# Patient Record
Sex: MALE | Race: WHITE | NOT HISPANIC OR LATINO | Employment: OTHER | URBAN - METROPOLITAN AREA
[De-identification: names, ages, dates, MRNs, and addresses within clinical notes are randomized per-mention and may not be internally consistent; named-entity substitution may affect disease eponyms.]

---

## 2017-01-02 DIAGNOSIS — I10 ESSENTIAL HYPERTENSION: ICD-10-CM

## 2017-01-04 RX ORDER — HYDROCHLOROTHIAZIDE 12.5 MG/1
CAPSULE, GELATIN COATED ORAL
Qty: 30 CAP | Refills: 11 | Status: SHIPPED | OUTPATIENT
Start: 2017-01-04 | End: 2017-10-02

## 2017-01-13 ENCOUNTER — APPOINTMENT (OUTPATIENT)
Dept: MEDICAL GROUP | Facility: MEDICAL CENTER | Age: 72
End: 2017-01-13
Payer: MEDICARE

## 2017-01-27 ENCOUNTER — HOSPITAL ENCOUNTER (OUTPATIENT)
Dept: LAB | Facility: MEDICAL CENTER | Age: 72
End: 2017-01-27
Attending: FAMILY MEDICINE
Payer: MEDICARE

## 2017-01-27 DIAGNOSIS — E11.42 TYPE 2 DIABETES MELLITUS WITH PERIPHERAL NEUROPATHY (HCC): ICD-10-CM

## 2017-01-27 DIAGNOSIS — E78.5 HYPERLIPIDEMIA: ICD-10-CM

## 2017-01-27 DIAGNOSIS — E55.9 VITAMIN D INSUFFICIENCY: ICD-10-CM

## 2017-01-27 DIAGNOSIS — I10 ESSENTIAL HYPERTENSION: ICD-10-CM

## 2017-01-27 LAB
25(OH)D3 SERPL-MCNC: 19 NG/ML (ref 30–100)
ALBUMIN SERPL BCP-MCNC: 4 G/DL (ref 3.2–4.9)
ALBUMIN/GLOB SERPL: 1.3 G/DL
ALP SERPL-CCNC: 112 U/L (ref 30–99)
ALT SERPL-CCNC: 35 U/L (ref 2–50)
ANION GAP SERPL CALC-SCNC: 6 MMOL/L (ref 0–11.9)
AST SERPL-CCNC: 26 U/L (ref 12–45)
BASOPHILS # BLD AUTO: 0.06 K/UL (ref 0–0.12)
BASOPHILS NFR BLD AUTO: 1.1 % (ref 0–1.8)
BILIRUB SERPL-MCNC: 1.8 MG/DL (ref 0.1–1.5)
BUN SERPL-MCNC: 17 MG/DL (ref 8–22)
CALCIUM SERPL-MCNC: 9.3 MG/DL (ref 8.5–10.5)
CHLORIDE SERPL-SCNC: 105 MMOL/L (ref 96–112)
CHOLEST SERPL-MCNC: 149 MG/DL (ref 100–199)
CO2 SERPL-SCNC: 27 MMOL/L (ref 20–33)
CREAT SERPL-MCNC: 0.97 MG/DL (ref 0.5–1.4)
CREAT UR-MCNC: 95.5 MG/DL
EOSINOPHIL # BLD: 0.2 K/UL (ref 0–0.51)
EOSINOPHIL NFR BLD AUTO: 3.5 % (ref 0–6.9)
ERYTHROCYTE [DISTWIDTH] IN BLOOD BY AUTOMATED COUNT: 46.1 FL (ref 35.9–50)
EST. AVERAGE GLUCOSE BLD GHB EST-MCNC: 192 MG/DL
GLOBULIN SER CALC-MCNC: 3 G/DL (ref 1.9–3.5)
GLUCOSE SERPL-MCNC: 163 MG/DL (ref 65–99)
HBA1C MFR BLD: 8.3 % (ref 0–5.6)
HCT VFR BLD AUTO: 49.2 % (ref 42–52)
HDLC SERPL-MCNC: 56 MG/DL
HGB BLD-MCNC: 15.7 G/DL (ref 14–18)
IMM GRANULOCYTES # BLD AUTO: 0.01 K/UL (ref 0–0.11)
IMM GRANULOCYTES NFR BLD AUTO: 0.2 % (ref 0–0.9)
LDLC SERPL CALC-MCNC: 75 MG/DL
LYMPHOCYTES # BLD: 1.41 K/UL (ref 1–4.8)
LYMPHOCYTES NFR BLD AUTO: 25 % (ref 22–41)
MCH RBC QN AUTO: 30.5 PG (ref 27–33)
MCHC RBC AUTO-ENTMCNC: 31.9 G/DL (ref 33.7–35.3)
MCV RBC AUTO: 95.5 FL (ref 81.4–97.8)
MICROALBUMIN UR-MCNC: 6 MG/DL
MICROALBUMIN/CREAT UR: 63 MG/G (ref 0–30)
MONOCYTES # BLD: 0.51 K/UL (ref 0–0.85)
MONOCYTES NFR BLD AUTO: 9 % (ref 0–13.4)
NEUTROPHILS # BLD: 3.46 K/UL (ref 1.82–7.42)
NEUTROPHILS NFR BLD AUTO: 61.2 % (ref 44–72)
NRBC # BLD AUTO: 0 K/UL
NRBC BLD-RTO: 0 /100 WBC
PLATELET # BLD AUTO: 171 K/UL (ref 164–446)
PMV BLD AUTO: 10.1 FL (ref 9–12.9)
POTASSIUM SERPL-SCNC: 4.2 MMOL/L (ref 3.6–5.5)
PROT SERPL-MCNC: 7 G/DL (ref 6–8.2)
RBC # BLD AUTO: 5.15 M/UL (ref 4.7–6.1)
SODIUM SERPL-SCNC: 138 MMOL/L (ref 135–145)
TRIGL SERPL-MCNC: 89 MG/DL (ref 0–149)
TSH SERPL DL<=0.005 MIU/L-ACNC: 1.24 UIU/ML (ref 0.3–3.7)
WBC # BLD AUTO: 5.7 K/UL (ref 4.8–10.8)

## 2017-01-27 PROCEDURE — 80061 LIPID PANEL: CPT

## 2017-01-27 PROCEDURE — 83036 HEMOGLOBIN GLYCOSYLATED A1C: CPT

## 2017-01-27 PROCEDURE — 82570 ASSAY OF URINE CREATININE: CPT

## 2017-01-27 PROCEDURE — 84443 ASSAY THYROID STIM HORMONE: CPT

## 2017-01-27 PROCEDURE — 36415 COLL VENOUS BLD VENIPUNCTURE: CPT

## 2017-01-27 PROCEDURE — 80053 COMPREHEN METABOLIC PANEL: CPT

## 2017-01-27 PROCEDURE — 82306 VITAMIN D 25 HYDROXY: CPT

## 2017-01-27 PROCEDURE — 85025 COMPLETE CBC W/AUTO DIFF WBC: CPT

## 2017-01-27 PROCEDURE — 82043 UR ALBUMIN QUANTITATIVE: CPT

## 2017-01-29 ENCOUNTER — PATIENT OUTREACH (OUTPATIENT)
Dept: HEALTH INFORMATION MANAGEMENT | Facility: OTHER | Age: 72
End: 2017-01-29

## 2017-01-29 NOTE — PROGRESS NOTES
1ST ATTEMPT AWV- REQUESTED A CALL BACK    DUE: , COLONOSCOPY, RETINAL SCREENING, DIABETES MONOFILAMENT, TDAP, ZOSTER, PNEUMO AND FLU (NO RECORD IN [facility])

## 2017-01-30 ENCOUNTER — OFFICE VISIT (OUTPATIENT)
Dept: MEDICAL GROUP | Facility: MEDICAL CENTER | Age: 72
End: 2017-01-30
Payer: MEDICARE

## 2017-01-30 VITALS
BODY MASS INDEX: 32.85 KG/M2 | RESPIRATION RATE: 16 BRPM | OXYGEN SATURATION: 94 % | HEART RATE: 72 BPM | DIASTOLIC BLOOD PRESSURE: 82 MMHG | HEIGHT: 74 IN | SYSTOLIC BLOOD PRESSURE: 142 MMHG | TEMPERATURE: 97.5 F | WEIGHT: 256 LBS

## 2017-01-30 DIAGNOSIS — I10 ESSENTIAL HYPERTENSION: ICD-10-CM

## 2017-01-30 DIAGNOSIS — N40.1 BENIGN NON-NODULAR PROSTATIC HYPERPLASIA WITH LOWER URINARY TRACT SYMPTOMS: ICD-10-CM

## 2017-01-30 DIAGNOSIS — E55.9 VITAMIN D DEFICIENCY: ICD-10-CM

## 2017-01-30 DIAGNOSIS — E11.42 TYPE 2 DIABETES MELLITUS WITH PERIPHERAL NEUROPATHY (HCC): ICD-10-CM

## 2017-01-30 DIAGNOSIS — E78.00 PURE HYPERCHOLESTEROLEMIA: ICD-10-CM

## 2017-01-30 PROCEDURE — 1036F TOBACCO NON-USER: CPT | Performed by: FAMILY MEDICINE

## 2017-01-30 PROCEDURE — 1101F PT FALLS ASSESS-DOCD LE1/YR: CPT | Performed by: FAMILY MEDICINE

## 2017-01-30 PROCEDURE — 3045F PR MOST RECENT HEMOGLOBIN A1C LEVEL 7.0-9.0%: CPT | Performed by: FAMILY MEDICINE

## 2017-01-30 PROCEDURE — 99214 OFFICE O/P EST MOD 30 MIN: CPT | Performed by: FAMILY MEDICINE

## 2017-01-30 PROCEDURE — G8598 ASA/ANTIPLAT THER USED: HCPCS | Performed by: FAMILY MEDICINE

## 2017-01-30 PROCEDURE — G8419 CALC BMI OUT NRM PARAM NOF/U: HCPCS | Performed by: FAMILY MEDICINE

## 2017-01-30 PROCEDURE — G8484 FLU IMMUNIZE NO ADMIN: HCPCS | Performed by: FAMILY MEDICINE

## 2017-01-30 PROCEDURE — 4040F PNEUMOC VAC/ADMIN/RCVD: CPT | Mod: 8P | Performed by: FAMILY MEDICINE

## 2017-01-30 PROCEDURE — G8432 DEP SCR NOT DOC, RNG: HCPCS | Performed by: FAMILY MEDICINE

## 2017-01-30 PROCEDURE — 3017F COLORECTAL CA SCREEN DOC REV: CPT | Mod: 8P | Performed by: FAMILY MEDICINE

## 2017-01-30 ASSESSMENT — PATIENT HEALTH QUESTIONNAIRE - PHQ9: CLINICAL INTERPRETATION OF PHQ2 SCORE: 0

## 2017-01-30 NOTE — ASSESSMENT & PLAN NOTE
Patient has been forgetting to take vitamin D supplementation. He has vitamin D 5000 units at home. Vitamin D level was 19.

## 2017-01-30 NOTE — ASSESSMENT & PLAN NOTE
Patient is tolerating current medications without any noticeable side effects. His A1c did go up from 7.7 up to 8.3. He is still taking Actos 45 mg daily and metformin 500 mg 3 times a day. He states he has not been compliant with healthy diet recently.

## 2017-01-30 NOTE — ASSESSMENT & PLAN NOTE
"Patient is waking up one time at night and notices that his stream is decreased in the middle night. During the day his stream is \"pretty good\". He is not on any medication for prostate.  "

## 2017-01-30 NOTE — MR AVS SNAPSHOT
"        Agapito Stone   2017 1:40 PM   Office Visit   MRN: [id number]    Department:  [facility]   Dept Phone:  [phone]    Description:  Male : 1945   Provider:  Brody Zamorano M.D.           Reason for Visit     Follow-Up     Results           Allergies as of 2017     Allergen Noted Reactions    Fish 2010   Hives    shellfish    Pcn [Penicillins] 2010   Hives    Amoxicillin 2013   Hives    Food 03/10/2014   Swelling     Eggs,Melons, avacados-puffy mouth    Horse Allergy 2015       Influenza Vaccines 2016         You were diagnosed with     Type 2 diabetes mellitus with peripheral neuropathy (CMS-HCC)   [3330923]       Essential hypertension   [4634141]       Pure hypercholesterolemia   [272.0.ICD-9-CM]       Vitamin D deficiency   [6633685]       Benign non-nodular prostatic hyperplasia with lower urinary tract symptoms   [7264105]         Vital Signs     Blood Pressure Pulse Temperature Respirations Height Weight    142/82 mmHg 72 36.4 °C (97.5 °F) 16 1.88 m (6' 2.02\") 116.121 kg (256 lb)    Body Mass Index Oxygen Saturation Smoking Status             32.85 kg/m2 94% Never Smoker          Basic Information     Date Of Birth Sex Race Ethnicity Preferred Language    1945 Male White Non- English      Your appointments     Mar 08, 2017 11:00 AM   FOLLOW UP with Kade Ovalle M.D.   [facility] (--)    [address] [place] [address] [address]  [facility] [address]   [phone]              Problem List              ICD-10-CM Priority Class Noted - Resolved    History of stroke Z86.73 Medium  2012 - Present    HTN (hypertension) I10 High  2012 - Present    Carotid atherosclerosis I65.29 High  2012 - Present    CAD (coronary artery disease) I25.10 High  10/30/2013 - Present    Hyperlipidemia E78.5 High  10/30/2013 - Present    Stented coronary artery Z95.5 High  2013 - Present    Dysphagia R13.10 "   9/4/2014 - Present    Type 2 diabetes mellitus with peripheral neuropathy (CMS-HCC) E11.42   9/24/2014 - Present    Hereditary and idiopathic peripheral neuropathy G60.9   9/24/2014 - Present    Vitamin D deficiency E55.9   11/9/2015 - Present    Seasonal allergies J30.2   11/9/2015 - Present    Obesity (BMI 30.0-34.9) E66.9   11/9/2015 - Present    Agent orange exposure Z77.098   1/14/2016 - Present    Right ankle pain M25.571   9/22/2016 - Present    Benign non-nodular prostatic hyperplasia with lower urinary tract symptoms N40.1   1/30/2017 - Present      Health Maintenance        Date Due Completion Dates    IMM DTaP/Tdap/Td Vaccine (1 - Tdap) 2/25/1966 ---    COLONOSCOPY 2/25/1997 ---    IMM ZOSTER VACCINE 2/25/2007 ---    IMM PNEUMOCOCCAL 65+ (ADULT) LOW/MEDIUM RISK SERIES (1 of 2 - PCV13) 2/25/2012 ---    DIABETES MONOFILAMENT / LE EXAM 3/18/2016 3/18/2015 (N/S), 3/10/2014    Override on 3/18/2015: (N/S)    IMM INFLUENZA (1) 9/1/2016 ---    RETINAL SCREENING 10/12/2016 10/12/2015, 10/6/2014, 8/16/2013, 6/29/2012    A1C SCREENING 7/27/2017 1/27/2017, 9/22/2016, 6/20/2016, 3/19/2016, 3/19/2016, 9/23/2015, 3/18/2015, 9/24/2014, 9/22/2014, 11/15/2013, 1/29/2012, 6/14/2010    FASTING LIPID PROFILE 1/27/2018 1/27/2017, 6/20/2016, 3/19/2016, 10/8/2015, 9/22/2014, 11/15/2013, 1/29/2012, 6/14/2010    URINE ACR / MICROALBUMIN 1/27/2018 1/27/2017, 6/20/2016, 3/19/2016, 9/21/2015, 10/22/2012    SERUM CREATININE 1/27/2018 1/27/2017, 9/5/2016, 6/20/2016, 3/19/2016, 10/8/2015, 3/30/2015, 3/16/2015, 9/22/2014, 11/15/2013, 2/20/2013, 1/31/2013, 10/22/2012, 1/29/2012, 1/28/2012, 7/14/2010, 7/6/2010, 6/14/2010, 6/13/2010, 4/9/2007, 1/23/2007            Current Immunizations     No immunizations on file.      Below and/or attached are the medications your provider expects you to take. Review all of your home medications and newly ordered medications with your provider and/or pharmacist. Follow medication instructions as  directed by your provider and/or pharmacist. Please keep your medication list with you and share with your provider. Update the information when medications are discontinued, doses are changed, or new medications (including over-the-counter products) are added; and carry medication information at all times in the event of emergency situations     Allergies:  FISH - Hives     PCN - Hives     AMOXICILLIN - Hives     FOOD - Swelling     HORSE ALLERGY - (reactions not documented)     INFLUENZA VACCINES - (reactions not documented)               Medications  Valid as of: January 30, 2017 -  1:52 PM    Generic Name Brand Name Tablet Size Instructions for use    Aspirin (Tab) aspirin 81 MG Take 81 mg by mouth every day.        Atorvastatin Calcium (Tab) LIPITOR 80 MG Take 1 Tab by mouth every day.        Blood Glucose Monitoring Suppl (Misc) Blood Glucose Monitoring Suppl SUPPLIES Accu Check Yoanna blood glucose test strips.  Checking blood sugars 2 times per day E11.40        Carvedilol (Tab) COREG 12.5 MG Take 1 Tab by mouth 2 times a day, with meals.        Cholecalciferol (Tab) Vitamin D-3 5000 UNITS Take 1 Tab by mouth every day.          HydroCHLOROthiazide (Cap) MICROZIDE 12.5 MG TAKE ONE CAPSULE BY MOUTH ONE TIME DAILY        Lisinopril (Tab) PRINIVIL 20 MG Take 1 Tab by mouth every day.        MetFORMIN HCl (Tab) GLUCOPHAGE 500 MG Take 1 Tab by mouth 3 times a day, with meals.        Multiple Vitamin   Take 1 Tab by mouth every day.        Nitroglycerin (SL Tab) NITROSTAT 0.4 MG Place 1 Tab under tongue as needed for Chest Pain.        Pioglitazone HCl (Tab) ACTOS 45 MG Take 1 Tab by mouth every day.        .                 Medicines prescribed today were sent to:     [id number] - [place] - [address] [place] [address]    Phone: [phone] Fax: [phone]    Open 24 Hours?: No      Medication refill instructions:       If your prescription bottle indicates you have medication refills  left, it is not necessary to call your provider’s office. Please contact your pharmacy and they will refill your medication.    If your prescription bottle indicates you do not have any refills left, you may request refills at any time through one of the following ways: The online Jampp system (except Urgent Care), by calling your provider’s office, or by asking your pharmacy to contact your provider’s office with a refill request. Medication refills are processed only during regular business hours and may not be available until the next business day. Your provider may request additional information or to have a follow-up visit with you prior to refilling your medication.   *Please Note: Medication refills are assigned a new Rx number when refilled electronically. Your pharmacy may indicate that no refills were authorized even though a new prescription for the same medication is available at the pharmacy. Please request the medicine by name with the pharmacy before contacting your provider for a refill.        Your To Do List     Future Labs/Procedures Complete By Expires    COMP METABOLIC PANEL  As directed 1/31/2018    HEMOGLOBIN A1C  As directed 1/31/2018    LIPID PROFILE  As directed 1/31/2018    MICROALBUMIN CREAT RATIO URINE  As directed 1/31/2018    VITAMIN D,25 HYDROXY  As directed 1/31/2018         Jampp Access Code: Activation code not generated  Current Jampp Status: Active

## 2017-01-30 NOTE — Clinical Note
[facility]  Brody Zamorano M.D.  [address]  [place] [address]  Fax: [phone] Authorization for Release/Disclosure of Protected Health Information   Name: NADINE CABRERA : 1945 SSN: [id number]   Address: [address] [place] [address]  [place] [address] Phone:    [phone] (home)    I authorize the entity listed below to release/disclose the PHI below to [facility]/Brody Zamorano M.D.   Provider or Entity Name:  Dr Jorden Samaniego      Address   City, [facility]   Phone:      Fax:     Reason for request: continuity of care   Information to be released:    [  ] LAST COLONOSCOPY, including any PATH REPORT [  ] LAST DEXA  [  ] LAST MAMMOGRAM  [  ] LAST PAP [  ] RETINA EXAM REPORT  [  ] IMMUNIZATION RECORDS  [  ] Release all info      [  ] Check here and initial the line next to each item to release ALL health information INCLUDING  _____ Care and treatment for drug and / or alcohol abuse  _____ HIV testing, infection status, or AIDS  _____ Genetic Testing    DATES OF SERVICE OR TIME PERIOD TO BE DISCLOSED: _____________  I understand and acknowledge that:  * This Authorization may be revoked at any time by you in writing, except if your health information has already been used or disclosed.  * Your health information that will be used or disclosed as a result of you signing this authorization could be re-disclosed by the recipient. If this occurs, your re-disclosed health information may no longer be protected by State or Federal laws.  * You may refuse to sign this Authorization. Your refusal will not affect your ability to obtain treatment.  * This Authorization becomes effective upon signing and will  on (date) __________. If no date is indicated, this Authorization will  one (1) year from the signature date.    Name: Nadine Cabrera    Signature:     Date: 2017

## 2017-01-30 NOTE — ASSESSMENT & PLAN NOTE
Patient is tolerating atorvastatin 80 mg daily. He has history of stroke. His history of coronary artery disease, requiring stent.

## 2017-01-30 NOTE — PROGRESS NOTES
"Subjective:   Agapito Stone is a 71 y.o. male here today for diabetes, BPH, every lipidemia    Benign non-nodular prostatic hyperplasia with lower urinary tract symptoms  Patient is waking up one time at night and notices that his stream is decreased in the middle night. During the day his stream is \"pretty good\". He is not on any medication for prostate.    HTN (hypertension)  Patient is tolerating current medications without any noticeable side effects.    Type 2 diabetes mellitus with peripheral neuropathy  Patient is tolerating current medications without any noticeable side effects. His A1c did go up from 7.7 up to 8.3. He is still taking Actos 45 mg daily and metformin 500 mg 3 times a day. He states he has not been compliant with healthy diet recently.    Hyperlipidemia  Patient is tolerating atorvastatin 80 mg daily. He has history of stroke. His history of coronary artery disease, requiring stent.    Vitamin D deficiency  Patient has been forgetting to take vitamin D supplementation. He has vitamin D 5000 units at home. Vitamin D level was 19.           Current medicines (including changes today)  Current Outpatient Prescriptions   Medication Sig Dispense Refill   • hydrochlorothiazide (MICROZIDE) 12.5 MG capsule TAKE ONE CAPSULE BY MOUTH ONE TIME DAILY 30 Cap 11   • carvedilol (COREG) 12.5 MG Tab Take 1 Tab by mouth 2 times a day, with meals. 180 Tab 3   • atorvastatin (LIPITOR) 80 MG tablet Take 1 Tab by mouth every day. 90 Tab 3   • metformin (GLUCOPHAGE) 500 MG Tab Take 1 Tab by mouth 3 times a day, with meals. 90 Tab 5   • Blood Glucose Monitoring Suppl SUPPLIES Misc Accu Check Yoanna blood glucose test strips.  Checking blood sugars 2 times per day E11.40 150 Strip 5   • pioglitazone (ACTOS) 45 MG Tab Take 1 Tab by mouth every day. 90 Tab 1   • lisinopril (PRINIVIL) 20 MG Tab Take 1 Tab by mouth every day. 90 Tab 2   • aspirin 81 MG tablet Take 81 mg by mouth every day.     • nitroglycerin " "(NITROSTAT) 0.4 MG SUBL Place 1 Tab under tongue as needed for Chest Pain. 25 Tab 1   • Cholecalciferol (VITAMIN D-3) 5000 UNITS TABS Take 1 Tab by mouth every day.       • Multiple Vitamin (HM STRESS FORMULA VITAMIN PO) Take 1 Tab by mouth every day.       No current facility-administered medications for this visit.     He  has a past medical history of Diabetes; Stroke (CMS-[facility]) (2010); Hypertension; CAD (coronary artery disease) (October 2013); Hyperlipidemia; and Syncope.    ROS   No chest pain, no shortness of breath, no abdominal pain       Objective:     Blood pressure 142/82, pulse 72, temperature 36.4 °C (97.5 °F), resp. rate 16, height 1.88 m (6' 2.02\"), weight 116.121 kg (256 lb), SpO2 94 %. Body mass index is 32.85 kg/(m^2).   Physical Exam:  Constitutional: Alert, no distress.  Skin: Warm, dry, good turgor, no rashes in visible areas.  Eye: Equal, round and reactive, conjunctiva clear, lids normal.  Psych: Alert and oriented x3, normal affect and mood.        Assessment and Plan:   The following treatment plan was discussed    1. Type 2 diabetes mellitus with peripheral neuropathy (CMS-HCC)  Advised patient on diet and exercise. Continue current medication. Follow-up in 3 months. If no improvement in blood sugar in 3 months consider adding Januvia.  - COMP METABOLIC PANEL; Future  - HEMOGLOBIN A1C; Future  - MICROALBUMIN CREAT RATIO URINE; Future    2. Essential hypertension  Controlled. Continue current medication.    3. Pure hypercholesterolemia  Controlled. Continue current medication.  - LIPID PROFILE; Future    4. Vitamin D deficiency  Advised patient to start taking vitamin D 5000 units daily.  - VITAMIN D,25 HYDROXY; Future    5. Benign non-nodular prostatic hyperplasia with lower urinary tract symptoms  Advised patient to monitor symptoms and if symptoms get worse then consider Flomax.      Followup: Return in about 3 months (around 4/30/2017) for Diabetes, Short.           "

## 2017-01-30 NOTE — Clinical Note
[facility]  Brody Zamorano M.D.  [address]  [place] [address]  Fax: [phone] Authorization for Release/Disclosure of Protected Health Information   Name: AGAPITO CABRERA : 1945 SSN: [id number]   Address: [address] [place] [address]  [place] [address] Phone:    [phone] (home)    I authorize the entity listed below to release/disclose the PHI below to [facility]/Brody Zamorano M.D.   [facility]      Address   City, State, Zip   Phone:      Fax:     Reason for request: continuity of care   Information to be released:    [X] LAST COLONOSCOPY, including any PATH REPORT [  ] LAST DEXA  [  ] LAST MAMMOGRAM  [  ] LAST PAP [  ] RETINA EXAM REPORT  [  ] IMMUNIZATION RECORDS  [  ] Release all info      [  ] Check here and initial the line next to each item to release ALL health information INCLUDING  _____ Care and treatment for drug and / or alcohol abuse  _____ HIV testing, infection status, or AIDS  _____ Genetic Testing    DATES OF SERVICE OR TIME PERIOD TO BE DISCLOSED: _____________  I understand and acknowledge that:  * This Authorization may be revoked at any time by you in writing, except if your health information has already been used or disclosed.  * Your health information that will be used or disclosed as a result of you signing this authorization could be re-disclosed by the recipient. If this occurs, your re-disclosed health information may no longer be protected by State or Federal laws.  * You may refuse to sign this Authorization. Your refusal will not affect your ability to obtain treatment.  * This Authorization becomes effective upon signing and will  on (date) __________. If no date is indicated, this Authorization will  one (1) year from the signature date.    Name: Agapito Cabrera    Signature:     Date: 2017

## 2017-02-03 DIAGNOSIS — E11.42 TYPE 2 DIABETES MELLITUS WITH PERIPHERAL NEUROPATHY (HCC): ICD-10-CM

## 2017-02-03 NOTE — TELEPHONE ENCOUNTER
Please notify patient received a message from his insurance company saying that he has not filled a prescription for metformin or Actos in the last one year. Please ask why he is not filling his diabetic medications.  Brody Zamorano M.D.

## 2017-02-06 RX ORDER — PIOGLITAZONEHYDROCHLORIDE 45 MG/1
45 TABLET ORAL DAILY
Qty: 90 TAB | Refills: 3 | Status: SHIPPED | OUTPATIENT
Start: 2017-02-06 | End: 2018-01-27 | Stop reason: SDUPTHER

## 2017-02-06 NOTE — TELEPHONE ENCOUNTER
Pt's wife states [facility]. Pt's wife had also received an over supply of Metformin from [facility] two years ago and shared it with him. They would like to get back on track to receiving rx's from [facility] [place].     He needs a refill for Actos and Metformin.     Call back number: [phone] (home)

## 2017-02-11 NOTE — PROGRESS NOTES
2/11/17   -     Annual Wellness Visit Scheduling  1. Scheduling Status:Not Scheduled. Patient states they are not interested   PT SAID THAT HE SAW DR. JONES RECENTLY AND HE ALREADY HAS ENOUGH DR'S.     MyChart Activation: already active

## 2017-02-16 ENCOUNTER — APPOINTMENT (OUTPATIENT)
Dept: ADMISSIONS | Facility: MEDICAL CENTER | Age: 72
End: 2017-02-16
Attending: OPHTHALMOLOGY
Payer: MEDICARE

## 2017-02-17 DIAGNOSIS — E66.9 DIABETES MELLITUS TYPE 2 IN OBESE (HCC): ICD-10-CM

## 2017-02-17 DIAGNOSIS — E11.69 DIABETES MELLITUS TYPE 2 IN OBESE (HCC): ICD-10-CM

## 2017-02-24 DIAGNOSIS — E11.69 DIABETES MELLITUS TYPE 2 IN OBESE (HCC): ICD-10-CM

## 2017-02-24 DIAGNOSIS — E66.9 DIABETES MELLITUS TYPE 2 IN OBESE (HCC): ICD-10-CM

## 2017-02-24 NOTE — TELEPHONE ENCOUNTER
Pt needs 3 mo RX HARD COPT for  on Monday at .    This is due to pharmacy change.     Was the patient seen in the last year in this department? Yes     Does patient have an active prescription for medications requested? No     Received Request Via: Patient

## 2017-03-02 ENCOUNTER — TELEPHONE (OUTPATIENT)
Dept: MEDICAL GROUP | Facility: MEDICAL CENTER | Age: 72
End: 2017-03-02

## 2017-03-02 DIAGNOSIS — E11.42 TYPE 2 DIABETES MELLITUS WITH PERIPHERAL NEUROPATHY (HCC): ICD-10-CM

## 2017-03-02 RX ORDER — BLOOD-GLUCOSE METER
1 KIT MISCELLANEOUS DAILY
Qty: 1 KIT | Refills: 0 | Status: ON HOLD | OUTPATIENT
Start: 2017-03-02 | End: 2020-01-01

## 2017-03-02 RX ORDER — LANCETS 30 GAUGE
EACH MISCELLANEOUS
Qty: 100 EACH | Refills: 3 | Status: SHIPPED | OUTPATIENT
Start: 2017-03-02 | End: 2020-01-01

## 2017-03-02 NOTE — TELEPHONE ENCOUNTER
There is no machine called freestyle acensia.  There is a machine called freestyle light and there is a different machine called ascensia.  I have sent in a prescription for freestyle machine and supplies.  Brody Zamorano M.D.

## 2017-03-02 NOTE — TELEPHONE ENCOUNTER
1. Caller Name: Simran Pt's wife                      Call Back Number: [phone]    2. Message: Pt's wife states their insurance will only cover a Freestyle Acensia. She would like rx for device, strips, and lancets.     3. Patient approves office to leave a detailed voicemail/MyChart message: N\A

## 2017-03-08 ENCOUNTER — OFFICE VISIT (OUTPATIENT)
Dept: CARDIOLOGY | Facility: MEDICAL CENTER | Age: 72
End: 2017-03-08
Payer: MEDICARE

## 2017-03-08 VITALS
BODY MASS INDEX: 32.85 KG/M2 | DIASTOLIC BLOOD PRESSURE: 80 MMHG | HEART RATE: 70 BPM | SYSTOLIC BLOOD PRESSURE: 150 MMHG | HEIGHT: 74 IN | WEIGHT: 256 LBS

## 2017-03-08 DIAGNOSIS — I10 ESSENTIAL HYPERTENSION: ICD-10-CM

## 2017-03-08 DIAGNOSIS — Z95.5 STENTED CORONARY ARTERY: ICD-10-CM

## 2017-03-08 DIAGNOSIS — E78.00 PURE HYPERCHOLESTEROLEMIA: ICD-10-CM

## 2017-03-08 DIAGNOSIS — I25.10 CORONARY ARTERY DISEASE INVOLVING NATIVE CORONARY ARTERY OF NATIVE HEART WITHOUT ANGINA PECTORIS: ICD-10-CM

## 2017-03-08 PROCEDURE — G8598 ASA/ANTIPLAT THER USED: HCPCS | Performed by: INTERNAL MEDICINE

## 2017-03-08 PROCEDURE — G8419 CALC BMI OUT NRM PARAM NOF/U: HCPCS | Performed by: INTERNAL MEDICINE

## 2017-03-08 PROCEDURE — G8432 DEP SCR NOT DOC, RNG: HCPCS | Performed by: INTERNAL MEDICINE

## 2017-03-08 PROCEDURE — 1101F PT FALLS ASSESS-DOCD LE1/YR: CPT | Performed by: INTERNAL MEDICINE

## 2017-03-08 PROCEDURE — 99214 OFFICE O/P EST MOD 30 MIN: CPT | Performed by: INTERNAL MEDICINE

## 2017-03-08 PROCEDURE — 3017F COLORECTAL CA SCREEN DOC REV: CPT | Mod: 8P | Performed by: INTERNAL MEDICINE

## 2017-03-08 PROCEDURE — 4040F PNEUMOC VAC/ADMIN/RCVD: CPT | Mod: 8P | Performed by: INTERNAL MEDICINE

## 2017-03-08 PROCEDURE — 1036F TOBACCO NON-USER: CPT | Performed by: INTERNAL MEDICINE

## 2017-03-08 PROCEDURE — G8484 FLU IMMUNIZE NO ADMIN: HCPCS | Performed by: INTERNAL MEDICINE

## 2017-03-08 ASSESSMENT — ENCOUNTER SYMPTOMS
VOMITING: 0
FOCAL WEAKNESS: 0
MYALGIAS: 0
CONSTITUTIONAL NEGATIVE: 1
NAUSEA: 0
BLURRED VISION: 0
WEAKNESS: 0
SHORTNESS OF BREATH: 0
PALPITATIONS: 0
DEPRESSION: 0
LOSS OF CONSCIOUSNESS: 0

## 2017-03-08 NOTE — Clinical Note
[facility]   [address]  [place] [address]  Phone: [phone]  Fax: [phone]              Agapito Stone  1945    Encounter Date: 3/8/2017    Kade Ovalle M.D.          PROGRESS NOTE:  Subjective:   Agapito Stone is a 72 y.o. male who presents today for follow-up of coronary disease with stenting of the circumflex coronary artery performed in [place] in October 2013. At that time he had a 3.5×12 mm Xience stent placed in the proximal circumflex and a 3.25×23 mm Xience stent placed in the mid to distal circumflex. He's had no angina at all. The patient has diabetes mellitus, hypertension, and carotid artery disease. He had a left endarterectomy and his carotid artery status is followed closely by Dr. Darden.  Recent lab was reviewed with him. LDL is not quite at target on maximum dose atorvastatin. Weight loss was again recommended.  Last visit, HCTZ was added to his blood pressure regimen.    Past Medical History   Diagnosis Date   • Diabetes    • Hypertension    • CAD (coronary artery disease) October 2013     Dr. Ovalle; ACS. PCI/LETA x 2 of the mid circumflex (Xience 3.25 x 23mm) and proximal circumflex (Xience 3.6x 12mm)   • Hyperlipidemia    • Syncope    • Arthritis      hands, wrists, ankles   • Cataract    • Pain      ankles   • Stroke (CMS-HCC) 2010     no residual effects     Past Surgical History   Procedure Laterality Date   • Carotid endarterectomy  7/13/2010     left; Performed by CHIQUITA DARDEN at SURGERY [facility] ORS   • Stent placement  2013     cardiac   • Tonsillectomy  as a child     Family History   Problem Relation Age of Onset   • Other Mother      Rheumatic fever     History   Smoking status   • Never Smoker    Smokeless tobacco   • Never Used     Allergies   Allergen Reactions   • Fish Hives     shellfish   • Pcn [Penicillins] Hives   • Amoxicillin Hives   • Food Swelling      Eggs,Melons,  avocados-puffy mouth   • Horse Allergy    • Influenza Vaccines      Outpatient Encounter Prescriptions as of 3/8/2017   Medication Sig Dispense Refill   • Blood Glucose Monitoring Suppl (FREESTYLE FREEDOM LITE) W/DEVICE Kit 1 Application by Does not apply route every day. Dx: E11.42 1 Kit 0   • Blood Glucose Monitoring Suppl SUPPLIES Misc Test strips order: Test strips for Abbott Freestyle Lite meter. Sig: use once daily. Dx: E11.42 100 Each 3   • Lancets Misc Lancets order: Lancets for Abbott Freestyle Lite meter. Sig: use once daily. Dx: E11.42 100 Each 3   • Blood Glucose Monitoring Suppl SUPPLIES Misc Accu Check Yoanna blood glucose test strips.  Checking blood sugars 2 times per day E11.40 200 Strip 3   • ibuprofen (MOTRIN) 200 MG Tab Take 200 mg by mouth every 6 hours as needed.     • pioglitazone (ACTOS) 45 MG Tab Take 1 Tab by mouth every day. 90 Tab 3   • metformin (GLUCOPHAGE) 500 MG Tab Take 1 Tab by mouth 3 times a day, with meals. 270 Tab 3   • hydrochlorothiazide (MICROZIDE) 12.5 MG capsule TAKE ONE CAPSULE BY MOUTH ONE TIME DAILY 30 Cap 11   • carvedilol (COREG) 12.5 MG Tab Take 1 Tab by mouth 2 times a day, with meals. 180 Tab 3   • atorvastatin (LIPITOR) 80 MG tablet Take 1 Tab by mouth every day. (Patient taking differently: Take 80 mg by mouth every evening.) 90 Tab 3   • lisinopril (PRINIVIL) 20 MG Tab Take 1 Tab by mouth every day. 90 Tab 2   • aspirin 81 MG tablet Take 81 mg by mouth every day.     • nitroglycerin (NITROSTAT) 0.4 MG SUBL Place 1 Tab under tongue as needed for Chest Pain. 25 Tab 1   • Cholecalciferol (VITAMIN D-3) 5000 UNITS TABS Take 1 Tab by mouth every day.         No facility-administered encounter medications on file as of 3/8/2017.     Review of Systems   Constitutional: Negative.  Negative for malaise/fatigue.   HENT: Negative for hearing loss.         Difficulty swallowing, intermittent   Eyes: Negative for blurred vision.   Respiratory: Negative for shortness of breath.   "  Cardiovascular: Negative for chest pain and palpitations.   Gastrointestinal: Negative for nausea and vomiting.   Genitourinary: Negative for hematuria.   Musculoskeletal: Positive for joint pain. Negative for myalgias.        Has bilateral ankle pain   Neurological: Negative for focal weakness, loss of consciousness and weakness.        Wife notes some speech difficulty after his stroke, this is chronic   Psychiatric/Behavioral: Negative for depression.        Objective:   /80 mmHg  Pulse 70  Ht 1.88 m (6' 2\")  Wt 116.121 kg (256 lb)  BMI 32.85 kg/m2    Physical Exam   Constitutional: He is oriented to person, place, and time. He appears well-developed and well-nourished. No distress.   obese   HENT:   Head: Atraumatic.   Eyes: Conjunctivae and EOM are normal. Pupils are equal, round, and reactive to light.   Neck: Neck supple. No JVD present.   Cardiovascular: Normal rate and regular rhythm.    No murmur heard.  Pulses:       Carotid pulses are on the right side with bruit, and on the left side with bruit.  Pulmonary/Chest: Effort normal and breath sounds normal. No respiratory distress. He has no wheezes. He has no rales.   Abdominal: Soft. There is no tenderness.   obese   Musculoskeletal: He exhibits no edema.   Neurological: He is alert and oriented to person, place, and time.   Skin: Skin is warm and dry. He is not diaphoretic.       Assessment:     1. Coronary artery disease involving native coronary artery of native heart without angina pectoris     2. Essential hypertension     3. Pure hypercholesterolemia     4. Stented coronary artery         Medical Decision Making:  Today's Assessment / Status / Plan:   His blood pressure is under good control on his current medical regimen. By my reading is 132/80 bilaterally using a large cuff.   He's had no angina at all. LDL is not quite at target on maximum dose atorvastatin. Weight loss again encouraged.   He is admitted for towards ankle surgery " which was postponed previously. According to ACC/AHA guidelines is a risk for perioperative event.      Brody Zamorano M.D.  [address]  [address]  [facility] [address]  VIA In Basket

## 2017-03-08 NOTE — PROGRESS NOTES
Subjective:   Agapito Stone is a 72 y.o. male who presents today for follow-up of coronary disease with stenting of the circumflex coronary artery performed in [place] in October 2013. At that time he had a 3.5×12 mm Xience stent placed in the proximal circumflex and a 3.25×23 mm Xience stent placed in the mid to distal circumflex. He's had no angina at all. The patient has diabetes mellitus, hypertension, and carotid artery disease. He had a left endarterectomy and his carotid artery status is followed closely by Dr. Corrigan.  Recent lab was reviewed with him. LDL is not quite at target on maximum dose atorvastatin. Weight loss was again recommended.  Last visit, HCTZ was added to his blood pressure regimen.    Past Medical History   Diagnosis Date   • Diabetes    • Hypertension    • CAD (coronary artery disease) October 2013     Dr. Ovalle; ACS. PCI/LETA x 2 of the mid circumflex (Xience 3.25 x 23mm) and proximal circumflex (Xience 3.6x 12mm)   • Hyperlipidemia    • Syncope    • Arthritis      hands, wrists, ankles   • Cataract    • Pain      ankles   • Stroke (CMS-HCC) 2010     no residual effects     Past Surgical History   Procedure Laterality Date   • Carotid endarterectomy  7/13/2010     left; Performed by CHIQUITA CORRIGAN at [facility]ERY [facility]   • Stent placement  2013     cardiac   • Tonsillectomy  as a child     Family History   Problem Relation Age of Onset   • Other Mother      Rheumatic fever     History   Smoking status   • Never Smoker    Smokeless tobacco   • Never Used     Allergies   Allergen Reactions   • Fish Hives     shellfish   • Pcn [Penicillins] Hives   • Amoxicillin Hives   • Food Swelling      Eggs,Melons, avocados-puffy mouth   • Horse Allergy    • Influenza Vaccines      Outpatient Encounter Prescriptions as of 3/8/2017   Medication Sig Dispense Refill   • Blood Glucose Monitoring Suppl (FREESTYLE FREEDOM LITE) W/DEVICE Kit 1 Application by Does not apply route every day. Dx:  E11.42 1 Kit 0   • Blood Glucose Monitoring Suppl SUPPLIES Misc Test strips order: Test strips for Abbott Freestyle Lite meter. Sig: use once daily. Dx: E11.42 100 Each 3   • Lancets Misc Lancets order: Lancets for Abbott Freestyle Lite meter. Sig: use once daily. Dx: E11.42 100 Each 3   • Blood Glucose Monitoring Suppl SUPPLIES Misc Accu Check Yoanna blood glucose test strips.  Checking blood sugars 2 times per day E11.40 200 Strip 3   • ibuprofen (MOTRIN) 200 MG Tab Take 200 mg by mouth every 6 hours as needed.     • pioglitazone (ACTOS) 45 MG Tab Take 1 Tab by mouth every day. 90 Tab 3   • metformin (GLUCOPHAGE) 500 MG Tab Take 1 Tab by mouth 3 times a day, with meals. 270 Tab 3   • hydrochlorothiazide (MICROZIDE) 12.5 MG capsule TAKE ONE CAPSULE BY MOUTH ONE TIME DAILY 30 Cap 11   • carvedilol (COREG) 12.5 MG Tab Take 1 Tab by mouth 2 times a day, with meals. 180 Tab 3   • atorvastatin (LIPITOR) 80 MG tablet Take 1 Tab by mouth every day. (Patient taking differently: Take 80 mg by mouth every evening.) 90 Tab 3   • lisinopril (PRINIVIL) 20 MG Tab Take 1 Tab by mouth every day. 90 Tab 2   • aspirin 81 MG tablet Take 81 mg by mouth every day.     • nitroglycerin (NITROSTAT) 0.4 MG SUBL Place 1 Tab under tongue as needed for Chest Pain. 25 Tab 1   • Cholecalciferol (VITAMIN D-3) 5000 UNITS TABS Take 1 Tab by mouth every day.         No facility-administered encounter medications on file as of 3/8/2017.     Review of Systems   Constitutional: Negative.  Negative for malaise/fatigue.   HENT: Negative for hearing loss.         Difficulty swallowing, intermittent   Eyes: Negative for blurred vision.   Respiratory: Negative for shortness of breath.    Cardiovascular: Negative for chest pain and palpitations.   Gastrointestinal: Negative for nausea and vomiting.   Genitourinary: Negative for hematuria.   Musculoskeletal: Positive for joint pain. Negative for myalgias.        Has bilateral ankle pain   Neurological:  "Negative for focal weakness, loss of consciousness and weakness.        Wife notes some speech difficulty after his stroke, this is chronic   Psychiatric/Behavioral: Negative for depression.        Objective:   /80 mmHg  Pulse 70  Ht 1.88 m (6' 2\")  Wt 116.121 kg (256 lb)  BMI 32.85 kg/m2    Physical Exam   Constitutional: He is oriented to person, place, and time. He appears well-developed and well-nourished. No distress.   obese   HENT:   Head: Atraumatic.   Eyes: Conjunctivae and EOM are normal. Pupils are equal, round, and reactive to light.   Neck: Neck supple. No JVD present.   Cardiovascular: Normal rate and regular rhythm.    No murmur heard.  Pulses:       Carotid pulses are on the right side with bruit, and on the left side with bruit.  Pulmonary/Chest: Effort normal and breath sounds normal. No respiratory distress. He has no wheezes. He has no rales.   Abdominal: Soft. There is no tenderness.   obese   Musculoskeletal: He exhibits no edema.   Neurological: He is alert and oriented to person, place, and time.   Skin: Skin is warm and dry. He is not diaphoretic.       Assessment:     1. Coronary artery disease involving native coronary artery of native heart without angina pectoris     2. Essential hypertension     3. Pure hypercholesterolemia     4. Stented coronary artery         Medical Decision Making:  Today's Assessment / Status / Plan:   His blood pressure is under good control on his current medical regimen. By my reading is 132/80 bilaterally using a large cuff.   He's had no angina at all. LDL is not quite at target on maximum dose atorvastatin. Weight loss again encouraged.   He is admitted for towards ankle surgery which was postponed previously. According to ACC/AHA guidelines is a risk for perioperative event.  "

## 2017-03-08 NOTE — MR AVS SNAPSHOT
"        Agapito Stone   3/8/2017 11:00 AM   Office Visit   MRN: [id number]    Department:  [facility]   Dept Phone:  [phone]    Description:  Male : 1945   Provider:  Kade Ovalle M.D.           Reason for Visit     Follow-Up labs in epic 2017      Allergies as of 3/8/2017     Allergen Noted Reactions    Fish 2010   Hives    shellfish    Pcn [Penicillins] 2010   Hives    Amoxicillin 2013   Hives    Food 03/10/2014   Swelling     Eggs,Melons, avocados-puffy mouth    Horse Allergy 2015       Influenza Vaccines 2016         You were diagnosed with     Coronary artery disease involving native coronary artery of native heart without angina pectoris   [0676409]       Essential hypertension   [2910068]       Pure hypercholesterolemia   [272.0.ICD-9-CM]       Stented coronary artery   [473985]         Vital Signs     Blood Pressure Pulse Height Weight Body Mass Index Smoking Status    150/80 mmHg 70 1.88 m (6' 2\") 116.121 kg (256 lb) 32.85 kg/m2 Never Smoker       Basic Information     Date Of Birth Sex Race Ethnicity Preferred Language    1945 Male White Non- English      Your appointments     May 01, 2017  1:40 PM   Established Patient with Brody Zamorano M.D.   [facility]    [address]  [place] [id number] [phone]           You will be receiving a confirmation call a few days before your appointment from our automated call confirmation system.            Sep 11, 2017 10:20 AM   FOLLOW UP with Kade Ovalle M.D.   [facility] (--)    [address], [address]  [place] [address] [phone]              Problem List              ICD-10-CM Priority Class Noted - Resolved    History of stroke Z86.73 Medium  2012 - Present    HTN (hypertension) I10 High  2012 - Present    Carotid atherosclerosis I65.29 High  2012 - Present    CAD " (coronary artery disease) I25.10 High  10/30/2013 - Present    Hyperlipidemia E78.5 High  10/30/2013 - Present    Stented coronary artery Z95.5 High  11/20/2013 - Present    Dysphagia R13.10   9/4/2014 - Present    Type 2 diabetes mellitus with peripheral neuropathy (CMS-[facility]) E11.42   9/24/2014 - Present    Hereditary and idiopathic peripheral neuropathy G60.9   9/24/2014 - Present    Vitamin D deficiency E55.9   11/9/2015 - Present    Seasonal allergies J30.2   11/9/2015 - Present    Obesity (BMI 30.0-34.9) E66.9   11/9/2015 - Present    Agent orange exposure Z77.098   1/14/2016 - Present    Right ankle pain M25.571   9/22/2016 - Present    Benign non-nodular prostatic hyperplasia with lower urinary tract symptoms N40.1   1/30/2017 - Present      Health Maintenance        Date Due Completion Dates    IMM DTaP/Tdap/Td Vaccine (1 - Tdap) 2/25/1966 ---    COLONOSCOPY 2/25/1997 ---    IMM ZOSTER VACCINE 2/25/2007 ---    IMM PNEUMOCOCCAL 65+ (ADULT) LOW/MEDIUM RISK SERIES (1 of 2 - PCV13) 2/25/2012 ---    DIABETES MONOFILAMENT / LE EXAM 3/18/2016 3/18/2015 (N/S), 3/10/2014    Override on 3/18/2015: (N/S)    IMM INFLUENZA (1) 9/1/2016 ---    A1C SCREENING 7/27/2017 1/27/2017, 9/22/2016, 6/20/2016, 3/19/2016, 3/19/2016, 9/23/2015, 3/18/2015, 9/24/2014, 9/22/2014, 11/15/2013, 1/29/2012, 6/14/2010    RETINAL SCREENING 1/25/2018 1/25/2017, 10/12/2015, 10/6/2014, 8/16/2013, 6/29/2012    FASTING LIPID PROFILE 1/27/2018 1/27/2017, 6/20/2016, 3/19/2016, 10/8/2015, 9/22/2014, 11/15/2013, 1/29/2012, 6/14/2010    URINE ACR / MICROALBUMIN 1/27/2018 1/27/2017, 6/20/2016, 3/19/2016, 9/21/2015, 10/22/2012    SERUM CREATININE 1/27/2018 1/27/2017, 9/5/2016, 6/20/2016, 3/19/2016, 10/8/2015, 3/30/2015, 3/16/2015, 9/22/2014, 11/15/2013, 2/20/2013, 1/31/2013, 10/22/2012, 1/29/2012, 1/28/2012, 7/14/2010, 7/6/2010, 6/14/2010, 6/13/2010, 4/9/2007, 1/23/2007            Current Immunizations     No immunizations on file.      Below and/or  attached are the medications your provider expects you to take. Review all of your home medications and newly ordered medications with your provider and/or pharmacist. Follow medication instructions as directed by your provider and/or pharmacist. Please keep your medication list with you and share with your provider. Update the information when medications are discontinued, doses are changed, or new medications (including over-the-counter products) are added; and carry medication information at all times in the event of emergency situations     Allergies:  FISH - Hives     PCN - Hives     AMOXICILLIN - Hives     FOOD - Swelling     HORSE ALLERGY - (reactions not documented)     INFLUENZA VACCINES - (reactions not documented)               Medications  Valid as of: March 08, 2017 - 11:33 AM    Generic Name Brand Name Tablet Size Instructions for use    Aspirin (Tab) aspirin 81 MG Take 81 mg by mouth every day.        Atorvastatin Calcium (Tab) LIPITOR 80 MG Take 1 Tab by mouth every day.        Blood Glucose Monitoring Suppl (Misc) Blood Glucose Monitoring Suppl SUPPLIES Accu Check Yoanna blood glucose test strips.  Checking blood sugars 2 times per day E11.40        Blood Glucose Monitoring Suppl (Kit) FREESTYLE FREEDOM LITE W/DEVICE 1 Application by Does not apply route every day. Dx: E11.42        Blood Glucose Monitoring Suppl (Misc) Blood Glucose Monitoring Suppl SUPPLIES Test strips order: Test strips for Abbott Freestyle Lite meter. Sig: use once daily. Dx: E11.42        Carvedilol (Tab) COREG 12.5 MG Take 1 Tab by mouth 2 times a day, with meals.        Cholecalciferol (Tab) Vitamin D-3 5000 UNITS Take 1 Tab by mouth every day.          HydroCHLOROthiazide (Cap) MICROZIDE 12.5 MG TAKE ONE CAPSULE BY MOUTH ONE TIME DAILY        Ibuprofen (Tab) MOTRIN 200 MG Take 200 mg by mouth every 6 hours as needed.        Lancets (Misc) Lancets  Lancets order: Lancets for Abbott Freestyle Lite meter. Sig: use once daily.  Dx: E11.42        Lisinopril (Tab) PRINIVIL 20 MG Take 1 Tab by mouth every day.        MetFORMIN HCl (Tab) GLUCOPHAGE 500 MG Take 1 Tab by mouth 3 times a day, with meals.        Nitroglycerin (SL Tab) NITROSTAT 0.4 MG Place 1 Tab under tongue as needed for Chest Pain.        Pioglitazone HCl (Tab) ACTOS 45 MG Take 1 Tab by mouth every day.        .                 Medicines prescribed today were sent to:     [id number] - [place] - [address] [place] [address]    Phone: [phone] Fax: [phone]    Open 24 Hours?: No      Medication refill instructions:       If your prescription bottle indicates you have medication refills left, it is not necessary to call your provider’s office. Please contact your pharmacy and they will refill your medication.    If your prescription bottle indicates you do not have any refills left, you may request refills at any time through one of the following ways: The online Deem system (except Urgent Care), by calling your provider’s office, or by asking your pharmacy to contact your provider’s office with a refill request. Medication refills are processed only during regular business hours and may not be available until the next business day. Your provider may request additional information or to have a follow-up visit with you prior to refilling your medication.   *Please Note: Medication refills are assigned a new Rx number when refilled electronically. Your pharmacy may indicate that no refills were authorized even though a new prescription for the same medication is available at the pharmacy. Please request the medicine by name with the pharmacy before contacting your provider for a refill.           Deem Access Code: Activation code not generated  Current Deem Status: Active

## 2017-03-17 ENCOUNTER — OFFICE VISIT (OUTPATIENT)
Dept: MEDICAL GROUP | Facility: MEDICAL CENTER | Age: 72
End: 2017-03-17
Payer: MEDICARE

## 2017-03-17 VITALS
HEIGHT: 74 IN | RESPIRATION RATE: 16 BRPM | TEMPERATURE: 98 F | SYSTOLIC BLOOD PRESSURE: 124 MMHG | OXYGEN SATURATION: 94 % | HEART RATE: 68 BPM | DIASTOLIC BLOOD PRESSURE: 74 MMHG | BODY MASS INDEX: 32.47 KG/M2 | WEIGHT: 253 LBS

## 2017-03-17 DIAGNOSIS — J06.9 VIRAL URI WITH COUGH: ICD-10-CM

## 2017-03-17 DIAGNOSIS — E11.42 TYPE 2 DIABETES MELLITUS WITH PERIPHERAL NEUROPATHY (HCC): ICD-10-CM

## 2017-03-17 PROCEDURE — 99214 OFFICE O/P EST MOD 30 MIN: CPT | Performed by: NURSE PRACTITIONER

## 2017-03-17 PROCEDURE — G8432 DEP SCR NOT DOC, RNG: HCPCS | Performed by: NURSE PRACTITIONER

## 2017-03-17 PROCEDURE — G8598 ASA/ANTIPLAT THER USED: HCPCS | Performed by: NURSE PRACTITIONER

## 2017-03-17 PROCEDURE — 3017F COLORECTAL CA SCREEN DOC REV: CPT | Mod: 8P | Performed by: NURSE PRACTITIONER

## 2017-03-17 PROCEDURE — G8484 FLU IMMUNIZE NO ADMIN: HCPCS | Performed by: NURSE PRACTITIONER

## 2017-03-17 PROCEDURE — 3045F PR MOST RECENT HEMOGLOBIN A1C LEVEL 7.0-9.0%: CPT | Performed by: NURSE PRACTITIONER

## 2017-03-17 PROCEDURE — 4040F PNEUMOC VAC/ADMIN/RCVD: CPT | Mod: 8P | Performed by: NURSE PRACTITIONER

## 2017-03-17 PROCEDURE — G8419 CALC BMI OUT NRM PARAM NOF/U: HCPCS | Performed by: NURSE PRACTITIONER

## 2017-03-17 PROCEDURE — 1036F TOBACCO NON-USER: CPT | Performed by: NURSE PRACTITIONER

## 2017-03-17 PROCEDURE — 1101F PT FALLS ASSESS-DOCD LE1/YR: CPT | Performed by: NURSE PRACTITIONER

## 2017-03-17 NOTE — MR AVS SNAPSHOT
"        Agapito Stone   3/17/2017 10:40 AM   Office Visit   MRN: [id number]    Department:  [facility]   Dept Phone:  [phone]    Description:  Male : 1945   Provider:  LION Julien           Reason for Visit     Cough chest congestion       Allergies as of 3/17/2017     Allergen Noted Reactions    Fish 2010   Hives    shellfish    Pcn [Penicillins] 2010   Hives    Amoxicillin 2013   Hives    Food 03/10/2014   Swelling     Eggs,Melons, avocados-puffy mouth    Horse Allergy 2015       Influenza Vaccines 2016         Vital Signs     Blood Pressure Pulse Temperature Respirations Height Weight    124/74 mmHg 68 36.7 °C (98 °F) 16 1.88 m (6' 2\") 114.76 kg (253 lb)    Body Mass Index Oxygen Saturation Smoking Status             32.47 kg/m2 94% Never Smoker          Basic Information     Date Of Birth Sex Race Ethnicity Preferred Language    1945 Male White Non- English      Your appointments     May 01, 2017  1:40 PM   Established Patient with Brody Zamorano M.D.   [facility] ([facility])    [address]  [place] [address] [phone]           You will be receiving a confirmation call a few days before your appointment from our automated call confirmation system.            Sep 11, 2017 10:20 AM   FOLLOW UP with Kade Ovalle M.D.   [facility] (--)    [address]  [place] [address] [phone]              Problem List              ICD-10-CM Priority Class Noted - Resolved    History of stroke Z86.73 Medium  2012 - Present    HTN (hypertension) I10 High  2012 - Present    Carotid atherosclerosis I65.29 High  2012 - Present    CAD (coronary artery disease) I25.10 High  10/30/2013 - Present    Hyperlipidemia E78.5 High  10/30/2013 - Present    Stented coronary artery Z95.5 High  2013 - Present    Dysphagia R13.10   " 9/4/2014 - Present    Type 2 diabetes mellitus with peripheral neuropathy (CMS-HCC) E11.42   9/24/2014 - Present    Hereditary and idiopathic peripheral neuropathy G60.9   9/24/2014 - Present    Vitamin D deficiency E55.9   11/9/2015 - Present    Seasonal allergies J30.2   11/9/2015 - Present    Obesity (BMI 30.0-34.9) E66.9   11/9/2015 - Present    Agent orange exposure Z77.098   1/14/2016 - Present    Right ankle pain M25.571   9/22/2016 - Present    Benign non-nodular prostatic hyperplasia with lower urinary tract symptoms N40.1   1/30/2017 - Present      Health Maintenance        Date Due Completion Dates    IMM DTaP/Tdap/Td Vaccine (1 - Tdap) 2/25/1966 ---    COLONOSCOPY 2/25/1997 ---    IMM ZOSTER VACCINE 2/25/2007 ---    IMM PNEUMOCOCCAL 65+ (ADULT) LOW/MEDIUM RISK SERIES (1 of 2 - PCV13) 2/25/2012 ---    DIABETES MONOFILAMENT / LE EXAM 3/18/2016 3/18/2015 (N/S), 3/10/2014    Override on 3/18/2015: (N/S)    IMM INFLUENZA (1) 9/1/2016 ---    A1C SCREENING 7/27/2017 1/27/2017, 9/22/2016, 6/20/2016, 3/19/2016, 3/19/2016, 9/23/2015, 3/18/2015, 9/24/2014, 9/22/2014, 11/15/2013, 1/29/2012, 6/14/2010    RETINAL SCREENING 1/25/2018 1/25/2017, 10/12/2015, 10/6/2014, 8/16/2013, 6/29/2012    FASTING LIPID PROFILE 1/27/2018 1/27/2017, 6/20/2016, 3/19/2016, 10/8/2015, 9/22/2014, 11/15/2013, 1/29/2012, 6/14/2010    URINE ACR / MICROALBUMIN 1/27/2018 1/27/2017, 6/20/2016, 3/19/2016, 9/21/2015, 10/22/2012    SERUM CREATININE 1/27/2018 1/27/2017, 9/5/2016, 6/20/2016, 3/19/2016, 10/8/2015, 3/30/2015, 3/16/2015, 9/22/2014, 11/15/2013, 2/20/2013, 1/31/2013, 10/22/2012, 1/29/2012, 1/28/2012, 7/14/2010, 7/6/2010, 6/14/2010, 6/13/2010, 4/9/2007, 1/23/2007            Current Immunizations     No immunizations on file.      Below and/or attached are the medications your provider expects you to take. Review all of your home medications and newly ordered medications with your provider and/or pharmacist. Follow medication instructions  as directed by your provider and/or pharmacist. Please keep your medication list with you and share with your provider. Update the information when medications are discontinued, doses are changed, or new medications (including over-the-counter products) are added; and carry medication information at all times in the event of emergency situations     Allergies:  FISH - Hives     PCN - Hives     AMOXICILLIN - Hives     FOOD - Swelling     HORSE ALLERGY - (reactions not documented)     INFLUENZA VACCINES - (reactions not documented)               Medications  Valid as of: March 17, 2017 - 11:26 AM    Generic Name Brand Name Tablet Size Instructions for use    Aspirin (Tab) aspirin 81 MG Take 81 mg by mouth every day.        Atorvastatin Calcium (Tab) LIPITOR 80 MG Take 1 Tab by mouth every day.        Blood Glucose Monitoring Suppl (Misc) Blood Glucose Monitoring Suppl SUPPLIES Accu Check Yoanna blood glucose test strips.  Checking blood sugars 2 times per day E11.40        Blood Glucose Monitoring Suppl (Kit) FREESTYLE FREEDOM LITE W/DEVICE 1 Application by Does not apply route every day. Dx: E11.42        Blood Glucose Monitoring Suppl (Misc) Blood Glucose Monitoring Suppl SUPPLIES Test strips order: Test strips for Abbott Freestyle Lite meter. Sig: use once daily. Dx: E11.42        Carvedilol (Tab) COREG 12.5 MG Take 1 Tab by mouth 2 times a day, with meals.        Cholecalciferol (Tab) Vitamin D-3 5000 UNITS Take 1 Tab by mouth every day.          HydroCHLOROthiazide (Cap) MICROZIDE 12.5 MG TAKE ONE CAPSULE BY MOUTH ONE TIME DAILY        Ibuprofen (Tab) MOTRIN 200 MG Take 200 mg by mouth every 6 hours as needed.        Lancets (Misc) Lancets  Lancets order: Lancets for Abbott Freestyle Lite meter. Sig: use once daily. Dx: E11.42        Lisinopril (Tab) PRINIVIL 20 MG Take 1 Tab by mouth every day.        MetFORMIN HCl (Tab) GLUCOPHAGE 500 MG Take 1 Tab by mouth 3 times a day, with meals.        Nitroglycerin (SL  Tab) NITROSTAT 0.4 MG Place 1 Tab under tongue as needed for Chest Pain.        Pioglitazone HCl (Tab) ACTOS 45 MG Take 1 Tab by mouth every day.        .                 Medicines prescribed today were sent to:     [facility]/PHARMACY #[id number] - [place] - [address] [address]    Phone: [phone] Fax: [phone]    Open 24 Hours?: No      Medication refill instructions:       If your prescription bottle indicates you have medication refills left, it is not necessary to call your provider’s office. Please contact your pharmacy and they will refill your medication.    If your prescription bottle indicates you do not have any refills left, you may request refills at any time through one of the following ways: The online [employer] system (except Urgent Care), by calling your provider’s office, or by asking your pharmacy to contact your provider’s office with a refill request. Medication refills are processed only during regular business hours and may not be available until the next business day. Your provider may request additional information or to have a follow-up visit with you prior to refilling your medication.   *Please Note: Medication refills are assigned a new Rx number when refilled electronically. Your pharmacy may indicate that no refills were authorized even though a new prescription for the same medication is available at the pharmacy. Please request the medicine by name with the pharmacy before contacting your provider for a refill.           [employer] Access Code: Activation code not generated  Current [employer] Status: Active

## 2017-03-17 NOTE — PROGRESS NOTES
Subjective:     Chief Complaint   Patient presents with   • Cough     chest congestion      Agapito Stone is a 72 y.o. male established patient of Dr. padron here for evaluation of cough and congestion. Reports initially starting to feel poorly Sunday evening-5 days ago. He had sudden onset of nasal congestion, sore throat, cough. This was particularly troublesome for about 2 days and then slightly improved. He does continue to have cough, productive of clear sputum. Cough is worse at night and first thing in the morning. He is using an over-the-counter cough syrup which is helping. He denies shortness of breath, fever, chest pain, malaise, appetite change, history of asthma, COPD, or smoking.  He is a type II diabetic, last A1c 8.3    Current medicines (including changes today)  Current Outpatient Prescriptions   Medication Sig Dispense Refill   • Blood Glucose Monitoring Suppl (FREESTYLE FREEDOM LITE) W/DEVICE Kit 1 Application by Does not apply route every day. Dx: E11.42 1 Kit 0   • Blood Glucose Monitoring Suppl SUPPLIES Misc Test strips order: Test strips for Abbott Freestyle Lite meter. Sig: use once daily. Dx: E11.42 100 Each 3   • Lancets Misc Lancets order: Lancets for Abbott Freestyle Lite meter. Sig: use once daily. Dx: E11.42 100 Each 3   • Blood Glucose Monitoring Suppl SUPPLIES Misc Accu Check Yoanna blood glucose test strips.  Checking blood sugars 2 times per day E11.40 200 Strip 3   • ibuprofen (MOTRIN) 200 MG Tab Take 200 mg by mouth every 6 hours as needed.     • pioglitazone (ACTOS) 45 MG Tab Take 1 Tab by mouth every day. 90 Tab 3   • metformin (GLUCOPHAGE) 500 MG Tab Take 1 Tab by mouth 3 times a day, with meals. 270 Tab 3   • hydrochlorothiazide (MICROZIDE) 12.5 MG capsule TAKE ONE CAPSULE BY MOUTH ONE TIME DAILY 30 Cap 11   • carvedilol (COREG) 12.5 MG Tab Take 1 Tab by mouth 2 times a day, with meals. 180 Tab 3   • atorvastatin (LIPITOR) 80 MG tablet Take 1 Tab by mouth every day.  "(Patient taking differently: Take 80 mg by mouth every evening.) 90 Tab 3   • lisinopril (PRINIVIL) 20 MG Tab Take 1 Tab by mouth every day. 90 Tab 2   • aspirin 81 MG tablet Take 81 mg by mouth every day.     • nitroglycerin (NITROSTAT) 0.4 MG SUBL Place 1 Tab under tongue as needed for Chest Pain. 25 Tab 1   • Cholecalciferol (VITAMIN D-3) 5000 UNITS TABS Take 1 Tab by mouth every day.         No current facility-administered medications for this visit.     He  has a past medical history of Diabetes; Hypertension; CAD (coronary artery disease) (October 2013); Hyperlipidemia; Syncope; Arthritis; Cataract; Pain; and Stroke (CMS-[facility], located within [facility]) (2010).    ROS included above     Objective:     Blood pressure 124/74, pulse 68, temperature 36.7 °C (98 °F), resp. rate 16, height 1.88 m (6' 2\"), weight 114.76 kg (253 lb), SpO2 94 %. Body mass index is 32.47 kg/(m^2).     Physical Exam:  General: Alert, oriented in no acute distress.  Eye contact is good, speech is normal, affect calm  HEENT: Oral mucosa pink moist, no lesions. TMs gray with good landmarks bilaterally. No cervical or supraclavicular lymphadenopathy.  Lungs: Coarse throughout, good aeration, normal effort, no wheeze/ rhonchi/ rales.  CV: regular rate and rhythm, S1, S2, no murmur  Abdomen: soft, nontender  Ext: no edema, color normal, vascularity normal, temperature normal    Assessment and Plan:   The following treatment plan was discussed   1. Viral URI with cough   5 days of illness with the initial 2 days being more severe than current symptoms. While oxygenated on room air, no indication of consolidation on exam, no acute distress. Course of viral illness reviewed, symptomatic treatment discussed including increasing fluids, rest, continue over-the-counter cough syrup as needed. Given his diabetes I would have a lower threshold for antibiotic use, but at this time he seems to be doing well. He may contact me if symptoms worsen    2. Type 2 diabetes mellitus with " peripheral neuropathy (CMS-HCC)   last A1c 8.3.        Followup: As needed         Please note that this dictation was created using voice recognition software. I have worked with consultants from the vendor as well as technical experts from [facility] to optimize the interface. I have made every reasonable attempt to correct obvious errors, but I expect that there are errors of grammar and possibly content that I did not discover before finalizing the note.

## 2017-03-30 ENCOUNTER — OFFICE VISIT (OUTPATIENT)
Dept: MEDICAL GROUP | Facility: MEDICAL CENTER | Age: 72
End: 2017-03-30
Payer: MEDICARE

## 2017-03-30 VITALS
HEART RATE: 83 BPM | DIASTOLIC BLOOD PRESSURE: 82 MMHG | OXYGEN SATURATION: 93 % | WEIGHT: 251 LBS | HEIGHT: 74 IN | TEMPERATURE: 97.2 F | SYSTOLIC BLOOD PRESSURE: 128 MMHG | RESPIRATION RATE: 16 BRPM | BODY MASS INDEX: 32.21 KG/M2

## 2017-03-30 DIAGNOSIS — J18.9 PNEUMONIA OF RIGHT LOWER LOBE DUE TO INFECTIOUS ORGANISM: ICD-10-CM

## 2017-03-30 PROCEDURE — 99214 OFFICE O/P EST MOD 30 MIN: CPT | Performed by: FAMILY MEDICINE

## 2017-03-30 PROCEDURE — 1101F PT FALLS ASSESS-DOCD LE1/YR: CPT | Performed by: FAMILY MEDICINE

## 2017-03-30 PROCEDURE — 3017F COLORECTAL CA SCREEN DOC REV: CPT | Mod: 8P | Performed by: FAMILY MEDICINE

## 2017-03-30 PROCEDURE — 4040F PNEUMOC VAC/ADMIN/RCVD: CPT | Mod: 8P | Performed by: FAMILY MEDICINE

## 2017-03-30 PROCEDURE — G8417 CALC BMI ABV UP PARAM F/U: HCPCS | Performed by: FAMILY MEDICINE

## 2017-03-30 PROCEDURE — G8432 DEP SCR NOT DOC, RNG: HCPCS | Performed by: FAMILY MEDICINE

## 2017-03-30 PROCEDURE — G8484 FLU IMMUNIZE NO ADMIN: HCPCS | Performed by: FAMILY MEDICINE

## 2017-03-30 PROCEDURE — G8598 ASA/ANTIPLAT THER USED: HCPCS | Performed by: FAMILY MEDICINE

## 2017-03-30 PROCEDURE — 1036F TOBACCO NON-USER: CPT | Performed by: FAMILY MEDICINE

## 2017-03-30 NOTE — PROGRESS NOTES
Subjective:   Agapito Stone is a 72 y.o. male here today for pneumonia    Pneumonia of right lower lobe due to infectious organism  Patient was seen about one week ago and had a chest x-ray done which he said showed a right lower lobe pneumonia. He was treated with a Z-Apolinar. He is now feeling significantly improved. Patient is here because he was advised to follow-up with PCP in one week. Patient states that his cough has improved. He does not have a fever. No chest pain or shortness of breath.         Current medicines (including changes today)  Current Outpatient Prescriptions   Medication Sig Dispense Refill   • Blood Glucose Monitoring Suppl (FREESTYLE FREEDOM LITE) W/DEVICE Kit 1 Application by Does not apply route every day. Dx: E11.42 1 Kit 0   • Blood Glucose Monitoring Suppl SUPPLIES Misc Test strips order: Test strips for Abbott Freestyle Lite meter. Sig: use once daily. Dx: E11.42 100 Each 3   • Lancets Misc Lancets order: Lancets for Abbott Freestyle Lite meter. Sig: use once daily. Dx: E11.42 100 Each 3   • Blood Glucose Monitoring Suppl SUPPLIES Misc Accu Check Yoanna blood glucose test strips.  Checking blood sugars 2 times per day E11.40 200 Strip 3   • ibuprofen (MOTRIN) 200 MG Tab Take 200 mg by mouth every 6 hours as needed.     • pioglitazone (ACTOS) 45 MG Tab Take 1 Tab by mouth every day. 90 Tab 3   • metformin (GLUCOPHAGE) 500 MG Tab Take 1 Tab by mouth 3 times a day, with meals. 270 Tab 3   • hydrochlorothiazide (MICROZIDE) 12.5 MG capsule TAKE ONE CAPSULE BY MOUTH ONE TIME DAILY 30 Cap 11   • carvedilol (COREG) 12.5 MG Tab Take 1 Tab by mouth 2 times a day, with meals. 180 Tab 3   • atorvastatin (LIPITOR) 80 MG tablet Take 1 Tab by mouth every day. (Patient taking differently: Take 80 mg by mouth every evening.) 90 Tab 3   • lisinopril (PRINIVIL) 20 MG Tab Take 1 Tab by mouth every day. 90 Tab 2   • aspirin 81 MG tablet Take 81 mg by mouth every day.     • nitroglycerin (NITROSTAT) 0.4  "MG SUBL Place 1 Tab under tongue as needed for Chest Pain. 25 Tab 1   • Cholecalciferol (VITAMIN D-3) 5000 UNITS TABS Take 1 Tab by mouth every day.         No current facility-administered medications for this visit.     He  has a past medical history of Diabetes; Hypertension; CAD (coronary artery disease) (October 2013); Hyperlipidemia; Syncope; Arthritis; Cataract; Pain; and Stroke (CMS-HCC) (2010).    ROS   No chest pain, no shortness of breath       Objective:     Blood pressure 128/82, pulse 83, temperature 36.2 °C (97.2 °F), resp. rate 16, height 1.88 m (6' 2.02\"), weight 113.853 kg (251 lb), SpO2 93 %. Body mass index is 32.21 kg/(m^2).   Physical Exam:  Constitutional: Alert, no distress.  Skin: Warm, dry, good turgor, no rashes in visible areas.  Eye: Equal, round and reactive, conjunctiva clear, lids normal.  Respiratory: Unlabored respiratory effort, lungs clear to auscultation, no wheezes, no ronchi.  Psych: Alert and oriented x3, normal affect and mood.        Assessment and Plan:   The following treatment plan was discussed    1. Pneumonia of right lower lobe due to infectious organism  Patient is clinically improved. Pneumonia is most likely resolving.  Continue off Z-Apolinar. No need for further antibiotics.  X-ray to be done and approximately 4-5 weeks to ensure resolution of pneumonia. X-ray ordered today.  - DX-CHEST-2 VIEWS; Future      Followup: Return if symptoms worsen or fail to improve.           "

## 2017-03-30 NOTE — MR AVS SNAPSHOT
"        Agapito Stone   3/30/2017 1:40 PM   Office Visit   MRN: [id number]    Department:  [facility]   Dept Phone:  [phone]    Description:  Male : 1945   Provider:  Brody Zamorano M.D.           Allergies as of 3/30/2017     Allergen Noted Reactions    Fish 2010   Hives    shellfish    Pcn [Penicillins] 2010   Hives    Amoxicillin 2013   Hives    Food 03/10/2014   Swelling     Eggs,Melons, avocados-puffy mouth    Horse Allergy 2015       Influenza Vaccines 2016         You were diagnosed with     Pneumonia of right lower lobe due to infectious organism   [[id number]]         Vital Signs     Blood Pressure Pulse Temperature Respirations Height Weight    128/82 mmHg 83 36.2 °C (97.2 °F) 16 1.88 m (6' 2.02\") 113.853 kg (251 lb)    Body Mass Index Oxygen Saturation Smoking Status             32.21 kg/m2 93% Never Smoker          Basic Information     Date Of Birth Sex Race Ethnicity Preferred Language    1945 Male White Non- English      Your appointments     2017 12:15 PM   CAROTID DUPLEX with VASCULAR LAB [facility] EXAM 6   NON-INVASIVE LAB [facility] ([facility])    [address] [facility] [address] [phone]           No prep            May 01, 2017  1:40 PM   Established Patient with Brody Zamorano M.D.   [facility])    [address]  [facility] [address] [phone]           You will be receiving a confirmation call a few days before your appointment from our automated call confirmation system.            Sep 11, 2017 10:20 AM   FOLLOW UP with Kade Ovalle M.D.   [facility] (--)    [address], [address]  [place] [address] [phone]              Problem List              ICD-10-CM Priority Class Noted - Resolved    History of stroke Z86.73 Medium  2012 - Present    HTN (hypertension) I10 High  2012 - Present    Carotid " atherosclerosis I65.29 High  4/17/2012 - Present    CAD (coronary artery disease) I25.10 High  10/30/2013 - Present    Hyperlipidemia E78.5 High  10/30/2013 - Present    Stented coronary artery Z95.5 High  11/20/2013 - Present    Dysphagia R13.10   9/4/2014 - Present    Type 2 diabetes mellitus with peripheral neuropathy (CMS-HCC) E11.42   9/24/2014 - Present    Hereditary and idiopathic peripheral neuropathy G60.9   9/24/2014 - Present    Vitamin D deficiency E55.9   11/9/2015 - Present    Seasonal allergies J30.2   11/9/2015 - Present    Obesity (BMI 30.0-34.9) E66.9   11/9/2015 - Present    Agent orange exposure Z77.098   1/14/2016 - Present    Right ankle pain M25.571   9/22/2016 - Present    Benign non-nodular prostatic hyperplasia with lower urinary tract symptoms N40.1   1/30/2017 - Present    Pneumonia of right lower lobe due to infectious organism J18.9   3/30/2017 - Present      Health Maintenance        Date Due Completion Dates    IMM DTaP/Tdap/Td Vaccine (1 - Tdap) 2/25/1966 ---    COLONOSCOPY 2/25/1997 ---    IMM ZOSTER VACCINE 2/25/2007 ---    IMM PNEUMOCOCCAL 65+ (ADULT) LOW/MEDIUM RISK SERIES (1 of 2 - PCV13) 2/25/2012 ---    DIABETES MONOFILAMENT / LE EXAM 3/18/2016 3/18/2015 (N/S), 3/10/2014    Override on 3/18/2015: (N/S)    IMM INFLUENZA (1) 9/1/2016 ---    A1C SCREENING 7/27/2017 1/27/2017, 9/22/2016, 6/20/2016, 3/19/2016, 3/19/2016, 9/23/2015, 3/18/2015, 9/24/2014, 9/22/2014, 11/15/2013, 1/29/2012, 6/14/2010    RETINAL SCREENING 1/25/2018 1/25/2017, 10/12/2015, 10/6/2014, 8/16/2013, 6/29/2012    FASTING LIPID PROFILE 1/27/2018 1/27/2017, 6/20/2016, 3/19/2016, 10/8/2015, 9/22/2014, 11/15/2013, 1/29/2012, 6/14/2010    URINE ACR / MICROALBUMIN 1/27/2018 1/27/2017, 6/20/2016, 3/19/2016, 9/21/2015, 10/22/2012    SERUM CREATININE 1/27/2018 1/27/2017, 9/5/2016, 6/20/2016, 3/19/2016, 10/8/2015, 3/30/2015, 3/16/2015, 9/22/2014, 11/15/2013, 2/20/2013, 1/31/2013, 10/22/2012, 1/29/2012, 1/28/2012,  7/14/2010, 7/6/2010, 6/14/2010, 6/13/2010, 4/9/2007, 1/23/2007            Current Immunizations     No immunizations on file.      Below and/or attached are the medications your provider expects you to take. Review all of your home medications and newly ordered medications with your provider and/or pharmacist. Follow medication instructions as directed by your provider and/or pharmacist. Please keep your medication list with you and share with your provider. Update the information when medications are discontinued, doses are changed, or new medications (including over-the-counter products) are added; and carry medication information at all times in the event of emergency situations     Allergies:  FISH - Hives     PCN - Hives     AMOXICILLIN - Hives     FOOD - Swelling     HORSE ALLERGY - (reactions not documented)     INFLUENZA VACCINES - (reactions not documented)               Medications  Valid as of: March 30, 2017 -  3:21 PM    Generic Name Brand Name Tablet Size Instructions for use    Aspirin (Tab) aspirin 81 MG Take 81 mg by mouth every day.        Atorvastatin Calcium (Tab) LIPITOR 80 MG Take 1 Tab by mouth every day.        Blood Glucose Monitoring Suppl (Misc) Blood Glucose Monitoring Suppl SUPPLIES Accu Check Yoanna blood glucose test strips.  Checking blood sugars 2 times per day E11.40        Blood Glucose Monitoring Suppl (Kit) FREESTYLE FREEDOM LITE W/DEVICE 1 Application by Does not apply route every day. Dx: E11.42        Blood Glucose Monitoring Suppl (Misc) Blood Glucose Monitoring Suppl SUPPLIES Test strips order: Test strips for Abbott Freestyle Lite meter. Sig: use once daily. Dx: E11.42        Carvedilol (Tab) COREG 12.5 MG Take 1 Tab by mouth 2 times a day, with meals.        Cholecalciferol (Tab) Vitamin D-3 5000 UNITS Take 1 Tab by mouth every day.          HydroCHLOROthiazide (Cap) MICROZIDE 12.5 MG TAKE ONE CAPSULE BY MOUTH ONE TIME DAILY        Ibuprofen (Tab) MOTRIN 200 MG Take 200 mg  by mouth every 6 hours as needed.        Lancets (Misc) Lancets  Lancets order: Lancets for Abbott Freestyle Lite meter. Sig: use once daily. Dx: E11.42        Lisinopril (Tab) PRINIVIL 20 MG Take 1 Tab by mouth every day.        MetFORMIN HCl (Tab) GLUCOPHAGE 500 MG Take 1 Tab by mouth 3 times a day, with meals.        Nitroglycerin (SL Tab) NITROSTAT 0.4 MG Place 1 Tab under tongue as needed for Chest Pain.        Pioglitazone HCl (Tab) ACTOS 45 MG Take 1 Tab by mouth every day.        .                 Medicines prescribed today were sent to:     [facility]/PHARMACY #[id number] - [place] - [address]    [address] [place] [address]    Phone: [phone] Fax: [phone]    Open 24 Hours?: No      Medication refill instructions:       If your prescription bottle indicates you have medication refills left, it is not necessary to call your provider’s office. Please contact your pharmacy and they will refill your medication.    If your prescription bottle indicates you do not have any refills left, you may request refills at any time through one of the following ways: The online [employer] system (except Urgent Care), by calling your provider’s office, or by asking your pharmacy to contact your provider’s office with a refill request. Medication refills are processed only during regular business hours and may not be available until the next business day. Your provider may request additional information or to have a follow-up visit with you prior to refilling your medication.   *Please Note: Medication refills are assigned a new Rx number when refilled electronically. Your pharmacy may indicate that no refills were authorized even though a new prescription for the same medication is available at the pharmacy. Please request the medicine by name with the pharmacy before contacting your provider for a refill.        Your To Do List     Future Labs/Procedures Complete By Expires    DX-CHEST-2 VIEWS  As directed 9/30/2017          [employer] Access Code: Activation code not generated  Current [employer] Status: Active

## 2017-03-30 NOTE — ASSESSMENT & PLAN NOTE
Patient was seen about one week ago and had a chest x-ray done which he said showed a right lower lobe pneumonia. He was treated with a Z-Apolinar. He is now feeling significantly improved. Patient is here because he was advised to follow-up with PCP in one week. Patient states that his cough has improved. He does not have a fever. No chest pain or shortness of breath.

## 2017-04-06 ENCOUNTER — HOSPITAL ENCOUNTER (OUTPATIENT)
Dept: RADIOLOGY | Facility: MEDICAL CENTER | Age: 72
End: 2017-04-06
Attending: SURGERY
Payer: MEDICARE

## 2017-04-06 DIAGNOSIS — Z86.79 PERSONAL HISTORY OF UNSPECIFIED CIRCULATORY DISEASE: ICD-10-CM

## 2017-04-06 PROCEDURE — 93880 EXTRACRANIAL BILAT STUDY: CPT

## 2017-04-06 PROCEDURE — 93880 EXTRACRANIAL BILAT STUDY: CPT | Mod: 26 | Performed by: SURGERY

## 2017-04-26 ENCOUNTER — HOSPITAL ENCOUNTER (OUTPATIENT)
Facility: MEDICAL CENTER | Age: 72
End: 2017-04-26
Attending: ORTHOPAEDIC SURGERY | Admitting: ORTHOPAEDIC SURGERY
Payer: MEDICARE

## 2017-05-08 DIAGNOSIS — I10 ESSENTIAL HYPERTENSION: ICD-10-CM

## 2017-05-08 RX ORDER — LISINOPRIL 20 MG/1
20 TABLET ORAL DAILY
Qty: 90 TAB | Refills: 3 | OUTPATIENT
Start: 2017-05-08 | End: 2017-09-11

## 2017-05-11 ENCOUNTER — HOSPITAL ENCOUNTER (OUTPATIENT)
Dept: RADIOLOGY | Facility: MEDICAL CENTER | Age: 72
End: 2017-05-11
Attending: FAMILY MEDICINE
Payer: MEDICARE

## 2017-05-11 DIAGNOSIS — J18.9 PNEUMONIA OF RIGHT LOWER LOBE DUE TO INFECTIOUS ORGANISM: ICD-10-CM

## 2017-05-11 PROCEDURE — 71020 DX-CHEST-2 VIEWS: CPT

## 2017-05-15 ENCOUNTER — HOSPITAL ENCOUNTER (OUTPATIENT)
Dept: LAB | Facility: MEDICAL CENTER | Age: 72
End: 2017-05-15
Attending: FAMILY MEDICINE
Payer: MEDICARE

## 2017-05-15 DIAGNOSIS — E11.42 TYPE 2 DIABETES MELLITUS WITH PERIPHERAL NEUROPATHY (HCC): ICD-10-CM

## 2017-05-15 DIAGNOSIS — E78.00 PURE HYPERCHOLESTEROLEMIA: ICD-10-CM

## 2017-05-15 DIAGNOSIS — E55.9 VITAMIN D DEFICIENCY: ICD-10-CM

## 2017-05-15 LAB
25(OH)D3 SERPL-MCNC: 24 NG/ML (ref 30–100)
ALBUMIN SERPL BCP-MCNC: 3.9 G/DL (ref 3.2–4.9)
ALBUMIN/GLOB SERPL: 1.3 G/DL
ALP SERPL-CCNC: 114 U/L (ref 30–99)
ALT SERPL-CCNC: 31 U/L (ref 2–50)
ANION GAP SERPL CALC-SCNC: 5 MMOL/L (ref 0–11.9)
AST SERPL-CCNC: 20 U/L (ref 12–45)
BILIRUB SERPL-MCNC: 1.5 MG/DL (ref 0.1–1.5)
BUN SERPL-MCNC: 27 MG/DL (ref 8–22)
CALCIUM SERPL-MCNC: 9.7 MG/DL (ref 8.5–10.5)
CHLORIDE SERPL-SCNC: 106 MMOL/L (ref 96–112)
CHOLEST SERPL-MCNC: 135 MG/DL (ref 100–199)
CO2 SERPL-SCNC: 28 MMOL/L (ref 20–33)
CREAT SERPL-MCNC: 1.22 MG/DL (ref 0.5–1.4)
CREAT UR-MCNC: 151.2 MG/DL
EST. AVERAGE GLUCOSE BLD GHB EST-MCNC: 171 MG/DL
GFR SERPL CREATININE-BSD FRML MDRD: 59 ML/MIN/1.73 M 2
GLOBULIN SER CALC-MCNC: 3 G/DL (ref 1.9–3.5)
GLUCOSE SERPL-MCNC: 173 MG/DL (ref 65–99)
HBA1C MFR BLD: 7.6 % (ref 0–5.6)
HDLC SERPL-MCNC: 50 MG/DL
LDLC SERPL CALC-MCNC: 67 MG/DL
MICROALBUMIN UR-MCNC: 4.9 MG/DL
MICROALBUMIN/CREAT UR: 32 MG/G (ref 0–30)
POTASSIUM SERPL-SCNC: 4.8 MMOL/L (ref 3.6–5.5)
PROT SERPL-MCNC: 6.9 G/DL (ref 6–8.2)
SODIUM SERPL-SCNC: 139 MMOL/L (ref 135–145)
TRIGL SERPL-MCNC: 89 MG/DL (ref 0–149)

## 2017-05-15 PROCEDURE — 83036 HEMOGLOBIN GLYCOSYLATED A1C: CPT

## 2017-05-15 PROCEDURE — 36415 COLL VENOUS BLD VENIPUNCTURE: CPT

## 2017-05-15 PROCEDURE — 80053 COMPREHEN METABOLIC PANEL: CPT

## 2017-05-15 PROCEDURE — 80061 LIPID PANEL: CPT

## 2017-05-15 PROCEDURE — 82570 ASSAY OF URINE CREATININE: CPT

## 2017-05-15 PROCEDURE — 82043 UR ALBUMIN QUANTITATIVE: CPT

## 2017-05-15 PROCEDURE — 82306 VITAMIN D 25 HYDROXY: CPT

## 2017-05-16 ENCOUNTER — OFFICE VISIT (OUTPATIENT)
Dept: MEDICAL GROUP | Facility: MEDICAL CENTER | Age: 72
End: 2017-05-16
Payer: MEDICARE

## 2017-05-16 VITALS
TEMPERATURE: 98.4 F | RESPIRATION RATE: 16 BRPM | DIASTOLIC BLOOD PRESSURE: 80 MMHG | HEART RATE: 65 BPM | SYSTOLIC BLOOD PRESSURE: 134 MMHG | OXYGEN SATURATION: 95 % | HEIGHT: 74 IN

## 2017-05-16 DIAGNOSIS — Z12.5 PROSTATE CANCER SCREENING: ICD-10-CM

## 2017-05-16 DIAGNOSIS — H61.21 IMPACTED CERUMEN OF RIGHT EAR: ICD-10-CM

## 2017-05-16 DIAGNOSIS — E55.9 VITAMIN D DEFICIENCY: ICD-10-CM

## 2017-05-16 DIAGNOSIS — E11.42 TYPE 2 DIABETES MELLITUS WITH PERIPHERAL NEUROPATHY (HCC): ICD-10-CM

## 2017-05-16 DIAGNOSIS — E78.00 PURE HYPERCHOLESTEROLEMIA: ICD-10-CM

## 2017-05-16 PROCEDURE — 3045F PR MOST RECENT HEMOGLOBIN A1C LEVEL 7.0-9.0%: CPT | Performed by: FAMILY MEDICINE

## 2017-05-16 PROCEDURE — 1036F TOBACCO NON-USER: CPT | Performed by: FAMILY MEDICINE

## 2017-05-16 PROCEDURE — G8417 CALC BMI ABV UP PARAM F/U: HCPCS | Performed by: FAMILY MEDICINE

## 2017-05-16 PROCEDURE — 3017F COLORECTAL CA SCREEN DOC REV: CPT | Mod: 8P | Performed by: FAMILY MEDICINE

## 2017-05-16 PROCEDURE — 99214 OFFICE O/P EST MOD 30 MIN: CPT | Performed by: FAMILY MEDICINE

## 2017-05-16 PROCEDURE — 1101F PT FALLS ASSESS-DOCD LE1/YR: CPT | Performed by: FAMILY MEDICINE

## 2017-05-16 PROCEDURE — 4040F PNEUMOC VAC/ADMIN/RCVD: CPT | Mod: 8P | Performed by: FAMILY MEDICINE

## 2017-05-16 PROCEDURE — G8432 DEP SCR NOT DOC, RNG: HCPCS | Performed by: FAMILY MEDICINE

## 2017-05-16 PROCEDURE — G8598 ASA/ANTIPLAT THER USED: HCPCS | Performed by: FAMILY MEDICINE

## 2017-05-16 NOTE — ASSESSMENT & PLAN NOTE
Patient is tolerating Actos 45 mg daily and metformin 500 mg 3 times a day. He is also trying to eat a healthier diet. Patient has lost approximately 6 pounds, per his scale, since our last visit.    Results for RICK NADINE SERA (MRN [id number]) as of 5/16/2017 14:00   Ref. Range 1/27/2017 11:39 5/15/2017 09:13   Glycohemoglobin Latest Ref Range: 0.0-5.6 % 8.3 (H) 7.6 (H)

## 2017-05-16 NOTE — PROGRESS NOTES
Subjective:   Agapito Cabrera is a 72 y.o. male here today for diabetes    Type 2 diabetes mellitus with peripheral neuropathy  Patient is tolerating Actos 45 mg daily and metformin 500 mg 3 times a day. He is also trying to eat a healthier diet. Patient has lost approximately 6 pounds, per his scale, since our last visit.    Results for AGAPITO CABRERA (MRN [id number]) as of 5/16/2017 14:00   Ref. Range 1/27/2017 11:39 5/15/2017 09:13   Glycohemoglobin Latest Ref Range: 0.0-5.6 % 8.3 (H) 7.6 (H)       Vitamin D deficiency  Patient is tolerating vitamin D supplementation, although he is forgetting to take it some days. Vitamin D level was slightly low at 24    Impacted cerumen of right ear  Patient was advised to have right cerumen removed when he saw his homeopathic doctor recently.         Current medicines (including changes today)  Current Outpatient Prescriptions   Medication Sig Dispense Refill   • lisinopril (PRINIVIL) 20 MG Tab Take 1 Tab by mouth every day. 90 Tab 3   • Blood Glucose Monitoring Suppl (FREESTYLE FREEDOM LITE) W/DEVICE Kit 1 Application by Does not apply route every day. Dx: E11.42 1 Kit 0   • Blood Glucose Monitoring Suppl SUPPLIES Misc Test strips order: Test strips for Abbott Freestyle Lite meter. Sig: use once daily. Dx: E11.42 100 Each 3   • Lancets Misc Lancets order: Lancets for Abbott Freestyle Lite meter. Sig: use once daily. Dx: E11.42 100 Each 3   • Blood Glucose Monitoring Suppl SUPPLIES Misc Accu Check Yoanna blood glucose test strips.  Checking blood sugars 2 times per day E11.40 200 Strip 3   • ibuprofen (MOTRIN) 200 MG Tab Take 200 mg by mouth every 6 hours as needed.     • pioglitazone (ACTOS) 45 MG Tab Take 1 Tab by mouth every day. 90 Tab 3   • metformin (GLUCOPHAGE) 500 MG Tab Take 1 Tab by mouth 3 times a day, with meals. 270 Tab 3   • hydrochlorothiazide (MICROZIDE) 12.5 MG capsule TAKE ONE CAPSULE BY MOUTH ONE TIME DAILY 30 Cap 11   • carvedilol (COREG) 12.5 MG  "Tab Take 1 Tab by mouth 2 times a day, with meals. 180 Tab 3   • atorvastatin (LIPITOR) 80 MG tablet Take 1 Tab by mouth every day. (Patient taking differently: Take 80 mg by mouth every evening.) 90 Tab 3   • aspirin 81 MG tablet Take 81 mg by mouth every day.     • nitroglycerin (NITROSTAT) 0.4 MG SUBL Place 1 Tab under tongue as needed for Chest Pain. 25 Tab 1   • Cholecalciferol (VITAMIN D-3) 5000 UNITS TABS Take 1 Tab by mouth every day.         No current facility-administered medications for this visit.     He  has a past medical history of Diabetes; Hypertension; CAD (coronary artery disease) (October 2013); Hyperlipidemia; Syncope; Arthritis; Cataract; Pain; and Stroke (CMS-[facility]) (2010).    ROS   No chest pain, no shortness of breath       Objective:     Blood pressure 134/80, pulse 65, temperature 36.9 °C (98.4 °F), resp. rate 16, height 1.88 m (6' 2.02\"), SpO2 95 %. There is no weight on file to calculate BMI.   Physical Exam:  Constitutional: Alert, no distress.  Skin: Warm, dry, good turgor, no rashes in visible areas.  Eye: Equal, round and reactive, conjunctiva clear, lids normal.  ENMT: Right cerumen impaction  Psych: Alert and oriented x3, normal affect and mood.        Assessment and Plan:   The following treatment plan was discussed    1. Type 2 diabetes mellitus with peripheral neuropathy (CMS-HCC)  Controlled for age. Continue current medication. Advised patient continue healthy diet. Follow up with labs in 3 months.  - COMP METABOLIC PANEL; Future  - HEMOGLOBIN A1C; Future  - MICROALBUMIN CREAT RATIO URINE; Future  - LIPID PROFILE; Future    2. Vitamin D deficiency  Uncontrolled. Advised patient to take vitamin D daily with food. Follow-up with vitamin D in 3 months.  - VITAMIN D,25 HYDROXY; Future    3. Pure hypercholesterolemia  Controlled. Continue current medication.    4. Impacted cerumen of right ear  Lavage in clinic today.    5. Prostate cancer screening  - PROSTATE SPECIFIC AG " SCREENING; Future      Followup: Return in about 3 months (around 8/16/2017) for Diabetes, Short.         [facility]

## 2017-05-30 ENCOUNTER — OFFICE VISIT (OUTPATIENT)
Dept: MEDICAL GROUP | Facility: MEDICAL CENTER | Age: 72
End: 2017-05-30
Payer: MEDICARE

## 2017-05-30 VITALS
TEMPERATURE: 98.5 F | DIASTOLIC BLOOD PRESSURE: 78 MMHG | OXYGEN SATURATION: 93 % | HEART RATE: 67 BPM | HEIGHT: 74 IN | WEIGHT: 250 LBS | SYSTOLIC BLOOD PRESSURE: 130 MMHG | BODY MASS INDEX: 32.08 KG/M2

## 2017-05-30 DIAGNOSIS — R53.83 FATIGUE, UNSPECIFIED TYPE: ICD-10-CM

## 2017-05-30 DIAGNOSIS — T63.301A SPIDER BITE, ACCIDENTAL OR UNINTENTIONAL, INITIAL ENCOUNTER: ICD-10-CM

## 2017-05-30 PROBLEM — H61.21 IMPACTED CERUMEN OF RIGHT EAR: Status: RESOLVED | Noted: 2017-05-16 | Resolved: 2017-05-30

## 2017-05-30 PROCEDURE — G8417 CALC BMI ABV UP PARAM F/U: HCPCS | Performed by: FAMILY MEDICINE

## 2017-05-30 PROCEDURE — 3017F COLORECTAL CA SCREEN DOC REV: CPT | Mod: 8P | Performed by: FAMILY MEDICINE

## 2017-05-30 PROCEDURE — 99214 OFFICE O/P EST MOD 30 MIN: CPT | Performed by: FAMILY MEDICINE

## 2017-05-30 PROCEDURE — 4040F PNEUMOC VAC/ADMIN/RCVD: CPT | Mod: 8P | Performed by: FAMILY MEDICINE

## 2017-05-30 PROCEDURE — G8598 ASA/ANTIPLAT THER USED: HCPCS | Performed by: FAMILY MEDICINE

## 2017-05-30 PROCEDURE — 1036F TOBACCO NON-USER: CPT | Performed by: FAMILY MEDICINE

## 2017-05-30 PROCEDURE — G8432 DEP SCR NOT DOC, RNG: HCPCS | Performed by: FAMILY MEDICINE

## 2017-05-30 PROCEDURE — 1101F PT FALLS ASSESS-DOCD LE1/YR: CPT | Performed by: FAMILY MEDICINE

## 2017-05-30 NOTE — ASSESSMENT & PLAN NOTE
3 weeks ago patient noticed a spot on the dorsal aspect of left hand. He states that it has had now formed an eschar over the top with no drainage or warmth. He is complaining of associated pain now.

## 2017-05-30 NOTE — MR AVS SNAPSHOT
"        Agapito Stone   2017 11:20 AM   Office Visit   MRN: [id number]    Department:  [facility]   Dept Phone:  [phone]    Description:  Male : 1945   Provider:  Brody Zamorano M.D.           Reason for Visit     Insect Bite poss spider bite back of  L hand x3wks      Allergies as of 2017     Allergen Noted Reactions    Fish 2010   Hives    shellfish    Pcn [Penicillins] 2010   Hives    Amoxicillin 2013   Hives    Food 03/10/2014   Swelling     Eggs,Melons, avocados-puffy mouth    Horse Allergy 2015       Influenza Vaccines 2016         You were diagnosed with     Spider bite, accidental or unintentional, initial encounter   [[id number]]       Fatigue, unspecified type   [4196635]         Vital Signs     Blood Pressure Pulse Temperature Height Weight Body Mass Index    130/78 mmHg 67 36.9 °C (98.5 °F) 1.88 m (6' 2\") 113.399 kg (250 lb) 32.08 kg/m2    Oxygen Saturation Smoking Status                93% Never Smoker           Basic Information     Date Of Birth Sex Race Ethnicity Preferred Language    1945 Male White Non- English      Your appointments     Aug 17, 2017  1:00 PM   Established Patient with Brody Zamorano M.D.   [facility])    [address]  [place] [id number] [phone]           You will be receiving a confirmation call a few days before your appointment from our automated call confirmation system.            Sep 11, 2017 10:20 AM   FOLLOW UP with Kade Ovalle M.D.   [facility] (--)    [address]  [place] [address] [phone]              Problem List              ICD-10-CM Priority Class Noted - Resolved    History of stroke Z86.73 Medium  2012 - Present    HTN (hypertension) I10 High  2012 - Present    Carotid atherosclerosis I65.29 High  2012 - Present    CAD (coronary artery disease) I25.10 " High  10/30/2013 - Present    Hyperlipidemia E78.5 High  10/30/2013 - Present    Stented coronary artery Z95.5 High  11/20/2013 - Present    Dysphagia R13.10   9/4/2014 - Present    Type 2 diabetes mellitus with peripheral neuropathy (CMS-HCC) E11.42   9/24/2014 - Present    Hereditary and idiopathic peripheral neuropathy G60.9   9/24/2014 - Present    Vitamin D deficiency E55.9   11/9/2015 - Present    Seasonal allergies J30.2   11/9/2015 - Present    Obesity (BMI 30.0-34.9) E66.9   11/9/2015 - Present    Agent orange exposure Z77.098   1/14/2016 - Present    Right ankle pain M25.571   9/22/2016 - Present    Benign non-nodular prostatic hyperplasia with lower urinary tract symptoms N40.1   1/30/2017 - Present    Pneumonia of right lower lobe due to infectious organism J18.1   3/30/2017 - Present    Spider bite T63.301A   5/30/2017 - Present      Health Maintenance        Date Due Completion Dates    IMM DTaP/Tdap/Td Vaccine (1 - Tdap) 2/25/1966 ---    COLONOSCOPY 2/25/1997 ---    IMM ZOSTER VACCINE 2/25/2007 ---    IMM PNEUMOCOCCAL 65+ (ADULT) LOW/MEDIUM RISK SERIES (1 of 2 - PCV13) 2/25/2012 ---    DIABETES MONOFILAMENT / LE EXAM 3/18/2016 3/18/2015 (N/S), 3/10/2014    Override on 3/18/2015: (N/S)    A1C SCREENING 11/15/2017 5/15/2017, 1/27/2017, 9/22/2016, 6/20/2016, 3/19/2016, 3/19/2016, 9/23/2015, 3/18/2015, 9/24/2014, 9/22/2014, 11/15/2013, 1/29/2012, 6/14/2010    RETINAL SCREENING 1/25/2018 1/25/2017, 10/12/2015, 10/6/2014, 8/16/2013, 6/29/2012    FASTING LIPID PROFILE 5/15/2018 5/15/2017, 1/27/2017, 6/20/2016, 3/19/2016, 10/8/2015, 9/22/2014, 11/15/2013, 1/29/2012, 6/14/2010    URINE ACR / MICROALBUMIN 5/15/2018 5/15/2017, 1/27/2017, 6/20/2016, 3/19/2016, 9/21/2015, 10/22/2012    SERUM CREATININE 5/15/2018 5/15/2017, 1/27/2017, 9/5/2016, 6/20/2016, 3/19/2016, 10/8/2015, 3/30/2015, 3/16/2015, 9/22/2014, 11/15/2013, 2/20/2013, 1/31/2013, 10/22/2012, 1/29/2012, 1/28/2012, 7/14/2010, 7/6/2010, 6/14/2010,  6/13/2010, 4/9/2007, 1/23/2007            Current Immunizations     No immunizations on file.      Below and/or attached are the medications your provider expects you to take. Review all of your home medications and newly ordered medications with your provider and/or pharmacist. Follow medication instructions as directed by your provider and/or pharmacist. Please keep your medication list with you and share with your provider. Update the information when medications are discontinued, doses are changed, or new medications (including over-the-counter products) are added; and carry medication information at all times in the event of emergency situations     Allergies:  FISH - Hives     PCN - Hives     AMOXICILLIN - Hives     FOOD - Swelling     HORSE ALLERGY - (reactions not documented)     INFLUENZA VACCINES - (reactions not documented)               Medications  Valid as of: May 30, 2017 -  2:38 PM    Generic Name Brand Name Tablet Size Instructions for use    Aspirin (Tab) aspirin 81 MG Take 81 mg by mouth every day.        Atorvastatin Calcium (Tab) LIPITOR 80 MG Take 1 Tab by mouth every day.        Blood Glucose Monitoring Suppl (Misc) Blood Glucose Monitoring Suppl SUPPLIES Accu Check Yoanna blood glucose test strips.  Checking blood sugars 2 times per day E11.40        Blood Glucose Monitoring Suppl (Kit) FREESTYLE FREEDOM LITE W/DEVICE 1 Application by Does not apply route every day. Dx: E11.42        Blood Glucose Monitoring Suppl (Misc) Blood Glucose Monitoring Suppl SUPPLIES Test strips order: Test strips for Abbott Freestyle Lite meter. Sig: use once daily. Dx: E11.42        Carvedilol (Tab) COREG 12.5 MG Take 1 Tab by mouth 2 times a day, with meals.        Cholecalciferol (Tab) Vitamin D-3 5000 UNITS Take 1 Tab by mouth every day.          HydroCHLOROthiazide (Cap) MICROZIDE 12.5 MG TAKE ONE CAPSULE BY MOUTH ONE TIME DAILY        Ibuprofen (Tab) MOTRIN 200 MG Take 200 mg by mouth every 6 hours as needed.          Lancets (Misc) Lancets  Lancets order: Lancets for Abbott Freestyle Lite meter. Sig: use once daily. Dx: E11.42        Lisinopril (Tab) PRINIVIL 20 MG Take 1 Tab by mouth every day.        MetFORMIN HCl (Tab) GLUCOPHAGE 500 MG Take 1 Tab by mouth 3 times a day, with meals.        Mupirocin (Ointment) BACTROBAN 2 % Apply 1 Application to affected area(s) 2 times a day.        Nitroglycerin (SL Tab) NITROSTAT 0.4 MG Place 1 Tab under tongue as needed for Chest Pain.        Pioglitazone HCl (Tab) ACTOS 45 MG Take 1 Tab by mouth every day.        .                 Medicines prescribed today were sent to:     [id number] - [place] - [address] [place] [address]    Phone: [phone] Fax: [phone]    Open 24 Hours?: No      Medication refill instructions:       If your prescription bottle indicates you have medication refills left, it is not necessary to call your provider’s office. Please contact your pharmacy and they will refill your medication.    If your prescription bottle indicates you do not have any refills left, you may request refills at any time through one of the following ways: The online [facility] system (except Urgent Care), by calling your provider’s office, or by asking your pharmacy to contact your provider’s office with a refill request. Medication refills are processed only during regular business hours and may not be available until the next business day. Your provider may request additional information or to have a follow-up visit with you prior to refilling your medication.   *Please Note: Medication refills are assigned a new Rx number when refilled electronically. Your pharmacy may indicate that no refills were authorized even though a new prescription for the same medication is available at the pharmacy. Please request the medicine by name with the pharmacy before contacting your provider for a refill.        Your To Do List     Future Labs/Procedures Complete By  Expires    TESTOSTERONE SERUM  As directed 5/30/2018         ViewReplet Access Code: Activation code not generated  Current [place] [facility] Status: Active

## 2017-05-30 NOTE — PROGRESS NOTES
Subjective:   Agapito Stone is a 72 y.o. male here today for spider bite and fatigue    Spider bite  3 weeks ago patient noticed a spot on the dorsal aspect of left hand. He states that it has had now formed an eschar over the top with no drainage or warmth. He is complaining of associated pain now.         Patient is complaining of fatigue and leg weakness. He is planning to start exercising again. Patient is concerned of having low testosterone level. He would like his testosterone checked on next labs.     Current medicines (including changes today)  Current Outpatient Prescriptions   Medication Sig Dispense Refill   • mupirocin (BACTROBAN) 2 % Ointment Apply 1 Application to affected area(s) 2 times a day. 1 Tube 1   • lisinopril (PRINIVIL) 20 MG Tab Take 1 Tab by mouth every day. 90 Tab 3   • Blood Glucose Monitoring Suppl (FREESTYLE FREEDOM LITE) W/DEVICE Kit 1 Application by Does not apply route every day. Dx: E11.42 1 Kit 0   • Blood Glucose Monitoring Suppl SUPPLIES Misc Test strips order: Test strips for Abbott Freestyle Lite meter. Sig: use once daily. Dx: E11.42 100 Each 3   • Lancets Misc Lancets order: Lancets for Abbott Freestyle Lite meter. Sig: use once daily. Dx: E11.42 100 Each 3   • Blood Glucose Monitoring Suppl SUPPLIES Misc Accu Check Yoanna blood glucose test strips.  Checking blood sugars 2 times per day E11.40 200 Strip 3   • ibuprofen (MOTRIN) 200 MG Tab Take 200 mg by mouth every 6 hours as needed.     • pioglitazone (ACTOS) 45 MG Tab Take 1 Tab by mouth every day. 90 Tab 3   • metformin (GLUCOPHAGE) 500 MG Tab Take 1 Tab by mouth 3 times a day, with meals. 270 Tab 3   • hydrochlorothiazide (MICROZIDE) 12.5 MG capsule TAKE ONE CAPSULE BY MOUTH ONE TIME DAILY 30 Cap 11   • carvedilol (COREG) 12.5 MG Tab Take 1 Tab by mouth 2 times a day, with meals. 180 Tab 3   • atorvastatin (LIPITOR) 80 MG tablet Take 1 Tab by mouth every day. (Patient taking differently: Take 80 mg by mouth every  "evening.) 90 Tab 3   • aspirin 81 MG tablet Take 81 mg by mouth every day.     • nitroglycerin (NITROSTAT) 0.4 MG SUBL Place 1 Tab under tongue as needed for Chest Pain. 25 Tab 1   • Cholecalciferol (VITAMIN D-3) 5000 UNITS TABS Take 1 Tab by mouth every day.         No current facility-administered medications for this visit.     He  has a past medical history of Diabetes; Hypertension; CAD (coronary artery disease) (October 2013); Hyperlipidemia; Syncope; Arthritis; Cataract; Pain; and Stroke (CMS-HCC) (2010).    ROS   No fever, positive lower leg weakness       Objective:     Blood pressure 130/78, pulse 67, temperature 36.9 °C (98.5 °F), height 1.88 m (6' 2\"), weight 113.399 kg (250 lb), SpO2 93 %. Body mass index is 32.08 kg/(m^2).   Physical Exam:  Constitutional: Alert, no distress.  Skin: Warm, dry, good turgor, left dorsal hand with central eschar and circular mild erythema, no tenderness, no warmth, no drainage  Eye: Equal, round and reactive, conjunctiva clear, lids normal.  Psych: Alert and oriented x3, normal affect and mood.        Assessment and Plan:   The following treatment plan was discussed    1. Spider bite, accidental or unintentional, initial encounter  Healing with no sign of current infection. Prescription for Bactroban given because patient is diabetic, to prevent infection.     2. Fatigue, unspecified type  Check testosterone with next labs. Encouraged patient to exercise regularly.  - TESTOSTERONE SERUM; Future      Followup: Return if symptoms worsen or fail to improve.           "

## 2017-08-16 ENCOUNTER — HOSPITAL ENCOUNTER (OUTPATIENT)
Dept: LAB | Facility: MEDICAL CENTER | Age: 72
End: 2017-08-16
Attending: FAMILY MEDICINE
Payer: MEDICARE

## 2017-08-16 DIAGNOSIS — E55.9 VITAMIN D DEFICIENCY: ICD-10-CM

## 2017-08-16 DIAGNOSIS — Z12.5 PROSTATE CANCER SCREENING: ICD-10-CM

## 2017-08-16 DIAGNOSIS — E11.42 TYPE 2 DIABETES MELLITUS WITH PERIPHERAL NEUROPATHY (HCC): ICD-10-CM

## 2017-08-16 DIAGNOSIS — R53.83 FATIGUE, UNSPECIFIED TYPE: ICD-10-CM

## 2017-08-16 DIAGNOSIS — E78.00 PURE HYPERCHOLESTEROLEMIA: ICD-10-CM

## 2017-08-16 LAB
25(OH)D3 SERPL-MCNC: 22 NG/ML (ref 30–100)
ALBUMIN SERPL BCP-MCNC: 3.8 G/DL (ref 3.2–4.9)
ALBUMIN/GLOB SERPL: 1.3 G/DL
ALP SERPL-CCNC: 128 U/L (ref 30–99)
ALT SERPL-CCNC: 33 U/L (ref 2–50)
ANION GAP SERPL CALC-SCNC: 8 MMOL/L (ref 0–11.9)
AST SERPL-CCNC: 25 U/L (ref 12–45)
BILIRUB SERPL-MCNC: 1.6 MG/DL (ref 0.1–1.5)
BUN SERPL-MCNC: 16 MG/DL (ref 8–22)
CALCIUM SERPL-MCNC: 9.2 MG/DL (ref 8.5–10.5)
CHLORIDE SERPL-SCNC: 105 MMOL/L (ref 96–112)
CHOLEST SERPL-MCNC: 143 MG/DL (ref 100–199)
CO2 SERPL-SCNC: 27 MMOL/L (ref 20–33)
CREAT SERPL-MCNC: 0.87 MG/DL (ref 0.5–1.4)
CREAT UR-MCNC: 134.7 MG/DL
EST. AVERAGE GLUCOSE BLD GHB EST-MCNC: 189 MG/DL
GFR SERPL CREATININE-BSD FRML MDRD: >60 ML/MIN/1.73 M 2
GLOBULIN SER CALC-MCNC: 2.9 G/DL (ref 1.9–3.5)
GLUCOSE SERPL-MCNC: 202 MG/DL (ref 65–99)
HBA1C MFR BLD: 8.2 % (ref 0–5.6)
HDLC SERPL-MCNC: 50 MG/DL
LDLC SERPL CALC-MCNC: 75 MG/DL
MICROALBUMIN UR-MCNC: 7.3 MG/DL
MICROALBUMIN/CREAT UR: 54 MG/G (ref 0–30)
POTASSIUM SERPL-SCNC: 4.1 MMOL/L (ref 3.6–5.5)
PROT SERPL-MCNC: 6.7 G/DL (ref 6–8.2)
PSA SERPL-MCNC: 0.71 NG/ML (ref 0–4)
SODIUM SERPL-SCNC: 140 MMOL/L (ref 135–145)
TESTOST SERPL-MCNC: 396 NG/DL (ref 175–781)
TRIGL SERPL-MCNC: 92 MG/DL (ref 0–149)

## 2017-08-16 PROCEDURE — 84403 ASSAY OF TOTAL TESTOSTERONE: CPT

## 2017-08-16 PROCEDURE — 82306 VITAMIN D 25 HYDROXY: CPT

## 2017-08-16 PROCEDURE — 83036 HEMOGLOBIN GLYCOSYLATED A1C: CPT

## 2017-08-16 PROCEDURE — 84153 ASSAY OF PSA TOTAL: CPT

## 2017-08-16 PROCEDURE — 82570 ASSAY OF URINE CREATININE: CPT

## 2017-08-16 PROCEDURE — 82043 UR ALBUMIN QUANTITATIVE: CPT

## 2017-08-16 PROCEDURE — 36415 COLL VENOUS BLD VENIPUNCTURE: CPT

## 2017-08-16 PROCEDURE — 80061 LIPID PANEL: CPT

## 2017-08-16 PROCEDURE — 80053 COMPREHEN METABOLIC PANEL: CPT

## 2017-08-17 ENCOUNTER — OFFICE VISIT (OUTPATIENT)
Dept: MEDICAL GROUP | Facility: MEDICAL CENTER | Age: 72
End: 2017-08-17
Payer: MEDICARE

## 2017-08-17 VITALS
HEIGHT: 74 IN | TEMPERATURE: 98.1 F | HEART RATE: 74 BPM | OXYGEN SATURATION: 92 % | RESPIRATION RATE: 16 BRPM | SYSTOLIC BLOOD PRESSURE: 128 MMHG | DIASTOLIC BLOOD PRESSURE: 76 MMHG | BODY MASS INDEX: 32.21 KG/M2 | WEIGHT: 251 LBS

## 2017-08-17 DIAGNOSIS — E11.42 TYPE 2 DIABETES MELLITUS WITH PERIPHERAL NEUROPATHY (HCC): ICD-10-CM

## 2017-08-17 DIAGNOSIS — Z12.11 COLON CANCER SCREENING: ICD-10-CM

## 2017-08-17 DIAGNOSIS — E78.00 PURE HYPERCHOLESTEROLEMIA: ICD-10-CM

## 2017-08-17 DIAGNOSIS — I10 ESSENTIAL HYPERTENSION: ICD-10-CM

## 2017-08-17 DIAGNOSIS — E55.9 VITAMIN D DEFICIENCY: ICD-10-CM

## 2017-08-17 PROBLEM — T63.301A SPIDER BITE: Status: RESOLVED | Noted: 2017-05-30 | Resolved: 2017-08-17

## 2017-08-17 PROBLEM — J18.9 PNEUMONIA OF RIGHT LOWER LOBE DUE TO INFECTIOUS ORGANISM: Status: RESOLVED | Noted: 2017-03-30 | Resolved: 2017-08-17

## 2017-08-17 PROCEDURE — 99214 OFFICE O/P EST MOD 30 MIN: CPT | Performed by: FAMILY MEDICINE

## 2017-08-17 NOTE — PROGRESS NOTES
Subjective:   Agapito Stone is a 72 y.o. male here today for diabetes  He is planning to have ankle surgery in October    Type 2 diabetes mellitus with peripheral neuropathy  Has been eating out more because his wife is having back problems and cannot cook.  A1c went up from 7.6 to 8.2.  Still taking metformin 500 mg three times daily, actos 45 mg daily.  + increased numbness in both feet.    Hyperlipidemia  Patient is tolerating atorvastatin 80 mg daily. He denies any side effects with medication.    HTN (hypertension)  Patient is tolerating lisinopril 20 mg daily and carvedilol 12.5 mg twice daily.    Vitamin D deficiency  Patient has been irregular with taking vitamin D 5000 units daily.         Current medicines (including changes today)  Current Outpatient Prescriptions   Medication Sig Dispense Refill   • mupirocin (BACTROBAN) 2 % Ointment Apply 1 Application to affected area(s) 2 times a day. 1 Tube 1   • lisinopril (PRINIVIL) 20 MG Tab Take 1 Tab by mouth every day. 90 Tab 3   • Blood Glucose Monitoring Suppl (FREESTYLE FREEDOM LITE) W/DEVICE Kit 1 Application by Does not apply route every day. Dx: E11.42 1 Kit 0   • Blood Glucose Monitoring Suppl SUPPLIES Misc Test strips order: Test strips for Abbott Freestyle Lite meter. Sig: use once daily. Dx: E11.42 100 Each 3   • Lancets Misc Lancets order: Lancets for Abbott Freestyle Lite meter. Sig: use once daily. Dx: E11.42 100 Each 3   • Blood Glucose Monitoring Suppl SUPPLIES Misc Accu Check Yoanna blood glucose test strips.  Checking blood sugars 2 times per day E11.40 200 Strip 3   • ibuprofen (MOTRIN) 200 MG Tab Take 200 mg by mouth every 6 hours as needed.     • pioglitazone (ACTOS) 45 MG Tab Take 1 Tab by mouth every day. 90 Tab 3   • metformin (GLUCOPHAGE) 500 MG Tab Take 1 Tab by mouth 3 times a day, with meals. 270 Tab 3   • hydrochlorothiazide (MICROZIDE) 12.5 MG capsule TAKE ONE CAPSULE BY MOUTH ONE TIME DAILY 30 Cap 11   • carvedilol (COREG)  "12.5 MG Tab Take 1 Tab by mouth 2 times a day, with meals. 180 Tab 3   • atorvastatin (LIPITOR) 80 MG tablet Take 1 Tab by mouth every day. (Patient taking differently: Take 80 mg by mouth every evening.) 90 Tab 3   • aspirin 81 MG tablet Take 81 mg by mouth every day.     • nitroglycerin (NITROSTAT) 0.4 MG SUBL Place 1 Tab under tongue as needed for Chest Pain. 25 Tab 1   • Cholecalciferol (VITAMIN D-3) 5000 UNITS TABS Take 1 Tab by mouth every day.         No current facility-administered medications for this visit.     He  has a past medical history of Diabetes; Hypertension; CAD (coronary artery disease) (October 2013); Hyperlipidemia; Syncope; Arthritis; Cataract; Pain; and Stroke (CMS-HCC) (2010).    ROS   No chest pain, no shortness of breath, no abdominal pain       Objective:     Blood pressure 128/76, pulse 74, temperature 36.7 °C (98.1 °F), resp. rate 16, height 1.88 m (6' 2.02\"), weight 113.853 kg (251 lb), SpO2 92 %. Body mass index is 32.21 kg/(m^2).   Physical Exam:  Constitutional: Alert, no distress.  Skin: Warm, dry, good turgor, no rashes in visible areas.  Eye: Equal, round and reactive, conjunctiva clear, lids normal.  Respiratory: Unlabored respiratory effort, lungs clear to auscultation, no wheezes, no ronchi.  Cardiovascular: Normal S1, S2, no murmur, no edema.  Psych: Alert and oriented x3, normal affect and mood.        Assessment and Plan:   The following treatment plan was discussed    1. Type 2 diabetes mellitus with peripheral neuropathy (CMS-HCC)  Controlled for age. Continue current medication. Follow up with labs in 3 months. Recommended diet and exercise.  - COMP METABOLIC PANEL; Future  - HEMOGLOBIN A1C; Future  - MICROALBUMIN CREAT RATIO URINE; Future  - LIPID PROFILE; Future    2. Vitamin D deficiency  Uncontrolled. Advised patient take vitamin D daily.    3. Essential hypertension  Controlled. Continue current medication. Follow up with labs in 3 months.      4. Pure " hypercholesterolemia  Controlled. Continue current medication. Follow up with labs in 3 months.      5. Colon cancer screening  - OCCULT BLOOD FECES IMMUNOASSAY; Future      Followup: Return in about 3 months (around 11/17/2017) for Diabetes, Short.

## 2017-08-17 NOTE — ASSESSMENT & PLAN NOTE
Has been eating out more because his wife is having back problems and cannot cook.  A1c went up from 7.6 to 8.2.  Still taking metformin 500 mg three times daily, actos 45 mg daily.  + increased numbness in both feet.

## 2017-08-17 NOTE — MR AVS SNAPSHOT
"        Agapito Stone   2017 1:00 PM   Office Visit   MRN: [id number]    Department:  [facility]   Dept Phone:  [phone]    Description:  Male : 1945   Provider:  Brody Zamorano M.D.           Reason for Visit     Follow-Up           Allergies as of 2017     Allergen Noted Reactions    Fish 2010   Hives    shellfish    Pcn [Penicillins] 2010   Hives    Amoxicillin 2013   Hives    Food 03/10/2014   Swelling     Eggs,Melons, avocados-puffy mouth    Horse Allergy 2015       Influenza Vaccines 2016         You were diagnosed with     Type 2 diabetes mellitus with peripheral neuropathy (CMS-HCC)   [2156097]       Vitamin D deficiency   [2973340]       Essential hypertension   [6338757]       Pure hypercholesterolemia   [272.0.ICD-9-CM]       Colon cancer screening   [185176]         Vital Signs     Blood Pressure Pulse Temperature Respirations Height Weight    128/76 mmHg 74 36.7 °C (98.1 °F) 16 1.88 m (6' 2.02\") 113.853 kg (251 lb)    Body Mass Index Oxygen Saturation Smoking Status             32.21 kg/m2 92% Never Smoker          Basic Information     Date Of Birth Sex Race Ethnicity Preferred Language    1945 Male White Non- English      Your appointments     Sep 11, 2017 10:20 AM   FOLLOW UP with Kade Ovalle M.D.   [facility] (--)    [address] , [facility] [address]  [facility] [address] [phone]              Problem List              ICD-10-CM Priority Class Noted - Resolved    History of stroke Z86.73 Medium  2012 - Present    HTN (hypertension) I10 High  2012 - Present    Carotid atherosclerosis I65.29 High  2012 - Present    CAD (coronary artery disease) I25.10 High  10/30/2013 - Present    Hyperlipidemia E78.5 High  10/30/2013 - Present    Stented coronary artery Z95.5 High  2013 - Present    Dysphagia R13.10   2014 - Present    Type 2 diabetes mellitus with peripheral " neuropathy (CMS-[facility]) E11.42   9/24/2014 - Present    Hereditary and idiopathic peripheral neuropathy G60.9   9/24/2014 - Present    Vitamin D deficiency E55.9   11/9/2015 - Present    Seasonal allergies J30.2   11/9/2015 - Present    Obesity (BMI 30.0-34.9) E66.9   11/9/2015 - Present    Agent orange exposure Z77.098   1/14/2016 - Present    Right ankle pain M25.571   9/22/2016 - Present    Benign non-nodular prostatic hyperplasia with lower urinary tract symptoms N40.1   1/30/2017 - Present      Health Maintenance        Date Due Completion Dates    IMM DTaP/Tdap/Td Vaccine (1 - Tdap) 2/25/1966 ---    COLONOSCOPY 2/25/1997 ---    IMM ZOSTER VACCINE 2/25/2007 ---    IMM PNEUMOCOCCAL 65+ (ADULT) LOW/MEDIUM RISK SERIES (1 of 2 - PCV13) 2/25/2012 ---    DIABETES MONOFILAMENT / LE EXAM 3/18/2016 3/18/2015 (N/S), 3/10/2014    Override on 3/18/2015: (N/S)    IMM INFLUENZA (1) 9/1/2017 ---    RETINAL SCREENING 1/25/2018 1/25/2017, 10/12/2015, 10/6/2014, 8/16/2013, 6/29/2012    A1C SCREENING 2/16/2018 8/16/2017, 5/15/2017, 1/27/2017, 9/22/2016, 6/20/2016, 3/19/2016, 3/19/2016, 9/23/2015, 3/18/2015, 9/24/2014, 9/22/2014, 11/15/2013, 1/29/2012, 6/14/2010    FASTING LIPID PROFILE 8/16/2018 8/16/2017, 5/15/2017, 1/27/2017, 6/20/2016, 3/19/2016, 10/8/2015, 9/22/2014, 11/15/2013, 1/29/2012, 6/14/2010    URINE ACR / MICROALBUMIN 8/16/2018 8/16/2017, 5/15/2017, 1/27/2017, 6/20/2016, 3/19/2016, 9/21/2015, 10/22/2012    SERUM CREATININE 8/16/2018 8/16/2017, 5/15/2017, 1/27/2017, 9/5/2016, 6/20/2016, 3/19/2016, 10/8/2015, 3/30/2015, 3/16/2015, 9/22/2014, 11/15/2013, 2/20/2013, 1/31/2013, 10/22/2012, 1/29/2012, 1/28/2012, 7/14/2010, 7/6/2010, 6/14/2010, 6/13/2010, 4/9/2007, 1/23/2007            Current Immunizations     No immunizations on file.      Below and/or attached are the medications your provider expects you to take. Review all of your home medications and newly ordered medications with your provider and/or pharmacist. Follow  medication instructions as directed by your provider and/or pharmacist. Please keep your medication list with you and share with your provider. Update the information when medications are discontinued, doses are changed, or new medications (including over-the-counter products) are added; and carry medication information at all times in the event of emergency situations     Allergies:  FISH - Hives     PCN - Hives     AMOXICILLIN - Hives     FOOD - Swelling     HORSE ALLERGY - (reactions not documented)     INFLUENZA VACCINES - (reactions not documented)               Medications  Valid as of: August 17, 2017 -  1:40 PM    Generic Name Brand Name Tablet Size Instructions for use    Aspirin (Tab) aspirin 81 MG Take 81 mg by mouth every day.        Atorvastatin Calcium (Tab) LIPITOR 80 MG Take 1 Tab by mouth every day.        Blood Glucose Monitoring Suppl (Misc) Blood Glucose Monitoring Suppl Supplies Accu Check Yoanna blood glucose test strips.  Checking blood sugars 2 times per day E11.40        Blood Glucose Monitoring Suppl (Kit) FREESTYLE FREEDOM LITE w/Device 1 Application by Does not apply route every day. Dx: E11.42        Blood Glucose Monitoring Suppl (Misc) Blood Glucose Monitoring Suppl Supplies Test strips order: Test strips for Abbott Freestyle Lite meter. Sig: use once daily. Dx: E11.42        Carvedilol (Tab) COREG 12.5 MG Take 1 Tab by mouth 2 times a day, with meals.        Cholecalciferol (Tab) Vitamin D-3 5000 units Take 1 Tab by mouth every day.          HydroCHLOROthiazide (Cap) MICROZIDE 12.5 MG TAKE ONE CAPSULE BY MOUTH ONE TIME DAILY        Ibuprofen (Tab) MOTRIN 200 MG Take 200 mg by mouth every 6 hours as needed.        Lancets (Misc) Lancets  Lancets order: Lancets for Abbott Freestyle Lite meter. Sig: use once daily. Dx: E11.42        Lisinopril (Tab) PRINIVIL 20 MG Take 1 Tab by mouth every day.        MetFORMIN HCl (Tab) GLUCOPHAGE 500 MG Take 1 Tab by mouth 3 times a day, with meals.         Mupirocin (Ointment) BACTROBAN 2 % Apply 1 Application to affected area(s) 2 times a day.        Nitroglycerin (SL Tab) NITROSTAT 0.4 MG Place 1 Tab under tongue as needed for Chest Pain.        Pioglitazone HCl (Tab) ACTOS 45 MG Take 1 Tab by mouth every day.        .                 Medicines prescribed today were sent to:     [id number] - [place] - [address]    [address] [place] [address]    Phone: [phone] Fax: [phone]    Open 24 Hours?: No      Medication refill instructions:       If your prescription bottle indicates you have medication refills left, it is not necessary to call your provider’s office. Please contact your pharmacy and they will refill your medication.    If your prescription bottle indicates you do not have any refills left, you may request refills at any time through one of the following ways: The online [employer] system (except Urgent Care), by calling your provider’s office, or by asking your pharmacy to contact your provider’s office with a refill request. Medication refills are processed only during regular business hours and may not be available until the next business day. Your provider may request additional information or to have a follow-up visit with you prior to refilling your medication.   *Please Note: Medication refills are assigned a new Rx number when refilled electronically. Your pharmacy may indicate that no refills were authorized even though a new prescription for the same medication is available at the pharmacy. Please request the medicine by name with the pharmacy before contacting your provider for a refill.        Your To Do List     Future Labs/Procedures Complete By Expires    COMP METABOLIC PANEL  As directed 8/18/2018    HEMOGLOBIN A1C  As directed 8/18/2018    LIPID PROFILE  As directed 8/18/2018    MICROALBUMIN CREAT RATIO URINE  As directed 8/18/2018    OCCULT BLOOD FECES IMMUNOASSAY  As directed 8/18/2018         [employer] Access Code:  Activation code not generated  Current MyChart Status: Active

## 2017-08-31 ENCOUNTER — HOSPITAL ENCOUNTER (OUTPATIENT)
Facility: MEDICAL CENTER | Age: 72
End: 2017-08-31
Attending: ORTHOPAEDIC SURGERY | Admitting: ORTHOPAEDIC SURGERY
Payer: MEDICARE

## 2017-09-08 ENCOUNTER — APPOINTMENT (OUTPATIENT)
Dept: RADIOLOGY | Facility: MEDICAL CENTER | Age: 72
End: 2017-09-08
Attending: EMERGENCY MEDICINE
Payer: MEDICARE

## 2017-09-08 ENCOUNTER — HOSPITAL ENCOUNTER (EMERGENCY)
Facility: MEDICAL CENTER | Age: 72
End: 2017-09-08
Attending: EMERGENCY MEDICINE
Payer: MEDICARE

## 2017-09-08 VITALS
OXYGEN SATURATION: 96 % | TEMPERATURE: 97.9 F | WEIGHT: 250 LBS | HEART RATE: 96 BPM | SYSTOLIC BLOOD PRESSURE: 164 MMHG | HEIGHT: 74 IN | DIASTOLIC BLOOD PRESSURE: 88 MMHG | BODY MASS INDEX: 32.08 KG/M2 | RESPIRATION RATE: 20 BRPM

## 2017-09-08 DIAGNOSIS — R00.2 PALPITATIONS: ICD-10-CM

## 2017-09-08 LAB
ALBUMIN SERPL BCP-MCNC: 3.8 G/DL (ref 3.2–4.9)
ALBUMIN/GLOB SERPL: 1.3 G/DL
ALP SERPL-CCNC: 153 U/L (ref 30–99)
ALT SERPL-CCNC: 30 U/L (ref 2–50)
ANION GAP SERPL CALC-SCNC: 11 MMOL/L (ref 0–11.9)
APTT PPP: 25.5 SEC (ref 24.7–36)
AST SERPL-CCNC: 25 U/L (ref 12–45)
BASOPHILS # BLD AUTO: 1.3 % (ref 0–1.8)
BASOPHILS # BLD: 0.1 K/UL (ref 0–0.12)
BILIRUB SERPL-MCNC: 1.7 MG/DL (ref 0.1–1.5)
BNP SERPL-MCNC: 33 PG/ML (ref 0–100)
BUN SERPL-MCNC: 16 MG/DL (ref 8–22)
CALCIUM SERPL-MCNC: 9.3 MG/DL (ref 8.5–10.5)
CHLORIDE SERPL-SCNC: 106 MMOL/L (ref 96–112)
CO2 SERPL-SCNC: 21 MMOL/L (ref 20–33)
CREAT SERPL-MCNC: 0.87 MG/DL (ref 0.5–1.4)
EKG IMPRESSION: NORMAL
EOSINOPHIL # BLD AUTO: 0.29 K/UL (ref 0–0.51)
EOSINOPHIL NFR BLD: 3.8 % (ref 0–6.9)
ERYTHROCYTE [DISTWIDTH] IN BLOOD BY AUTOMATED COUNT: 44 FL (ref 35.9–50)
GFR SERPL CREATININE-BSD FRML MDRD: >60 ML/MIN/1.73 M 2
GLOBULIN SER CALC-MCNC: 3 G/DL (ref 1.9–3.5)
GLUCOSE SERPL-MCNC: 185 MG/DL (ref 65–99)
HCT VFR BLD AUTO: 46.8 % (ref 42–52)
HGB BLD-MCNC: 15.5 G/DL (ref 14–18)
IMM GRANULOCYTES # BLD AUTO: 0.02 K/UL (ref 0–0.11)
IMM GRANULOCYTES NFR BLD AUTO: 0.3 % (ref 0–0.9)
INR PPP: 0.96 (ref 0.87–1.13)
LIPASE SERPL-CCNC: 27 U/L (ref 11–82)
LYMPHOCYTES # BLD AUTO: 1.44 K/UL (ref 1–4.8)
LYMPHOCYTES NFR BLD: 18.9 % (ref 22–41)
MCH RBC QN AUTO: 30.3 PG (ref 27–33)
MCHC RBC AUTO-ENTMCNC: 33.1 G/DL (ref 33.7–35.3)
MCV RBC AUTO: 91.4 FL (ref 81.4–97.8)
MONOCYTES # BLD AUTO: 0.63 K/UL (ref 0–0.85)
MONOCYTES NFR BLD AUTO: 8.3 % (ref 0–13.4)
NEUTROPHILS # BLD AUTO: 5.14 K/UL (ref 1.82–7.42)
NEUTROPHILS NFR BLD: 67.4 % (ref 44–72)
NRBC # BLD AUTO: 0 K/UL
NRBC BLD AUTO-RTO: 0 /100 WBC
PLATELET # BLD AUTO: 159 K/UL (ref 164–446)
PMV BLD AUTO: 9.2 FL (ref 9–12.9)
POTASSIUM SERPL-SCNC: 4.2 MMOL/L (ref 3.6–5.5)
PROT SERPL-MCNC: 6.8 G/DL (ref 6–8.2)
PROTHROMBIN TIME: 13.1 SEC (ref 12–14.6)
RBC # BLD AUTO: 5.12 M/UL (ref 4.7–6.1)
SODIUM SERPL-SCNC: 138 MMOL/L (ref 135–145)
TROPONIN I SERPL-MCNC: 0.01 NG/ML (ref 0–0.04)
WBC # BLD AUTO: 7.6 K/UL (ref 4.8–10.8)

## 2017-09-08 PROCEDURE — 99284 EMERGENCY DEPT VISIT MOD MDM: CPT

## 2017-09-08 PROCEDURE — 85025 COMPLETE CBC W/AUTO DIFF WBC: CPT

## 2017-09-08 PROCEDURE — 80053 COMPREHEN METABOLIC PANEL: CPT

## 2017-09-08 PROCEDURE — 83880 ASSAY OF NATRIURETIC PEPTIDE: CPT

## 2017-09-08 PROCEDURE — 71020 DX-CHEST-2 VIEWS: CPT

## 2017-09-08 PROCEDURE — 85730 THROMBOPLASTIN TIME PARTIAL: CPT

## 2017-09-08 PROCEDURE — 93005 ELECTROCARDIOGRAM TRACING: CPT | Performed by: EMERGENCY MEDICINE

## 2017-09-08 PROCEDURE — 84484 ASSAY OF TROPONIN QUANT: CPT

## 2017-09-08 PROCEDURE — 85610 PROTHROMBIN TIME: CPT

## 2017-09-08 PROCEDURE — 76705 ECHO EXAM OF ABDOMEN: CPT

## 2017-09-08 PROCEDURE — 83690 ASSAY OF LIPASE: CPT

## 2017-09-08 PROCEDURE — 93005 ELECTROCARDIOGRAM TRACING: CPT

## 2017-09-08 ASSESSMENT — PAIN SCALES - GENERAL: PAINLEVEL_OUTOF10: 0

## 2017-09-08 NOTE — DISCHARGE INSTRUCTIONS
Palpitations  A palpitation is the feeling that your heartbeat is irregular or is faster than normal. It may feel like your heart is fluttering or skipping a beat. Palpitations are usually not a serious problem. However, in some cases, you may need further medical evaluation.  CAUSES   Palpitations can be caused by:  · Smoking.  · Caffeine or other stimulants, such as diet pills or energy drinks.  · Alcohol.  · Stress and anxiety.  · Strenuous physical activity.  · Fatigue.  · Certain medicines.  · Heart disease, especially if you have a history of irregular heart rhythms (arrhythmias), such as atrial fibrillation, atrial flutter, or supraventricular tachycardia.  · An improperly working pacemaker or defibrillator.  DIAGNOSIS   To find the cause of your palpitations, your health care provider will take your medical history and perform a physical exam. Your health care provider may also have you take a test called an ambulatory electrocardiogram (ECG). An ECG records your heartbeat patterns over a 24-hour period. You may also have other tests, such as:  · Transthoracic echocardiogram (TTE). During echocardiography, sound waves are used to evaluate how blood flows through your heart.  · Transesophageal echocardiogram (DOM).  · Cardiac monitoring. This allows your health care provider to monitor your heart rate and rhythm in real time.  · Holter monitor. This is a portable device that records your heartbeat and can help diagnose heart arrhythmias. It allows your health care provider to track your heart activity for several days, if needed.  · Stress tests by exercise or by giving medicine that makes the heart beat faster.  TREATMENT   Treatment of palpitations depends on the cause of your symptoms and can vary greatly. Most cases of palpitations do not require any treatment other than time, relaxation, and monitoring your symptoms. Other causes, such as atrial fibrillation, atrial flutter, or supraventricular  tachycardia, usually require further treatment.  HOME CARE INSTRUCTIONS   · Avoid:  ¨ Caffeinated coffee, tea, soft drinks, diet pills, and energy drinks.  ¨ Chocolate.  ¨ Alcohol.  · Stop smoking if you smoke.  · Reduce your stress and anxiety. Things that can help you relax include:  ¨ A method of controlling things in your body, such as your heartbeats, with your mind (biofeedback).  ¨ Yoga.  ¨ Meditation.  ¨ Physical activity such as swimming, jogging, or walking.  · Get plenty of rest and sleep.  SEEK MEDICAL CARE IF:   · You continue to have a fast or irregular heartbeat beyond 24 hours.  · Your palpitations occur more often.  SEEK IMMEDIATE MEDICAL CARE IF:  · You have chest pain or shortness of breath.  · You have a severe headache.  · You feel dizzy or you faint.  MAKE SURE YOU:  · Understand these instructions.  · Will watch your condition.  · Will get help right away if you are not doing well or get worse.     This information is not intended to replace advice given to you by your health care provider. Make sure you discuss any questions you have with your health care provider.     Document Released: 12/15/2001 Document Revised: 12/23/2014 Document Reviewed: 02/15/2013  Elsevier Interactive Patient Education ©2016 [employer] Inc[employer]

## 2017-09-08 NOTE — ED PROVIDER NOTES
ED Provider Note    ED Provider Note      Primary care provider: Brody Zamorano M.D.    CHIEF COMPLAINT  Chief Complaint   Patient presents with   • Palpitations     Pt BIB EMS for palpitations that started appx 0000, awakened from sleep appx 0130.  Pt denies n/v/cp       HPI  Agapito Stone is a 72 y.o. male who [facility] with chief complaint of palpitations. Patient stated palpitations began about midnight he went to sleep was awoken with more palpitations approximately 1:30. He reported minimal shortness of breath and an uncomfortable feeling with the palpitations but had no pain. He reported that the palpitations seem to be gradually decreasing on the phone. He also reports some minimal abdominal discomfort over the last couple days and sense of bloating. He states that this occasionally happens to him when he eats abnormal foods. He's had no headache, altered mental status he denies pain in his chest  minimal nausea no emesis no Constipation diarrhea dysuria hematuria melena hematochezia skin changes no other acute concerns.    REVIEW OF SYSTEMS  10 systems reviewed and otherwise negative, pertinent positives and negatives listed in the history of present illness.      PAST MEDICAL HISTORY   has a past medical history of Arthritis; CAD (coronary artery disease) (October 2013); Cataract; Diabetes; Hyperlipidemia; Hypertension; Pain; Stroke (CMS-HCC) (2010); and Syncope.    SURGICAL HISTORY   has a past surgical history that includes carotid endarterectomy (7/13/2010); stent placement (2013); and tonsillectomy (as a child).    SOCIAL HISTORY  Social History   Substance Use Topics   • Smoking status: Never Smoker   • Smokeless tobacco: Never Used   • Alcohol use 0.0 oz/week      Comment: once a year      History   Drug Use No       FAMILY HISTORY  Non-Contributory    CURRENT MEDICATIONS  Home Medications     Reviewed by Kat Mosquera R.N. (Registered Nurse) on 09/08/17 at 5634  " Med List Status: Not Addressed   Medication Last Dose Status   aspirin 81 MG tablet 3/8/2017 Active   atorvastatin (LIPITOR) 80 MG tablet 3/8/2017 Active   Blood Glucose Monitoring Suppl (FREESTYLE FREEDOM LITE) W/DEVICE Kit 3/8/2017 Active   Blood Glucose Monitoring Suppl SUPPLIES Misc 3/8/2017 Active   Blood Glucose Monitoring Suppl SUPPLIES Misc 3/8/2017 Active   carvedilol (COREG) 12.5 MG Tab 3/8/2017 Active   Cholecalciferol (VITAMIN D-3) 5000 UNITS TABS 3/8/2017 Active   hydrochlorothiazide (MICROZIDE) 12.5 MG capsule 3/8/2017 Active   ibuprofen (MOTRIN) 200 MG Tab 3/8/2017 Active   Lancets Misc 3/8/2017 Active   lisinopril (PRINIVIL) 20 MG Tab  Active   metformin (GLUCOPHAGE) 500 MG Tab 3/8/2017 Active   mupirocin (BACTROBAN) 2 % Ointment  Active   nitroglycerin (NITROSTAT) 0.4 MG SUBL PRN Active   pioglitazone (ACTOS) 45 MG Tab 3/8/2017 Active                ALLERGIES  Allergies   Allergen Reactions   • Fish Hives     shellfish   • Pcn [Penicillins] Hives   • Amoxicillin Hives   • Food Swelling      Eggs,Melons, avocados-puffy mouth   • Horse Allergy    • Influenza Vaccines        PHYSICAL EXAM  VITAL SIGNS: BP (!) 164/88   Pulse 96   Temp 36.6 °C (97.9 °F)   Resp 20   Ht 1.88 m (6' 2\")   Wt 113.4 kg (250 lb)   SpO2 96%   BMI 32.10 kg/m²   Pulse ox interpretation: I interpret this pulse ox as normal.  Constitutional: Alert and oriented x 3, minimal Distress  HEENT: Atraumatic normocephalic, pupils are equal round reactive to light extraocular movements are intact. The nares is clear, external ears are normal, mouth shows moist mucous membranes  Neck: Supple, no JVD no tracheal deviation  Cardiovascular:Borderline tachycardic no murmur rub or gallop 2+ pulses peripherally x4  Thorax & Lungs: No respiratory distress, no wheezes rales or rhonchi, No chest tenderness.   GI: Soft nontender nondistended positive bowel sounds, no peritoneal signs  Skin: Warm dry no acute rash or lesion  Musculoskeletal: " Moving all extremities with full range and 5 of 5 strength, no acute  deformity  Neurologic: Cranial nerves III through XII are grossly intact, no sensory deficit, no cerebellar dysfunction   Psychiatric: Appropriate affect for situation at this time      DIAGNOSTIC STUDIES / PROCEDURES  LABS  Results for orders placed or performed during the hospital encounter of 09/08/17   Troponin   Result Value Ref Range    Troponin I 0.01 0.00 - 0.04 ng/mL   Btype Natriuretic Peptide   Result Value Ref Range    B Natriuretic Peptide 33 0 - 100 pg/mL   CBC with Differential   Result Value Ref Range    WBC 7.6 4.8 - 10.8 K/uL    RBC 5.12 4.70 - 6.10 M/uL    Hemoglobin 15.5 14.0 - 18.0 g/dL    Hematocrit 46.8 42.0 - 52.0 %    MCV 91.4 81.4 - 97.8 fL    MCH 30.3 27.0 - 33.0 pg    MCHC 33.1 (L) 33.7 - 35.3 g/dL    RDW 44.0 35.9 - 50.0 fL    Platelet Count 159 (L) 164 - 446 K/uL    MPV 9.2 9.0 - 12.9 fL    Neutrophils-Polys 67.40 44.00 - 72.00 %    Lymphocytes 18.90 (L) 22.00 - 41.00 %    Monocytes 8.30 0.00 - 13.40 %    Eosinophils 3.80 0.00 - 6.90 %    Basophils 1.30 0.00 - 1.80 %    Immature Granulocytes 0.30 0.00 - 0.90 %    Nucleated RBC 0.00 /100 WBC    Neutrophils (Absolute) 5.14 1.82 - 7.42 K/uL    Lymphs (Absolute) 1.44 1.00 - 4.80 K/uL    Monos (Absolute) 0.63 0.00 - 0.85 K/uL    Eos (Absolute) 0.29 0.00 - 0.51 K/uL    Baso (Absolute) 0.10 0.00 - 0.12 K/uL    Immature Granulocytes (abs) 0.02 0.00 - 0.11 K/uL    NRBC (Absolute) 0.00 K/uL   Complete Metabolic Panel (CMP)   Result Value Ref Range    Sodium 138 135 - 145 mmol/L    Potassium 4.2 3.6 - 5.5 mmol/L    Chloride 106 96 - 112 mmol/L    Co2 21 20 - 33 mmol/L    Anion Gap 11.0 0.0 - 11.9    Glucose 185 (H) 65 - 99 mg/dL    Bun 16 8 - 22 mg/dL    Creatinine 0.87 0.50 - 1.40 mg/dL    Calcium 9.3 8.5 - 10.5 mg/dL    AST(SGOT) 25 12 - 45 U/L    ALT(SGPT) 30 2 - 50 U/L    Alkaline Phosphatase 153 (H) 30 - 99 U/L    Total Bilirubin 1.7 (H) 0.1 - 1.5 mg/dL    Albumin 3.8 3.2 -  4.9 g/dL    Total Protein 6.8 6.0 - 8.2 g/dL    Globulin 3.0 1.9 - 3.5 g/dL    A-G Ratio 1.3 g/dL   Prothrombin Time   Result Value Ref Range    PT 13.1 12.0 - 14.6 sec    INR 0.96 0.87 - 1.13   APTT   Result Value Ref Range    APTT 25.5 24.7 - 36.0 sec   Lipase   Result Value Ref Range    Lipase 27 11 - 82 U/L   ESTIMATED GFR   Result Value Ref Range    GFR If African American >60 >60 mL/min/1.73 m 2    GFR If Non African American >60 >60 mL/min/1.73 m 2   EKG (ER)   Result Value Ref Range    Report       [facility] Emergency Dept.    Test Date:  2017  Pt Name:    NADINE CABRERA                Department: ER  MRN:        [id number]                      Room:       [facility]  Gender:     M                            Technician: [id number]  :        1945                   Requested By:ER TRIAGE PROTOCOL  Order #:    [id number]                    Reading MD:    Measurements  Intervals                                Axis  Rate:       99                           P:          35  CT:         196                          QRS:        -94  QRSD:       144                          T:          61  QT:         372  QTc:        478    Interpretive Statements  SINUS RHYTHM  RBBB AND LAFB  Compared to ECG 2016 03:54:01  No significant changes         All labs reviewed by me.    EKG Interpretation  Interpreted by me  Sinus rhythm at a rate of 99 with right bundle branch block left anterior fascicular block no ST elevation or ST depression and T-wave inversion no changes from previous EKG from 2016.    RADIOLOGY  DX-CHEST-2 VIEWS   Final Result      No evidence of acute cardiopulmonary disease      US-GALLBLADDER   Final Result      Unremarkable gallbladder ultrasound.           The radiologist's interpretation of all radiological studies have been reviewed by me.    COURSE & MEDICAL DECISION MAKING  Pertinent Labs & Imaging studies reviewed. (See chart for details)    4:25 AM - Patient seen and  "examined at bedside.       Patient noted to have slightly elevated blood pressure likely circumstantial secondary to presenting complaint. Referred to primary care physician for further evaluation.     Patient was given IV fluids based on Slight tachycardia     Medical Decision Making: Patient feeling much better at arrival. Rhythm strip from EMS was reviewed that did show multiple premature ventricular contractions his EKG here has no PVCs sees Telemetry with Occasional PVC No Other Acute Arrhythmic Features EKG Shows No Ischemia He's Having No Chest Pain at This Time His Symptoms Are Gradually Improving without Intervention. Patient Did Report Some Abdominal Bloating As Well He Did Have Some Slight Elevation of Alkaline Phosphatase and Total Bilirubin Which Has Been Seen Prior. He Had an Ultrasound of the Right Upper Quadrant Which Showed Normal Diameter Common Bile Duct No Other Intra-or Extrahepatic Biliary Dilatation No Other Acute Changes. Patient Doing Much Better at This Time. HEENT Does Have Well Established Primary Care. He Reports That He Will Follow-Up with His Primary Care Physician He Will Return Here Immediately for Any Further Palpitations Chest Pain Shortness Breath Any Other Acute Concerns and Was Discharged Home Stable Condition.  BP (!) 164/88   Pulse 96   Temp 36.6 °C (97.9 °F)   Resp 20   Ht 1.88 m (6' 2\")   Wt 113.4 kg (250 lb)   SpO2 96%   BMI 32.10 kg/m²     Brody Zamorano M.D.  [address]  [address]  [facility] [address] [phone]    In 2 days      [facility], Emergency Dept  [address] [facility] [address] [phone]    immediately if symptoms worsen            FINAL IMPRESSION  1. Palpitations     2.Slight elevation of alkaline phosphatase  3. Slight elevation of total bilirubin  4. Premature ventricular contraction      This dictation has been created using voice recognition software and/or scribes. The accuracy of the dictation is limited " by the abilities of the software and the expertise of the scribes. I expect there may be some errors of grammar and possibly content. I made every attempt to manually correct the errors within my dictation. However, errors related to voice recognition software and/or scribes may still exist and should be interpreted within the appropriate context.

## 2017-09-08 NOTE — ED NOTES
Pt provided discharge instructions.  In such instructions, the patient made aware of medical diagnosis, treatment team, follow up recommendations for continuity of care, how to access [employer] health information online, information on medical prescriptions (how to take, when to take/not to take, side effects and adverse effects), and other health information pertinent to patient's diagnosis.  Patient verbalized understanding of discharge information.

## 2017-09-08 NOTE — ED NOTES
Chief Complaint   Patient presents with   • Palpitations     Pt BIB EMS for palpitations that started appx 0000, awakened from sleep appx 0130.  Pt denies n/v/cp       Pt states abdominal distention that started today.  Pt denies d/fevers/pain

## 2017-09-10 ENCOUNTER — OFFICE VISIT (OUTPATIENT)
Dept: URGENT CARE | Facility: CLINIC | Age: 72
End: 2017-09-10
Payer: MEDICARE

## 2017-09-10 VITALS
SYSTOLIC BLOOD PRESSURE: 136 MMHG | HEIGHT: 74 IN | HEART RATE: 72 BPM | RESPIRATION RATE: 18 BRPM | TEMPERATURE: 98.2 F | DIASTOLIC BLOOD PRESSURE: 80 MMHG | OXYGEN SATURATION: 91 % | BODY MASS INDEX: 30.8 KG/M2 | WEIGHT: 240 LBS

## 2017-09-10 DIAGNOSIS — R05.9 COUGH: ICD-10-CM

## 2017-09-10 DIAGNOSIS — J45.20 RAD (REACTIVE AIRWAY DISEASE), MILD INTERMITTENT, UNCOMPLICATED: ICD-10-CM

## 2017-09-10 DIAGNOSIS — H57.89 DISCHARGE OF EYE, LEFT: ICD-10-CM

## 2017-09-10 PROCEDURE — 99203 OFFICE O/P NEW LOW 30 MIN: CPT | Performed by: PHYSICIAN ASSISTANT

## 2017-09-10 RX ORDER — AZITHROMYCIN 250 MG/1
TABLET, FILM COATED ORAL
Qty: 6 TAB | Refills: 1 | Status: SHIPPED | OUTPATIENT
Start: 2017-09-10 | End: 2017-10-02

## 2017-09-10 RX ORDER — ALBUTEROL SULFATE 90 UG/1
2 AEROSOL, METERED RESPIRATORY (INHALATION) EVERY 4 HOURS PRN
Qty: 1 INHALER | Refills: 0 | Status: SHIPPED | OUTPATIENT
Start: 2017-09-10 | End: 2017-10-02

## 2017-09-10 RX ORDER — KETOROLAC TROMETHAMINE 5 MG/ML
1 SOLUTION OPHTHALMIC 4 TIMES DAILY
Qty: 5 ML | Refills: 0 | Status: SHIPPED | OUTPATIENT
Start: 2017-09-10 | End: 2017-10-02

## 2017-09-10 RX ORDER — METHYLPREDNISOLONE 4 MG/1
TABLET ORAL
Qty: 1 TAB | Refills: 0 | Status: SHIPPED | OUTPATIENT
Start: 2017-09-10 | End: 2017-10-02

## 2017-09-10 RX ORDER — CIPROFLOXACIN HYDROCHLORIDE 3.5 MG/ML
SOLUTION/ DROPS TOPICAL
Qty: 5 ML | Refills: 0 | Status: SHIPPED | OUTPATIENT
Start: 2017-09-10 | End: 2017-10-02

## 2017-09-10 ASSESSMENT — ENCOUNTER SYMPTOMS
EYES NEGATIVE: 1
CARDIOVASCULAR NEGATIVE: 1
SORE THROAT: 0
WHEEZING: 1
SPUTUM PRODUCTION: 1
CONSTITUTIONAL NEGATIVE: 1
COUGH: 1
FEVER: 0
SHORTNESS OF BREATH: 1

## 2017-09-10 ASSESSMENT — COPD QUESTIONNAIRES: COPD: 0

## 2017-09-10 NOTE — PROGRESS NOTES
Subjective:      Agaipto Stone is a 72 y.o. male who presents with No chief complaint on file.            Cough   This is a new (cough, whz, sob; ) problem. The current episode started in the past 7 days. The problem has been unchanged. The problem occurs every few minutes. The cough is productive of sputum. Associated symptoms include shortness of breath and wheezing. Pertinent negatives include no fever or sore throat. Nothing aggravates the symptoms. He has tried nothing for the symptoms. The treatment provided no relief. There is no history of asthma, COPD or environmental allergies.       Review of Systems   Constitutional: Negative.  Negative for fever.   HENT: Negative.  Negative for sore throat.    Eyes: Negative.    Respiratory: Positive for cough, sputum production, shortness of breath and wheezing.    Cardiovascular: Negative.    Skin: Negative.    Endo/Heme/Allergies: Negative for environmental allergies.          Objective:     There were no vitals taken for this visit.     Physical Exam   Constitutional: He is oriented to person, place, and time. He appears well-developed and well-nourished. No distress.   HENT:   Head: Normocephalic and atraumatic.   Eyes: EOM are normal. Pupils are equal, round, and reactive to light.   Neck: Normal range of motion. Neck supple.   Cardiovascular: Normal rate, regular rhythm and normal heart sounds.    Pulmonary/Chest: Effort normal and breath sounds normal. No respiratory distress. He has no wheezes. He has no rales.   Neurological: He is alert and oriented to person, place, and time.   Skin: Skin is warm and dry.   Psychiatric: He has a normal mood and affect. His behavior is normal. Thought content normal.   Nursing note and vitals reviewed.  There were no vitals filed for this visit.  Active Ambulatory Problems     Diagnosis Date Noted   • History of stroke 04/17/2012   • HTN (hypertension) 04/17/2012   • Carotid atherosclerosis 04/17/2012   • CAD (coronary  artery disease) 10/30/2013   • Hyperlipidemia 10/30/2013   • Stented coronary artery 11/20/2013   • Dysphagia 09/04/2014   • Type 2 diabetes mellitus with peripheral neuropathy (CMS-HCC) 09/24/2014   • Hereditary and idiopathic peripheral neuropathy 09/24/2014   • Vitamin D deficiency 11/09/2015   • Seasonal allergies 11/09/2015   • Obesity (BMI 30.0-34.9) 11/09/2015   • Agent orange exposure 01/14/2016   • Right ankle pain 09/22/2016   • Benign non-nodular prostatic hyperplasia with lower urinary tract symptoms 01/30/2017     Resolved Ambulatory Problems     Diagnosis Date Noted   • Syncopal episodes 01/29/2012   • Urinary frequency 03/09/2015   • Pneumonia of right lower lobe due to infectious organism 03/30/2017   • Impacted cerumen of right ear 05/16/2017   • Spider bite 05/30/2017     Past Medical History:   Diagnosis Date   • Arthritis    • CAD (coronary artery disease) October 2013   • Cataract    • Diabetes    • Hyperlipidemia    • Hypertension    • Pain    • Stroke (CMS-HCC) 2010   • Syncope      Current Outpatient Prescriptions on File Prior to Visit   Medication Sig Dispense Refill   • Blood Glucose Monitoring Suppl (FREESTYLE FREEDOM LITE) W/DEVICE Kit 1 Application by Does not apply route every day. Dx: E11.42 1 Kit 0   • Blood Glucose Monitoring Suppl SUPPLIES Misc Test strips order: Test strips for Abbott Freestyle Lite meter. Sig: use once daily. Dx: E11.42 100 Each 3   • Lancets Misc Lancets order: Lancets for Abbott Freestyle Lite meter. Sig: use once daily. Dx: E11.42 100 Each 3   • Blood Glucose Monitoring Suppl SUPPLIES Misc Accu Check Yoanna blood glucose test strips.  Checking blood sugars 2 times per day E11.40 200 Strip 3   • ibuprofen (MOTRIN) 200 MG Tab Take 200 mg by mouth every 6 hours as needed.     • pioglitazone (ACTOS) 45 MG Tab Take 1 Tab by mouth every day. 90 Tab 3   • metformin (GLUCOPHAGE) 500 MG Tab Take 1 Tab by mouth 3 times a day, with meals. 270 Tab 3   • hydrochlorothiazide  (MICROZIDE) 12.5 MG capsule TAKE ONE CAPSULE BY MOUTH ONE TIME DAILY 30 Cap 11   • carvedilol (COREG) 12.5 MG Tab Take 1 Tab by mouth 2 times a day, with meals. 180 Tab 3   • atorvastatin (LIPITOR) 80 MG tablet Take 1 Tab by mouth every day. (Patient taking differently: Take 80 mg by mouth every evening.) 90 Tab 3   • aspirin 81 MG tablet Take 81 mg by mouth every day.     • Cholecalciferol (VITAMIN D-3) 5000 UNITS TABS Take 1 Tab by mouth every day.       • mupirocin (BACTROBAN) 2 % Ointment Apply 1 Application to affected area(s) 2 times a day. 1 Tube 1   • lisinopril (PRINIVIL) 20 MG Tab Take 1 Tab by mouth every day. 90 Tab 3   • nitroglycerin (NITROSTAT) 0.4 MG SUBL Place 1 Tab under tongue as needed for Chest Pain. 25 Tab 1     No current facility-administered medications on file prior to visit.      Gargles, Cepacol lozenges, Aleve/Advil as needed for throat pain  Family History   Problem Relation Age of Onset   • Other Mother      Rheumatic fever     Fish; Pcn [penicillins]; Amoxicillin; Food; Horse allergy; and Influenza vaccines              Assessment/Plan:     ·  bronchitis  · RAD  · OS dc      ·  meds as rx; [hold inhaler; ask Cardiol.tomorrow if can use [because of recent palpit.eval at ER]

## 2017-09-11 ENCOUNTER — OFFICE VISIT (OUTPATIENT)
Dept: CARDIOLOGY | Facility: MEDICAL CENTER | Age: 72
End: 2017-09-11
Payer: MEDICARE

## 2017-09-11 VITALS
WEIGHT: 245 LBS | BODY MASS INDEX: 31.44 KG/M2 | OXYGEN SATURATION: 93 % | HEIGHT: 74 IN | DIASTOLIC BLOOD PRESSURE: 80 MMHG | HEART RATE: 80 BPM | SYSTOLIC BLOOD PRESSURE: 140 MMHG

## 2017-09-11 DIAGNOSIS — I65.22 CAROTID ATHEROSCLEROSIS, LEFT: ICD-10-CM

## 2017-09-11 DIAGNOSIS — I25.10 CORONARY ARTERY DISEASE INVOLVING NATIVE CORONARY ARTERY OF NATIVE HEART WITHOUT ANGINA PECTORIS: ICD-10-CM

## 2017-09-11 DIAGNOSIS — I10 ESSENTIAL HYPERTENSION: ICD-10-CM

## 2017-09-11 DIAGNOSIS — E78.00 PURE HYPERCHOLESTEROLEMIA: ICD-10-CM

## 2017-09-11 DIAGNOSIS — Z95.5 STENTED CORONARY ARTERY: ICD-10-CM

## 2017-09-11 PROCEDURE — 99214 OFFICE O/P EST MOD 30 MIN: CPT | Performed by: INTERNAL MEDICINE

## 2017-09-11 RX ORDER — IRBESARTAN 300 MG/1
300 TABLET ORAL DAILY
Qty: 30 TAB | Refills: 3 | Status: SHIPPED | OUTPATIENT
Start: 2017-09-11 | End: 2018-01-09 | Stop reason: SDUPTHER

## 2017-09-11 ASSESSMENT — ENCOUNTER SYMPTOMS
VOMITING: 0
DEPRESSION: 0
CONSTITUTIONAL NEGATIVE: 1
WEAKNESS: 0
MYALGIAS: 0
FOCAL WEAKNESS: 0
NAUSEA: 0
COUGH: 1
PALPITATIONS: 0
LOSS OF CONSCIOUSNESS: 0
BLURRED VISION: 0
SHORTNESS OF BREATH: 0

## 2017-09-11 NOTE — PROGRESS NOTES
Subjective:   Agapito Stone is a 72 y.o. male who presents today For follow-up of coronary artery disease with stenting of the circumflex artery in October, 2013. Myocardial perfusion scanning in 2015 revealed a small fixed apical defect which was probably artifactual. No ischemia was noted. He's had no angina.  He was recently seen in an urgent care because of cough. He also has chronic clearing of his throat. The patient is on an ACE inhibitor. He states his blood sugars have been under good control.    Past Medical History:   Diagnosis Date   • CAD (coronary artery disease) October 2013    Dr. Ovalle; ACS. PCI/LETA x 2 of the mid circumflex (Xience 3.25 x 23mm) and proximal circumflex (Xience 3.6x 12mm)   • Stroke (CMS-[facility]) 2010    no residual effects   • Arthritis     hands, wrists, ankles   • Cataract    • Diabetes    • Hyperlipidemia    • Hypertension    • Pain     ankles   • Syncope      Past Surgical History:   Procedure Laterality Date   • STENT PLACEMENT  2013    cardiac   • CAROTID ENDARTERECTOMY  7/13/2010    left; Performed by CHIQUITA CORRIGAN at SURGERY [facility]   • TONSILLECTOMY  as a child     Family History   Problem Relation Age of Onset   • Other Mother      Rheumatic fever     History   Smoking Status   • Never Smoker   Smokeless Tobacco   • Never Used     Allergies   Allergen Reactions   • Fish Hives     shellfish   • Pcn [Penicillins] Hives   • Amoxicillin Hives   • Food Swelling      Eggs,Melons, avocados-puffy mouth   • Horse Allergy    • Influenza Vaccines      Outpatient Encounter Prescriptions as of 9/11/2017   Medication Sig Dispense Refill   • irbesartan (AVAPRO) 300 MG Tab Take 1 Tab by mouth every day. 30 Tab 3   • ciprofloxacin (CILOXIN) 0.3 % Solution Place 1 gtt into affected eye[s] q3hrs 5 mL 0   • ketorolac (ACULAR) 0.5 % Solution 1 Drop by Ophthalmic route 4 times a day. to affected eye(s) 5 mL 0   • Blood Glucose Monitoring Suppl (FREESTYLE FREEDOM LITE) W/DEVICE Kit  1 Application by Does not apply route every day. Dx: E11.42 1 Kit 0   • Blood Glucose Monitoring Suppl SUPPLIES Misc Test strips order: Test strips for Abbott Freestyle Lite meter. Sig: use once daily. Dx: E11.42 100 Each 3   • Lancets Misc Lancets order: Lancets for Abbott Freestyle Lite meter. Sig: use once daily. Dx: E11.42 100 Each 3   • Blood Glucose Monitoring Suppl SUPPLIES Misc Accu Check Yoanna blood glucose test strips.  Checking blood sugars 2 times per day E11.40 200 Strip 3   • ibuprofen (MOTRIN) 200 MG Tab Take 200 mg by mouth every 6 hours as needed.     • pioglitazone (ACTOS) 45 MG Tab Take 1 Tab by mouth every day. 90 Tab 3   • metformin (GLUCOPHAGE) 500 MG Tab Take 1 Tab by mouth 3 times a day, with meals. 270 Tab 3   • carvedilol (COREG) 12.5 MG Tab Take 1 Tab by mouth 2 times a day, with meals. 180 Tab 3   • atorvastatin (LIPITOR) 80 MG tablet Take 1 Tab by mouth every day. (Patient taking differently: Take 80 mg by mouth every evening.) 90 Tab 3   • aspirin 81 MG tablet Take 81 mg by mouth every day.     • Cholecalciferol (VITAMIN D-3) 5000 UNITS TABS Take 1 Tab by mouth every day.       • albuterol 108 (90 Base) MCG/ACT Aero Soln inhalation aerosol Inhale 2 Puffs by mouth every four hours as needed for Shortness of Breath. (Patient not taking: Reported on 9/11/2017) 1 Inhaler 0   • azithromycin (ZITHROMAX) 250 MG Tab z-lamont; U.D. 6 Tab 1   • MethylPREDNISolone (MEDROL DOSEPAK) 4 MG Tablet Therapy Pack U.D. 1 Tab 0   • mupirocin (BACTROBAN) 2 % Ointment Apply 1 Application to affected area(s) 2 times a day. (Patient not taking: Reported on 9/11/2017) 1 Tube 1   • [DISCONTINUED] lisinopril (PRINIVIL) 20 MG Tab Take 1 Tab by mouth every day. 90 Tab 3   • hydrochlorothiazide (MICROZIDE) 12.5 MG capsule TAKE ONE CAPSULE BY MOUTH ONE TIME DAILY (Patient not taking: Reported on 9/11/2017) 30 Cap 11   • nitroglycerin (NITROSTAT) 0.4 MG SUBL Place 1 Tab under tongue as needed for Chest Pain. 25 Tab 1  "    No facility-administered encounter medications on file as of 9/11/2017.      Review of Systems   Constitutional: Negative.  Negative for malaise/fatigue.   HENT: Negative for hearing loss.         Difficulty swallowing, intermittent   Eyes: Negative for blurred vision.   Respiratory: Positive for cough. Negative for shortness of breath.    Cardiovascular: Negative for chest pain and palpitations.   Gastrointestinal: Negative for nausea and vomiting.   Genitourinary: Negative for hematuria.   Musculoskeletal: Positive for joint pain. Negative for myalgias.        Has bilateral ankle pain   Neurological: Negative for focal weakness, loss of consciousness and weakness.        Wife notes some speech difficulty after his stroke, this is chronic   Psychiatric/Behavioral: Negative for depression.        Objective:   /80   Pulse 80   Ht 1.88 m (6' 2.02\")   Wt 111.1 kg (245 lb)   SpO2 93%   BMI 31.44 kg/m²     Physical Exam   Constitutional: He is oriented to person, place, and time. He appears well-developed and well-nourished. No distress.   obese   HENT:   Head: Atraumatic.   Eyes: Conjunctivae and EOM are normal. Pupils are equal, round, and reactive to light.   Neck: Neck supple. No JVD present.   Cardiovascular: Normal rate and regular rhythm.    No murmur heard.  Pulses:       Carotid pulses are on the left side with bruit.  Pulmonary/Chest: Effort normal and breath sounds normal. No respiratory distress. He has no wheezes. He has no rales.   Abdominal: Soft. There is no tenderness.   obese   Musculoskeletal: He exhibits no edema.   Neurological: He is alert and oriented to person, place, and time.   Skin: Skin is warm and dry. He is not diaphoretic.       Assessment:     1. Essential hypertension     2. Coronary artery disease involving native coronary artery of native heart without angina pectoris     3. Carotid atherosclerosis, left     4. Pure hypercholesterolemia     5. Stented coronary artery   "       Medical Decision Making:  Today's Assessment / Status / Plan:   The patient is having no angina. He is up-to-date on scanning. LDL is not at target and the last value was 75. He has lost some weight and I have encouraged him to  lose another 10-15 pounds for cholesterol management.  His blood pressure by my reading is 146 systolic left arm sitting. We'll change him from lisinopril 20 mg a day to irbesartan 300 mg a day and return for blood pressure check in about 2 months. Hopefully his cough will resolve off ACE inhibitor.

## 2017-09-12 DIAGNOSIS — E78.5 HYPERLIPIDEMIA: ICD-10-CM

## 2017-09-12 RX ORDER — ATORVASTATIN CALCIUM 80 MG/1
TABLET, FILM COATED ORAL
Qty: 90 TAB | Refills: 3 | Status: SHIPPED | OUTPATIENT
Start: 2017-09-12 | End: 2018-09-15 | Stop reason: SDUPTHER

## 2017-09-12 NOTE — TELEPHONE ENCOUNTER
Was the patient seen in the last year in this department? Yes     Does patient have an active prescription for medications requested? No     Received Request Via: Pharmacy     Last seen: 08/17/2017

## 2017-09-27 ENCOUNTER — OFFICE VISIT (OUTPATIENT)
Dept: MEDICAL GROUP | Facility: MEDICAL CENTER | Age: 72
End: 2017-09-27
Payer: MEDICARE

## 2017-09-27 VITALS
RESPIRATION RATE: 16 BRPM | BODY MASS INDEX: 31.32 KG/M2 | HEIGHT: 74 IN | SYSTOLIC BLOOD PRESSURE: 140 MMHG | DIASTOLIC BLOOD PRESSURE: 82 MMHG | HEART RATE: 72 BPM | WEIGHT: 244 LBS | OXYGEN SATURATION: 94 % | TEMPERATURE: 98.4 F

## 2017-09-27 DIAGNOSIS — J30.1 CHRONIC SEASONAL ALLERGIC RHINITIS DUE TO POLLEN: ICD-10-CM

## 2017-09-27 DIAGNOSIS — J30.1 SEASONAL ALLERGIC RHINITIS DUE TO POLLEN: ICD-10-CM

## 2017-09-27 PROBLEM — J06.9 VIRAL UPPER RESPIRATORY TRACT INFECTION: Status: ACTIVE | Noted: 2017-09-27

## 2017-09-27 PROCEDURE — 99214 OFFICE O/P EST MOD 30 MIN: CPT | Performed by: FAMILY MEDICINE

## 2017-09-27 NOTE — ASSESSMENT & PLAN NOTE
Illness: Patient complains of 3 weeks of illness including: nasal congestion, clear/whitish rhinorrhea, cough ,  Sinus pain and pressure: bilateral maxillary  Went to the ER on 9/8/17 for palpitations.  On 9/10/17 he went to the  and was treated with 2 Zpacks.  Symptoms negative for fever, chills,   Since onset, symptoms are waxing and waning  Similarly ill exposures: no  Medical history negative for asthma but is severely allergic to rabbit brush  He  reports that he has never smoked. He has never used smokeless tobacco.

## 2017-10-02 ENCOUNTER — OFFICE VISIT (OUTPATIENT)
Dept: MEDICAL GROUP | Facility: MEDICAL CENTER | Age: 72
End: 2017-10-02
Payer: MEDICARE

## 2017-10-02 VITALS
OXYGEN SATURATION: 90 % | SYSTOLIC BLOOD PRESSURE: 132 MMHG | HEIGHT: 74 IN | BODY MASS INDEX: 30.8 KG/M2 | WEIGHT: 240 LBS | TEMPERATURE: 97.6 F | HEART RATE: 73 BPM | RESPIRATION RATE: 16 BRPM | DIASTOLIC BLOOD PRESSURE: 80 MMHG

## 2017-10-02 DIAGNOSIS — E66.9 OBESITY (BMI 30.0-34.9): ICD-10-CM

## 2017-10-02 DIAGNOSIS — N40.1 BENIGN NON-NODULAR PROSTATIC HYPERPLASIA WITH LOWER URINARY TRACT SYMPTOMS: ICD-10-CM

## 2017-10-02 DIAGNOSIS — J30.1 CHRONIC SEASONAL ALLERGIC RHINITIS DUE TO POLLEN: ICD-10-CM

## 2017-10-02 PROCEDURE — 99214 OFFICE O/P EST MOD 30 MIN: CPT | Performed by: FAMILY MEDICINE

## 2017-10-02 RX ORDER — TAMSULOSIN HYDROCHLORIDE 0.4 MG/1
0.4 CAPSULE ORAL
Qty: 90 CAP | Refills: 3 | Status: SHIPPED | OUTPATIENT
Start: 2017-10-02 | End: 2018-09-26 | Stop reason: SDUPTHER

## 2017-10-02 RX ORDER — MONTELUKAST SODIUM 10 MG/1
10 TABLET ORAL DAILY
Qty: 90 TAB | Refills: 3 | Status: SHIPPED | OUTPATIENT
Start: 2017-10-02 | End: 2018-09-26 | Stop reason: SDUPTHER

## 2017-10-02 NOTE — ASSESSMENT & PLAN NOTE
Has been having cough, sneezing, post nasal drainage recently. Taken off lisinopril but still having cough.  Was treated recently with medrol and z-lamont, which helped about 3 weeks ago.  He is not taking anything on a regular basis.  Had allergy shots in [place] as a child.

## 2017-10-02 NOTE — ASSESSMENT & PLAN NOTE
Patient's weight has steadily decreased over the last month. His wife is now seeing a dietitian and they're both trying to work on their weight.

## 2017-10-02 NOTE — ASSESSMENT & PLAN NOTE
Patient is complained of decreased urinary flow with lots of starting and stopping. He is not being treated for BPH with any medication. He is interested in receiving treatment for BPH.  Symptoms have been slowly progressing.

## 2017-10-02 NOTE — PROGRESS NOTES
Subjective:   Agapito Stone is a 72 y.o. male here today for Seasonal allergies, BPH    Seasonal allergies  Has been having cough, sneezing, post nasal drainage recently. Taken off lisinopril but still having cough.  Was treated recently with medrol and z-lamont, which helped about 3 weeks ago.  He is not taking anything on a regular basis.  Had allergy shots in [place] as a child.    Benign non-nodular prostatic hyperplasia with lower urinary tract symptoms  Patient is complained of decreased urinary flow with lots of starting and stopping. He is not being treated for BPH with any medication. He is interested in receiving treatment for BPH.  Symptoms have been slowly progressing.    Obesity (BMI 30.0-34.9)  Patient's weight has steadily decreased over the last month. His wife is now seeing a dietitian and they're both trying to work on their weight.         Current medicines (including changes today)  Current Outpatient Prescriptions   Medication Sig Dispense Refill   • montelukast (SINGULAIR) 10 MG Tab Take 1 Tab by mouth every day. 90 Tab 3   • tamsulosin (FLOMAX) 0.4 MG capsule Take 1 Cap by mouth ONE-HALF HOUR AFTER BREAKFAST. 90 Cap 3   • atorvastatin (LIPITOR) 80 MG tablet TAKE ONE TABLET BY MOUTH DAILY IN THE EVENING 90 Tab 3   • irbesartan (AVAPRO) 300 MG Tab Take 1 Tab by mouth every day. 30 Tab 3   • Blood Glucose Monitoring Suppl (FREESTYLE FREEDOM LITE) W/DEVICE Kit 1 Application by Does not apply route every day. Dx: E11.42 1 Kit 0   • Blood Glucose Monitoring Suppl SUPPLIES Misc Test strips order: Test strips for Abbott Freestyle Lite meter. Sig: use once daily. Dx: E11.42 100 Each 3   • Lancets Misc Lancets order: Lancets for Abbott Freestyle Lite meter. Sig: use once daily. Dx: E11.42 100 Each 3   • Blood Glucose Monitoring Suppl SUPPLIES Misc Accu Check Yoanna blood glucose test strips.  Checking blood sugars 2 times per day E11.40 200 Strip 3   • ibuprofen (MOTRIN) 200 MG Tab Take 200 mg by  "mouth every 6 hours as needed.     • pioglitazone (ACTOS) 45 MG Tab Take 1 Tab by mouth every day. 90 Tab 3   • metformin (GLUCOPHAGE) 500 MG Tab Take 1 Tab by mouth 3 times a day, with meals. 270 Tab 3   • carvedilol (COREG) 12.5 MG Tab Take 1 Tab by mouth 2 times a day, with meals. 180 Tab 3   • aspirin 81 MG tablet Take 81 mg by mouth every day.     • nitroglycerin (NITROSTAT) 0.4 MG SUBL Place 1 Tab under tongue as needed for Chest Pain. 25 Tab 1   • Cholecalciferol (VITAMIN D-3) 5000 UNITS TABS Take 1 Tab by mouth every day.         No current facility-administered medications for this visit.      He  has a past medical history of Arthritis; CAD (coronary artery disease) (October 2013); Cataract; Diabetes; Hyperlipidemia; Hypertension; Pain; Stroke (CMS-[facility]) (2010); and Syncope.    ROS   No chest pain, no shortness of breath       Objective:     Blood pressure 132/80, pulse 73, temperature 36.4 °C (97.6 °F), resp. rate 16, height 1.88 m (6' 2.02\"), weight 108.9 kg (240 lb), SpO2 90 %. Body mass index is 30.8 kg/m².   Physical Exam:  Constitutional: Alert, no distress.  Skin: Warm, dry, good turgor, no rashes in visible areas.  Eye: Equal, round and reactive, conjunctiva clear, lids normal.  ENMT: Positive nasal drainage bilaterally  Respiratory: Unlabored respiratory effort, lungs clear to auscultation, no wheezes, no ronchi.  Psych: Alert and oriented x3, normal affect and mood.        Assessment and Plan:   The following treatment plan was discussed    1. Chronic seasonal allergic rhinitis due to pollen  Trial of Singulair to see if this helps with postnasal drainage which is causing his cough.  - montelukast (SINGULAIR) 10 MG Tab; Take 1 Tab by mouth every day.  Dispense: 90 Tab; Refill: 3    2. Obesity (BMI 30.0-34.9)  - Patient identified as having weight management issue.  Appropriate orders and counseling given.    3. Benign non-nodular prostatic hyperplasia with lower urinary tract symptoms  Trial of " Flomax. Follow-up in one month with labs for routine diabetic visit and we will review Flomax effectiveness.  - tamsulosin (FLOMAX) 0.4 MG capsule; Take 1 Cap by mouth ONE-HALF HOUR AFTER BREAKFAST.  Dispense: 90 Cap; Refill: 3      Followup: Return in about 4 weeks (around 10/30/2017) for Diabetes, Short.

## 2017-10-04 NOTE — PROGRESS NOTES
Subjective:     Chief Complaint   Patient presents with   • Cough   • URI   • Diarrhea       Agapito Stone is a 72 y.o. male here today for evaluation and management of:    Seasonal allergic rhinitis due to pollen  Illness: Patient complains of 3 weeks of illness including: nasal congestion, clear/whitish rhinorrhea, cough ,  Sinus pain and pressure: bilateral maxillary  Went to the ER on 9/8/17 for palpitations.  On 9/10/17 he went to the  and was treated with 2 Zpacks.  Symptoms negative for fever, chills,   Since onset, symptoms are waxing and waning  Similarly ill exposures: no  Medical history negative for asthma but is severely allergic to rabbit brush  He  reports that he has never smoked. He has never used smokeless tobacco.         Allergies   Allergen Reactions   • Fish Hives     shellfish   • Pcn [Penicillins] Hives   • Amoxicillin Hives   • Food Swelling      Eggs,Melons, avocados-puffy mouth   • Horse Allergy    • Influenza Vaccines        Current medicines (including changes today)  Current Outpatient Prescriptions   Medication Sig Dispense Refill   • atorvastatin (LIPITOR) 80 MG tablet TAKE ONE TABLET BY MOUTH DAILY IN THE EVENING 90 Tab 3   • irbesartan (AVAPRO) 300 MG Tab Take 1 Tab by mouth every day. 30 Tab 3   • Blood Glucose Monitoring Suppl (FREESTYLE FREEDOM LITE) W/DEVICE Kit 1 Application by Does not apply route every day. Dx: E11.42 1 Kit 0   • Blood Glucose Monitoring Suppl SUPPLIES Misc Test strips order: Test strips for Abbott Freestyle Lite meter. Sig: use once daily. Dx: E11.42 100 Each 3   • Lancets Misc Lancets order: Lancets for Abbott Freestyle Lite meter. Sig: use once daily. Dx: E11.42 100 Each 3   • Blood Glucose Monitoring Suppl SUPPLIES Misc Accu Check Yoanna blood glucose test strips.  Checking blood sugars 2 times per day E11.40 200 Strip 3   • ibuprofen (MOTRIN) 200 MG Tab Take 200 mg by mouth every 6 hours as needed.     • pioglitazone (ACTOS) 45 MG Tab Take 1 Tab  by mouth every day. 90 Tab 3   • metformin (GLUCOPHAGE) 500 MG Tab Take 1 Tab by mouth 3 times a day, with meals. 270 Tab 3   • carvedilol (COREG) 12.5 MG Tab Take 1 Tab by mouth 2 times a day, with meals. 180 Tab 3   • aspirin 81 MG tablet Take 81 mg by mouth every day.     • nitroglycerin (NITROSTAT) 0.4 MG SUBL Place 1 Tab under tongue as needed for Chest Pain. 25 Tab 1   • Cholecalciferol (VITAMIN D-3) 5000 UNITS TABS Take 1 Tab by mouth every day.       • montelukast (SINGULAIR) 10 MG Tab Take 1 Tab by mouth every day. 90 Tab 3   • tamsulosin (FLOMAX) 0.4 MG capsule Take 1 Cap by mouth ONE-HALF HOUR AFTER BREAKFAST. 90 Cap 3     No current facility-administered medications for this visit.        He  has a past medical history of Arthritis; CAD (coronary artery disease) (October 2013); Cataract; Diabetes; Hyperlipidemia; Hypertension; Pain; Stroke (CMS-HCC) (2010); and Syncope.    Patient Active Problem List    Diagnosis Date Noted   • Stented coronary artery 11/20/2013     Priority: High   • CAD (coronary artery disease) 10/30/2013     Priority: High   • Hyperlipidemia 10/30/2013     Priority: High   • HTN (hypertension) 04/17/2012     Priority: High   • Carotid atherosclerosis 04/17/2012     Priority: High   • History of stroke 04/17/2012     Priority: Medium   • Benign non-nodular prostatic hyperplasia with lower urinary tract symptoms 01/30/2017   • Right ankle pain 09/22/2016   • Agent orange exposure 01/14/2016   • Vitamin D deficiency 11/09/2015   • Seasonal allergies 11/09/2015   • Obesity (BMI 30.0-34.9) 11/09/2015   • Type 2 diabetes mellitus with peripheral neuropathy (CMS-HCC) 09/24/2014   • Hereditary and idiopathic peripheral neuropathy 09/24/2014   • Dysphagia 09/04/2014       ROS   No fever or chills.  No nausea or vomiting.  No chest pain or palpitations.   No pain with urination or hematuria.  No black or bloody stools.       Objective:     Blood pressure 140/82, pulse 72, temperature 36.9 °C  "(98.4 °F), resp. rate 16, height 1.88 m (6' 2\"), weight 110.7 kg (244 lb), SpO2 94 %. Body mass index is 31.33 kg/m².   Physical Exam:  Well developed, well nourished.  Alert, oriented in no acute distress.  Eye contact is good, speech goal directed, affect calm  Eyes: conjunctiva non-injected, sclera non-icteric.  Ears: Pinna normal. TM pearly gray.   Nose: Nares are patent.  Pale, boggy mucosa with clear discharge  Mouth: Oral mucous membranes pink and moist with no lesions.  Neck Supple.  No adenopathy or masses in the neck or supraclavicular regions. No thyromegaly  Lungs: clear to auscultation bilaterally with good excursion. No wheezes or rhonchi  CV: regular rate and rhythm. No murmur        Assessment and Plan:   The following treatment plan was discussed    1. Seasonal allergic rhinitis due to pollen  Recommend Allegra or Claritin. We did discuss steroids but I am concerned with his recent history of palpitations. Discussed avoidance measures. He is to follow-up with Dr. padron if not improving.    Any change or worsening of signs or symptoms, patient encouraged to follow-up or report to the emergency room for further evaluation. Patient understands and agrees.    Followup: Return if symptoms worsen or fail to improve.           "

## 2017-10-04 NOTE — ASSESSMENT & PLAN NOTE
Illness: One month of illness including: nasal congestion, clear/whitish rhinorrhea, ear pain/congestion, cough ,  Sinus pain and pressure: bilateral frontal  Symptoms negative for fever, chills, night sweats,   Treatments tried: none   Since onset, symptoms are same   Similarly ill exposures: no  Medical history negative for asthma  He  reports that he has never smoked. He has never used smokeless tobacco.  Patient has had allergies in the past.

## 2017-10-09 DIAGNOSIS — I10 ESSENTIAL HYPERTENSION: ICD-10-CM

## 2017-10-09 RX ORDER — CARVEDILOL 12.5 MG/1
12.5 TABLET ORAL 2 TIMES DAILY WITH MEALS
Qty: 180 TAB | Refills: 3 | Status: CANCELLED | OUTPATIENT
Start: 2017-10-09

## 2017-10-09 RX ORDER — CARVEDILOL 12.5 MG/1
12.5 TABLET ORAL 2 TIMES DAILY WITH MEALS
Qty: 180 TAB | Refills: 3 | OUTPATIENT
Start: 2017-10-09 | End: 2018-10-14 | Stop reason: SDUPTHER

## 2017-11-11 ENCOUNTER — HOSPITAL ENCOUNTER (OUTPATIENT)
Dept: LAB | Facility: MEDICAL CENTER | Age: 72
End: 2017-11-11
Attending: FAMILY MEDICINE
Payer: MEDICARE

## 2017-11-11 DIAGNOSIS — E11.42 TYPE 2 DIABETES MELLITUS WITH PERIPHERAL NEUROPATHY (HCC): ICD-10-CM

## 2017-11-11 LAB
ALBUMIN SERPL BCP-MCNC: 3.8 G/DL (ref 3.2–4.9)
ALBUMIN/GLOB SERPL: 1.2 G/DL
ALP SERPL-CCNC: 118 U/L (ref 30–99)
ALT SERPL-CCNC: 30 U/L (ref 2–50)
ANION GAP SERPL CALC-SCNC: 6 MMOL/L (ref 0–11.9)
AST SERPL-CCNC: 20 U/L (ref 12–45)
BILIRUB SERPL-MCNC: 1.6 MG/DL (ref 0.1–1.5)
BUN SERPL-MCNC: 17 MG/DL (ref 8–22)
CALCIUM SERPL-MCNC: 9 MG/DL (ref 8.5–10.5)
CHLORIDE SERPL-SCNC: 104 MMOL/L (ref 96–112)
CHOLEST SERPL-MCNC: 141 MG/DL (ref 100–199)
CO2 SERPL-SCNC: 28 MMOL/L (ref 20–33)
CREAT SERPL-MCNC: 0.89 MG/DL (ref 0.5–1.4)
CREAT UR-MCNC: 104.7 MG/DL
EST. AVERAGE GLUCOSE BLD GHB EST-MCNC: 212 MG/DL
GFR SERPL CREATININE-BSD FRML MDRD: >60 ML/MIN/1.73 M 2
GLOBULIN SER CALC-MCNC: 3.1 G/DL (ref 1.9–3.5)
GLUCOSE SERPL-MCNC: 246 MG/DL (ref 65–99)
HBA1C MFR BLD: 9 % (ref 0–5.6)
HDLC SERPL-MCNC: 52 MG/DL
LDLC SERPL CALC-MCNC: 71 MG/DL
MICROALBUMIN UR-MCNC: 9.3 MG/DL
MICROALBUMIN/CREAT UR: 89 MG/G (ref 0–30)
POTASSIUM SERPL-SCNC: 4.8 MMOL/L (ref 3.6–5.5)
PROT SERPL-MCNC: 6.9 G/DL (ref 6–8.2)
SODIUM SERPL-SCNC: 138 MMOL/L (ref 135–145)
TRIGL SERPL-MCNC: 88 MG/DL (ref 0–149)

## 2017-11-11 PROCEDURE — 36415 COLL VENOUS BLD VENIPUNCTURE: CPT

## 2017-11-11 PROCEDURE — 80061 LIPID PANEL: CPT

## 2017-11-11 PROCEDURE — 83036 HEMOGLOBIN GLYCOSYLATED A1C: CPT

## 2017-11-11 PROCEDURE — 82570 ASSAY OF URINE CREATININE: CPT

## 2017-11-11 PROCEDURE — 82043 UR ALBUMIN QUANTITATIVE: CPT

## 2017-11-11 PROCEDURE — 80053 COMPREHEN METABOLIC PANEL: CPT

## 2017-11-13 ENCOUNTER — OFFICE VISIT (OUTPATIENT)
Dept: MEDICAL GROUP | Facility: MEDICAL CENTER | Age: 72
End: 2017-11-13
Payer: MEDICARE

## 2017-11-13 VITALS
HEIGHT: 74 IN | SYSTOLIC BLOOD PRESSURE: 124 MMHG | OXYGEN SATURATION: 91 % | DIASTOLIC BLOOD PRESSURE: 84 MMHG | HEART RATE: 62 BPM | BODY MASS INDEX: 31.47 KG/M2 | TEMPERATURE: 98.4 F | WEIGHT: 245.2 LBS

## 2017-11-13 DIAGNOSIS — E11.42 TYPE 2 DIABETES MELLITUS WITH PERIPHERAL NEUROPATHY (HCC): ICD-10-CM

## 2017-11-13 DIAGNOSIS — N40.1 BENIGN NON-NODULAR PROSTATIC HYPERPLASIA WITH LOWER URINARY TRACT SYMPTOMS: ICD-10-CM

## 2017-11-13 DIAGNOSIS — E78.00 PURE HYPERCHOLESTEROLEMIA: ICD-10-CM

## 2017-11-13 DIAGNOSIS — I10 ESSENTIAL HYPERTENSION: ICD-10-CM

## 2017-11-13 PROCEDURE — 99214 OFFICE O/P EST MOD 30 MIN: CPT | Performed by: FAMILY MEDICINE

## 2017-11-13 RX ORDER — GLIMEPIRIDE 4 MG/1
4 TABLET ORAL EVERY MORNING
Qty: 90 TAB | Refills: 3 | Status: SHIPPED | OUTPATIENT
Start: 2017-11-13 | End: 2018-11-04 | Stop reason: SDUPTHER

## 2017-11-13 NOTE — PROGRESS NOTES
Subjective:   Agapito Cabrera is a 72 y.o. male here today for diabetes    Type 2 diabetes mellitus with peripheral neuropathy  Patient is taking Actos 45 mg daily and metformin 500 mg three times daily.  Not exercising, but is motivated to go back to [facility].  A1c has progressively been increasing this year.  Results for AGAPITO CABRERA (MRN [id number]) as of 11/13/2017 14:33   Ref. Range 1/27/2017 11:39 5/15/2017 09:13 8/16/2017 10:50 11/11/2017 09:23   Glycohemoglobin Latest Ref Range: 0.0 - 5.6 % 8.3 (H) 7.6 (H) 8.2 (H) 9.0 (H)       Hyperlipidemia  Patient is tolerating atorvastatin 80 mg daily.    HTN (hypertension)  Patient is tolerating avapro 300 mg daily, coreg 12.5 mg twice daily.    Benign non-nodular prostatic hyperplasia with lower urinary tract symptoms  Having hesitancy and incomplete emptying of bladder.  Has not started flomax because he was unsure of what it was.         Current medicines (including changes today)  Current Outpatient Prescriptions   Medication Sig Dispense Refill   • glimepiride (AMARYL) 4 MG Tab Take 1 Tab by mouth every morning. 90 Tab 3   • carvedilol (COREG) 12.5 MG Tab Take 1 Tab by mouth 2 times a day, with meals. 180 Tab 3   • montelukast (SINGULAIR) 10 MG Tab Take 1 Tab by mouth every day. 90 Tab 3   • tamsulosin (FLOMAX) 0.4 MG capsule Take 1 Cap by mouth ONE-HALF HOUR AFTER BREAKFAST. 90 Cap 3   • atorvastatin (LIPITOR) 80 MG tablet TAKE ONE TABLET BY MOUTH DAILY IN THE EVENING 90 Tab 3   • irbesartan (AVAPRO) 300 MG Tab Take 1 Tab by mouth every day. 30 Tab 3   • Blood Glucose Monitoring Suppl (FREESTYLE FREEDOM LITE) W/DEVICE Kit 1 Application by Does not apply route every day. Dx: E11.42 1 Kit 0   • Blood Glucose Monitoring Suppl SUPPLIES Misc Test strips order: Test strips for Abbott Freestyle Lite meter. Sig: use once daily. Dx: E11.42 100 Each 3   • Lancets Misc Lancets order: Lancets for Abbott Freestyle Lite meter. Sig: use once daily.  "Dx: E11.42 100 Each 3   • Blood Glucose Monitoring Suppl SUPPLIES Misc Accu Check Yoanna blood glucose test strips.  Checking blood sugars 2 times per day E11.40 200 Strip 3   • ibuprofen (MOTRIN) 200 MG Tab Take 200 mg by mouth every 6 hours as needed.     • pioglitazone (ACTOS) 45 MG Tab Take 1 Tab by mouth every day. 90 Tab 3   • metformin (GLUCOPHAGE) 500 MG Tab Take 1 Tab by mouth 3 times a day, with meals. 270 Tab 3   • aspirin 81 MG tablet Take 81 mg by mouth every day.     • nitroglycerin (NITROSTAT) 0.4 MG SUBL Place 1 Tab under tongue as needed for Chest Pain. 25 Tab 1   • Cholecalciferol (VITAMIN D-3) 5000 UNITS TABS Take 1 Tab by mouth every day.         No current facility-administered medications for this visit.      He  has a past medical history of Arthritis; CAD (coronary artery disease) (October 2013); Cataract; Diabetes; Hyperlipidemia; Hypertension; Pain; Stroke (CMS-[facility]) (2010); and Syncope.    ROS   No chest pain, no shortness of breath       Objective:     Blood pressure 124/84, pulse 62, temperature 36.9 °C (98.4 °F), height 1.88 m (6' 2.02\"), weight 111.2 kg (245 lb 3.2 oz), SpO2 91 %. Body mass index is 31.46 kg/m².   Physical Exam:  Constitutional: Alert, no distress.  Skin: Warm, dry, good turgor, no rashes in visible areas.  Eye: Equal, round and reactive, conjunctiva clear, lids normal.  Psych: Alert and oriented x3, normal affect and mood.        Assessment and Plan:   The following treatment plan was discussed    1. Type 2 diabetes mellitus with peripheral neuropathy (CMS-[facility])  Uncontrolled. Encouraged exercise. Start on glimepiride 4 mg daily. Follow up in 3 months with labs.  - glimepiride (AMARYL) 4 MG Tab; Take 1 Tab by mouth every morning.  Dispense: 90 Tab; Refill: 3  - HEMOGLOBIN A1C; Future  - COMP METABOLIC PANEL; Future  - MICROALBUMIN CREAT RATIO URINE; Future  - LIPID PROFILE; Future    2. Pure hypercholesterolemia  Controlled. Continue Atorvastatin 80 mg daily.  - COMP " METABOLIC PANEL; Future  - LIPID PROFILE; Future    3. Essential hypertension  Controlled. Continue current medication.    4. Benign non-nodular prostatic hyperplasia with lower urinary tract symptoms  Uncontrolled. Trial of Flomax.      Followup: Return in about 3 months (around 2/13/2018) for Diabetes.

## 2017-11-20 ENCOUNTER — OFFICE VISIT (OUTPATIENT)
Dept: CARDIOLOGY | Facility: MEDICAL CENTER | Age: 72
End: 2017-11-20
Payer: MEDICARE

## 2017-11-20 VITALS
HEIGHT: 74 IN | SYSTOLIC BLOOD PRESSURE: 134 MMHG | HEART RATE: 70 BPM | WEIGHT: 246 LBS | DIASTOLIC BLOOD PRESSURE: 80 MMHG | BODY MASS INDEX: 31.57 KG/M2

## 2017-11-20 DIAGNOSIS — I10 ESSENTIAL HYPERTENSION: ICD-10-CM

## 2017-11-20 DIAGNOSIS — Z95.5 STENTED CORONARY ARTERY: ICD-10-CM

## 2017-11-20 DIAGNOSIS — E78.00 PURE HYPERCHOLESTEROLEMIA: ICD-10-CM

## 2017-11-20 PROCEDURE — 99214 OFFICE O/P EST MOD 30 MIN: CPT | Performed by: INTERNAL MEDICINE

## 2017-11-20 ASSESSMENT — ENCOUNTER SYMPTOMS
SHORTNESS OF BREATH: 0
CONSTITUTIONAL NEGATIVE: 1
PALPITATIONS: 0
DEPRESSION: 0
NAUSEA: 0
BLURRED VISION: 0
LOSS OF CONSCIOUSNESS: 0
WEAKNESS: 0
FOCAL WEAKNESS: 0
MYALGIAS: 0
VOMITING: 0

## 2017-11-20 NOTE — PROGRESS NOTES
Subjective:   Agapito Stone is a 72 y.o. male who presents today for follow-up of coronary disease with stenting of the circumflex coronary artery performed in [place] in October 2013. At that time he had a 3.5×12 mm Xience stent placed in the proximal circumflex and a 3.25×23 mm Xience stent placed in the mid to distal circumflex.  The patient has not been too active since his last visit due to an ankle problem. He's had no angina. LDL from November 11, is 71. Glycohemoglobin is 9. Weight is unchanged. His most recent myocardial perfusion scan was October, 2015.    Past Medical History:   Diagnosis Date   • Arthritis     hands, wrists, ankles   • CAD (coronary artery disease) October 2013    Dr. Ovalle; ACS. PCI/LETA x 2 of the mid circumflex (Xience 3.25 x 23mm) and proximal circumflex (Xience 3.6x 12mm)   • Cataract    • Diabetes    • Hyperlipidemia    • Hypertension    • Pain     ankles   • Stroke (CMS-HCC) 2010    no residual effects   • Syncope      Past Surgical History:   Procedure Laterality Date   • STENT PLACEMENT  2013    cardiac   • CAROTID ENDARTERECTOMY  7/13/2010    left; Performed by CHIQUITA CORRIGAN at SURGERY [facility]S   • TONSILLECTOMY  as a child     Family History   Problem Relation Age of Onset   • Other Mother      Rheumatic fever     History   Smoking Status   • Never Smoker   Smokeless Tobacco   • Never Used     Allergies   Allergen Reactions   • Fish Hives     shellfish   • Pcn [Penicillins] Hives   • Amoxicillin Hives   • Food Swelling      Eggs,Melons, avocados-puffy mouth   • Horse Allergy    • Influenza Vaccines      Outpatient Encounter Prescriptions as of 11/20/2017   Medication Sig Dispense Refill   • glimepiride (AMARYL) 4 MG Tab Take 1 Tab by mouth every morning. 90 Tab 3   • carvedilol (COREG) 12.5 MG Tab Take 1 Tab by mouth 2 times a day, with meals. 180 Tab 3   • tamsulosin (FLOMAX) 0.4 MG capsule Take 1 Cap by mouth ONE-HALF HOUR AFTER BREAKFAST. 90 Cap 3   •  atorvastatin (LIPITOR) 80 MG tablet TAKE ONE TABLET BY MOUTH DAILY IN THE EVENING 90 Tab 3   • irbesartan (AVAPRO) 300 MG Tab Take 1 Tab by mouth every day. 30 Tab 3   • Blood Glucose Monitoring Suppl (FREESTYLE FREEDOM LITE) W/DEVICE Kit 1 Application by Does not apply route every day. Dx: E11.42 1 Kit 0   • Blood Glucose Monitoring Suppl SUPPLIES Misc Test strips order: Test strips for Abbott Freestyle Lite meter. Sig: use once daily. Dx: E11.42 100 Each 3   • Lancets Misc Lancets order: Lancets for Abbott Freestyle Lite meter. Sig: use once daily. Dx: E11.42 100 Each 3   • Blood Glucose Monitoring Suppl SUPPLIES Misc Accu Check Yoanna blood glucose test strips.  Checking blood sugars 2 times per day E11.40 200 Strip 3   • ibuprofen (MOTRIN) 200 MG Tab Take 200 mg by mouth every 6 hours as needed.     • pioglitazone (ACTOS) 45 MG Tab Take 1 Tab by mouth every day. 90 Tab 3   • metformin (GLUCOPHAGE) 500 MG Tab Take 1 Tab by mouth 3 times a day, with meals. 270 Tab 3   • aspirin 81 MG tablet Take 81 mg by mouth every day.     • nitroglycerin (NITROSTAT) 0.4 MG SUBL Place 1 Tab under tongue as needed for Chest Pain. 25 Tab 1   • Cholecalciferol (VITAMIN D-3) 5000 UNITS TABS Take 1 Tab by mouth every day.       • montelukast (SINGULAIR) 10 MG Tab Take 1 Tab by mouth every day. 90 Tab 3     No facility-administered encounter medications on file as of 11/20/2017.      Review of Systems   Constitutional: Negative.  Negative for malaise/fatigue.   HENT: Negative for hearing loss.         Difficulty swallowing, intermittent   Eyes: Negative for blurred vision.   Respiratory: Negative for shortness of breath.    Cardiovascular: Negative for chest pain and palpitations.   Gastrointestinal: Negative for nausea and vomiting.   Genitourinary: Negative for hematuria.   Musculoskeletal: Positive for joint pain. Negative for myalgias.        Has bilateral ankle pain   Neurological: Negative for focal weakness, loss of  "consciousness and weakness.        Wife notes some speech difficulty after his stroke, this is chronic   Psychiatric/Behavioral: Negative for depression.        Objective:   /80   Pulse 70   Ht 1.88 m (6' 2\")   Wt 111.6 kg (246 lb)   BMI 31.58 kg/m²     Physical Exam   Constitutional: He is oriented to person, place, and time. He appears well-developed and well-nourished. No distress.   obese   HENT:   Head: Atraumatic.   Eyes: Conjunctivae and EOM are normal. Pupils are equal, round, and reactive to light.   Neck: Neck supple. No JVD present.   Cardiovascular: Normal rate and regular rhythm.    No murmur heard.  Pulses:       Carotid pulses are on the right side with bruit, and on the left side with bruit.  Pulmonary/Chest: Effort normal and breath sounds normal. No respiratory distress. He has no wheezes. He has no rales.   Abdominal: Soft. There is no tenderness.   obese   Musculoskeletal: He exhibits no edema.   Neurological: He is alert and oriented to person, place, and time.   Skin: Skin is warm and dry. He is not diaphoretic.       Assessment:     1. Essential hypertension     2. Pure hypercholesterolemia     3. Stented coronary artery         Medical Decision Making:  Today's Assessment / Status / Plan:   He is doing well from a cardiac point of view. No angina. He is up-to-date on stress testing. LDL is at target. Recommend weight loss and better control of his diabetes. Return 6 months. Medications reviewed.  "

## 2018-01-09 ENCOUNTER — OFFICE VISIT (OUTPATIENT)
Dept: MEDICAL GROUP | Facility: MEDICAL CENTER | Age: 73
End: 2018-01-09
Payer: MEDICARE

## 2018-01-09 VITALS
TEMPERATURE: 98.9 F | DIASTOLIC BLOOD PRESSURE: 74 MMHG | OXYGEN SATURATION: 97 % | SYSTOLIC BLOOD PRESSURE: 116 MMHG | BODY MASS INDEX: 32.14 KG/M2 | WEIGHT: 250.4 LBS | HEIGHT: 74 IN | HEART RATE: 114 BPM

## 2018-01-09 DIAGNOSIS — J00 ACUTE NASOPHARYNGITIS: ICD-10-CM

## 2018-01-09 DIAGNOSIS — J10.1 INFLUENZA B: ICD-10-CM

## 2018-01-09 LAB
FLUAV+FLUBV AG SPEC QL IA: POSITIVE
INT CON NEG: NEGATIVE
INT CON POS: POSITIVE

## 2018-01-09 PROCEDURE — 99214 OFFICE O/P EST MOD 30 MIN: CPT | Performed by: FAMILY MEDICINE

## 2018-01-09 PROCEDURE — 87804 INFLUENZA ASSAY W/OPTIC: CPT | Performed by: FAMILY MEDICINE

## 2018-01-09 RX ORDER — OSELTAMIVIR PHOSPHATE 75 MG/1
75 CAPSULE ORAL 2 TIMES DAILY
Qty: 10 CAP | Refills: 0 | Status: SHIPPED | OUTPATIENT
Start: 2018-01-09 | End: 2018-01-14

## 2018-01-09 RX ORDER — IRBESARTAN 300 MG/1
300 TABLET ORAL DAILY
Qty: 30 TAB | Refills: 11 | OUTPATIENT
Start: 2018-01-09 | End: 2019-01-29 | Stop reason: SDUPTHER

## 2018-01-09 ASSESSMENT — PATIENT HEALTH QUESTIONNAIRE - PHQ9: CLINICAL INTERPRETATION OF PHQ2 SCORE: 0

## 2018-01-09 NOTE — PROGRESS NOTES
Subjective:   Agapito Stone is a 72 y.o. male here today for flu like symptoms/cold    Acute nasopharyngitis  2 days of cough, headache, runny nose, body aches. He was able to sleep last night.  Has not had a flu vaccine yet this year.  Has not been around sick people.  1 month ago he took a z-lamont by doctor with [facility].         Current medicines (including changes today)  Current Outpatient Prescriptions   Medication Sig Dispense Refill   • oseltamivir (TAMIFLU) 75 MG Cap Take 1 Cap by mouth 2 times a day for 5 days. 10 Cap 0   • glimepiride (AMARYL) 4 MG Tab Take 1 Tab by mouth every morning. 90 Tab 3   • carvedilol (COREG) 12.5 MG Tab Take 1 Tab by mouth 2 times a day, with meals. 180 Tab 3   • montelukast (SINGULAIR) 10 MG Tab Take 1 Tab by mouth every day. 90 Tab 3   • tamsulosin (FLOMAX) 0.4 MG capsule Take 1 Cap by mouth ONE-HALF HOUR AFTER BREAKFAST. 90 Cap 3   • atorvastatin (LIPITOR) 80 MG tablet TAKE ONE TABLET BY MOUTH DAILY IN THE EVENING 90 Tab 3   • irbesartan (AVAPRO) 300 MG Tab Take 1 Tab by mouth every day. 30 Tab 3   • Blood Glucose Monitoring Suppl (FREESTYLE FREEDOM LITE) W/DEVICE Kit 1 Application by Does not apply route every day. Dx: E11.42 1 Kit 0   • Blood Glucose Monitoring Suppl SUPPLIES Misc Test strips order: Test strips for Abbott Freestyle Lite meter. Sig: use once daily. Dx: E11.42 100 Each 3   • Lancets Misc Lancets order: Lancets for Abbott Freestyle Lite meter. Sig: use once daily. Dx: E11.42 100 Each 3   • Blood Glucose Monitoring Suppl SUPPLIES Misc Accu Check Yoanna blood glucose test strips.  Checking blood sugars 2 times per day E11.40 200 Strip 3   • ibuprofen (MOTRIN) 200 MG Tab Take 200 mg by mouth every 6 hours as needed.     • pioglitazone (ACTOS) 45 MG Tab Take 1 Tab by mouth every day. 90 Tab 3   • metformin (GLUCOPHAGE) 500 MG Tab Take 1 Tab by mouth 3 times a day, with meals. 270 Tab 3   • aspirin 81 MG tablet Take 81 mg by mouth every day.     •  "nitroglycerin (NITROSTAT) 0.4 MG SUBL Place 1 Tab under tongue as needed for Chest Pain. 25 Tab 1   • Cholecalciferol (VITAMIN D-3) 5000 UNITS TABS Take 1 Tab by mouth every day.         No current facility-administered medications for this visit.      He  has a past medical history of Arthritis; CAD (coronary artery disease) (October 2013); Cataract; Diabetes; Hyperlipidemia; Hypertension; Pain; Stroke (CMS-HCC) (2010); and Syncope.    ROS   N shortness of breath, no wheeze, no abdominal pain       Objective:     Blood pressure 116/74, pulse (!) 114, temperature 37.2 °C (98.9 °F), height 1.88 m (6' 2\"), weight 113.6 kg (250 lb 6.4 oz), SpO2 97 %. Body mass index is 32.15 kg/m².   Physical Exam:  Constitutional: Alert, no distress.  Skin: Warm, dry, good turgor, no rashes in visible areas.  Eye: Equal, round and reactive, conjunctiva clear, lids normal.  Respiratory: Unlabored respiratory effort, lungs clear to auscultation, no wheezes, no ronchi.  Cardiovascular: Normal S1, S2, no murmur, no edema.  Psych: Alert and oriented x3, normal affect and mood.    + Influenza B on in clinic test    Assessment and Plan:   The following treatment plan was discussed    1. Influenza B  Prescription for Tamiflu and Tamiflu for his wife.  - oseltamivir (TAMIFLU) 75 MG Cap; Take 1 Cap by mouth 2 times a day for 5 days.  Dispense: 10 Cap; Refill: 0    2. Acute nasopharyngitis  - POCT Influenza A/B      Followup: Return in about 4 weeks (around 2/6/2018), or if symptoms worsen or fail to improve, for Diabetes.         "

## 2018-01-09 NOTE — ASSESSMENT & PLAN NOTE
2 days of cough, headache, runny nose, body aches. He was able to sleep last night.  Has not had a flu vaccine yet this year.  Has not been around sick people.  1 month ago he took a z-lamont by doctor with [facility].

## 2018-01-19 ENCOUNTER — PATIENT OUTREACH (OUTPATIENT)
Dept: HEALTH INFORMATION MANAGEMENT | Facility: OTHER | Age: 73
End: 2018-01-19

## 2018-01-19 NOTE — PROGRESS NOTES
Attempt #:1    Verify PCP: yes    Communication Preference Obtained: no     Review Care Team: no    Annual Wellness Visit Scheduling  1. Scheduling Status:Not Scheduled. Patient states they are not interested       Patient declined awv       MyChart Activation: declined  [email or web address] Ram[facility]: no  Virtual Visits: no  Opt In to Text Messages: no

## 2018-01-25 ENCOUNTER — HOSPITAL ENCOUNTER (OUTPATIENT)
Facility: MEDICAL CENTER | Age: 73
End: 2018-01-25
Attending: FAMILY MEDICINE
Payer: MEDICARE

## 2018-01-25 PROCEDURE — 82274 ASSAY TEST FOR BLOOD FECAL: CPT

## 2018-01-26 ENCOUNTER — NON-PROVIDER VISIT (OUTPATIENT)
Dept: URGENT CARE | Facility: CLINIC | Age: 73
End: 2018-01-26
Payer: MEDICARE

## 2018-01-26 ENCOUNTER — OFFICE VISIT (OUTPATIENT)
Dept: MEDICAL GROUP | Facility: MEDICAL CENTER | Age: 73
End: 2018-01-26
Payer: MEDICARE

## 2018-01-26 VITALS
TEMPERATURE: 97.7 F | OXYGEN SATURATION: 95 % | HEIGHT: 74 IN | SYSTOLIC BLOOD PRESSURE: 130 MMHG | WEIGHT: 246.6 LBS | BODY MASS INDEX: 31.65 KG/M2 | HEART RATE: 61 BPM | DIASTOLIC BLOOD PRESSURE: 74 MMHG

## 2018-01-26 DIAGNOSIS — J00 ACUTE NASOPHARYNGITIS: ICD-10-CM

## 2018-01-26 DIAGNOSIS — Z23 NEED FOR INFLUENZA VACCINATION: ICD-10-CM

## 2018-01-26 DIAGNOSIS — R10.30 LOWER ABDOMINAL PAIN: ICD-10-CM

## 2018-01-26 PROCEDURE — 99214 OFFICE O/P EST MOD 30 MIN: CPT | Performed by: FAMILY MEDICINE

## 2018-01-26 PROCEDURE — G0008 ADMIN INFLUENZA VIRUS VAC: HCPCS | Performed by: NURSE PRACTITIONER

## 2018-01-26 PROCEDURE — 90662 IIV NO PRSV INCREASED AG IM: CPT | Performed by: NURSE PRACTITIONER

## 2018-01-26 NOTE — ASSESSMENT & PLAN NOTE
Has had lower abdominal pain that comes and goes for long a time.  He is taking metformin 500 mg three times a day.  Improved with kailash.  No constipation issues.  Never had a colonoscopy.

## 2018-01-26 NOTE — ASSESSMENT & PLAN NOTE
Patient was diagnosed with Flu B and treated with tamiflu. He is not having significant symptoms anymore. Hearing about young people dying this year of the flu this year, so he is concerned.

## 2018-01-26 NOTE — PROGRESS NOTES
Subjective:   Agapito Stone is a 72 y.o. male here today for nasopharyngitis    Acute nasopharyngitis  Patient was diagnosed with Flu B and treated with tamiflu. He is not having significant symptoms anymore. Hearing about young people dying this year of the flu this year, so he is concerned.    Lower abdominal pain  Has had lower abdominal pain that comes and goes for long a time.  He is taking metformin 500 mg three times a day.  Improved with ginger.  No constipation issues.  Never had a colonoscopy.         Current medicines (including changes today)  Current Outpatient Prescriptions   Medication Sig Dispense Refill   • irbesartan (AVAPRO) 300 MG Tab Take 1 Tab by mouth every day. 30 Tab 11   • glimepiride (AMARYL) 4 MG Tab Take 1 Tab by mouth every morning. 90 Tab 3   • carvedilol (COREG) 12.5 MG Tab Take 1 Tab by mouth 2 times a day, with meals. 180 Tab 3   • montelukast (SINGULAIR) 10 MG Tab Take 1 Tab by mouth every day. 90 Tab 3   • tamsulosin (FLOMAX) 0.4 MG capsule Take 1 Cap by mouth ONE-HALF HOUR AFTER BREAKFAST. 90 Cap 3   • atorvastatin (LIPITOR) 80 MG tablet TAKE ONE TABLET BY MOUTH DAILY IN THE EVENING 90 Tab 3   • Blood Glucose Monitoring Suppl (FREESTYLE FREEDOM LITE) W/DEVICE Kit 1 Application by Does not apply route every day. Dx: E11.42 1 Kit 0   • Blood Glucose Monitoring Suppl SUPPLIES Misc Test strips order: Test strips for Abbott Freestyle Lite meter. Sig: use once daily. Dx: E11.42 100 Each 3   • Lancets Misc Lancets order: Lancets for Abbott Freestyle Lite meter. Sig: use once daily. Dx: E11.42 100 Each 3   • Blood Glucose Monitoring Suppl SUPPLIES Misc Accu Check Yoanna blood glucose test strips.  Checking blood sugars 2 times per day E11.40 200 Strip 3   • ibuprofen (MOTRIN) 200 MG Tab Take 200 mg by mouth every 6 hours as needed.     • pioglitazone (ACTOS) 45 MG Tab Take 1 Tab by mouth every day. 90 Tab 3   • metformin (GLUCOPHAGE) 500 MG Tab Take 1 Tab by mouth 3 times a day,  "with meals. 270 Tab 3   • aspirin 81 MG tablet Take 81 mg by mouth every day.     • nitroglycerin (NITROSTAT) 0.4 MG SUBL Place 1 Tab under tongue as needed for Chest Pain. 25 Tab 1   • Cholecalciferol (VITAMIN D-3) 5000 UNITS TABS Take 1 Tab by mouth every day.         No current facility-administered medications for this visit.      He  has a past medical history of Arthritis; CAD (coronary artery disease) (October 2013); Cataract; Diabetes; Hyperlipidemia; Hypertension; Pain; Stroke (CMS-HCC) (2010); and Syncope.    ROS   No fever, no shortness of breath       Objective:     Blood pressure 130/74, pulse 61, temperature 36.5 °C (97.7 °F), height 1.88 m (6' 2\"), weight 111.9 kg (246 lb 9.6 oz), SpO2 95 %. Body mass index is 31.66 kg/m².   Physical Exam:  Constitutional: Alert, no distress.  Skin: Warm, dry, good turgor, no rashes in visible areas.  Eye: Equal, round and reactive, conjunctiva clear, lids normal.  Respiratory: Unlabored respiratory effort, lungs clear to auscultation, no wheezes, no ronchi.  Cardiovascular: Normal S1, S2, no murmur, no edema.  Psych: Alert and oriented x3, normal affect and mood.        Assessment and Plan:   The following treatment plan was discussed    1. Acute nasopharyngitis  Reassurance given. No concerns.  Encouraged influenza vaccine to protect against influenza A. We do not have high dose influenza vaccine in stock, so encouraged to get vaccine at the pharmacy.    2. Lower abdominal pain  Possibly related to metformin, not severe enough to switch metformin, continue metformin. Encouraged colonoscopy.      Followup: Return if symptoms worsen or fail to improve.         "

## 2018-01-27 DIAGNOSIS — E11.42 TYPE 2 DIABETES MELLITUS WITH PERIPHERAL NEUROPATHY (HCC): ICD-10-CM

## 2018-01-29 RX ORDER — PIOGLITAZONEHYDROCHLORIDE 45 MG/1
45 TABLET ORAL DAILY
Qty: 90 TAB | Refills: 3 | Status: SHIPPED | OUTPATIENT
Start: 2018-01-29 | End: 2019-01-23 | Stop reason: SDUPTHER

## 2018-01-30 DIAGNOSIS — Z12.11 COLON CANCER SCREENING: ICD-10-CM

## 2018-01-31 LAB — HEMOCCULT STL QL IA: NEGATIVE

## 2018-02-01 ENCOUNTER — TELEPHONE (OUTPATIENT)
Dept: MEDICAL GROUP | Facility: MEDICAL CENTER | Age: 73
End: 2018-02-01

## 2018-02-01 ENCOUNTER — NON-PROVIDER VISIT (OUTPATIENT)
Dept: MEDICAL GROUP | Facility: MEDICAL CENTER | Age: 73
End: 2018-02-01
Payer: MEDICARE

## 2018-02-01 VITALS — DIASTOLIC BLOOD PRESSURE: 74 MMHG | SYSTOLIC BLOOD PRESSURE: 140 MMHG

## 2018-02-01 NOTE — TELEPHONE ENCOUNTER
----- Message from Brody Zamorano M.D. sent at 2/1/2018  7:18 AM PST -----  Please notify patient that stool test is negative for blood. This test should be repeated annually for colon cancer screening.  Brody Zamorano MD

## 2018-02-02 NOTE — PROGRESS NOTES
Agapito Stone is a 72 y.o. male here for a non-provider visit for Blood pressure check with AM    Vitals:    02/01/18 1501   BP: 140/74     If abnormal, was an in office provider notified today? Yes  Routed to PCP? No

## 2018-02-16 ENCOUNTER — OFFICE VISIT (OUTPATIENT)
Dept: MEDICAL GROUP | Facility: MEDICAL CENTER | Age: 73
End: 2018-02-16
Payer: MEDICARE

## 2018-02-16 VITALS
HEART RATE: 65 BPM | SYSTOLIC BLOOD PRESSURE: 138 MMHG | RESPIRATION RATE: 12 BRPM | BODY MASS INDEX: 31.03 KG/M2 | DIASTOLIC BLOOD PRESSURE: 80 MMHG | HEIGHT: 74 IN | WEIGHT: 241.8 LBS | OXYGEN SATURATION: 97 % | TEMPERATURE: 97.9 F

## 2018-02-16 DIAGNOSIS — E11.42 TYPE 2 DIABETES MELLITUS WITH PERIPHERAL NEUROPATHY (HCC): ICD-10-CM

## 2018-02-16 DIAGNOSIS — Z12.11 COLON CANCER SCREENING: ICD-10-CM

## 2018-02-16 DIAGNOSIS — R14.0 ABDOMINAL BLOATING: ICD-10-CM

## 2018-02-16 DIAGNOSIS — I10 ESSENTIAL HYPERTENSION: ICD-10-CM

## 2018-02-16 PROBLEM — J00 ACUTE NASOPHARYNGITIS: Status: RESOLVED | Noted: 2018-01-09 | Resolved: 2018-02-16

## 2018-02-16 LAB
HBA1C MFR BLD: 7.7 % (ref ?–5.8)
INT CON NEG: NORMAL
INT CON POS: NORMAL

## 2018-02-16 PROCEDURE — 83036 HEMOGLOBIN GLYCOSYLATED A1C: CPT | Performed by: FAMILY MEDICINE

## 2018-02-16 PROCEDURE — 99214 OFFICE O/P EST MOD 30 MIN: CPT | Performed by: FAMILY MEDICINE

## 2018-02-16 RX ORDER — OMEPRAZOLE 20 MG/1
20 CAPSULE, DELAYED RELEASE ORAL DAILY
Qty: 30 CAP | Refills: 2 | Status: SHIPPED | OUTPATIENT
Start: 2018-02-16 | End: 2018-06-06 | Stop reason: SDUPTHER

## 2018-02-16 NOTE — ASSESSMENT & PLAN NOTE
Tolerating metformin 500 mg three times daily, Actos 45 mg daily, glimepiride 4 mg daily. Today A1c is 7.7, down from 9.0.  Planning to see Dr. Samaniego, ophthalmologist, soon.

## 2018-02-16 NOTE — ASSESSMENT & PLAN NOTE
Patient is complaining of abdominal bloating, which is new and progressive. He occasionally has indigestion. Patient has not taken anything regular for indigestion. He has never had a colonoscopy, negative FIT.

## 2018-02-16 NOTE — ASSESSMENT & PLAN NOTE
After being worked on by the chiropractor they got him off the table and he got dizzy and elevated blood pressure of 160s/80s.

## 2018-02-16 NOTE — PROGRESS NOTES
Subjective:   Agapito Stone is a 72 y.o. male here today for diabetes, bloating, HTN    HTN (hypertension)  After being worked on by the chiropractor they got him off the table and he got dizzy and elevated blood pressure of 160s/80s.    Type 2 diabetes mellitus with peripheral neuropathy  Tolerating metformin 500 mg three times daily, Actos 45 mg daily, glimepiride 4 mg daily. Today A1c is 7.7, down from 9.0.  Planning to see Dr. Samaniego, ophthalmologist, soon.    Abdominal bloating  Patient is complaining of abdominal bloating, which is new and progressive. He occasionally has indigestion. Patient has not taken anything regular for indigestion. He has never had a colonoscopy, negative FIT.         Current medicines (including changes today)  Current Outpatient Prescriptions   Medication Sig Dispense Refill   • omeprazole (PRILOSEC) 20 MG delayed-release capsule Take 1 Cap by mouth every day. 30 Cap 2   • pioglitazone (ACTOS) 45 MG Tab TAKE 1 TAB BY MOUTH EVERY DAY. 90 Tab 3   • irbesartan (AVAPRO) 300 MG Tab Take 1 Tab by mouth every day. 30 Tab 11   • glimepiride (AMARYL) 4 MG Tab Take 1 Tab by mouth every morning. 90 Tab 3   • carvedilol (COREG) 12.5 MG Tab Take 1 Tab by mouth 2 times a day, with meals. 180 Tab 3   • montelukast (SINGULAIR) 10 MG Tab Take 1 Tab by mouth every day. 90 Tab 3   • tamsulosin (FLOMAX) 0.4 MG capsule Take 1 Cap by mouth ONE-HALF HOUR AFTER BREAKFAST. 90 Cap 3   • atorvastatin (LIPITOR) 80 MG tablet TAKE ONE TABLET BY MOUTH DAILY IN THE EVENING 90 Tab 3   • Blood Glucose Monitoring Suppl (FREESTYLE FREEDOM LITE) W/DEVICE Kit 1 Application by Does not apply route every day. Dx: E11.42 1 Kit 0   • Blood Glucose Monitoring Suppl SUPPLIES Misc Test strips order: Test strips for Abbott Freestyle Lite meter. Sig: use once daily. Dx: E11.42 100 Each 3   • Lancets Misc Lancets order: Lancets for Abbott Freestyle Lite meter. Sig: use once daily. Dx: E11.42 100 Each 3   • Blood Glucose  "Monitoring Suppl SUPPLIES Misc Accu Check Yoanna blood glucose test strips.  Checking blood sugars 2 times per day E11.40 200 Strip 3   • ibuprofen (MOTRIN) 200 MG Tab Take 200 mg by mouth every 6 hours as needed.     • metformin (GLUCOPHAGE) 500 MG Tab Take 1 Tab by mouth 3 times a day, with meals. 270 Tab 3   • aspirin 81 MG tablet Take 81 mg by mouth every day.     • nitroglycerin (NITROSTAT) 0.4 MG SUBL Place 1 Tab under tongue as needed for Chest Pain. 25 Tab 1   • Cholecalciferol (VITAMIN D-3) 5000 UNITS TABS Take 1 Tab by mouth every day.         No current facility-administered medications for this visit.      He  has a past medical history of Arthritis; CAD (coronary artery disease) (October 2013); Cataract; Diabetes; Hyperlipidemia; Hypertension; Pain; Stroke (CMS-HCC) (2010); and Syncope.    ROS   No dark stools, no bloody stools, no fever       Objective:     Blood pressure 138/80, pulse 65, temperature 36.6 °C (97.9 °F), resp. rate 12, height 1.88 m (6' 2\"), weight 109.7 kg (241 lb 12.8 oz), SpO2 97 %. Body mass index is 31.05 kg/m².   Physical Exam:  Constitutional: Alert, no distress.  Skin: Warm, dry, good turgor, no rashes in visible areas.  Eye: Equal, round and reactive, conjunctiva clear, lids normal.  Psych: Alert and oriented x3, normal affect and mood.        Assessment and Plan:   The following treatment plan was discussed    1. Type 2 diabetes mellitus with peripheral neuropathy (CMS-[facility])  Controlled for age. Continue current medication.  - POCT Hemoglobin A1C    2. Essential hypertension  Controlled. Continue current medication.    3. Abdominal bloating  Trial of omeprazole. Referral to GI for colonoscopy.  - REFERRAL TO GASTROENTEROLOGY    4. Colon cancer screening  Referral to GI for colonoscopy.  - REFERRAL TO GASTROENTEROLOGY      Followup: Return in about 3 months (around 5/16/2018) for Diabetes.         "

## 2018-02-19 DIAGNOSIS — E11.42 TYPE 2 DIABETES MELLITUS WITH PERIPHERAL NEUROPATHY (HCC): ICD-10-CM

## 2018-02-25 DIAGNOSIS — E11.42 TYPE 2 DIABETES MELLITUS WITH PERIPHERAL NEUROPATHY (HCC): ICD-10-CM

## 2018-05-14 ENCOUNTER — HOSPITAL ENCOUNTER (OUTPATIENT)
Dept: LAB | Facility: MEDICAL CENTER | Age: 73
End: 2018-05-14
Attending: FAMILY MEDICINE
Payer: MEDICARE

## 2018-05-14 DIAGNOSIS — E11.42 TYPE 2 DIABETES MELLITUS WITH PERIPHERAL NEUROPATHY (HCC): ICD-10-CM

## 2018-05-14 DIAGNOSIS — E78.00 PURE HYPERCHOLESTEROLEMIA: ICD-10-CM

## 2018-05-14 LAB
ALBUMIN SERPL BCP-MCNC: 3.8 G/DL (ref 3.2–4.9)
ALBUMIN/GLOB SERPL: 1.4 G/DL
ALP SERPL-CCNC: 124 U/L (ref 30–99)
ALT SERPL-CCNC: 31 U/L (ref 2–50)
ANION GAP SERPL CALC-SCNC: 6 MMOL/L (ref 0–11.9)
AST SERPL-CCNC: 22 U/L (ref 12–45)
BILIRUB SERPL-MCNC: 1.7 MG/DL (ref 0.1–1.5)
BUN SERPL-MCNC: 20 MG/DL (ref 8–22)
CALCIUM SERPL-MCNC: 9.4 MG/DL (ref 8.5–10.5)
CHLORIDE SERPL-SCNC: 106 MMOL/L (ref 96–112)
CHOLEST SERPL-MCNC: 131 MG/DL (ref 100–199)
CO2 SERPL-SCNC: 29 MMOL/L (ref 20–33)
CREAT SERPL-MCNC: 0.98 MG/DL (ref 0.5–1.4)
CREAT UR-MCNC: 144.2 MG/DL
EST. AVERAGE GLUCOSE BLD GHB EST-MCNC: 217 MG/DL
GLOBULIN SER CALC-MCNC: 2.7 G/DL (ref 1.9–3.5)
GLUCOSE SERPL-MCNC: 197 MG/DL (ref 65–99)
HBA1C MFR BLD: 9.2 % (ref 0–5.6)
HDLC SERPL-MCNC: 47 MG/DL
LDLC SERPL CALC-MCNC: 63 MG/DL
MICROALBUMIN UR-MCNC: 8.2 MG/DL
MICROALBUMIN/CREAT UR: 57 MG/G (ref 0–30)
POTASSIUM SERPL-SCNC: 4.6 MMOL/L (ref 3.6–5.5)
PROT SERPL-MCNC: 6.5 G/DL (ref 6–8.2)
SODIUM SERPL-SCNC: 141 MMOL/L (ref 135–145)
TRIGL SERPL-MCNC: 106 MG/DL (ref 0–149)

## 2018-05-14 PROCEDURE — 82043 UR ALBUMIN QUANTITATIVE: CPT

## 2018-05-14 PROCEDURE — 82570 ASSAY OF URINE CREATININE: CPT

## 2018-05-14 PROCEDURE — 83036 HEMOGLOBIN GLYCOSYLATED A1C: CPT

## 2018-05-14 PROCEDURE — 80053 COMPREHEN METABOLIC PANEL: CPT

## 2018-05-14 PROCEDURE — 80061 LIPID PANEL: CPT

## 2018-05-14 PROCEDURE — 36415 COLL VENOUS BLD VENIPUNCTURE: CPT

## 2018-05-16 ENCOUNTER — OFFICE VISIT (OUTPATIENT)
Dept: MEDICAL GROUP | Facility: MEDICAL CENTER | Age: 73
End: 2018-05-16
Payer: MEDICARE

## 2018-05-16 VITALS
SYSTOLIC BLOOD PRESSURE: 140 MMHG | DIASTOLIC BLOOD PRESSURE: 88 MMHG | TEMPERATURE: 97.9 F | OXYGEN SATURATION: 92 % | BODY MASS INDEX: 32.08 KG/M2 | HEART RATE: 65 BPM | HEIGHT: 74 IN | WEIGHT: 250 LBS | RESPIRATION RATE: 16 BRPM

## 2018-05-16 DIAGNOSIS — E66.9 OBESITY (BMI 30-39.9): ICD-10-CM

## 2018-05-16 DIAGNOSIS — E55.9 VITAMIN D DEFICIENCY: ICD-10-CM

## 2018-05-16 DIAGNOSIS — I10 ESSENTIAL HYPERTENSION: ICD-10-CM

## 2018-05-16 DIAGNOSIS — E11.42 TYPE 2 DIABETES MELLITUS WITH PERIPHERAL NEUROPATHY (HCC): ICD-10-CM

## 2018-05-16 DIAGNOSIS — E78.00 PURE HYPERCHOLESTEROLEMIA: ICD-10-CM

## 2018-05-16 PROCEDURE — 99214 OFFICE O/P EST MOD 30 MIN: CPT | Performed by: FAMILY MEDICINE

## 2018-05-16 NOTE — ASSESSMENT & PLAN NOTE
Has an eye appointment 6th of next month.  On metformin 500 mg TID, but forgets to take it 3x/day. He is also on Actos 45 mg daily and glimepiride 4 mg daily.  A1c went from 7.7 to 9.2.

## 2018-05-16 NOTE — PROGRESS NOTES
Subjective:   Agapito Stone is a 73 y.o. male here today for diabetes    Type 2 diabetes mellitus with peripheral neuropathy  Has an eye appointment 6th of next month.  On metformin 500 mg TID, but forgets to take it 3x/day. He is also on Actos 45 mg daily and glimepiride 4 mg daily.  A1c went from 7.7 to 9.2.    Hyperlipidemia  Tolerating atorvastatin 80 mg daily.    HTN (hypertension)  Patient is tolerating irbesartan 300 mg daily.    Vitamin D deficiency  Taking Vitamin D 5000 units daily.         Current medicines (including changes today)  Current Outpatient Prescriptions   Medication Sig Dispense Refill   • metFORMIN (GLUCOPHAGE) 1000 MG tablet Take 1 Tab by mouth 2 times a day, with meals. 180 Tab 3   • omeprazole (PRILOSEC) 20 MG delayed-release capsule Take 1 Cap by mouth every day. 30 Cap 2   • pioglitazone (ACTOS) 45 MG Tab TAKE 1 TAB BY MOUTH EVERY DAY. 90 Tab 3   • irbesartan (AVAPRO) 300 MG Tab Take 1 Tab by mouth every day. 30 Tab 11   • glimepiride (AMARYL) 4 MG Tab Take 1 Tab by mouth every morning. 90 Tab 3   • carvedilol (COREG) 12.5 MG Tab Take 1 Tab by mouth 2 times a day, with meals. 180 Tab 3   • montelukast (SINGULAIR) 10 MG Tab Take 1 Tab by mouth every day. 90 Tab 3   • tamsulosin (FLOMAX) 0.4 MG capsule Take 1 Cap by mouth ONE-HALF HOUR AFTER BREAKFAST. 90 Cap 3   • atorvastatin (LIPITOR) 80 MG tablet TAKE ONE TABLET BY MOUTH DAILY IN THE EVENING 90 Tab 3   • Blood Glucose Monitoring Suppl (FREESTYLE FREEDOM LITE) W/DEVICE Kit 1 Application by Does not apply route every day. Dx: E11.42 1 Kit 0   • Blood Glucose Monitoring Suppl SUPPLIES Misc Test strips order: Test strips for Abbott Freestyle Lite meter. Sig: use once daily. Dx: E11.42 100 Each 3   • Lancets Misc Lancets order: Lancets for Abbott Freestyle Lite meter. Sig: use once daily. Dx: E11.42 100 Each 3   • Blood Glucose Monitoring Suppl SUPPLIES Misc Accu Check Yoanna blood glucose test strips.  Checking blood sugars 2 times  "per day E11.40 200 Strip 3   • ibuprofen (MOTRIN) 200 MG Tab Take 200 mg by mouth every 6 hours as needed.     • aspirin 81 MG tablet Take 81 mg by mouth every day.     • nitroglycerin (NITROSTAT) 0.4 MG SUBL Place 1 Tab under tongue as needed for Chest Pain. 25 Tab 1   • Cholecalciferol (VITAMIN D-3) 5000 UNITS TABS Take 1 Tab by mouth every day.         No current facility-administered medications for this visit.      He  has a past medical history of Arthritis; CAD (coronary artery disease) (October 2013); Cataract; Diabetes; Hyperlipidemia; Hypertension; Pain; Stroke (HCC) (2010); and Syncope.    ROS   + cough, + post nasal drainage, no fever       Objective:     Blood pressure 140/88, pulse 65, temperature 36.6 °C (97.9 °F), resp. rate 16, height 1.88 m (6' 2\"), weight 113.4 kg (250 lb), SpO2 92 %. Body mass index is 32.1 kg/m².   Physical Exam:  Constitutional: Alert, no distress.  Skin: Warm, dry, good turgor, no rashes in visible areas.  Eye: Equal, round and reactive, conjunctiva clear, lids normal.  Respiratory: Unlabored respiratory effort, lungs clear to auscultation, no wheezes, no ronchi.  Cardiovascular: Normal S1, S2, no murmur, no edema.  Psych: Alert and oriented x3, normal affect and mood.        Assessment and Plan:   The following treatment plan was discussed    1. Type 2 diabetes mellitus with peripheral neuropathy (HCC)  Uncontrolled.  Increase metformin from 500 mg twice daily to 1000 mg twice daily.  Advised diet and exercise.  Follow-up with labs in 3 months.  - metFORMIN (GLUCOPHAGE) 1000 MG tablet; Take 1 Tab by mouth 2 times a day, with meals.  Dispense: 180 Tab; Refill: 3  - COMP METABOLIC PANEL; Future  - HEMOGLOBIN A1C; Future  - MICROALBUMIN CREAT RATIO URINE; Future  - LIPID PROFILE; Future    2. Essential hypertension  Controlled.  Continue current medication.    3. Pure hypercholesterolemia  Controlled.  Continue current medication.    4. Vitamin D deficiency  Continue vitamin D " supplementation.  Check labs in 3 months and follow-up.  - VITAMIN D,25 HYDROXY; Future    5. Obesity (BMI 30-39.9)  - Patient identified as having weight management issue.  Appropriate orders and counseling given.      Followup: Return in about 3 months (around 8/16/2018) for Diabetes.

## 2018-05-21 RX ORDER — BLOOD-GLUCOSE METER
KIT MISCELLANEOUS
Qty: 150 STRIP | Refills: 0 | OUTPATIENT
Start: 2018-05-21

## 2018-05-22 DIAGNOSIS — E11.42 TYPE 2 DIABETES MELLITUS WITH PERIPHERAL NEUROPATHY (HCC): ICD-10-CM

## 2018-05-22 NOTE — TELEPHONE ENCOUNTER
Was the patient seen in the last year in this department? Yes     Does patient have an active prescription for medications requested? Yes     Received Request Via: Patient     I checked the pt chart with previous refill and refusal. Refill on 5/18/18 states Transmission to pharmacy failed and refill request from yesterday was denied due to previous approved refill.   Pt stated this morning that they have not received their Rx yet

## 2018-06-11 ENCOUNTER — APPOINTMENT (OUTPATIENT)
Dept: RADIOLOGY | Facility: MEDICAL CENTER | Age: 73
End: 2018-06-11
Attending: SURGERY
Payer: MEDICARE

## 2018-06-15 ENCOUNTER — HOSPITAL ENCOUNTER (OUTPATIENT)
Dept: RADIOLOGY | Facility: MEDICAL CENTER | Age: 73
End: 2018-06-15
Attending: SURGERY
Payer: MEDICARE

## 2018-06-15 DIAGNOSIS — I65.23 BILATERAL CAROTID ARTERY STENOSIS: ICD-10-CM

## 2018-06-15 PROCEDURE — 93880 EXTRACRANIAL BILAT STUDY: CPT | Mod: 26 | Performed by: SURGERY

## 2018-06-15 PROCEDURE — 93880 EXTRACRANIAL BILAT STUDY: CPT

## 2018-06-20 NOTE — ASSESSMENT & PLAN NOTE
Patient is tolerating vitamin D supplementation, although he is forgetting to take it some days. Vitamin D level was slightly low at 24   My signature below certifies that the above stated patient is homebound and upon completion of the Face-To-Face encounter, has the need for intermittent skilled nursing, physical therapy and/or speech or occupational therapy services in their home for their current diagnosis as outlined in their initial plan of care. These services will continue to be monitored by myself or another physician.

## 2018-07-11 ENCOUNTER — OFFICE VISIT (OUTPATIENT)
Dept: CARDIOLOGY | Facility: MEDICAL CENTER | Age: 73
End: 2018-07-11
Payer: MEDICARE

## 2018-07-11 VITALS
BODY MASS INDEX: 32.34 KG/M2 | HEIGHT: 74 IN | WEIGHT: 252 LBS | DIASTOLIC BLOOD PRESSURE: 80 MMHG | SYSTOLIC BLOOD PRESSURE: 140 MMHG | OXYGEN SATURATION: 94 % | HEART RATE: 64 BPM

## 2018-07-11 DIAGNOSIS — I25.10 CORONARY ARTERY DISEASE INVOLVING NATIVE CORONARY ARTERY OF NATIVE HEART WITHOUT ANGINA PECTORIS: ICD-10-CM

## 2018-07-11 DIAGNOSIS — I10 ESSENTIAL HYPERTENSION: ICD-10-CM

## 2018-07-11 DIAGNOSIS — Z95.5 STENTED CORONARY ARTERY: ICD-10-CM

## 2018-07-11 PROCEDURE — 99214 OFFICE O/P EST MOD 30 MIN: CPT | Performed by: INTERNAL MEDICINE

## 2018-07-11 RX ORDER — ACETAMINOPHEN 325 MG/1
650 TABLET ORAL EVERY 4 HOURS PRN
Status: ON HOLD | COMMUNITY
End: 2020-01-01

## 2018-07-11 ASSESSMENT — ENCOUNTER SYMPTOMS
FOCAL WEAKNESS: 0
CONSTITUTIONAL NEGATIVE: 1
PALPITATIONS: 0
SHORTNESS OF BREATH: 0
NAUSEA: 0
BLURRED VISION: 0
WEAKNESS: 0
MYALGIAS: 0
DEPRESSION: 0
VOMITING: 0
LOSS OF CONSCIOUSNESS: 0

## 2018-07-11 NOTE — LETTER
[facility]   [address], [address]  [place] [address]  Phone: [phone]  Fax: [phone]              Agapito Stone  1945    Encounter Date: 7/11/2018    Kade Ovalle M.D.          PROGRESS NOTE:  Chief Complaint   Patient presents with   • Coronary Artery Disease     Follow up       Subjective:   Agapito Stone is a 73 y.o. male who presents today for follow-up of coronary disease with stenting of his circumflex in October 2013, hypertension and hyperlipidemia.  He has had no angina at all.  The patient has been physically inactive.  Recent lab reveals that blood sugars are not under good control glycol hemoglobin has grown to 9.2.  LDL however is at target at 63.    Past Medical History:   Diagnosis Date   • Arthritis     hands, wrists, ankles   • CAD (coronary artery disease) October 2013    Dr. Ovalle; ACS. PCI/LETA x 2 of the mid circumflex (Xience 3.25 x 23mm) and proximal circumflex (Xience 3.6x 12mm)   • Cataract    • Diabetes    • Hyperlipidemia    • Hypertension    • Pain     ankles   • Stroke (HCC) 2010    no residual effects   • Syncope      Past Surgical History:   Procedure Laterality Date   • STENT PLACEMENT  2013    cardiac   • CAROTID ENDARTERECTOMY  7/13/2010    left; Performed by CHIQUITA CORRIGAN at SURGERY [facility]   • TONSILLECTOMY  as a child     Family History   Problem Relation Age of Onset   • Other Mother      Rheumatic fever     Social History     Social History   • Marital status:      Spouse name: N/A   • Number of children: N/A   • Years of education: N/A     Occupational History   • Not on file.     Social History Main Topics   • Smoking status: Never Smoker   • Smokeless tobacco: Never Used   • Alcohol use 0.0 oz/week      Comment: once a year   • Drug use: No   • Sexual activity: Not on file     Other Topics Concern   • Not on file     Social History Narrative   • No narrative on file          Allergies   Allergen Reactions   • Fish Hives     shellfish   • Pcn [Penicillins] Hives   • Amoxicillin Hives   • Food Swelling      Eggs,Melons, avocados-puffy mouth   • Horse Allergy      Outpatient Encounter Prescriptions as of 7/11/2018   Medication Sig Dispense Refill   • acetaminophen (TYLENOL) 325 MG Tab Take 650 mg by mouth every four hours as needed.     • Alum Hydroxide-Mag Carbonate (GAVISCON PO) Take  by mouth.     • metFORMIN (GLUCOPHAGE) 1000 MG tablet Take 1 Tab by mouth 2 times a day, with meals. (Patient taking differently: Take 1,000 mg by mouth every day.) 180 Tab 3   • pioglitazone (ACTOS) 45 MG Tab TAKE 1 TAB BY MOUTH EVERY DAY. 90 Tab 3   • irbesartan (AVAPRO) 300 MG Tab Take 1 Tab by mouth every day. 30 Tab 11   • glimepiride (AMARYL) 4 MG Tab Take 1 Tab by mouth every morning. 90 Tab 3   • carvedilol (COREG) 12.5 MG Tab Take 1 Tab by mouth 2 times a day, with meals. 180 Tab 3   • atorvastatin (LIPITOR) 80 MG tablet TAKE ONE TABLET BY MOUTH DAILY IN THE EVENING 90 Tab 3   • aspirin 81 MG tablet Take 81 mg by mouth every day.     • Cholecalciferol (VITAMIN D-3) 5000 UNITS TABS Take 1 Tab by mouth every day.       • omeprazole (PRILOSEC) 20 MG delayed-release capsule TAKE 1 CAP BY MOUTH EVERY DAY. 90 Cap 3   • glucose blood (FREESTYLE LITE) strip CHECK BLOOD SUGAR TWICE A DAY - DX E11.42 150 Strip 5   • montelukast (SINGULAIR) 10 MG Tab Take 1 Tab by mouth every day. 90 Tab 3   • tamsulosin (FLOMAX) 0.4 MG capsule Take 1 Cap by mouth ONE-HALF HOUR AFTER BREAKFAST. 90 Cap 3   • Blood Glucose Monitoring Suppl (FREESTYLE FREEDOM LITE) W/DEVICE Kit 1 Application by Does not apply route every day. Dx: E11.42 1 Kit 0   • Blood Glucose Monitoring Suppl SUPPLIES Misc Test strips order: Test strips for Abbott Freestyle Lite meter. Sig: use once daily. Dx: E11.42 100 Each 3   • Lancets Misc Lancets order: Lancets for Abbott Freestyle Lite meter. Sig: use once daily. Dx: E11.42 100 Each 3   • Blood  "Glucose Monitoring Suppl SUPPLIES Misc Accu Check Yoanna blood glucose test strips.  Checking blood sugars 2 times per day E11.40 200 Strip 3   • ibuprofen (MOTRIN) 200 MG Tab Take 200 mg by mouth every 6 hours as needed.     • nitroglycerin (NITROSTAT) 0.4 MG SUBL Place 1 Tab under tongue as needed for Chest Pain. 25 Tab 1     No facility-administered encounter medications on file as of 7/11/2018.      Review of Systems   Constitutional: Negative.  Negative for malaise/fatigue.   HENT: Negative for hearing loss.         Difficulty swallowing, intermittent   Eyes: Negative for blurred vision.   Respiratory: Negative for shortness of breath.    Cardiovascular: Negative for chest pain and palpitations.   Gastrointestinal: Negative for nausea and vomiting.   Genitourinary: Negative for hematuria.   Musculoskeletal: Positive for joint pain. Negative for myalgias.        Has bilateral ankle pain   Neurological: Negative for focal weakness, loss of consciousness and weakness.        History of CVA  Known right carotid occlusion   Psychiatric/Behavioral: Negative for depression.        Objective:   /80   Pulse 64   Ht 1.88 m (6' 2\")   Wt 114.3 kg (252 lb)   SpO2 94%   BMI 32.35 kg/m²      Physical Exam   Constitutional: He is oriented to person, place, and time. No distress.   HENT:   Head: Normocephalic and atraumatic.   Cardiovascular: Normal rate and regular rhythm.    No murmur heard.  Pulses:       Carotid pulses are on the left side with bruit.  Pulmonary/Chest: Breath sounds normal. No respiratory distress. He has no wheezes.   Abdominal: Soft. Bowel sounds are normal. He exhibits no distension.   Musculoskeletal: He exhibits no edema.   Neurological: He is alert and oriented to person, place, and time.   Skin: Skin is warm and dry.   Psychiatric: He has a normal mood and affect.       Assessment:     1. Coronary artery disease involving native coronary artery of native heart without angina pectoris     2. " Stented coronary artery     3. Essential hypertension         Medical Decision Making:  Today's Assessment / Status / Plan:   Coronary artery disease: Status post stenting of circumflex.  He is asymptomatic.  Most recent stress test was in October 2015 that showed a small apical scar no ischemia.  Will plan repeat stress testing in 6 months.    Hypertension: Under good control on current medical regimen.  Hyperlipidemia: LDL is at target.  Diabetes mellitus: Glycohemoglobin needs some work this was discussed with patient.  Unfortunately he is not very physically active due to his ankle injury.  Cerebrovascular disease: Patient is a bruit on the left.  Very recent ultrasound reveals a right carotid is occluded.  He has had previous endarterectomy of the left and there is no evidence of significant stenosis.  Return in 6 months.      Brody Zamorano M.D.  [id number]  [id number]  [place] [address]  VIA In Basket

## 2018-07-12 NOTE — PROGRESS NOTES
Chief Complaint   Patient presents with   • Coronary Artery Disease     Follow up       Subjective:   Agapito Stone is a 73 y.o. male who presents today for follow-up of coronary disease with stenting of his circumflex in October 2013, hypertension and hyperlipidemia.  He has had no angina at all.  The patient has been physically inactive.  Recent lab reveals that blood sugars are not under good control glycol hemoglobin has grown to 9.2.  LDL however is at target at 63.    Past Medical History:   Diagnosis Date   • Arthritis     hands, wrists, ankles   • CAD (coronary artery disease) October 2013    Dr. Ovalle; ACS. PCI/LETA x 2 of the mid circumflex (Xience 3.25 x 23mm) and proximal circumflex (Xience 3.6x 12mm)   • Cataract    • Diabetes    • Hyperlipidemia    • Hypertension    • Pain     ankles   • Stroke (HCC) 2010    no residual effects   • Syncope      Past Surgical History:   Procedure Laterality Date   • STENT PLACEMENT  2013    cardiac   • CAROTID ENDARTERECTOMY  7/13/2010    left; Performed by CHIQUITA CORRIGAN at SURGERY [facility]   • TONSILLECTOMY  as a child     Family History   Problem Relation Age of Onset   • Other Mother      Rheumatic fever     Social History     Social History   • Marital status:      Spouse name: N/A   • Number of children: N/A   • Years of education: N/A     Occupational History   • Not on file.     Social History Main Topics   • Smoking status: Never Smoker   • Smokeless tobacco: Never Used   • Alcohol use 0.0 oz/week      Comment: once a year   • Drug use: No   • Sexual activity: Not on file     Other Topics Concern   • Not on file     Social History Narrative   • No narrative on file     Allergies   Allergen Reactions   • Fish Hives     shellfish   • Pcn [Penicillins] Hives   • Amoxicillin Hives   • Food Swelling      Eggs,Melons, avocados-puffy mouth   • Horse Allergy      Outpatient Encounter Prescriptions as of 7/11/2018   Medication Sig Dispense Refill    • acetaminophen (TYLENOL) 325 MG Tab Take 650 mg by mouth every four hours as needed.     • Alum Hydroxide-Mag Carbonate (GAVISCON PO) Take  by mouth.     • metFORMIN (GLUCOPHAGE) 1000 MG tablet Take 1 Tab by mouth 2 times a day, with meals. (Patient taking differently: Take 1,000 mg by mouth every day.) 180 Tab 3   • pioglitazone (ACTOS) 45 MG Tab TAKE 1 TAB BY MOUTH EVERY DAY. 90 Tab 3   • irbesartan (AVAPRO) 300 MG Tab Take 1 Tab by mouth every day. 30 Tab 11   • glimepiride (AMARYL) 4 MG Tab Take 1 Tab by mouth every morning. 90 Tab 3   • carvedilol (COREG) 12.5 MG Tab Take 1 Tab by mouth 2 times a day, with meals. 180 Tab 3   • atorvastatin (LIPITOR) 80 MG tablet TAKE ONE TABLET BY MOUTH DAILY IN THE EVENING 90 Tab 3   • aspirin 81 MG tablet Take 81 mg by mouth every day.     • Cholecalciferol (VITAMIN D-3) 5000 UNITS TABS Take 1 Tab by mouth every day.       • omeprazole (PRILOSEC) 20 MG delayed-release capsule TAKE 1 CAP BY MOUTH EVERY DAY. 90 Cap 3   • glucose blood (FREESTYLE LITE) strip CHECK BLOOD SUGAR TWICE A DAY - DX E11.42 150 Strip 5   • montelukast (SINGULAIR) 10 MG Tab Take 1 Tab by mouth every day. 90 Tab 3   • tamsulosin (FLOMAX) 0.4 MG capsule Take 1 Cap by mouth ONE-HALF HOUR AFTER BREAKFAST. 90 Cap 3   • Blood Glucose Monitoring Suppl (FREESTYLE FREEDOM LITE) W/DEVICE Kit 1 Application by Does not apply route every day. Dx: E11.42 1 Kit 0   • Blood Glucose Monitoring Suppl SUPPLIES Misc Test strips order: Test strips for Abbott Freestyle Lite meter. Sig: use once daily. Dx: E11.42 100 Each 3   • Lancets Misc Lancets order: Lancets for Abbott Freestyle Lite meter. Sig: use once daily. Dx: E11.42 100 Each 3   • Blood Glucose Monitoring Suppl SUPPLIES Misc Accu Check Yoanna blood glucose test strips.  Checking blood sugars 2 times per day E11.40 200 Strip 3   • ibuprofen (MOTRIN) 200 MG Tab Take 200 mg by mouth every 6 hours as needed.     • nitroglycerin (NITROSTAT) 0.4 MG SUBL Place 1 Tab  "under tongue as needed for Chest Pain. 25 Tab 1     No facility-administered encounter medications on file as of 7/11/2018.      Review of Systems   Constitutional: Negative.  Negative for malaise/fatigue.   HENT: Negative for hearing loss.         Difficulty swallowing, intermittent   Eyes: Negative for blurred vision.   Respiratory: Negative for shortness of breath.    Cardiovascular: Negative for chest pain and palpitations.   Gastrointestinal: Negative for nausea and vomiting.   Genitourinary: Negative for hematuria.   Musculoskeletal: Positive for joint pain. Negative for myalgias.        Has bilateral ankle pain   Neurological: Negative for focal weakness, loss of consciousness and weakness.        History of CVA  Known right carotid occlusion   Psychiatric/Behavioral: Negative for depression.        Objective:   /80   Pulse 64   Ht 1.88 m (6' 2\")   Wt 114.3 kg (252 lb)   SpO2 94%   BMI 32.35 kg/m²     Physical Exam   Constitutional: He is oriented to person, place, and time. No distress.   HENT:   Head: Normocephalic and atraumatic.   Cardiovascular: Normal rate and regular rhythm.    No murmur heard.  Pulses:       Carotid pulses are on the left side with bruit.  Pulmonary/Chest: Breath sounds normal. No respiratory distress. He has no wheezes.   Abdominal: Soft. Bowel sounds are normal. He exhibits no distension.   Musculoskeletal: He exhibits no edema.   Neurological: He is alert and oriented to person, place, and time.   Skin: Skin is warm and dry.   Psychiatric: He has a normal mood and affect.       Assessment:     1. Coronary artery disease involving native coronary artery of native heart without angina pectoris     2. Stented coronary artery     3. Essential hypertension         Medical Decision Making:  Today's Assessment / Status / Plan:   Coronary artery disease: Status post stenting of circumflex.  He is asymptomatic.  Most recent stress test was in October 2015 that showed a small " apical scar no ischemia.  Will plan repeat stress testing in 6 months.    Hypertension: Under good control on current medical regimen.  Hyperlipidemia: LDL is at target.  Diabetes mellitus: Glycohemoglobin needs some work this was discussed with patient.  Unfortunately he is not very physically active due to his ankle injury.  Cerebrovascular disease: Patient is a bruit on the left.  Very recent ultrasound reveals a right carotid is occluded.  He has had previous endarterectomy of the left and there is no evidence of significant stenosis.  Return in 6 months.

## 2018-08-15 ENCOUNTER — PATIENT OUTREACH (OUTPATIENT)
Dept: HEALTH INFORMATION MANAGEMENT | Facility: OTHER | Age: 73
End: 2018-08-15

## 2018-08-15 NOTE — PROGRESS NOTES
Outcome: Left Message    Please transfer to Patient Outreach Team at [phone] when patient returns call.    WebIZ Checked & Epic Updated:  yes    [facility] Verified: yes    Attempt # 1

## 2018-08-18 ENCOUNTER — HOSPITAL ENCOUNTER (OUTPATIENT)
Dept: LAB | Facility: MEDICAL CENTER | Age: 73
End: 2018-08-18
Attending: FAMILY MEDICINE
Payer: MEDICARE

## 2018-08-18 DIAGNOSIS — E11.42 TYPE 2 DIABETES MELLITUS WITH PERIPHERAL NEUROPATHY (HCC): ICD-10-CM

## 2018-08-18 DIAGNOSIS — E55.9 VITAMIN D DEFICIENCY: ICD-10-CM

## 2018-08-18 LAB
25(OH)D3 SERPL-MCNC: 27 NG/ML (ref 30–100)
ALBUMIN SERPL BCP-MCNC: 3.8 G/DL (ref 3.2–4.9)
ALBUMIN/GLOB SERPL: 1.4 G/DL
ALP SERPL-CCNC: 121 U/L (ref 30–99)
ALT SERPL-CCNC: 35 U/L (ref 2–50)
ANION GAP SERPL CALC-SCNC: 7 MMOL/L (ref 0–11.9)
AST SERPL-CCNC: 32 U/L (ref 12–45)
BILIRUB SERPL-MCNC: 2 MG/DL (ref 0.1–1.5)
BUN SERPL-MCNC: 19 MG/DL (ref 8–22)
CALCIUM SERPL-MCNC: 8.9 MG/DL (ref 8.5–10.5)
CHLORIDE SERPL-SCNC: 107 MMOL/L (ref 96–112)
CHOLEST SERPL-MCNC: 139 MG/DL (ref 100–199)
CO2 SERPL-SCNC: 25 MMOL/L (ref 20–33)
CREAT SERPL-MCNC: 0.95 MG/DL (ref 0.5–1.4)
CREAT UR-MCNC: 96.4 MG/DL
EST. AVERAGE GLUCOSE BLD GHB EST-MCNC: 183 MG/DL
GLOBULIN SER CALC-MCNC: 2.8 G/DL (ref 1.9–3.5)
GLUCOSE SERPL-MCNC: 154 MG/DL (ref 65–99)
HBA1C MFR BLD: 8 % (ref 0–5.6)
HDLC SERPL-MCNC: 48 MG/DL
LDLC SERPL CALC-MCNC: 74 MG/DL
MICROALBUMIN UR-MCNC: 7 MG/DL
MICROALBUMIN/CREAT UR: 73 MG/G (ref 0–30)
POTASSIUM SERPL-SCNC: 4.6 MMOL/L (ref 3.6–5.5)
PROT SERPL-MCNC: 6.6 G/DL (ref 6–8.2)
SODIUM SERPL-SCNC: 139 MMOL/L (ref 135–145)
TRIGL SERPL-MCNC: 84 MG/DL (ref 0–149)

## 2018-08-18 PROCEDURE — 36415 COLL VENOUS BLD VENIPUNCTURE: CPT

## 2018-08-18 PROCEDURE — 82043 UR ALBUMIN QUANTITATIVE: CPT

## 2018-08-18 PROCEDURE — 83036 HEMOGLOBIN GLYCOSYLATED A1C: CPT

## 2018-08-18 PROCEDURE — 82306 VITAMIN D 25 HYDROXY: CPT

## 2018-08-18 PROCEDURE — 82570 ASSAY OF URINE CREATININE: CPT

## 2018-08-18 PROCEDURE — 80053 COMPREHEN METABOLIC PANEL: CPT

## 2018-08-18 PROCEDURE — 80061 LIPID PANEL: CPT

## 2018-08-21 ENCOUNTER — OFFICE VISIT (OUTPATIENT)
Dept: MEDICAL GROUP | Facility: MEDICAL CENTER | Age: 73
End: 2018-08-21
Payer: MEDICARE

## 2018-08-21 VITALS
OXYGEN SATURATION: 96 % | WEIGHT: 250.8 LBS | DIASTOLIC BLOOD PRESSURE: 76 MMHG | BODY MASS INDEX: 32.19 KG/M2 | HEART RATE: 65 BPM | SYSTOLIC BLOOD PRESSURE: 146 MMHG | HEIGHT: 74 IN | TEMPERATURE: 97.8 F

## 2018-08-21 DIAGNOSIS — I10 ESSENTIAL HYPERTENSION: ICD-10-CM

## 2018-08-21 DIAGNOSIS — E78.00 PURE HYPERCHOLESTEROLEMIA: ICD-10-CM

## 2018-08-21 DIAGNOSIS — E55.9 VITAMIN D DEFICIENCY: ICD-10-CM

## 2018-08-21 DIAGNOSIS — E11.42 TYPE 2 DIABETES MELLITUS WITH PERIPHERAL NEUROPATHY (HCC): ICD-10-CM

## 2018-08-21 PROCEDURE — 99214 OFFICE O/P EST MOD 30 MIN: CPT | Performed by: FAMILY MEDICINE

## 2018-08-21 NOTE — PROGRESS NOTES
Subjective:   Agapito Cabrera is a 73 y.o. male here today for Diabetes    HTN (hypertension)  Patient is tolerating Avapro 300 mg daily and carvedilol 12.5 mg twice daily.    Hyperlipidemia  Patient is tolerating atorvastatin 80 mg daily.    Type 2 diabetes mellitus with peripheral neuropathy  Patient is currently metformin 1000 mg daily, glimepiride 4 mg daily, Actos 45 mg daily.  Results for AGAPITO CABRERA (MRN [id number]) as of 8/21/2018 13:58   Ref. Range 5/14/2018 11:33 8/18/2018 09:54   Glycohemoglobin Latest Ref Range: 0.0 - 5.6 % 9.2 (H) 8.0 (H)       Vitamin D deficiency  Patient is currently taking vitamin D 5000 units daily.         Current medicines (including changes today)  Current Outpatient Prescriptions   Medication Sig Dispense Refill   • acetaminophen (TYLENOL) 325 MG Tab Take 650 mg by mouth every four hours as needed.     • Alum Hydroxide-Mag Carbonate (GAVISCON PO) Take  by mouth.     • omeprazole (PRILOSEC) 20 MG delayed-release capsule TAKE 1 CAP BY MOUTH EVERY DAY. 90 Cap 3   • glucose blood (FREESTYLE LITE) strip CHECK BLOOD SUGAR TWICE A DAY - DX E11.42 150 Strip 5   • metFORMIN (GLUCOPHAGE) 1000 MG tablet Take 1 Tab by mouth 2 times a day, with meals. (Patient taking differently: Take 1,000 mg by mouth every day.) 180 Tab 3   • pioglitazone (ACTOS) 45 MG Tab TAKE 1 TAB BY MOUTH EVERY DAY. 90 Tab 3   • irbesartan (AVAPRO) 300 MG Tab Take 1 Tab by mouth every day. 30 Tab 11   • glimepiride (AMARYL) 4 MG Tab Take 1 Tab by mouth every morning. 90 Tab 3   • carvedilol (COREG) 12.5 MG Tab Take 1 Tab by mouth 2 times a day, with meals. 180 Tab 3   • montelukast (SINGULAIR) 10 MG Tab Take 1 Tab by mouth every day. 90 Tab 3   • tamsulosin (FLOMAX) 0.4 MG capsule Take 1 Cap by mouth ONE-HALF HOUR AFTER BREAKFAST. 90 Cap 3   • atorvastatin (LIPITOR) 80 MG tablet TAKE ONE TABLET BY MOUTH DAILY IN THE EVENING 90 Tab 3   • Blood Glucose Monitoring Suppl (FREESTYLE FREEDOM LITE) W/DEVICE Kit 1  "Application by Does not apply route every day. Dx: E11.42 1 Kit 0   • Blood Glucose Monitoring Suppl SUPPLIES Misc Test strips order: Test strips for Abbott Freestyle Lite meter. Sig: use once daily. Dx: E11.42 100 Each 3   • Lancets Misc Lancets order: Lancets for Abbott Freestyle Lite meter. Sig: use once daily. Dx: E11.42 100 Each 3   • Blood Glucose Monitoring Suppl SUPPLIES Misc Accu Check Yoanna blood glucose test strips.  Checking blood sugars 2 times per day E11.40 200 Strip 3   • ibuprofen (MOTRIN) 200 MG Tab Take 200 mg by mouth every 6 hours as needed.     • aspirin 81 MG tablet Take 81 mg by mouth every day.     • nitroglycerin (NITROSTAT) 0.4 MG SUBL Place 1 Tab under tongue as needed for Chest Pain. 25 Tab 1   • Cholecalciferol (VITAMIN D-3) 5000 UNITS TABS Take 1 Tab by mouth every day.         No current facility-administered medications for this visit.      He  has a past medical history of Arthritis; CAD (coronary artery disease) (October 2013); Cataract; Diabetes; Hyperlipidemia; Hypertension; Pain; Stroke (HCC) (2010); and Syncope.    ROS   No chest pain, no shortness of breath       Objective:     Blood pressure 146/76, pulse 65, temperature 36.6 °C (97.8 °F), height 1.88 m (6' 2\"), weight 113.8 kg (250 lb 12.8 oz), SpO2 96 %. Body mass index is 32.2 kg/m².   Physical Exam:  Constitutional: Alert, no distress.  Skin: Warm, dry, good turgor, no rashes in visible areas.  Eye: Equal, round and reactive, conjunctiva clear, lids normal.  Psych: Alert and oriented x3, normal affect and mood.    Monofilament testing with a 10 gram force: sensation intact: decreased bilaterally  Visual Inspection: Feet without maceration, ulcers, fissures.  Pedal pulses: intact bilaterally        Assessment and Plan:   The following treatment plan was discussed    1. Type 2 diabetes mellitus with peripheral neuropathy (HCC)  Patient is doing better.  Continue current medications.  Follow-up with labs in 3 months.  - " Diabetic Monofilament Lower Extremity Exam  - COMP METABOLIC PANEL; Future  - HEMOGLOBIN A1C; Future  - MICROALBUMIN CREAT RATIO URINE; Future  - LIPID PROFILE; Future    2. Pure hypercholesterolemia  Controlled.  Continue atorvastatin.  Follow-up with labs in 3 months.    3. Essential hypertension  Controlled.  Continue current medications.    4. Vitamin D deficiency  Advised vitamin D 5000 units daily.  - VITAMIN D,25 HYDROXY; Future      Followup: Return in about 3 months (around 11/21/2018) for Diabetes.

## 2018-08-21 NOTE — ASSESSMENT & PLAN NOTE
Patient is currently metformin 1000 mg daily, glimepiride 4 mg daily, Actos 45 mg daily.  Results for NJLAKSHMI NADINE SERA (MRN [id number]) as of 8/21/2018 13:58   Ref. Range 5/14/2018 11:33 8/18/2018 09:54   Glycohemoglobin Latest Ref Range: 0.0 - 5.6 % 9.2 (H) 8.0 (H)

## 2018-08-31 NOTE — PROGRESS NOTES
1. Attempt #:final  2. WebIZ Checked & Epic Updated: Yes  3. HealthConnect Verified: yes  4. Verify PCP: yes    5. Communication Preference Obtained: yes    6. Diabetes Visit Scheduling  Scheduling Status:Scheduled/already scheduled       7. Care Gap Scheduling (Attempt to Schedule EACH Overdue Care Gap!)    Health Maintenance Due   Topic Date Due   • Annual Wellness Visit  1945   • IMM HEP B VACCINE (1 of 3 - Risk 3-dose series) 02/25/1966   • IMM DTaP/Tdap/Td Vaccine (1 - Tdap) 02/25/1966   • IMM ZOSTER VACCINES (1 of 2) 02/25/1997   • IMM PNEUMOCOCCAL 65+ (ADULT) LOW/MEDIUM RISK SERIES (1 of 2 - PCV13) 02/25/2012   • RETINAL SCREENING  01/25/2018   • IMM INFLUENZA (1) 09/01/2018        8. Patient was directed to Health and Wellness Website: no     - Patient has completed Retinal Eye Exam and HMR Letter(s) faxed to: Dr. Samaniego    9. Screened for Food [employer] Prescription? yes  10. [employer] Activation: already active  11. [employer] Ramón: no  12. Virtual Visits: no  13. Opt In to Text Messages: no

## 2018-09-15 DIAGNOSIS — E78.5 HYPERLIPIDEMIA: ICD-10-CM

## 2018-09-16 RX ORDER — ATORVASTATIN CALCIUM 80 MG/1
TABLET, FILM COATED ORAL
Qty: 90 TAB | Refills: 3 | Status: SHIPPED | OUTPATIENT
Start: 2018-09-16 | End: 2019-03-15 | Stop reason: SDUPTHER

## 2018-09-26 ENCOUNTER — OFFICE VISIT (OUTPATIENT)
Dept: MEDICAL GROUP | Facility: MEDICAL CENTER | Age: 73
End: 2018-09-26
Payer: MEDICARE

## 2018-09-26 VITALS
DIASTOLIC BLOOD PRESSURE: 70 MMHG | OXYGEN SATURATION: 91 % | HEIGHT: 74 IN | TEMPERATURE: 97.8 F | WEIGHT: 247 LBS | BODY MASS INDEX: 31.7 KG/M2 | HEART RATE: 67 BPM | SYSTOLIC BLOOD PRESSURE: 144 MMHG | RESPIRATION RATE: 16 BRPM

## 2018-09-26 DIAGNOSIS — N40.1 BENIGN NON-NODULAR PROSTATIC HYPERPLASIA WITH LOWER URINARY TRACT SYMPTOMS: ICD-10-CM

## 2018-09-26 DIAGNOSIS — J30.1 CHRONIC SEASONAL ALLERGIC RHINITIS DUE TO POLLEN: ICD-10-CM

## 2018-09-26 DIAGNOSIS — J30.1 SEASONAL ALLERGIC RHINITIS DUE TO POLLEN: ICD-10-CM

## 2018-09-26 DIAGNOSIS — Z23 NEED FOR VACCINATION: ICD-10-CM

## 2018-09-26 PROCEDURE — G0009 ADMIN PNEUMOCOCCAL VACCINE: HCPCS | Performed by: NURSE PRACTITIONER

## 2018-09-26 PROCEDURE — 90670 PCV13 VACCINE IM: CPT | Performed by: NURSE PRACTITIONER

## 2018-09-26 PROCEDURE — 90662 IIV NO PRSV INCREASED AG IM: CPT | Performed by: NURSE PRACTITIONER

## 2018-09-26 PROCEDURE — G0008 ADMIN INFLUENZA VIRUS VAC: HCPCS | Performed by: NURSE PRACTITIONER

## 2018-09-26 PROCEDURE — 99213 OFFICE O/P EST LOW 20 MIN: CPT | Mod: 25 | Performed by: NURSE PRACTITIONER

## 2018-09-26 RX ORDER — MONTELUKAST SODIUM 10 MG/1
10 TABLET ORAL DAILY
Qty: 90 TAB | Refills: 3 | Status: SHIPPED | OUTPATIENT
Start: 2018-09-26 | End: 2020-04-14

## 2018-09-26 RX ORDER — TAMSULOSIN HYDROCHLORIDE 0.4 MG/1
0.4 CAPSULE ORAL
Qty: 90 CAP | Refills: 3 | Status: SHIPPED | OUTPATIENT
Start: 2018-09-26 | End: 2019-09-30 | Stop reason: SDUPTHER

## 2018-09-26 NOTE — ASSESSMENT & PLAN NOTE
Patient developed nasal congestion, rhinitis, postnasal drainage about 4 days ago.  He has a slight cough and throat irritation.  He has had seasonal allergies in the past with fall typically being a problem for him.  He has not been taking an allergy medication although he does have Allegra at home  Denies pain in the face or teeth, headache, fatigue, shortness of breath, fevers

## 2018-09-26 NOTE — PROGRESS NOTES
Subjective:     Chief Complaint   Patient presents with   • Other     ALLERGIES     Agapito Stone is a 73 y.o. male here today to follow up on:    Seasonal allergies  Patient developed nasal congestion, rhinitis, postnasal drainage about 4 days ago.  He has a slight cough and throat irritation.  He has had seasonal allergies in the past with fall typically being a problem for him.  He has not been taking an allergy medication although he does have Allegra at home  Denies pain in the face or teeth, headache, fatigue, shortness of breath, fevers     He would like influenza vaccine today  He would also like pneumococcal vaccination    Current medicines (including changes today)  Current Outpatient Prescriptions   Medication Sig Dispense Refill   • montelukast (SINGULAIR) 10 MG Tab TAKE 1 TAB BY MOUTH EVERY DAY. 90 Tab 3   • tamsulosin (FLOMAX) 0.4 MG capsule TAKE 1 CAP BY MOUTH ONE-HALF HOUR AFTER BREAKFAST. 90 Cap 3   • atorvastatin (LIPITOR) 80 MG tablet TAKE ONE TABLET BY MOUTH DAILY IN THE EVENING 90 Tab 3   • Alum Hydroxide-Mag Carbonate (GAVISCON PO) Take  by mouth.     • glucose blood (FREESTYLE LITE) strip CHECK BLOOD SUGAR TWICE A DAY - DX E11.42 150 Strip 5   • metFORMIN (GLUCOPHAGE) 1000 MG tablet Take 1 Tab by mouth 2 times a day, with meals. (Patient taking differently: Take 1,000 mg by mouth every day.) 180 Tab 3   • pioglitazone (ACTOS) 45 MG Tab TAKE 1 TAB BY MOUTH EVERY DAY. 90 Tab 3   • irbesartan (AVAPRO) 300 MG Tab Take 1 Tab by mouth every day. 30 Tab 11   • glimepiride (AMARYL) 4 MG Tab Take 1 Tab by mouth every morning. 90 Tab 3   • carvedilol (COREG) 12.5 MG Tab Take 1 Tab by mouth 2 times a day, with meals. 180 Tab 3   • Blood Glucose Monitoring Suppl (FREESTYLE FREEDOM LITE) W/DEVICE Kit 1 Application by Does not apply route every day. Dx: E11.42 1 Kit 0   • Blood Glucose Monitoring Suppl SUPPLIES Misc Test strips order: Test strips for Abbott Freestyle Lite meter. Sig: use once daily.  "Dx: E11.42 100 Each 3   • Lancets Misc Lancets order: Lancets for Abbott Freestyle Lite meter. Sig: use once daily. Dx: E11.42 100 Each 3   • Blood Glucose Monitoring Suppl SUPPLIES Misc Accu Check Yoanna blood glucose test strips.  Checking blood sugars 2 times per day E11.40 200 Strip 3   • aspirin 81 MG tablet Take 81 mg by mouth every day.     • Cholecalciferol (VITAMIN D-3) 5000 UNITS TABS Take 1 Tab by mouth every day.       • acetaminophen (TYLENOL) 325 MG Tab Take 650 mg by mouth every four hours as needed.     • omeprazole (PRILOSEC) 20 MG delayed-release capsule TAKE 1 CAP BY MOUTH EVERY DAY. 90 Cap 3   • ibuprofen (MOTRIN) 200 MG Tab Take 200 mg by mouth every 6 hours as needed.     • nitroglycerin (NITROSTAT) 0.4 MG SUBL Place 1 Tab under tongue as needed for Chest Pain. 25 Tab 1     No current facility-administered medications for this visit.      He  has a past medical history of Arthritis; CAD (coronary artery disease) (October 2013); Cataract; Diabetes; Hyperlipidemia; Hypertension; Pain; Stroke (HCC) (2010); and Syncope.    ROS included above     Objective:     Blood pressure 144/70, pulse 67, temperature 36.6 °C (97.8 °F), temperature source Temporal, resp. rate 16, height 1.88 m (6' 2\"), weight 112 kg (247 lb), SpO2 91 %. Body mass index is 31.71 kg/m².     Physical Exam:  General: Alert, oriented in no acute distress.  Eye contact is good, speech is normal, affect calm  HEENT: Oral mucosa pink moist, no lesions.  Nares with clear mucus, boggy turbinates.  No pain over maxillary or frontal sinuses.  TMs gray with good landmarks bilaterally. No lymphadenopathy.  Lungs: clear to auscultation bilaterally, normal effort, no wheeze/ rhonchi/ rales.  CV: regular rate and rhythm, S1, S2  Ext: no edema, color normal, vascularity normal, temperature normal    Assessment and Plan:   The following treatment plan was discussed   1. Seasonal allergic rhinitis due to pollen   patient will start Allegra which he " has at home.  Discussed that he may add Flonase if symptoms remain uncontrolled.  Follow-up for further concerns   2. Need for vaccination  I have placed the below orders and discussed them with an approved delegating provider. The MA is performing the below orders under the direction of Dr. Zamorano  Influenza Vaccine, High Dose (65+ Only)    Prevnar 13 PCV-13       Followup: As needed         Please note that this dictation was created using voice recognition software. I have worked with consultants from the vendor as well as technical experts from [facility] to optimize the interface. I have made every reasonable attempt to correct obvious errors, but I expect that there are errors of grammar and possibly content that I did not discover before finalizing the note.

## 2018-10-14 DIAGNOSIS — I10 ESSENTIAL HYPERTENSION: ICD-10-CM

## 2018-10-15 RX ORDER — CARVEDILOL 12.5 MG/1
TABLET ORAL
Qty: 180 TAB | Refills: 3 | Status: SHIPPED | OUTPATIENT
Start: 2018-10-15 | End: 2019-10-21 | Stop reason: SDUPTHER

## 2018-10-30 ENCOUNTER — TELEPHONE (OUTPATIENT)
Dept: MEDICAL GROUP | Facility: MEDICAL CENTER | Age: 73
End: 2018-10-30

## 2018-10-30 DIAGNOSIS — R06.2 WHEEZING: ICD-10-CM

## 2018-10-30 NOTE — TELEPHONE ENCOUNTER
Patient saw [facility]. He was diagnosed with bronchitis and was prescribed a Z-lamont. Patient is requesting an order for an x-ray. He will schedule an appointment to follow up with you.

## 2018-11-02 ENCOUNTER — HOSPITAL ENCOUNTER (OUTPATIENT)
Dept: RADIOLOGY | Facility: MEDICAL CENTER | Age: 73
End: 2018-11-02
Attending: FAMILY MEDICINE
Payer: MEDICARE

## 2018-11-02 DIAGNOSIS — R06.2 WHEEZING: ICD-10-CM

## 2018-11-02 PROCEDURE — 71046 X-RAY EXAM CHEST 2 VIEWS: CPT

## 2018-11-04 DIAGNOSIS — E11.42 TYPE 2 DIABETES MELLITUS WITH PERIPHERAL NEUROPATHY (HCC): ICD-10-CM

## 2018-11-05 ENCOUNTER — TELEPHONE (OUTPATIENT)
Dept: MEDICAL GROUP | Facility: MEDICAL CENTER | Age: 73
End: 2018-11-05

## 2018-11-05 RX ORDER — GLIMEPIRIDE 4 MG/1
4 TABLET ORAL EVERY MORNING
Qty: 90 TAB | Refills: 3 | Status: SHIPPED | OUTPATIENT
Start: 2018-11-05 | End: 2019-09-30

## 2018-11-05 NOTE — TELEPHONE ENCOUNTER
----- Message from Brody Zamorano M.D. sent at 11/5/2018  6:33 AM PST -----  Please notify patient that his chest x-ray shows no concerns for pneumonia or cancer.  Brody Zamorano M.D.

## 2018-11-13 ENCOUNTER — TELEPHONE (OUTPATIENT)
Dept: MEDICAL GROUP | Facility: MEDICAL CENTER | Age: 73
End: 2018-11-13

## 2018-11-13 NOTE — TELEPHONE ENCOUNTER
ESTABLISHED PATIENT PRE-VISIT PLANNING     Patient was contacted to complete PVP.     Note: Patient will not be contacted if there is no indication to call.     1.  Reviewed notes from the last few office visits within the medical group: Yes    2.  If any orders were placed at last visit or intended to be done for this visit (i.e. 6 mos follow-up), do we have Results/Consult Notes?        •  Labs - Labs ordered, NOT completed. Patient advised to complete prior to next appointment.   Note: If patient appointment is for lab review and patient did not complete labs, check with provider if OK to reschedule patient until labs completed.       •  Imaging - Imaging was not ordered at last office visit.       •  Referrals - No referrals were ordered at last office visit.    3. Is this appointment scheduled as a Hospital Follow-Up? No    4.  Immunizations were updated in [facility] using WebIZ?: Yes       •  Web Iz Recommendations: HEPATITIS B, TDAP and ZOSTAVAX (Shingles)    5.  Patient is due for the following Health Maintenance Topics:   Health Maintenance Due   Topic Date Due   • Annual Wellness Visit  1945   • IMM HEP B VACCINE (1 of 3 - Risk 3-dose series) 02/25/1966   • IMM DTaP/Tdap/Td Vaccine (1 - Tdap) 02/25/1966   • IMM ZOSTER VACCINES (1 of 2) 02/25/1997   • RETINAL SCREENING  01/25/2018       6.  MDX printed for Provider? NO    7.  Patient was informed to arrive 15 min prior to their scheduled appointment and bring in their medication bottles.

## 2018-11-14 ENCOUNTER — OFFICE VISIT (OUTPATIENT)
Dept: MEDICAL GROUP | Facility: MEDICAL CENTER | Age: 73
End: 2018-11-14
Payer: MEDICARE

## 2018-11-14 VITALS
DIASTOLIC BLOOD PRESSURE: 78 MMHG | BODY MASS INDEX: 31.7 KG/M2 | HEART RATE: 66 BPM | OXYGEN SATURATION: 93 % | HEIGHT: 74 IN | WEIGHT: 247 LBS | SYSTOLIC BLOOD PRESSURE: 132 MMHG | TEMPERATURE: 98.6 F

## 2018-11-14 DIAGNOSIS — I10 ESSENTIAL HYPERTENSION: ICD-10-CM

## 2018-11-14 DIAGNOSIS — N40.1 BENIGN NON-NODULAR PROSTATIC HYPERPLASIA WITH LOWER URINARY TRACT SYMPTOMS: ICD-10-CM

## 2018-11-14 DIAGNOSIS — J30.2 SEASONAL ALLERGIES: ICD-10-CM

## 2018-11-14 PROCEDURE — 99214 OFFICE O/P EST MOD 30 MIN: CPT | Performed by: FAMILY MEDICINE

## 2018-11-14 NOTE — ASSESSMENT & PLAN NOTE
Patient has not been taking tamsulosin 0.4 mg daily.  He states that his urinary stream is weak and he has a lot of post void dribbling.

## 2018-11-14 NOTE — ASSESSMENT & PLAN NOTE
Patient has had a long history of allergies particularly in the fall.  When he was a kid he was receiving allergy shots 3 times a week.  Every fall he has significant nasal drainage and postnasal drip.  He is having recurrent nasal drainage and cough.  Patient used to take Allegra daily which helped, but he states that if he takes every day he gets too dried out so he is taking it every other day.

## 2018-11-14 NOTE — ASSESSMENT & PLAN NOTE
Patient is taking irbesartan 300 mg daily and carvedilol 12.5 mg only at night instead of twice daily.  Wife complains that his blood pressure sometimes 160s first thing in the morning.

## 2018-11-14 NOTE — PROGRESS NOTES
Subjective:   Agapito Stone is a 73 y.o. male here today for seasonal allergies    HTN (hypertension)  Patient is taking irbesartan 300 mg daily and carvedilol 12.5 mg only at night instead of twice daily.  Wife complains that his blood pressure sometimes 160s first thing in the morning.     Seasonal allergies  Patient has had a long history of allergies particularly in the fall.  When he was a kid he was receiving allergy shots 3 times a week.  Every fall he has significant nasal drainage and postnasal drip.  He is having recurrent nasal drainage and cough.  Patient used to take Allegra daily which helped, but he states that if he takes every day he gets too dried out so he is taking it every other day.    Benign non-nodular prostatic hyperplasia with lower urinary tract symptoms  Patient has not been taking tamsulosin 0.4 mg daily.  He states that his urinary stream is weak and he has a lot of post void dribbling.         Current medicines (including changes today)  Current Outpatient Prescriptions   Medication Sig Dispense Refill   • glimepiride (AMARYL) 4 MG Tab TAKE 1 TAB BY MOUTH EVERY MORNING. 90 Tab 3   • carvedilol (COREG) 12.5 MG Tab TAKE 1 TABLET BY MOUTH TWICE A DAY WITH MEALS 180 Tab 3   • montelukast (SINGULAIR) 10 MG Tab TAKE 1 TAB BY MOUTH EVERY DAY. 90 Tab 3   • tamsulosin (FLOMAX) 0.4 MG capsule TAKE 1 CAP BY MOUTH ONE-HALF HOUR AFTER BREAKFAST. 90 Cap 3   • atorvastatin (LIPITOR) 80 MG tablet TAKE ONE TABLET BY MOUTH DAILY IN THE EVENING 90 Tab 3   • acetaminophen (TYLENOL) 325 MG Tab Take 650 mg by mouth every four hours as needed.     • Alum Hydroxide-Mag Carbonate (GAVISCON PO) Take  by mouth.     • glucose blood (FREESTYLE LITE) strip CHECK BLOOD SUGAR TWICE A DAY - DX E11.42 150 Strip 5   • metFORMIN (GLUCOPHAGE) 1000 MG tablet Take 1 Tab by mouth 2 times a day, with meals. (Patient taking differently: Take 1,000 mg by mouth every day.) 180 Tab 3   • pioglitazone (ACTOS) 45 MG Tab TAKE  "1 TAB BY MOUTH EVERY DAY. 90 Tab 3   • irbesartan (AVAPRO) 300 MG Tab Take 1 Tab by mouth every day. 30 Tab 11   • Blood Glucose Monitoring Suppl (FREESTYLE FREEDOM LITE) W/DEVICE Kit 1 Application by Does not apply route every day. Dx: E11.42 1 Kit 0   • Blood Glucose Monitoring Suppl SUPPLIES Misc Test strips order: Test strips for Abbott Freestyle Lite meter. Sig: use once daily. Dx: E11.42 100 Each 3   • Lancets Misc Lancets order: Lancets for Abbott Freestyle Lite meter. Sig: use once daily. Dx: E11.42 100 Each 3   • Blood Glucose Monitoring Suppl SUPPLIES Misc Accu Check Yoanna blood glucose test strips.  Checking blood sugars 2 times per day E11.40 200 Strip 3   • ibuprofen (MOTRIN) 200 MG Tab Take 200 mg by mouth every 6 hours as needed.     • aspirin 81 MG tablet Take 81 mg by mouth every day.     • nitroglycerin (NITROSTAT) 0.4 MG SUBL Place 1 Tab under tongue as needed for Chest Pain. 25 Tab 1   • Cholecalciferol (VITAMIN D-3) 5000 UNITS TABS Take 1 Tab by mouth every day.         No current facility-administered medications for this visit.      He  has a past medical history of Arthritis; CAD (coronary artery disease) (October 2013); Cataract; Diabetes; Hyperlipidemia; Hypertension; Pain; Stroke (HCC) (2010); and Syncope.    ROS   No chest pain, no shortness of breath       Objective:     Blood pressure 132/78, pulse 66, temperature 37 °C (98.6 °F), height 1.88 m (6' 2\"), weight 112 kg (247 lb), SpO2 93 %. Body mass index is 31.71 kg/m².   Physical Exam:  Constitutional: Alert, no distress.  Skin: Warm, dry, good turgor, no rashes in visible areas.  Eye: Equal, round and reactive, conjunctiva clear, lids normal.  ENMT: Lips without lesions, good dentition, oropharynx clear.  Positive thin nasal drainage bilaterally, deviated septum to the left.  Neck: Trachea midline, no masses, no thyromegaly. No cervical or supraclavicular lymphadenopathy  Respiratory: Unlabored respiratory effort, lungs clear to " auscultation, no wheezes, no ronchi.  Psych: Alert and oriented x3, normal affect and mood.        Assessment and Plan:   The following treatment plan was discussed    1. Seasonal allergies  Advised patient to use montelukast 10 mg daily for the next 2 weeks.  Follow-up in 2 weeks as scheduled and we will consider Flonase if needed.    2. Essential hypertension  Uncontrolled.  Patient will do a trial of carvedilol 12.5 mg twice daily.  If no improvement we will consider sleep apnea testing.    3. Benign non-nodular prostatic hyperplasia with lower urinary tract symptoms  Uncontrolled.  Advised patient do a trial of tamsulosin.      Followup: Return in about 2 weeks (around 11/28/2018), or if symptoms worsen or fail to improve, for Diabetes.

## 2018-12-03 ENCOUNTER — HOSPITAL ENCOUNTER (OUTPATIENT)
Dept: LAB | Facility: MEDICAL CENTER | Age: 73
End: 2018-12-03
Attending: FAMILY MEDICINE
Payer: MEDICARE

## 2018-12-03 DIAGNOSIS — E55.9 VITAMIN D DEFICIENCY: ICD-10-CM

## 2018-12-03 DIAGNOSIS — E11.42 TYPE 2 DIABETES MELLITUS WITH PERIPHERAL NEUROPATHY (HCC): ICD-10-CM

## 2018-12-03 LAB
25(OH)D3 SERPL-MCNC: 24 NG/ML (ref 30–100)
CREAT UR-MCNC: 139.9 MG/DL
MICROALBUMIN UR-MCNC: 13 MG/DL
MICROALBUMIN/CREAT UR: 93 MG/G (ref 0–30)

## 2018-12-03 PROCEDURE — 82043 UR ALBUMIN QUANTITATIVE: CPT

## 2018-12-03 PROCEDURE — 82306 VITAMIN D 25 HYDROXY: CPT

## 2018-12-03 PROCEDURE — 83036 HEMOGLOBIN GLYCOSYLATED A1C: CPT

## 2018-12-03 PROCEDURE — 36415 COLL VENOUS BLD VENIPUNCTURE: CPT

## 2018-12-03 PROCEDURE — 80053 COMPREHEN METABOLIC PANEL: CPT

## 2018-12-03 PROCEDURE — 80061 LIPID PANEL: CPT

## 2018-12-03 PROCEDURE — 82570 ASSAY OF URINE CREATININE: CPT

## 2018-12-04 LAB
ALBUMIN SERPL BCP-MCNC: 3.8 G/DL (ref 3.2–4.9)
ALBUMIN/GLOB SERPL: 1.4 G/DL
ALP SERPL-CCNC: 135 U/L (ref 30–99)
ALT SERPL-CCNC: 33 U/L (ref 2–50)
ANION GAP SERPL CALC-SCNC: 5 MMOL/L (ref 0–11.9)
AST SERPL-CCNC: 23 U/L (ref 12–45)
BILIRUB SERPL-MCNC: 1.6 MG/DL (ref 0.1–1.5)
BUN SERPL-MCNC: 15 MG/DL (ref 8–22)
CALCIUM SERPL-MCNC: 8.8 MG/DL (ref 8.5–10.5)
CHLORIDE SERPL-SCNC: 106 MMOL/L (ref 96–112)
CHOLEST SERPL-MCNC: 142 MG/DL (ref 100–199)
CO2 SERPL-SCNC: 29 MMOL/L (ref 20–33)
CREAT SERPL-MCNC: 0.92 MG/DL (ref 0.5–1.4)
EST. AVERAGE GLUCOSE BLD GHB EST-MCNC: 212 MG/DL
FASTING STATUS PATIENT QL REPORTED: NORMAL
GLOBULIN SER CALC-MCNC: 2.8 G/DL (ref 1.9–3.5)
GLUCOSE SERPL-MCNC: 231 MG/DL (ref 65–99)
HBA1C MFR BLD: 9 % (ref 0–5.6)
HDLC SERPL-MCNC: 47 MG/DL
LDLC SERPL CALC-MCNC: 73 MG/DL
POTASSIUM SERPL-SCNC: 4.3 MMOL/L (ref 3.6–5.5)
PROT SERPL-MCNC: 6.6 G/DL (ref 6–8.2)
SODIUM SERPL-SCNC: 140 MMOL/L (ref 135–145)
TRIGL SERPL-MCNC: 110 MG/DL (ref 0–149)

## 2018-12-18 ENCOUNTER — TELEPHONE (OUTPATIENT)
Dept: MEDICAL GROUP | Facility: MEDICAL CENTER | Age: 73
End: 2018-12-18

## 2018-12-18 NOTE — TELEPHONE ENCOUNTER
Left message for patient to call back regarding pre-visit planning. Please transfer call to [phone].

## 2018-12-18 NOTE — TELEPHONE ENCOUNTER
ESTABLISHED PATIENT PRE-VISIT PLANNING     Patient was NOT contacted to complete PVP.     Note: Patient will not be contacted if there is no indication to call.     1.  Reviewed notes from the last few office visits within the medical group: Yes    2.  If any orders were placed at last visit or intended to be done for this visit (i.e. 6 mos follow-up), do we have Results/Consult Notes?        •  Labs - Labs were not ordered at last office visit.   Note: If patient appointment is for lab review and patient did not complete labs, check with provider if OK to reschedule patient until labs completed.       •  Imaging - Imaging was not ordered at last office visit.       •  Referrals - No referrals were ordered at last office visit.    3. Is this appointment scheduled as a Hospital Follow-Up? No    4.  Immunizations were updated in Epic using WebIZ?: Epic matches WebIZ       •  Web Iz Recommendations: TDAP and SHINGRIX (Shingles)    5.  Patient is due for the following Health Maintenance Topics:   Health Maintenance Due   Topic Date Due   • Annual Wellness Visit  1945   • IMM ZOSTER VACCINES (1 of 2) 02/25/1997   • RETINAL SCREENING  01/25/2018     6.  MDX printed for Provider? NO    7.  Patient was NOT informed to arrive 15 min prior to their scheduled appointment and bring in their medication bottles.

## 2018-12-19 ENCOUNTER — OFFICE VISIT (OUTPATIENT)
Dept: MEDICAL GROUP | Facility: MEDICAL CENTER | Age: 73
End: 2018-12-19
Payer: MEDICARE

## 2018-12-19 VITALS
WEIGHT: 248 LBS | OXYGEN SATURATION: 95 % | HEART RATE: 60 BPM | BODY MASS INDEX: 31.83 KG/M2 | HEIGHT: 74 IN | DIASTOLIC BLOOD PRESSURE: 82 MMHG | TEMPERATURE: 98.4 F | SYSTOLIC BLOOD PRESSURE: 142 MMHG

## 2018-12-19 DIAGNOSIS — E78.00 PURE HYPERCHOLESTEROLEMIA: ICD-10-CM

## 2018-12-19 DIAGNOSIS — E55.9 VITAMIN D DEFICIENCY: ICD-10-CM

## 2018-12-19 DIAGNOSIS — E11.42 TYPE 2 DIABETES MELLITUS WITH PERIPHERAL NEUROPATHY (HCC): ICD-10-CM

## 2018-12-19 PROCEDURE — 99214 OFFICE O/P EST MOD 30 MIN: CPT | Performed by: FAMILY MEDICINE

## 2018-12-19 NOTE — ASSESSMENT & PLAN NOTE
Taking metformin 1000 mg twice daily, glimepiride 4 mg daily, Actos 45 mg daily. A1c went from 8.0 to 9.0.  Not always following a diabetic diet. Not exercising right now.

## 2018-12-19 NOTE — PROGRESS NOTES
Subjective:   Agapito Stone is a 73 y.o. male here today for diabetes    Type 2 diabetes mellitus with peripheral neuropathy  Taking metformin 1000 mg twice daily, glimepiride 4 mg daily, Actos 45 mg daily. A1c went from 8.0 to 9.0.  Not always following a diabetic diet. Not exercising right now.    Hyperlipidemia  Tolerating atorvastatin 80 mg daily.    Vitamin D deficiency  Takes vitamin D 5000 units but does not take with food.         Current medicines (including changes today)  Current Outpatient Prescriptions   Medication Sig Dispense Refill   • glimepiride (AMARYL) 4 MG Tab TAKE 1 TAB BY MOUTH EVERY MORNING. 90 Tab 3   • carvedilol (COREG) 12.5 MG Tab TAKE 1 TABLET BY MOUTH TWICE A DAY WITH MEALS 180 Tab 3   • montelukast (SINGULAIR) 10 MG Tab TAKE 1 TAB BY MOUTH EVERY DAY. 90 Tab 3   • tamsulosin (FLOMAX) 0.4 MG capsule TAKE 1 CAP BY MOUTH ONE-HALF HOUR AFTER BREAKFAST. 90 Cap 3   • atorvastatin (LIPITOR) 80 MG tablet TAKE ONE TABLET BY MOUTH DAILY IN THE EVENING 90 Tab 3   • acetaminophen (TYLENOL) 325 MG Tab Take 650 mg by mouth every four hours as needed.     • Alum Hydroxide-Mag Carbonate (GAVISCON PO) Take  by mouth.     • glucose blood (FREESTYLE LITE) strip CHECK BLOOD SUGAR TWICE A DAY - DX E11.42 150 Strip 5   • metFORMIN (GLUCOPHAGE) 1000 MG tablet Take 1 Tab by mouth 2 times a day, with meals. (Patient taking differently: Take 1,000 mg by mouth every day.) 180 Tab 3   • pioglitazone (ACTOS) 45 MG Tab TAKE 1 TAB BY MOUTH EVERY DAY. 90 Tab 3   • irbesartan (AVAPRO) 300 MG Tab Take 1 Tab by mouth every day. 30 Tab 11   • Blood Glucose Monitoring Suppl (FREESTYLE FREEDOM LITE) W/DEVICE Kit 1 Application by Does not apply route every day. Dx: E11.42 1 Kit 0   • Blood Glucose Monitoring Suppl SUPPLIES Misc Test strips order: Test strips for Abbott Freestyle Lite meter. Sig: use once daily. Dx: E11.42 100 Each 3   • Lancets Misc Lancets order: Lancets for Abbott Freestyle Lite meter. Sig: use once  "daily. Dx: E11.42 100 Each 3   • Blood Glucose Monitoring Suppl SUPPLIES Misc Accu Check Yoanna blood glucose test strips.  Checking blood sugars 2 times per day E11.40 200 Strip 3   • ibuprofen (MOTRIN) 200 MG Tab Take 200 mg by mouth every 6 hours as needed.     • aspirin 81 MG tablet Take 81 mg by mouth every day.     • nitroglycerin (NITROSTAT) 0.4 MG SUBL Place 1 Tab under tongue as needed for Chest Pain. 25 Tab 1   • Cholecalciferol (VITAMIN D-3) 5000 UNITS TABS Take 1 Tab by mouth every day.         No current facility-administered medications for this visit.      He  has a past medical history of Arthritis; CAD (coronary artery disease) (October 2013); Cataract; Diabetes; Hyperlipidemia; Hypertension; Pain; Stroke (HCC) (2010); and Syncope.    ROS   No chest pain, no shortness of breath       Objective:     Blood pressure 142/82, pulse 60, temperature 36.9 °C (98.4 °F), height 1.88 m (6' 2\"), weight 112.5 kg (248 lb), SpO2 95 %. Body mass index is 31.84 kg/m².   Physical Exam:  Constitutional: Alert, no distress.  Skin: Warm, dry, good turgor, no rashes in visible areas.  Eye: Equal, round and reactive, conjunctiva clear, lids normal.  Psych: Alert and oriented x3, normal affect and mood.        Assessment and Plan:   The following treatment plan was discussed    1. Type 2 diabetes mellitus with peripheral neuropathy (HCC)  Elevation and uncontrolled.  Continue current medication.  Advised dietary adjustment and regular exercise.  Follow-up in 3 months with labs.  - CBC WITH DIFFERENTIAL; Future  - COMP METABOLIC PANEL; Future  - HEMOGLOBIN A1C; Future  - MICROALBUMIN CREAT RATIO URINE; Future  - Lipid Profile; Future  - TSH WITH REFLEX TO FT4; Future  - VITAMIN B12; Future    2. Pure hypercholesterolemia  Controlled.  Continue atorvastatin.  - COMP METABOLIC PANEL; Future  - Lipid Profile; Future    3. Vitamin D deficiency  Uncontrolled.  Advised patient to take vitamin D 5000 units with food.  Follow-up " with labs in 3 months.  - VITAMIN D,25 HYDROXY; Future      Followup: Return in about 3 months (around 3/19/2019) for Diabetes.

## 2019-01-16 ENCOUNTER — PATIENT OUTREACH (OUTPATIENT)
Dept: HEALTH INFORMATION MANAGEMENT | Facility: OTHER | Age: 74
End: 2019-01-16

## 2019-01-16 NOTE — PROGRESS NOTES
Spoke with wife and scheduled appointment for her . She stated to add the zoster vaccine to the appointment.     Web Iz  Tdap   Zoster Rolan (Shingrix)

## 2019-01-17 ENCOUNTER — OFFICE VISIT (OUTPATIENT)
Dept: CARDIOLOGY | Facility: MEDICAL CENTER | Age: 74
End: 2019-01-17
Payer: MEDICARE

## 2019-01-17 VITALS
HEART RATE: 76 BPM | HEIGHT: 74 IN | WEIGHT: 248 LBS | OXYGEN SATURATION: 93 % | BODY MASS INDEX: 31.83 KG/M2 | DIASTOLIC BLOOD PRESSURE: 74 MMHG | SYSTOLIC BLOOD PRESSURE: 130 MMHG

## 2019-01-17 DIAGNOSIS — I10 ESSENTIAL HYPERTENSION: ICD-10-CM

## 2019-01-17 DIAGNOSIS — I25.10 CORONARY ARTERY DISEASE INVOLVING NATIVE CORONARY ARTERY OF NATIVE HEART WITHOUT ANGINA PECTORIS: ICD-10-CM

## 2019-01-17 DIAGNOSIS — Z95.5 STENTED CORONARY ARTERY: ICD-10-CM

## 2019-01-17 PROCEDURE — 99214 OFFICE O/P EST MOD 30 MIN: CPT | Performed by: INTERNAL MEDICINE

## 2019-01-17 RX ORDER — NITROGLYCERIN 0.4 MG/1
0.4 TABLET SUBLINGUAL PRN
Qty: 25 TAB | Refills: 1 | Status: ON HOLD | OUTPATIENT
Start: 2019-01-17 | End: 2021-01-01

## 2019-01-17 ASSESSMENT — ENCOUNTER SYMPTOMS
CONSTITUTIONAL NEGATIVE: 1
NAUSEA: 0
BLURRED VISION: 0
DEPRESSION: 0
VOMITING: 0
MYALGIAS: 0
WEAKNESS: 0
LOSS OF CONSCIOUSNESS: 0
FOCAL WEAKNESS: 0
SHORTNESS OF BREATH: 0
PALPITATIONS: 0

## 2019-01-17 NOTE — PROGRESS NOTES
Chief Complaint   Patient presents with   • Coronary Artery Disease       Subjective:   Agapito Stone is a 73 y.o. male who presents today for follow-up of prior CT of his circumflex in October, 2013, hypertension, hyperlipidemia.  He did very physically inactive due to right ankle pain.  No angina.  Lab from early December reviewed with him.  LDL is at target.  Glucose is high.    Past Medical History:   Diagnosis Date   • Arthritis     hands, wrists, ankles   • CAD (coronary artery disease) October 2013    Dr. Ovalle; ACS. PCI/LETA x 2 of the mid circumflex (Xience 3.25 x 23mm) and proximal circumflex (Xience 3.6x 12mm)   • Cataract    • Diabetes    • Hyperlipidemia    • Hypertension    • Pain     ankles   • Stroke (HCC) 2010    no residual effects   • Syncope      Past Surgical History:   Procedure Laterality Date   • STENT PLACEMENT  2013    cardiac   • CAROTID ENDARTERECTOMY  7/13/2010    left; Performed by CHIQUITA CORRIGAN at SURGERY [facility] ORS   • TONSILLECTOMY  as a child     Family History   Problem Relation Age of Onset   • Other Mother         Rheumatic fever     Social History     Social History   • Marital status:      Spouse name: N/A   • Number of children: N/A   • Years of education: N/A     Occupational History   • Not on file.     Social History Main Topics   • Smoking status: Never Smoker   • Smokeless tobacco: Never Used   • Alcohol use 0.0 oz/week      Comment: once a year   • Drug use: No   • Sexual activity: Not on file     Other Topics Concern   • Not on file     Social History Narrative   • No narrative on file     Allergies   Allergen Reactions   • Fish Hives     shellfish   • Pcn [Penicillins] Hives   • Amoxicillin Hives   • Food Swelling      Eggs,Melons, avocados-puffy mouth   • Horse Allergy      Outpatient Encounter Prescriptions as of 1/17/2019   Medication Sig Dispense Refill   • nitroglycerin (NITROSTAT) 0.4 MG SL Tab Place 1 Tab under tongue as needed for Chest  Pain. 25 Tab 1   • glimepiride (AMARYL) 4 MG Tab TAKE 1 TAB BY MOUTH EVERY MORNING. 90 Tab 3   • carvedilol (COREG) 12.5 MG Tab TAKE 1 TABLET BY MOUTH TWICE A DAY WITH MEALS 180 Tab 3   • montelukast (SINGULAIR) 10 MG Tab TAKE 1 TAB BY MOUTH EVERY DAY. 90 Tab 3   • tamsulosin (FLOMAX) 0.4 MG capsule TAKE 1 CAP BY MOUTH ONE-HALF HOUR AFTER BREAKFAST. 90 Cap 3   • atorvastatin (LIPITOR) 80 MG tablet TAKE ONE TABLET BY MOUTH DAILY IN THE EVENING 90 Tab 3   • Alum Hydroxide-Mag Carbonate (GAVISCON PO) Take  by mouth.     • glucose blood (FREESTYLE LITE) strip CHECK BLOOD SUGAR TWICE A DAY - DX E11.42 150 Strip 5   • metFORMIN (GLUCOPHAGE) 1000 MG tablet Take 1 Tab by mouth 2 times a day, with meals. (Patient taking differently: Take 1,000 mg by mouth every day.) 180 Tab 3   • pioglitazone (ACTOS) 45 MG Tab TAKE 1 TAB BY MOUTH EVERY DAY. 90 Tab 3   • irbesartan (AVAPRO) 300 MG Tab Take 1 Tab by mouth every day. 30 Tab 11   • Blood Glucose Monitoring Suppl (FREESTYLE FREEDOM LITE) W/DEVICE Kit 1 Application by Does not apply route every day. Dx: E11.42 1 Kit 0   • Blood Glucose Monitoring Suppl SUPPLIES Misc Test strips order: Test strips for Abbott Freestyle Lite meter. Sig: use once daily. Dx: E11.42 100 Each 3   • Lancets Misc Lancets order: Lancets for Abbott Freestyle Lite meter. Sig: use once daily. Dx: E11.42 100 Each 3   • Blood Glucose Monitoring Suppl SUPPLIES Misc Accu Check Yoanna blood glucose test strips.  Checking blood sugars 2 times per day E11.40 200 Strip 3   • aspirin 81 MG tablet Take 81 mg by mouth every day.     • Cholecalciferol (VITAMIN D-3) 5000 UNITS TABS Take 1 Tab by mouth every day.       • acetaminophen (TYLENOL) 325 MG Tab Take 650 mg by mouth every four hours as needed.     • ibuprofen (MOTRIN) 200 MG Tab Take 200 mg by mouth every 6 hours as needed.     • [DISCONTINUED] nitroglycerin (NITROSTAT) 0.4 MG SUBL Place 1 Tab under tongue as needed for Chest Pain. 25 Tab 1     No  "facility-administered encounter medications on file as of 1/17/2019.      Review of Systems   Constitutional: Negative.  Negative for malaise/fatigue.   HENT: Positive for hearing loss.    Eyes: Negative for blurred vision.   Respiratory: Negative for shortness of breath.    Cardiovascular: Negative for chest pain and palpitations.   Gastrointestinal: Negative for nausea and vomiting.   Genitourinary: Negative for hematuria.   Musculoskeletal: Positive for joint pain. Negative for myalgias.        Has bilateral ankle pain   Neurological: Negative for focal weakness, loss of consciousness and weakness.        History of CVA  Known right carotid occlusion   Psychiatric/Behavioral: Negative for depression.        Objective:   /74 (BP Location: Left arm, Patient Position: Sitting, BP Cuff Size: Adult)   Pulse 76   Ht 1.88 m (6' 2\")   Wt 112.5 kg (248 lb)   SpO2 93%   BMI 31.84 kg/m²     Physical Exam   Constitutional: He is oriented to person, place, and time. No distress.   Patient is examined today and his physical exam is unchanged from the prior exam of July 11, 2018.  His left carotid bruit is persistent.   HENT:   Head: Normocephalic and atraumatic.   Cardiovascular: Normal rate and regular rhythm.    No murmur heard.  Pulses:       Carotid pulses are on the left side with bruit.  Pulmonary/Chest: Breath sounds normal. No respiratory distress. He has no wheezes.   Abdominal: Soft. Bowel sounds are normal. He exhibits no distension.   Musculoskeletal: He exhibits no edema.   Neurological: He is alert and oriented to person, place, and time.   Skin: Skin is warm and dry.   Psychiatric: He has a normal mood and affect.       Assessment:     1. Stented coronary artery  nitroglycerin (NITROSTAT) 0.4 MG SL Tab    NM-CARDIAC STRESS TEST   2. Coronary artery disease involving native coronary artery of native heart without angina pectoris  NM-CARDIAC STRESS TEST   3. Essential hypertension  NM-CARDIAC STRESS " TEST       Medical Decision Making:  Today's Assessment / Status / Plan:   Stented coronary artery: No angina at all.    Hypertension: Under good control on his current medical regimen.  Medications reviewed.  Weight loss recommended.  Return in 6 months.

## 2019-01-23 DIAGNOSIS — E11.42 TYPE 2 DIABETES MELLITUS WITH PERIPHERAL NEUROPATHY (HCC): ICD-10-CM

## 2019-01-23 RX ORDER — PIOGLITAZONEHYDROCHLORIDE 45 MG/1
45 TABLET ORAL DAILY
Qty: 90 TAB | Refills: 3 | Status: SHIPPED | OUTPATIENT
Start: 2019-01-23 | End: 2019-09-30

## 2019-01-24 ENCOUNTER — APPOINTMENT (OUTPATIENT)
Dept: RADIOLOGY | Facility: MEDICAL CENTER | Age: 74
End: 2019-01-24
Attending: INTERNAL MEDICINE
Payer: MEDICARE

## 2019-01-29 DIAGNOSIS — I10 ESSENTIAL HYPERTENSION: Primary | ICD-10-CM

## 2019-01-30 RX ORDER — IRBESARTAN 300 MG/1
TABLET ORAL
Qty: 90 TAB | Refills: 3 | Status: SHIPPED | OUTPATIENT
Start: 2019-01-30 | End: 2019-12-07 | Stop reason: SDUPTHER

## 2019-03-15 RX ORDER — ATORVASTATIN CALCIUM 80 MG/1
TABLET, FILM COATED ORAL
Qty: 90 TAB | Refills: 3 | Status: SHIPPED | OUTPATIENT
Start: 2019-03-15 | End: 2019-04-09 | Stop reason: SDUPTHER

## 2019-03-18 ENCOUNTER — HOSPITAL ENCOUNTER (OUTPATIENT)
Dept: LAB | Facility: MEDICAL CENTER | Age: 74
End: 2019-03-18
Attending: FAMILY MEDICINE
Payer: MEDICARE

## 2019-03-18 DIAGNOSIS — E11.42 TYPE 2 DIABETES MELLITUS WITH PERIPHERAL NEUROPATHY (HCC): ICD-10-CM

## 2019-03-18 DIAGNOSIS — E55.9 VITAMIN D DEFICIENCY: ICD-10-CM

## 2019-03-18 DIAGNOSIS — E78.00 PURE HYPERCHOLESTEROLEMIA: ICD-10-CM

## 2019-03-18 LAB
25(OH)D3 SERPL-MCNC: 16 NG/ML (ref 30–100)
ALBUMIN SERPL BCP-MCNC: 4 G/DL (ref 3.2–4.9)
ALBUMIN/GLOB SERPL: 1.4 G/DL
ALP SERPL-CCNC: 139 U/L (ref 30–99)
ALT SERPL-CCNC: 31 U/L (ref 2–50)
ANION GAP SERPL CALC-SCNC: 5 MMOL/L (ref 0–11.9)
AST SERPL-CCNC: 23 U/L (ref 12–45)
BASOPHILS # BLD AUTO: 1.2 % (ref 0–1.8)
BASOPHILS # BLD: 0.07 K/UL (ref 0–0.12)
BILIRUB SERPL-MCNC: 1.7 MG/DL (ref 0.1–1.5)
BUN SERPL-MCNC: 12 MG/DL (ref 8–22)
CALCIUM SERPL-MCNC: 9.3 MG/DL (ref 8.5–10.5)
CHLORIDE SERPL-SCNC: 105 MMOL/L (ref 96–112)
CHOLEST SERPL-MCNC: 136 MG/DL (ref 100–199)
CO2 SERPL-SCNC: 28 MMOL/L (ref 20–33)
CREAT SERPL-MCNC: 0.76 MG/DL (ref 0.5–1.4)
CREAT UR-MCNC: 84.5 MG/DL
EOSINOPHIL # BLD AUTO: 0.23 K/UL (ref 0–0.51)
EOSINOPHIL NFR BLD: 4 % (ref 0–6.9)
ERYTHROCYTE [DISTWIDTH] IN BLOOD BY AUTOMATED COUNT: 45.6 FL (ref 35.9–50)
EST. AVERAGE GLUCOSE BLD GHB EST-MCNC: 206 MG/DL
FASTING STATUS PATIENT QL REPORTED: NORMAL
GLOBULIN SER CALC-MCNC: 2.8 G/DL (ref 1.9–3.5)
GLUCOSE SERPL-MCNC: 184 MG/DL (ref 65–99)
HBA1C MFR BLD: 8.8 % (ref 0–5.6)
HCT VFR BLD AUTO: 49.6 % (ref 42–52)
HDLC SERPL-MCNC: 49 MG/DL
HGB BLD-MCNC: 16.1 G/DL (ref 14–18)
IMM GRANULOCYTES # BLD AUTO: 0.01 K/UL (ref 0–0.11)
IMM GRANULOCYTES NFR BLD AUTO: 0.2 % (ref 0–0.9)
LDLC SERPL CALC-MCNC: 66 MG/DL
LYMPHOCYTES # BLD AUTO: 1.17 K/UL (ref 1–4.8)
LYMPHOCYTES NFR BLD: 20.5 % (ref 22–41)
MCH RBC QN AUTO: 30.8 PG (ref 27–33)
MCHC RBC AUTO-ENTMCNC: 32.5 G/DL (ref 33.7–35.3)
MCV RBC AUTO: 94.8 FL (ref 81.4–97.8)
MICROALBUMIN UR-MCNC: 14.4 MG/DL
MICROALBUMIN/CREAT UR: 170 MG/G (ref 0–30)
MONOCYTES # BLD AUTO: 0.57 K/UL (ref 0–0.85)
MONOCYTES NFR BLD AUTO: 10 % (ref 0–13.4)
NEUTROPHILS # BLD AUTO: 3.67 K/UL (ref 1.82–7.42)
NEUTROPHILS NFR BLD: 64.1 % (ref 44–72)
NRBC # BLD AUTO: 0 K/UL
NRBC BLD-RTO: 0 /100 WBC
PLATELET # BLD AUTO: 145 K/UL (ref 164–446)
PMV BLD AUTO: 10 FL (ref 9–12.9)
POTASSIUM SERPL-SCNC: 4.8 MMOL/L (ref 3.6–5.5)
PROT SERPL-MCNC: 6.8 G/DL (ref 6–8.2)
RBC # BLD AUTO: 5.23 M/UL (ref 4.7–6.1)
SODIUM SERPL-SCNC: 138 MMOL/L (ref 135–145)
TRIGL SERPL-MCNC: 104 MG/DL (ref 0–149)
TSH SERPL DL<=0.005 MIU/L-ACNC: 1.12 UIU/ML (ref 0.38–5.33)
VIT B12 SERPL-MCNC: 356 PG/ML (ref 211–911)
WBC # BLD AUTO: 5.7 K/UL (ref 4.8–10.8)

## 2019-03-18 PROCEDURE — 82306 VITAMIN D 25 HYDROXY: CPT

## 2019-03-18 PROCEDURE — 84443 ASSAY THYROID STIM HORMONE: CPT

## 2019-03-18 PROCEDURE — 82607 VITAMIN B-12: CPT

## 2019-03-18 PROCEDURE — 85025 COMPLETE CBC W/AUTO DIFF WBC: CPT

## 2019-03-18 PROCEDURE — 36415 COLL VENOUS BLD VENIPUNCTURE: CPT

## 2019-03-18 PROCEDURE — 80061 LIPID PANEL: CPT

## 2019-03-18 PROCEDURE — 82043 UR ALBUMIN QUANTITATIVE: CPT

## 2019-03-18 PROCEDURE — 82570 ASSAY OF URINE CREATININE: CPT

## 2019-03-18 PROCEDURE — 83036 HEMOGLOBIN GLYCOSYLATED A1C: CPT

## 2019-03-18 PROCEDURE — 80053 COMPREHEN METABOLIC PANEL: CPT

## 2019-03-19 ENCOUNTER — TELEPHONE (OUTPATIENT)
Dept: MEDICAL GROUP | Facility: MEDICAL CENTER | Age: 74
End: 2019-03-19

## 2019-03-19 NOTE — TELEPHONE ENCOUNTER
ESTABLISHED PATIENT PRE-VISIT PLANNING     Patient was NOT contacted to complete PVP.     Note: Patient will not be contacted if there is no indication to call.     1.  Reviewed notes from the last few office visits within the medical group: Yes    2.  If any orders were placed at last visit or intended to be done for this visit (i.e. 6 mos follow-up), do we have Results/Consult Notes?        •  Labs - Labs ordered, completed on 3/18/19 and results are in chart.   Note: If patient appointment is for lab review and patient did not complete labs, check with provider if OK to reschedule patient until labs completed.       •  Imaging - Imaging was not ordered at last office visit.       •  Referrals - No referrals were ordered at last office visit.    3. Is this appointment scheduled as a Hospital Follow-Up? No    4.  Immunizations were updated in Epic using WebIZ?: Epic matches WebIZ       •  Web Iz Recommendations: TDAP, VARICELLA (Chicken Pox)  and SHINGRIX (Shingles)    5.  Patient is due for the following Health Maintenance Topics:   Health Maintenance Due   Topic Date Due   • Annual Wellness Visit  1945   • IMM ZOSTER VACCINES (1 of 2) 02/25/1997   • RETINAL SCREENING  01/25/2018   • COLON CANCER SCREENING ANNUAL FIT  01/25/2019     6. Orders for overdue Health Maintenance topics pended in Pre-Charting? NO    7.  AHA (MDX) form printed for Provider? YES    8.  Patient was NOT informed to arrive 15 min prior to their scheduled appointment and bring in their medication bottles.

## 2019-03-19 NOTE — TELEPHONE ENCOUNTER
Left message for patient to call back regarding pre-visit planning. Please transfer call to [phone].

## 2019-03-20 ENCOUNTER — OFFICE VISIT (OUTPATIENT)
Dept: MEDICAL GROUP | Facility: MEDICAL CENTER | Age: 74
End: 2019-03-20
Payer: MEDICARE

## 2019-03-20 VITALS
HEIGHT: 74 IN | SYSTOLIC BLOOD PRESSURE: 130 MMHG | BODY MASS INDEX: 31.57 KG/M2 | WEIGHT: 246 LBS | TEMPERATURE: 99.3 F | OXYGEN SATURATION: 91 % | DIASTOLIC BLOOD PRESSURE: 72 MMHG | HEART RATE: 65 BPM

## 2019-03-20 DIAGNOSIS — E11.42 TYPE 2 DIABETES MELLITUS WITH PERIPHERAL NEUROPATHY (HCC): ICD-10-CM

## 2019-03-20 DIAGNOSIS — I10 ESSENTIAL HYPERTENSION: ICD-10-CM

## 2019-03-20 DIAGNOSIS — E53.8 VITAMIN B12 DEFICIENCY: ICD-10-CM

## 2019-03-20 DIAGNOSIS — E55.9 VITAMIN D DEFICIENCY: ICD-10-CM

## 2019-03-20 DIAGNOSIS — E78.00 PURE HYPERCHOLESTEROLEMIA: ICD-10-CM

## 2019-03-20 PROCEDURE — 8041 PR SCP AHA: Performed by: FAMILY MEDICINE

## 2019-03-20 PROCEDURE — 99214 OFFICE O/P EST MOD 30 MIN: CPT | Performed by: FAMILY MEDICINE

## 2019-03-20 ASSESSMENT — PATIENT HEALTH QUESTIONNAIRE - PHQ9: CLINICAL INTERPRETATION OF PHQ2 SCORE: 0

## 2019-03-20 NOTE — PROGRESS NOTES

## 2019-03-20 NOTE — PROGRESS NOTES
Subjective:   Agapito Cabrera is a 74 y.o. male here today for diabetes    Type 2 diabetes mellitus with peripheral neuropathy  Metformin 500 mg twice daily, glimepiride 4 mg daily, actos 45 mg daily. A1c went down from 9.0 to 8.8.    Hyperlipidemia  Patient is tolerating atorvastatin 80 mg daily.  Results for AGAPITO CABRERA (MRN [id number]) as of 3/20/2019 13:32   Ref. Range 3/18/2019 11:17   Cholesterol,Tot Latest Ref Range: 100 - 199 mg/dL 136   Triglycerides Latest Ref Range: 0 - 149 mg/dL 104   HDL Latest Ref Range: >=40 mg/dL 49   LDL Latest Ref Range: <100 mg/dL 66       HTN (hypertension)  Patient is tolerating irbesartan 300 mg daily and Coreg 12.5 mg twice daily.    Vitamin D deficiency  Forgetting to take vitamin D supplementation.    Vitamin B12 deficiency  Not taking vitamin B12.         Current medicines (including changes today)  Current Outpatient Prescriptions   Medication Sig Dispense Refill   • Empagliflozin-Metformin HCl (SYNJARDY) 5-1000 MG Tab Take 1 Tab by mouth 2 Times a Day. 60 Tab 11   • atorvastatin (LIPITOR) 80 MG tablet TAKE ONE TABLET BY MOUTH DAILY IN THE EVENING 90 Tab 3   • irbesartan (AVAPRO) 300 MG Tab TAKE 1 TAB BY MOUTH EVERYDAY 90 Tab 3   • pioglitazone (ACTOS) 45 MG Tab TAKE 1 TAB BY MOUTH EVERY DAY. 90 Tab 3   • nitroglycerin (NITROSTAT) 0.4 MG SL Tab Place 1 Tab under tongue as needed for Chest Pain. 25 Tab 1   • glimepiride (AMARYL) 4 MG Tab TAKE 1 TAB BY MOUTH EVERY MORNING. 90 Tab 3   • carvedilol (COREG) 12.5 MG Tab TAKE 1 TABLET BY MOUTH TWICE A DAY WITH MEALS 180 Tab 3   • montelukast (SINGULAIR) 10 MG Tab TAKE 1 TAB BY MOUTH EVERY DAY. 90 Tab 3   • tamsulosin (FLOMAX) 0.4 MG capsule TAKE 1 CAP BY MOUTH ONE-HALF HOUR AFTER BREAKFAST. 90 Cap 3   • acetaminophen (TYLENOL) 325 MG Tab Take 650 mg by mouth every four hours as needed.     • Alum Hydroxide-Mag Carbonate (GAVISCON PO) Take  by mouth.     • glucose blood (FREESTYLE LITE) strip CHECK BLOOD SUGAR TWICE A  "DAY - DX E11.42 150 Strip 5   • Blood Glucose Monitoring Suppl (FREESTYLE FREEDOM LITE) W/DEVICE Kit 1 Application by Does not apply route every day. Dx: E11.42 1 Kit 0   • Blood Glucose Monitoring Suppl SUPPLIES Misc Test strips order: Test strips for Abbott Freestyle Lite meter. Sig: use once daily. Dx: E11.42 100 Each 3   • Lancets Misc Lancets order: Lancets for Abbott Freestyle Lite meter. Sig: use once daily. Dx: E11.42 100 Each 3   • Blood Glucose Monitoring Suppl SUPPLIES Misc Accu Check Yoanna blood glucose test strips.  Checking blood sugars 2 times per day E11.40 200 Strip 3   • ibuprofen (MOTRIN) 200 MG Tab Take 200 mg by mouth every 6 hours as needed.     • aspirin 81 MG tablet Take 81 mg by mouth every day.     • Cholecalciferol (VITAMIN D-3) 5000 UNITS TABS Take 1 Tab by mouth every day.         No current facility-administered medications for this visit.      He  has a past medical history of Arthritis; CAD (coronary artery disease) (October 2013); Cataract; Diabetes; Hyperlipidemia; Hypertension; Pain; Stroke (HCC) (2010); and Syncope.    ROS   No chest pain, no shortness of breath       Objective:     Blood pressure 130/72, pulse 65, temperature 37.4 °C (99.3 °F), height 1.88 m (6' 2\"), weight 111.6 kg (246 lb), SpO2 91 %. Body mass index is 31.58 kg/m².   Physical Exam:  Constitutional: Alert, no distress.  Skin: Warm, dry, good turgor, no rashes in visible areas.  Eye: Equal, round and reactive, conjunctiva clear, lids normal.  Psych: Alert and oriented x3, normal affect and mood.        Assessment and Plan:   The following treatment plan was discussed    1. Type 2 diabetes mellitus with peripheral neuropathy (HCC)  Slightly better controlled.  We will start patient on Synjardy 5-1000 mg twice daily.  Follow-up with labs in 3 months.  Continue glimepiride and Actos.  - Comp Metabolic Panel; Future  - HEMOGLOBIN A1C; Future  - MICROALBUMIN CREAT RATIO URINE; Future  - Empagliflozin-Metformin HCl " (SYNJARDY) 5-1000 MG Tab; Take 1 Tab by mouth 2 Times a Day.  Dispense: 60 Tab; Refill: 11    2. Pure hypercholesterolemia  Controlled.  Continue atorvastatin.  - Lipid Profile; Future    3. Essential hypertension  Controlled.  Continue current blood pressure medications.    4. Vitamin D deficiency  Advised vitamin D supplementation daily.  Follow-up labs in 3 months.  - VITAMIN D,25 HYDROXY; Future    5. Vitamin B12 deficiency  Advised B12 supplementation daily.  Follow-up with labs in 3 months.  - VITAMIN B12; Future      Followup: Return in about 3 months (around 6/20/2019) for Diabetes.

## 2019-03-20 NOTE — ASSESSMENT & PLAN NOTE
Patient is tolerating atorvastatin 80 mg daily.  Results for NJLAKSHMI NADINE SERA (MRN [id number]) as of 3/20/2019 13:32   Ref. Range 3/18/2019 11:17   Cholesterol,Tot Latest Ref Range: 100 - 199 mg/dL 136   Triglycerides Latest Ref Range: 0 - 149 mg/dL 104   HDL Latest Ref Range: >=40 mg/dL 49   LDL Latest Ref Range: <100 mg/dL 66

## 2019-03-20 NOTE — ASSESSMENT & PLAN NOTE
Metformin 500 mg twice daily, glimepiride 4 mg daily, actos 45 mg daily. A1c went down from 9.0 to 8.8.

## 2019-04-04 ENCOUNTER — APPOINTMENT (OUTPATIENT)
Dept: RADIOLOGY | Facility: MEDICAL CENTER | Age: 74
End: 2019-04-04
Attending: INTERNAL MEDICINE
Payer: MEDICARE

## 2019-04-04 DIAGNOSIS — E11.42 TYPE 2 DIABETES MELLITUS WITH PERIPHERAL NEUROPATHY (HCC): ICD-10-CM

## 2019-04-10 RX ORDER — ATORVASTATIN CALCIUM 80 MG/1
TABLET, FILM COATED ORAL
Qty: 100 TAB | Refills: 3 | Status: SHIPPED | OUTPATIENT
Start: 2019-04-10 | End: 2020-06-18

## 2019-04-18 ENCOUNTER — HOSPITAL ENCOUNTER (OUTPATIENT)
Dept: RADIOLOGY | Facility: MEDICAL CENTER | Age: 74
End: 2019-04-18
Attending: INTERNAL MEDICINE
Payer: MEDICARE

## 2019-04-18 DIAGNOSIS — I10 ESSENTIAL HYPERTENSION: ICD-10-CM

## 2019-04-18 DIAGNOSIS — Z95.5 STENTED CORONARY ARTERY: ICD-10-CM

## 2019-04-18 DIAGNOSIS — I25.10 CORONARY ARTERY DISEASE INVOLVING NATIVE CORONARY ARTERY OF NATIVE HEART WITHOUT ANGINA PECTORIS: ICD-10-CM

## 2019-04-18 PROCEDURE — 78452 HT MUSCLE IMAGE SPECT MULT: CPT

## 2019-04-18 PROCEDURE — 78452 HT MUSCLE IMAGE SPECT MULT: CPT | Mod: 26 | Performed by: INTERNAL MEDICINE

## 2019-04-18 PROCEDURE — 93018 CV STRESS TEST I&R ONLY: CPT | Performed by: INTERNAL MEDICINE

## 2019-04-18 PROCEDURE — 700111 HCHG RX REV CODE 636 W/ 250 OVERRIDE (IP)

## 2019-04-18 RX ORDER — REGADENOSON 0.08 MG/ML
INJECTION, SOLUTION INTRAVENOUS
Status: COMPLETED
Start: 2019-04-18 | End: 2019-04-18

## 2019-04-18 RX ADMIN — REGADENOSON 0.4 MG: 0.08 INJECTION, SOLUTION INTRAVENOUS at 11:59

## 2019-05-07 DIAGNOSIS — E11.42 TYPE 2 DIABETES MELLITUS WITH PERIPHERAL NEUROPATHY (HCC): ICD-10-CM

## 2019-05-07 NOTE — TELEPHONE ENCOUNTER
Patient hasn't started Synjardy yet, he believes his levels are stable right now on Metformin. Please refill. Patient wants to discuss Synjardy at next appt.

## 2019-05-07 NOTE — TELEPHONE ENCOUNTER
Please ask if patient is taking Synjardy twice a day. If he is then he does not need extra metformin.    Dr. Zamorano

## 2019-05-23 ENCOUNTER — OFFICE VISIT (OUTPATIENT)
Dept: MEDICAL GROUP | Facility: MEDICAL CENTER | Age: 74
End: 2019-05-23
Payer: MEDICARE

## 2019-05-23 VITALS
TEMPERATURE: 99.5 F | HEART RATE: 85 BPM | SYSTOLIC BLOOD PRESSURE: 142 MMHG | OXYGEN SATURATION: 88 % | WEIGHT: 246 LBS | DIASTOLIC BLOOD PRESSURE: 78 MMHG | HEIGHT: 74 IN | BODY MASS INDEX: 31.57 KG/M2

## 2019-05-23 DIAGNOSIS — R05.9 COUGH: ICD-10-CM

## 2019-05-23 DIAGNOSIS — H61.21 IMPACTED CERUMEN OF RIGHT EAR: ICD-10-CM

## 2019-05-23 PROCEDURE — 99214 OFFICE O/P EST MOD 30 MIN: CPT | Performed by: FAMILY MEDICINE

## 2019-05-23 RX ORDER — AZITHROMYCIN 250 MG/1
TABLET, FILM COATED ORAL
Qty: 6 TAB | Refills: 0 | Status: SHIPPED | OUTPATIENT
Start: 2019-05-23 | End: 2019-05-28 | Stop reason: SDUPTHER

## 2019-05-23 RX ORDER — CODEINE PHOSPHATE AND GUAIFENESIN 10; 100 MG/5ML; MG/5ML
5 SOLUTION ORAL EVERY 4 HOURS PRN
Qty: 200 ML | Refills: 0 | Status: SHIPPED
Start: 2019-05-23 | End: 2019-06-02

## 2019-05-23 RX ORDER — FLUTICASONE PROPIONATE 50 MCG
2 SPRAY, SUSPENSION (ML) NASAL DAILY
Qty: 16 G | Refills: 3 | Status: SHIPPED | OUTPATIENT
Start: 2019-05-23 | End: 2020-03-24

## 2019-05-23 NOTE — PROGRESS NOTES
Subjective:   Agapito Stone is a 74 y.o. male here today for cough    Cough  Caught a cold 5 days ago and has progressively got worse with cough and chest congestion. Today the mucus in the chest has broken up. Cough is on and off.    + nasal drainage, no GERD, + wheezing.  + history of significant spring allergies, pollen levels have been very high this year.    Taking robitussin, which does help. Taking vitamin C 1000 mg daily. Tylenol and herbal drops to help fight colds.         Current medicines (including changes today)  Current Outpatient Prescriptions   Medication Sig Dispense Refill   • azithromycin (ZITHROMAX) 250 MG Tab 2 tabs by mouth day 1, 1 tab by mouth days 2-5 6 Tab 0   • fluticasone (FLONASE) 50 MCG/ACT nasal spray Spray 2 Sprays in nose every day. 16 g 3   • guaifenesin-codeine (CHERATUSSIN AC) Solution oral solution Take 5 mL by mouth every four hours as needed for Cough for up to 10 days. 200 mL 0   • metformin (GLUCOPHAGE) 1000 MG tablet TAKE 1 TAB BY MOUTH 2 TIMES A DAY, WITH MEALS. 180 Tab 3   • atorvastatin (LIPITOR) 80 MG tablet TAKE ONE TABLET BY MOUTH DAILY IN THE EVENING 100 Tab 3   • irbesartan (AVAPRO) 300 MG Tab TAKE 1 TAB BY MOUTH EVERYDAY 90 Tab 3   • pioglitazone (ACTOS) 45 MG Tab TAKE 1 TAB BY MOUTH EVERY DAY. 90 Tab 3   • nitroglycerin (NITROSTAT) 0.4 MG SL Tab Place 1 Tab under tongue as needed for Chest Pain. 25 Tab 1   • glimepiride (AMARYL) 4 MG Tab TAKE 1 TAB BY MOUTH EVERY MORNING. 90 Tab 3   • carvedilol (COREG) 12.5 MG Tab TAKE 1 TABLET BY MOUTH TWICE A DAY WITH MEALS 180 Tab 3   • montelukast (SINGULAIR) 10 MG Tab TAKE 1 TAB BY MOUTH EVERY DAY. 90 Tab 3   • tamsulosin (FLOMAX) 0.4 MG capsule TAKE 1 CAP BY MOUTH ONE-HALF HOUR AFTER BREAKFAST. 90 Cap 3   • acetaminophen (TYLENOL) 325 MG Tab Take 650 mg by mouth every four hours as needed.     • Alum Hydroxide-Mag Carbonate (GAVISCON PO) Take  by mouth.     • glucose blood (FREESTYLE LITE) strip CHECK BLOOD SUGAR  "TWICE A DAY - DX E11.42 150 Strip 5   • Blood Glucose Monitoring Suppl (FREESTYLE FREEDOM LITE) W/DEVICE Kit 1 Application by Does not apply route every day. Dx: E11.42 1 Kit 0   • Blood Glucose Monitoring Suppl SUPPLIES Misc Test strips order: Test strips for Abbott Freestyle Lite meter. Sig: use once daily. Dx: E11.42 100 Each 3   • Lancets Misc Lancets order: Lancets for Abbott Freestyle Lite meter. Sig: use once daily. Dx: E11.42 100 Each 3   • Blood Glucose Monitoring Suppl SUPPLIES Misc Accu Check Yoanna blood glucose test strips.  Checking blood sugars 2 times per day E11.40 200 Strip 3   • ibuprofen (MOTRIN) 200 MG Tab Take 200 mg by mouth every 6 hours as needed.     • aspirin 81 MG tablet Take 81 mg by mouth every day.     • Cholecalciferol (VITAMIN D-3) 5000 UNITS TABS Take 1 Tab by mouth every day.         No current facility-administered medications for this visit.      He  has a past medical history of Arthritis; CAD (coronary artery disease) (October 2013); Cataract; Diabetes; Hyperlipidemia; Hypertension; Pain; Stroke (HCC) (2010); and Syncope.    ROS   Left ear congestion and decreased hearing, no GERD       Objective:     /78 (BP Location: Right arm, Patient Position: Sitting)   Pulse 85   Temp 37.5 °C (99.5 °F)   Ht 1.88 m (6' 2\")   Wt 111.6 kg (246 lb)   SpO2 88%  Body mass index is 31.58 kg/m².   Physical Exam:  Constitutional: Alert, no distress.  Skin: Warm, dry, good turgor, no rashes in visible areas.  Eye: Equal, round and reactive, conjunctiva clear, lids normal.  ENMT: Lips without lesions, good dentition, oropharynx clear.  Significant thin clear drainage bilateral nares.  Right cerumen impaction, left TM with mild serous bulging.  Neck: Trachea midline, no masses, no thyromegaly. No cervical or supraclavicular lymphadenopathy  Respiratory: Unlabored respiratory effort, positive wheezing in bilateral lungs.  Psych: Alert and oriented x3, normal affect and " mood.        Assessment and Plan:   The following treatment plan was discussed    1. Cough  New problem.  Possible postnasal drainage versus bronchitis.  Will treat with Z-Apolinar, Flonase and codeine cough syrup.  He declines albuterol inhaler and prednisone.  - azithromycin (ZITHROMAX) 250 MG Tab; 2 tabs by mouth day 1, 1 tab by mouth days 2-5  Dispense: 6 Tab; Refill: 0  - fluticasone (FLONASE) 50 MCG/ACT nasal spray; Spray 2 Sprays in nose every day.  Dispense: 16 g; Refill: 3  - guaifenesin-codeine (CHERATUSSIN AC) Solution oral solution; Take 5 mL by mouth every four hours as needed for Cough for up to 10 days.  Dispense: 200 mL; Refill: 0    2. Impacted cerumen of right ear  Lavaged in clinic today.      Followup: Return if symptoms worsen or fail to improve.

## 2019-05-23 NOTE — ASSESSMENT & PLAN NOTE
Caught a cold 5 days ago and has progressively got worse with cough and chest congestion. Today the mucus in the chest has broken up. Cough is on and off.    + nasal drainage, no GERD, + wheezing.  + history of significant spring allergies, pollen levels have been very high this year.    Taking robitussin, which does help. Taking vitamin C 1000 mg daily. Tylenol and herbal drops to help fight colds.

## 2019-05-28 ENCOUNTER — TELEPHONE (OUTPATIENT)
Dept: MEDICAL GROUP | Facility: MEDICAL CENTER | Age: 74
End: 2019-05-28

## 2019-05-28 DIAGNOSIS — R05.9 COUGH: ICD-10-CM

## 2019-05-28 RX ORDER — AZITHROMYCIN 250 MG/1
TABLET, FILM COATED ORAL
Qty: 6 TAB | Refills: 0 | Status: SHIPPED | OUTPATIENT
Start: 2019-05-28 | End: 2019-06-05

## 2019-05-28 NOTE — TELEPHONE ENCOUNTER
I have sent in another round of Z-Apolinar.  If he does not improve after this round, please have him schedule a follow-up appointment so we can listen to his lungs.  Brody Zamorano M.D.

## 2019-05-28 NOTE — TELEPHONE ENCOUNTER
Patient has reported that cold has improved with Z-Apolinar. He is requesting another round of antibiotics.

## 2019-06-04 ENCOUNTER — TELEPHONE (OUTPATIENT)
Dept: MEDICAL GROUP | Facility: MEDICAL CENTER | Age: 74
End: 2019-06-04

## 2019-06-04 NOTE — TELEPHONE ENCOUNTER
Left message for patient to call back regarding pre-visit planning. Please transfer call to [phone].

## 2019-06-04 NOTE — TELEPHONE ENCOUNTER
ESTABLISHED PATIENT PRE-VISIT PLANNING     Patient was NOT contacted to complete PVP.     Note: Patient will not be contacted if there is no indication to call.     1.  Reviewed notes from the last few office visits within the medical group: Yes    2.  If any orders were placed at last visit or intended to be done for this visit (i.e. 6 mos follow-up), do we have Results/Consult Notes?        •  Labs - Labs ordered, but not to be completed until 6/20/19.   Note: If patient appointment is for lab review and patient did not complete labs, check with provider if OK to reschedule patient until labs completed.       •  Imaging - Imaging was not ordered at last office visit.       •  Referrals - No referrals were ordered at last office visit.    3. Is this appointment scheduled as a Hospital Follow-Up? No    4.  Immunizations were updated in Epic using WebIZ?: Epic matches WebIZ       •  Web Iz Recommendations: TDAP, VARICELLA (Chicken Pox)  and SHINGRIX (Shingles)    5.  Patient is due for the following Health Maintenance Topics:   Health Maintenance Due   Topic Date Due   • Annual Wellness Visit  1945   • IMM ZOSTER VACCINES (1 of 2) 02/25/1997   • RETINAL SCREENING  01/25/2018   • COLON CANCER SCREENING ANNUAL FIT  01/25/2019     6. Orders for overdue Health Maintenance topics pended in Pre-Charting? NO    7.  AHA (MDX) form printed for Provider? No, already completed    8.  Patient was informed to arrive 15 min prior to their scheduled appointment and bring in their medication bottles.

## 2019-06-05 ENCOUNTER — OFFICE VISIT (OUTPATIENT)
Dept: MEDICAL GROUP | Facility: MEDICAL CENTER | Age: 74
End: 2019-06-05
Payer: MEDICARE

## 2019-06-05 VITALS
OXYGEN SATURATION: 90 % | HEIGHT: 74 IN | SYSTOLIC BLOOD PRESSURE: 132 MMHG | HEART RATE: 68 BPM | WEIGHT: 246 LBS | BODY MASS INDEX: 31.57 KG/M2 | DIASTOLIC BLOOD PRESSURE: 76 MMHG | TEMPERATURE: 99.9 F

## 2019-06-05 DIAGNOSIS — J30.2 SEASONAL ALLERGIES: ICD-10-CM

## 2019-06-05 DIAGNOSIS — E11.42 TYPE 2 DIABETES MELLITUS WITH PERIPHERAL NEUROPATHY (HCC): ICD-10-CM

## 2019-06-05 DIAGNOSIS — R05.9 COUGH: ICD-10-CM

## 2019-06-05 PROCEDURE — 99214 OFFICE O/P EST MOD 30 MIN: CPT | Performed by: FAMILY MEDICINE

## 2019-06-05 NOTE — ASSESSMENT & PLAN NOTE
Has nasal congestion first thing in the morning. He has a history of severe allergies that required allergy shots earlier in life. He is using Singulair, which has helped.

## 2019-06-05 NOTE — PROGRESS NOTES
Subjective:   Agapito Stone is a 74 y.o. male here today for cough, seasonal allergies, diabetes    Cough  He had a 2 round of Z-lamont recently which has helped. He started to have a productive cough last week and now he feels almost clear. Not using albuterol and not feeling short of breath. No history of asthma but did have severe allergies and required allergy shots earlier in life. He never smoked in his life.  Singulair has been helpful.    Seasonal allergies  Has nasal congestion first thing in the morning. He has a history of severe allergies that required allergy shots earlier in life. He is using Singulair, which has helped.    Type 2 diabetes mellitus with peripheral neuropathy  A1c 8.8 in March. He is interested switching from metformin to Synjardy.         Current medicines (including changes today)  Current Outpatient Prescriptions   Medication Sig Dispense Refill   • Empagliflozin-Metformin HCl (SYNJARDY) 5-1000 MG Tab Take 1 Tab by mouth 2 Times a Day. 180 Tab 3   • fluticasone (FLONASE) 50 MCG/ACT nasal spray Spray 2 Sprays in nose every day. 16 g 3   • atorvastatin (LIPITOR) 80 MG tablet TAKE ONE TABLET BY MOUTH DAILY IN THE EVENING 100 Tab 3   • irbesartan (AVAPRO) 300 MG Tab TAKE 1 TAB BY MOUTH EVERYDAY 90 Tab 3   • pioglitazone (ACTOS) 45 MG Tab TAKE 1 TAB BY MOUTH EVERY DAY. 90 Tab 3   • nitroglycerin (NITROSTAT) 0.4 MG SL Tab Place 1 Tab under tongue as needed for Chest Pain. 25 Tab 1   • glimepiride (AMARYL) 4 MG Tab TAKE 1 TAB BY MOUTH EVERY MORNING. 90 Tab 3   • carvedilol (COREG) 12.5 MG Tab TAKE 1 TABLET BY MOUTH TWICE A DAY WITH MEALS 180 Tab 3   • montelukast (SINGULAIR) 10 MG Tab TAKE 1 TAB BY MOUTH EVERY DAY. 90 Tab 3   • tamsulosin (FLOMAX) 0.4 MG capsule TAKE 1 CAP BY MOUTH ONE-HALF HOUR AFTER BREAKFAST. 90 Cap 3   • acetaminophen (TYLENOL) 325 MG Tab Take 650 mg by mouth every four hours as needed.     • Alum Hydroxide-Mag Carbonate (GAVISCON PO) Take  by mouth.     • glucose  "blood (FREESTYLE LITE) strip CHECK BLOOD SUGAR TWICE A DAY - DX E11.42 150 Strip 5   • Blood Glucose Monitoring Suppl (FREESTYLE FREEDOM LITE) W/DEVICE Kit 1 Application by Does not apply route every day. Dx: E11.42 1 Kit 0   • Blood Glucose Monitoring Suppl SUPPLIES Misc Test strips order: Test strips for Abbott Freestyle Lite meter. Sig: use once daily. Dx: E11.42 100 Each 3   • Lancets Misc Lancets order: Lancets for Abbott Freestyle Lite meter. Sig: use once daily. Dx: E11.42 100 Each 3   • Blood Glucose Monitoring Suppl SUPPLIES Misc Accu Check Yoanna blood glucose test strips.  Checking blood sugars 2 times per day E11.40 200 Strip 3   • ibuprofen (MOTRIN) 200 MG Tab Take 200 mg by mouth every 6 hours as needed.     • aspirin 81 MG tablet Take 81 mg by mouth every day.     • Cholecalciferol (VITAMIN D-3) 5000 UNITS TABS Take 1 Tab by mouth every day.         No current facility-administered medications for this visit.      He  has a past medical history of Arthritis; CAD (coronary artery disease) (October 2013); Cataract; Diabetes; Hyperlipidemia; Hypertension; Pain; Stroke (HCC) (2010); and Syncope.    ROS   No chest pain, no shortness of breath       Objective:     /76 (BP Location: Right arm, Patient Position: Sitting)   Pulse 68   Temp 37.7 °C (99.9 °F)   Ht 1.88 m (6' 2\")   Wt 111.6 kg (246 lb)   SpO2 90%  Body mass index is 31.58 kg/m².   Physical Exam:  Constitutional: Alert, no distress.  Skin: Warm, dry, good turgor, no rashes in visible areas.  Eye: Equal, round and reactive, conjunctiva clear, lids normal.  Respiratory: Unlabored respiratory effort, + mild diffuse wheezing  Psych: Alert and oriented x3, normal affect and mood.        Assessment and Plan:   The following treatment plan was discussed    1. Cough  Possibly seasonal intermittent asthma.  Referral to allergist.  Continue Singulair.  Patient declines albuterol or prednisone.  - REFERRAL TO ALLERGY    2. Seasonal " allergies  Continue Singulair.  Referral to allergist for evaluation.  - REFERRAL TO ALLERGY    3. Type 2 diabetes mellitus with peripheral neuropathy (HCC)  Uncontrolled.  We will switch patient from metformin to Synjardy.  Follow-up for in clinic A1c in 3 months.  - Empagliflozin-Metformin HCl (SYNJARDY) 5-1000 MG Tab; Take 1 Tab by mouth 2 Times a Day.  Dispense: 180 Tab; Refill: 3      Followup: Return in about 3 months (around 9/5/2019) for Diabetes.

## 2019-06-05 NOTE — ASSESSMENT & PLAN NOTE
He had a 2 round of Z-lamont recently which has helped. He started to have a productive cough last week and now he feels almost clear. Not using albuterol and not feeling short of breath. No history of asthma but did have severe allergies and required allergy shots earlier in life. He never smoked in his life.  Singulair has been helpful.

## 2019-06-10 RX ORDER — BLOOD-GLUCOSE METER
KIT MISCELLANEOUS
Qty: 150 STRIP | Refills: 3 | Status: SHIPPED | OUTPATIENT
Start: 2019-06-10 | End: 2020-04-08 | Stop reason: SDUPTHER

## 2019-06-17 RX ORDER — OMEPRAZOLE 20 MG/1
CAPSULE, DELAYED RELEASE ORAL
Qty: 90 CAP | Refills: 3 | Status: SHIPPED | OUTPATIENT
Start: 2019-06-17 | End: 2020-07-24 | Stop reason: SDUPTHER

## 2019-07-10 ENCOUNTER — OFFICE VISIT (OUTPATIENT)
Dept: CARDIOLOGY | Facility: MEDICAL CENTER | Age: 74
End: 2019-07-10
Payer: MEDICARE

## 2019-07-10 VITALS
WEIGHT: 240 LBS | SYSTOLIC BLOOD PRESSURE: 130 MMHG | DIASTOLIC BLOOD PRESSURE: 84 MMHG | HEIGHT: 74 IN | OXYGEN SATURATION: 91 % | BODY MASS INDEX: 30.8 KG/M2 | HEART RATE: 66 BPM

## 2019-07-10 DIAGNOSIS — I10 ESSENTIAL HYPERTENSION: ICD-10-CM

## 2019-07-10 DIAGNOSIS — Z95.5 STENTED CORONARY ARTERY: ICD-10-CM

## 2019-07-10 DIAGNOSIS — E78.2 MIXED HYPERLIPIDEMIA: ICD-10-CM

## 2019-07-10 DIAGNOSIS — I25.10 CORONARY ARTERY DISEASE INVOLVING NATIVE CORONARY ARTERY OF NATIVE HEART WITHOUT ANGINA PECTORIS: ICD-10-CM

## 2019-07-10 PROCEDURE — 99214 OFFICE O/P EST MOD 30 MIN: CPT | Performed by: INTERNAL MEDICINE

## 2019-07-10 ASSESSMENT — ENCOUNTER SYMPTOMS
FOCAL WEAKNESS: 0
MYALGIAS: 0
VOMITING: 0
BLURRED VISION: 0
DEPRESSION: 0
WEAKNESS: 0
WEIGHT LOSS: 1
LOSS OF CONSCIOUSNESS: 0
NAUSEA: 0
SHORTNESS OF BREATH: 0
PALPITATIONS: 0

## 2019-07-10 NOTE — PROGRESS NOTES
Chief Complaint   Patient presents with   • Coronary Artery Disease     Follow up       Subjective:   Agapito Stone is a 74 y.o. male who presents today for follow-up of coronary disease with prior stenting of the circumflex coronary artery in October 2013 at [facility].  At that time he had a 3.5 x 12 mm Xience stent placed in the proximal circumflex and a 3.25 x 23 mm Xience stent also placed in the circumflex.  He has had no angina.  Recent myocardial perfusion scan was very reassuring with no ischemia.  The patient is now on Synjardy for his diabetes and is very happy with his medication except for the cost.  He is lost about 6 pounds.  Blood sugars under better control.  He is having a difficult time with his right ankle and is scheduled for ankle surgery in the near future.      Past Medical History:   Diagnosis Date   • Arthritis     hands, wrists, ankles   • CAD (coronary artery disease) October 2013    Dr. Ovalle; ACS. PCI/LETA x 2 of the mid circumflex (Xience 3.25 x 23mm) and proximal circumflex (Xience 3.6x 12mm)   • Cataract    • Diabetes    • Hyperlipidemia    • Hypertension    • Pain     ankles   • Stroke (HCC) 2010    no residual effects   • Syncope      Past Surgical History:   Procedure Laterality Date   • STENT PLACEMENT  2013    cardiac   • CAROTID ENDARTERECTOMY  7/13/2010    left; Performed by CHIQUITA CORRIGAN at SURGERY [facility] ORS   • TONSILLECTOMY  as a child     Family History   Problem Relation Age of Onset   • Other Mother         Rheumatic fever     Social History     Social History   • Marital status:      Spouse name: N/A   • Number of children: N/A   • Years of education: N/A     Occupational History   • Not on file.     Social History Main Topics   • Smoking status: Never Smoker   • Smokeless tobacco: Never Used   • Alcohol use 0.0 oz/week      Comment: once a year   • Drug use: No   • Sexual activity: Not on file     Other Topics Concern   • Not on file      Social History Narrative   • No narrative on file     Allergies   Allergen Reactions   • Fish Hives     shellfish   • Pcn [Penicillins] Hives   • Amoxicillin Hives   • Food Swelling      Eggs,Melons, avocados-puffy mouth   • Horse Allergy      Outpatient Encounter Prescriptions as of 7/10/2019   Medication Sig Dispense Refill   • Empagliflozin-Metformin HCl (SYNJARDY) 5-1000 MG Tab Take 1 Tab by mouth 2 Times a Day. (Patient taking differently: Take 1 Tab by mouth every day.) 180 Tab 3   • atorvastatin (LIPITOR) 80 MG tablet TAKE ONE TABLET BY MOUTH DAILY IN THE EVENING 100 Tab 3   • irbesartan (AVAPRO) 300 MG Tab TAKE 1 TAB BY MOUTH EVERYDAY 90 Tab 3   • carvedilol (COREG) 12.5 MG Tab TAKE 1 TABLET BY MOUTH TWICE A DAY WITH MEALS 180 Tab 3   • montelukast (SINGULAIR) 10 MG Tab TAKE 1 TAB BY MOUTH EVERY DAY. 90 Tab 3   • aspirin 81 MG tablet Take 81 mg by mouth every day.     • Cholecalciferol (VITAMIN D-3) 5000 UNITS TABS Take 1 Tab by mouth every day.       • omeprazole (PRILOSEC) 20 MG delayed-release capsule TAKE ONE CAP BY MOUTH DAILY 90 Cap 3   • FREESTYLE LITE strip CHECK BLOOD SUGAR TWICE A DAY - DX E11.42 150 Strip 3   • fluticasone (FLONASE) 50 MCG/ACT nasal spray Spray 2 Sprays in nose every day. (Patient not taking: Reported on 7/10/2019) 16 g 3   • pioglitazone (ACTOS) 45 MG Tab TAKE 1 TAB BY MOUTH EVERY DAY. (Patient not taking: Reported on 7/10/2019) 90 Tab 3   • nitroglycerin (NITROSTAT) 0.4 MG SL Tab Place 1 Tab under tongue as needed for Chest Pain. 25 Tab 1   • glimepiride (AMARYL) 4 MG Tab TAKE 1 TAB BY MOUTH EVERY MORNING. (Patient not taking: Reported on 7/10/2019) 90 Tab 3   • tamsulosin (FLOMAX) 0.4 MG capsule TAKE 1 CAP BY MOUTH ONE-HALF HOUR AFTER BREAKFAST. 90 Cap 3   • acetaminophen (TYLENOL) 325 MG Tab Take 650 mg by mouth every four hours as needed.     • Alum Hydroxide-Mag Carbonate (GAVISCON PO) Take  by mouth.     • Blood Glucose Monitoring Suppl (FREESTYLE FREEDOM LITE) W/DEVICE  "Kit 1 Application by Does not apply route every day. Dx: E11.42 1 Kit 0   • Blood Glucose Monitoring Suppl SUPPLIES Misc Test strips order: Test strips for Abbott Freestyle Lite meter. Sig: use once daily. Dx: E11.42 100 Each 3   • Lancets Misc Lancets order: Lancets for Abbott Freestyle Lite meter. Sig: use once daily. Dx: E11.42 100 Each 3   • Blood Glucose Monitoring Suppl SUPPLIES Misc Accu Check Yoanna blood glucose test strips.  Checking blood sugars 2 times per day E11.40 200 Strip 3   • ibuprofen (MOTRIN) 200 MG Tab Take 200 mg by mouth every 6 hours as needed.       No facility-administered encounter medications on file as of 7/10/2019.      Review of Systems   Constitutional: Positive for weight loss. Negative for malaise/fatigue.   HENT: Positive for hearing loss.    Eyes: Negative for blurred vision.   Respiratory: Negative for shortness of breath.    Cardiovascular: Negative for chest pain and palpitations.   Gastrointestinal: Negative for nausea and vomiting.   Genitourinary: Negative for hematuria.   Musculoskeletal: Positive for joint pain. Negative for myalgias.        Ankle pain   Neurological: Negative for focal weakness, loss of consciousness and weakness.        History of CVA  Known right carotid occlusion   Psychiatric/Behavioral: Negative for depression.        Objective:   /84 (BP Location: Left arm, Patient Position: Sitting, BP Cuff Size: Adult)   Pulse 66   Ht 1.88 m (6' 2\")   Wt 108.9 kg (240 lb)   SpO2 91%   BMI 30.81 kg/m²     Physical Exam   Constitutional: He is oriented to person, place, and time. He appears well-nourished. No distress.   HENT:   Head: Normocephalic and atraumatic.   Eyes: Pupils are equal, round, and reactive to light. Conjunctivae are normal. No scleral icterus.   Cardiovascular: Normal rate and regular rhythm.    No murmur heard.  Pulses:       Carotid pulses are on the left side with bruit.  Bilateral carotid bruits   Pulmonary/Chest: Breath sounds " normal. No respiratory distress. He has no wheezes.   Abdominal: Soft. Bowel sounds are normal. He exhibits no distension and no mass.   Musculoskeletal: He exhibits no edema.   Neurological: He is alert and oriented to person, place, and time.   Skin: Skin is warm and dry.   Psychiatric: He has a normal mood and affect.       Assessment:     1. Essential hypertension     2. Coronary artery disease involving native coronary artery of native heart without angina pectoris     3. Stented coronary artery     4. Mixed hyperlipidemia         Medical Decision Making:  Today's Assessment / Status / Plan:   Coronary disease: Status post stenting of circumflex 6 years ago.  Recent myocardial perfusion scan shows no ischemia.  Patient has no angina.  If he needs ankle surgery.  According to guidelines he is actually low risk for surgery as the scan is normal.    Hypertension: Under good control.    Hyperlipidemia: LDL is at target on his current medications.    No contraindications to proposed surgery or anesthesia.  Return in 6 months

## 2019-09-28 ENCOUNTER — HOSPITAL ENCOUNTER (OUTPATIENT)
Dept: LAB | Facility: MEDICAL CENTER | Age: 74
End: 2019-09-28
Attending: FAMILY MEDICINE
Payer: MEDICARE

## 2019-09-28 DIAGNOSIS — E11.42 TYPE 2 DIABETES MELLITUS WITH PERIPHERAL NEUROPATHY (HCC): ICD-10-CM

## 2019-09-28 DIAGNOSIS — E78.00 PURE HYPERCHOLESTEROLEMIA: ICD-10-CM

## 2019-09-28 DIAGNOSIS — E55.9 VITAMIN D DEFICIENCY: ICD-10-CM

## 2019-09-28 LAB
25(OH)D3 SERPL-MCNC: 14 NG/ML (ref 30–100)
ALBUMIN SERPL BCP-MCNC: 4 G/DL (ref 3.2–4.9)
ALBUMIN/GLOB SERPL: 1.4 G/DL
ALP SERPL-CCNC: 157 U/L (ref 30–99)
ALT SERPL-CCNC: 42 U/L (ref 2–50)
ANION GAP SERPL CALC-SCNC: 7 MMOL/L (ref 0–11.9)
AST SERPL-CCNC: 30 U/L (ref 12–45)
BILIRUB SERPL-MCNC: 1.7 MG/DL (ref 0.1–1.5)
BUN SERPL-MCNC: 16 MG/DL (ref 8–22)
CALCIUM SERPL-MCNC: 9 MG/DL (ref 8.5–10.5)
CHLORIDE SERPL-SCNC: 107 MMOL/L (ref 96–112)
CHOLEST SERPL-MCNC: 133 MG/DL (ref 100–199)
CO2 SERPL-SCNC: 27 MMOL/L (ref 20–33)
CREAT SERPL-MCNC: 0.88 MG/DL (ref 0.5–1.4)
CREAT UR-MCNC: 88.1 MG/DL
GLOBULIN SER CALC-MCNC: 2.8 G/DL (ref 1.9–3.5)
GLUCOSE SERPL-MCNC: 158 MG/DL (ref 65–99)
HDLC SERPL-MCNC: 47 MG/DL
LDLC SERPL CALC-MCNC: 68 MG/DL
MICROALBUMIN UR-MCNC: 8.5 MG/DL
MICROALBUMIN/CREAT UR: 96 MG/G (ref 0–30)
POTASSIUM SERPL-SCNC: 4.3 MMOL/L (ref 3.6–5.5)
PROT SERPL-MCNC: 6.8 G/DL (ref 6–8.2)
SODIUM SERPL-SCNC: 141 MMOL/L (ref 135–145)
TRIGL SERPL-MCNC: 88 MG/DL (ref 0–149)

## 2019-09-28 PROCEDURE — 80061 LIPID PANEL: CPT

## 2019-09-28 PROCEDURE — 82306 VITAMIN D 25 HYDROXY: CPT

## 2019-09-28 PROCEDURE — 82570 ASSAY OF URINE CREATININE: CPT

## 2019-09-28 PROCEDURE — 82043 UR ALBUMIN QUANTITATIVE: CPT

## 2019-09-28 PROCEDURE — 36415 COLL VENOUS BLD VENIPUNCTURE: CPT

## 2019-09-28 PROCEDURE — 80053 COMPREHEN METABOLIC PANEL: CPT

## 2019-09-28 PROCEDURE — 83036 HEMOGLOBIN GLYCOSYLATED A1C: CPT

## 2019-09-29 LAB
EST. AVERAGE GLUCOSE BLD GHB EST-MCNC: 180 MG/DL
HBA1C MFR BLD: 7.9 % (ref 0–5.6)

## 2019-09-30 ENCOUNTER — OFFICE VISIT (OUTPATIENT)
Dept: MEDICAL GROUP | Facility: MEDICAL CENTER | Age: 74
End: 2019-09-30
Payer: MEDICARE

## 2019-09-30 VITALS
WEIGHT: 239 LBS | SYSTOLIC BLOOD PRESSURE: 128 MMHG | TEMPERATURE: 98.6 F | OXYGEN SATURATION: 94 % | BODY MASS INDEX: 30.67 KG/M2 | HEIGHT: 74 IN | HEART RATE: 55 BPM | DIASTOLIC BLOOD PRESSURE: 70 MMHG

## 2019-09-30 DIAGNOSIS — E11.69 HYPERLIPIDEMIA ASSOCIATED WITH TYPE 2 DIABETES MELLITUS (HCC): ICD-10-CM

## 2019-09-30 DIAGNOSIS — E55.9 VITAMIN D DEFICIENCY: ICD-10-CM

## 2019-09-30 DIAGNOSIS — N40.1 BENIGN NON-NODULAR PROSTATIC HYPERPLASIA WITH LOWER URINARY TRACT SYMPTOMS: ICD-10-CM

## 2019-09-30 DIAGNOSIS — I10 ESSENTIAL HYPERTENSION: ICD-10-CM

## 2019-09-30 DIAGNOSIS — E53.8 VITAMIN B12 DEFICIENCY: ICD-10-CM

## 2019-09-30 DIAGNOSIS — Z23 NEED FOR VACCINATION: ICD-10-CM

## 2019-09-30 DIAGNOSIS — E11.42 TYPE 2 DIABETES MELLITUS WITH PERIPHERAL NEUROPATHY (HCC): ICD-10-CM

## 2019-09-30 DIAGNOSIS — E78.5 HYPERLIPIDEMIA ASSOCIATED WITH TYPE 2 DIABETES MELLITUS (HCC): ICD-10-CM

## 2019-09-30 PROCEDURE — 99214 OFFICE O/P EST MOD 30 MIN: CPT | Mod: 25 | Performed by: FAMILY MEDICINE

## 2019-09-30 PROCEDURE — 90662 IIV NO PRSV INCREASED AG IM: CPT | Performed by: FAMILY MEDICINE

## 2019-09-30 PROCEDURE — G0008 ADMIN INFLUENZA VIRUS VAC: HCPCS | Performed by: FAMILY MEDICINE

## 2019-09-30 PROCEDURE — 90732 PPSV23 VACC 2 YRS+ SUBQ/IM: CPT | Performed by: FAMILY MEDICINE

## 2019-09-30 PROCEDURE — G0009 ADMIN PNEUMOCOCCAL VACCINE: HCPCS | Performed by: FAMILY MEDICINE

## 2019-09-30 RX ORDER — TAMSULOSIN HYDROCHLORIDE 0.4 MG/1
0.4 CAPSULE ORAL
Qty: 90 CAP | Refills: 3 | Status: ON HOLD | OUTPATIENT
Start: 2019-09-30 | End: 2020-01-01

## 2019-09-30 NOTE — ASSESSMENT & PLAN NOTE
Patient is tolerating atorvastatin 80 mg daily.  Results for NJLAKSHMI NADINE SERA (MRN [id number]) as of 9/30/2019 13:06   Ref. Range 9/28/2019 10:21   Cholesterol,Tot Latest Ref Range: 100 - 199 mg/dL 133   Triglycerides Latest Ref Range: 0 - 149 mg/dL 88   HDL Latest Ref Range: >=40 mg/dL 47   LDL Latest Ref Range: <100 mg/dL 68

## 2019-09-30 NOTE — PROGRESS NOTES
Subjective:   Agapito Cabrera is a 74 y.o. male here today for diabetes    HTN (hypertension)  Patient is tolerating carvedilol 12.5 mg twice daily and irbesartan 300 mg daily.    Hyperlipidemia associated with type 2 diabetes mellitus (HCC)  Patient is tolerating atorvastatin 80 mg daily.  Results for AGAPITO CABRERA (MRN [id number]) as of 9/30/2019 13:06   Ref. Range 9/28/2019 10:21   Cholesterol,Tot Latest Ref Range: 100 - 199 mg/dL 133   Triglycerides Latest Ref Range: 0 - 149 mg/dL 88   HDL Latest Ref Range: >=40 mg/dL 47   LDL Latest Ref Range: <100 mg/dL 68       Type 2 diabetes mellitus with peripheral neuropathy  Patient is tolerating Synjardy 5-1000 mg once daily. No longer on glimepiride and Actos.    Results for AGAPITO CABRERA (MRN [id number]) as of 9/30/2019 13:06   Ref. Range 3/18/2019 11:17 9/28/2019 10:21   Glycohemoglobin Latest Ref Range: 0.0 - 5.6 % 8.8 (H) 7.9 (H)       Benign non-nodular prostatic hyperplasia with lower urinary tract symptoms  Patient has not been taking Flomax and decreased urinary flow.    Vitamin D deficiency  Patient has not been taking vitamin D supplementation.    Vitamin B12 deficiency  Has not been getting his regular vitamin B12.         Current medicines (including changes today)  Current Outpatient Medications   Medication Sig Dispense Refill   • tamsulosin (FLOMAX) 0.4 MG capsule Take 1 Cap by mouth ONE-HALF HOUR AFTER BREAKFAST. 90 Cap 3   • omeprazole (PRILOSEC) 20 MG delayed-release capsule TAKE ONE CAP BY MOUTH DAILY 90 Cap 3   • FREESTYLE LITE strip CHECK BLOOD SUGAR TWICE A DAY - DX E11.42 150 Strip 3   • Empagliflozin-Metformin HCl (SYNJARDY) 5-1000 MG Tab Take 1 Tab by mouth 2 Times a Day. (Patient taking differently: Take 1 Tab by mouth every day.) 180 Tab 3   • fluticasone (FLONASE) 50 MCG/ACT nasal spray Spray 2 Sprays in nose every day. (Patient not taking: Reported on 7/10/2019) 16 g 3   • atorvastatin (LIPITOR) 80 MG tablet TAKE ONE TABLET  "BY MOUTH DAILY IN THE EVENING 100 Tab 3   • irbesartan (AVAPRO) 300 MG Tab TAKE 1 TAB BY MOUTH EVERYDAY 90 Tab 3   • nitroglycerin (NITROSTAT) 0.4 MG SL Tab Place 1 Tab under tongue as needed for Chest Pain. 25 Tab 1   • carvedilol (COREG) 12.5 MG Tab TAKE 1 TABLET BY MOUTH TWICE A DAY WITH MEALS 180 Tab 3   • montelukast (SINGULAIR) 10 MG Tab TAKE 1 TAB BY MOUTH EVERY DAY. 90 Tab 3   • acetaminophen (TYLENOL) 325 MG Tab Take 650 mg by mouth every four hours as needed.     • Alum Hydroxide-Mag Carbonate (GAVISCON PO) Take  by mouth.     • Blood Glucose Monitoring Suppl (FREESTYLE FREEDOM LITE) W/DEVICE Kit 1 Application by Does not apply route every day. Dx: E11.42 1 Kit 0   • Blood Glucose Monitoring Suppl SUPPLIES Misc Test strips order: Test strips for Abbott Freestyle Lite meter. Sig: use once daily. Dx: E11.42 100 Each 3   • Lancets Misc Lancets order: Lancets for Abbott Freestyle Lite meter. Sig: use once daily. Dx: E11.42 100 Each 3   • Blood Glucose Monitoring Suppl SUPPLIES Misc Accu Check Yoanna blood glucose test strips.  Checking blood sugars 2 times per day E11.40 200 Strip 3   • ibuprofen (MOTRIN) 200 MG Tab Take 200 mg by mouth every 6 hours as needed.     • aspirin 81 MG tablet Take 81 mg by mouth every day.     • Cholecalciferol (VITAMIN D-3) 5000 UNITS TABS Take 1 Tab by mouth every day.         No current facility-administered medications for this visit.      He  has a past medical history of Arthritis, CAD (coronary artery disease) (October 2013), Cataract, Diabetes, Hyperlipidemia, Hypertension, Pain, Stroke (HCC) (2010), and Syncope.    ROS   No chest pain, no shortness of breath       Objective:     /70 (BP Location: Right arm, Patient Position: Sitting)   Pulse (!) 55   Temp 37 °C (98.6 °F)   Ht 1.88 m (6' 2\")   Wt 108.4 kg (239 lb)   SpO2 94%  Body mass index is 30.69 kg/m².   Physical Exam:  Constitutional: Alert, no distress.  Skin: Warm, dry, good turgor, no rashes in visible " areas.  Eye: Equal, round and reactive, conjunctiva clear, lids normal.  ENMT: Lips without lesions, good dentition, oropharynx clear.  Psych: Alert and oriented x3, normal affect and mood.        Assessment and Plan:   The following treatment plan was discussed    1. Type 2 diabetes mellitus with peripheral neuropathy (HCC)  Controlled. Continue Synjardy. Follow up with labs in 6 months.  - Comp Metabolic Panel; Future  - Lipid Profile; Future  - HEMOGLOBIN A1C; Future  - MICROALBUMIN CREAT RATIO URINE; Future  - TSH WITH REFLEX TO FT4; Future    2. Hyperlipidemia associated with type 2 diabetes mellitus (HCC)  Controlled. Continue atorvastatin.  - TSH WITH REFLEX TO FT4; Future    3. Essential hypertension  Controlled. Continue current medications.  - CBC WITH DIFFERENTIAL; Future  - TSH WITH REFLEX TO FT4; Future    4. Benign non-nodular prostatic hyperplasia with lower urinary tract symptoms  Uncontrolled. Restart Flomax.  - tamsulosin (FLOMAX) 0.4 MG capsule; Take 1 Cap by mouth ONE-HALF HOUR AFTER BREAKFAST.  Dispense: 90 Cap; Refill: 3    5. Vitamin B12 deficiency  Advised vitamin B12 supplementation.  - VITAMIN B12; Future    6. Vitamin D deficiency  Advised vitamin D supplementation.  - VITAMIN D,25 HYDROXY; Future    7. Need for vaccination  - Influenza Vaccine, High Dose (65+ Only)  - Pneumococal Polysaccharide Vaccine 23-Valent =>1YO SQ/IM      Followup: Return in about 6 months (around 3/30/2020) for Diabetes.

## 2019-09-30 NOTE — ASSESSMENT & PLAN NOTE
Patient is tolerating Synjardy 5-1000 mg once daily. No longer on glimepiride and Actos.    Results for NADINE CABRERA (MRN [id number]) as of 9/30/2019 13:06   Ref. Range 3/18/2019 11:17 9/28/2019 10:21   Glycohemoglobin Latest Ref Range: 0.0 - 5.6 % 8.8 (H) 7.9 (H)

## 2019-10-16 ENCOUNTER — TELEPHONE (OUTPATIENT)
Dept: CARDIOLOGY | Facility: MEDICAL CENTER | Age: 74
End: 2019-10-16

## 2019-10-16 NOTE — TELEPHONE ENCOUNTER
MANNY Aguirre,    [facility] is requesting a Surgical clearance and EKG for Pt. I know Pt was last seen by RS on 07-. Does he need an appt w/RS or APRN in addition to EKG appt? Please let me know.    Thank you so much for checking,    Christina

## 2019-10-21 DIAGNOSIS — I10 ESSENTIAL HYPERTENSION: ICD-10-CM

## 2019-10-22 RX ORDER — CARVEDILOL 12.5 MG/1
TABLET ORAL
Qty: 200 TAB | Refills: 3 | Status: SHIPPED | OUTPATIENT
Start: 2019-10-22 | End: 2020-01-01

## 2019-10-28 DIAGNOSIS — E11.42 TYPE 2 DIABETES MELLITUS WITH PERIPHERAL NEUROPATHY (HCC): ICD-10-CM

## 2019-10-28 RX ORDER — PIOGLITAZONEHYDROCHLORIDE 45 MG/1
TABLET ORAL
Qty: 100 TAB | Refills: 3 | Status: SHIPPED | OUTPATIENT
Start: 2019-10-28 | End: 2020-07-24

## 2019-11-21 ENCOUNTER — NON-PROVIDER VISIT (OUTPATIENT)
Dept: CARDIOLOGY | Facility: MEDICAL CENTER | Age: 74
End: 2019-11-21
Payer: MEDICARE

## 2019-11-21 DIAGNOSIS — Z01.810 PREOP CARDIOVASCULAR EXAM: ICD-10-CM

## 2019-11-21 PROCEDURE — 93000 ELECTROCARDIOGRAM COMPLETE: CPT | Performed by: INTERNAL MEDICINE

## 2019-11-21 NOTE — TELEPHONE ENCOUNTER
To Dr. Ovalle to review pre-op EKG for  Orthopedic surgery at [facility].  Left message for pt. To call with details about surgery.

## 2019-11-22 LAB — EKG IMPRESSION: NORMAL

## 2019-11-25 NOTE — TELEPHONE ENCOUNTER
Called pt. Again to ask for details re: surgery. Spoke with wife. Pt. Is having right ankle surgery with Dr. Bolivar ([phone]).

## 2019-11-26 NOTE — ASSESSMENT & PLAN NOTE
Having hesitancy and incomplete emptying of bladder.  Has not started flomax because he was unsure of what it was.  
Patient is taking Actos 45 mg daily and metformin 500 mg three times daily.  Not exercising, but is motivated to go back to [facility].  A1c has progressively been increasing this year.  Results for NJLAKSHMI NADINE SERA (MRN [id number]) as of 11/13/2017 14:33   Ref. Range 1/27/2017 11:39 5/15/2017 09:13 8/16/2017 10:50 11/11/2017 09:23   Glycohemoglobin Latest Ref Range: 0.0 - 5.6 % 8.3 (H) 7.6 (H) 8.2 (H) 9.0 (H)     
Patient is tolerating atorvastatin 80 mg daily.  
Patient is tolerating avapro 300 mg daily, coreg 12.5 mg twice daily.  
yes...

## 2019-12-07 DIAGNOSIS — I10 ESSENTIAL HYPERTENSION: ICD-10-CM

## 2019-12-10 RX ORDER — IRBESARTAN 300 MG/1
TABLET ORAL
Qty: 100 TAB | Refills: 0 | Status: SHIPPED | OUTPATIENT
Start: 2019-12-10 | End: 2019-12-23 | Stop reason: RX

## 2019-12-17 ENCOUNTER — TELEPHONE (OUTPATIENT)
Dept: CARDIOLOGY | Facility: MEDICAL CENTER | Age: 74
End: 2019-12-17

## 2019-12-17 DIAGNOSIS — I10 ESSENTIAL HYPERTENSION: ICD-10-CM

## 2019-12-18 NOTE — TELEPHONE ENCOUNTER
Received fax from [facility].  Irbesartan 300mg on back order.  Pharmacy requesting alternative.    Routed to Dr. Ovalle.  Dr. Ovalle out of the office until Monday, 12/23.

## 2019-12-23 RX ORDER — VALSARTAN 320 MG/1
320 TABLET ORAL DAILY
Qty: 30 TAB | Refills: 3 | Status: SHIPPED | OUTPATIENT
Start: 2019-12-23 | End: 2019-12-24 | Stop reason: SDUPTHER

## 2019-12-23 NOTE — TELEPHONE ENCOUNTER
Kade Ovalle M.D.  Monae Sharma, R.N. 15 hours ago (4:34 PM)      Valsartan 320 mg a day    Routing comment      Called pt, pt in the shower, s/w wife (Simran), discussed Dr Ovalle recommendations, wife agreed and verbalizes understanding    New Rx for Valsartan 320mg PO daily sent to [facility], 30 day supply per pt's wife request

## 2019-12-24 DIAGNOSIS — I10 ESSENTIAL HYPERTENSION: ICD-10-CM

## 2019-12-26 RX ORDER — VALSARTAN 320 MG/1
320 TABLET ORAL DAILY
Qty: 100 TAB | Refills: 1 | Status: SHIPPED | OUTPATIENT
Start: 2019-12-26 | End: 2020-03-18 | Stop reason: SDUPTHER

## 2020-01-01 ENCOUNTER — APPOINTMENT (OUTPATIENT)
Dept: RADIOLOGY | Facility: MEDICAL CENTER | Age: 75
End: 2020-01-01
Attending: ORTHOPAEDIC SURGERY
Payer: MEDICARE

## 2020-01-01 ENCOUNTER — ANESTHESIA (OUTPATIENT)
Dept: SURGERY | Facility: MEDICAL CENTER | Age: 75
DRG: 981 | End: 2020-01-01
Payer: MEDICARE

## 2020-01-01 ENCOUNTER — HOSPITAL ENCOUNTER (OUTPATIENT)
Facility: MEDICAL CENTER | Age: 75
End: 2020-09-02
Attending: EMERGENCY MEDICINE | Admitting: INTERNAL MEDICINE
Payer: MEDICARE

## 2020-01-01 ENCOUNTER — APPOINTMENT (OUTPATIENT)
Dept: RADIOLOGY | Facility: MEDICAL CENTER | Age: 75
DRG: 981 | End: 2020-01-01
Attending: FAMILY MEDICINE
Payer: MEDICARE

## 2020-01-01 ENCOUNTER — ANESTHESIA (OUTPATIENT)
Dept: SURGERY | Facility: MEDICAL CENTER | Age: 75
End: 2020-01-01
Payer: MEDICARE

## 2020-01-01 ENCOUNTER — TELEPHONE (OUTPATIENT)
Dept: HEALTH INFORMATION MANAGEMENT | Facility: OTHER | Age: 75
End: 2020-01-01

## 2020-01-01 ENCOUNTER — APPOINTMENT (OUTPATIENT)
Dept: RADIOLOGY | Facility: MEDICAL CENTER | Age: 75
DRG: 981 | End: 2020-01-01
Attending: EMERGENCY MEDICINE
Payer: MEDICARE

## 2020-01-01 ENCOUNTER — APPOINTMENT (OUTPATIENT)
Dept: RADIOLOGY | Facility: MEDICAL CENTER | Age: 75
DRG: 981 | End: 2020-01-01
Attending: INTERNAL MEDICINE
Payer: MEDICARE

## 2020-01-01 ENCOUNTER — PRE-ADMISSION TESTING (OUTPATIENT)
Dept: ADMISSIONS | Facility: MEDICAL CENTER | Age: 75
End: 2020-01-01
Attending: ORTHOPAEDIC SURGERY
Payer: MEDICARE

## 2020-01-01 ENCOUNTER — PATIENT OUTREACH (OUTPATIENT)
Dept: HEALTH INFORMATION MANAGEMENT | Facility: OTHER | Age: 75
End: 2020-01-01

## 2020-01-01 ENCOUNTER — HOME HEALTH ADMISSION (OUTPATIENT)
Dept: HOME HEALTH SERVICES | Facility: HOME HEALTHCARE | Age: 75
End: 2020-01-01
Payer: MEDICARE

## 2020-01-01 ENCOUNTER — ANESTHESIA EVENT (OUTPATIENT)
Dept: SURGERY | Facility: MEDICAL CENTER | Age: 75
DRG: 981 | End: 2020-01-01
Payer: MEDICARE

## 2020-01-01 ENCOUNTER — APPOINTMENT (OUTPATIENT)
Dept: RADIOLOGY | Facility: MEDICAL CENTER | Age: 75
End: 2020-01-01
Attending: EMERGENCY MEDICINE
Payer: MEDICARE

## 2020-01-01 ENCOUNTER — APPOINTMENT (OUTPATIENT)
Dept: CARDIOLOGY | Facility: MEDICAL CENTER | Age: 75
End: 2020-01-01
Attending: INTERNAL MEDICINE
Payer: MEDICARE

## 2020-01-01 ENCOUNTER — HOSPITAL ENCOUNTER (OUTPATIENT)
Facility: MEDICAL CENTER | Age: 75
End: 2020-08-24
Attending: ORTHOPAEDIC SURGERY | Admitting: ORTHOPAEDIC SURGERY
Payer: MEDICARE

## 2020-01-01 ENCOUNTER — ANESTHESIA EVENT (OUTPATIENT)
Dept: SURGERY | Facility: MEDICAL CENTER | Age: 75
End: 2020-01-01
Payer: MEDICARE

## 2020-01-01 ENCOUNTER — HOSPITAL ENCOUNTER (INPATIENT)
Facility: MEDICAL CENTER | Age: 75
LOS: 39 days | DRG: 981 | End: 2020-12-28
Attending: EMERGENCY MEDICINE | Admitting: FAMILY MEDICINE
Payer: MEDICARE

## 2020-01-01 VITALS
RESPIRATION RATE: 18 BRPM | DIASTOLIC BLOOD PRESSURE: 66 MMHG | WEIGHT: 195.11 LBS | BODY MASS INDEX: 25.04 KG/M2 | HEIGHT: 74 IN | HEART RATE: 89 BPM | TEMPERATURE: 98.2 F | SYSTOLIC BLOOD PRESSURE: 111 MMHG | OXYGEN SATURATION: 91 %

## 2020-01-01 VITALS
TEMPERATURE: 97.7 F | HEART RATE: 69 BPM | HEIGHT: 74 IN | WEIGHT: 223.99 LBS | RESPIRATION RATE: 18 BRPM | SYSTOLIC BLOOD PRESSURE: 115 MMHG | DIASTOLIC BLOOD PRESSURE: 64 MMHG | BODY MASS INDEX: 28.75 KG/M2 | OXYGEN SATURATION: 95 %

## 2020-01-01 VITALS
RESPIRATION RATE: 16 BRPM | HEIGHT: 74 IN | BODY MASS INDEX: 29.2 KG/M2 | HEART RATE: 84 BPM | TEMPERATURE: 98 F | WEIGHT: 227.51 LBS | DIASTOLIC BLOOD PRESSURE: 79 MMHG | OXYGEN SATURATION: 94 % | SYSTOLIC BLOOD PRESSURE: 144 MMHG

## 2020-01-01 DIAGNOSIS — U07.1 COVID-19 VIRUS INFECTION: ICD-10-CM

## 2020-01-01 DIAGNOSIS — R62.7 ADULT FAILURE TO THRIVE: ICD-10-CM

## 2020-01-01 DIAGNOSIS — S91.301A OPEN WOUND OF HEEL, RIGHT, INITIAL ENCOUNTER: ICD-10-CM

## 2020-01-01 DIAGNOSIS — Z86.73 HISTORY OF STROKE: ICD-10-CM

## 2020-01-01 DIAGNOSIS — Z01.812 PRE-OPERATIVE LABORATORY EXAMINATION: ICD-10-CM

## 2020-01-01 DIAGNOSIS — I25.10 CORONARY ARTERY DISEASE INVOLVING NATIVE CORONARY ARTERY OF NATIVE HEART WITHOUT ANGINA PECTORIS: ICD-10-CM

## 2020-01-01 DIAGNOSIS — E11.42 TYPE 2 DIABETES MELLITUS WITH PERIPHERAL NEUROPATHY (HCC): ICD-10-CM

## 2020-01-01 DIAGNOSIS — R79.89 ELEVATED TROPONIN: ICD-10-CM

## 2020-01-01 DIAGNOSIS — M25.571 ACUTE RIGHT ANKLE PAIN: ICD-10-CM

## 2020-01-01 DIAGNOSIS — Z77.098 AGENT ORANGE EXPOSURE: ICD-10-CM

## 2020-01-01 DIAGNOSIS — Z01.810 PRE-OPERATIVE CARDIOVASCULAR EXAMINATION: ICD-10-CM

## 2020-01-01 DIAGNOSIS — R53.1 WEAKNESS: ICD-10-CM

## 2020-01-01 DIAGNOSIS — N39.0 ACUTE UTI: ICD-10-CM

## 2020-01-01 DIAGNOSIS — E78.5 HYPERLIPIDEMIA ASSOCIATED WITH TYPE 2 DIABETES MELLITUS (HCC): ICD-10-CM

## 2020-01-01 DIAGNOSIS — Z95.5 STENTED CORONARY ARTERY: ICD-10-CM

## 2020-01-01 DIAGNOSIS — M25.571 RIGHT ANKLE PAIN, UNSPECIFIED CHRONICITY: ICD-10-CM

## 2020-01-01 DIAGNOSIS — I82.462 ACUTE DEEP VEIN THROMBOSIS (DVT) OF CALF MUSCLE VEIN OF LEFT LOWER EXTREMITY (HCC): ICD-10-CM

## 2020-01-01 DIAGNOSIS — N40.1 BENIGN NON-NODULAR PROSTATIC HYPERPLASIA WITH LOWER URINARY TRACT SYMPTOMS: ICD-10-CM

## 2020-01-01 DIAGNOSIS — E11.69 HYPERLIPIDEMIA ASSOCIATED WITH TYPE 2 DIABETES MELLITUS (HCC): ICD-10-CM

## 2020-01-01 DIAGNOSIS — E55.9 VITAMIN D DEFICIENCY: ICD-10-CM

## 2020-01-01 DIAGNOSIS — E11.3293 TYPE 2 DIABETES MELLITUS WITH BOTH EYES AFFECTED BY MILD NONPROLIFERATIVE RETINOPATHY WITHOUT MACULAR EDEMA, WITHOUT LONG-TERM CURRENT USE OF INSULIN (HCC): ICD-10-CM

## 2020-01-01 LAB
25(OH)D3 SERPL-MCNC: 36 NG/ML (ref 30–100)
ALBUMIN SERPL BCP-MCNC: 2.2 G/DL (ref 3.2–4.9)
ALBUMIN SERPL BCP-MCNC: 2.4 G/DL (ref 3.2–4.9)
ALBUMIN SERPL BCP-MCNC: 2.5 G/DL (ref 3.2–4.9)
ALBUMIN SERPL BCP-MCNC: 2.6 G/DL (ref 3.2–4.9)
ALBUMIN SERPL BCP-MCNC: 2.7 G/DL (ref 3.2–4.9)
ALBUMIN SERPL BCP-MCNC: 2.8 G/DL (ref 3.2–4.9)
ALBUMIN/GLOB SERPL: 0.8 G/DL
ALBUMIN/GLOB SERPL: 0.9 G/DL
ALP SERPL-CCNC: 190 U/L (ref 30–99)
ALP SERPL-CCNC: 204 U/L (ref 30–99)
ALP SERPL-CCNC: 210 U/L (ref 30–99)
ALP SERPL-CCNC: 211 U/L (ref 30–99)
ALP SERPL-CCNC: 212 U/L (ref 30–99)
ALP SERPL-CCNC: 237 U/L (ref 30–99)
ALP SERPL-CCNC: 248 U/L (ref 30–99)
ALP SERPL-CCNC: 301 U/L (ref 30–99)
ALP SERPL-CCNC: 343 U/L (ref 30–99)
ALT SERPL-CCNC: 18 U/L (ref 2–50)
ALT SERPL-CCNC: 20 U/L (ref 2–50)
ALT SERPL-CCNC: 21 U/L (ref 2–50)
ALT SERPL-CCNC: 23 U/L (ref 2–50)
ALT SERPL-CCNC: 27 U/L (ref 2–50)
ALT SERPL-CCNC: 28 U/L (ref 2–50)
ALT SERPL-CCNC: 29 U/L (ref 2–50)
ALT SERPL-CCNC: 31 U/L (ref 2–50)
ALT SERPL-CCNC: 32 U/L (ref 2–50)
ALT SERPL-CCNC: 36 U/L (ref 2–50)
ALT SERPL-CCNC: 50 U/L (ref 2–50)
ANION GAP SERPL CALC-SCNC: 10 MMOL/L (ref 7–16)
ANION GAP SERPL CALC-SCNC: 11 MMOL/L (ref 7–16)
ANION GAP SERPL CALC-SCNC: 12 MMOL/L (ref 7–16)
ANION GAP SERPL CALC-SCNC: 13 MMOL/L (ref 7–16)
ANION GAP SERPL CALC-SCNC: 7 MMOL/L (ref 7–16)
ANION GAP SERPL CALC-SCNC: 8 MMOL/L (ref 7–16)
ANION GAP SERPL CALC-SCNC: 9 MMOL/L (ref 7–16)
APPEARANCE UR: ABNORMAL
APPEARANCE UR: ABNORMAL
AST SERPL-CCNC: 27 U/L (ref 12–45)
AST SERPL-CCNC: 27 U/L (ref 12–45)
AST SERPL-CCNC: 28 U/L (ref 12–45)
AST SERPL-CCNC: 28 U/L (ref 12–45)
AST SERPL-CCNC: 29 U/L (ref 12–45)
AST SERPL-CCNC: 30 U/L (ref 12–45)
AST SERPL-CCNC: 31 U/L (ref 12–45)
AST SERPL-CCNC: 32 U/L (ref 12–45)
AST SERPL-CCNC: 32 U/L (ref 12–45)
AST SERPL-CCNC: 36 U/L (ref 12–45)
AST SERPL-CCNC: 58 U/L (ref 12–45)
BACTERIA #/AREA URNS HPF: ABNORMAL /HPF
BACTERIA #/AREA URNS HPF: NEGATIVE /HPF
BACTERIA SPEC ANAEROBE CULT: NORMAL
BACTERIA TISS AEROBE CULT: ABNORMAL
BACTERIA UR CULT: NORMAL
BASOPHILS # BLD AUTO: 0 % (ref 0–1.8)
BASOPHILS # BLD AUTO: 0.2 % (ref 0–1.8)
BASOPHILS # BLD AUTO: 0.3 % (ref 0–1.8)
BASOPHILS # BLD AUTO: 0.3 % (ref 0–1.8)
BASOPHILS # BLD AUTO: 0.5 % (ref 0–1.8)
BASOPHILS # BLD AUTO: 0.5 % (ref 0–1.8)
BASOPHILS # BLD AUTO: 0.6 % (ref 0–1.8)
BASOPHILS # BLD AUTO: 0.6 % (ref 0–1.8)
BASOPHILS # BLD AUTO: 0.9 % (ref 0–1.8)
BASOPHILS # BLD AUTO: 0.9 % (ref 0–1.8)
BASOPHILS # BLD AUTO: 1.1 % (ref 0–1.8)
BASOPHILS # BLD: 0 K/UL (ref 0–0.12)
BASOPHILS # BLD: 0.01 K/UL (ref 0–0.12)
BASOPHILS # BLD: 0.02 K/UL (ref 0–0.12)
BASOPHILS # BLD: 0.02 K/UL (ref 0–0.12)
BASOPHILS # BLD: 0.03 K/UL (ref 0–0.12)
BASOPHILS # BLD: 0.04 K/UL (ref 0–0.12)
BASOPHILS # BLD: 0.05 K/UL (ref 0–0.12)
BASOPHILS # BLD: 0.06 K/UL (ref 0–0.12)
BASOPHILS # BLD: 0.06 K/UL (ref 0–0.12)
BILIRUB SERPL-MCNC: 0.6 MG/DL (ref 0.1–1.5)
BILIRUB SERPL-MCNC: 0.6 MG/DL (ref 0.1–1.5)
BILIRUB SERPL-MCNC: 0.7 MG/DL (ref 0.1–1.5)
BILIRUB SERPL-MCNC: 0.7 MG/DL (ref 0.1–1.5)
BILIRUB SERPL-MCNC: 0.8 MG/DL (ref 0.1–1.5)
BILIRUB SERPL-MCNC: 0.8 MG/DL (ref 0.1–1.5)
BILIRUB SERPL-MCNC: 0.9 MG/DL (ref 0.1–1.5)
BILIRUB SERPL-MCNC: 1.1 MG/DL (ref 0.1–1.5)
BILIRUB SERPL-MCNC: 1.3 MG/DL (ref 0.1–1.5)
BILIRUB SERPL-MCNC: 1.5 MG/DL (ref 0.1–1.5)
BILIRUB SERPL-MCNC: 1.6 MG/DL (ref 0.1–1.5)
BILIRUB UR QL STRIP.AUTO: NEGATIVE
BILIRUB UR QL STRIP.AUTO: NEGATIVE
BUN SERPL-MCNC: 13 MG/DL (ref 8–22)
BUN SERPL-MCNC: 15 MG/DL (ref 8–22)
BUN SERPL-MCNC: 16 MG/DL (ref 8–22)
BUN SERPL-MCNC: 16 MG/DL (ref 8–22)
BUN SERPL-MCNC: 17 MG/DL (ref 8–22)
BUN SERPL-MCNC: 17 MG/DL (ref 8–22)
BUN SERPL-MCNC: 18 MG/DL (ref 8–22)
BUN SERPL-MCNC: 18 MG/DL (ref 8–22)
BUN SERPL-MCNC: 19 MG/DL (ref 8–22)
BUN SERPL-MCNC: 20 MG/DL (ref 8–22)
BUN SERPL-MCNC: 20 MG/DL (ref 8–22)
BUN SERPL-MCNC: 21 MG/DL (ref 8–22)
BUN SERPL-MCNC: 22 MG/DL (ref 8–22)
BUN SERPL-MCNC: 24 MG/DL (ref 8–22)
BUN SERPL-MCNC: 24 MG/DL (ref 8–22)
BUN SERPL-MCNC: 26 MG/DL (ref 8–22)
BUN SERPL-MCNC: 28 MG/DL (ref 8–22)
BUN SERPL-MCNC: 30 MG/DL (ref 8–22)
BUN SERPL-MCNC: 35 MG/DL (ref 8–22)
BUN SERPL-MCNC: 35 MG/DL (ref 8–22)
BUN SERPL-MCNC: 36 MG/DL (ref 8–22)
CALCIUM SERPL-MCNC: 7.8 MG/DL (ref 8.4–10.2)
CALCIUM SERPL-MCNC: 7.8 MG/DL (ref 8.4–10.2)
CALCIUM SERPL-MCNC: 8 MG/DL (ref 8.4–10.2)
CALCIUM SERPL-MCNC: 8.1 MG/DL (ref 8.4–10.2)
CALCIUM SERPL-MCNC: 8.2 MG/DL (ref 8.4–10.2)
CALCIUM SERPL-MCNC: 8.2 MG/DL (ref 8.4–10.2)
CALCIUM SERPL-MCNC: 8.3 MG/DL (ref 8.4–10.2)
CALCIUM SERPL-MCNC: 8.4 MG/DL (ref 8.4–10.2)
CALCIUM SERPL-MCNC: 8.5 MG/DL (ref 8.4–10.2)
CALCIUM SERPL-MCNC: 8.5 MG/DL (ref 8.5–10.5)
CALCIUM SERPL-MCNC: 8.6 MG/DL (ref 8.4–10.2)
CALCIUM SERPL-MCNC: 8.6 MG/DL (ref 8.4–10.2)
CALCIUM SERPL-MCNC: 8.6 MG/DL (ref 8.5–10.5)
CALCIUM SERPL-MCNC: 8.7 MG/DL (ref 8.4–10.2)
CALCIUM SERPL-MCNC: 8.7 MG/DL (ref 8.5–10.5)
CALCIUM SERPL-MCNC: 9.6 MG/DL (ref 8.5–10.5)
CHLORIDE SERPL-SCNC: 100 MMOL/L (ref 96–112)
CHLORIDE SERPL-SCNC: 100 MMOL/L (ref 96–112)
CHLORIDE SERPL-SCNC: 101 MMOL/L (ref 96–112)
CHLORIDE SERPL-SCNC: 102 MMOL/L (ref 96–112)
CHLORIDE SERPL-SCNC: 103 MMOL/L (ref 96–112)
CHLORIDE SERPL-SCNC: 104 MMOL/L (ref 96–112)
CHLORIDE SERPL-SCNC: 104 MMOL/L (ref 96–112)
CHLORIDE SERPL-SCNC: 105 MMOL/L (ref 96–112)
CHLORIDE SERPL-SCNC: 106 MMOL/L (ref 96–112)
CHLORIDE SERPL-SCNC: 109 MMOL/L (ref 96–112)
CHLORIDE SERPL-SCNC: 110 MMOL/L (ref 96–112)
CHOLEST SERPL-MCNC: 92 MG/DL (ref 100–199)
CK SERPL-CCNC: 280 U/L (ref 0–154)
CO2 SERPL-SCNC: 22 MMOL/L (ref 20–33)
CO2 SERPL-SCNC: 23 MMOL/L (ref 20–33)
CO2 SERPL-SCNC: 24 MMOL/L (ref 20–33)
CO2 SERPL-SCNC: 25 MMOL/L (ref 20–33)
CO2 SERPL-SCNC: 26 MMOL/L (ref 20–33)
CO2 SERPL-SCNC: 27 MMOL/L (ref 20–33)
CO2 SERPL-SCNC: 27 MMOL/L (ref 20–33)
CO2 SERPL-SCNC: 28 MMOL/L (ref 20–33)
CO2 SERPL-SCNC: 29 MMOL/L (ref 20–33)
CO2 SERPL-SCNC: 29 MMOL/L (ref 20–33)
COLOR UR: YELLOW
COLOR UR: YELLOW
COVID ORDER STATUS COVID19: NORMAL
CREAT SERPL-MCNC: 0.78 MG/DL (ref 0.5–1.4)
CREAT SERPL-MCNC: 0.78 MG/DL (ref 0.5–1.4)
CREAT SERPL-MCNC: 0.81 MG/DL (ref 0.5–1.4)
CREAT SERPL-MCNC: 0.86 MG/DL (ref 0.5–1.4)
CREAT SERPL-MCNC: 0.89 MG/DL (ref 0.5–1.4)
CREAT SERPL-MCNC: 0.9 MG/DL (ref 0.5–1.4)
CREAT SERPL-MCNC: 0.9 MG/DL (ref 0.5–1.4)
CREAT SERPL-MCNC: 0.91 MG/DL (ref 0.5–1.4)
CREAT SERPL-MCNC: 0.94 MG/DL (ref 0.5–1.4)
CREAT SERPL-MCNC: 0.97 MG/DL (ref 0.5–1.4)
CREAT SERPL-MCNC: 1.01 MG/DL (ref 0.5–1.4)
CREAT SERPL-MCNC: 1.02 MG/DL (ref 0.5–1.4)
CREAT SERPL-MCNC: 1.07 MG/DL (ref 0.5–1.4)
CREAT SERPL-MCNC: 1.07 MG/DL (ref 0.5–1.4)
CREAT SERPL-MCNC: 1.09 MG/DL (ref 0.5–1.4)
CREAT SERPL-MCNC: 1.11 MG/DL (ref 0.5–1.4)
CREAT SERPL-MCNC: 1.13 MG/DL (ref 0.5–1.4)
CREAT SERPL-MCNC: 1.14 MG/DL (ref 0.5–1.4)
CREAT SERPL-MCNC: 1.2 MG/DL (ref 0.5–1.4)
CREAT SERPL-MCNC: 1.23 MG/DL (ref 0.5–1.4)
CREAT SERPL-MCNC: 1.27 MG/DL (ref 0.5–1.4)
CREAT SERPL-MCNC: 1.41 MG/DL (ref 0.5–1.4)
CREAT SERPL-MCNC: 1.5 MG/DL (ref 0.5–1.4)
CREAT SERPL-MCNC: 1.51 MG/DL (ref 0.5–1.4)
CREAT SERPL-MCNC: 1.53 MG/DL (ref 0.5–1.4)
CREAT SERPL-MCNC: 1.71 MG/DL (ref 0.5–1.4)
CRP SERPL HS-MCNC: 139.2 MG/L (ref 0–7.5)
D DIMER PPP IA.FEU-MCNC: 3.4 UG/ML (FEU) (ref 0–0.5)
D DIMER PPP IA.FEU-MCNC: 4.25 UG/ML (FEU) (ref 0–0.5)
EKG IMPRESSION: NORMAL
EKG IMPRESSION: NORMAL
EOSINOPHIL # BLD AUTO: 0 K/UL (ref 0–0.51)
EOSINOPHIL # BLD AUTO: 0.02 K/UL (ref 0–0.51)
EOSINOPHIL # BLD AUTO: 0.03 K/UL (ref 0–0.51)
EOSINOPHIL # BLD AUTO: 0.07 K/UL (ref 0–0.51)
EOSINOPHIL # BLD AUTO: 0.1 K/UL (ref 0–0.51)
EOSINOPHIL # BLD AUTO: 0.11 K/UL (ref 0–0.51)
EOSINOPHIL # BLD AUTO: 0.12 K/UL (ref 0–0.51)
EOSINOPHIL # BLD AUTO: 0.15 K/UL (ref 0–0.51)
EOSINOPHIL # BLD AUTO: 0.16 K/UL (ref 0–0.51)
EOSINOPHIL NFR BLD: 0 % (ref 0–6.9)
EOSINOPHIL NFR BLD: 0.2 % (ref 0–6.9)
EOSINOPHIL NFR BLD: 0.3 % (ref 0–6.9)
EOSINOPHIL NFR BLD: 1.1 % (ref 0–6.9)
EOSINOPHIL NFR BLD: 1.6 % (ref 0–6.9)
EOSINOPHIL NFR BLD: 2 % (ref 0–6.9)
EOSINOPHIL NFR BLD: 2.2 % (ref 0–6.9)
EOSINOPHIL NFR BLD: 2.2 % (ref 0–6.9)
EOSINOPHIL NFR BLD: 2.8 % (ref 0–6.9)
EPI CELLS #/AREA URNS HPF: ABNORMAL /HPF
EPI CELLS #/AREA URNS HPF: ABNORMAL /HPF
ERYTHROCYTE [DISTWIDTH] IN BLOOD BY AUTOMATED COUNT: 46.5 FL (ref 35.9–50)
ERYTHROCYTE [DISTWIDTH] IN BLOOD BY AUTOMATED COUNT: 46.5 FL (ref 35.9–50)
ERYTHROCYTE [DISTWIDTH] IN BLOOD BY AUTOMATED COUNT: 47 FL (ref 35.9–50)
ERYTHROCYTE [DISTWIDTH] IN BLOOD BY AUTOMATED COUNT: 51.2 FL (ref 35.9–50)
ERYTHROCYTE [DISTWIDTH] IN BLOOD BY AUTOMATED COUNT: 51.7 FL (ref 35.9–50)
ERYTHROCYTE [DISTWIDTH] IN BLOOD BY AUTOMATED COUNT: 52.1 FL (ref 35.9–50)
ERYTHROCYTE [DISTWIDTH] IN BLOOD BY AUTOMATED COUNT: 52.3 FL (ref 35.9–50)
ERYTHROCYTE [DISTWIDTH] IN BLOOD BY AUTOMATED COUNT: 52.4 FL (ref 35.9–50)
ERYTHROCYTE [DISTWIDTH] IN BLOOD BY AUTOMATED COUNT: 53.2 FL (ref 35.9–50)
ERYTHROCYTE [DISTWIDTH] IN BLOOD BY AUTOMATED COUNT: 53.4 FL (ref 35.9–50)
ERYTHROCYTE [DISTWIDTH] IN BLOOD BY AUTOMATED COUNT: 53.6 FL (ref 35.9–50)
ERYTHROCYTE [DISTWIDTH] IN BLOOD BY AUTOMATED COUNT: 53.8 FL (ref 35.9–50)
ERYTHROCYTE [DISTWIDTH] IN BLOOD BY AUTOMATED COUNT: 54 FL (ref 35.9–50)
ERYTHROCYTE [DISTWIDTH] IN BLOOD BY AUTOMATED COUNT: 54.1 FL (ref 35.9–50)
ERYTHROCYTE [DISTWIDTH] IN BLOOD BY AUTOMATED COUNT: 54.4 FL (ref 35.9–50)
ERYTHROCYTE [DISTWIDTH] IN BLOOD BY AUTOMATED COUNT: 54.4 FL (ref 35.9–50)
ERYTHROCYTE [DISTWIDTH] IN BLOOD BY AUTOMATED COUNT: 55.5 FL (ref 35.9–50)
ERYTHROCYTE [DISTWIDTH] IN BLOOD BY AUTOMATED COUNT: 55.7 FL (ref 35.9–50)
ERYTHROCYTE [DISTWIDTH] IN BLOOD BY AUTOMATED COUNT: 55.8 FL (ref 35.9–50)
ERYTHROCYTE [SEDIMENTATION RATE] IN BLOOD BY WESTERGREN METHOD: 26 MM/HOUR (ref 0–20)
EST. AVERAGE GLUCOSE BLD GHB EST-MCNC: 183 MG/DL
FERRITIN SERPL-MCNC: 1062 NG/ML (ref 22–322)
FLUAV RNA SPEC QL NAA+PROBE: NEGATIVE
FLUAV RNA SPEC QL NAA+PROBE: NEGATIVE
FLUBV RNA SPEC QL NAA+PROBE: NEGATIVE
FLUBV RNA SPEC QL NAA+PROBE: NEGATIVE
GLOBULIN SER CALC-MCNC: 2.6 G/DL (ref 1.9–3.5)
GLOBULIN SER CALC-MCNC: 2.7 G/DL (ref 1.9–3.5)
GLOBULIN SER CALC-MCNC: 2.8 G/DL (ref 1.9–3.5)
GLOBULIN SER CALC-MCNC: 2.9 G/DL (ref 1.9–3.5)
GLOBULIN SER CALC-MCNC: 3.2 G/DL (ref 1.9–3.5)
GLOBULIN SER CALC-MCNC: 3.6 G/DL (ref 1.9–3.5)
GLUCOSE BLD-MCNC: 100 MG/DL (ref 65–99)
GLUCOSE BLD-MCNC: 102 MG/DL (ref 65–99)
GLUCOSE BLD-MCNC: 103 MG/DL (ref 65–99)
GLUCOSE BLD-MCNC: 104 MG/DL (ref 65–99)
GLUCOSE BLD-MCNC: 106 MG/DL (ref 65–99)
GLUCOSE BLD-MCNC: 107 MG/DL (ref 65–99)
GLUCOSE BLD-MCNC: 108 MG/DL (ref 65–99)
GLUCOSE BLD-MCNC: 109 MG/DL (ref 65–99)
GLUCOSE BLD-MCNC: 110 MG/DL (ref 65–99)
GLUCOSE BLD-MCNC: 110 MG/DL (ref 65–99)
GLUCOSE BLD-MCNC: 112 MG/DL (ref 65–99)
GLUCOSE BLD-MCNC: 112 MG/DL (ref 65–99)
GLUCOSE BLD-MCNC: 113 MG/DL (ref 65–99)
GLUCOSE BLD-MCNC: 113 MG/DL (ref 65–99)
GLUCOSE BLD-MCNC: 114 MG/DL (ref 65–99)
GLUCOSE BLD-MCNC: 115 MG/DL (ref 65–99)
GLUCOSE BLD-MCNC: 116 MG/DL (ref 65–99)
GLUCOSE BLD-MCNC: 118 MG/DL (ref 65–99)
GLUCOSE BLD-MCNC: 118 MG/DL (ref 65–99)
GLUCOSE BLD-MCNC: 119 MG/DL (ref 65–99)
GLUCOSE BLD-MCNC: 120 MG/DL (ref 65–99)
GLUCOSE BLD-MCNC: 121 MG/DL (ref 65–99)
GLUCOSE BLD-MCNC: 123 MG/DL (ref 65–99)
GLUCOSE BLD-MCNC: 123 MG/DL (ref 65–99)
GLUCOSE BLD-MCNC: 125 MG/DL (ref 65–99)
GLUCOSE BLD-MCNC: 127 MG/DL (ref 65–99)
GLUCOSE BLD-MCNC: 128 MG/DL (ref 65–99)
GLUCOSE BLD-MCNC: 130 MG/DL (ref 65–99)
GLUCOSE BLD-MCNC: 131 MG/DL (ref 65–99)
GLUCOSE BLD-MCNC: 132 MG/DL (ref 65–99)
GLUCOSE BLD-MCNC: 133 MG/DL (ref 65–99)
GLUCOSE BLD-MCNC: 134 MG/DL (ref 65–99)
GLUCOSE BLD-MCNC: 135 MG/DL (ref 65–99)
GLUCOSE BLD-MCNC: 136 MG/DL (ref 65–99)
GLUCOSE BLD-MCNC: 138 MG/DL (ref 65–99)
GLUCOSE BLD-MCNC: 139 MG/DL (ref 65–99)
GLUCOSE BLD-MCNC: 142 MG/DL (ref 65–99)
GLUCOSE BLD-MCNC: 143 MG/DL (ref 65–99)
GLUCOSE BLD-MCNC: 145 MG/DL (ref 65–99)
GLUCOSE BLD-MCNC: 145 MG/DL (ref 65–99)
GLUCOSE BLD-MCNC: 146 MG/DL (ref 65–99)
GLUCOSE BLD-MCNC: 146 MG/DL (ref 65–99)
GLUCOSE BLD-MCNC: 147 MG/DL (ref 65–99)
GLUCOSE BLD-MCNC: 148 MG/DL (ref 65–99)
GLUCOSE BLD-MCNC: 151 MG/DL (ref 65–99)
GLUCOSE BLD-MCNC: 151 MG/DL (ref 65–99)
GLUCOSE BLD-MCNC: 152 MG/DL (ref 65–99)
GLUCOSE BLD-MCNC: 153 MG/DL (ref 65–99)
GLUCOSE BLD-MCNC: 156 MG/DL (ref 65–99)
GLUCOSE BLD-MCNC: 156 MG/DL (ref 65–99)
GLUCOSE BLD-MCNC: 158 MG/DL (ref 65–99)
GLUCOSE BLD-MCNC: 159 MG/DL (ref 65–99)
GLUCOSE BLD-MCNC: 161 MG/DL (ref 65–99)
GLUCOSE BLD-MCNC: 162 MG/DL (ref 65–99)
GLUCOSE BLD-MCNC: 162 MG/DL (ref 65–99)
GLUCOSE BLD-MCNC: 164 MG/DL (ref 65–99)
GLUCOSE BLD-MCNC: 166 MG/DL (ref 65–99)
GLUCOSE BLD-MCNC: 169 MG/DL (ref 65–99)
GLUCOSE BLD-MCNC: 170 MG/DL (ref 65–99)
GLUCOSE BLD-MCNC: 171 MG/DL (ref 65–99)
GLUCOSE BLD-MCNC: 171 MG/DL (ref 65–99)
GLUCOSE BLD-MCNC: 173 MG/DL (ref 65–99)
GLUCOSE BLD-MCNC: 174 MG/DL (ref 65–99)
GLUCOSE BLD-MCNC: 182 MG/DL (ref 65–99)
GLUCOSE BLD-MCNC: 185 MG/DL (ref 65–99)
GLUCOSE BLD-MCNC: 189 MG/DL (ref 65–99)
GLUCOSE BLD-MCNC: 190 MG/DL (ref 65–99)
GLUCOSE BLD-MCNC: 190 MG/DL (ref 65–99)
GLUCOSE BLD-MCNC: 192 MG/DL (ref 65–99)
GLUCOSE BLD-MCNC: 197 MG/DL (ref 65–99)
GLUCOSE BLD-MCNC: 221 MG/DL (ref 65–99)
GLUCOSE BLD-MCNC: 237 MG/DL (ref 65–99)
GLUCOSE BLD-MCNC: 73 MG/DL (ref 65–99)
GLUCOSE BLD-MCNC: 78 MG/DL (ref 65–99)
GLUCOSE BLD-MCNC: 81 MG/DL (ref 65–99)
GLUCOSE BLD-MCNC: 81 MG/DL (ref 65–99)
GLUCOSE BLD-MCNC: 82 MG/DL (ref 65–99)
GLUCOSE BLD-MCNC: 83 MG/DL (ref 65–99)
GLUCOSE BLD-MCNC: 83 MG/DL (ref 65–99)
GLUCOSE BLD-MCNC: 84 MG/DL (ref 65–99)
GLUCOSE BLD-MCNC: 85 MG/DL (ref 65–99)
GLUCOSE BLD-MCNC: 86 MG/DL (ref 65–99)
GLUCOSE BLD-MCNC: 87 MG/DL (ref 65–99)
GLUCOSE BLD-MCNC: 88 MG/DL (ref 65–99)
GLUCOSE BLD-MCNC: 89 MG/DL (ref 65–99)
GLUCOSE BLD-MCNC: 89 MG/DL (ref 65–99)
GLUCOSE BLD-MCNC: 90 MG/DL (ref 65–99)
GLUCOSE BLD-MCNC: 91 MG/DL (ref 65–99)
GLUCOSE BLD-MCNC: 92 MG/DL (ref 65–99)
GLUCOSE BLD-MCNC: 93 MG/DL (ref 65–99)
GLUCOSE BLD-MCNC: 94 MG/DL (ref 65–99)
GLUCOSE BLD-MCNC: 94 MG/DL (ref 65–99)
GLUCOSE BLD-MCNC: 95 MG/DL (ref 65–99)
GLUCOSE BLD-MCNC: 96 MG/DL (ref 65–99)
GLUCOSE BLD-MCNC: 96 MG/DL (ref 65–99)
GLUCOSE BLD-MCNC: 97 MG/DL (ref 65–99)
GLUCOSE BLD-MCNC: 97 MG/DL (ref 65–99)
GLUCOSE BLD-MCNC: 98 MG/DL (ref 65–99)
GLUCOSE BLD-MCNC: 98 MG/DL (ref 65–99)
GLUCOSE BLD-MCNC: 99 MG/DL (ref 65–99)
GLUCOSE SERPL-MCNC: 107 MG/DL (ref 65–99)
GLUCOSE SERPL-MCNC: 112 MG/DL (ref 65–99)
GLUCOSE SERPL-MCNC: 115 MG/DL (ref 65–99)
GLUCOSE SERPL-MCNC: 120 MG/DL (ref 65–99)
GLUCOSE SERPL-MCNC: 136 MG/DL (ref 65–99)
GLUCOSE SERPL-MCNC: 143 MG/DL (ref 65–99)
GLUCOSE SERPL-MCNC: 157 MG/DL (ref 65–99)
GLUCOSE SERPL-MCNC: 160 MG/DL (ref 65–99)
GLUCOSE SERPL-MCNC: 163 MG/DL (ref 65–99)
GLUCOSE SERPL-MCNC: 174 MG/DL (ref 65–99)
GLUCOSE SERPL-MCNC: 191 MG/DL (ref 65–99)
GLUCOSE SERPL-MCNC: 196 MG/DL (ref 65–99)
GLUCOSE SERPL-MCNC: 198 MG/DL (ref 65–99)
GLUCOSE SERPL-MCNC: 199 MG/DL (ref 65–99)
GLUCOSE SERPL-MCNC: 202 MG/DL (ref 65–99)
GLUCOSE SERPL-MCNC: 275 MG/DL (ref 65–99)
GLUCOSE SERPL-MCNC: 72 MG/DL (ref 65–99)
GLUCOSE SERPL-MCNC: 72 MG/DL (ref 65–99)
GLUCOSE SERPL-MCNC: 79 MG/DL (ref 65–99)
GLUCOSE SERPL-MCNC: 81 MG/DL (ref 65–99)
GLUCOSE SERPL-MCNC: 85 MG/DL (ref 65–99)
GLUCOSE SERPL-MCNC: 89 MG/DL (ref 65–99)
GLUCOSE SERPL-MCNC: 90 MG/DL (ref 65–99)
GLUCOSE SERPL-MCNC: 95 MG/DL (ref 65–99)
GLUCOSE SERPL-MCNC: 97 MG/DL (ref 65–99)
GLUCOSE SERPL-MCNC: 97 MG/DL (ref 65–99)
GLUCOSE UR STRIP.AUTO-MCNC: 100 MG/DL
GLUCOSE UR STRIP.AUTO-MCNC: 500 MG/DL
GRAM STN SPEC: ABNORMAL
GRAM STN SPEC: NORMAL
HBA1C MFR BLD: 8 % (ref 0–5.6)
HCT VFR BLD AUTO: 29.5 % (ref 42–52)
HCT VFR BLD AUTO: 30.4 % (ref 42–52)
HCT VFR BLD AUTO: 31.6 % (ref 42–52)
HCT VFR BLD AUTO: 31.8 % (ref 42–52)
HCT VFR BLD AUTO: 31.9 % (ref 42–52)
HCT VFR BLD AUTO: 32.4 % (ref 42–52)
HCT VFR BLD AUTO: 32.5 % (ref 42–52)
HCT VFR BLD AUTO: 32.7 % (ref 42–52)
HCT VFR BLD AUTO: 32.9 % (ref 42–52)
HCT VFR BLD AUTO: 33.1 % (ref 42–52)
HCT VFR BLD AUTO: 33.5 % (ref 42–52)
HCT VFR BLD AUTO: 33.5 % (ref 42–52)
HCT VFR BLD AUTO: 34 % (ref 42–52)
HCT VFR BLD AUTO: 34.5 % (ref 42–52)
HCT VFR BLD AUTO: 35.5 % (ref 42–52)
HCT VFR BLD AUTO: 36 % (ref 42–52)
HCT VFR BLD AUTO: 39.8 % (ref 42–52)
HDLC SERPL-MCNC: 31 MG/DL
HGB BLD-MCNC: 10 G/DL (ref 14–18)
HGB BLD-MCNC: 10.1 G/DL (ref 14–18)
HGB BLD-MCNC: 10.2 G/DL (ref 14–18)
HGB BLD-MCNC: 10.3 G/DL (ref 14–18)
HGB BLD-MCNC: 10.3 G/DL (ref 14–18)
HGB BLD-MCNC: 10.4 G/DL (ref 14–18)
HGB BLD-MCNC: 10.5 G/DL (ref 14–18)
HGB BLD-MCNC: 10.7 G/DL (ref 14–18)
HGB BLD-MCNC: 10.7 G/DL (ref 14–18)
HGB BLD-MCNC: 10.9 G/DL (ref 14–18)
HGB BLD-MCNC: 11.4 G/DL (ref 14–18)
HGB BLD-MCNC: 11.6 G/DL (ref 14–18)
HGB BLD-MCNC: 13.1 G/DL (ref 14–18)
HGB BLD-MCNC: 9.2 G/DL (ref 14–18)
HGB BLD-MCNC: 9.5 G/DL (ref 14–18)
HGB BLD-MCNC: 9.7 G/DL (ref 14–18)
HYALINE CASTS #/AREA URNS LPF: ABNORMAL /LPF
IMM GRANULOCYTES # BLD AUTO: 0.01 K/UL (ref 0–0.11)
IMM GRANULOCYTES # BLD AUTO: 0.02 K/UL (ref 0–0.11)
IMM GRANULOCYTES # BLD AUTO: 0.03 K/UL (ref 0–0.11)
IMM GRANULOCYTES # BLD AUTO: 0.04 K/UL (ref 0–0.11)
IMM GRANULOCYTES # BLD AUTO: 0.05 K/UL (ref 0–0.11)
IMM GRANULOCYTES # BLD AUTO: 0.06 K/UL (ref 0–0.11)
IMM GRANULOCYTES # BLD AUTO: 0.06 K/UL (ref 0–0.11)
IMM GRANULOCYTES NFR BLD AUTO: 0.2 % (ref 0–0.9)
IMM GRANULOCYTES NFR BLD AUTO: 0.3 % (ref 0–0.9)
IMM GRANULOCYTES NFR BLD AUTO: 0.4 % (ref 0–0.9)
IMM GRANULOCYTES NFR BLD AUTO: 0.4 % (ref 0–0.9)
IMM GRANULOCYTES NFR BLD AUTO: 0.5 % (ref 0–0.9)
IMM GRANULOCYTES NFR BLD AUTO: 0.5 % (ref 0–0.9)
IMM GRANULOCYTES NFR BLD AUTO: 0.6 % (ref 0–0.9)
IMM GRANULOCYTES NFR BLD AUTO: 0.7 % (ref 0–0.9)
IMM GRANULOCYTES NFR BLD AUTO: 0.9 % (ref 0–0.9)
IMM GRANULOCYTES NFR BLD AUTO: 1.1 % (ref 0–0.9)
IMM GRANULOCYTES NFR BLD AUTO: 1.4 % (ref 0–0.9)
IRON SATN MFR SERPL: 23 % (ref 15–55)
IRON SERPL-MCNC: 41 UG/DL (ref 50–180)
KETONES UR STRIP.AUTO-MCNC: NEGATIVE MG/DL
KETONES UR STRIP.AUTO-MCNC: NEGATIVE MG/DL
LDLC SERPL CALC-MCNC: 45 MG/DL
LEUKOCYTE ESTERASE UR QL STRIP.AUTO: ABNORMAL
LEUKOCYTE ESTERASE UR QL STRIP.AUTO: ABNORMAL
LV EJECT FRACT  99904: 55
LV EJECT FRACT MOD 2C 99903: 53.54
LV EJECT FRACT MOD 4C 99902: 69.5
LV EJECT FRACT MOD BP 99901: 62.11
LYMPHOCYTES # BLD AUTO: 0.36 K/UL (ref 1–4.8)
LYMPHOCYTES # BLD AUTO: 0.47 K/UL (ref 1–4.8)
LYMPHOCYTES # BLD AUTO: 0.53 K/UL (ref 1–4.8)
LYMPHOCYTES # BLD AUTO: 0.56 K/UL (ref 1–4.8)
LYMPHOCYTES # BLD AUTO: 0.6 K/UL (ref 1–4.8)
LYMPHOCYTES # BLD AUTO: 0.69 K/UL (ref 1–4.8)
LYMPHOCYTES # BLD AUTO: 0.77 K/UL (ref 1–4.8)
LYMPHOCYTES # BLD AUTO: 0.77 K/UL (ref 1–4.8)
LYMPHOCYTES # BLD AUTO: 0.98 K/UL (ref 1–4.8)
LYMPHOCYTES # BLD AUTO: 1.06 K/UL (ref 1–4.8)
LYMPHOCYTES # BLD AUTO: 1.15 K/UL (ref 1–4.8)
LYMPHOCYTES # BLD AUTO: 1.2 K/UL (ref 1–4.8)
LYMPHOCYTES # BLD AUTO: 1.23 K/UL (ref 1–4.8)
LYMPHOCYTES # BLD AUTO: 1.35 K/UL (ref 1–4.8)
LYMPHOCYTES # BLD AUTO: 1.45 K/UL (ref 1–4.8)
LYMPHOCYTES NFR BLD: 10 % (ref 22–41)
LYMPHOCYTES NFR BLD: 10.2 % (ref 22–41)
LYMPHOCYTES NFR BLD: 10.7 % (ref 22–41)
LYMPHOCYTES NFR BLD: 11.1 % (ref 22–41)
LYMPHOCYTES NFR BLD: 12 % (ref 22–41)
LYMPHOCYTES NFR BLD: 16.4 % (ref 22–41)
LYMPHOCYTES NFR BLD: 16.6 % (ref 22–41)
LYMPHOCYTES NFR BLD: 18.5 % (ref 22–41)
LYMPHOCYTES NFR BLD: 21.4 % (ref 22–41)
LYMPHOCYTES NFR BLD: 21.4 % (ref 22–41)
LYMPHOCYTES NFR BLD: 27 % (ref 22–41)
LYMPHOCYTES NFR BLD: 7.6 % (ref 22–41)
LYMPHOCYTES NFR BLD: 9.1 % (ref 22–41)
LYMPHOCYTES NFR BLD: 9.4 % (ref 22–41)
LYMPHOCYTES NFR BLD: 9.9 % (ref 22–41)
MAGNESIUM SERPL-MCNC: 1.6 MG/DL (ref 1.5–2.5)
MAGNESIUM SERPL-MCNC: 1.7 MG/DL (ref 1.5–2.5)
MAGNESIUM SERPL-MCNC: 1.7 MG/DL (ref 1.5–2.5)
MAGNESIUM SERPL-MCNC: 1.8 MG/DL (ref 1.5–2.5)
MAGNESIUM SERPL-MCNC: 1.8 MG/DL (ref 1.5–2.5)
MAGNESIUM SERPL-MCNC: 1.9 MG/DL (ref 1.5–2.5)
MAGNESIUM SERPL-MCNC: 2 MG/DL (ref 1.5–2.5)
MAGNESIUM SERPL-MCNC: 2.1 MG/DL (ref 1.5–2.5)
MAGNESIUM SERPL-MCNC: 2.2 MG/DL (ref 1.5–2.5)
MCH RBC QN AUTO: 29.2 PG (ref 27–33)
MCH RBC QN AUTO: 29.3 PG (ref 27–33)
MCH RBC QN AUTO: 29.5 PG (ref 27–33)
MCH RBC QN AUTO: 29.5 PG (ref 27–33)
MCH RBC QN AUTO: 29.7 PG (ref 27–33)
MCH RBC QN AUTO: 29.7 PG (ref 27–33)
MCH RBC QN AUTO: 29.8 PG (ref 27–33)
MCH RBC QN AUTO: 29.9 PG (ref 27–33)
MCH RBC QN AUTO: 30 PG (ref 27–33)
MCH RBC QN AUTO: 30.1 PG (ref 27–33)
MCH RBC QN AUTO: 30.1 PG (ref 27–33)
MCH RBC QN AUTO: 30.3 PG (ref 27–33)
MCH RBC QN AUTO: 31 PG (ref 27–33)
MCH RBC QN AUTO: 31.1 PG (ref 27–33)
MCH RBC QN AUTO: 31.6 PG (ref 27–33)
MCHC RBC AUTO-ENTMCNC: 30.5 G/DL (ref 33.7–35.3)
MCHC RBC AUTO-ENTMCNC: 30.9 G/DL (ref 33.7–35.3)
MCHC RBC AUTO-ENTMCNC: 31 G/DL (ref 33.7–35.3)
MCHC RBC AUTO-ENTMCNC: 31.1 G/DL (ref 33.7–35.3)
MCHC RBC AUTO-ENTMCNC: 31.2 G/DL (ref 33.7–35.3)
MCHC RBC AUTO-ENTMCNC: 31.3 G/DL (ref 33.7–35.3)
MCHC RBC AUTO-ENTMCNC: 31.4 G/DL (ref 33.7–35.3)
MCHC RBC AUTO-ENTMCNC: 31.4 G/DL (ref 33.7–35.3)
MCHC RBC AUTO-ENTMCNC: 31.5 G/DL (ref 33.7–35.3)
MCHC RBC AUTO-ENTMCNC: 31.5 G/DL (ref 33.7–35.3)
MCHC RBC AUTO-ENTMCNC: 31.6 G/DL (ref 33.7–35.3)
MCHC RBC AUTO-ENTMCNC: 31.6 G/DL (ref 33.7–35.3)
MCHC RBC AUTO-ENTMCNC: 31.9 G/DL (ref 33.7–35.3)
MCHC RBC AUTO-ENTMCNC: 32.1 G/DL (ref 33.7–35.3)
MCHC RBC AUTO-ENTMCNC: 32.2 G/DL (ref 33.7–35.3)
MCHC RBC AUTO-ENTMCNC: 32.3 G/DL (ref 33.7–35.3)
MCHC RBC AUTO-ENTMCNC: 32.9 G/DL (ref 33.7–35.3)
MCV RBC AUTO: 93.1 FL (ref 81.4–97.8)
MCV RBC AUTO: 93.1 FL (ref 81.4–97.8)
MCV RBC AUTO: 93.2 FL (ref 81.4–97.8)
MCV RBC AUTO: 93.3 FL (ref 81.4–97.8)
MCV RBC AUTO: 93.5 FL (ref 81.4–97.8)
MCV RBC AUTO: 93.7 FL (ref 81.4–97.8)
MCV RBC AUTO: 94 FL (ref 81.4–97.8)
MCV RBC AUTO: 94.4 FL (ref 81.4–97.8)
MCV RBC AUTO: 94.5 FL (ref 81.4–97.8)
MCV RBC AUTO: 94.6 FL (ref 81.4–97.8)
MCV RBC AUTO: 94.7 FL (ref 81.4–97.8)
MCV RBC AUTO: 95.6 FL (ref 81.4–97.8)
MCV RBC AUTO: 95.9 FL (ref 81.4–97.8)
MCV RBC AUTO: 96 FL (ref 81.4–97.8)
MCV RBC AUTO: 96.3 FL (ref 81.4–97.8)
MCV RBC AUTO: 96.4 FL (ref 81.4–97.8)
MCV RBC AUTO: 96.5 FL (ref 81.4–97.8)
MCV RBC AUTO: 97 FL (ref 81.4–97.8)
MCV RBC AUTO: 98.8 FL (ref 81.4–97.8)
MICRO URNS: ABNORMAL
MICRO URNS: ABNORMAL
MONOCYTES # BLD AUTO: 0.13 K/UL (ref 0–0.85)
MONOCYTES # BLD AUTO: 0.2 K/UL (ref 0–0.85)
MONOCYTES # BLD AUTO: 0.23 K/UL (ref 0–0.85)
MONOCYTES # BLD AUTO: 0.24 K/UL (ref 0–0.85)
MONOCYTES # BLD AUTO: 0.35 K/UL (ref 0–0.85)
MONOCYTES # BLD AUTO: 0.35 K/UL (ref 0–0.85)
MONOCYTES # BLD AUTO: 0.41 K/UL (ref 0–0.85)
MONOCYTES # BLD AUTO: 0.5 K/UL (ref 0–0.85)
MONOCYTES # BLD AUTO: 0.59 K/UL (ref 0–0.85)
MONOCYTES # BLD AUTO: 0.62 K/UL (ref 0–0.85)
MONOCYTES # BLD AUTO: 0.63 K/UL (ref 0–0.85)
MONOCYTES # BLD AUTO: 0.64 K/UL (ref 0–0.85)
MONOCYTES # BLD AUTO: 0.7 K/UL (ref 0–0.85)
MONOCYTES # BLD AUTO: 0.81 K/UL (ref 0–0.85)
MONOCYTES # BLD AUTO: 0.87 K/UL (ref 0–0.85)
MONOCYTES NFR BLD AUTO: 10.8 % (ref 0–13.4)
MONOCYTES NFR BLD AUTO: 11 % (ref 0–13.4)
MONOCYTES NFR BLD AUTO: 3.6 % (ref 0–13.4)
MONOCYTES NFR BLD AUTO: 3.6 % (ref 0–13.4)
MONOCYTES NFR BLD AUTO: 4.5 % (ref 0–13.4)
MONOCYTES NFR BLD AUTO: 4.6 % (ref 0–13.4)
MONOCYTES NFR BLD AUTO: 5.4 % (ref 0–13.4)
MONOCYTES NFR BLD AUTO: 6.6 % (ref 0–13.4)
MONOCYTES NFR BLD AUTO: 7.6 % (ref 0–13.4)
MONOCYTES NFR BLD AUTO: 7.7 % (ref 0–13.4)
MONOCYTES NFR BLD AUTO: 8 % (ref 0–13.4)
MONOCYTES NFR BLD AUTO: 8.5 % (ref 0–13.4)
MONOCYTES NFR BLD AUTO: 8.7 % (ref 0–13.4)
MONOCYTES NFR BLD AUTO: 9.3 % (ref 0–13.4)
MONOCYTES NFR BLD AUTO: 9.8 % (ref 0–13.4)
NEUTROPHILS # BLD AUTO: 3.09 K/UL (ref 1.82–7.42)
NEUTROPHILS # BLD AUTO: 3.13 K/UL (ref 1.82–7.42)
NEUTROPHILS # BLD AUTO: 3.56 K/UL (ref 1.82–7.42)
NEUTROPHILS # BLD AUTO: 3.7 K/UL (ref 1.82–7.42)
NEUTROPHILS # BLD AUTO: 4.19 K/UL (ref 1.82–7.42)
NEUTROPHILS # BLD AUTO: 4.25 K/UL (ref 1.82–7.42)
NEUTROPHILS # BLD AUTO: 4.33 K/UL (ref 1.82–7.42)
NEUTROPHILS # BLD AUTO: 4.49 K/UL (ref 1.82–7.42)
NEUTROPHILS # BLD AUTO: 4.6 K/UL (ref 1.82–7.42)
NEUTROPHILS # BLD AUTO: 4.67 K/UL (ref 1.82–7.42)
NEUTROPHILS # BLD AUTO: 5.18 K/UL (ref 1.82–7.42)
NEUTROPHILS # BLD AUTO: 5.38 K/UL (ref 1.82–7.42)
NEUTROPHILS # BLD AUTO: 6.98 K/UL (ref 1.82–7.42)
NEUTROPHILS # BLD AUTO: 7.99 K/UL (ref 1.82–7.42)
NEUTROPHILS # BLD AUTO: 8.39 K/UL (ref 1.82–7.42)
NEUTROPHILS NFR BLD: 58.3 % (ref 44–72)
NEUTROPHILS NFR BLD: 66.2 % (ref 44–72)
NEUTROPHILS NFR BLD: 66.3 % (ref 44–72)
NEUTROPHILS NFR BLD: 69.7 % (ref 44–72)
NEUTROPHILS NFR BLD: 70.9 % (ref 44–72)
NEUTROPHILS NFR BLD: 71.6 % (ref 44–72)
NEUTROPHILS NFR BLD: 78.1 % (ref 44–72)
NEUTROPHILS NFR BLD: 82.1 % (ref 44–72)
NEUTROPHILS NFR BLD: 82.6 % (ref 44–72)
NEUTROPHILS NFR BLD: 83.1 % (ref 44–72)
NEUTROPHILS NFR BLD: 83.3 % (ref 44–72)
NEUTROPHILS NFR BLD: 83.4 % (ref 44–72)
NEUTROPHILS NFR BLD: 84.8 % (ref 44–72)
NEUTROPHILS NFR BLD: 85.3 % (ref 44–72)
NEUTROPHILS NFR BLD: 85.4 % (ref 44–72)
NITRITE UR QL STRIP.AUTO: NEGATIVE
NITRITE UR QL STRIP.AUTO: NEGATIVE
NRBC # BLD AUTO: 0 K/UL
NRBC BLD-RTO: 0 /100 WBC
PATHOLOGY CONSULT NOTE: NORMAL
PH UR STRIP.AUTO: 5 [PH] (ref 5–8)
PH UR STRIP.AUTO: 5 [PH] (ref 5–8)
PHOSPHATE SERPL-MCNC: 2.6 MG/DL (ref 2.5–4.5)
PHOSPHATE SERPL-MCNC: 2.7 MG/DL (ref 2.5–4.5)
PHOSPHATE SERPL-MCNC: 2.7 MG/DL (ref 2.5–4.5)
PHOSPHATE SERPL-MCNC: 3 MG/DL (ref 2.5–4.5)
PHOSPHATE SERPL-MCNC: 3 MG/DL (ref 2.5–4.5)
PHOSPHATE SERPL-MCNC: 3.1 MG/DL (ref 2.5–4.5)
PHOSPHATE SERPL-MCNC: 3.2 MG/DL (ref 2.5–4.5)
PHOSPHATE SERPL-MCNC: 3.4 MG/DL (ref 2.5–4.5)
PHOSPHATE SERPL-MCNC: 3.4 MG/DL (ref 2.5–4.5)
PHOSPHATE SERPL-MCNC: 3.5 MG/DL (ref 2.5–4.5)
PHOSPHATE SERPL-MCNC: 3.6 MG/DL (ref 2.5–4.5)
PHOSPHATE SERPL-MCNC: 3.6 MG/DL (ref 2.5–4.5)
PLATELET # BLD AUTO: 149 K/UL (ref 164–446)
PLATELET # BLD AUTO: 174 K/UL (ref 164–446)
PLATELET # BLD AUTO: 175 K/UL (ref 164–446)
PLATELET # BLD AUTO: 176 K/UL (ref 164–446)
PLATELET # BLD AUTO: 179 K/UL (ref 164–446)
PLATELET # BLD AUTO: 179 K/UL (ref 164–446)
PLATELET # BLD AUTO: 180 K/UL (ref 164–446)
PLATELET # BLD AUTO: 184 K/UL (ref 164–446)
PLATELET # BLD AUTO: 192 K/UL (ref 164–446)
PLATELET # BLD AUTO: 219 K/UL (ref 164–446)
PLATELET # BLD AUTO: 225 K/UL (ref 164–446)
PLATELET # BLD AUTO: 226 K/UL (ref 164–446)
PLATELET # BLD AUTO: 235 K/UL (ref 164–446)
PLATELET # BLD AUTO: 242 K/UL (ref 164–446)
PLATELET # BLD AUTO: 244 K/UL (ref 164–446)
PLATELET # BLD AUTO: 247 K/UL (ref 164–446)
PLATELET # BLD AUTO: 253 K/UL (ref 164–446)
PLATELET # BLD AUTO: 259 K/UL (ref 164–446)
PLATELET # BLD AUTO: 272 K/UL (ref 164–446)
PMV BLD AUTO: 10.2 FL (ref 9–12.9)
PMV BLD AUTO: 10.3 FL (ref 9–12.9)
PMV BLD AUTO: 9.1 FL (ref 9–12.9)
PMV BLD AUTO: 9.2 FL (ref 9–12.9)
PMV BLD AUTO: 9.2 FL (ref 9–12.9)
PMV BLD AUTO: 9.5 FL (ref 9–12.9)
PMV BLD AUTO: 9.5 FL (ref 9–12.9)
PMV BLD AUTO: 9.6 FL (ref 9–12.9)
PMV BLD AUTO: 9.7 FL (ref 9–12.9)
PMV BLD AUTO: 9.7 FL (ref 9–12.9)
PMV BLD AUTO: 9.8 FL (ref 9–12.9)
PMV BLD AUTO: 9.9 FL (ref 9–12.9)
POTASSIUM SERPL-SCNC: 4.2 MMOL/L (ref 3.6–5.5)
POTASSIUM SERPL-SCNC: 4.2 MMOL/L (ref 3.6–5.5)
POTASSIUM SERPL-SCNC: 4.3 MMOL/L (ref 3.6–5.5)
POTASSIUM SERPL-SCNC: 4.5 MMOL/L (ref 3.6–5.5)
POTASSIUM SERPL-SCNC: 4.6 MMOL/L (ref 3.6–5.5)
POTASSIUM SERPL-SCNC: 4.8 MMOL/L (ref 3.6–5.5)
POTASSIUM SERPL-SCNC: 4.9 MMOL/L (ref 3.6–5.5)
POTASSIUM SERPL-SCNC: 5.1 MMOL/L (ref 3.6–5.5)
POTASSIUM SERPL-SCNC: 5.2 MMOL/L (ref 3.6–5.5)
POTASSIUM SERPL-SCNC: 5.3 MMOL/L (ref 3.6–5.5)
POTASSIUM SERPL-SCNC: 5.5 MMOL/L (ref 3.6–5.5)
PROCALCITONIN SERPL-MCNC: 0.13 NG/ML
PROCALCITONIN SERPL-MCNC: 0.13 NG/ML
PROT SERPL-MCNC: 5 G/DL (ref 6–8.2)
PROT SERPL-MCNC: 5.1 G/DL (ref 6–8.2)
PROT SERPL-MCNC: 5.2 G/DL (ref 6–8.2)
PROT SERPL-MCNC: 5.3 G/DL (ref 6–8.2)
PROT SERPL-MCNC: 5.5 G/DL (ref 6–8.2)
PROT SERPL-MCNC: 5.6 G/DL (ref 6–8.2)
PROT SERPL-MCNC: 5.8 G/DL (ref 6–8.2)
PROT SERPL-MCNC: 6.4 G/DL (ref 6–8.2)
PROT UR QL STRIP: NEGATIVE MG/DL
PROT UR QL STRIP: NEGATIVE MG/DL
RBC # BLD AUTO: 3.12 M/UL (ref 4.7–6.1)
RBC # BLD AUTO: 3.18 M/UL (ref 4.7–6.1)
RBC # BLD AUTO: 3.22 M/UL (ref 4.7–6.1)
RBC # BLD AUTO: 3.3 M/UL (ref 4.7–6.1)
RBC # BLD AUTO: 3.36 M/UL (ref 4.7–6.1)
RBC # BLD AUTO: 3.41 M/UL (ref 4.7–6.1)
RBC # BLD AUTO: 3.41 M/UL (ref 4.7–6.1)
RBC # BLD AUTO: 3.42 M/UL (ref 4.7–6.1)
RBC # BLD AUTO: 3.43 M/UL (ref 4.7–6.1)
RBC # BLD AUTO: 3.46 M/UL (ref 4.7–6.1)
RBC # BLD AUTO: 3.49 M/UL (ref 4.7–6.1)
RBC # BLD AUTO: 3.49 M/UL (ref 4.7–6.1)
RBC # BLD AUTO: 3.51 M/UL (ref 4.7–6.1)
RBC # BLD AUTO: 3.6 M/UL (ref 4.7–6.1)
RBC # BLD AUTO: 3.6 M/UL (ref 4.7–6.1)
RBC # BLD AUTO: 3.66 M/UL (ref 4.7–6.1)
RBC # BLD AUTO: 3.7 M/UL (ref 4.7–6.1)
RBC # BLD AUTO: 3.74 M/UL (ref 4.7–6.1)
RBC # BLD AUTO: 4.15 M/UL (ref 4.7–6.1)
RBC # URNS HPF: ABNORMAL /HPF
RBC # URNS HPF: ABNORMAL /HPF
RBC UR QL AUTO: ABNORMAL
RBC UR QL AUTO: ABNORMAL
RSV RNA SPEC QL NAA+PROBE: NEGATIVE
RSV RNA SPEC QL NAA+PROBE: NEGATIVE
SARS-COV+SARS-COV-2 AG RESP QL IA.RAPID: NOTDETECTED
SARS-COV-2 RNA RESP QL NAA+PROBE: DETECTED
SARS-COV-2 RNA RESP QL NAA+PROBE: NOTDETECTED
SIGNIFICANT IND 70042: ABNORMAL
SIGNIFICANT IND 70042: NORMAL
SITE SITE: ABNORMAL
SITE SITE: NORMAL
SODIUM SERPL-SCNC: 135 MMOL/L (ref 135–145)
SODIUM SERPL-SCNC: 136 MMOL/L (ref 135–145)
SODIUM SERPL-SCNC: 136 MMOL/L (ref 135–145)
SODIUM SERPL-SCNC: 137 MMOL/L (ref 135–145)
SODIUM SERPL-SCNC: 138 MMOL/L (ref 135–145)
SODIUM SERPL-SCNC: 139 MMOL/L (ref 135–145)
SODIUM SERPL-SCNC: 141 MMOL/L (ref 135–145)
SODIUM SERPL-SCNC: 141 MMOL/L (ref 135–145)
SODIUM SERPL-SCNC: 142 MMOL/L (ref 135–145)
SODIUM SERPL-SCNC: 143 MMOL/L (ref 135–145)
SODIUM SERPL-SCNC: 143 MMOL/L (ref 135–145)
SODIUM SERPL-SCNC: 144 MMOL/L (ref 135–145)
SODIUM SERPL-SCNC: 144 MMOL/L (ref 135–145)
SOURCE SOURCE: ABNORMAL
SOURCE SOURCE: NORMAL
SP GR UR STRIP.AUTO: 1.02
SP GR UR STRIP.AUTO: 1.02
SPECIMEN SOURCE: ABNORMAL
SPECIMEN SOURCE: NORMAL
T4 FREE SERPL-MCNC: 1.58 NG/DL (ref 0.93–1.7)
TIBC SERPL-MCNC: 175 UG/DL (ref 250–450)
TRANSFERRIN SERPL-MCNC: 118 MG/DL (ref 200–370)
TRIGL SERPL-MCNC: 78 MG/DL (ref 0–149)
TROPONIN T SERPL-MCNC: 119 NG/L (ref 6–19)
TROPONIN T SERPL-MCNC: 284 NG/L (ref 6–19)
TROPONIN T SERPL-MCNC: 33 NG/L (ref 6–19)
TROPONIN T SERPL-MCNC: 33 NG/L (ref 6–19)
TROPONIN T SERPL-MCNC: 34 NG/L (ref 6–19)
TROPONIN T SERPL-MCNC: 52 NG/L (ref 6–19)
TSH SERPL DL<=0.005 MIU/L-ACNC: 0.96 UIU/ML (ref 0.38–5.33)
UIBC SERPL-MCNC: 134 UG/DL (ref 110–370)
UNIDENT CRYS URNS QL MICRO: ABNORMAL /HPF
UROBILINOGEN UR STRIP.AUTO-MCNC: 1 MG/DL
WBC # BLD AUTO: 10.1 K/UL (ref 4.8–10.8)
WBC # BLD AUTO: 10.2 K/UL (ref 4.8–10.8)
WBC # BLD AUTO: 3.6 K/UL (ref 4.8–10.8)
WBC # BLD AUTO: 5 K/UL (ref 4.8–10.8)
WBC # BLD AUTO: 5.3 K/UL (ref 4.8–10.8)
WBC # BLD AUTO: 5.4 K/UL (ref 4.8–10.8)
WBC # BLD AUTO: 5.5 K/UL (ref 4.8–10.8)
WBC # BLD AUTO: 6.3 K/UL (ref 4.8–10.8)
WBC # BLD AUTO: 6.5 K/UL (ref 4.8–10.8)
WBC # BLD AUTO: 7.2 K/UL (ref 4.8–10.8)
WBC # BLD AUTO: 7.8 K/UL (ref 4.8–10.8)
WBC # BLD AUTO: 8.5 K/UL (ref 4.8–10.8)
WBC # BLD AUTO: 9.8 K/UL (ref 4.8–10.8)
WBC #/AREA URNS HPF: ABNORMAL /HPF
WBC #/AREA URNS HPF: ABNORMAL /HPF

## 2020-01-01 PROCEDURE — 700102 HCHG RX REV CODE 250 W/ 637 OVERRIDE(OP): Performed by: INTERNAL MEDICINE

## 2020-01-01 PROCEDURE — 36415 COLL VENOUS BLD VENIPUNCTURE: CPT

## 2020-01-01 PROCEDURE — A9270 NON-COVERED ITEM OR SERVICE: HCPCS | Performed by: INTERNAL MEDICINE

## 2020-01-01 PROCEDURE — 700111 HCHG RX REV CODE 636 W/ 250 OVERRIDE (IP): Performed by: FAMILY MEDICINE

## 2020-01-01 PROCEDURE — 82962 GLUCOSE BLOOD TEST: CPT | Mod: 91

## 2020-01-01 PROCEDURE — 700111 HCHG RX REV CODE 636 W/ 250 OVERRIDE (IP): Performed by: INTERNAL MEDICINE

## 2020-01-01 PROCEDURE — 700111 HCHG RX REV CODE 636 W/ 250 OVERRIDE (IP): Performed by: ORTHOPAEDIC SURGERY

## 2020-01-01 PROCEDURE — 700102 HCHG RX REV CODE 250 W/ 637 OVERRIDE(OP): Performed by: HOSPITALIST

## 2020-01-01 PROCEDURE — 80053 COMPREHEN METABOLIC PANEL: CPT

## 2020-01-01 PROCEDURE — 99232 SBSQ HOSP IP/OBS MODERATE 35: CPT | Performed by: INTERNAL MEDICINE

## 2020-01-01 PROCEDURE — 99239 HOSP IP/OBS DSCHRG MGMT >30: CPT | Performed by: INTERNAL MEDICINE

## 2020-01-01 PROCEDURE — A6223 GAUZE >16<=48 NO W/SAL W/O B: HCPCS | Performed by: ORTHOPAEDIC SURGERY

## 2020-01-01 PROCEDURE — 502000 HCHG MISC OR IMPLANTS RC 0278: Performed by: ORTHOPAEDIC SURGERY

## 2020-01-01 PROCEDURE — 96372 THER/PROPH/DIAG INJ SC/IM: CPT

## 2020-01-01 PROCEDURE — 97535 SELF CARE MNGMENT TRAINING: CPT

## 2020-01-01 PROCEDURE — 97530 THERAPEUTIC ACTIVITIES: CPT

## 2020-01-01 PROCEDURE — 80069 RENAL FUNCTION PANEL: CPT

## 2020-01-01 PROCEDURE — 700101 HCHG RX REV CODE 250: Performed by: INTERNAL MEDICINE

## 2020-01-01 PROCEDURE — 97166 OT EVAL MOD COMPLEX 45 MIN: CPT

## 2020-01-01 PROCEDURE — A9270 NON-COVERED ITEM OR SERVICE: HCPCS | Performed by: HOSPITALIST

## 2020-01-01 PROCEDURE — 84484 ASSAY OF TROPONIN QUANT: CPT

## 2020-01-01 PROCEDURE — 85027 COMPLETE CBC AUTOMATED: CPT

## 2020-01-01 PROCEDURE — 770021 HCHG ROOM/CARE - ISO PRIVATE

## 2020-01-01 PROCEDURE — U0003 INFECTIOUS AGENT DETECTION BY NUCLEIC ACID (DNA OR RNA); SEVERE ACUTE RESPIRATORY SYNDROME CORONAVIRUS 2 (SARS-COV-2) (CORONAVIRUS DISEASE [COVID-19]), AMPLIFIED PROBE TECHNIQUE, MAKING USE OF HIGH THROUGHPUT TECHNOLOGIES AS DESCRIBED BY CMS-2020-01-R: HCPCS

## 2020-01-01 PROCEDURE — 97605 NEG PRS WND THER DME<=50SQCM: CPT

## 2020-01-01 PROCEDURE — 82962 GLUCOSE BLOOD TEST: CPT

## 2020-01-01 PROCEDURE — 700102 HCHG RX REV CODE 250 W/ 637 OVERRIDE(OP): Performed by: FAMILY MEDICINE

## 2020-01-01 PROCEDURE — 85025 COMPLETE CBC W/AUTO DIFF WBC: CPT

## 2020-01-01 PROCEDURE — A4306 DRUG DELIVERY SYSTEM <=50 ML: HCPCS | Performed by: ORTHOPAEDIC SURGERY

## 2020-01-01 PROCEDURE — 99233 SBSQ HOSP IP/OBS HIGH 50: CPT | Performed by: INTERNAL MEDICINE

## 2020-01-01 PROCEDURE — A9270 NON-COVERED ITEM OR SERVICE: HCPCS | Performed by: ORTHOPAEDIC SURGERY

## 2020-01-01 PROCEDURE — 700111 HCHG RX REV CODE 636 W/ 250 OVERRIDE (IP): Performed by: HOSPITALIST

## 2020-01-01 PROCEDURE — A9270 NON-COVERED ITEM OR SERVICE: HCPCS | Performed by: FAMILY MEDICINE

## 2020-01-01 PROCEDURE — 99232 SBSQ HOSP IP/OBS MODERATE 35: CPT | Performed by: FAMILY MEDICINE

## 2020-01-01 PROCEDURE — 84100 ASSAY OF PHOSPHORUS: CPT

## 2020-01-01 PROCEDURE — 501838 HCHG SUTURE GENERAL: Performed by: ORTHOPAEDIC SURGERY

## 2020-01-01 PROCEDURE — 700105 HCHG RX REV CODE 258: Performed by: FAMILY MEDICINE

## 2020-01-01 PROCEDURE — 160027 HCHG SURGERY MINUTES - 1ST 30 MINS LEVEL 2: Performed by: ORTHOPAEDIC SURGERY

## 2020-01-01 PROCEDURE — 0241U HCHG SARS-COV-2 COVID-19 NFCT DS RESP RNA 4 TRGT MIC: CPT

## 2020-01-01 PROCEDURE — 99285 EMERGENCY DEPT VISIT HI MDM: CPT

## 2020-01-01 PROCEDURE — 97165 OT EVAL LOW COMPLEX 30 MIN: CPT

## 2020-01-01 PROCEDURE — 85652 RBC SED RATE AUTOMATED: CPT

## 2020-01-01 PROCEDURE — 700105 HCHG RX REV CODE 258: Performed by: EMERGENCY MEDICINE

## 2020-01-01 PROCEDURE — 85379 FIBRIN DEGRADATION QUANT: CPT

## 2020-01-01 PROCEDURE — 700105 HCHG RX REV CODE 258: Performed by: ORTHOPAEDIC SURGERY

## 2020-01-01 PROCEDURE — C9803 HOPD COVID-19 SPEC COLLECT: HCPCS | Performed by: FAMILY MEDICINE

## 2020-01-01 PROCEDURE — 83550 IRON BINDING TEST: CPT

## 2020-01-01 PROCEDURE — 87015 SPECIMEN INFECT AGNT CONCNTJ: CPT

## 2020-01-01 PROCEDURE — 71045 X-RAY EXAM CHEST 1 VIEW: CPT

## 2020-01-01 PROCEDURE — 93005 ELECTROCARDIOGRAM TRACING: CPT | Performed by: EMERGENCY MEDICINE

## 2020-01-01 PROCEDURE — 96365 THER/PROPH/DIAG IV INF INIT: CPT

## 2020-01-01 PROCEDURE — 99232 SBSQ HOSP IP/OBS MODERATE 35: CPT | Performed by: HOSPITALIST

## 2020-01-01 PROCEDURE — C9803 HOPD COVID-19 SPEC COLLECT: HCPCS | Performed by: INTERNAL MEDICINE

## 2020-01-01 PROCEDURE — 99231 SBSQ HOSP IP/OBS SF/LOW 25: CPT | Performed by: INTERNAL MEDICINE

## 2020-01-01 PROCEDURE — 502240 HCHG MISC OR SUPPLY RC 0272: Performed by: ORTHOPAEDIC SURGERY

## 2020-01-01 PROCEDURE — 87075 CULTR BACTERIA EXCEPT BLOOD: CPT

## 2020-01-01 PROCEDURE — 3E02340 INTRODUCTION OF INFLUENZA VACCINE INTO MUSCLE, PERCUTANEOUS APPROACH: ICD-10-PCS | Performed by: INTERNAL MEDICINE

## 2020-01-01 PROCEDURE — 0HRMXK3 REPLACEMENT OF RIGHT FOOT SKIN WITH NONAUTOLOGOUS TISSUE SUBSTITUTE, FULL THICKNESS, EXTERNAL APPROACH: ICD-10-PCS | Performed by: ORTHOPAEDIC SURGERY

## 2020-01-01 PROCEDURE — 83540 ASSAY OF IRON: CPT

## 2020-01-01 PROCEDURE — 160038 HCHG SURGERY MINUTES - EA ADDL 1 MIN LEVEL 2: Performed by: ORTHOPAEDIC SURGERY

## 2020-01-01 PROCEDURE — 93005 ELECTROCARDIOGRAM TRACING: CPT

## 2020-01-01 PROCEDURE — 64447 NJX AA&/STRD FEMORAL NRV IMG: CPT | Performed by: ORTHOPAEDIC SURGERY

## 2020-01-01 PROCEDURE — 64445 NJX AA&/STRD SCIATIC NRV IMG: CPT | Performed by: ORTHOPAEDIC SURGERY

## 2020-01-01 PROCEDURE — 97162 PT EVAL MOD COMPLEX 30 MIN: CPT

## 2020-01-01 PROCEDURE — 83735 ASSAY OF MAGNESIUM: CPT

## 2020-01-01 PROCEDURE — 700102 HCHG RX REV CODE 250 W/ 637 OVERRIDE(OP)

## 2020-01-01 PROCEDURE — 700101 HCHG RX REV CODE 250: Performed by: ANESTHESIOLOGY

## 2020-01-01 PROCEDURE — 80048 BASIC METABOLIC PNL TOTAL CA: CPT

## 2020-01-01 PROCEDURE — 160002 HCHG RECOVERY MINUTES (STAT): Performed by: ORTHOPAEDIC SURGERY

## 2020-01-01 PROCEDURE — 99223 1ST HOSP IP/OBS HIGH 75: CPT | Mod: AI | Performed by: INTERNAL MEDICINE

## 2020-01-01 PROCEDURE — 87426 SARSCOV CORONAVIRUS AG IA: CPT

## 2020-01-01 PROCEDURE — 84484 ASSAY OF TROPONIN QUANT: CPT | Mod: 91

## 2020-01-01 PROCEDURE — 84145 PROCALCITONIN (PCT): CPT

## 2020-01-01 PROCEDURE — A6454 SELF-ADHER BAND W>=3" <5"/YD: HCPCS | Performed by: ORTHOPAEDIC SURGERY

## 2020-01-01 PROCEDURE — 700102 HCHG RX REV CODE 250 W/ 637 OVERRIDE(OP): Performed by: ANESTHESIOLOGY

## 2020-01-01 PROCEDURE — 160035 HCHG PACU - 1ST 60 MINS PHASE I: Performed by: ORTHOPAEDIC SURGERY

## 2020-01-01 PROCEDURE — 80061 LIPID PANEL: CPT

## 2020-01-01 PROCEDURE — 83036 HEMOGLOBIN GLYCOSYLATED A1C: CPT

## 2020-01-01 PROCEDURE — 700111 HCHG RX REV CODE 636 W/ 250 OVERRIDE (IP): Performed by: ANESTHESIOLOGY

## 2020-01-01 PROCEDURE — 700111 HCHG RX REV CODE 636 W/ 250 OVERRIDE (IP)

## 2020-01-01 PROCEDURE — C1781 MESH (IMPLANTABLE): HCPCS | Performed by: ORTHOPAEDIC SURGERY

## 2020-01-01 PROCEDURE — 93306 TTE W/DOPPLER COMPLETE: CPT

## 2020-01-01 PROCEDURE — 87070 CULTURE OTHR SPECIMN AEROBIC: CPT

## 2020-01-01 PROCEDURE — 160009 HCHG ANES TIME/MIN: Performed by: ORTHOPAEDIC SURGERY

## 2020-01-01 PROCEDURE — 97110 THERAPEUTIC EXERCISES: CPT

## 2020-01-01 PROCEDURE — 99231 SBSQ HOSP IP/OBS SF/LOW 25: CPT | Performed by: HOSPITALIST

## 2020-01-01 PROCEDURE — 500562 HCHG FIBERWIRE: Performed by: ORTHOPAEDIC SURGERY

## 2020-01-01 PROCEDURE — 71275 CT ANGIOGRAPHY CHEST: CPT

## 2020-01-01 PROCEDURE — 160046 HCHG PACU - 1ST 60 MINS PHASE II: Performed by: ORTHOPAEDIC SURGERY

## 2020-01-01 PROCEDURE — 87086 URINE CULTURE/COLONY COUNT: CPT

## 2020-01-01 PROCEDURE — 90471 IMMUNIZATION ADMIN: CPT

## 2020-01-01 PROCEDURE — 81001 URINALYSIS AUTO W/SCOPE: CPT

## 2020-01-01 PROCEDURE — 160048 HCHG OR STATISTICAL LEVEL 1-5: Performed by: ORTHOPAEDIC SURGERY

## 2020-01-01 PROCEDURE — 700102 HCHG RX REV CODE 250 W/ 637 OVERRIDE(OP): Performed by: EMERGENCY MEDICINE

## 2020-01-01 PROCEDURE — 84439 ASSAY OF FREE THYROXINE: CPT

## 2020-01-01 PROCEDURE — 93306 TTE W/DOPPLER COMPLETE: CPT | Mod: 26 | Performed by: INTERNAL MEDICINE

## 2020-01-01 PROCEDURE — 87147 CULTURE TYPE IMMUNOLOGIC: CPT

## 2020-01-01 PROCEDURE — 88307 TISSUE EXAM BY PATHOLOGIST: CPT

## 2020-01-01 PROCEDURE — 87186 SC STD MICRODIL/AGAR DIL: CPT

## 2020-01-01 PROCEDURE — 700105 HCHG RX REV CODE 258: Performed by: HOSPITALIST

## 2020-01-01 PROCEDURE — 73610 X-RAY EXAM OF ANKLE: CPT | Mod: RT

## 2020-01-01 PROCEDURE — 86141 C-REACTIVE PROTEIN HS: CPT

## 2020-01-01 PROCEDURE — 82728 ASSAY OF FERRITIN: CPT

## 2020-01-01 PROCEDURE — 160025 RECOVERY II MINUTES (STATS): Performed by: ORTHOPAEDIC SURGERY

## 2020-01-01 PROCEDURE — 88304 TISSUE EXAM BY PATHOLOGIST: CPT

## 2020-01-01 PROCEDURE — 84466 ASSAY OF TRANSFERRIN: CPT

## 2020-01-01 PROCEDURE — 73600 X-RAY EXAM OF ANKLE: CPT | Mod: RT

## 2020-01-01 PROCEDURE — 99217 PR OBSERVATION CARE DISCHARGE: CPT | Performed by: INTERNAL MEDICINE

## 2020-01-01 PROCEDURE — 88311 DECALCIFY TISSUE: CPT

## 2020-01-01 PROCEDURE — 87205 SMEAR GRAM STAIN: CPT

## 2020-01-01 PROCEDURE — 93970 EXTREMITY STUDY: CPT

## 2020-01-01 PROCEDURE — 160029 HCHG SURGERY MINUTES - 1ST 30 MINS LEVEL 4: Performed by: ORTHOPAEDIC SURGERY

## 2020-01-01 PROCEDURE — G0378 HOSPITAL OBSERVATION PER HR: HCPCS

## 2020-01-01 PROCEDURE — 97112 NEUROMUSCULAR REEDUCATION: CPT

## 2020-01-01 PROCEDURE — 700101 HCHG RX REV CODE 250: Performed by: ORTHOPAEDIC SURGERY

## 2020-01-01 PROCEDURE — 84443 ASSAY THYROID STIM HORMONE: CPT

## 2020-01-01 PROCEDURE — C1713 ANCHOR/SCREW BN/BN,TIS/BN: HCPCS | Performed by: ORTHOPAEDIC SURGERY

## 2020-01-01 PROCEDURE — 87077 CULTURE AEROBIC IDENTIFY: CPT

## 2020-01-01 PROCEDURE — 93010 ELECTROCARDIOGRAM REPORT: CPT | Performed by: INTERNAL MEDICINE

## 2020-01-01 PROCEDURE — 82306 VITAMIN D 25 HYDROXY: CPT

## 2020-01-01 PROCEDURE — 502579 HCHG PACK, TOTAL KNEE: Performed by: ORTHOPAEDIC SURGERY

## 2020-01-01 PROCEDURE — 99220 PR INITIAL OBSERVATION CARE,LEVL III: CPT | Performed by: INTERNAL MEDICINE

## 2020-01-01 PROCEDURE — A9270 NON-COVERED ITEM OR SERVICE: HCPCS | Performed by: ANESTHESIOLOGY

## 2020-01-01 PROCEDURE — 700105 HCHG RX REV CODE 258: Performed by: INTERNAL MEDICINE

## 2020-01-01 PROCEDURE — 160041 HCHG SURGERY MINUTES - EA ADDL 1 MIN LEVEL 4: Performed by: ORTHOPAEDIC SURGERY

## 2020-01-01 PROCEDURE — 99225 PR SUBSEQUENT OBSERVATION CARE,LEVEL II: CPT | Performed by: INTERNAL MEDICINE

## 2020-01-01 PROCEDURE — 700111 HCHG RX REV CODE 636 W/ 250 OVERRIDE (IP): Performed by: EMERGENCY MEDICINE

## 2020-01-01 PROCEDURE — 90662 IIV NO PRSV INCREASED AG IM: CPT | Performed by: INTERNAL MEDICINE

## 2020-01-01 PROCEDURE — 99233 SBSQ HOSP IP/OBS HIGH 50: CPT | Performed by: HOSPITALIST

## 2020-01-01 PROCEDURE — 700117 HCHG RX CONTRAST REV CODE 255: Performed by: EMERGENCY MEDICINE

## 2020-01-01 PROCEDURE — 770001 HCHG ROOM/CARE - MED/SURG/GYN PRIV*

## 2020-01-01 PROCEDURE — 160047 HCHG PACU  - EA ADDL 30 MINS PHASE II: Performed by: ORTHOPAEDIC SURGERY

## 2020-01-01 PROCEDURE — A9270 NON-COVERED ITEM OR SERVICE: HCPCS | Performed by: EMERGENCY MEDICINE

## 2020-01-01 PROCEDURE — 99204 OFFICE O/P NEW MOD 45 MIN: CPT | Performed by: INTERNAL MEDICINE

## 2020-01-01 PROCEDURE — 97597 DBRDMT OPN WND 1ST 20 CM/<: CPT

## 2020-01-01 PROCEDURE — 0QBL0ZX EXCISION OF RIGHT TARSAL, OPEN APPROACH, DIAGNOSTIC: ICD-10-PCS | Performed by: ORTHOPAEDIC SURGERY

## 2020-01-01 PROCEDURE — 500881 HCHG PACK, EXTREMITY: Performed by: ORTHOPAEDIC SURGERY

## 2020-01-01 PROCEDURE — 503339 HCHG DRESSSING PICO: Performed by: ORTHOPAEDIC SURGERY

## 2020-01-01 PROCEDURE — 0QPL04Z REMOVAL OF INTERNAL FIXATION DEVICE FROM RIGHT TARSAL, OPEN APPROACH: ICD-10-PCS | Performed by: ORTHOPAEDIC SURGERY

## 2020-01-01 PROCEDURE — 82550 ASSAY OF CK (CPK): CPT

## 2020-01-01 PROCEDURE — 99231 SBSQ HOSP IP/OBS SF/LOW 25: CPT | Performed by: FAMILY MEDICINE

## 2020-01-01 PROCEDURE — C9803 HOPD COVID-19 SPEC COLLECT: HCPCS | Performed by: HOSPITALIST

## 2020-01-01 DEVICE — GENTRIX SURGICAL MATRIX 5CM X 5CM: Type: IMPLANTABLE DEVICE | Site: HEEL | Status: FUNCTIONAL

## 2020-01-01 DEVICE — MATRISTEM MICROMATRIX 1000MG (1/EA): Type: IMPLANTABLE DEVICE | Site: HEEL | Status: FUNCTIONAL

## 2020-01-01 DEVICE — WIRE K- SMTH .062 4 - (6TX6=36): Type: IMPLANTABLE DEVICE | Site: ANKLE | Status: FUNCTIONAL

## 2020-01-01 DEVICE — IMPLANTABLE DEVICE: Type: IMPLANTABLE DEVICE | Site: ANKLE | Status: FUNCTIONAL

## 2020-01-01 DEVICE — GRAFT BONE VIABLE BONE MATRIX BIO4 5 CC: Type: IMPLANTABLE DEVICE | Site: ANKLE | Status: FUNCTIONAL

## 2020-01-01 RX ORDER — HYDROMORPHONE HYDROCHLORIDE 1 MG/ML
0.25 INJECTION, SOLUTION INTRAMUSCULAR; INTRAVENOUS; SUBCUTANEOUS
Status: DISCONTINUED | OUTPATIENT
Start: 2020-01-01 | End: 2020-01-01 | Stop reason: HOSPADM

## 2020-01-01 RX ORDER — DEXAMETHASONE 4 MG/1
6 TABLET ORAL DAILY
Status: COMPLETED | OUTPATIENT
Start: 2020-01-01 | End: 2020-01-01

## 2020-01-01 RX ORDER — CARVEDILOL 3.12 MG/1
3.12 TABLET ORAL 2 TIMES DAILY WITH MEALS
Status: DISCONTINUED | OUTPATIENT
Start: 2020-01-01 | End: 2020-01-01 | Stop reason: HOSPADM

## 2020-01-01 RX ORDER — HYDRALAZINE HYDROCHLORIDE 20 MG/ML
5 INJECTION INTRAMUSCULAR; INTRAVENOUS
Status: DISCONTINUED | OUTPATIENT
Start: 2020-01-01 | End: 2020-01-01 | Stop reason: HOSPADM

## 2020-01-01 RX ORDER — HALOPERIDOL 5 MG/ML
1 INJECTION INTRAMUSCULAR
Status: DISCONTINUED | OUTPATIENT
Start: 2020-01-01 | End: 2020-01-01 | Stop reason: HOSPADM

## 2020-01-01 RX ORDER — PHENYLEPHRINE HYDROCHLORIDE 10 MG/ML
INJECTION, SOLUTION INTRAMUSCULAR; INTRAVENOUS; SUBCUTANEOUS PRN
Status: DISCONTINUED | OUTPATIENT
Start: 2020-01-01 | End: 2020-01-01 | Stop reason: SURG

## 2020-01-01 RX ORDER — DEXTROSE MONOHYDRATE 25 G/50ML
50 INJECTION, SOLUTION INTRAVENOUS
Status: CANCELLED | OUTPATIENT
Start: 2020-01-01 | End: 2020-01-01

## 2020-01-01 RX ORDER — BUPIVACAINE HYDROCHLORIDE 2.5 MG/ML
INJECTION, SOLUTION EPIDURAL; INFILTRATION; INTRACAUDAL PRN
Status: DISCONTINUED | OUTPATIENT
Start: 2020-01-01 | End: 2020-01-01 | Stop reason: SURG

## 2020-01-01 RX ORDER — MAGNESIUM HYDROXIDE 1200 MG/15ML
LIQUID ORAL
Status: COMPLETED | OUTPATIENT
Start: 2020-01-01 | End: 2020-01-01

## 2020-01-01 RX ORDER — BUPIVACAINE HYDROCHLORIDE 5 MG/ML
INJECTION, SOLUTION EPIDURAL; INTRACAUDAL
Status: DISCONTINUED | OUTPATIENT
Start: 2020-01-01 | End: 2020-01-01 | Stop reason: HOSPADM

## 2020-01-01 RX ORDER — ACETAMINOPHEN 325 MG/1
650 TABLET ORAL EVERY 6 HOURS PRN
Status: CANCELLED | OUTPATIENT
Start: 2020-01-01

## 2020-01-01 RX ORDER — HYDROMORPHONE HYDROCHLORIDE 1 MG/ML
0.4 INJECTION, SOLUTION INTRAMUSCULAR; INTRAVENOUS; SUBCUTANEOUS
Status: DISCONTINUED | OUTPATIENT
Start: 2020-01-01 | End: 2020-01-01 | Stop reason: HOSPADM

## 2020-01-01 RX ORDER — ONDANSETRON 2 MG/ML
4 INJECTION INTRAMUSCULAR; INTRAVENOUS
Status: DISCONTINUED | OUTPATIENT
Start: 2020-01-01 | End: 2020-01-01 | Stop reason: HOSPADM

## 2020-01-01 RX ORDER — ONDANSETRON 2 MG/ML
4 INJECTION INTRAMUSCULAR; INTRAVENOUS EVERY 4 HOURS PRN
Status: CANCELLED | OUTPATIENT
Start: 2020-01-01

## 2020-01-01 RX ORDER — CEPHALEXIN 500 MG/1
500 CAPSULE ORAL 4 TIMES DAILY
Status: ON HOLD | COMMUNITY
Start: 2020-01-01 | End: 2020-01-01

## 2020-01-01 RX ORDER — ONDANSETRON 4 MG/1
4 TABLET, ORALLY DISINTEGRATING ORAL EVERY 4 HOURS PRN
Status: DISCONTINUED | OUTPATIENT
Start: 2020-01-01 | End: 2020-01-01 | Stop reason: HOSPADM

## 2020-01-01 RX ORDER — LIDOCAINE HYDROCHLORIDE 10 MG/ML
INJECTION, SOLUTION EPIDURAL; INFILTRATION; INTRACAUDAL; PERINEURAL
Status: COMPLETED
Start: 2020-01-01 | End: 2020-01-01

## 2020-01-01 RX ORDER — BUPIVACAINE HYDROCHLORIDE 5 MG/ML
INJECTION, SOLUTION EPIDURAL; INTRACAUDAL PRN
Status: DISCONTINUED | OUTPATIENT
Start: 2020-01-01 | End: 2020-01-01 | Stop reason: SURG

## 2020-01-01 RX ORDER — DEXTROSE MONOHYDRATE 25 G/50ML
50 INJECTION, SOLUTION INTRAVENOUS
Status: CANCELLED | OUTPATIENT
Start: 2020-01-01

## 2020-01-01 RX ORDER — ATORVASTATIN CALCIUM 40 MG/1
80 TABLET, FILM COATED ORAL EVERY EVENING
Status: CANCELLED | OUTPATIENT
Start: 2020-01-01

## 2020-01-01 RX ORDER — HEPARIN SODIUM 5000 [USP'U]/ML
5000 INJECTION, SOLUTION INTRAVENOUS; SUBCUTANEOUS EVERY 8 HOURS
Status: DISCONTINUED | OUTPATIENT
Start: 2020-01-01 | End: 2020-01-01

## 2020-01-01 RX ORDER — TRANEXAMIC ACID 100 MG/ML
INJECTION, SOLUTION INTRAVENOUS PRN
Status: DISCONTINUED | OUTPATIENT
Start: 2020-01-01 | End: 2020-01-01 | Stop reason: SURG

## 2020-01-01 RX ORDER — NITROGLYCERIN 0.4 MG/1
0.4 TABLET SUBLINGUAL PRN
Status: DISCONTINUED | OUTPATIENT
Start: 2020-01-01 | End: 2020-01-01 | Stop reason: HOSPADM

## 2020-01-01 RX ORDER — ASPIRIN 300 MG/1
300 SUPPOSITORY RECTAL DAILY
Status: DISCONTINUED | OUTPATIENT
Start: 2020-01-01 | End: 2020-01-01

## 2020-01-01 RX ORDER — POLYETHYLENE GLYCOL 3350 17 G/17G
1 POWDER, FOR SOLUTION ORAL
Status: CANCELLED | OUTPATIENT
Start: 2020-01-01

## 2020-01-01 RX ORDER — POLYETHYLENE GLYCOL 3350 17 G/17G
1 POWDER, FOR SOLUTION ORAL
Status: DISCONTINUED | OUTPATIENT
Start: 2020-01-01 | End: 2020-01-01 | Stop reason: HOSPADM

## 2020-01-01 RX ORDER — METOPROLOL TARTRATE 1 MG/ML
1 INJECTION, SOLUTION INTRAVENOUS
Status: DISCONTINUED | OUTPATIENT
Start: 2020-01-01 | End: 2020-01-01 | Stop reason: HOSPADM

## 2020-01-01 RX ORDER — DIPHENHYDRAMINE HYDROCHLORIDE 50 MG/ML
12.5 INJECTION INTRAMUSCULAR; INTRAVENOUS
Status: DISCONTINUED | OUTPATIENT
Start: 2020-01-01 | End: 2020-01-01 | Stop reason: HOSPADM

## 2020-01-01 RX ORDER — CARVEDILOL 6.25 MG/1
6.25 TABLET ORAL 2 TIMES DAILY WITH MEALS
Status: DISCONTINUED | OUTPATIENT
Start: 2020-01-01 | End: 2020-01-01

## 2020-01-01 RX ORDER — ASCORBIC ACID 500 MG
500 TABLET ORAL DAILY
Status: CANCELLED | OUTPATIENT
Start: 2020-01-01

## 2020-01-01 RX ORDER — ONDANSETRON 2 MG/ML
4 INJECTION INTRAMUSCULAR; INTRAVENOUS EVERY 4 HOURS PRN
Status: DISCONTINUED | OUTPATIENT
Start: 2020-01-01 | End: 2020-01-01 | Stop reason: HOSPADM

## 2020-01-01 RX ORDER — MONTELUKAST SODIUM 10 MG/1
10 TABLET ORAL DAILY
Status: DISCONTINUED | OUTPATIENT
Start: 2020-01-01 | End: 2020-01-01 | Stop reason: HOSPADM

## 2020-01-01 RX ORDER — SODIUM CHLORIDE 9 MG/ML
INJECTION, SOLUTION INTRAVENOUS ONCE
Status: COMPLETED | OUTPATIENT
Start: 2020-01-01 | End: 2020-01-01

## 2020-01-01 RX ORDER — DEXTROSE MONOHYDRATE 25 G/50ML
50 INJECTION, SOLUTION INTRAVENOUS
Status: DISCONTINUED | OUTPATIENT
Start: 2020-01-01 | End: 2020-01-01

## 2020-01-01 RX ORDER — ACETAMINOPHEN 500 MG
1000 TABLET ORAL EVERY 6 HOURS PRN
COMMUNITY
End: 2020-01-01

## 2020-01-01 RX ORDER — ONDANSETRON 4 MG/1
4 TABLET, ORALLY DISINTEGRATING ORAL EVERY 4 HOURS PRN
Status: CANCELLED | OUTPATIENT
Start: 2020-01-01

## 2020-01-01 RX ORDER — ASPIRIN 81 MG/1
81 TABLET, CHEWABLE ORAL DAILY
Status: DISCONTINUED | OUTPATIENT
Start: 2020-01-01 | End: 2020-01-01

## 2020-01-01 RX ORDER — OXYCODONE HYDROCHLORIDE 5 MG/1
2.5 TABLET ORAL
Status: DISCONTINUED | OUTPATIENT
Start: 2020-01-01 | End: 2020-01-01 | Stop reason: HOSPADM

## 2020-01-01 RX ORDER — LABETALOL HYDROCHLORIDE 5 MG/ML
5 INJECTION, SOLUTION INTRAVENOUS
Status: DISCONTINUED | OUTPATIENT
Start: 2020-01-01 | End: 2020-01-01 | Stop reason: HOSPADM

## 2020-01-01 RX ORDER — MEPERIDINE HYDROCHLORIDE 25 MG/ML
6.25 INJECTION INTRAMUSCULAR; INTRAVENOUS; SUBCUTANEOUS
Status: DISCONTINUED | OUTPATIENT
Start: 2020-01-01 | End: 2020-01-01 | Stop reason: HOSPADM

## 2020-01-01 RX ORDER — ASPIRIN 81 MG/1
81 TABLET, CHEWABLE ORAL DAILY
Status: CANCELLED | OUTPATIENT
Start: 2020-01-01

## 2020-01-01 RX ORDER — ASPIRIN 81 MG/1
324 TABLET, CHEWABLE ORAL DAILY
Status: DISCONTINUED | OUTPATIENT
Start: 2020-01-01 | End: 2020-01-01

## 2020-01-01 RX ORDER — LIDOCAINE HYDROCHLORIDE 20 MG/ML
INJECTION, SOLUTION EPIDURAL; INFILTRATION; INTRACAUDAL; PERINEURAL PRN
Status: DISCONTINUED | OUTPATIENT
Start: 2020-01-01 | End: 2020-01-01 | Stop reason: SURG

## 2020-01-01 RX ORDER — AMOXICILLIN 250 MG
2 CAPSULE ORAL 2 TIMES DAILY
Status: DISCONTINUED | OUTPATIENT
Start: 2020-01-01 | End: 2020-01-01 | Stop reason: HOSPADM

## 2020-01-01 RX ORDER — SODIUM CHLORIDE 9 MG/ML
500 INJECTION, SOLUTION INTRAVENOUS ONCE
Status: DISCONTINUED | OUTPATIENT
Start: 2020-01-01 | End: 2020-01-01

## 2020-01-01 RX ORDER — HYDROMORPHONE HYDROCHLORIDE 1 MG/ML
0.1 INJECTION, SOLUTION INTRAMUSCULAR; INTRAVENOUS; SUBCUTANEOUS
Status: DISCONTINUED | OUTPATIENT
Start: 2020-01-01 | End: 2020-01-01 | Stop reason: HOSPADM

## 2020-01-01 RX ORDER — AMOXICILLIN 250 MG
2 CAPSULE ORAL 2 TIMES DAILY
Status: CANCELLED | OUTPATIENT
Start: 2020-01-01

## 2020-01-01 RX ORDER — DEXTROSE MONOHYDRATE 25 G/50ML
50 INJECTION, SOLUTION INTRAVENOUS
Status: DISCONTINUED | OUTPATIENT
Start: 2020-01-01 | End: 2020-01-01 | Stop reason: HOSPADM

## 2020-01-01 RX ORDER — NITROGLYCERIN 0.4 MG/1
0.4 TABLET SUBLINGUAL PRN
Status: CANCELLED | OUTPATIENT
Start: 2020-01-01

## 2020-01-01 RX ORDER — BISACODYL 10 MG
10 SUPPOSITORY, RECTAL RECTAL
Status: DISCONTINUED | OUTPATIENT
Start: 2020-01-01 | End: 2020-01-01 | Stop reason: HOSPADM

## 2020-01-01 RX ORDER — SODIUM CHLORIDE, SODIUM LACTATE, POTASSIUM CHLORIDE, CALCIUM CHLORIDE 600; 310; 30; 20 MG/100ML; MG/100ML; MG/100ML; MG/100ML
INJECTION, SOLUTION INTRAVENOUS CONTINUOUS
Status: DISCONTINUED | OUTPATIENT
Start: 2020-01-01 | End: 2020-01-01 | Stop reason: HOSPADM

## 2020-01-01 RX ORDER — VITAMIN B COMPLEX
1000 TABLET ORAL DAILY
Status: DISCONTINUED | OUTPATIENT
Start: 2020-01-01 | End: 2020-01-01 | Stop reason: HOSPADM

## 2020-01-01 RX ORDER — SULFAMETHOXAZOLE AND TRIMETHOPRIM 800; 160 MG/1; MG/1
1 TABLET ORAL EVERY 12 HOURS
Status: DISCONTINUED | OUTPATIENT
Start: 2020-01-01 | End: 2020-01-01 | Stop reason: HOSPADM

## 2020-01-01 RX ORDER — BISACODYL 10 MG
10 SUPPOSITORY, RECTAL RECTAL
Status: CANCELLED | OUTPATIENT
Start: 2020-01-01

## 2020-01-01 RX ORDER — OXYCODONE HYDROCHLORIDE 5 MG/1
5 TABLET ORAL
Status: DISCONTINUED | OUTPATIENT
Start: 2020-01-01 | End: 2020-01-01 | Stop reason: HOSPADM

## 2020-01-01 RX ORDER — OXYCODONE HYDROCHLORIDE 5 MG/1
5 TABLET ORAL EVERY 6 HOURS PRN
COMMUNITY
End: 2021-01-01

## 2020-01-01 RX ORDER — ASPIRIN 81 MG/1
162 TABLET, CHEWABLE ORAL ONCE
Status: COMPLETED | OUTPATIENT
Start: 2020-01-01 | End: 2020-01-01

## 2020-01-01 RX ORDER — KETOROLAC TROMETHAMINE 30 MG/ML
INJECTION, SOLUTION INTRAMUSCULAR; INTRAVENOUS PRN
Status: DISCONTINUED | OUTPATIENT
Start: 2020-01-01 | End: 2020-01-01 | Stop reason: SURG

## 2020-01-01 RX ORDER — ASPIRIN 325 MG
325 TABLET ORAL DAILY
Status: DISCONTINUED | OUTPATIENT
Start: 2020-01-01 | End: 2020-01-01

## 2020-01-01 RX ORDER — ALUMINA, MAGNESIA, AND SIMETHICONE 2400; 2400; 240 MG/30ML; MG/30ML; MG/30ML
10 SUSPENSION ORAL 3 TIMES DAILY PRN
Status: DISCONTINUED | OUTPATIENT
Start: 2020-01-01 | End: 2020-01-01 | Stop reason: HOSPADM

## 2020-01-01 RX ORDER — OXYCODONE HCL 5 MG/5 ML
10 SOLUTION, ORAL ORAL
Status: DISCONTINUED | OUTPATIENT
Start: 2020-01-01 | End: 2020-01-01 | Stop reason: HOSPADM

## 2020-01-01 RX ORDER — CARVEDILOL 3.12 MG/1
3.12 TABLET ORAL 2 TIMES DAILY WITH MEALS
Status: CANCELLED | OUTPATIENT
Start: 2020-01-01

## 2020-01-01 RX ORDER — ATORVASTATIN CALCIUM 40 MG/1
80 TABLET, FILM COATED ORAL EVERY EVENING
Status: DISCONTINUED | OUTPATIENT
Start: 2020-01-01 | End: 2020-01-01 | Stop reason: HOSPADM

## 2020-01-01 RX ORDER — VALSARTAN 80 MG/1
160 TABLET ORAL 2 TIMES DAILY
Status: DISCONTINUED | OUTPATIENT
Start: 2020-01-01 | End: 2020-01-01 | Stop reason: HOSPADM

## 2020-01-01 RX ORDER — SODIUM CHLORIDE 9 MG/ML
1000 INJECTION, SOLUTION INTRAVENOUS ONCE
Status: COMPLETED | OUTPATIENT
Start: 2020-01-01 | End: 2020-01-01

## 2020-01-01 RX ORDER — CARVEDILOL 6.25 MG/1
6.25 TABLET ORAL 2 TIMES DAILY WITH MEALS
Status: DISCONTINUED | OUTPATIENT
Start: 2020-01-01 | End: 2020-01-01 | Stop reason: HOSPADM

## 2020-01-01 RX ORDER — ASCORBIC ACID 500 MG
500 TABLET ORAL DAILY
Status: DISCONTINUED | OUTPATIENT
Start: 2020-01-01 | End: 2020-01-01 | Stop reason: HOSPADM

## 2020-01-01 RX ORDER — LIDOCAINE HYDROCHLORIDE 40 MG/ML
SOLUTION TOPICAL PRN
Status: DISCONTINUED | OUTPATIENT
Start: 2020-01-01 | End: 2020-01-01 | Stop reason: SURG

## 2020-01-01 RX ORDER — OXYCODONE HYDROCHLORIDE 5 MG/1
5 TABLET ORAL EVERY 6 HOURS PRN
Qty: 20 TAB | Refills: 0 | Status: SHIPPED | OUTPATIENT
Start: 2020-01-01 | End: 2020-01-01

## 2020-01-01 RX ORDER — PHENYLEPHRINE HCL IN 0.9% NACL 0.5 MG/5ML
SYRINGE (ML) INTRAVENOUS PRN
Status: DISCONTINUED | OUTPATIENT
Start: 2020-01-01 | End: 2020-01-01 | Stop reason: SURG

## 2020-01-01 RX ORDER — HYDROMORPHONE HYDROCHLORIDE 1 MG/ML
0.2 INJECTION, SOLUTION INTRAMUSCULAR; INTRAVENOUS; SUBCUTANEOUS
Status: DISCONTINUED | OUTPATIENT
Start: 2020-01-01 | End: 2020-01-01 | Stop reason: HOSPADM

## 2020-01-01 RX ORDER — NEOSTIGMINE METHYLSULFATE 1 MG/ML
INJECTION, SOLUTION INTRAVENOUS PRN
Status: DISCONTINUED | OUTPATIENT
Start: 2020-01-01 | End: 2020-01-01 | Stop reason: SURG

## 2020-01-01 RX ORDER — GLYCOPYRROLATE 0.2 MG/ML
INJECTION INTRAMUSCULAR; INTRAVENOUS PRN
Status: DISCONTINUED | OUTPATIENT
Start: 2020-01-01 | End: 2020-01-01 | Stop reason: SURG

## 2020-01-01 RX ORDER — DOXYCYCLINE 100 MG/1
100 TABLET ORAL EVERY 12 HOURS
Status: DISCONTINUED | OUTPATIENT
Start: 2020-01-01 | End: 2020-01-01

## 2020-01-01 RX ORDER — ATORVASTATIN CALCIUM 80 MG/1
80 TABLET, FILM COATED ORAL EVERY EVENING
Status: DISCONTINUED | OUTPATIENT
Start: 2020-01-01 | End: 2020-01-01 | Stop reason: HOSPADM

## 2020-01-01 RX ORDER — PIOGLITAZONEHYDROCHLORIDE 15 MG/1
15 TABLET ORAL EVERY MORNING
Status: ON HOLD | COMMUNITY
End: 2021-01-01

## 2020-01-01 RX ORDER — OXYCODONE HCL 5 MG/5 ML
5 SOLUTION, ORAL ORAL
Status: DISCONTINUED | OUTPATIENT
Start: 2020-01-01 | End: 2020-01-01 | Stop reason: HOSPADM

## 2020-01-01 RX ORDER — ACETAMINOPHEN 500 MG
1000 TABLET ORAL ONCE
Status: COMPLETED | OUTPATIENT
Start: 2020-01-01 | End: 2020-01-01

## 2020-01-01 RX ORDER — SULFAMETHOXAZOLE AND TRIMETHOPRIM 800; 160 MG/1; MG/1
1 TABLET ORAL EVERY 12 HOURS
Refills: 0 | Status: SHIPPED
Start: 2020-01-01 | End: 2021-01-01

## 2020-01-01 RX ORDER — INSULIN GLARGINE 100 [IU]/ML
10 INJECTION, SOLUTION SUBCUTANEOUS EVERY EVENING
Status: DISCONTINUED | OUTPATIENT
Start: 2020-01-01 | End: 2020-01-01 | Stop reason: HOSPADM

## 2020-01-01 RX ORDER — OXYCODONE HYDROCHLORIDE 5 MG/1
5 TABLET ORAL EVERY 8 HOURS
Status: DISCONTINUED | OUTPATIENT
Start: 2020-01-01 | End: 2020-01-01

## 2020-01-01 RX ORDER — CEFAZOLIN SODIUM 1 G/3ML
INJECTION, POWDER, FOR SOLUTION INTRAMUSCULAR; INTRAVENOUS PRN
Status: DISCONTINUED | OUTPATIENT
Start: 2020-01-01 | End: 2020-01-01 | Stop reason: SURG

## 2020-01-01 RX ORDER — CARVEDILOL 12.5 MG/1
12.5 TABLET ORAL EVERY EVENING
Status: ON HOLD | COMMUNITY
End: 2020-01-01

## 2020-01-01 RX ORDER — ONDANSETRON 2 MG/ML
INJECTION INTRAMUSCULAR; INTRAVENOUS PRN
Status: DISCONTINUED | OUTPATIENT
Start: 2020-01-01 | End: 2020-01-01 | Stop reason: SURG

## 2020-01-01 RX ORDER — CARVEDILOL 6.25 MG/1
6.25 TABLET ORAL 2 TIMES DAILY WITH MEALS
Qty: 120 TAB | Refills: 0 | Status: ON HOLD | OUTPATIENT
Start: 2020-01-01 | End: 2021-01-01

## 2020-01-01 RX ORDER — ROCURONIUM BROMIDE 10 MG/ML
INJECTION, SOLUTION INTRAVENOUS PRN
Status: DISCONTINUED | OUTPATIENT
Start: 2020-01-01 | End: 2020-01-01 | Stop reason: SURG

## 2020-01-01 RX ORDER — CARVEDILOL 12.5 MG/1
12.5 TABLET ORAL EVERY EVENING
Status: DISCONTINUED | OUTPATIENT
Start: 2020-01-01 | End: 2020-01-01

## 2020-01-01 RX ORDER — ACETAMINOPHEN 325 MG/1
650 TABLET ORAL EVERY 6 HOURS PRN
Status: DISCONTINUED | OUTPATIENT
Start: 2020-01-01 | End: 2020-01-01 | Stop reason: HOSPADM

## 2020-01-01 RX ORDER — DEXAMETHASONE 4 MG/1
6 TABLET ORAL DAILY
Status: CANCELLED | OUTPATIENT
Start: 2020-01-01 | End: 2020-01-01

## 2020-01-01 RX ORDER — ONDANSETRON 4 MG/1
4 TABLET, ORALLY DISINTEGRATING ORAL EVERY 6 HOURS PRN
COMMUNITY
End: 2021-01-01

## 2020-01-01 RX ORDER — MIDAZOLAM HYDROCHLORIDE 1 MG/ML
INJECTION INTRAMUSCULAR; INTRAVENOUS PRN
Status: DISCONTINUED | OUTPATIENT
Start: 2020-01-01 | End: 2020-01-01 | Stop reason: SURG

## 2020-01-01 RX ORDER — CALCIUM CARBONATE 500 MG/1
500 TABLET, CHEWABLE ORAL PRN
COMMUNITY
End: 2021-01-01

## 2020-01-01 RX ORDER — VALSARTAN 80 MG/1
160 TABLET ORAL 2 TIMES DAILY
Status: DISCONTINUED | OUTPATIENT
Start: 2020-01-01 | End: 2020-01-01

## 2020-01-01 RX ORDER — DEXAMETHASONE SODIUM PHOSPHATE 4 MG/ML
INJECTION, SOLUTION INTRA-ARTICULAR; INTRALESIONAL; INTRAMUSCULAR; INTRAVENOUS; SOFT TISSUE PRN
Status: DISCONTINUED | OUTPATIENT
Start: 2020-01-01 | End: 2020-01-01 | Stop reason: SURG

## 2020-01-01 RX ORDER — NITROGLYCERIN 0.4 MG/1
0.4 TABLET SUBLINGUAL
Status: DISCONTINUED | OUTPATIENT
Start: 2020-01-01 | End: 2020-01-01 | Stop reason: HOSPADM

## 2020-01-01 RX ORDER — VALSARTAN 160 MG/1
160 TABLET ORAL 2 TIMES DAILY
COMMUNITY
End: 2020-01-01

## 2020-01-01 RX ORDER — SODIUM CHLORIDE, SODIUM LACTATE, POTASSIUM CHLORIDE, CALCIUM CHLORIDE 600; 310; 30; 20 MG/100ML; MG/100ML; MG/100ML; MG/100ML
INJECTION, SOLUTION INTRAVENOUS CONTINUOUS
Status: ACTIVE | OUTPATIENT
Start: 2020-01-01 | End: 2020-01-01

## 2020-01-01 RX ADMIN — STANDARDIZED SENNA CONCENTRATE AND DOCUSATE SODIUM 2 TABLET: 8.6; 5 TABLET, FILM COATED ORAL at 20:14

## 2020-01-01 RX ADMIN — STANDARDIZED SENNA CONCENTRATE AND DOCUSATE SODIUM 2 TABLET: 8.6; 5 TABLET, FILM COATED ORAL at 05:49

## 2020-01-01 RX ADMIN — STANDARDIZED SENNA CONCENTRATE AND DOCUSATE SODIUM 2 TABLET: 8.6; 5 TABLET, FILM COATED ORAL at 05:27

## 2020-01-01 RX ADMIN — APIXABAN 5 MG: 5 TABLET, FILM COATED ORAL at 05:30

## 2020-01-01 RX ADMIN — APIXABAN 10 MG: 5 TABLET, FILM COATED ORAL at 17:00

## 2020-01-01 RX ADMIN — ASPIRIN 81 MG CHEWABLE TABLET 81 MG: 81 TABLET CHEWABLE at 05:26

## 2020-01-01 RX ADMIN — SODIUM CHLORIDE, POTASSIUM CHLORIDE, SODIUM LACTATE AND CALCIUM CHLORIDE: 600; 310; 30; 20 INJECTION, SOLUTION INTRAVENOUS at 13:38

## 2020-01-01 RX ADMIN — CARVEDILOL 3.12 MG: 6.25 TABLET, FILM COATED ORAL at 05:41

## 2020-01-01 RX ADMIN — Medication 400 MG: at 06:44

## 2020-01-01 RX ADMIN — STANDARDIZED SENNA CONCENTRATE AND DOCUSATE SODIUM 2 TABLET: 8.6; 5 TABLET, FILM COATED ORAL at 17:46

## 2020-01-01 RX ADMIN — ROCURONIUM BROMIDE 30 MG: 10 INJECTION INTRAVENOUS at 17:27

## 2020-01-01 RX ADMIN — DEXAMETHASONE 6 MG: 4 TABLET ORAL at 17:55

## 2020-01-01 RX ADMIN — Medication 400 MG: at 10:30

## 2020-01-01 RX ADMIN — APIXABAN 10 MG: 5 TABLET, FILM COATED ORAL at 05:07

## 2020-01-01 RX ADMIN — PROPOFOL 150 MG: 10 INJECTION, EMULSION INTRAVENOUS at 09:44

## 2020-01-01 RX ADMIN — APIXABAN 10 MG: 5 TABLET, FILM COATED ORAL at 17:18

## 2020-01-01 RX ADMIN — ATORVASTATIN CALCIUM 80 MG: 40 TABLET, FILM COATED ORAL at 18:38

## 2020-01-01 RX ADMIN — APIXABAN 5 MG: 5 TABLET, FILM COATED ORAL at 16:51

## 2020-01-01 RX ADMIN — OXYCODONE HYDROCHLORIDE AND ACETAMINOPHEN 500 MG: 500 TABLET ORAL at 04:56

## 2020-01-01 RX ADMIN — MONTELUKAST 10 MG: 10 TABLET, FILM COATED ORAL at 04:53

## 2020-01-01 RX ADMIN — STANDARDIZED SENNA CONCENTRATE AND DOCUSATE SODIUM 2 TABLET: 8.6; 5 TABLET, FILM COATED ORAL at 05:45

## 2020-01-01 RX ADMIN — APIXABAN 5 MG: 5 TABLET, FILM COATED ORAL at 06:44

## 2020-01-01 RX ADMIN — Medication 1 TABLET: at 16:33

## 2020-01-01 RX ADMIN — STANDARDIZED SENNA CONCENTRATE AND DOCUSATE SODIUM 2 TABLET: 8.6; 5 TABLET, FILM COATED ORAL at 18:12

## 2020-01-01 RX ADMIN — CARVEDILOL 3.12 MG: 6.25 TABLET, FILM COATED ORAL at 10:42

## 2020-01-01 RX ADMIN — CARVEDILOL 6.25 MG: 6.25 TABLET, FILM COATED ORAL at 04:51

## 2020-01-01 RX ADMIN — FENTANYL CITRATE 25 MCG: 50 INJECTION, SOLUTION INTRAMUSCULAR; INTRAVENOUS at 17:08

## 2020-01-01 RX ADMIN — CARVEDILOL 6.25 MG: 6.25 TABLET, FILM COATED ORAL at 05:49

## 2020-01-01 RX ADMIN — APIXABAN 10 MG: 5 TABLET, FILM COATED ORAL at 16:59

## 2020-01-01 RX ADMIN — APIXABAN 5 MG: 5 TABLET, FILM COATED ORAL at 17:52

## 2020-01-01 RX ADMIN — SULFAMETHOXAZOLE AND TRIMETHOPRIM 1 TABLET: 800; 160 TABLET ORAL at 16:34

## 2020-01-01 RX ADMIN — SULFAMETHOXAZOLE AND TRIMETHOPRIM 1 TABLET: 800; 160 TABLET ORAL at 16:51

## 2020-01-01 RX ADMIN — STANDARDIZED SENNA CONCENTRATE AND DOCUSATE SODIUM 2 TABLET: 8.6; 5 TABLET, FILM COATED ORAL at 04:09

## 2020-01-01 RX ADMIN — APIXABAN 5 MG: 5 TABLET, FILM COATED ORAL at 17:14

## 2020-01-01 RX ADMIN — ASPIRIN 81 MG CHEWABLE TABLET 81 MG: 81 TABLET CHEWABLE at 05:49

## 2020-01-01 RX ADMIN — STANDARDIZED SENNA CONCENTRATE AND DOCUSATE SODIUM 2 TABLET: 8.6; 5 TABLET, FILM COATED ORAL at 05:03

## 2020-01-01 RX ADMIN — INSULIN HUMAN 2 UNITS: 100 INJECTION, SOLUTION PARENTERAL at 13:20

## 2020-01-01 RX ADMIN — MONTELUKAST 10 MG: 10 TABLET, FILM COATED ORAL at 04:51

## 2020-01-01 RX ADMIN — ASPIRIN 81 MG CHEWABLE TABLET 81 MG: 81 TABLET CHEWABLE at 05:54

## 2020-01-01 RX ADMIN — ROCURONIUM BROMIDE 50 MG: 10 INJECTION, SOLUTION INTRAVENOUS at 09:44

## 2020-01-01 RX ADMIN — MIDAZOLAM HYDROCHLORIDE 2 MG: 1 INJECTION, SOLUTION INTRAMUSCULAR; INTRAVENOUS at 09:15

## 2020-01-01 RX ADMIN — APIXABAN 5 MG: 5 TABLET, FILM COATED ORAL at 06:03

## 2020-01-01 RX ADMIN — CARVEDILOL 6.25 MG: 6.25 TABLET, FILM COATED ORAL at 17:23

## 2020-01-01 RX ADMIN — SODIUM CHLORIDE, POTASSIUM CHLORIDE, SODIUM LACTATE AND CALCIUM CHLORIDE: 600; 310; 30; 20 INJECTION, SOLUTION INTRAVENOUS at 12:17

## 2020-01-01 RX ADMIN — ATORVASTATIN CALCIUM 80 MG: 40 TABLET, FILM COATED ORAL at 17:46

## 2020-01-01 RX ADMIN — FENTANYL CITRATE 50 MCG: 50 INJECTION, SOLUTION INTRAMUSCULAR; INTRAVENOUS at 17:53

## 2020-01-01 RX ADMIN — FENTANYL CITRATE 50 MCG: 50 INJECTION INTRAMUSCULAR; INTRAVENOUS at 09:15

## 2020-01-01 RX ADMIN — POLYETHYLENE GLYCOL 3350 1 PACKET: 17 POWDER, FOR SOLUTION ORAL at 17:04

## 2020-01-01 RX ADMIN — LIDOCAINE HYDROCHLORIDE 100 MG: 20 INJECTION, SOLUTION EPIDURAL; INFILTRATION; INTRACAUDAL; PERINEURAL at 17:27

## 2020-01-01 RX ADMIN — ATORVASTATIN CALCIUM 80 MG: 40 TABLET, FILM COATED ORAL at 18:11

## 2020-01-01 RX ADMIN — ATORVASTATIN CALCIUM 80 MG: 40 TABLET, FILM COATED ORAL at 17:50

## 2020-01-01 RX ADMIN — CARVEDILOL 12.5 MG: 12.5 TABLET, FILM COATED ORAL at 17:37

## 2020-01-01 RX ADMIN — MAGNESIUM HYDROXIDE 30 ML: 400 SUSPENSION ORAL at 05:31

## 2020-01-01 RX ADMIN — OXYCODONE HYDROCHLORIDE 5 MG: 5 TABLET ORAL at 15:48

## 2020-01-01 RX ADMIN — VANCOMYCIN HYDROCHLORIDE 750 MG: 500 INJECTION, POWDER, LYOPHILIZED, FOR SOLUTION INTRAVENOUS at 04:58

## 2020-01-01 RX ADMIN — APIXABAN 5 MG: 5 TABLET, FILM COATED ORAL at 05:03

## 2020-01-01 RX ADMIN — APIXABAN 5 MG: 5 TABLET, FILM COATED ORAL at 05:27

## 2020-01-01 RX ADMIN — CARVEDILOL 3.12 MG: 6.25 TABLET, FILM COATED ORAL at 18:12

## 2020-01-01 RX ADMIN — SULFAMETHOXAZOLE AND TRIMETHOPRIM 1 TABLET: 800; 160 TABLET ORAL at 10:33

## 2020-01-01 RX ADMIN — INSULIN HUMAN 1 UNITS: 100 INJECTION, SOLUTION PARENTERAL at 21:41

## 2020-01-01 RX ADMIN — APIXABAN 5 MG: 5 TABLET, FILM COATED ORAL at 16:55

## 2020-01-01 RX ADMIN — INSULIN GLARGINE 10 UNITS: 100 INJECTION, SOLUTION SUBCUTANEOUS at 17:47

## 2020-01-01 RX ADMIN — STANDARDIZED SENNA CONCENTRATE AND DOCUSATE SODIUM 2 TABLET: 8.6; 5 TABLET, FILM COATED ORAL at 17:32

## 2020-01-01 RX ADMIN — ATORVASTATIN CALCIUM 80 MG: 40 TABLET, FILM COATED ORAL at 17:17

## 2020-01-01 RX ADMIN — SULFAMETHOXAZOLE AND TRIMETHOPRIM 1 TABLET: 800; 160 TABLET ORAL at 17:22

## 2020-01-01 RX ADMIN — DEXAMETHASONE 6 MG: 4 TABLET ORAL at 17:21

## 2020-01-01 RX ADMIN — OXYCODONE HYDROCHLORIDE AND ACETAMINOPHEN 500 MG: 500 TABLET ORAL at 05:33

## 2020-01-01 RX ADMIN — PROPOFOL 50 MG: 10 INJECTION, EMULSION INTRAVENOUS at 13:33

## 2020-01-01 RX ADMIN — STANDARDIZED SENNA CONCENTRATE AND DOCUSATE SODIUM 2 TABLET: 8.6; 5 TABLET, FILM COATED ORAL at 05:41

## 2020-01-01 RX ADMIN — OXYCODONE HYDROCHLORIDE AND ACETAMINOPHEN 500 MG: 500 TABLET ORAL at 05:55

## 2020-01-01 RX ADMIN — OXYCODONE HYDROCHLORIDE AND ACETAMINOPHEN 500 MG: 500 TABLET ORAL at 05:27

## 2020-01-01 RX ADMIN — SODIUM CHLORIDE, POTASSIUM CHLORIDE, SODIUM LACTATE AND CALCIUM CHLORIDE: 600; 310; 30; 20 INJECTION, SOLUTION INTRAVENOUS at 08:55

## 2020-01-01 RX ADMIN — STANDARDIZED SENNA CONCENTRATE AND DOCUSATE SODIUM 2 TABLET: 8.6; 5 TABLET, FILM COATED ORAL at 17:21

## 2020-01-01 RX ADMIN — DOXYCYCLINE 100 MG: 100 TABLET, FILM COATED ORAL at 09:32

## 2020-01-01 RX ADMIN — CARVEDILOL 3.12 MG: 6.25 TABLET, FILM COATED ORAL at 17:40

## 2020-01-01 RX ADMIN — PROPOFOL 100 MG: 10 INJECTION, EMULSION INTRAVENOUS at 17:26

## 2020-01-01 RX ADMIN — ASPIRIN 81 MG CHEWABLE TABLET 81 MG: 81 TABLET CHEWABLE at 05:50

## 2020-01-01 RX ADMIN — ATORVASTATIN CALCIUM 80 MG: 40 TABLET, FILM COATED ORAL at 19:20

## 2020-01-01 RX ADMIN — Medication 400 MG: at 17:51

## 2020-01-01 RX ADMIN — CEFTRIAXONE SODIUM 1 G: 1 INJECTION, POWDER, FOR SOLUTION INTRAMUSCULAR; INTRAVENOUS at 13:46

## 2020-01-01 RX ADMIN — ACETAMINOPHEN 650 MG: 325 TABLET, FILM COATED ORAL at 18:07

## 2020-01-01 RX ADMIN — ATORVASTATIN CALCIUM 80 MG: 40 TABLET, FILM COATED ORAL at 17:00

## 2020-01-01 RX ADMIN — ATORVASTATIN CALCIUM 80 MG: 40 TABLET, FILM COATED ORAL at 17:22

## 2020-01-01 RX ADMIN — APIXABAN 10 MG: 5 TABLET, FILM COATED ORAL at 04:40

## 2020-01-01 RX ADMIN — OXYCODONE HYDROCHLORIDE AND ACETAMINOPHEN 500 MG: 500 TABLET ORAL at 05:13

## 2020-01-01 RX ADMIN — SULFAMETHOXAZOLE AND TRIMETHOPRIM 1 TABLET: 800; 160 TABLET ORAL at 17:55

## 2020-01-01 RX ADMIN — Medication 1 TABLET: at 18:01

## 2020-01-01 RX ADMIN — INSULIN HUMAN 1 UNITS: 100 INJECTION, SOLUTION PARENTERAL at 18:41

## 2020-01-01 RX ADMIN — STANDARDIZED SENNA CONCENTRATE AND DOCUSATE SODIUM 2 TABLET: 8.6; 5 TABLET, FILM COATED ORAL at 17:16

## 2020-01-01 RX ADMIN — APIXABAN 5 MG: 5 TABLET, FILM COATED ORAL at 05:33

## 2020-01-01 RX ADMIN — APIXABAN 10 MG: 5 TABLET, FILM COATED ORAL at 17:57

## 2020-01-01 RX ADMIN — OXYCODONE HYDROCHLORIDE AND ACETAMINOPHEN 500 MG: 500 TABLET ORAL at 06:44

## 2020-01-01 RX ADMIN — Medication 1000 UNITS: at 09:20

## 2020-01-01 RX ADMIN — Medication 1 TABLET: at 20:16

## 2020-01-01 RX ADMIN — MAGNESIUM HYDROXIDE 30 ML: 400 SUSPENSION ORAL at 01:20

## 2020-01-01 RX ADMIN — STANDARDIZED SENNA CONCENTRATE AND DOCUSATE SODIUM 2 TABLET: 8.6; 5 TABLET, FILM COATED ORAL at 05:54

## 2020-01-01 RX ADMIN — CARVEDILOL 3.12 MG: 6.25 TABLET, FILM COATED ORAL at 08:51

## 2020-01-01 RX ADMIN — STANDARDIZED SENNA CONCENTRATE AND DOCUSATE SODIUM 2 TABLET: 8.6; 5 TABLET, FILM COATED ORAL at 05:13

## 2020-01-01 RX ADMIN — APIXABAN 5 MG: 5 TABLET, FILM COATED ORAL at 05:31

## 2020-01-01 RX ADMIN — SULFAMETHOXAZOLE AND TRIMETHOPRIM 1 TABLET: 800; 160 TABLET ORAL at 05:13

## 2020-01-01 RX ADMIN — Medication 1 TABLET: at 16:55

## 2020-01-01 RX ADMIN — APIXABAN 5 MG: 5 TABLET, FILM COATED ORAL at 05:50

## 2020-01-01 RX ADMIN — GLYCOPYRROLATE 0.2 MG: 0.2 INJECTION INTRAMUSCULAR; INTRAVENOUS at 13:08

## 2020-01-01 RX ADMIN — CARVEDILOL 3.12 MG: 6.25 TABLET, FILM COATED ORAL at 18:04

## 2020-01-01 RX ADMIN — OXYCODONE HYDROCHLORIDE AND ACETAMINOPHEN 500 MG: 500 TABLET ORAL at 05:54

## 2020-01-01 RX ADMIN — SULFAMETHOXAZOLE AND TRIMETHOPRIM 1 TABLET: 800; 160 TABLET ORAL at 17:47

## 2020-01-01 RX ADMIN — Medication 1 TABLET: at 18:07

## 2020-01-01 RX ADMIN — APIXABAN 5 MG: 5 TABLET, FILM COATED ORAL at 19:24

## 2020-01-01 RX ADMIN — Medication 1000 UNITS: at 04:52

## 2020-01-01 RX ADMIN — CARVEDILOL 3.12 MG: 6.25 TABLET, FILM COATED ORAL at 08:45

## 2020-01-01 RX ADMIN — OXYCODONE HYDROCHLORIDE AND ACETAMINOPHEN 500 MG: 500 TABLET ORAL at 07:06

## 2020-01-01 RX ADMIN — INSULIN GLARGINE 10 UNITS: 100 INJECTION, SOLUTION SUBCUTANEOUS at 17:30

## 2020-01-01 RX ADMIN — ASPIRIN 81 MG CHEWABLE TABLET 81 MG: 81 TABLET CHEWABLE at 05:18

## 2020-01-01 RX ADMIN — OXYCODONE HYDROCHLORIDE 5 MG: 5 TABLET ORAL at 19:56

## 2020-01-01 RX ADMIN — ATORVASTATIN CALCIUM 80 MG: 40 TABLET, FILM COATED ORAL at 16:35

## 2020-01-01 RX ADMIN — INSULIN HUMAN 1 UNITS: 100 INJECTION, SOLUTION PARENTERAL at 21:07

## 2020-01-01 RX ADMIN — Medication 400 MG: at 17:00

## 2020-01-01 RX ADMIN — ATORVASTATIN CALCIUM 80 MG: 40 TABLET, FILM COATED ORAL at 17:45

## 2020-01-01 RX ADMIN — CARVEDILOL 3.12 MG: 6.25 TABLET, FILM COATED ORAL at 09:49

## 2020-01-01 RX ADMIN — Medication 1 TABLET: at 18:23

## 2020-01-01 RX ADMIN — INSULIN HUMAN 1 UNITS: 100 INJECTION, SOLUTION PARENTERAL at 21:01

## 2020-01-01 RX ADMIN — DOXYCYCLINE 100 MG: 100 TABLET, FILM COATED ORAL at 04:56

## 2020-01-01 RX ADMIN — Medication 200 MCG: at 09:58

## 2020-01-01 RX ADMIN — ATORVASTATIN CALCIUM 80 MG: 40 TABLET, FILM COATED ORAL at 16:59

## 2020-01-01 RX ADMIN — TRANEXAMIC ACID 1000 MG: 100 INJECTION, SOLUTION INTRAVENOUS at 12:43

## 2020-01-01 RX ADMIN — APIXABAN 5 MG: 5 TABLET, FILM COATED ORAL at 16:34

## 2020-01-01 RX ADMIN — OXYCODONE HYDROCHLORIDE AND ACETAMINOPHEN 500 MG: 500 TABLET ORAL at 05:41

## 2020-01-01 RX ADMIN — STANDARDIZED SENNA CONCENTRATE AND DOCUSATE SODIUM 2 TABLET: 8.6; 5 TABLET, FILM COATED ORAL at 18:09

## 2020-01-01 RX ADMIN — APIXABAN 5 MG: 5 TABLET, FILM COATED ORAL at 16:53

## 2020-01-01 RX ADMIN — SULFAMETHOXAZOLE AND TRIMETHOPRIM 1 TABLET: 800; 160 TABLET ORAL at 17:35

## 2020-01-01 RX ADMIN — APIXABAN 5 MG: 5 TABLET, FILM COATED ORAL at 05:06

## 2020-01-01 RX ADMIN — SULFAMETHOXAZOLE AND TRIMETHOPRIM 1 TABLET: 800; 160 TABLET ORAL at 16:53

## 2020-01-01 RX ADMIN — APIXABAN 10 MG: 5 TABLET, FILM COATED ORAL at 04:32

## 2020-01-01 RX ADMIN — VALSARTAN 160 MG: 80 TABLET, FILM COATED ORAL at 09:40

## 2020-01-01 RX ADMIN — DEXAMETHASONE 6 MG: 4 TABLET ORAL at 19:01

## 2020-01-01 RX ADMIN — Medication 1 TABLET: at 18:04

## 2020-01-01 RX ADMIN — CARVEDILOL 3.12 MG: 6.25 TABLET, FILM COATED ORAL at 17:03

## 2020-01-01 RX ADMIN — Medication 1 TABLET: at 17:14

## 2020-01-01 RX ADMIN — SODIUM CHLORIDE, POTASSIUM CHLORIDE, SODIUM LACTATE AND CALCIUM CHLORIDE: 600; 310; 30; 20 INJECTION, SOLUTION INTRAVENOUS at 16:49

## 2020-01-01 RX ADMIN — Medication 1 TABLET: at 18:38

## 2020-01-01 RX ADMIN — FENTANYL CITRATE 50 MCG: 50 INJECTION INTRAMUSCULAR; INTRAVENOUS at 11:14

## 2020-01-01 RX ADMIN — Medication 1000 UNITS: at 04:53

## 2020-01-01 RX ADMIN — ACETAMINOPHEN 650 MG: 325 TABLET, FILM COATED ORAL at 04:29

## 2020-01-01 RX ADMIN — APIXABAN 5 MG: 5 TABLET, FILM COATED ORAL at 18:04

## 2020-01-01 RX ADMIN — APIXABAN 5 MG: 5 TABLET, FILM COATED ORAL at 17:22

## 2020-01-01 RX ADMIN — CARVEDILOL 3.12 MG: 6.25 TABLET, FILM COATED ORAL at 05:04

## 2020-01-01 RX ADMIN — Medication 1 TABLET: at 17:23

## 2020-01-01 RX ADMIN — STANDARDIZED SENNA CONCENTRATE AND DOCUSATE SODIUM 2 TABLET: 8.6; 5 TABLET, FILM COATED ORAL at 05:36

## 2020-01-01 RX ADMIN — OXYCODONE HYDROCHLORIDE AND ACETAMINOPHEN 500 MG: 500 TABLET ORAL at 04:42

## 2020-01-01 RX ADMIN — SULFAMETHOXAZOLE AND TRIMETHOPRIM 1 TABLET: 800; 160 TABLET ORAL at 05:45

## 2020-01-01 RX ADMIN — APIXABAN 5 MG: 5 TABLET, FILM COATED ORAL at 17:46

## 2020-01-01 RX ADMIN — ATORVASTATIN CALCIUM 80 MG: 40 TABLET, FILM COATED ORAL at 17:40

## 2020-01-01 RX ADMIN — DOCUSATE SODIUM 50 MG AND SENNOSIDES 8.6 MG 2 TABLET: 8.6; 5 TABLET, FILM COATED ORAL at 04:52

## 2020-01-01 RX ADMIN — ACETAMINOPHEN 650 MG: 325 TABLET, FILM COATED ORAL at 08:44

## 2020-01-01 RX ADMIN — OXYCODONE HYDROCHLORIDE AND ACETAMINOPHEN 500 MG: 500 TABLET ORAL at 04:30

## 2020-01-01 RX ADMIN — METFORMIN HYDROCHLORIDE 1000 MG: 500 TABLET ORAL at 09:19

## 2020-01-01 RX ADMIN — STANDARDIZED SENNA CONCENTRATE AND DOCUSATE SODIUM 2 TABLET: 8.6; 5 TABLET, FILM COATED ORAL at 17:15

## 2020-01-01 RX ADMIN — APIXABAN 5 MG: 5 TABLET, FILM COATED ORAL at 05:54

## 2020-01-01 RX ADMIN — DOXYCYCLINE 100 MG: 100 TABLET, FILM COATED ORAL at 05:36

## 2020-01-01 RX ADMIN — FENTANYL CITRATE 100 MCG: 50 INJECTION INTRAMUSCULAR; INTRAVENOUS at 09:43

## 2020-01-01 RX ADMIN — ASPIRIN 81 MG CHEWABLE TABLET 81 MG: 81 TABLET CHEWABLE at 04:41

## 2020-01-01 RX ADMIN — ASPIRIN 81 MG CHEWABLE TABLET 81 MG: 81 TABLET CHEWABLE at 05:52

## 2020-01-01 RX ADMIN — STANDARDIZED SENNA CONCENTRATE AND DOCUSATE SODIUM 2 TABLET: 8.6; 5 TABLET, FILM COATED ORAL at 05:46

## 2020-01-01 RX ADMIN — OXYCODONE HYDROCHLORIDE 5 MG: 5 TABLET ORAL at 09:21

## 2020-01-01 RX ADMIN — APIXABAN 5 MG: 5 TABLET, FILM COATED ORAL at 18:12

## 2020-01-01 RX ADMIN — CARVEDILOL 6.25 MG: 6.25 TABLET, FILM COATED ORAL at 08:24

## 2020-01-01 RX ADMIN — DIBASIC SODIUM PHOSPHATE, MONOBASIC POTASSIUM PHOSPHATE AND MONOBASIC SODIUM PHOSPHATE 500 MG: 852; 155; 130 TABLET ORAL at 10:30

## 2020-01-01 RX ADMIN — DEXAMETHASONE 6 MG: 4 TABLET ORAL at 16:58

## 2020-01-01 RX ADMIN — OXYCODONE HYDROCHLORIDE AND ACETAMINOPHEN 500 MG: 500 TABLET ORAL at 05:30

## 2020-01-01 RX ADMIN — STANDARDIZED SENNA CONCENTRATE AND DOCUSATE SODIUM 2 TABLET: 8.6; 5 TABLET, FILM COATED ORAL at 04:44

## 2020-01-01 RX ADMIN — STANDARDIZED SENNA CONCENTRATE AND DOCUSATE SODIUM 2 TABLET: 8.6; 5 TABLET, FILM COATED ORAL at 05:31

## 2020-01-01 RX ADMIN — STANDARDIZED SENNA CONCENTRATE AND DOCUSATE SODIUM 2 TABLET: 8.6; 5 TABLET, FILM COATED ORAL at 05:55

## 2020-01-01 RX ADMIN — STANDARDIZED SENNA CONCENTRATE AND DOCUSATE SODIUM 2 TABLET: 8.6; 5 TABLET, FILM COATED ORAL at 17:25

## 2020-01-01 RX ADMIN — ROPIVACAINE HYDROCHLORIDE: 2 INJECTION, SOLUTION EPIDURAL; INFILTRATION at 14:15

## 2020-01-01 RX ADMIN — APIXABAN 5 MG: 5 TABLET, FILM COATED ORAL at 05:52

## 2020-01-01 RX ADMIN — CARVEDILOL 3.12 MG: 6.25 TABLET, FILM COATED ORAL at 18:23

## 2020-01-01 RX ADMIN — LIDOCAINE HYDROCHLORIDE 4 ML: 40 SOLUTION TOPICAL at 09:45

## 2020-01-01 RX ADMIN — STANDARDIZED SENNA CONCENTRATE AND DOCUSATE SODIUM 2 TABLET: 8.6; 5 TABLET, FILM COATED ORAL at 06:03

## 2020-01-01 RX ADMIN — Medication 1 TABLET: at 17:49

## 2020-01-01 RX ADMIN — INSULIN HUMAN 1 UNITS: 100 INJECTION, SOLUTION PARENTERAL at 05:39

## 2020-01-01 RX ADMIN — Medication 400 MG: at 05:27

## 2020-01-01 RX ADMIN — DEXAMETHASONE 6 MG: 4 TABLET ORAL at 17:23

## 2020-01-01 RX ADMIN — CARVEDILOL 3.12 MG: 6.25 TABLET, FILM COATED ORAL at 08:30

## 2020-01-01 RX ADMIN — CARVEDILOL 3.12 MG: 6.25 TABLET, FILM COATED ORAL at 07:56

## 2020-01-01 RX ADMIN — APIXABAN 5 MG: 5 TABLET, FILM COATED ORAL at 04:09

## 2020-01-01 RX ADMIN — APIXABAN 5 MG: 5 TABLET, FILM COATED ORAL at 05:48

## 2020-01-01 RX ADMIN — APIXABAN 5 MG: 5 TABLET, FILM COATED ORAL at 23:46

## 2020-01-01 RX ADMIN — ATORVASTATIN CALCIUM 80 MG: 40 TABLET, FILM COATED ORAL at 16:55

## 2020-01-01 RX ADMIN — DOXYCYCLINE 100 MG: 100 TABLET, FILM COATED ORAL at 17:15

## 2020-01-01 RX ADMIN — INSULIN HUMAN 1 UNITS: 100 INJECTION, SOLUTION PARENTERAL at 05:52

## 2020-01-01 RX ADMIN — APIXABAN 5 MG: 5 TABLET, FILM COATED ORAL at 04:44

## 2020-01-01 RX ADMIN — STANDARDIZED SENNA CONCENTRATE AND DOCUSATE SODIUM 2 TABLET: 8.6; 5 TABLET, FILM COATED ORAL at 04:30

## 2020-01-01 RX ADMIN — INSULIN HUMAN 2 UNITS: 100 INJECTION, SOLUTION PARENTERAL at 13:16

## 2020-01-01 RX ADMIN — CARVEDILOL 3.12 MG: 6.25 TABLET, FILM COATED ORAL at 08:58

## 2020-01-01 RX ADMIN — DEXAMETHASONE 6 MG: 4 TABLET ORAL at 17:27

## 2020-01-01 RX ADMIN — ASPIRIN 81 MG CHEWABLE TABLET 81 MG: 81 TABLET CHEWABLE at 05:47

## 2020-01-01 RX ADMIN — SUGAMMADEX 200 MG: 100 INJECTION, SOLUTION INTRAVENOUS at 18:03

## 2020-01-01 RX ADMIN — BUPIVACAINE HYDROCHLORIDE 20 ML: 2.5 INJECTION, SOLUTION EPIDURAL; INFILTRATION; INTRACAUDAL; PERINEURAL at 09:36

## 2020-01-01 RX ADMIN — DEXAMETHASONE 6 MG: 4 TABLET ORAL at 17:51

## 2020-01-01 RX ADMIN — ASPIRIN 81 MG CHEWABLE TABLET 81 MG: 81 TABLET CHEWABLE at 06:44

## 2020-01-01 RX ADMIN — CARVEDILOL 6.25 MG: 6.25 TABLET, FILM COATED ORAL at 12:08

## 2020-01-01 RX ADMIN — ASPIRIN 81 MG CHEWABLE TABLET 81 MG: 81 TABLET CHEWABLE at 04:42

## 2020-01-01 RX ADMIN — ATORVASTATIN CALCIUM 80 MG: 40 TABLET, FILM COATED ORAL at 18:10

## 2020-01-01 RX ADMIN — ACETAMINOPHEN 650 MG: 325 TABLET, FILM COATED ORAL at 21:19

## 2020-01-01 RX ADMIN — ATORVASTATIN CALCIUM 80 MG: 40 TABLET, FILM COATED ORAL at 17:24

## 2020-01-01 RX ADMIN — OXYCODONE HYDROCHLORIDE AND ACETAMINOPHEN 500 MG: 500 TABLET ORAL at 04:44

## 2020-01-01 RX ADMIN — OXYCODONE HYDROCHLORIDE AND ACETAMINOPHEN 500 MG: 500 TABLET ORAL at 06:03

## 2020-01-01 RX ADMIN — ATORVASTATIN CALCIUM 80 MG: 40 TABLET, FILM COATED ORAL at 17:57

## 2020-01-01 RX ADMIN — OXYCODONE HYDROCHLORIDE 5 MG: 5 TABLET ORAL at 12:04

## 2020-01-01 RX ADMIN — ACETAMINOPHEN 650 MG: 325 TABLET, FILM COATED ORAL at 16:02

## 2020-01-01 RX ADMIN — APIXABAN 10 MG: 5 TABLET, FILM COATED ORAL at 14:45

## 2020-01-01 RX ADMIN — IOHEXOL 75 ML: 350 INJECTION, SOLUTION INTRAVENOUS at 17:10

## 2020-01-01 RX ADMIN — Medication 400 MG: at 05:31

## 2020-01-01 RX ADMIN — VALSARTAN 160 MG: 80 TABLET, FILM COATED ORAL at 17:33

## 2020-01-01 RX ADMIN — CARVEDILOL 6.25 MG: 6.25 TABLET, FILM COATED ORAL at 17:00

## 2020-01-01 RX ADMIN — CARVEDILOL 3.12 MG: 6.25 TABLET, FILM COATED ORAL at 06:44

## 2020-01-01 RX ADMIN — APIXABAN 5 MG: 5 TABLET, FILM COATED ORAL at 05:45

## 2020-01-01 RX ADMIN — APIXABAN 5 MG: 5 TABLET, FILM COATED ORAL at 18:26

## 2020-01-01 RX ADMIN — STANDARDIZED SENNA CONCENTRATE AND DOCUSATE SODIUM 2 TABLET: 8.6; 5 TABLET, FILM COATED ORAL at 06:18

## 2020-01-01 RX ADMIN — OXYCODONE HYDROCHLORIDE AND ACETAMINOPHEN 500 MG: 500 TABLET ORAL at 04:58

## 2020-01-01 RX ADMIN — INFLUENZA A VIRUS A/MICHIGAN/45/2015 X-275 (H1N1) ANTIGEN (FORMALDEHYDE INACTIVATED), INFLUENZA A VIRUS A/SINGAPORE/INFIMH-16-0019/2016 IVR-186 (H3N2) ANTIGEN (FORMALDEHYDE INACTIVATED), INFLUENZA B VIRUS B/PHUKET/3073/2013 ANTIGEN (FORMALDEHYDE INACTIVATED), AND INFLUENZA B VIRUS B/MARYLAND/15/2016 BX-69A ANTIGEN (FORMALDEHYDE INACTIVATED) 0.7 ML: 60; 60; 60; 60 INJECTION, SUSPENSION INTRAMUSCULAR at 17:08

## 2020-01-01 RX ADMIN — MAGNESIUM HYDROXIDE 30 ML: 400 SUSPENSION ORAL at 17:50

## 2020-01-01 RX ADMIN — CARVEDILOL 3.12 MG: 6.25 TABLET, FILM COATED ORAL at 10:29

## 2020-01-01 RX ADMIN — OXYCODONE HYDROCHLORIDE AND ACETAMINOPHEN 500 MG: 500 TABLET ORAL at 05:46

## 2020-01-01 RX ADMIN — OXYCODONE HYDROCHLORIDE AND ACETAMINOPHEN 500 MG: 500 TABLET ORAL at 14:46

## 2020-01-01 RX ADMIN — APIXABAN 5 MG: 5 TABLET, FILM COATED ORAL at 18:50

## 2020-01-01 RX ADMIN — CARVEDILOL 3.12 MG: 6.25 TABLET, FILM COATED ORAL at 16:55

## 2020-01-01 RX ADMIN — ATORVASTATIN CALCIUM 80 MG: 40 TABLET, FILM COATED ORAL at 18:23

## 2020-01-01 RX ADMIN — FENTANYL CITRATE 50 MCG: 50 INJECTION INTRAMUSCULAR; INTRAVENOUS at 13:46

## 2020-01-01 RX ADMIN — SULFAMETHOXAZOLE AND TRIMETHOPRIM 1 TABLET: 800; 160 TABLET ORAL at 16:55

## 2020-01-01 RX ADMIN — STANDARDIZED SENNA CONCENTRATE AND DOCUSATE SODIUM 2 TABLET: 8.6; 5 TABLET, FILM COATED ORAL at 16:51

## 2020-01-01 RX ADMIN — APIXABAN 10 MG: 5 TABLET, FILM COATED ORAL at 05:13

## 2020-01-01 RX ADMIN — Medication 400 MG: at 18:12

## 2020-01-01 RX ADMIN — APIXABAN 5 MG: 5 TABLET, FILM COATED ORAL at 05:36

## 2020-01-01 RX ADMIN — KETOROLAC TROMETHAMINE 15 MG: 30 INJECTION, SOLUTION INTRAMUSCULAR at 17:45

## 2020-01-01 RX ADMIN — ACETAMINOPHEN 650 MG: 325 TABLET, FILM COATED ORAL at 20:07

## 2020-01-01 RX ADMIN — APIXABAN 10 MG: 5 TABLET, FILM COATED ORAL at 05:49

## 2020-01-01 RX ADMIN — SULFAMETHOXAZOLE AND TRIMETHOPRIM 1 TABLET: 800; 160 TABLET ORAL at 05:26

## 2020-01-01 RX ADMIN — OXYCODONE HYDROCHLORIDE AND ACETAMINOPHEN 500 MG: 500 TABLET ORAL at 04:09

## 2020-01-01 RX ADMIN — CARVEDILOL 3.12 MG: 6.25 TABLET, FILM COATED ORAL at 17:13

## 2020-01-01 RX ADMIN — ASPIRIN 81 MG: 81 TABLET, COATED ORAL at 04:51

## 2020-01-01 RX ADMIN — APIXABAN 5 MG: 5 TABLET, FILM COATED ORAL at 19:01

## 2020-01-01 RX ADMIN — DOXYCYCLINE 100 MG: 100 TABLET, FILM COATED ORAL at 18:04

## 2020-01-01 RX ADMIN — LIDOCAINE HYDROCHLORIDE 2 ML: 20 INJECTION, SOLUTION EPIDURAL; INFILTRATION; INTRACAUDAL at 09:36

## 2020-01-01 RX ADMIN — Medication 1 TABLET: at 17:40

## 2020-01-01 RX ADMIN — Medication 1 TABLET: at 19:20

## 2020-01-01 RX ADMIN — Medication 1 TABLET: at 16:53

## 2020-01-01 RX ADMIN — CARVEDILOL 3.12 MG: 6.25 TABLET, FILM COATED ORAL at 05:31

## 2020-01-01 RX ADMIN — APIXABAN 5 MG: 5 TABLET, FILM COATED ORAL at 05:13

## 2020-01-01 RX ADMIN — ACETAMINOPHEN 650 MG: 325 TABLET, FILM COATED ORAL at 11:08

## 2020-01-01 RX ADMIN — ATORVASTATIN CALCIUM 80 MG: 40 TABLET, FILM COATED ORAL at 16:53

## 2020-01-01 RX ADMIN — CARVEDILOL 6.25 MG: 6.25 TABLET, FILM COATED ORAL at 06:36

## 2020-01-01 RX ADMIN — Medication 400 MG: at 10:39

## 2020-01-01 RX ADMIN — ACETAMINOPHEN 650 MG: 325 TABLET, FILM COATED ORAL at 21:05

## 2020-01-01 RX ADMIN — HEPARIN SODIUM 5000 UNITS: 5000 INJECTION, SOLUTION INTRAVENOUS; SUBCUTANEOUS at 13:14

## 2020-01-01 RX ADMIN — CARVEDILOL 3.12 MG: 6.25 TABLET, FILM COATED ORAL at 05:50

## 2020-01-01 RX ADMIN — APIXABAN 5 MG: 5 TABLET, FILM COATED ORAL at 17:32

## 2020-01-01 RX ADMIN — SULFAMETHOXAZOLE AND TRIMETHOPRIM 1 TABLET: 800; 160 TABLET ORAL at 07:06

## 2020-01-01 RX ADMIN — ATORVASTATIN CALCIUM 80 MG: 40 TABLET, FILM COATED ORAL at 17:14

## 2020-01-01 RX ADMIN — OXYCODONE HYDROCHLORIDE 5 MG: 5 TABLET ORAL at 20:27

## 2020-01-01 RX ADMIN — Medication 1 TABLET: at 17:47

## 2020-01-01 RX ADMIN — STANDARDIZED SENNA CONCENTRATE AND DOCUSATE SODIUM 2 TABLET: 8.6; 5 TABLET, FILM COATED ORAL at 18:00

## 2020-01-01 RX ADMIN — INSULIN HUMAN 1 UNITS: 100 INJECTION, SOLUTION PARENTERAL at 12:16

## 2020-01-01 RX ADMIN — ASPIRIN 81 MG CHEWABLE TABLET 81 MG: 81 TABLET CHEWABLE at 05:08

## 2020-01-01 RX ADMIN — ATORVASTATIN CALCIUM 80 MG: 40 TABLET, FILM COATED ORAL at 19:01

## 2020-01-01 RX ADMIN — CEFAZOLIN 2 G: 1 INJECTION, POWDER, FOR SOLUTION INTRAVENOUS at 17:31

## 2020-01-01 RX ADMIN — APIXABAN 10 MG: 5 TABLET, FILM COATED ORAL at 05:46

## 2020-01-01 RX ADMIN — Medication 1 TABLET: at 16:39

## 2020-01-01 RX ADMIN — OXYCODONE HYDROCHLORIDE AND ACETAMINOPHEN 500 MG: 500 TABLET ORAL at 05:26

## 2020-01-01 RX ADMIN — CARVEDILOL 3.12 MG: 6.25 TABLET, FILM COATED ORAL at 07:43

## 2020-01-01 RX ADMIN — VANCOMYCIN HYDROCHLORIDE 2250 MG: 500 INJECTION, POWDER, LYOPHILIZED, FOR SOLUTION INTRAVENOUS at 15:33

## 2020-01-01 RX ADMIN — STANDARDIZED SENNA CONCENTRATE AND DOCUSATE SODIUM 2 TABLET: 8.6; 5 TABLET, FILM COATED ORAL at 17:27

## 2020-01-01 RX ADMIN — DEXAMETHASONE 6 MG: 4 TABLET ORAL at 16:37

## 2020-01-01 RX ADMIN — APIXABAN 5 MG: 5 TABLET, FILM COATED ORAL at 05:26

## 2020-01-01 RX ADMIN — FENTANYL CITRATE 50 MCG: 50 INJECTION INTRAMUSCULAR; INTRAVENOUS at 13:33

## 2020-01-01 RX ADMIN — MONTELUKAST 10 MG: 10 TABLET, FILM COATED ORAL at 09:20

## 2020-01-01 RX ADMIN — CARVEDILOL 3.12 MG: 6.25 TABLET, FILM COATED ORAL at 17:34

## 2020-01-01 RX ADMIN — ACETAMINOPHEN 650 MG: 325 TABLET, FILM COATED ORAL at 12:56

## 2020-01-01 RX ADMIN — BUPIVACAINE HYDROCHLORIDE 20 ML: 5 INJECTION, SOLUTION EPIDURAL; INTRACAUDAL; PERINEURAL at 09:25

## 2020-01-01 RX ADMIN — Medication 1 TABLET: at 17:00

## 2020-01-01 RX ADMIN — SULFAMETHOXAZOLE AND TRIMETHOPRIM 1 TABLET: 800; 160 TABLET ORAL at 05:06

## 2020-01-01 RX ADMIN — DEXAMETHASONE 6 MG: 4 TABLET ORAL at 18:05

## 2020-01-01 RX ADMIN — POLYETHYLENE GLYCOL 3350 1 PACKET: 17 POWDER, FOR SOLUTION ORAL at 09:27

## 2020-01-01 RX ADMIN — ENOXAPARIN SODIUM 40 MG: 40 INJECTION SUBCUTANEOUS at 04:55

## 2020-01-01 RX ADMIN — APIXABAN 5 MG: 5 TABLET, FILM COATED ORAL at 18:23

## 2020-01-01 RX ADMIN — HEPARIN SODIUM 5000 UNITS: 5000 INJECTION, SOLUTION INTRAVENOUS; SUBCUTANEOUS at 04:55

## 2020-01-01 RX ADMIN — Medication 1 TABLET: at 18:11

## 2020-01-01 RX ADMIN — POLYETHYLENE GLYCOL 3350 1 PACKET: 17 POWDER, FOR SOLUTION ORAL at 19:16

## 2020-01-01 RX ADMIN — STANDARDIZED SENNA CONCENTRATE AND DOCUSATE SODIUM 2 TABLET: 8.6; 5 TABLET, FILM COATED ORAL at 16:36

## 2020-01-01 RX ADMIN — CARVEDILOL 3.12 MG: 6.25 TABLET, FILM COATED ORAL at 07:33

## 2020-01-01 RX ADMIN — VALSARTAN 160 MG: 80 TABLET, FILM COATED ORAL at 04:53

## 2020-01-01 RX ADMIN — CARVEDILOL 3.12 MG: 6.25 TABLET, FILM COATED ORAL at 08:23

## 2020-01-01 RX ADMIN — APIXABAN 5 MG: 5 TABLET, FILM COATED ORAL at 17:27

## 2020-01-01 RX ADMIN — DOCUSATE SODIUM 50 MG AND SENNOSIDES 8.6 MG 2 TABLET: 8.6; 5 TABLET, FILM COATED ORAL at 17:33

## 2020-01-01 RX ADMIN — ASPIRIN 81 MG CHEWABLE TABLET 81 MG: 81 TABLET CHEWABLE at 04:32

## 2020-01-01 RX ADMIN — CARVEDILOL 6.25 MG: 6.25 TABLET, FILM COATED ORAL at 08:42

## 2020-01-01 RX ADMIN — STANDARDIZED SENNA CONCENTRATE AND DOCUSATE SODIUM 2 TABLET: 8.6; 5 TABLET, FILM COATED ORAL at 17:00

## 2020-01-01 RX ADMIN — Medication 400 MG: at 05:48

## 2020-01-01 RX ADMIN — Medication 1 TABLET: at 17:21

## 2020-01-01 RX ADMIN — DEXAMETHASONE 6 MG: 4 TABLET ORAL at 17:00

## 2020-01-01 RX ADMIN — ASPIRIN 81 MG CHEWABLE TABLET 81 MG: 81 TABLET CHEWABLE at 05:55

## 2020-01-01 RX ADMIN — OXYCODONE HYDROCHLORIDE AND ACETAMINOPHEN 500 MG: 500 TABLET ORAL at 05:14

## 2020-01-01 RX ADMIN — INSULIN HUMAN 1 UNITS: 100 INJECTION, SOLUTION PARENTERAL at 12:54

## 2020-01-01 RX ADMIN — OXYCODONE HYDROCHLORIDE AND ACETAMINOPHEN 500 MG: 500 TABLET ORAL at 05:52

## 2020-01-01 RX ADMIN — Medication 0.5 ML: at 08:37

## 2020-01-01 RX ADMIN — Medication 400 MG: at 17:32

## 2020-01-01 RX ADMIN — VALSARTAN 160 MG: 80 TABLET, FILM COATED ORAL at 17:37

## 2020-01-01 RX ADMIN — CARVEDILOL 6.25 MG: 6.25 TABLET, FILM COATED ORAL at 17:17

## 2020-01-01 RX ADMIN — FENTANYL CITRATE 25 MCG: 50 INJECTION, SOLUTION INTRAMUSCULAR; INTRAVENOUS at 17:25

## 2020-01-01 RX ADMIN — OXYCODONE HYDROCHLORIDE AND ACETAMINOPHEN 500 MG: 500 TABLET ORAL at 05:04

## 2020-01-01 RX ADMIN — STANDARDIZED SENNA CONCENTRATE AND DOCUSATE SODIUM 2 TABLET: 8.6; 5 TABLET, FILM COATED ORAL at 05:06

## 2020-01-01 RX ADMIN — STANDARDIZED SENNA CONCENTRATE AND DOCUSATE SODIUM 2 TABLET: 8.6; 5 TABLET, FILM COATED ORAL at 18:05

## 2020-01-01 RX ADMIN — APIXABAN 5 MG: 5 TABLET, FILM COATED ORAL at 17:21

## 2020-01-01 RX ADMIN — CARVEDILOL 6.25 MG: 6.25 TABLET, FILM COATED ORAL at 16:58

## 2020-01-01 RX ADMIN — ACETAMINOPHEN 500 MG: 500 TABLET ORAL at 08:36

## 2020-01-01 RX ADMIN — STANDARDIZED SENNA CONCENTRATE AND DOCUSATE SODIUM 2 TABLET: 8.6; 5 TABLET, FILM COATED ORAL at 17:56

## 2020-01-01 RX ADMIN — OXYCODONE HYDROCHLORIDE AND ACETAMINOPHEN 500 MG: 500 TABLET ORAL at 05:50

## 2020-01-01 RX ADMIN — ONDANSETRON 4 MG: 2 INJECTION INTRAMUSCULAR; INTRAVENOUS at 17:33

## 2020-01-01 RX ADMIN — ATORVASTATIN CALCIUM 80 MG: 40 TABLET, FILM COATED ORAL at 17:05

## 2020-01-01 RX ADMIN — SUCCINYLCHOLINE CHLORIDE 100 MG: 20 INJECTION, SOLUTION INTRAMUSCULAR; INTRAVENOUS at 17:26

## 2020-01-01 RX ADMIN — STANDARDIZED SENNA CONCENTRATE AND DOCUSATE SODIUM 2 TABLET: 8.6; 5 TABLET, FILM COATED ORAL at 17:13

## 2020-01-01 RX ADMIN — STANDARDIZED SENNA CONCENTRATE AND DOCUSATE SODIUM 2 TABLET: 8.6; 5 TABLET, FILM COATED ORAL at 19:20

## 2020-01-01 RX ADMIN — DOXYCYCLINE 100 MG: 100 TABLET, FILM COATED ORAL at 17:45

## 2020-01-01 RX ADMIN — Medication 400 MG: at 11:28

## 2020-01-01 RX ADMIN — ATORVASTATIN CALCIUM 80 MG: 40 TABLET, FILM COATED ORAL at 18:04

## 2020-01-01 RX ADMIN — APIXABAN 5 MG: 5 TABLET, FILM COATED ORAL at 18:03

## 2020-01-01 RX ADMIN — OXYCODONE HYDROCHLORIDE AND ACETAMINOPHEN 500 MG: 500 TABLET ORAL at 05:18

## 2020-01-01 RX ADMIN — APIXABAN 5 MG: 5 TABLET, FILM COATED ORAL at 04:42

## 2020-01-01 RX ADMIN — Medication 1 TABLET: at 17:45

## 2020-01-01 RX ADMIN — SULFAMETHOXAZOLE AND TRIMETHOPRIM 1 TABLET: 800; 160 TABLET ORAL at 04:44

## 2020-01-01 RX ADMIN — APIXABAN 5 MG: 5 TABLET, FILM COATED ORAL at 16:56

## 2020-01-01 RX ADMIN — STANDARDIZED SENNA CONCENTRATE AND DOCUSATE SODIUM 2 TABLET: 8.6; 5 TABLET, FILM COATED ORAL at 18:03

## 2020-01-01 RX ADMIN — CARVEDILOL 3.12 MG: 6.25 TABLET, FILM COATED ORAL at 07:34

## 2020-01-01 RX ADMIN — ATORVASTATIN CALCIUM 80 MG: 80 TABLET, FILM COATED ORAL at 17:33

## 2020-01-01 RX ADMIN — STANDARDIZED SENNA CONCENTRATE AND DOCUSATE SODIUM 2 TABLET: 8.6; 5 TABLET, FILM COATED ORAL at 17:40

## 2020-01-01 RX ADMIN — CARVEDILOL 3.12 MG: 6.25 TABLET, FILM COATED ORAL at 09:10

## 2020-01-01 RX ADMIN — APIXABAN 5 MG: 5 TABLET, FILM COATED ORAL at 15:04

## 2020-01-01 RX ADMIN — STANDARDIZED SENNA CONCENTRATE AND DOCUSATE SODIUM 2 TABLET: 8.6; 5 TABLET, FILM COATED ORAL at 16:35

## 2020-01-01 RX ADMIN — STANDARDIZED SENNA CONCENTRATE AND DOCUSATE SODIUM 2 TABLET: 8.6; 5 TABLET, FILM COATED ORAL at 19:01

## 2020-01-01 RX ADMIN — ATORVASTATIN CALCIUM 80 MG: 40 TABLET, FILM COATED ORAL at 17:27

## 2020-01-01 RX ADMIN — CARVEDILOL 3.12 MG: 6.25 TABLET, FILM COATED ORAL at 18:03

## 2020-01-01 RX ADMIN — FENTANYL CITRATE 50 MCG: 50 INJECTION INTRAMUSCULAR; INTRAVENOUS at 12:00

## 2020-01-01 RX ADMIN — MAGNESIUM HYDROXIDE 30 ML: 400 SUSPENSION ORAL at 12:40

## 2020-01-01 RX ADMIN — Medication 400 MG: at 16:38

## 2020-01-01 RX ADMIN — APIXABAN 5 MG: 5 TABLET, FILM COATED ORAL at 17:45

## 2020-01-01 RX ADMIN — Medication 1 TABLET: at 16:02

## 2020-01-01 RX ADMIN — APIXABAN 10 MG: 5 TABLET, FILM COATED ORAL at 17:23

## 2020-01-01 RX ADMIN — ATORVASTATIN CALCIUM 80 MG: 40 TABLET, FILM COATED ORAL at 16:51

## 2020-01-01 RX ADMIN — APIXABAN 10 MG: 5 TABLET, FILM COATED ORAL at 17:05

## 2020-01-01 RX ADMIN — SODIUM CHLORIDE 1000 ML: 9 INJECTION, SOLUTION INTRAVENOUS at 15:58

## 2020-01-01 RX ADMIN — ATORVASTATIN CALCIUM 80 MG: 40 TABLET, FILM COATED ORAL at 17:34

## 2020-01-01 RX ADMIN — Medication 1 TABLET: at 17:32

## 2020-01-01 RX ADMIN — SULFAMETHOXAZOLE AND TRIMETHOPRIM 1 TABLET: 800; 160 TABLET ORAL at 17:46

## 2020-01-01 RX ADMIN — APIXABAN 5 MG: 5 TABLET, FILM COATED ORAL at 17:51

## 2020-01-01 RX ADMIN — APIXABAN 5 MG: 5 TABLET, FILM COATED ORAL at 17:34

## 2020-01-01 RX ADMIN — STANDARDIZED SENNA CONCENTRATE AND DOCUSATE SODIUM 2 TABLET: 8.6; 5 TABLET, FILM COATED ORAL at 18:38

## 2020-01-01 RX ADMIN — OXYCODONE HYDROCHLORIDE AND ACETAMINOPHEN 500 MG: 500 TABLET ORAL at 05:36

## 2020-01-01 RX ADMIN — APIXABAN 5 MG: 5 TABLET, FILM COATED ORAL at 04:30

## 2020-01-01 RX ADMIN — BISACODYL 10 MG: 10 SUPPOSITORY RECTAL at 02:56

## 2020-01-01 RX ADMIN — ATORVASTATIN CALCIUM 80 MG: 40 TABLET, FILM COATED ORAL at 18:02

## 2020-01-01 RX ADMIN — DOXYCYCLINE 100 MG: 100 TABLET, FILM COATED ORAL at 05:45

## 2020-01-01 RX ADMIN — ASPIRIN 81 MG CHEWABLE TABLET 81 MG: 81 TABLET CHEWABLE at 05:13

## 2020-01-01 RX ADMIN — VALSARTAN 160 MG: 80 TABLET, FILM COATED ORAL at 04:51

## 2020-01-01 RX ADMIN — ACETAMINOPHEN 650 MG: 325 TABLET, FILM COATED ORAL at 06:19

## 2020-01-01 RX ADMIN — DOXYCYCLINE 100 MG: 100 TABLET, FILM COATED ORAL at 18:23

## 2020-01-01 RX ADMIN — INSULIN HUMAN 1 UNITS: 100 INJECTION, SOLUTION PARENTERAL at 17:30

## 2020-01-01 RX ADMIN — INSULIN HUMAN 1 UNITS: 100 INJECTION, SOLUTION PARENTERAL at 21:46

## 2020-01-01 RX ADMIN — OXYCODONE HYDROCHLORIDE AND ACETAMINOPHEN 500 MG: 500 TABLET ORAL at 05:07

## 2020-01-01 RX ADMIN — APIXABAN 5 MG: 5 TABLET, FILM COATED ORAL at 05:19

## 2020-01-01 RX ADMIN — DEXAMETHASONE SODIUM PHOSPHATE 8 MG: 4 INJECTION, SOLUTION INTRAMUSCULAR; INTRAVENOUS at 17:33

## 2020-01-01 RX ADMIN — SULFAMETHOXAZOLE AND TRIMETHOPRIM 1 TABLET: 800; 160 TABLET ORAL at 05:33

## 2020-01-01 RX ADMIN — ATORVASTATIN CALCIUM 80 MG: 40 TABLET, FILM COATED ORAL at 20:14

## 2020-01-01 RX ADMIN — SULFAMETHOXAZOLE AND TRIMETHOPRIM 1 TABLET: 800; 160 TABLET ORAL at 05:30

## 2020-01-01 RX ADMIN — APIXABAN 5 MG: 5 TABLET, FILM COATED ORAL at 18:08

## 2020-01-01 RX ADMIN — CARVEDILOL 3.12 MG: 6.25 TABLET, FILM COATED ORAL at 17:45

## 2020-01-01 RX ADMIN — CARVEDILOL 3.12 MG: 6.25 TABLET, FILM COATED ORAL at 08:15

## 2020-01-01 RX ADMIN — APIXABAN 5 MG: 5 TABLET, FILM COATED ORAL at 04:58

## 2020-01-01 RX ADMIN — STANDARDIZED SENNA CONCENTRATE AND DOCUSATE SODIUM 2 TABLET: 8.6; 5 TABLET, FILM COATED ORAL at 05:14

## 2020-01-01 RX ADMIN — OXYCODONE HYDROCHLORIDE AND ACETAMINOPHEN 500 MG: 500 TABLET ORAL at 05:49

## 2020-01-01 RX ADMIN — APIXABAN 5 MG: 5 TABLET, FILM COATED ORAL at 18:00

## 2020-01-01 RX ADMIN — ASPIRIN 81 MG CHEWABLE TABLET 81 MG: 81 TABLET CHEWABLE at 05:46

## 2020-01-01 RX ADMIN — CARVEDILOL 3.12 MG: 6.25 TABLET, FILM COATED ORAL at 06:03

## 2020-01-01 RX ADMIN — ATORVASTATIN CALCIUM 80 MG: 80 TABLET, FILM COATED ORAL at 17:37

## 2020-01-01 RX ADMIN — CARVEDILOL 3.12 MG: 6.25 TABLET, FILM COATED ORAL at 18:07

## 2020-01-01 RX ADMIN — CEFAZOLIN 2 G: 330 INJECTION, POWDER, FOR SOLUTION INTRAMUSCULAR; INTRAVENOUS at 13:25

## 2020-01-01 RX ADMIN — ENOXAPARIN SODIUM 40 MG: 40 INJECTION SUBCUTANEOUS at 05:18

## 2020-01-01 RX ADMIN — STANDARDIZED SENNA CONCENTRATE AND DOCUSATE SODIUM 2 TABLET: 8.6; 5 TABLET, FILM COATED ORAL at 05:33

## 2020-01-01 RX ADMIN — CEFTRIAXONE SODIUM 2 G: 2 INJECTION, POWDER, FOR SOLUTION INTRAMUSCULAR; INTRAVENOUS at 00:19

## 2020-01-01 RX ADMIN — NEOSTIGMINE METHYLSULFATE 2 MG: 1 INJECTION INTRAVENOUS at 13:08

## 2020-01-01 RX ADMIN — CARVEDILOL 3.12 MG: 6.25 TABLET, FILM COATED ORAL at 08:35

## 2020-01-01 RX ADMIN — APIXABAN 5 MG: 5 TABLET, FILM COATED ORAL at 05:46

## 2020-01-01 RX ADMIN — Medication 1 TABLET: at 17:28

## 2020-01-01 RX ADMIN — OXYCODONE HYDROCHLORIDE AND ACETAMINOPHEN 500 MG: 500 TABLET ORAL at 05:06

## 2020-01-01 RX ADMIN — ONDANSETRON 4 MG: 2 INJECTION INTRAMUSCULAR; INTRAVENOUS at 13:08

## 2020-01-01 RX ADMIN — CARVEDILOL 3.12 MG: 6.25 TABLET, FILM COATED ORAL at 18:05

## 2020-01-01 RX ADMIN — DOXYCYCLINE 100 MG: 100 TABLET, FILM COATED ORAL at 11:00

## 2020-01-01 RX ADMIN — CARVEDILOL 6.25 MG: 6.25 TABLET, FILM COATED ORAL at 17:33

## 2020-01-01 RX ADMIN — STANDARDIZED SENNA CONCENTRATE AND DOCUSATE SODIUM 2 TABLET: 8.6; 5 TABLET, FILM COATED ORAL at 05:18

## 2020-01-01 RX ADMIN — ASPIRIN 162 MG: 81 TABLET, CHEWABLE ORAL at 07:51

## 2020-01-01 RX ADMIN — CARVEDILOL 3.12 MG: 6.25 TABLET, FILM COATED ORAL at 19:01

## 2020-01-01 RX ADMIN — INSULIN HUMAN 1 UNITS: 100 INJECTION, SOLUTION PARENTERAL at 20:36

## 2020-01-01 RX ADMIN — CARVEDILOL 3.12 MG: 6.25 TABLET, FILM COATED ORAL at 09:40

## 2020-01-01 RX ADMIN — CARVEDILOL 6.25 MG: 6.25 TABLET, FILM COATED ORAL at 17:57

## 2020-01-01 RX ADMIN — Medication 1 TABLET: at 17:35

## 2020-01-01 RX ADMIN — STANDARDIZED SENNA CONCENTRATE AND DOCUSATE SODIUM 2 TABLET: 8.6; 5 TABLET, FILM COATED ORAL at 06:44

## 2020-01-01 RX ADMIN — INSULIN HUMAN 1 UNITS: 100 INJECTION, SOLUTION PARENTERAL at 22:31

## 2020-01-01 RX ADMIN — Medication 1 TABLET: at 16:51

## 2020-01-01 RX ADMIN — CEFAZOLIN 2 G: 330 INJECTION, POWDER, FOR SOLUTION INTRAMUSCULAR; INTRAVENOUS at 09:30

## 2020-01-01 RX ADMIN — STANDARDIZED SENNA CONCENTRATE AND DOCUSATE SODIUM 2 TABLET: 8.6; 5 TABLET, FILM COATED ORAL at 04:58

## 2020-01-01 RX ADMIN — APIXABAN 5 MG: 5 TABLET, FILM COATED ORAL at 19:54

## 2020-01-01 RX ADMIN — VANCOMYCIN HYDROCHLORIDE 750 MG: 500 INJECTION, POWDER, LYOPHILIZED, FOR SOLUTION INTRAVENOUS at 03:59

## 2020-01-01 RX ADMIN — CARVEDILOL 3.12 MG: 6.25 TABLET, FILM COATED ORAL at 10:16

## 2020-01-01 RX ADMIN — INSULIN HUMAN 2 UNITS: 100 INJECTION, SOLUTION PARENTERAL at 20:58

## 2020-01-01 RX ADMIN — OXYCODONE HYDROCHLORIDE AND ACETAMINOPHEN 500 MG: 500 TABLET ORAL at 04:32

## 2020-01-01 RX ADMIN — CARVEDILOL 3.12 MG: 6.25 TABLET, FILM COATED ORAL at 07:49

## 2020-01-01 RX ADMIN — STANDARDIZED SENNA CONCENTRATE AND DOCUSATE SODIUM 2 TABLET: 8.6; 5 TABLET, FILM COATED ORAL at 07:05

## 2020-01-01 RX ADMIN — INSULIN HUMAN 1 UNITS: 100 INJECTION, SOLUTION PARENTERAL at 05:45

## 2020-01-01 RX ADMIN — APIXABAN 10 MG: 5 TABLET, FILM COATED ORAL at 04:38

## 2020-01-01 RX ADMIN — Medication 400 MG: at 05:41

## 2020-01-01 RX ADMIN — ASPIRIN 81 MG CHEWABLE TABLET 81 MG: 81 TABLET CHEWABLE at 04:59

## 2020-01-01 RX ADMIN — CARVEDILOL 3.12 MG: 6.25 TABLET, FILM COATED ORAL at 05:48

## 2020-01-01 RX ADMIN — APIXABAN 5 MG: 5 TABLET, FILM COATED ORAL at 18:05

## 2020-01-01 RX ADMIN — Medication 100 MCG: at 10:09

## 2020-01-01 RX ADMIN — ASPIRIN 81 MG CHEWABLE TABLET 81 MG: 81 TABLET CHEWABLE at 06:03

## 2020-01-01 RX ADMIN — OXYCODONE HYDROCHLORIDE AND ACETAMINOPHEN 500 MG: 500 TABLET ORAL at 05:45

## 2020-01-01 RX ADMIN — SODIUM CHLORIDE: 9 INJECTION, SOLUTION INTRAVENOUS at 10:18

## 2020-01-01 RX ADMIN — ACETAMINOPHEN 650 MG: 325 TABLET, FILM COATED ORAL at 15:32

## 2020-01-01 RX ADMIN — POLYETHYLENE GLYCOL 3350 1 PACKET: 17 POWDER, FOR SOLUTION ORAL at 10:14

## 2020-01-01 RX ADMIN — Medication 400 MG: at 05:52

## 2020-01-01 RX ADMIN — OXYCODONE HYDROCHLORIDE AND ACETAMINOPHEN 500 MG: 500 TABLET ORAL at 04:40

## 2020-01-01 RX ADMIN — CARVEDILOL 6.25 MG: 6.25 TABLET, FILM COATED ORAL at 17:05

## 2020-01-01 RX ADMIN — Medication 1 TABLET: at 17:50

## 2020-01-01 RX ADMIN — OXYCODONE HYDROCHLORIDE AND ACETAMINOPHEN 500 MG: 500 TABLET ORAL at 04:37

## 2020-01-01 RX ADMIN — Medication 400 MG: at 10:42

## 2020-01-01 RX ADMIN — MAGNESIUM HYDROXIDE 30 ML: 400 SUSPENSION ORAL at 11:11

## 2020-01-01 RX ADMIN — STANDARDIZED SENNA CONCENTRATE AND DOCUSATE SODIUM 2 TABLET: 8.6; 5 TABLET, FILM COATED ORAL at 06:00

## 2020-01-01 RX ADMIN — STANDARDIZED SENNA CONCENTRATE AND DOCUSATE SODIUM 2 TABLET: 8.6; 5 TABLET, FILM COATED ORAL at 17:35

## 2020-01-01 RX ADMIN — OXYCODONE HYDROCHLORIDE AND ACETAMINOPHEN 500 MG: 500 TABLET ORAL at 05:48

## 2020-01-01 RX ADMIN — APIXABAN 5 MG: 5 TABLET, FILM COATED ORAL at 04:56

## 2020-01-01 RX ADMIN — PROPOFOL 20 MG: 10 INJECTION, EMULSION INTRAVENOUS at 18:03

## 2020-01-01 RX ADMIN — ATORVASTATIN CALCIUM 80 MG: 40 TABLET, FILM COATED ORAL at 17:32

## 2020-01-01 RX ADMIN — STANDARDIZED SENNA CONCENTRATE AND DOCUSATE SODIUM 2 TABLET: 8.6; 5 TABLET, FILM COATED ORAL at 05:30

## 2020-01-01 RX ADMIN — OXYCODONE HYDROCHLORIDE AND ACETAMINOPHEN 500 MG: 500 TABLET ORAL at 05:31

## 2020-01-01 RX ADMIN — STANDARDIZED SENNA CONCENTRATE AND DOCUSATE SODIUM 2 TABLET: 8.6; 5 TABLET, FILM COATED ORAL at 05:52

## 2020-01-01 RX ADMIN — PHENYLEPHRINE HYDROCHLORIDE 100 MCG: 10 INJECTION INTRAVENOUS at 17:45

## 2020-01-01 RX ADMIN — ASPIRIN 325 MG: 325 TABLET, FILM COATED ORAL at 04:52

## 2020-01-01 RX ADMIN — APIXABAN 5 MG: 5 TABLET, FILM COATED ORAL at 07:06

## 2020-01-01 RX ADMIN — INSULIN HUMAN 1 UNITS: 100 INJECTION, SOLUTION PARENTERAL at 18:09

## 2020-01-01 RX ADMIN — INSULIN HUMAN 1 UNITS: 100 INJECTION, SOLUTION PARENTERAL at 19:48

## 2020-01-01 RX ADMIN — STANDARDIZED SENNA CONCENTRATE AND DOCUSATE SODIUM 2 TABLET: 8.6; 5 TABLET, FILM COATED ORAL at 17:52

## 2020-01-01 RX ADMIN — CARVEDILOL 3.12 MG: 6.25 TABLET, FILM COATED ORAL at 16:53

## 2020-01-01 RX ADMIN — DOXYCYCLINE 100 MG: 100 TABLET, FILM COATED ORAL at 05:31

## 2020-01-01 RX ADMIN — APIXABAN 5 MG: 5 TABLET, FILM COATED ORAL at 05:55

## 2020-01-01 RX ADMIN — CARVEDILOL 3.12 MG: 6.25 TABLET, FILM COATED ORAL at 19:21

## 2020-01-01 RX ADMIN — ATORVASTATIN CALCIUM 80 MG: 40 TABLET, FILM COATED ORAL at 18:07

## 2020-01-01 RX ADMIN — CARVEDILOL 3.12 MG: 6.25 TABLET, FILM COATED ORAL at 17:32

## 2020-01-01 RX ADMIN — ASPIRIN 81 MG CHEWABLE TABLET 81 MG: 81 TABLET CHEWABLE at 05:31

## 2020-01-01 RX ADMIN — Medication 400 MG: at 19:20

## 2020-01-01 RX ADMIN — CARVEDILOL 3.12 MG: 6.25 TABLET, FILM COATED ORAL at 18:38

## 2020-01-01 RX ADMIN — SULFAMETHOXAZOLE AND TRIMETHOPRIM 1 TABLET: 800; 160 TABLET ORAL at 04:30

## 2020-01-01 RX ADMIN — Medication 1 TABLET: at 19:01

## 2020-01-01 RX ADMIN — Medication 1 TABLET: at 18:09

## 2020-01-01 RX ADMIN — ATORVASTATIN CALCIUM 80 MG: 40 TABLET, FILM COATED ORAL at 16:34

## 2020-01-01 RX ADMIN — LIDOCAINE HYDROCHLORIDE 0.5 ML: 10 INJECTION, SOLUTION EPIDURAL; INFILTRATION; INTRACAUDAL at 08:37

## 2020-01-01 RX ADMIN — ATORVASTATIN CALCIUM 80 MG: 40 TABLET, FILM COATED ORAL at 18:05

## 2020-01-01 RX ADMIN — CEFTRIAXONE SODIUM 2 G: 2 INJECTION, POWDER, FOR SOLUTION INTRAMUSCULAR; INTRAVENOUS at 14:31

## 2020-01-01 RX ADMIN — APIXABAN 5 MG: 5 TABLET, FILM COATED ORAL at 05:41

## 2020-01-01 RX ADMIN — SODIUM CHLORIDE, POTASSIUM CHLORIDE, SODIUM LACTATE AND CALCIUM CHLORIDE: 600; 310; 30; 20 INJECTION, SOLUTION INTRAVENOUS at 19:54

## 2020-01-01 RX ADMIN — ASPIRIN 81 MG CHEWABLE TABLET 81 MG: 81 TABLET CHEWABLE at 05:41

## 2020-01-01 RX ADMIN — STANDARDIZED SENNA CONCENTRATE AND DOCUSATE SODIUM 2 TABLET: 8.6; 5 TABLET, FILM COATED ORAL at 17:50

## 2020-01-01 RX ADMIN — ASPIRIN 81 MG CHEWABLE TABLET 81 MG: 81 TABLET CHEWABLE at 04:38

## 2020-01-01 RX ADMIN — INSULIN HUMAN 1 UNITS: 100 INJECTION, SOLUTION PARENTERAL at 20:42

## 2020-01-01 RX ADMIN — CARVEDILOL 3.12 MG: 6.25 TABLET, FILM COATED ORAL at 05:52

## 2020-01-01 SDOH — ECONOMIC STABILITY: FOOD INSECURITY: WITHIN THE PAST 12 MONTHS, THE FOOD YOU BOUGHT JUST DIDN'T LAST AND YOU DIDN'T HAVE MONEY TO GET MORE.: NEVER TRUE

## 2020-01-01 SDOH — ECONOMIC STABILITY: TRANSPORTATION INSECURITY
IN THE PAST 12 MONTHS, HAS LACK OF TRANSPORTATION KEPT YOU FROM MEETINGS, WORK, OR FROM GETTING THINGS NEEDED FOR DAILY LIVING?: NO

## 2020-01-01 SDOH — ECONOMIC STABILITY: TRANSPORTATION INSECURITY
IN THE PAST 12 MONTHS, HAS THE LACK OF TRANSPORTATION KEPT YOU FROM MEDICAL APPOINTMENTS OR FROM GETTING MEDICATIONS?: NO

## 2020-01-01 SDOH — ECONOMIC STABILITY: FOOD INSECURITY: WITHIN THE PAST 12 MONTHS, YOU WORRIED THAT YOUR FOOD WOULD RUN OUT BEFORE YOU GOT MONEY TO BUY MORE.: NEVER TRUE

## 2020-01-01 SDOH — ECONOMIC STABILITY: INCOME INSECURITY: HOW HARD IS IT FOR YOU TO PAY FOR THE VERY BASICS LIKE FOOD, HOUSING, MEDICAL CARE, AND HEATING?: NOT HARD AT ALL

## 2020-01-01 ASSESSMENT — ENCOUNTER SYMPTOMS
HEADACHES: 0
PALPITATIONS: 0
BLOOD IN STOOL: 0
DIZZINESS: 0
NAUSEA: 0
BRUISES/BLEEDS EASILY: 0
HEADACHES: 0
DEPRESSION: 0
DIAPHORESIS: 0
LOSS OF CONSCIOUSNESS: 0
VOMITING: 0
FEVER: 0
CHILLS: 0
COUGH: 0
DIAPHORESIS: 0
DIZZINESS: 0
CHILLS: 0
PHOTOPHOBIA: 0
CHILLS: 0
VOMITING: 0
ABDOMINAL PAIN: 0
SPUTUM PRODUCTION: 0
BLURRED VISION: 0
VOMITING: 0
PHOTOPHOBIA: 0
SHORTNESS OF BREATH: 0
COUGH: 0
VOMITING: 0
SORE THROAT: 0
SHORTNESS OF BREATH: 0
VOMITING: 0
HEADACHES: 0
DIAPHORESIS: 0
NECK PAIN: 0
PALPITATIONS: 0
HEMOPTYSIS: 0
NAUSEA: 0
NERVOUS/ANXIOUS: 0
CLAUDICATION: 0
ABDOMINAL PAIN: 0
LOSS OF CONSCIOUSNESS: 0
NECK PAIN: 0
CHILLS: 0
DEPRESSION: 0
DIZZINESS: 0
ORTHOPNEA: 0
NECK PAIN: 0
MYALGIAS: 0
HEMOPTYSIS: 0
PALPITATIONS: 0
FEVER: 0
SPEECH CHANGE: 0
NERVOUS/ANXIOUS: 0
NAUSEA: 0
CHILLS: 0
SENSORY CHANGE: 0
DIZZINESS: 0
VOMITING: 0
COUGH: 0
DIAPHORESIS: 0
CLAUDICATION: 0
MYALGIAS: 0
HEMOPTYSIS: 0
VOMITING: 0
ABDOMINAL PAIN: 0
TREMORS: 0
PALPITATIONS: 0
COUGH: 1
DIARRHEA: 0
DIZZINESS: 0
HEADACHES: 0
NAUSEA: 0
NERVOUS/ANXIOUS: 0
PALPITATIONS: 0
ORTHOPNEA: 0
ORTHOPNEA: 0
LOSS OF CONSCIOUSNESS: 0
FEVER: 0
PHOTOPHOBIA: 0
SORE THROAT: 0
VOMITING: 0
SHORTNESS OF BREATH: 0
HEMOPTYSIS: 0
ABDOMINAL PAIN: 0
DEPRESSION: 0
WHEEZING: 0
BRUISES/BLEEDS EASILY: 0
COUGH: 0
BACK PAIN: 0
SORE THROAT: 0
SPUTUM PRODUCTION: 0
WEAKNESS: 0
HEADACHES: 0
FEVER: 0
SORE THROAT: 0
SHORTNESS OF BREATH: 0
ABDOMINAL PAIN: 0
TINGLING: 0
PHOTOPHOBIA: 0
DOUBLE VISION: 0
SPEECH CHANGE: 0
HEADACHES: 0
VOMITING: 0
NAUSEA: 0
BLURRED VISION: 0
VOMITING: 0
FOCAL WEAKNESS: 0
CHILLS: 0
NAUSEA: 0
DIZZINESS: 0
DOUBLE VISION: 0
ABDOMINAL PAIN: 0
PALPITATIONS: 0
FOCAL WEAKNESS: 0
SHORTNESS OF BREATH: 0
PHOTOPHOBIA: 0
ORTHOPNEA: 0
BACK PAIN: 0
NECK PAIN: 0
VOMITING: 0
WEAKNESS: 1
HEADACHES: 0
DIZZINESS: 0
HEMOPTYSIS: 0
HEADACHES: 0
WEAKNESS: 1
VOMITING: 0
BACK PAIN: 0
FEVER: 0
CONSTIPATION: 0
DOUBLE VISION: 0
SHORTNESS OF BREATH: 1
TREMORS: 0
ORTHOPNEA: 0
PALPITATIONS: 0
DIAPHORESIS: 0
BRUISES/BLEEDS EASILY: 0
HEADACHES: 0
BACK PAIN: 0
HEARTBURN: 0
PHOTOPHOBIA: 0
COUGH: 0
TINGLING: 0
DOUBLE VISION: 0
DIZZINESS: 0
MYALGIAS: 0
LOSS OF CONSCIOUSNESS: 0
CLAUDICATION: 0
NAUSEA: 0
PALPITATIONS: 0
DIZZINESS: 0
DIARRHEA: 0
WEAKNESS: 1
CLAUDICATION: 0
VOMITING: 0
FEVER: 0
FEVER: 0
WEAKNESS: 1
SENSORY CHANGE: 0
PALPITATIONS: 0
BACK PAIN: 0
MYALGIAS: 0
EYE DISCHARGE: 0
WEAKNESS: 1
FOCAL WEAKNESS: 0
FEVER: 0
HEADACHES: 0
DEPRESSION: 0
PALPITATIONS: 0
SHORTNESS OF BREATH: 0
SORE THROAT: 0
ABDOMINAL PAIN: 0
SHORTNESS OF BREATH: 0
BACK PAIN: 0
CLAUDICATION: 0
BACK PAIN: 0
VOMITING: 0
NAUSEA: 0
SHORTNESS OF BREATH: 1
TREMORS: 0
SPUTUM PRODUCTION: 0
PALPITATIONS: 0
HEMOPTYSIS: 0
DIARRHEA: 0
ORTHOPNEA: 0
DIARRHEA: 0
PALPITATIONS: 0
FEVER: 0
TINGLING: 0
DIAPHORESIS: 0
HEADACHES: 0
WHEEZING: 0
TINGLING: 0
SPUTUM PRODUCTION: 0
DEPRESSION: 0
EYE PAIN: 0
CLAUDICATION: 0
DOUBLE VISION: 0
PALPITATIONS: 0
BLURRED VISION: 0
FALLS: 0
SPUTUM PRODUCTION: 0
EYE DISCHARGE: 0
CHILLS: 0
MYALGIAS: 0
BACK PAIN: 0
HEADACHES: 0
VOMITING: 0
VOMITING: 0
BLURRED VISION: 0
INSOMNIA: 0
COUGH: 0
EYE REDNESS: 0
CLAUDICATION: 0
BLURRED VISION: 0
ORTHOPNEA: 0
SHORTNESS OF BREATH: 0
LOSS OF CONSCIOUSNESS: 0
NAUSEA: 0
ABDOMINAL PAIN: 0
EYE PAIN: 0
BACK PAIN: 0
CONSTIPATION: 0
NERVOUS/ANXIOUS: 0
SPUTUM PRODUCTION: 0
INSOMNIA: 0
VOMITING: 0
BLURRED VISION: 0
SHORTNESS OF BREATH: 0
NECK PAIN: 0
COUGH: 0
HEMOPTYSIS: 0
COUGH: 0
LOSS OF CONSCIOUSNESS: 0
DEPRESSION: 0
NERVOUS/ANXIOUS: 0
NAUSEA: 0
COUGH: 0
CHILLS: 0
NERVOUS/ANXIOUS: 0
NAUSEA: 0
VOMITING: 0
DIARRHEA: 0
COUGH: 0
NAUSEA: 0
ABDOMINAL PAIN: 0
NERVOUS/ANXIOUS: 0
ORTHOPNEA: 0
CLAUDICATION: 0
BACK PAIN: 0
COUGH: 1
WHEEZING: 0
BACK PAIN: 0
DIARRHEA: 0
EYE PAIN: 0
DIAPHORESIS: 0
SHORTNESS OF BREATH: 0
HEADACHES: 0
DOUBLE VISION: 0
HEARTBURN: 0
NAUSEA: 0
PALPITATIONS: 0
SHORTNESS OF BREATH: 1
LOSS OF CONSCIOUSNESS: 0
HEMOPTYSIS: 0
PALPITATIONS: 0
DIARRHEA: 0
FEVER: 0
PHOTOPHOBIA: 0
BRUISES/BLEEDS EASILY: 0
DOUBLE VISION: 0
CHILLS: 0
NAUSEA: 0
PALPITATIONS: 0
SORE THROAT: 0
NAUSEA: 0
COUGH: 0
CHILLS: 0
VOMITING: 0
SHORTNESS OF BREATH: 0
ABDOMINAL PAIN: 0
FEVER: 0
TREMORS: 0
HEADACHES: 0
DOUBLE VISION: 0
TINGLING: 0
COUGH: 1
BACK PAIN: 0
HEADACHES: 0
TINGLING: 0
LOSS OF CONSCIOUSNESS: 0
CHILLS: 0
HEADACHES: 0
HEMOPTYSIS: 0
PALPITATIONS: 0
DOUBLE VISION: 0
NECK PAIN: 0
PALPITATIONS: 0
NAUSEA: 0
HEADACHES: 0
LOSS OF CONSCIOUSNESS: 0
SHORTNESS OF BREATH: 0
FEVER: 0
SORE THROAT: 0
EYE PAIN: 0
DIAPHORESIS: 0
ORTHOPNEA: 0
FOCAL WEAKNESS: 0
CHILLS: 0
PHOTOPHOBIA: 0
PALPITATIONS: 0
BACK PAIN: 0
COUGH: 1
HEADACHES: 0
HEADACHES: 0
MYALGIAS: 0
DIZZINESS: 0
COUGH: 0
FEVER: 0
COUGH: 0
VOMITING: 0
DIAPHORESIS: 0
FALLS: 0
EYE DISCHARGE: 0
HEADACHES: 0
DEPRESSION: 0
ABDOMINAL PAIN: 0
FALLS: 0
SPUTUM PRODUCTION: 0
EYE PAIN: 0
DIZZINESS: 0
SPUTUM PRODUCTION: 0
COUGH: 0
DIAPHORESIS: 0
SEIZURES: 0
SHORTNESS OF BREATH: 0
SPEECH CHANGE: 0
CONSTIPATION: 0
DIARRHEA: 0
BACK PAIN: 0
NECK PAIN: 0
BRUISES/BLEEDS EASILY: 0
DIAPHORESIS: 0
COUGH: 0
BACK PAIN: 0
FEVER: 0
CHILLS: 0
DIZZINESS: 0
HEADACHES: 0
HEARTBURN: 0
FEVER: 0
CHILLS: 0
DIARRHEA: 0
CONSTIPATION: 0
HEARTBURN: 0
DEPRESSION: 0
HEADACHES: 0
PHOTOPHOBIA: 0
DIZZINESS: 0
PHOTOPHOBIA: 0
ABDOMINAL PAIN: 0
FALLS: 0
SHORTNESS OF BREATH: 0
SPUTUM PRODUCTION: 0
CHILLS: 0
CONSTIPATION: 0
DIAPHORESIS: 0
HEMOPTYSIS: 0
ABDOMINAL PAIN: 0
DIZZINESS: 0
SORE THROAT: 0
COUGH: 0
NECK PAIN: 0
DIAPHORESIS: 0
CLAUDICATION: 0
HEADACHES: 0
PALPITATIONS: 0
FEVER: 0
CHILLS: 0
NAUSEA: 0
DIAPHORESIS: 0
HEARTBURN: 0
SPEECH CHANGE: 0
TREMORS: 0
SORE THROAT: 0
DIARRHEA: 0
NAUSEA: 0
EYE PAIN: 0
NECK PAIN: 0
SHORTNESS OF BREATH: 0
BRUISES/BLEEDS EASILY: 0
FOCAL WEAKNESS: 0
EYE PAIN: 0
TINGLING: 0
SPUTUM PRODUCTION: 0
DIZZINESS: 0
CONSTIPATION: 0
NERVOUS/ANXIOUS: 0
PALPITATIONS: 0
DEPRESSION: 0
BLURRED VISION: 0
BRUISES/BLEEDS EASILY: 0
MYALGIAS: 0
EYE PAIN: 0
CHILLS: 0
PALPITATIONS: 0
PALPITATIONS: 0
CONSTIPATION: 0
DOUBLE VISION: 0
CONSTIPATION: 0
DIZZINESS: 0
HEADACHES: 0
SPUTUM PRODUCTION: 0
NAUSEA: 0
SHORTNESS OF BREATH: 0
FALLS: 0
NAUSEA: 0
NAUSEA: 0
BACK PAIN: 0
BLURRED VISION: 0
CONSTIPATION: 0
SPUTUM PRODUCTION: 0
CONSTIPATION: 0
SORE THROAT: 0
BLURRED VISION: 0
SPUTUM PRODUCTION: 0
SPUTUM PRODUCTION: 0
BACK PAIN: 0
PALPITATIONS: 0
FEVER: 0
ABDOMINAL PAIN: 0
HEARTBURN: 0
NECK PAIN: 0
COUGH: 0
HEADACHES: 0
NECK PAIN: 0
CHILLS: 0
FALLS: 0
DEPRESSION: 0
VOMITING: 0
PHOTOPHOBIA: 0
ORTHOPNEA: 0
CHILLS: 0
DIARRHEA: 0
FEVER: 0
BLURRED VISION: 0
EYE PAIN: 0
BLURRED VISION: 0
NAUSEA: 0
INSOMNIA: 0
WHEEZING: 0
FALLS: 0
CONSTIPATION: 0
DIZZINESS: 0
SHORTNESS OF BREATH: 0
PALPITATIONS: 0
COUGH: 1
FEVER: 0
SORE THROAT: 0
LOSS OF CONSCIOUSNESS: 0
CLAUDICATION: 0
PALPITATIONS: 0
HEMOPTYSIS: 0
SENSORY CHANGE: 0
FALLS: 0
BACK PAIN: 0
NECK PAIN: 0
TINGLING: 0
BACK PAIN: 0
VOMITING: 0
FEVER: 0
FEVER: 0
DEPRESSION: 0
BACK PAIN: 0
SORE THROAT: 0
COUGH: 0
DIZZINESS: 0
TINGLING: 0
DIZZINESS: 0
DIARRHEA: 0
DEPRESSION: 0
SORE THROAT: 0
ABDOMINAL PAIN: 0
VOMITING: 0
NECK PAIN: 0
BLURRED VISION: 0
TINGLING: 0
NAUSEA: 0
EYE DISCHARGE: 0
VOMITING: 0
FALLS: 0
DOUBLE VISION: 0
SENSORY CHANGE: 0
FEVER: 0
VOMITING: 0
CLAUDICATION: 0
SORE THROAT: 0
SPUTUM PRODUCTION: 0
PHOTOPHOBIA: 0
BLURRED VISION: 0
ABDOMINAL PAIN: 0
DIARRHEA: 0
CONSTIPATION: 0
CHILLS: 0
INSOMNIA: 0
ABDOMINAL PAIN: 0
FOCAL WEAKNESS: 0
HEADACHES: 0
PALPITATIONS: 0
DIARRHEA: 0
DIZZINESS: 0
NAUSEA: 0
CONSTIPATION: 0
ORTHOPNEA: 0
PALPITATIONS: 0
FEVER: 0
PALPITATIONS: 0
FALLS: 0
LOSS OF CONSCIOUSNESS: 0
ABDOMINAL PAIN: 0
NAUSEA: 0
BLURRED VISION: 0
FEVER: 0
COUGH: 1
TINGLING: 0
HEADACHES: 0
VOMITING: 0
DIARRHEA: 0
WEAKNESS: 0
NAUSEA: 0
BLURRED VISION: 0
WHEEZING: 0
TREMORS: 0
WHEEZING: 0
INSOMNIA: 0
TINGLING: 0
DIZZINESS: 0
HEADACHES: 0
DOUBLE VISION: 0
INSOMNIA: 0
CHILLS: 0
SPUTUM PRODUCTION: 0
DEPRESSION: 0
SPUTUM PRODUCTION: 0
SHORTNESS OF BREATH: 0
DIARRHEA: 0
TINGLING: 0
HEADACHES: 0
BRUISES/BLEEDS EASILY: 0
PALPITATIONS: 0
ORTHOPNEA: 0
PALPITATIONS: 0
MYALGIAS: 0
VOMITING: 0
INSOMNIA: 0
VOMITING: 0
HEADACHES: 0
CHILLS: 0
BRUISES/BLEEDS EASILY: 0
MYALGIAS: 1
BACK PAIN: 0
ABDOMINAL PAIN: 0
CHILLS: 0
CHILLS: 0
ABDOMINAL PAIN: 0
SORE THROAT: 0
EYE PAIN: 0
WHEEZING: 0
CHILLS: 0
DIARRHEA: 0
SPUTUM PRODUCTION: 0
HEADACHES: 0
FALLS: 0
CONSTIPATION: 0
CHILLS: 0
FOCAL WEAKNESS: 0
NAUSEA: 0
PALPITATIONS: 0
BRUISES/BLEEDS EASILY: 0
DIAPHORESIS: 0
DIZZINESS: 0
FEVER: 0
NECK PAIN: 0
SHORTNESS OF BREATH: 1
SHORTNESS OF BREATH: 1
NECK PAIN: 0
BACK PAIN: 0
ABDOMINAL PAIN: 0
DIZZINESS: 0
EYE PAIN: 0
HEARTBURN: 0
HEMOPTYSIS: 0
HEMOPTYSIS: 0
COUGH: 0
VOMITING: 0
NAUSEA: 0
FEVER: 0
HEMOPTYSIS: 0
DEPRESSION: 0
SHORTNESS OF BREATH: 0
NAUSEA: 0
ABDOMINAL PAIN: 0
COUGH: 0
SORE THROAT: 0
CLAUDICATION: 0
MYALGIAS: 0
NERVOUS/ANXIOUS: 0
SPUTUM PRODUCTION: 0
CHILLS: 0
COUGH: 0
ORTHOPNEA: 0
VOMITING: 0
PHOTOPHOBIA: 0
FEVER: 0
HEADACHES: 0
HEARTBURN: 0
DIAPHORESIS: 0
HEADACHES: 0
PALPITATIONS: 0
BRUISES/BLEEDS EASILY: 0
BRUISES/BLEEDS EASILY: 0
NECK PAIN: 0
DIARRHEA: 0
COUGH: 0
DOUBLE VISION: 0
NAUSEA: 0
NECK PAIN: 0
COUGH: 0
TINGLING: 0
EYE REDNESS: 0
DIZZINESS: 0
CONSTIPATION: 0
CONSTIPATION: 0
CHILLS: 0
EYE DISCHARGE: 0
CONSTIPATION: 0
TREMORS: 0
CHILLS: 0
WEIGHT LOSS: 0
SHORTNESS OF BREATH: 0
NAUSEA: 0
HEARTBURN: 0
DIAPHORESIS: 0
WEAKNESS: 1
PALPITATIONS: 0
CLAUDICATION: 0
BACK PAIN: 0
PHOTOPHOBIA: 0
VOMITING: 0
DIAPHORESIS: 0
ORTHOPNEA: 0
ABDOMINAL PAIN: 0
WHEEZING: 0
DIAPHORESIS: 0
VOMITING: 0
NAUSEA: 0
ABDOMINAL PAIN: 0
NAUSEA: 0
SHORTNESS OF BREATH: 1
SEIZURES: 0
FEVER: 0
PALPITATIONS: 0
LOSS OF CONSCIOUSNESS: 0
HEADACHES: 0
SPUTUM PRODUCTION: 0
VOMITING: 0
CHILLS: 0
STRIDOR: 0
HEMOPTYSIS: 0
SENSORY CHANGE: 0
HEMOPTYSIS: 0
LOSS OF CONSCIOUSNESS: 0
NAUSEA: 0
HEMOPTYSIS: 0
NAUSEA: 0
SHORTNESS OF BREATH: 0
DOUBLE VISION: 0
MYALGIAS: 0
VOMITING: 0
BLURRED VISION: 0
BACK PAIN: 0
FEVER: 0
INSOMNIA: 0
DIZZINESS: 0
NECK PAIN: 0
NERVOUS/ANXIOUS: 0
NECK PAIN: 0
PALPITATIONS: 0
VOMITING: 0
SHORTNESS OF BREATH: 0
VOMITING: 0
TREMORS: 0
DEPRESSION: 0
ABDOMINAL PAIN: 0
MYALGIAS: 0
DIARRHEA: 0
DOUBLE VISION: 0
DIAPHORESIS: 0
NECK PAIN: 0
ABDOMINAL PAIN: 0
HEADACHES: 0
CONSTIPATION: 0
BLURRED VISION: 0
SHORTNESS OF BREATH: 1
DIZZINESS: 0
ABDOMINAL PAIN: 0
CHILLS: 0
CONSTIPATION: 0
COUGH: 0
BRUISES/BLEEDS EASILY: 0
CHILLS: 0
ORTHOPNEA: 0
BACK PAIN: 0
TINGLING: 0
PHOTOPHOBIA: 0
EYE DISCHARGE: 0
SPEECH CHANGE: 0
CHILLS: 0
COUGH: 0
BACK PAIN: 0
DIAPHORESIS: 0
NAUSEA: 0
ORTHOPNEA: 0
COUGH: 0
FEVER: 0
DEPRESSION: 0
WHEEZING: 0
FEVER: 0
EYE PAIN: 0
FEVER: 0
DIZZINESS: 0
ABDOMINAL PAIN: 0
EYE PAIN: 0
ABDOMINAL PAIN: 0
DIZZINESS: 0
PALPITATIONS: 0
DIZZINESS: 0
DIZZINESS: 0
DEPRESSION: 0
FALLS: 0
ABDOMINAL PAIN: 0
SPUTUM PRODUCTION: 0
HEADACHES: 0
PALPITATIONS: 0
DIARRHEA: 0
NAUSEA: 0
VOMITING: 0
BRUISES/BLEEDS EASILY: 0
NECK PAIN: 0
DIZZINESS: 0
LOSS OF CONSCIOUSNESS: 0
HEADACHES: 0
BLURRED VISION: 0
SPUTUM PRODUCTION: 0
HEMOPTYSIS: 0
NAUSEA: 0
EYE PAIN: 0
HEMOPTYSIS: 0
BLURRED VISION: 0
CHILLS: 0
FEVER: 0
SPUTUM PRODUCTION: 0
DIAPHORESIS: 0
SHORTNESS OF BREATH: 0
SPUTUM PRODUCTION: 0
BLURRED VISION: 0
HEADACHES: 0
CONSTIPATION: 0
MYALGIAS: 0
HEMOPTYSIS: 0
DIZZINESS: 0
DIARRHEA: 0
COUGH: 1
HEADACHES: 0
SHORTNESS OF BREATH: 0
LOSS OF CONSCIOUSNESS: 0
FEVER: 0
VOMITING: 0
ABDOMINAL PAIN: 0
VOMITING: 0
CHILLS: 0
PALPITATIONS: 0
SHORTNESS OF BREATH: 0
CLAUDICATION: 0
COUGH: 0
HEMOPTYSIS: 0
SHORTNESS OF BREATH: 0
NAUSEA: 0

## 2020-01-01 ASSESSMENT — COGNITIVE AND FUNCTIONAL STATUS - GENERAL
TOILETING: A LOT
HELP NEEDED FOR BATHING: A LOT
MOVING FROM LYING ON BACK TO SITTING ON SIDE OF FLAT BED: UNABLE
DRESSING REGULAR UPPER BODY CLOTHING: A LITTLE
DRESSING REGULAR UPPER BODY CLOTHING: A LITTLE
DRESSING REGULAR LOWER BODY CLOTHING: A LOT
DRESSING REGULAR LOWER BODY CLOTHING: A LOT
TOILETING: A LOT
WALKING IN HOSPITAL ROOM: A LOT
MOBILITY SCORE: 8
MOBILITY SCORE: 10
DRESSING REGULAR UPPER BODY CLOTHING: A LITTLE
WALKING IN HOSPITAL ROOM: A LOT
MOVING TO AND FROM BED TO CHAIR: A LOT
TURNING FROM BACK TO SIDE WHILE IN FLAT BAD: A LOT
PERSONAL GROOMING: A LITTLE
CLIMB 3 TO 5 STEPS WITH RAILING: TOTAL
DRESSING REGULAR LOWER BODY CLOTHING: A LOT
CLIMB 3 TO 5 STEPS WITH RAILING: TOTAL
SUGGESTED CMS G CODE MODIFIER DAILY ACTIVITY: CK
MOVING FROM LYING ON BACK TO SITTING ON SIDE OF FLAT BED: UNABLE
DAILY ACTIVITIY SCORE: 18
DRESSING REGULAR UPPER BODY CLOTHING: A LITTLE
HELP NEEDED FOR BATHING: A LOT
STANDING UP FROM CHAIR USING ARMS: A LOT
DRESSING REGULAR LOWER BODY CLOTHING: A LOT
DAILY ACTIVITIY SCORE: 16
MOBILITY SCORE: 8
CLIMB 3 TO 5 STEPS WITH RAILING: TOTAL
MOBILITY SCORE: 11
SUGGESTED CMS G CODE MODIFIER DAILY ACTIVITY: CK
CLIMB 3 TO 5 STEPS WITH RAILING: TOTAL
TURNING FROM BACK TO SIDE WHILE IN FLAT BAD: A LOT
MOVING FROM LYING ON BACK TO SITTING ON SIDE OF FLAT BED: A LOT
SUGGESTED CMS G CODE MODIFIER MOBILITY: CM
CLIMB 3 TO 5 STEPS WITH RAILING: TOTAL
STANDING UP FROM CHAIR USING ARMS: A LOT
MOVING TO AND FROM BED TO CHAIR: A LOT
SUGGESTED CMS G CODE MODIFIER MOBILITY: CL
CLIMB 3 TO 5 STEPS WITH RAILING: TOTAL
DRESSING REGULAR UPPER BODY CLOTHING: A LOT
DAILY ACTIVITIY SCORE: 17
WALKING IN HOSPITAL ROOM: TOTAL
PERSONAL GROOMING: A LITTLE
STANDING UP FROM CHAIR USING ARMS: A LOT
MOVING TO AND FROM BED TO CHAIR: A LOT
SUGGESTED CMS G CODE MODIFIER MOBILITY: CM
STANDING UP FROM CHAIR USING ARMS: A LOT
SUGGESTED CMS G CODE MODIFIER MOBILITY: CM
SUGGESTED CMS G CODE MODIFIER MOBILITY: CM
SUGGESTED CMS G CODE MODIFIER DAILY ACTIVITY: CK
WALKING IN HOSPITAL ROOM: A LOT
SUGGESTED CMS G CODE MODIFIER DAILY ACTIVITY: CK
PERSONAL GROOMING: A LITTLE
TOILETING: A LOT
DRESSING REGULAR UPPER BODY CLOTHING: A LITTLE
HELP NEEDED FOR BATHING: A LOT
SUGGESTED CMS G CODE MODIFIER MOBILITY: CL
HELP NEEDED FOR BATHING: A LOT
HELP NEEDED FOR BATHING: A LOT
CLIMB 3 TO 5 STEPS WITH RAILING: TOTAL
DAILY ACTIVITIY SCORE: 17
MOVING FROM LYING ON BACK TO SITTING ON SIDE OF FLAT BED: UNABLE
SUGGESTED CMS G CODE MODIFIER DAILY ACTIVITY: CK
MOVING FROM LYING ON BACK TO SITTING ON SIDE OF FLAT BED: A LOT
DRESSING REGULAR LOWER BODY CLOTHING: A LOT
TURNING FROM BACK TO SIDE WHILE IN FLAT BAD: A LOT
MOVING FROM LYING ON BACK TO SITTING ON SIDE OF FLAT BED: A LOT
DRESSING REGULAR UPPER BODY CLOTHING: A LITTLE
MOVING FROM LYING ON BACK TO SITTING ON SIDE OF FLAT BED: UNABLE
HELP NEEDED FOR BATHING: A LOT
WALKING IN HOSPITAL ROOM: TOTAL
STANDING UP FROM CHAIR USING ARMS: A LOT
SUGGESTED CMS G CODE MODIFIER MOBILITY: CL
TOILETING: A LOT
STANDING UP FROM CHAIR USING ARMS: A LITTLE
DAILY ACTIVITIY SCORE: 17
WALKING IN HOSPITAL ROOM: A LOT
DAILY ACTIVITIY SCORE: 16
MOBILITY SCORE: 12
TOILETING: A LOT
MOVING TO AND FROM BED TO CHAIR: A LOT
STANDING UP FROM CHAIR USING ARMS: A LOT
SUGGESTED CMS G CODE MODIFIER MOBILITY: CL
TURNING FROM BACK TO SIDE WHILE IN FLAT BAD: A LITTLE
MOBILITY SCORE: 10
TURNING FROM BACK TO SIDE WHILE IN FLAT BAD: A LOT
MOBILITY SCORE: 9
TOILETING: A LOT
PERSONAL GROOMING: A LITTLE
DAILY ACTIVITIY SCORE: 16
CLIMB 3 TO 5 STEPS WITH RAILING: TOTAL
MOVING TO AND FROM BED TO CHAIR: UNABLE
TURNING FROM BACK TO SIDE WHILE IN FLAT BAD: A LOT
MOVING TO AND FROM BED TO CHAIR: A LOT
SUGGESTED CMS G CODE MODIFIER DAILY ACTIVITY: CK
SUGGESTED CMS G CODE MODIFIER MOBILITY: CL
MOVING TO AND FROM BED TO CHAIR: A LOT
WALKING IN HOSPITAL ROOM: TOTAL
STANDING UP FROM CHAIR USING ARMS: A LITTLE
SUGGESTED CMS G CODE MODIFIER DAILY ACTIVITY: CK
CLIMB 3 TO 5 STEPS WITH RAILING: TOTAL
HELP NEEDED FOR BATHING: A LOT
TURNING FROM BACK TO SIDE WHILE IN FLAT BAD: A LOT
PERSONAL GROOMING: A LITTLE
MOVING TO AND FROM BED TO CHAIR: UNABLE
DAILY ACTIVITIY SCORE: 15
HELP NEEDED FOR BATHING: A LOT
MOBILITY SCORE: 9
DRESSING REGULAR LOWER BODY CLOTHING: A LOT
WALKING IN HOSPITAL ROOM: TOTAL
STANDING UP FROM CHAIR USING ARMS: A LOT
DRESSING REGULAR LOWER BODY CLOTHING: A LOT
MOVING FROM LYING ON BACK TO SITTING ON SIDE OF FLAT BED: UNABLE
DRESSING REGULAR LOWER BODY CLOTHING: A LOT
TURNING FROM BACK TO SIDE WHILE IN FLAT BAD: A LOT
WALKING IN HOSPITAL ROOM: TOTAL
SUGGESTED CMS G CODE MODIFIER DAILY ACTIVITY: CK
TURNING FROM BACK TO SIDE WHILE IN FLAT BAD: A LOT
MOBILITY SCORE: 12
MOVING TO AND FROM BED TO CHAIR: A LOT
DRESSING REGULAR UPPER BODY CLOTHING: A LITTLE
TOILETING: A LITTLE
MOVING FROM LYING ON BACK TO SITTING ON SIDE OF FLAT BED: UNABLE
TOILETING: A LITTLE

## 2020-01-01 ASSESSMENT — PAIN DESCRIPTION - PAIN TYPE
TYPE: ACUTE PAIN

## 2020-01-01 ASSESSMENT — LIFESTYLE VARIABLES
SUBSTANCE_ABUSE: 0
SUBSTANCE_ABUSE: 0
EVER_SMOKED: NEVER
ALCOHOL_USE: NO
EVER FELT BAD OR GUILTY ABOUT YOUR DRINKING: NO
HAVE PEOPLE ANNOYED YOU BY CRITICIZING YOUR DRINKING: NO
SUBSTANCE_ABUSE: 0
HAVE YOU EVER FELT YOU SHOULD CUT DOWN ON YOUR DRINKING: NO
EVER HAD A DRINK FIRST THING IN THE MORNING TO STEADY YOUR NERVES TO GET RID OF A HANGOVER: NO
SUBSTANCE_ABUSE: 0
TOTAL SCORE: 0
CONSUMPTION TOTAL: NEGATIVE
HOW MANY TIMES IN THE PAST YEAR HAVE YOU HAD 5 OR MORE DRINKS IN A DAY: 0
TOTAL SCORE: 0
DO YOU DRINK ALCOHOL: NO
SUBSTANCE_ABUSE: 0
TOTAL SCORE: 0
AVERAGE NUMBER OF DAYS PER WEEK YOU HAVE A DRINK CONTAINING ALCOHOL: 0
ON A TYPICAL DAY WHEN YOU DRINK ALCOHOL HOW MANY DRINKS DO YOU HAVE: 0

## 2020-01-01 ASSESSMENT — GAIT ASSESSMENTS
GAIT LEVEL OF ASSIST: MINIMAL ASSIST
GAIT LEVEL OF ASSIST: MODERATE ASSIST
GAIT LEVEL OF ASSIST: UNABLE TO PARTICIPATE
GAIT LEVEL OF ASSIST: UNABLE TO PARTICIPATE
DEVIATION: STEP TO;BRADYKINETIC
GAIT LEVEL OF ASSIST: UNABLE TO PARTICIPATE
ASSISTIVE DEVICE: FRONT WHEEL WALKER
ASSISTIVE DEVICE: FRONT WHEEL WALKER
DISTANCE (FEET): 2
GAIT LEVEL OF ASSIST: UNABLE TO PARTICIPATE

## 2020-01-01 ASSESSMENT — FIBROSIS 4 INDEX
FIB4 SCORE: 1.78
FIB4 SCORE: 2.11
FIB4 SCORE: 1.7

## 2020-01-01 ASSESSMENT — PATIENT HEALTH QUESTIONNAIRE - PHQ9
1. LITTLE INTEREST OR PLEASURE IN DOING THINGS: NOT AT ALL
SUM OF ALL RESPONSES TO PHQ9 QUESTIONS 1 AND 2: 0
2. FEELING DOWN, DEPRESSED, IRRITABLE, OR HOPELESS: NOT AT ALL

## 2020-01-01 ASSESSMENT — PAIN SCALES - GENERAL
PAIN_LEVEL: 0
PAIN_LEVEL: 0

## 2020-01-01 ASSESSMENT — ACTIVITIES OF DAILY LIVING (ADL)
TOILETING: REQUIRES ASSIST
TOILETING: REQUIRES ASSIST

## 2020-01-02 ENCOUNTER — OFFICE VISIT (OUTPATIENT)
Dept: MEDICAL GROUP | Facility: MEDICAL CENTER | Age: 75
End: 2020-01-02
Payer: MEDICARE

## 2020-01-02 VITALS
OXYGEN SATURATION: 96 % | HEIGHT: 74 IN | TEMPERATURE: 99 F | WEIGHT: 234 LBS | HEART RATE: 76 BPM | BODY MASS INDEX: 30.03 KG/M2 | SYSTOLIC BLOOD PRESSURE: 128 MMHG | DIASTOLIC BLOOD PRESSURE: 72 MMHG

## 2020-01-02 DIAGNOSIS — E11.42 TYPE 2 DIABETES MELLITUS WITH PERIPHERAL NEUROPATHY (HCC): ICD-10-CM

## 2020-01-02 DIAGNOSIS — L20.9 ATOPIC DERMATITIS, UNSPECIFIED TYPE: ICD-10-CM

## 2020-01-02 DIAGNOSIS — E11.69 HYPERLIPIDEMIA ASSOCIATED WITH TYPE 2 DIABETES MELLITUS (HCC): ICD-10-CM

## 2020-01-02 DIAGNOSIS — E11.3293 TYPE 2 DIABETES MELLITUS WITH BOTH EYES AFFECTED BY MILD NONPROLIFERATIVE RETINOPATHY WITHOUT MACULAR EDEMA, WITHOUT LONG-TERM CURRENT USE OF INSULIN (HCC): ICD-10-CM

## 2020-01-02 DIAGNOSIS — R74.8 ELEVATED LIVER ENZYMES: ICD-10-CM

## 2020-01-02 DIAGNOSIS — E78.5 HYPERLIPIDEMIA ASSOCIATED WITH TYPE 2 DIABETES MELLITUS (HCC): ICD-10-CM

## 2020-01-02 PROCEDURE — 8041 PR SCP AHA: Performed by: FAMILY MEDICINE

## 2020-01-02 PROCEDURE — 99214 OFFICE O/P EST MOD 30 MIN: CPT | Performed by: FAMILY MEDICINE

## 2020-01-02 ASSESSMENT — PATIENT HEALTH QUESTIONNAIRE - PHQ9: CLINICAL INTERPRETATION OF PHQ2 SCORE: 0

## 2020-01-02 NOTE — PROGRESS NOTES
Subjective:   Agapito Stone is a 74 y.o. male here today for skin lesions, pancreatic cancer concerns    Patient is concerned because multiple people he knows that have been to [place] have been diagnosed and passed away with pancreatic cancer.  He was in [place] for 39 months.  He is asking if he can be screened for pancreatic cancer.  Patient has an elevated alkaline phosphatase.  He had a right upper quadrant ultrasound 2017, which was normal.    Patient is having skin lesions on his right upper back.  He states they are mildly itchy but he has been itching them to try to treat them.  No pain associated with skin lesion.  Skin lesions have been present for months.    Patient has not had labs done yet for his diabetes.    Current medicines (including changes today)  Current Outpatient Medications   Medication Sig Dispense Refill   • valsartan (DIOVAN) 320 MG tablet Take 1 Tab by mouth every day. 100 Tab 1   • pioglitazone (ACTOS) 45 MG Tab TAKE 1 TABLET BY MOUTH EVERY  Tab 3   • carvedilol (COREG) 12.5 MG Tab TAKE 1 TABLET BY MOUTH TWICE A DAY WITH MEALS 200 Tab 3   • tamsulosin (FLOMAX) 0.4 MG capsule Take 1 Cap by mouth ONE-HALF HOUR AFTER BREAKFAST. 90 Cap 3   • omeprazole (PRILOSEC) 20 MG delayed-release capsule TAKE ONE CAP BY MOUTH DAILY 90 Cap 3   • FREESTYLE LITE strip CHECK BLOOD SUGAR TWICE A DAY - DX E11.42 150 Strip 3   • Empagliflozin-Metformin HCl (SYNJARDY) 5-1000 MG Tab Take 1 Tab by mouth 2 Times a Day. (Patient taking differently: Take 1 Tab by mouth every day.) 180 Tab 3   • fluticasone (FLONASE) 50 MCG/ACT nasal spray Spray 2 Sprays in nose every day. (Patient not taking: Reported on 7/10/2019) 16 g 3   • atorvastatin (LIPITOR) 80 MG tablet TAKE ONE TABLET BY MOUTH DAILY IN THE EVENING 100 Tab 3   • nitroglycerin (NITROSTAT) 0.4 MG SL Tab Place 1 Tab under tongue as needed for Chest Pain. 25 Tab 1   • montelukast (SINGULAIR) 10 MG Tab TAKE 1 TAB BY MOUTH EVERY DAY. 90 Tab 3   •  "acetaminophen (TYLENOL) 325 MG Tab Take 650 mg by mouth every four hours as needed.     • Alum Hydroxide-Mag Carbonate (GAVISCON PO) Take  by mouth.     • Blood Glucose Monitoring Suppl (FREESTYLE FREEDOM LITE) W/DEVICE Kit 1 Application by Does not apply route every day. Dx: E11.42 1 Kit 0   • Blood Glucose Monitoring Suppl SUPPLIES Misc Test strips order: Test strips for Abbott Freestyle Lite meter. Sig: use once daily. Dx: E11.42 100 Each 3   • Lancets Misc Lancets order: Lancets for Abbott Freestyle Lite meter. Sig: use once daily. Dx: E11.42 100 Each 3   • Blood Glucose Monitoring Suppl SUPPLIES Misc Accu Check Yoanna blood glucose test strips.  Checking blood sugars 2 times per day E11.40 200 Strip 3   • ibuprofen (MOTRIN) 200 MG Tab Take 200 mg by mouth every 6 hours as needed.     • aspirin 81 MG tablet Take 81 mg by mouth every day.     • Cholecalciferol (VITAMIN D-3) 5000 UNITS TABS Take 1 Tab by mouth every day.         No current facility-administered medications for this visit.      He  has a past medical history of Arthritis, CAD (coronary artery disease) (October 2013), Cataract, Diabetes, Hyperlipidemia, Hypertension, Pain, Stroke (HCC) (2010), and Syncope.    ROS   No chest pain, no shortness of breath       Objective:     /72 (BP Location: Right arm, Patient Position: Sitting)   Pulse 76   Temp 37.2 °C (99 °F)   Ht 1.88 m (6' 2\")   Wt 106.1 kg (234 lb)   SpO2 96%  Body mass index is 30.04 kg/m².   Physical Exam:  Constitutional: Alert, no distress.  Skin: Warm, dry, good turgor.  Areas of excoriation of right posterior shoulder without significant erythema and no drainage.  Associated dryness of skin in the area.  Eye: Equal, round and reactive, conjunctiva clear, lids normal.  Psych: Alert and oriented x3, normal affect and mood.        Assessment and Plan:   The following treatment plan was discussed    1. Atopic dermatitis, unspecified type  New problem.  Advised patient to use " over-the-counter lotion and to avoid itching, which he states he can do.    2. Elevated liver enzymes  Repeat ultrasound to evaluate elevated alkaline phosphatase.  He would like to be reassured that he does not have pancreatic cancer.  - US-RUQ; Future    3. Type 2 diabetes mellitus with peripheral neuropathy (HCC)  Controlled.  Continue current medications.  Follow-up in 8 weeks with labs.    4. Type 2 diabetes mellitus with both eyes affected by mild nonproliferative retinopathy without macular edema, without long-term current use of insulin (HCC)  Controlled.  Continue current medications.  Follow-up in 8 weeks with labs.    5. Hyperlipidemia associated with type 2 diabetes mellitus (HCC)  Controlled.  Continue current medications.  Follow-up in 8 weeks with labs.      Followup: Return in about 8 weeks (around 2/27/2020), or if symptoms worsen or fail to improve, for Labs.

## 2020-01-07 ENCOUNTER — PATIENT OUTREACH (OUTPATIENT)
Dept: HEALTH INFORMATION MANAGEMENT | Facility: OTHER | Age: 75
End: 2020-01-07

## 2020-01-07 NOTE — PROGRESS NOTES
Called member to introduce myself as his SCP . He was not available, so his wife took down my information and let me know she would have him call me back. If the member calls back, please transfer to [phone].    Attempt # 1

## 2020-01-20 ENCOUNTER — HOSPITAL ENCOUNTER (OUTPATIENT)
Dept: RADIOLOGY | Facility: MEDICAL CENTER | Age: 75
End: 2020-01-20
Attending: FAMILY MEDICINE
Payer: MEDICARE

## 2020-01-20 DIAGNOSIS — R74.8 ELEVATED LIVER ENZYMES: ICD-10-CM

## 2020-01-20 PROCEDURE — 76705 ECHO EXAM OF ABDOMEN: CPT

## 2020-02-25 NOTE — PROGRESS NOTES
Called member to wish him a happy birthday and introduce myself as the member SCP . His wife answered and advised that he was in the shower and took down my direct line. She said that she would call me back because she did have questions about a previous surgery the member had. I let her know that would be fine and I would go over my introduction with him then. She understood and had no other questions or concerns at this time. If the member calls back, please transfer to [phone]

## 2020-03-18 ENCOUNTER — TELEPHONE (OUTPATIENT)
Dept: CARDIOLOGY | Facility: MEDICAL CENTER | Age: 75
End: 2020-03-18

## 2020-03-18 DIAGNOSIS — I10 ESSENTIAL HYPERTENSION: ICD-10-CM

## 2020-03-18 RX ORDER — VALSARTAN 320 MG/1
320 TABLET ORAL DAILY
Qty: 100 TAB | Refills: 0 | Status: SHIPPED | OUTPATIENT
Start: 2020-03-18 | End: 2020-05-22

## 2020-03-18 NOTE — TELEPHONE ENCOUNTER
RS    Hello, patient wife is calling in regards to patient medication for valsartan (DIOVAN) 320 MG. Patient went to [facility] pharmacy and was told he could gt a refill as early as April patient is concerned as he only has 5 tabs left. Patient would like a call back at [phone]

## 2020-03-30 NOTE — PROGRESS NOTES
Called member to introduce myself as his SCP . He said that he had everything he needed with SCP and was not interested in going over the program at this time. He asked that he be placed on a DNC list. I advised that I would.     Attempt # 3

## 2020-04-08 DIAGNOSIS — E11.42 DM TYPE 2 WITH DIABETIC PERIPHERAL NEUROPATHY (HCC): ICD-10-CM

## 2020-04-09 RX ORDER — BLOOD-GLUCOSE METER
KIT MISCELLANEOUS
Qty: 150 STRIP | Refills: 3 | Status: ON HOLD | OUTPATIENT
Start: 2020-04-09 | End: 2020-01-01

## 2020-04-13 DIAGNOSIS — J30.1 CHRONIC SEASONAL ALLERGIC RHINITIS DUE TO POLLEN: ICD-10-CM

## 2020-04-14 RX ORDER — MONTELUKAST SODIUM 10 MG/1
TABLET ORAL
Qty: 90 TAB | Refills: 3 | Status: SHIPPED | OUTPATIENT
Start: 2020-04-14 | End: 2021-01-01 | Stop reason: SDUPTHER

## 2020-05-09 ENCOUNTER — HOSPITAL ENCOUNTER (OUTPATIENT)
Dept: LAB | Facility: MEDICAL CENTER | Age: 75
End: 2020-05-09
Attending: FAMILY MEDICINE
Payer: MEDICARE

## 2020-05-09 DIAGNOSIS — E55.9 VITAMIN D DEFICIENCY: ICD-10-CM

## 2020-05-09 DIAGNOSIS — E11.69 HYPERLIPIDEMIA ASSOCIATED WITH TYPE 2 DIABETES MELLITUS (HCC): ICD-10-CM

## 2020-05-09 DIAGNOSIS — E11.42 TYPE 2 DIABETES MELLITUS WITH PERIPHERAL NEUROPATHY (HCC): ICD-10-CM

## 2020-05-09 DIAGNOSIS — I10 ESSENTIAL HYPERTENSION: ICD-10-CM

## 2020-05-09 DIAGNOSIS — E53.8 VITAMIN B12 DEFICIENCY: ICD-10-CM

## 2020-05-09 DIAGNOSIS — E78.5 HYPERLIPIDEMIA ASSOCIATED WITH TYPE 2 DIABETES MELLITUS (HCC): ICD-10-CM

## 2020-05-09 LAB
25(OH)D3 SERPL-MCNC: 20 NG/ML (ref 30–100)
ALBUMIN SERPL BCP-MCNC: 3.9 G/DL (ref 3.2–4.9)
ALBUMIN/GLOB SERPL: 1.3 G/DL
ALP SERPL-CCNC: 163 U/L (ref 30–99)
ALT SERPL-CCNC: 37 U/L (ref 2–50)
ANION GAP SERPL CALC-SCNC: 12 MMOL/L (ref 7–16)
AST SERPL-CCNC: 25 U/L (ref 12–45)
BASOPHILS # BLD AUTO: 0.7 % (ref 0–1.8)
BASOPHILS # BLD: 0.05 K/UL (ref 0–0.12)
BILIRUB SERPL-MCNC: 1.7 MG/DL (ref 0.1–1.5)
BUN SERPL-MCNC: 20 MG/DL (ref 8–22)
CALCIUM SERPL-MCNC: 9.1 MG/DL (ref 8.5–10.5)
CHLORIDE SERPL-SCNC: 101 MMOL/L (ref 96–112)
CHOLEST SERPL-MCNC: 147 MG/DL (ref 100–199)
CO2 SERPL-SCNC: 25 MMOL/L (ref 20–33)
CREAT SERPL-MCNC: 0.96 MG/DL (ref 0.5–1.4)
EOSINOPHIL # BLD AUTO: 0.04 K/UL (ref 0–0.51)
EOSINOPHIL NFR BLD: 0.6 % (ref 0–6.9)
ERYTHROCYTE [DISTWIDTH] IN BLOOD BY AUTOMATED COUNT: 46.6 FL (ref 35.9–50)
GLOBULIN SER CALC-MCNC: 2.9 G/DL (ref 1.9–3.5)
GLUCOSE SERPL-MCNC: 196 MG/DL (ref 65–99)
HCT VFR BLD AUTO: 50.6 % (ref 42–52)
HDLC SERPL-MCNC: 51 MG/DL
HGB BLD-MCNC: 16.5 G/DL (ref 14–18)
IMM GRANULOCYTES # BLD AUTO: 0.02 K/UL (ref 0–0.11)
IMM GRANULOCYTES NFR BLD AUTO: 0.3 % (ref 0–0.9)
LDLC SERPL CALC-MCNC: 75 MG/DL
LYMPHOCYTES # BLD AUTO: 1.22 K/UL (ref 1–4.8)
LYMPHOCYTES NFR BLD: 18 % (ref 22–41)
MCH RBC QN AUTO: 30.9 PG (ref 27–33)
MCHC RBC AUTO-ENTMCNC: 32.6 G/DL (ref 33.7–35.3)
MCV RBC AUTO: 94.8 FL (ref 81.4–97.8)
MONOCYTES # BLD AUTO: 0.59 K/UL (ref 0–0.85)
MONOCYTES NFR BLD AUTO: 8.7 % (ref 0–13.4)
NEUTROPHILS # BLD AUTO: 4.85 K/UL (ref 1.82–7.42)
NEUTROPHILS NFR BLD: 71.7 % (ref 44–72)
NRBC # BLD AUTO: 0 K/UL
NRBC BLD-RTO: 0 /100 WBC
PLATELET # BLD AUTO: 169 K/UL (ref 164–446)
PMV BLD AUTO: 9.9 FL (ref 9–12.9)
POTASSIUM SERPL-SCNC: 4.4 MMOL/L (ref 3.6–5.5)
PROT SERPL-MCNC: 6.8 G/DL (ref 6–8.2)
RBC # BLD AUTO: 5.34 M/UL (ref 4.7–6.1)
SODIUM SERPL-SCNC: 138 MMOL/L (ref 135–145)
TRIGL SERPL-MCNC: 105 MG/DL (ref 0–149)
TSH SERPL DL<=0.005 MIU/L-ACNC: 1.59 UIU/ML (ref 0.38–5.33)
VIT B12 SERPL-MCNC: 479 PG/ML (ref 211–911)
WBC # BLD AUTO: 6.8 K/UL (ref 4.8–10.8)

## 2020-05-09 PROCEDURE — 83036 HEMOGLOBIN GLYCOSYLATED A1C: CPT

## 2020-05-09 PROCEDURE — 80061 LIPID PANEL: CPT

## 2020-05-09 PROCEDURE — 84443 ASSAY THYROID STIM HORMONE: CPT

## 2020-05-09 PROCEDURE — 82306 VITAMIN D 25 HYDROXY: CPT

## 2020-05-09 PROCEDURE — 85025 COMPLETE CBC W/AUTO DIFF WBC: CPT

## 2020-05-09 PROCEDURE — 80053 COMPREHEN METABOLIC PANEL: CPT

## 2020-05-09 PROCEDURE — 82607 VITAMIN B-12: CPT

## 2020-05-09 PROCEDURE — 36415 COLL VENOUS BLD VENIPUNCTURE: CPT

## 2020-05-10 LAB
EST. AVERAGE GLUCOSE BLD GHB EST-MCNC: 189 MG/DL
HBA1C MFR BLD: 8.2 % (ref 0–5.6)

## 2020-05-11 ENCOUNTER — HOSPITAL ENCOUNTER (OUTPATIENT)
Facility: MEDICAL CENTER | Age: 75
End: 2020-05-11
Attending: FAMILY MEDICINE
Payer: MEDICARE

## 2020-05-11 LAB
CREAT UR-MCNC: 110.12 MG/DL
MICROALBUMIN UR-MCNC: 7 MG/DL
MICROALBUMIN/CREAT UR: 64 MG/G (ref 0–30)

## 2020-05-11 PROCEDURE — 82570 ASSAY OF URINE CREATININE: CPT

## 2020-05-11 PROCEDURE — 82043 UR ALBUMIN QUANTITATIVE: CPT

## 2020-05-15 ENCOUNTER — OFFICE VISIT (OUTPATIENT)
Dept: MEDICAL GROUP | Facility: MEDICAL CENTER | Age: 75
End: 2020-05-15
Payer: MEDICARE

## 2020-05-15 VITALS
HEIGHT: 74 IN | HEART RATE: 80 BPM | DIASTOLIC BLOOD PRESSURE: 72 MMHG | WEIGHT: 232 LBS | TEMPERATURE: 98.2 F | OXYGEN SATURATION: 90 % | BODY MASS INDEX: 29.77 KG/M2 | SYSTOLIC BLOOD PRESSURE: 128 MMHG

## 2020-05-15 DIAGNOSIS — E78.5 HYPERLIPIDEMIA ASSOCIATED WITH TYPE 2 DIABETES MELLITUS (HCC): ICD-10-CM

## 2020-05-15 DIAGNOSIS — I10 ESSENTIAL HYPERTENSION: ICD-10-CM

## 2020-05-15 DIAGNOSIS — E11.42 TYPE 2 DIABETES MELLITUS WITH PERIPHERAL NEUROPATHY (HCC): ICD-10-CM

## 2020-05-15 DIAGNOSIS — E11.69 HYPERLIPIDEMIA ASSOCIATED WITH TYPE 2 DIABETES MELLITUS (HCC): ICD-10-CM

## 2020-05-15 PROBLEM — E66.9 OBESITY (BMI 30-39.9): Status: RESOLVED | Noted: 2018-05-16 | Resolved: 2020-05-15

## 2020-05-15 PROCEDURE — 99214 OFFICE O/P EST MOD 30 MIN: CPT | Performed by: FAMILY MEDICINE

## 2020-05-15 RX ORDER — EMPAGLIFLOZIN AND METFORMIN HYDROCHLORIDE 5; 1000 MG/1; MG/1
1 TABLET ORAL 2 TIMES DAILY
Qty: 180 TAB | Refills: 3 | Status: ON HOLD | OUTPATIENT
Start: 2020-05-15 | End: 2021-01-01

## 2020-05-15 ASSESSMENT — FIBROSIS 4 INDEX: FIB4 SCORE: 1.78

## 2020-05-15 NOTE — PROGRESS NOTES
Subjective:   Agapito Cabrera is a 75 y.o. male here today for diabetes    HTN (hypertension)  Home blood pressure sometimes reads 150s or 140s on wrist blood pressure cuff in the morning. He is on valsartan 320 mg, which helps his blood pressure quickly.    Type 2 diabetes mellitus with peripheral neuropathy  He is taking Synjardy 5-1000 mg once daily. His weight has gone 2 pounds in the last 4 months. A1c went up slightly from 7.9 to 8.2.    Hyperlipidemia associated with type 2 diabetes mellitus (HCC)  Patient is tolerating atorvastatin 80 mg daily.  Results for AGAPITO CABRERA (MRN [id number]) as of 5/15/2020 13:20   Ref. Range 9/28/2019 10:21 5/9/2020 10:40   Cholesterol,Tot Latest Ref Range: 100 - 199 mg/dL 133 147   Triglycerides Latest Ref Range: 0 - 149 mg/dL 88 105   HDL Latest Ref Range: >=40 mg/dL 47 51   LDL Latest Ref Range: <100 mg/dL 68 75          Current medicines (including changes today)  Current Outpatient Medications   Medication Sig Dispense Refill   • Empagliflozin-metFORMIN HCl (SYNJARDY) 5-1000 MG Tab Take 1 Tab by mouth 2 Times a Day. 180 Tab 3   • montelukast (SINGULAIR) 10 MG Tab TAKE 1 TABLET BY MOUTH EVERY DAY 90 Tab 3   • glucose blood (FREESTYLE LITE) strip CHECK BLOOD SUGAR TWICE A DAY - DX E11.42 150 Strip 3   • fluticasone (FLONASE) 50 MCG/ACT nasal spray SPRAY 2 SPRAYS IN NOSE EVERY DAY. 48 mL 3   • valsartan (DIOVAN) 320 MG tablet Take 1 Tab by mouth every day. 100 Tab 0   • pioglitazone (ACTOS) 45 MG Tab TAKE 1 TABLET BY MOUTH EVERY  Tab 3   • carvedilol (COREG) 12.5 MG Tab TAKE 1 TABLET BY MOUTH TWICE A DAY WITH MEALS 200 Tab 3   • tamsulosin (FLOMAX) 0.4 MG capsule Take 1 Cap by mouth ONE-HALF HOUR AFTER BREAKFAST. 90 Cap 3   • omeprazole (PRILOSEC) 20 MG delayed-release capsule TAKE ONE CAP BY MOUTH DAILY 90 Cap 3   • atorvastatin (LIPITOR) 80 MG tablet TAKE ONE TABLET BY MOUTH DAILY IN THE EVENING 100 Tab 3   • nitroglycerin (NITROSTAT) 0.4 MG SL Tab Place 1  "Tab under tongue as needed for Chest Pain. 25 Tab 1   • acetaminophen (TYLENOL) 325 MG Tab Take 650 mg by mouth every four hours as needed.     • Alum Hydroxide-Mag Carbonate (GAVISCON PO) Take  by mouth.     • Blood Glucose Monitoring Suppl (FREESTYLE FREEDOM LITE) W/DEVICE Kit 1 Application by Does not apply route every day. Dx: E11.42 1 Kit 0   • Blood Glucose Monitoring Suppl SUPPLIES Misc Test strips order: Test strips for Abbott Freestyle Lite meter. Sig: use once daily. Dx: E11.42 100 Each 3   • Lancets Misc Lancets order: Lancets for Abbott Freestyle Lite meter. Sig: use once daily. Dx: E11.42 100 Each 3   • Blood Glucose Monitoring Suppl SUPPLIES Misc Accu Check Yoanna blood glucose test strips.  Checking blood sugars 2 times per day E11.40 200 Strip 3   • ibuprofen (MOTRIN) 200 MG Tab Take 200 mg by mouth every 6 hours as needed.     • aspirin 81 MG tablet Take 81 mg by mouth every day.     • Cholecalciferol (VITAMIN D-3) 5000 UNITS TABS Take 1 Tab by mouth every day.         No current facility-administered medications for this visit.      He  has a past medical history of Arthritis, CAD (coronary artery disease) (October 2013), Cataract, Diabetes, Hyperlipidemia, Hypertension, Pain, Stroke (HCC) (2010), and Syncope.    ROS   No chest pain, no shortness of breath       Objective:     /72 (BP Location: Right arm, Patient Position: Sitting)   Pulse 80   Temp 36.8 °C (98.2 °F)   Ht 1.88 m (6' 2\")   Wt 105.2 kg (232 lb)   SpO2 90%  Body mass index is 29.79 kg/m².   Physical Exam:  Constitutional: Alert, no distress.  Skin: Warm, dry, good turgor, no rashes in visible areas.  Eye: Equal, round and reactive, conjunctiva clear, lids normal.  Psych: Alert and oriented x3, normal affect and mood.        Assessment and Plan:   The following treatment plan was discussed    1. Type 2 diabetes mellitus with peripheral neuropathy (HCC)  Uncontrolled. Increase Synjardy 5-1000 mg from once daily to twice daily. " Follow up in 4 months with labs.  - Empagliflozin-metFORMIN HCl (SYNJARDY) 5-1000 MG Tab; Take 1 Tab by mouth 2 Times a Day.  Dispense: 180 Tab; Refill: 3    2. Hyperlipidemia associated with type 2 diabetes mellitus (HCC)  Stable. Continue atorvastatin 80 mg daily.    3. Essential hypertension  Controlled. Take valsartan 360 mg daily at night instead of morning.      Followup: Return in about 4 months (around 9/15/2020) for Diabetes.

## 2020-05-15 NOTE — ASSESSMENT & PLAN NOTE
He is taking Synjardy 5-1000 mg once daily. His weight has gone 2 pounds in the last 4 months. A1c went up slightly from 7.9 to 8.2.

## 2020-05-15 NOTE — ASSESSMENT & PLAN NOTE
Home blood pressure sometimes reads 150s or 140s on wrist blood pressure cuff in the morning. He is on valsartan 320 mg, which helps his blood pressure quickly.

## 2020-05-15 NOTE — ASSESSMENT & PLAN NOTE
Patient is tolerating atorvastatin 80 mg daily.  Results for TRICIADAJALAKSHMI NADINE SERA (MRN [id number]) as of 5/15/2020 13:20   Ref. Range 9/28/2019 10:21 5/9/2020 10:40   Cholesterol,Tot Latest Ref Range: 100 - 199 mg/dL 133 147   Triglycerides Latest Ref Range: 0 - 149 mg/dL 88 105   HDL Latest Ref Range: >=40 mg/dL 47 51   LDL Latest Ref Range: <100 mg/dL 68 75

## 2020-05-22 ENCOUNTER — TELEPHONE (OUTPATIENT)
Dept: MEDICAL GROUP | Facility: MEDICAL CENTER | Age: 75
End: 2020-05-22

## 2020-05-22 RX ORDER — VALSARTAN 160 MG/1
320 TABLET ORAL DAILY
Qty: 180 TAB | Refills: 3 | Status: SHIPPED | OUTPATIENT
Start: 2020-05-22 | End: 2020-01-01

## 2020-05-22 NOTE — TELEPHONE ENCOUNTER
[facility] pharmacy no longer carries Valsartan 320mg dosage. Pharmacy has requested rx be sent in for 180mg tabs 2x daily as alternative to 320mg. Patient is agreeable. Please advise if this is appropriate.

## 2020-06-18 RX ORDER — ATORVASTATIN CALCIUM 80 MG/1
TABLET, FILM COATED ORAL
Qty: 100 TAB | Refills: 3 | Status: ON HOLD | OUTPATIENT
Start: 2020-06-18 | End: 2021-01-01

## 2020-06-18 NOTE — TELEPHONE ENCOUNTER
Received request via: Pharmacy    Was the patient seen in the last year in this department? Yes 5/15/2020    Does the patient have an active prescription (recently filled or refills available) for medication(s) requested? No

## 2020-07-14 ENCOUNTER — APPOINTMENT (OUTPATIENT)
Dept: MEDICAL GROUP | Facility: MEDICAL CENTER | Age: 75
End: 2020-07-14
Payer: MEDICARE

## 2020-07-24 ENCOUNTER — OFFICE VISIT (OUTPATIENT)
Dept: MEDICAL GROUP | Facility: MEDICAL CENTER | Age: 75
End: 2020-07-24
Payer: MEDICARE

## 2020-07-24 VITALS
HEART RATE: 66 BPM | WEIGHT: 232 LBS | SYSTOLIC BLOOD PRESSURE: 130 MMHG | TEMPERATURE: 98.6 F | DIASTOLIC BLOOD PRESSURE: 64 MMHG | BODY MASS INDEX: 29.77 KG/M2 | HEIGHT: 74 IN | OXYGEN SATURATION: 92 %

## 2020-07-24 DIAGNOSIS — R14.0 ABDOMINAL BLOATING: ICD-10-CM

## 2020-07-24 DIAGNOSIS — E11.42 TYPE 2 DIABETES MELLITUS WITH PERIPHERAL NEUROPATHY (HCC): ICD-10-CM

## 2020-07-24 DIAGNOSIS — E53.8 VITAMIN B12 DEFICIENCY: ICD-10-CM

## 2020-07-24 DIAGNOSIS — E55.9 VITAMIN D DEFICIENCY: ICD-10-CM

## 2020-07-24 DIAGNOSIS — J30.2 SEASONAL ALLERGIES: ICD-10-CM

## 2020-07-24 PROCEDURE — 99214 OFFICE O/P EST MOD 30 MIN: CPT | Performed by: FAMILY MEDICINE

## 2020-07-24 RX ORDER — OMEPRAZOLE 20 MG/1
20 CAPSULE, DELAYED RELEASE ORAL DAILY
Qty: 90 CAP | Refills: 3 | Status: SHIPPED | OUTPATIENT
Start: 2020-07-24 | End: 2020-01-01

## 2020-07-24 ASSESSMENT — FIBROSIS 4 INDEX: FIB4 SCORE: 1.78

## 2020-07-24 NOTE — ASSESSMENT & PLAN NOTE
He is having persistent abdominal bloating for about 2 years. Abdominal ultrasound earlier this year showed no significant ascites. He was prescribed omeprazole 20 mg, but never started. Taking gaviscon has helped in the past.  2 weeks ago had 2 episodes of small episodes of blood in stool.  Has on and off constipation and diarrhea. He does take Colace about once per month.  He is on Synjardy (jardiance-metformin).

## 2020-07-24 NOTE — PROGRESS NOTES
Subjective:   Agapito Stone is a 75 y.o. male here today for abdominal bloating, diabetes    Abdominal bloating  He is having persistent abdominal bloating for about 2 years. Abdominal ultrasound earlier this year showed no significant ascites. He was prescribed omeprazole 20 mg, but never started. Taking gaviscon has helped in the past.  2 weeks ago had 2 episodes of small episodes of blood in stool.  Has on and off constipation and diarrhea. He does take Colace about once per month.  He is on Synjardy (jardiance-metformin).    Seasonal allergies  Has been having increased seasonal allergies, which he is controlling with Singulair.    Type 2 diabetes mellitus with peripheral neuropathy  Tolerating Synjardy 5-1000 mg once daily. Last A1c about 2 months was 8.2.         Current medicines (including changes today)  Current Outpatient Medications   Medication Sig Dispense Refill   • omeprazole (PRILOSEC) 20 MG delayed-release capsule Take 1 Cap by mouth every day. 90 Cap 3   • atorvastatin (LIPITOR) 80 MG tablet TAKE ONE TABLET BY MOUTH DAILY IN THE EVENING 100 Tab 3   • valsartan (DIOVAN) 160 MG Tab Take 2 Tabs by mouth every day. 180 Tab 3   • Empagliflozin-metFORMIN HCl (SYNJARDY) 5-1000 MG Tab Take 1 Tab by mouth 2 Times a Day. 180 Tab 3   • montelukast (SINGULAIR) 10 MG Tab TAKE 1 TABLET BY MOUTH EVERY DAY 90 Tab 3   • glucose blood (FREESTYLE LITE) strip CHECK BLOOD SUGAR TWICE A DAY - DX E11.42 150 Strip 3   • fluticasone (FLONASE) 50 MCG/ACT nasal spray SPRAY 2 SPRAYS IN NOSE EVERY DAY. 48 mL 3   • carvedilol (COREG) 12.5 MG Tab TAKE 1 TABLET BY MOUTH TWICE A DAY WITH MEALS 200 Tab 3   • tamsulosin (FLOMAX) 0.4 MG capsule Take 1 Cap by mouth ONE-HALF HOUR AFTER BREAKFAST. 90 Cap 3   • nitroglycerin (NITROSTAT) 0.4 MG SL Tab Place 1 Tab under tongue as needed for Chest Pain. 25 Tab 1   • acetaminophen (TYLENOL) 325 MG Tab Take 650 mg by mouth every four hours as needed.     • Blood Glucose Monitoring  "Suppl (FREESTYLE FREEDOM LITE) W/DEVICE Kit 1 Application by Does not apply route every day. Dx: E11.42 1 Kit 0   • Blood Glucose Monitoring Suppl SUPPLIES Misc Test strips order: Test strips for Abbott Freestyle Lite meter. Sig: use once daily. Dx: E11.42 100 Each 3   • Lancets Misc Lancets order: Lancets for Abbott Freestyle Lite meter. Sig: use once daily. Dx: E11.42 100 Each 3   • Blood Glucose Monitoring Suppl SUPPLIES Misc Accu Check Yoanna blood glucose test strips.  Checking blood sugars 2 times per day E11.40 200 Strip 3   • ibuprofen (MOTRIN) 200 MG Tab Take 200 mg by mouth every 6 hours as needed.     • aspirin 81 MG tablet Take 81 mg by mouth every day.     • Cholecalciferol (VITAMIN D-3) 5000 UNITS TABS Take 1 Tab by mouth every day.         No current facility-administered medications for this visit.      He  has a past medical history of Arthritis, CAD (coronary artery disease) (October 2013), Cataract, Diabetes, Hyperlipidemia, Hypertension, Pain, Stroke (HCC) (2010), and Syncope.    ROS   No chest pain, no shortness of breath       Objective:     /64 (BP Location: Right arm, Patient Position: Sitting)   Pulse 66   Temp 37 °C (98.6 °F) (Temporal)   Ht 1.88 m (6' 2\")   Wt 105.2 kg (232 lb)   SpO2 92%  Body mass index is 29.79 kg/m².   Physical Exam:  Constitutional: Alert, no distress.  Skin: Warm, dry, good turgor, no rashes in visible areas.  Eye: Equal, round and reactive, conjunctiva clear, lids normal.  Psych: Alert and oriented x3, normal affect and mood.        Assessment and Plan:   The following treatment plan was discussed    1. Abdominal bloating  Most likely related to either uncontrolled GERD, constipation and/or metformin.  We will start by having him take omeprazole 20 mg daily and consider Colace more often if needed.  If symptoms not improve then we will need to consider lowering metformin dosing.    2. Seasonal allergies  Controlled. Continue Singulair.    3. Type 2 " diabetes mellitus with peripheral neuropathy (HCC)  Uncontrolled.  Advised patient to take Synjardy 5/1000 mg twice daily instead of once daily.  Follow-up with labs in 3 months.  - CBC WITH DIFFERENTIAL; Future  - Comp Metabolic Panel; Future  - Lipid Profile; Future  - HEMOGLOBIN A1C; Future  - MICROALBUMIN CREAT RATIO URINE; Future  - TSH WITH REFLEX TO FT4; Future    4. Vitamin B12 deficiency  - VITAMIN B12; Future    5. Vitamin D deficiency  - VITAMIN D,25 HYDROXY; Future      Followup: Return in about 3 months (around 10/24/2020) for Diabetes.

## 2020-08-20 NOTE — DISCHARGE PLANNING
"DISCHARGE PLANNING NOTE - TOTAL JOINT    Procedure: Procedure(s):  LENGTHENING, TENDON- ACHILLES, MEDIAL, MEDIAL COLUMN RELEASE  FUSION, JOINT, ANKLE- ANKLE/POSS SUBTALAR  TRANSFER, TENDON- BRIDLE, POSS PLANTAR FASCIA RELEASE  OSTEOTOMY- POSS 1ST METATARSAL, BATES AND POSS CORRECTION AS NEEDED  Procedure Date: 8/24/2020  Insurance: Payor: SENIOR CARE PLUS / Plan: SENIOR CARE PLUS / Product Type: *No Product type* /    Equipment currently available at home?  cane, four-wheel walker, front-wheel walker and knee scooter  Steps into the home? 0  Steps within the home? Multilevel home   Toilet height? Standard 6'2\"  Type of shower? walk-in shower w/bench upstairs  Who will be with you during your recovery? spouse  Is Outpatient Physical Therapy set up after surgery? No   Did you take the Total Joint Class and where? No   Planning same day discharge?Yes     Plan: pt plans on being NWB for 5-6 weeks. He plans on staying in his bedroom downstairs. He has a half bath downstairs and a walk in shower upstairs. His wife Simran stated they have a fitness center membership at [place] and they have a w/c accessible shower with benches if he has a hard time getting upstairs to shower or will simply sponge bathe.   "

## 2020-08-23 NOTE — PROGRESS NOTES
COVID-19 Pre-surgery screenin. Do you have an undiagnosed respiratory illness or symptoms such as coughing or sneezing? (No)  a. Onset of Sx  b. Acute vs. chronic respiratory illness      2. Do you have an unexplained fever greater than 100.4 degrees Fahrenheit or 38 degrees Celsius?                     (No)     3. Have you had direct exposure to a patient who tested positive for Covid-19?                          (No)     4. Have you traveled outside [facility] in the last 14 days? (No)     5. Have you had any loss of your sense of taste or smell? Have you had N/V or sore throat? (No)     Patient has been informed of visitor policy and asked to wear a mask upon entering the hospital   (Yes)

## 2020-08-24 NOTE — ANESTHESIA TIME REPORT
Anesthesia Start and Stop Event Times     Date Time Event    8/24/2020 0853 Ready for Procedure     0910 Anesthesia Start     1402 Anesthesia Stop        Responsible Staff  08/24/20    Name Role Begin End    Angel Davidson M.D. Anesth 0910 1402        Preop Diagnosis (Free Text):  Pre-op Diagnosis     PAIN IN RIGHT ANKLE JOINT, ARTHRITIS RIGHT ANKLE, TAVOVARUS RIGHT FOOT        Preop Diagnosis (Codes):    Post op Diagnosis  Arthritis of right ankle      Premium Reason  Non-Premium    Comments: adductor block and sciatic catheter.

## 2020-08-24 NOTE — OR NURSING
D/c orders received. IV dc'd. Pt changed into clothing with assistance.  Discharge instructions given as well as pain management handout; pt and family verbalized understanding and questions answered.  Crutches provided and pt has scooter at home. Patient states ready to d/c home. No prescriptions given pt has RX at home. Pt dc'd in w/c with CNA in stable condition.

## 2020-08-24 NOTE — ANESTHESIA PROCEDURE NOTES
Airway    Date/Time: 8/24/2020 9:45 AM  Performed by: Angel Daivdson M.D.  Authorized by: Angel Davidson M.D.     Location:  OR  Urgency:  Elective  Indications for Airway Management:  Anesthesia      Spontaneous Ventilation: absent    Sedation Level:  Deep  Preoxygenated: Yes    Patient Position:  Sniffing  Mask Difficulty Assessment:  2 - vent by mask + OA or adjuvant +/- NMBA  Final Airway Type:  Endotracheal airway  Final Endotracheal Airway:  ETT  Cuffed: Yes    Technique Used for Successful ETT Placement:  Direct laryngoscopy    Insertion Site:  Oral  Blade Type:  Ewa  Laryngoscope Blade/Videolaryngoscope Blade Size:  3  ETT Size (mm):  8.0  Measured from:  Teeth  ETT to Teeth (cm):  24  Placement Verified by: auscultation and capnometry    Cormack-Lehane Classification:  Grade IIb - view of arytenoids or posterior of glottis only  Number of Attempts at Approach:  1   Atraumatic DLx2, initially had Grade 3 view and had esophageal intubation.  Removed ETT, mask ventilated and DL again.  With cricoid pressure had grade 2 view, easy intubation.

## 2020-08-24 NOTE — ANESTHESIA QCDR
2019 [facility] Clinical Data Registry (for Quality Improvement)     Postoperative nausea/vomiting risk protocol (Adult = 18 yrs and Pediatric 3-17 yrs)- (430 and 463)  General inhalation anesthetic (NOT TIVA) with PONV risk factors: No  Provision of anti-emetic therapy with at least 2 different classes of agents: N/A  Patient DID NOT receive anti-emetic therapy and reason is documented in Medical Record: N/A    Multimodal Pain Management- (477)  Non-emergent surgery AND patient age >= 18: Yes  Use of Multimodal Pain Management, two or more drugs and/or interventions, NOT including systemic opioids: Yes  Exception: Documented allergy to multiple classes of analgesics: N/A    Smoking Abstinence (404)  Patient is current smoker (cigarette, pipe, e-cig, marijuanna): No  Elective Surgery:   Abstinence instructions provided prior to day of surgery:   Patient abstained from smoking on day of surgery:     Pre-Op Beta-Blocker in Isolated CABG (44)  Isolated CABG AND patient age >= 18: No  Beta-blocker admin within 24 hours of surgical incision:   Exception:of medical reason(s) for not administering beta blocker within 24 hours prior to surgical incision (e.g., not  indicated,other medical reason):     PACU assessment of acute postoperative pain prior to Anesthesia Care End- Applies to Patients Age = 18- (ABG7)  Initial PACU pain score is which of the following: < 7/10  Patient unable to report pain score: N/A    Post-anesthetic transfer of care checklist/protocol to PACU/ICU- (426 and 427)  Upon conclusion of case, patient transferred to which of the following locations: PACU/Non-ICU  Use of transfer checklist/protocol: Yes  Exclusion: Service Performed in Patient Hospital Room (and thus did not require transfer): N/A  Unplanned admission to ICU related to anesthesia service up through end of PACU care- (MD51)  Unplanned admission to ICU (not initially anticipated at anesthesia start time): No

## 2020-08-24 NOTE — OR NURSING
Pt's wife Simran phoned and updated on pt status in Recovery.  Simran will return to [facility] for discharge at 1520.

## 2020-08-24 NOTE — OR NURSING
Pt on room air.  No c/o nausea, tolerating PO fluids.  Only c/o throat discomfort post op.  No difficulty swallowing.  Post-op FSBS 167, insulin administered per MAR.  Right ankle dressing CDI, ketan vac functioing, elevated.   Pt able to lift right leg.  Received sciatic, on-Q nerve block and femoral nerve block.   Pt denies any right ankle surgical pain. Pt c/o right eye dryness.  Flushed right eye with NS and placed damp washcloth on eye.  Discussed with pt to not rub right eye.  VSS, afebrile, GIBBONS, A/O x4.    Glasses and personal mask in place.   No prescriptions.

## 2020-08-24 NOTE — ANESTHESIA PREPROCEDURE EVALUATION
Right ankle arthritis    Relevant Problems   NEURO   (+) History of stroke      CARDIAC   (+) CAD (coronary artery disease)   (+) Carotid atherosclerosis   (+) HTN (hypertension)   (+) Stented coronary artery      ENDO   (+) Type 2 diabetes mellitus with both eyes affected by mild nonproliferative retinopathy without macular edema, without long-term current use of insulin (HCC)   (+) Type 2 diabetes mellitus with peripheral neuropathy (HCC)      Other   (+) Hereditary and idiopathic peripheral neuropathy   GERD  DLD  CAD with two stents in 2013. MPI 4/2019 negative for ischemia  CVA 2013 no residual weakness      Physical Exam    Airway   Mallampati: II  TM distance: >3 FB  Neck ROM: full       Cardiovascular - normal exam  Rhythm: regular  Rate: normal  (-) murmur     Dental - normal exam           Pulmonary - normal exam  Breath sounds clear to auscultation     Abdominal    Neurological - normal exam                 Anesthesia Plan    ASA 3   ASA physical status 3 criteria: CAD/stents (> 3 months)    Plan - general and peripheral nerve block     Peripheral nerve block will be post-op pain control  Airway plan will be LMA  (Sciatic nerve catheter)      Induction: intravenous    Postoperative Plan: Postoperative administration of opioids is intended.    Pertinent diagnostic labs and testing reviewed    Informed Consent:    Anesthetic plan and risks discussed with patient.    Use of blood products discussed with: patient whom consented to blood products.

## 2020-08-24 NOTE — OR NURSING
Pt's VSS; denies N/V; states pain is at tolerable level. Dressing CDI to RLE. Hortencia drain and On Q pump intact.

## 2020-08-24 NOTE — ANESTHESIA POSTPROCEDURE EVALUATION
Patient: Agapito Stone    Procedure Summary     Date: 08/24/20 Room / Location: [place] [address] / SURGERY [facility]    Anesthesia Start: 0910 Anesthesia Stop: 1402    Procedures:       LENGTHENING, TENDON- , GASTROC RECESSION (Right Ankle)      FUSION, JOINT, ANKLE- ANKLE SUBTALAR FUSION , BONE GRAFT, MEDIAL RELEASE INCLUDING TENDONS, PATIAL EXCISION OF FIBULA (Right Ankle)      TRANSFER, TENDON- PLANTAR FASCIA RELEASE (Right Foot)      OSTEOTOMY- 1ST METATARSAL, CROSS PROCEDURE, 2-5 METATARSAL OSTEOTOMY, 2-5 PHALANGEL JOINT RECONSTRUCTION (Right Foot)      CORRECTION, HAMMER TOE 2-5 (Right Foot) Diagnosis: (PAIN IN RIGHT ANKLE JOINT, ARTHRITIS RIGHT ANKLE, TAVOVARUS RIGHT FOOT)    Surgeon: Royal Bolivar M.D. Responsible Provider: Angel Davidson M.D.    Anesthesia Type: general, peripheral nerve block ASA Status: 3          Final Anesthesia Type: general, peripheral nerve block  Last vitals  BP   Blood Pressure : 139/79    Temp   36.7 °C (98 °F)    Pulse   Pulse: 84   Resp   16    SpO2   94 %      Anesthesia Post Evaluation    Patient location during evaluation: PACU  Patient participation: complete - patient participated  Level of consciousness: awake and alert  Pain score: 0    Airway patency: patent  Anesthetic complications: no  Cardiovascular status: hemodynamically stable  Respiratory status: acceptable  Hydration status: euvolemic    PONV: none           Nurse Pain Score: 0 (NPRS)

## 2020-08-24 NOTE — ANESTHESIA PROCEDURE NOTES
Peripheral Block    Date/Time: 8/24/2020 9:35 AM  Performed by: Angel Davidson M.D.  Authorized by: Angel Davidson M.D.     Patient Location:  OR  Start Time:  8/24/2020 9:35 AM  End Time:  8/24/2020 9:39 AM  Reason for Block: at surgeon's request and post-op pain management    patient identified, IV checked, site marked, risks and benefits discussed, surgical consent, monitors and equipment checked, pre-op evaluation and timeout performed    Patient Position:  Supine  Prep: ChloraPrep    Monitoring:  Heart rate, continuous pulse ox and cardiac monitor  Block Region:  Lower Extremity  Lower Extremity - Block Type:  Selective FEMORAL nerve block at the Adductor Canal    Laterality:  Right  Procedures: ultrasound guided  Image captured, interpreted and electronically stored.  Local Infiltration:  Lidocaine  Strength:  1 %  Dose:  3 ml  Block Type:  Single-shot  Needle Length:  100mm  Needle Gauge:  21 G  Needle Localization:  Ultrasound guidance  Injection Assessment:  Negative aspiration for heme, no paresthesia on injection, incremental injection and local visualized surrounding nerve on ultrasound  Evidence of intravascular injection: No     US Guided Selective Femoral Nerve Block at Adductor Canal:   US probe placed at mid-thigh level on externally rotated leg and femur identified.  Probe directed medially until Sartorius Muscle (SM), Femoral Artery (FA) and Saphenous Nerve (SN) identified in Adductor Canal (AC).  Needle inserted anterolateral to probe in an in plane approach into a subsartorial perivascular perineural position.  After negative aspiration LA injected with ease and visualized spreading within the AC.

## 2020-08-24 NOTE — ANESTHESIA PROCEDURE NOTES
Peripheral Block    Date/Time: 8/24/2020 9:20 AM  Performed by: Angel Davidson M.D.  Authorized by: Angel Davidson M.D.     Patient Location:  OR  Start Time:  8/24/2020 9:20 AM  End Time:  8/24/2020 9:32 AM  Reason for Block: at surgeon's request and post-op pain management    patient identified, IV checked, site marked, risks and benefits discussed, surgical consent, monitors and equipment checked, pre-op evaluation and timeout performed    Patient Position:  Supine  Prep: ChloraPrep and patient draped    Monitoring:  Heart rate, continuous pulse ox and cardiac monitor  Block Region:  Lower Extremity  Lower Extremity - Block Type:  SCIATIC nerve block, lateral approach    Laterality:  Right  Procedures: ultrasound guided  Image captured, interpreted and electronically stored.  Local Infiltration:  Lidocaine  Strength:  1 %  Dose:  3 ml  Block Type:  Catheter  Needle Length:  100mm  Needle Gauge:  18 G  Needle Localization:  Ultrasound guidance  Injection Assessment:  Negative aspiration for heme, no paresthesia on injection, incremental injection and local visualized surrounding nerve on ultrasound  Evidence of intravascular injection: No    Catheter Securement:  Tegaderm, benzoin and steristrips   US Guided continuous Sciatic Nerve Block Right     Patient prone. Leg prepped with chlora-prep, sterile drape applied.  US probe in sterile sheath placed several cm proximal to popliteal crease on posterior thigh and scanned caudad and cephalad until Sciatic Nerve (SN) identified superficial/lateral to popliteal artery.  Needle with catheter inserted lateral to probe in an in plane approach under direct visualization to a perineural position.  Pajunk e-catheter used for procedure. After negative aspiration LA injected with ease and visualized surrounding the SN. Injected 15ml of local. Next removed needle and inserted inner catheter and injected an additional 5ml of local. Local visualized surrounding nerve from  the catheter.  Catheter secured with benzoin, steri-strips, tegaderm and tape.  No complications from procedure.

## 2020-08-25 NOTE — DISCHARGE INSTRUCTIONS
ACTIVITY: Rest and take it easy for the first 24 hours.  A responsible adult is recommended to remain with you during that time.  It is normal to feel sleepy.  We encourage you to not do anything that requires balance, judgment or coordination.    MILD FLU-LIKE SYMPTOMS ARE NORMAL. YOU MAY EXPERIENCE GENERALIZED MUSCLE ACHES, THROAT IRRITATION, HEADACHE AND/OR SOME NAUSEA.    FOR 24 HOURS DO NOT:  Drive, operate machinery or run household appliances.  Drink beer or alcoholic beverages.   Make important decisions or sign legal documents.    SPECIAL INSTRUCTIONS:   Non-weight bearing, use crutches.  Ice & strict elevation of right leg    Aspirin 81 mg BID starting POD # 1  08/25    DIET: To avoid nausea, slowly advance diet as tolerated, avoiding spicy or greasy foods for the first day.  Add more substantial food to your diet according to your physician's instructions.    INCREASE FLUIDS AND FIBER TO AVOID CONSTIPATION.    SURGICAL DRESSING/BATHING: Keep splint clean and dry     FOLLOW-UP APPOINTMENT:  F/u in office in 2 weeks at previously scheduled appointment    You should CALL YOUR PHYSICIAN if you develop:  Fever greater than 101 degrees F.  Pain not relieved by medication, or persistent nausea or vomiting.  Excessive bleeding (blood soaking through dressing) or unexpected drainage from the wound.  Extreme redness or swelling around the incision site, drainage of pus or foul smelling drainage.  Inability to urinate or empty your bladder within 8 hours.  Problems with breathing or chest pain.    You should call 911 if you develop problems with breathing or chest pain.  If you are unable to contact your doctor or surgical center, you should go to the nearest emergency room or urgent care center.  Physician's telephone #: [phone]    If any questions arise, call your doctor.  If your doctor is not available, please feel free to call the Surgical Center at [phone].  The Center is open Monday through  Friday from 7AM to 7PM.  You can also call the HEALTH HOTLINE open 24 hours/day, 7 days/week and speak to a nurse at [phone], or toll free at [phone].    A registered nurse may call you a few days after your surgery to see how you are doing after your procedure.    MEDICATIONS: Resume taking daily medication.  Take prescribed pain medication with food.  If no medication is prescribed, you may take non-aspirin pain medication if needed.  PAIN MEDICATION CAN BE VERY CONSTIPATING.  Take a stool softener or laxative such as senokot, pericolace, or milk of magnesia if needed.    Prescription given 2 days ago per family .  No oral  pain medication given in recovery    If your physician has prescribed pain medication that includes Acetaminophen (Tylenol), do not take additional Acetaminophen (Tylenol) while taking the prescribed medication.    Depression / Suicide Risk    As you are discharged from this [facility] facility, it is important to learn how to keep safe from harming yourself.    Recognize the warning signs:  · Abrupt changes in personality, positive or negative- including increase in energy   · Giving away possessions  · Change in eating patterns- significant weight changes-  positive or negative  · Change in sleeping patterns- unable to sleep or sleeping all the time   · Unwillingness or inability to communicate  · Depression  · Unusual sadness, discouragement and loneliness  · Talk of wanting to die  · Neglect of personal appearance   · Rebelliousness- reckless behavior  · Withdrawal from people/activities they love  · Confusion- inability to concentrate     If you or a loved one observes any of these behaviors or has concerns about self-harm, here's what you can do:  · Talk about it- your feelings and reasons for harming yourself  · Remove any means that you might use to hurt yourself (examples: pills, rope, extension cords, firearm)  · Get professional help from the community (Mental  Health, Substance Abuse, psychological counseling)  · Do not be alone:Call your Safe Contact- someone whom you trust who will be there for you.  · Call your local CRISIS HOTLINE 907-6505 or 737-494-5889  · Call your local Children's Mobile Crisis Response Team [place] (514) 720-0250 or www.MONOQI  · Call the toll free National Suicide Prevention Hotlines   · National Suicide Prevention Lifeline 724-628-OCIC (3854)  · [employer] Hope Line Network 800-SUICIDE (028-4056)      Peripheral Nerve Block Discharge Instructions from Same Day Surgery and Inpatient :    What to Expect - Lower Extremity  · The block may cause you to experience numbness and weakness in your {LEG LOCATION [id number]} on the same side as your surgery  · Numbness, tingling and / or weakness are all normal. For some people, this may be an unpleasant sensation  · These issues will be resolved when the local anesthetic wears off   · You may experience numbness and tingling in your thigh on the same side as your surgery if the block medicine was injected at your groin area  · Numbness will make it difficult to walk  · You may have problems with balance and walking so be very careful   · Follow your surgeon's direction regarding weight bearing on your surgical limb  · Be very careful with your numb limb  Precautions  · The numbness may affect your balance  · Be careful when walking or moving around  · Your leg may be weak: be very careful putting weight on it  · If your surgeon did not specify a time, you should not bear weight for 24 hours  · Be sure to ask for help when you need it  · It is better to have help than to fall and hurt yourself  Prevent Injury  · Protect the limb like a baby  · Beware of exposing your limb to extreme heat or cold or trauma  · The limb may be injured without you noticing because it is numb  · Keep the limb elevated whenever possible  · Do not sleep on the limb  · Change the position of the limb  "regularly  · Avoid putting pressure on your surgical limb  Pain Control  · The initial block on the day of surgery will make your extremity feel \"numb\"  · Any consecutive injection including prior to discharge from the hospital will make your extremity feel \"numb\"  · You may feel an aching or burning when the local anesthesia starts to wear off  · Take pain pills as prescribed by your surgeon  · Call your surgeon or anesthesiologist if you do not have adequate pain control    "

## 2020-08-25 NOTE — OP REPORT
DATE OF SERVICE:  08/24/2020    PREOPERATIVE DIAGNOSES:  1. Neuropathy.  2. Right cavovarus foot alignment.  3. Right ankle arthritis.    POSTOPERATIVE DIAGNOSES:  1. Neuropathy.  2. Right cavovarus foot alignment.  3. Right ankle arthritis.    PROCEDURES PERFORMED:  1. Right gastrocsoleus recession.  2. Right ankle fusion with autologous bone grafting.  3. Right subtalar fusion with autologous bone grafting.  4. Right medial release of the foot including talonavicular, subtalar as well   as lengthening of the posterior tibialis tendon, flexor digitorum longus, and   flexor hallucis longus.  5. Right radical plantar fascia release.  6. Right partial excision distal fibula.  7. Right first metatarsal dorsiflexion osteotomy.  8. Right Cornell procedure.  9. Right distal metatarsal osteotomies, 2 through 5.  10. Right metatarsophalangeal joint reconstructions, 2 through 5.  11. Right hammertoe corrections, 2 through 5.    SURGEON:  Royal Bolivar MD    FIRST ASSISTANT:  Erik Vidales MD    SECOND ASSISTANT:  Felicia Martínez.    ANESTHESIA:  General endotracheal with popliteal catheter placed per my   request for postoperative pain management.    ESTIMATED BLOOD LOSS:  None.    COMPLICATIONS:  None.    POSTOPERATIVE PLAN:  1. Nonweightbearing.  2. Perioperative antibiotics.  3. Follow up in 2 weeks.    INDICATIONS:  The patient is a pleasant 75-year-old male who has had problems   with his right foot for some time.  The above diagnoses made and options were   discussed with him including operative and nonoperative.  He elected to   undergo operative intervention.  The above procedure was discussed and all   questions were answered.  Risks of surgery were explained, which include but   not limited to wound problems, infection, nerve injury, vascular injury, need   for further surgery.  He understands he could have persistent risk for pain   and/or deformity, malunion, nonunion, and need for further surgery.  He    understands and accepts these risks and agrees to proceed.  His site was   marked by myself prior to receiving psychotropic medicines.    PROCEDURE IN DETAIL:  The patient had a popliteal catheter placed per my   request for postoperative pain management.  He was brought to the operating   room and underwent general endotracheal anesthesia without complications.    Right lower extremity was prepped and draped in standard fashion in the supine   position with all appropriate padding.  Positive site verification confirmed   his right lower extremity as well as above procedure and confirmation that he   received preoperative antibiotics.  Esmarch was used to exsanguinate his foot   and ankle and leg tourniquet was inflated at 250 mmHg.  Posteromedial incision   was made at the level of the musculotendinous junction of the gastroc.  It   was brought down to the crural fascia exposing the gastroc tendon.  Under   direct visualization from lateral to medial, gastroc tendon was released,   preserving the underlying soleus fascia.  This gave significant improvement in   his overall hindfoot Achilles contracture.  The wound was irrigated with   copious irrigation, was closed in layered fashion with 3-0 Vicryl and 3-0   nylon.    An anterior incision was made over his ankle joint line.  This was carefully   dissected down.  The interval between the extensor hallus longus and the   tibialis anterior was developed retracting the neurovascular structure   laterally.  Arthrotomy was made and the ankle joint was exposed.  The ankle   would not reduce.  A full deltoid release was performed, still unable to   reduce the ankle.    Incision was then made at the tip of the medial malleolus extending distal.    It was brought down to the level of the navicula.  It was brought into the   posterior tibialis tendon sheath.  Posterior tibialis tendon was identified.    It was freed and released off of the navicular ___ adhesions.  There  was no   excursion at all, so the posterior tibialis tendon was then cut.  Using a 15   blade, a full talonavicular capsulotomy was performed as well as subtalar   capsulotomy, releasing the anterior, middle and posterior facets.  The midfoot   and hindfoot were still tight and so dissection was then made down to the   level of the flexor digitorum longus and a Z-lengthening was performed on the   FDL and next the FHL.  There still was some tightness, particularly in the   midfoot.    Incision was then made over the insertion of the plantar fascia.  This was   dissected down and the plantar fascia was identified.  Lira elevator released   soft tissue off of the deep and superficial surfaces where it was then cut   with Lilly scissors.  The abductor fascia both the superficial and the deep   were identified.  These were released with Lilly scissors also.  This did   improve his longitudinal arch contracture; however, still remained tight with   significant forefoot valgus.  Reduction of the ankle still was not possible.    Incision was then made over the distal aspect of the fibula.  It was dissected   distally.  It was dissected down.  At that point, there was a prominent   lateral process of the talus.  This was denuded of all soft tissue.  This was   then excised and harvested for bone graft for future use.  This did improve   the ability to reduce the ankle.    The ankle joint was then redressed.  Because it could be reduced, he did   require some shaving with a saw to get it to fall into place.  The joint   surfaces were then prepped by removal of all cartilage and soft tissue down to   good subchondral bone.  The ankle joint was then reduced and a guidepin for   the 7-0 cannulated screw was placed just above the medial malleolus and the   distal tibia into the talus.    Subtalar joint was then denuded of all cartilage and soft tissue at the   posterior facet and middle facet.  Attempt for reduction of the  subtalar joint   was not possible as calcaneus kept hitting the fibula.  A microsagittal saw   was used to perform an excision of the distal fibula.  This allowed the   subtalar joint to be reduced.  The provisional fixation of the ankle was then   removed.  The joint surfaces were then morselized in the ankle and subtalar   joint with multiple drill holes and an osteotome.  This bone graft that was   harvested was then mixed with Aiden BIO4.  This was placed in the ankle and   subtalar joint.  The ankle was then reduced to neutral varus, valgus, neutral   plantarflexion, dorsiflexion.  The guidepin was then advanced again from the   7-0 cannulated screw into the ankle.  Subtalar joint was then reduced and it   was provisionally held with a guide pin from the 7-0 cannulated screw from the   neck of the talus across the subtalar joint to the calcaneus.  C-arm imaging   demonstrated satisfactory radiographic alignment.  Clinically, the heel was in   neutral to slight lateral position.  Both screws were then filled with 7.0   headless Aiden screws.  The [place] fusion plate was then placed with a   nonlocking followed by 2 locking screws into the neck of the talus followed by   a compression hole into the tibia and 2 locking screws.  Clinically, the   hindfoot had good alignment with slight valgus, neutral, plantarflexion,   dorsiflexion.  Rotation was in the long axis with the second metatarsal.    He still had quite significant contractures of all of his toes with clawing   and also significant amount of forefoot valgus.  An incision was then made   over the first metatarsophalangeal joint, was dissected down.  The EHL was   identified.  It was released as distal as possible.  Full capsulotomy was   performed, releasing collateral ligaments as well as dorsal capsule.  This   allowed the first metatarsophalangeal joint to plantarflex.  He still had a   fixed IP joint at that point.  The EHL was then passed through  a drill hole   through the metatarsal neck from lateral to medial and then was tied back on   itself and a microsagittal saw was used to remove the IP joint of the great   toe and this was transfixed with a Aiden 4.0 headless screw completed the   Cornell procedure.    Dorsal incision was then made over the second and fourth webspace.  It was   dissected down the extensor tendon of the second toe.  Z-lengthening was   performed as well as full capsulotomy releasing all soft tissue.  This did   improve his overall position; however, he still remained somewhat contracted.    The exact procedure was performed on the third, fourth, and fifth toes   without deviations or complications.    Using C-arm imaging, the first TMT joint was identified in the lateral   position.  A dorsal closing wedge osteotomy was performed bringing the first   metatarsal dorsiflexed up to be equivalent to the second metatarsal as   confirmed on lateral C-arm imaging.  Clinically, this alleviated his forefoot   valgus.  This was transfixed with a Aiden nitinol staple.  There still   remained contractures of the metatarsophalangeal joints.  A second metatarsal   was then dorsally subluxated and microsagittal saw was used to make an   osteotomy parallel to plantar aspect of the foot.  Metatarsal head was   transitioned posteriorly approximately 3-4 mm.  It was transfixed with a   [place] snap-off screw.  This was then hand tightened.  The distal aspect was   rongeured.  ___ showed no evidence of instability or impingement.  The exact   procedure was performed on the third, fourth, and fifth toes without   deviations or complications.    To address the fixed hammertoes of the lesser toes, an elliptical incision was   made over the dorsal aspect of the second PIP joint.  It was dissected down.    The collateral ligaments were released.  Microsagittal saw was used to remove   the distal aspect of the proximal phalanx.  A 0.062 K wire was then ran    retrograde out through the tip of the toe and back into the proximal phalanx.    Exact procedure was performed on the third, fourth and fifth without   deviations or complications.  The toes were then clinically straight.    Due to the underlying neuropathy and clawing of his toes, a small stab   incision was then made just proximal to distal flexor crease of the second   through fifth toes, releasing the flexor digitorum longus.  Clinically, the   hindfoot, forefoot had good overall alignment relative to the long axis of the   tibia.  Final C-arm imaging demonstrated satisfactory position for internal   fixation, alignment of the foot.  Wounds were then irrigated with copious   irrigation and closed in layered fashion using 3-0 Vicryl and 3-0 nylon.  The   patient was readministered Ancef, was given a dose of TXA.  He was placed into   a posterior tong splint.  He was transferred to recovery room in good   condition.    Utilization of Dr. Vidales was necessary for patient positioning, retracting,   wound closure, dressing placement and splint placement.  He was present   throughout the entire procedure.             ____________________________________     MD MARVIN OLMSTEAD / NTS    DD:  08/24/2020 14:39:24  DT:  08/24/2020 16:35:09    D#:  [id number]  Job#:  [id number]

## 2020-08-31 PROBLEM — R62.7 ADULT FAILURE TO THRIVE: Status: ACTIVE | Noted: 2020-01-01

## 2020-08-31 PROBLEM — R79.89 ELEVATED TROPONIN: Status: ACTIVE | Noted: 2020-01-01

## 2020-08-31 NOTE — ED NOTES
Medicated for pain in rle per mar orders. Allergies verified.  Meal provided to pt. Right lower extremity elevated.

## 2020-08-31 NOTE — ASSESSMENT & PLAN NOTE
Lab Results   Component Value Date/Time    HBA1C 8.0 (H) 08/31/2020 0555    HBA1C 8.2 (H) 05/09/2020 1040    HBA1C 7.9 (H) 09/28/2019 1021     Results from last 7 days   Lab Units 09/01/20  1724 09/01/20  1117 09/01/20  0753 08/31/20  2035 08/31/20  1741 08/31/20  1133   ACCU CHECK GLUCOSE 788 mg/dL 152* 147* 133* 174* 170* 221*     I have ordered insulin sliding scale with D50 and glucagon for hypoglycemia per protocol.  Diabetic diet  Diabetic education    Continue atorvastatin

## 2020-08-31 NOTE — ASSESSMENT & PLAN NOTE
Trend troponin  Echocardiogram showed ejection fraction 55%, normal diastolic function.  Right ventricular systolic pressure 40 mmHg.    Telemetry  Continue core cardiac measures aspirin, statin, carvedilol, valsartan  Cardiology recommends outpatient follow-up

## 2020-08-31 NOTE — ED NOTES
Admitting MD at bedside. Denies any new complaints. Verbalized relief of pain in right lower extremity, denies any chest pain.

## 2020-08-31 NOTE — H&P
"Hospital Medicine History & Physical Note    Date of Service  8/31/2020    Primary Care Physician  Brody Zamorano M.D.    Consultants  Cardiology    Code Status  Full Code    Chief Complaint  Chief Complaint   Patient presents with   • Failure to Thrive     pt had ankle surgery X 1 week ago and has seen been unable to care for self. Pt was unable to get up from chair due to weakness, pt states he realized he was unable to care for himself and should seek help. per EMS report pt came from a \"hoarder house\" from floor to ceiling throughout all the house. pt lives with his wife   • Weakness       History of Presenting Illness  75 y.o. male who presented 8/31/2020 with Failure to Thrive (pt had ankle surgery X 1 week ago and has seen been unable to care for self. Pt was unable to get up from chair due to weakness, pt states he realized he was unable to care for himself and should seek help. per EMS report pt came from a \"hoarder house\" from floor to ceiling throughout all the house. pt lives with his wife) and Weakness  History of CAD with stents, follow [facility]. Last stress test was in 2019:   NUCLEAR IMAGING INTERPRETATION   * Equivocal small sized mild severity non-reversible defect in the apex    likely artifact. SSS 1.   * Normal left ventricular size, ejection fraction, and wall motion.   ECG INTERPRETATION   Negative stress ECG for ischemia.  Recently underwent  1. Right gastrocsoleus recession.  2. Right ankle fusion with autologous bone grafting.  3. Right subtalar fusion with autologous bone grafting.  4. Right medial release of the foot including talonavicular, subtalar as well   as lengthening of the posterior tibialis tendon, flexor digitorum longus, and   flexor hallucis longus.  5. Right radical plantar fascia release.  6. Right partial excision distal fibula.  7. Right first metatarsal dorsiflexion osteotomy.  8. Right Cornell procedure.  9. Right distal metatarsal osteotomies, 2 through 5.  10. " "Right metatarsophalangeal joint reconstructions, 2 through 5.  11. Right hammertoe corrections, 2 through 5  For:  1. Neuropathy.  2. Right cavovarus foot alignment.  3. Right ankle arthritis.  By: Dr. Bolivar on August 24,2020. CoVID test at that time was NEGATIVE  He was discharged home.  Presents with Failure to Thrive (pt had ankle surgery X 1 week ago and has seen been unable to care for self. Pt was unable to get up from chair due to weakness, pt states he realized he was unable to care for himself and should seek help. per EMS report pt came from a \"hoarder house\" from floor to ceiling throughout all the house. pt lives with his wife) and Weakness  Basically he feels weak, complains of the pain in his foot and feels he couldn't take care of himself at home.  He denied dizziness, headaches, chest pain, shortness of breath, productive coughing, nausea, rash, diarrhea or dysuria.   He denied contact with CoVID positive people or travel to pandemic hotspots. He was however just recently hospitalized.  At the ED, he is afebrile and hemodynamically stable.  CXR:  1.  No acute cardiopulmonary disease.  2.  Atherosclerosis  EKG sinus with RBBB  No leukocytosis. Hyperglycemia.  Elevated troponin  CoVID x 1 PENDING  EDP requested admission for failure to thrive, possible placement.    Review of Systems  Review of Systems   Constitutional: Negative for chills and fever.   HENT: Negative for congestion, hearing loss and nosebleeds.    Eyes: Negative for pain and redness.   Respiratory: Negative for cough, hemoptysis, shortness of breath and wheezing.    Cardiovascular: Negative for chest pain and palpitations.   Gastrointestinal: Negative for abdominal pain, blood in stool, constipation, diarrhea, nausea and vomiting.   Genitourinary: Negative for dysuria, frequency and hematuria.   Musculoskeletal: Positive for joint pain and myalgias. Negative for falls.   Skin: Negative for rash.   Neurological: Positive for weakness. " Negative for dizziness, tremors, focal weakness, seizures, loss of consciousness and headaches.   Psychiatric/Behavioral: The patient is not nervous/anxious and does not have insomnia.    All other systems reviewed and are negative.      Past Medical History   has a past medical history of Arthritis, CAD (coronary artery disease) (October 2013), Cataract, Diabetes, High cholesterol, Hyperlipidemia, Hypertension, Pain, Pneumonia (1968), Stroke (HCC) (2010), and Syncope.    Surgical History   has a past surgical history that includes carotid endarterectomy (7/13/2010); stent placement (2013); tonsillectomy (as a child); cataract extraction with iol (Bilateral); tendon lenghtening (Right, 8/24/2020); ankle fusion (Right, 8/24/2020); tendon transfer (Right, 8/24/2020); orthopedic osteotomy (Right, 8/24/2020); and hammertoe correction (Right, 8/24/2020).     Family History  family history includes Other in his mother.     Social History   reports that he has never smoked. He has never used smokeless tobacco. He reports current alcohol use. He reports that he does not use drugs.    Allergies  Allergies   Allergen Reactions   • Fish Hives     shellfish   • Pcn [Penicillins] Hives   • Amoxicillin Hives   • Food Swelling      Eggs,Melons, avocados-puffy mouth       Medications  Prior to Admission Medications   Prescriptions Last Dose Informant Patient Reported? Taking?   Cholecalciferol (VITAMIN D-3) 5000 UNITS TABS 8/30/2020 at AM Patient Yes No   Sig: Take 1 Tab by mouth every 48 hours.   Empagliflozin-metFORMIN HCl (SYNJARDY) 5-1000 MG Tab 8/30/2020 at 1900 Patient No No   Sig: Take 1 Tab by mouth 2 Times a Day.   acetaminophen (TYLENOL) 500 MG Tab 8/31/2020 at 1900 Patient Yes No   Sig: Take 1,000 mg by mouth every 6 hours as needed for Mild Pain or Moderate Pain. Indications: Pain   aspirin 81 MG tablet 8/30/2020 at 0800 Patient Yes No   Sig: Take 81 mg by mouth every day.   atorvastatin (LIPITOR) 80 MG tablet 8/30/2020  at 1900 Patient No No   Sig: TAKE ONE TABLET BY MOUTH DAILY IN THE EVENING   carvedilol (COREG) 12.5 MG Tab 8/30/2020 at 1900 Patient Yes Yes   Sig: Take 12.5 mg by mouth every evening.   cephALEXin (KEFLEX) 500 MG Cap 8/30/2020 at DONE Patient Yes Yes   Sig: Take 500 mg by mouth 4 times a day. 7 DAYS   mag-trisilicate/al-hydrox (GAVISCON) 80-20 MG Chew Tab 8/30/2020 at 1900 Patient Yes Yes   Sig: Take 1 Tab by mouth 3 times a day as needed.   montelukast (SINGULAIR) 10 MG Tab 8/30/2020 at AM Patient No No   Sig: TAKE 1 TABLET BY MOUTH EVERY DAY   nitroglycerin (NITROSTAT) 0.4 MG SL Tab PRN at PRN Patient No No   Sig: Place 1 Tab under tongue as needed for Chest Pain.   valsartan (DIOVAN) 160 MG Tab 8/30/2020 at 1900 Patient Yes Yes   Sig: Take 160 mg by mouth 2 times a day.      Facility-Administered Medications: None       Physical Exam  Temp:  [37.2 °C (99 °F)] 37.2 °C (99 °F)  Pulse:  [] 87  Resp:  [16-20] 18  BP: (119-167)/() 119/59  SpO2:  [88 %-96 %] 88 %    Physical Exam  Vitals signs and nursing note reviewed.   Constitutional:       Comments: Elderly   HENT:      Head: Normocephalic and atraumatic.      Right Ear: External ear normal.      Left Ear: External ear normal.      Nose: Nose normal.      Mouth/Throat:      Mouth: Mucous membranes are moist.   Eyes:      General: No scleral icterus.     Conjunctiva/sclera: Conjunctivae normal.   Neck:      Musculoskeletal: Normal range of motion and neck supple.   Cardiovascular:      Rate and Rhythm: Normal rate and regular rhythm.      Heart sounds: No murmur. No friction rub. No gallop.    Pulmonary:      Effort: Pulmonary effort is normal.      Breath sounds: Normal breath sounds.   Abdominal:      General: Abdomen is flat. Bowel sounds are normal. There is no distension.      Palpations: Abdomen is soft.      Tenderness: There is no abdominal tenderness. There is no guarding.   Musculoskeletal: Normal range of motion.         General: Swelling (R  foot area), tenderness (R foot) and deformity (R foot cast) present.      Comments: R foot cast   Skin:     General: Skin is warm.   Neurological:      Mental Status: He is alert and oriented to person, place, and time. Mental status is at baseline.   Psychiatric:         Mood and Affect: Mood normal.         Behavior: Behavior normal.         Thought Content: Thought content normal.         Judgment: Judgment normal.         Laboratory:  Recent Labs     08/31/20  0555   WBC 10.1   RBC 4.15*   HEMOGLOBIN 13.1*   HEMATOCRIT 39.8*   MCV 95.9   MCH 31.6   MCHC 32.9*   RDW 46.5   PLATELETCT 242   MPV 9.6     Recent Labs     08/31/20  0555   SODIUM 137   POTASSIUM 5.5   CHLORIDE 101   CO2 23   GLUCOSE 275*   BUN 35*   CREATININE 0.97   CALCIUM 8.7     Recent Labs     08/31/20  0555   ALTSGPT 23   ASTSGOT 27   ALKPHOSPHAT 343*   TBILIRUBIN 1.5   GLUCOSE 275*         No results for input(s): NTPROBNP in the last 72 hours.      Recent Labs     08/31/20  0555 08/31/20  0933   TROPONINT 33* 33*       Imaging:  DX-CHEST-PORTABLE (1 VIEW)   Final Result         1.  No acute cardiopulmonary disease.   2.  Atherosclerosis      EC-ECHOCARDIOGRAM COMPLETE W/O CONT    (Results Pending)         Assessment/Plan:  I anticipate this patient is appropriate for observation status at this time.    * Adult failure to thrive, elevated troponin, recent orthopedic surgery  Assessment & Plan  PT/OT ordered, may need skilled  Dr. Bolivar's team, LPS consulted.  Address other issues below    Hyperlipidemia associated with type 2 diabetes mellitus (HCC)- (present on admission)  Assessment & Plan  Uncontrolled  A1c ordered  Hold metformin, start basal insulin and SS insulin  Await A1c if patient needs insulin going home  Ordered diabetes teaching    CAD (coronary artery disease)- (present on admission)  Assessment & Plan  History of   Followed by [facility]  Work up elevated troponin    HTN (hypertension)- (present on admission)  Assessment  & Plan  Stable  Continue home blood pressure medications    Elevated troponin  Assessment & Plan  Trend troponin  Ordered echo  Telemetry  ASA, statin check lipid panel  Consulted Cardiology    Heprain SQ  Gastrointestinal prophylaxis: None  Antibiotics:   Ordered Anti-infectives (9999h ago, onward)    None        Diet:   Orders Placed This Encounter   Procedures   • Diet Order Cardiac, Diabetic     Standing Status:   Standing     Number of Occurrences:   1     Order Specific Question:   Diet:     Answer:   Cardiac [6]     Order Specific Question:   Diet:     Answer:   Diabetic [3]     Order Specific Question:   Consistency/Fluid modifications:     Answer:   2000 ml Fluid Restriction [11]      Prognosis: Guarded  Risk: The Patient is at HIGH risk for inpatient complications and decompensation secondary to his multiple cormorbidities  listed above, especially   Problem   Adult failure to thrive, elevated troponin, recent orthopedic surgery   Cad (Coronary Artery Disease)    October 2013: ACS. PCI/LETA x 2 in the mid circumflex (Xience 3.25 x 23mm) and proximal circumflex (Xience 3.6 x 12mm).     Hyperlipidemia Associated With Type 2 Diabetes Mellitus (Hcc)   Htn (Hypertension)   Elevated Troponin   Type 2 diabetes mellitus with peripheral neuropathy (HCC)      I have personally reviewed notes, labs, vitals, imaging.  I discussed the plan of care with bedside RN as well as on multidisciplinary rounds  I have performed a physical exam and reviewed and updated ROS and Plan today 8/31/2020. In review of yesterday's note 8/30/2020   there are no changes except as documented above.

## 2020-08-31 NOTE — CONSULTS
"Reason for Consult:  Asked by Dr Gaudencio Philippe to see this patient with troponin elevation  CC:   Chief Complaint   Patient presents with   • Failure to Thrive     pt had ankle surgery X 1 week ago and has seen been unable to care for self. Pt was unable to get up from chair due to weakness, pt states he realized he was unable to care for himself and should seek help. per EMS report pt came from a \"hoarder house\" from floor to ceiling throughout all the house. pt lives with his wife   • Weakness       HPI:      75 year old man PMH HTN, HLD, CAD s/p PCI/LETA x 2 of the circumflex 2013 with RCA  presents after mechanical fall from sitting. Patient describes leg weakness which gave out from under him and collapsed under his weight. He denies cardiac complaints; neither at time of event nor now.    Medications / Drug list prior to admission:  No current facility-administered medications on file prior to encounter.      Current Outpatient Medications on File Prior to Encounter   Medication Sig Dispense Refill   • carvedilol (COREG) 12.5 MG Tab Take 12.5 mg by mouth every evening.     • valsartan (DIOVAN) 160 MG Tab Take 160 mg by mouth 2 times a day.     • cephALEXin (KEFLEX) 500 MG Cap Take 500 mg by mouth 4 times a day. 7 DAYS     • mag-trisilicate/al-hydrox (GAVISCON) 80-20 MG Chew Tab Take 1 Tab by mouth 3 times a day as needed.     • acetaminophen (TYLENOL) 500 MG Tab Take 1,000 mg by mouth every 6 hours as needed for Mild Pain or Moderate Pain. Indications: Pain     • atorvastatin (LIPITOR) 80 MG tablet TAKE ONE TABLET BY MOUTH DAILY IN THE EVENING 100 Tab 3   • Empagliflozin-metFORMIN HCl (SYNJARDY) 5-1000 MG Tab Take 1 Tab by mouth 2 Times a Day. 180 Tab 3   • montelukast (SINGULAIR) 10 MG Tab TAKE 1 TABLET BY MOUTH EVERY DAY 90 Tab 3   • nitroglycerin (NITROSTAT) 0.4 MG SL Tab Place 1 Tab under tongue as needed for Chest Pain. 25 Tab 1   • aspirin 81 MG tablet Take 81 mg by mouth every day.     • " Cholecalciferol (VITAMIN D-3) 5000 UNITS TABS Take 1 Tab by mouth every 48 hours.         Current list of administered Medications:    Current Facility-Administered Medications:   •  atorvastatin (LIPITOR) tablet 80 mg, 80 mg, Oral, Q EVENING, Gaudencio Philippe M.D.  •  carvedilol (COREG) tablet 12.5 mg, 12.5 mg, Oral, Q EVENING, Gaudencio Philippe M.D.  •  vitamin D (cholecalciferol) tablet 1,000 Units, 1,000 Units, Oral, DAILY, Gaudencio Philippe M.D., 1,000 Units at 08/31/20 0920  •  montelukast (SINGULAIR) tablet 10 mg, 10 mg, Oral, DAILY, Gaudencio Philippe M.D., 10 mg at 08/31/20 0920  •  nitroglycerin (NITROSTAT) tablet 0.4 mg, 0.4 mg, Sublingual, Q5 MIN PRN, Gaudencio Philippe M.D.  •  valsartan (DIOVAN) tablet 160 mg, 160 mg, Oral, BID, Gaudencio Philippe M.D., 160 mg at 08/31/20 0940  •  senna-docusate (PERICOLACE or SENOKOT S) 8.6-50 MG per tablet 2 Tab, 2 Tab, Oral, BID **AND** polyethylene glycol/lytes (MIRALAX) PACKET 1 Packet, 1 Packet, Oral, QDAY PRN **AND** magnesium hydroxide (MILK OF MAGNESIA) suspension 30 mL, 30 mL, Oral, QDAY PRN **AND** bisacodyl (DULCOLAX) suppository 10 mg, 10 mg, Rectal, QDAY PRN, Gaudencio Philippe M.D.  •  Respiratory Therapy Consult, , Nebulization, Continuous RT, Gaudencio Philippe M.D.  •  [START ON 9/1/2020] heparin injection 5,000 Units, 5,000 Units, Subcutaneous, Q8HRS, Gaudencio Philippe M.D.  •  acetaminophen (TYLENOL) tablet 650 mg, 650 mg, Oral, Q6HRS PRN, Gaudencio Philippe M.D.  •  insulin regular (HumuLIN R,NovoLIN R) injection, 1-6 Units, Subcutaneous, 4X/DAY ACHS, 2 Units at 08/31/20 1316 **AND** POC Blood Glucose, , , Q AC AND BEDTIME(S) **AND** NOTIFY MD and PharmD, , , Once **AND** glucose 4 g chewable tablet 16 g, 16 g, Oral, Q15 MIN PRN **AND** dextrose 50% (D50W) injection 50 mL, 50 mL, Intravenous, Q15 MIN PRN, Gaudencio Philippe M.D.  •  [START ON 9/1/2020] aspirin (ASA) tablet 325 mg, 325 mg, Oral, DAILY **OR** [START ON 9/1/2020] aspirin (ASA) chewable tab  No 324 mg, 324 mg, Oral, DAILY **OR** [START ON 9/1/2020] aspirin (ASA) suppository 300 mg, 300 mg, Rectal, DAILY, Gaudencio Philippe M.D.  •  mag hydrox-al hydrox-simeth (MAALOX PLUS ES or MYLANTA DS) suspension 10 mL, 10 mL, Oral, TID PRN, Gaudencio Philippe M.D.  •  Notify provider if pain remains uncontrolled, , , CONTINUOUS **AND** Use the numeric rating scale (NRS-11) on regular floors and Critical-Care Pain Observation Tool (CPOT) on ICUs/Trauma to assess pain, , , CONTINUOUS **AND** Pulse Ox (Oximetry), , , CONTINUOUS **AND** Pharmacy Consult Request ...Pain Management Review 1 Each, 1 Each, Other, PHARMACY TO DOSE **AND** If patient difficult to arouse and/or has respiratory depression, stop any opiates that are currently infusing and call a Rapid Response., , , CONTINUOUS **AND** oxyCODONE immediate-release (ROXICODONE) tablet 2.5 mg, 2.5 mg, Oral, Q3HRS PRN **AND** oxyCODONE immediate-release (ROXICODONE) tablet 5 mg, 5 mg, Oral, Q3HRS PRN, 5 mg at 08/31/20 0921 **AND** HYDROmorphone pf (DILAUDID) injection 0.25 mg, 0.25 mg, Intravenous, Q3HRS PRN, Gaudencio Philippe M.D.  •  insulin glargine (Lantus) injection, 10 Units, Subcutaneous, Q EVENING, Gaudencio Philippe M.D.    Current Outpatient Medications:   •  carvedilol (COREG) 12.5 MG Tab, Take 12.5 mg by mouth every evening., Disp: , Rfl:   •  valsartan (DIOVAN) 160 MG Tab, Take 160 mg by mouth 2 times a day., Disp: , Rfl:   •  cephALEXin (KEFLEX) 500 MG Cap, Take 500 mg by mouth 4 times a day. 7 DAYS, Disp: , Rfl:   •  mag-trisilicate/al-hydrox (GAVISCON) 80-20 MG Chew Tab, Take 1 Tab by mouth 3 times a day as needed., Disp: , Rfl:   •  acetaminophen (TYLENOL) 500 MG Tab, Take 1,000 mg by mouth every 6 hours as needed for Mild Pain or Moderate Pain. Indications: Pain, Disp: , Rfl:   •  atorvastatin (LIPITOR) 80 MG tablet, TAKE ONE TABLET BY MOUTH DAILY IN THE EVENING, Disp: 100 Tab, Rfl: 3  •  Empagliflozin-metFORMIN HCl (SYNJARDY) 5-1000 MG Tab, Take 1 Tab  by mouth 2 Times a Day., Disp: 180 Tab, Rfl: 3  •  montelukast (SINGULAIR) 10 MG Tab, TAKE 1 TABLET BY MOUTH EVERY DAY, Disp: 90 Tab, Rfl: 3  •  nitroglycerin (NITROSTAT) 0.4 MG SL Tab, Place 1 Tab under tongue as needed for Chest Pain., Disp: 25 Tab, Rfl: 1  •  aspirin 81 MG tablet, Take 81 mg by mouth every day., Disp: , Rfl:   •  Cholecalciferol (VITAMIN D-3) 5000 UNITS TABS, Take 1 Tab by mouth every 48 hours., Disp: , Rfl:     Past Medical History:   Diagnosis Date   • Arthritis     hands, wrists, ankles   • CAD (coronary artery disease) October 2013    Dr. Ovalle; ACS. PCI/LETA x 2 of the mid circumflex (Xience 3.25 x 23mm) and proximal circumflex (Xience 3.6x 12mm)   • Cataract     sugery   • Diabetes     oral meds   • High cholesterol    • Hyperlipidemia    • Hypertension    • Pain     ankles   • Pneumonia 1968   • Stroke (HCC) 2010    no residual effects   • Syncope        Past Surgical History:   Procedure Laterality Date   • TENDON LENGHTENING Right 8/24/2020    Procedure: LENGTHENING, TENDON- , GASTROC RECESSION;  Surgeon: Royal Bolivar M.D.;  Location: [facility];  Service: Orthopedics   • ANKLE FUSION Right 8/24/2020    Procedure: FUSION, JOINT, ANKLE- ANKLE SUBTALAR FUSION , BONE GRAFT, MEDIAL RELEASE INCLUDING TENDONS, PATIAL EXCISION OF FIBULA;  Surgeon: Royal Bolivar M.D.;  Location: [facility];  Service: Orthopedics   • TENDON TRANSFER Right 8/24/2020    Procedure: TRANSFER, TENDON- PLANTAR FASCIA RELEASE;  Surgeon: Royal Bolivar M.D.;  Location: [facility];  Service: Orthopedics   • ORTHOPEDIC OSTEOTOMY Right 8/24/2020    Procedure: OSTEOTOMY- 1ST METATARSAL, CROSS PROCEDURE, 2-5 METATARSAL OSTEOTOMY, 2-5 PHALANGEL JOINT RECONSTRUCTION;  Surgeon: Royal Bolivar M.D.;  Location: [facility];  Service: Orthopedics   • HAMMERTOE CORRECTION Right 8/24/2020    Procedure: CORRECTION, HAMMER TOE 2-5;  Surgeon: Royal Bolivar M.D.;  Location:  SURGERY [facility];  Service: Orthopedics   • STENT PLACEMENT  2013    cardiac   • CAROTID ENDARTERECTOMY  7/13/2010    left; Performed by CHIQUITA CORRIGAN at SURGERY [facility]   • CATARACT EXTRACTION WITH IOL Bilateral    • TONSILLECTOMY  as a child       Family History   Problem Relation Age of Onset   • Other Mother         Rheumatic fever     Patient family history was personally reviewed, no pertinent family history to current presentation    Social History     Socioeconomic History   • Marital status:      Spouse name: Not on file   • Number of children: Not on file   • Years of education: Not on file   • Highest education level: Not on file   Occupational History   • Not on file   Social Needs   • Financial resource strain: Not on file   • Food insecurity     Worry: Not on file     Inability: Not on file   • Transportation needs     Medical: Not on file     Non-medical: Not on file   Tobacco Use   • Smoking status: Never Smoker   • Smokeless tobacco: Never Used   Substance and Sexual Activity   • Alcohol use: Yes     Alcohol/week: 0.0 oz     Comment: once a year   • Drug use: No   • Sexual activity: Not on file   Lifestyle   • Physical activity     Days per week: Not on file     Minutes per session: Not on file   • Stress: Not on file   Relationships   • Social connections     Talks on phone: Not on file     Gets together: Not on file     Attends Jew service: Not on file     Active member of club or organization: Not on file     Attends meetings of clubs or organizations: Not on file     Relationship status: Not on file   • Intimate partner violence     Fear of current or ex partner: Not on file     Emotionally abused: Not on file     Physically abused: Not on file     Forced sexual activity: Not on file   Other Topics Concern   • Not on file   Social History Narrative   • Not on file       ALLERGIES:  Allergies   Allergen Reactions   • Fish Hives     shellfish   • Pcn [Penicillins]  "Hives   • Amoxicillin Hives   • Food Swelling      Eggs,Melons, avocados-puffy mouth       Review of systems:  A complete review of symptoms was reviewed with patient. This is reviewed in H&P and PMH. ALL OTHERS reviewed and negative    Physical exam:  Patient Vitals for the past 24 hrs:   BP Temp Temp src Pulse Resp SpO2 Height Weight   08/31/20 1331 137/64 -- -- 79 20 94 % -- --   08/31/20 1301 118/58 -- -- 84 (!) 22 94 % -- --   08/31/20 1201 143/63 -- -- 86 20 93 % -- --   08/31/20 1101 119/59 -- -- 87 18 88 % -- --   08/31/20 1001 127/60 -- -- 96 16 95 % -- --   08/31/20 0901 130/65 -- -- 91 18 93 % -- --   08/31/20 0802 147/67 -- -- 95 20 96 % -- --   08/31/20 0701 138/67 -- -- 87 20 95 % -- --   08/31/20 0631 150/71 -- -- 89 16 96 % -- --   08/31/20 0601 (!) 167/104 -- -- (!) 144 17 95 % -- --   08/31/20 0531 125/69 -- -- 91 16 92 % -- --   08/31/20 0514 -- 37.2 °C (99 °F) Temporal -- -- -- -- --   08/31/20 0513 131/64 -- -- 97 16 95 % -- --   08/31/20 0511 -- -- -- -- -- -- 1.88 m (6' 2\") 104.8 kg (231 lb)     General: No acute distress.   EYES: no jaundice  HEENT: OP clear   Neck: No bruits No JVD.   CVS:  RRR. S1 + S2. No M/R/G. No edema.  Resp: CTAB. No wheezing or crackles/rhonchi.  Abdomen: Soft, NT, ND,  Skin: Grossly nothing acute no obvious rashes  Neurological: Alert, Moves all extremities, no cranial nerve defects on limited exam  Extremities: Pulse 2+ in b/l LE. No cyanosis.     Data:  Laboratory studies personally reviewed by me:  Recent Results (from the past 24 hour(s))   CBC WITH DIFFERENTIAL    Collection Time: 08/31/20  5:55 AM   Result Value Ref Range    WBC 10.1 4.8 - 10.8 K/uL    RBC 4.15 (L) 4.70 - 6.10 M/uL    Hemoglobin 13.1 (L) 14.0 - 18.0 g/dL    Hematocrit 39.8 (L) 42.0 - 52.0 %    MCV 95.9 81.4 - 97.8 fL    MCH 31.6 27.0 - 33.0 pg    MCHC 32.9 (L) 33.7 - 35.3 g/dL    RDW 46.5 35.9 - 50.0 fL    Platelet Count 242 164 - 446 K/uL    MPV 9.6 9.0 - 12.9 fL    Neutrophils-Polys 83.10 (H) " 44.00 - 72.00 %    Lymphocytes 7.60 (L) 22.00 - 41.00 %    Monocytes 8.00 0.00 - 13.40 %    Eosinophils 0.20 0.00 - 6.90 %    Basophils 0.60 0.00 - 1.80 %    Immature Granulocytes 0.50 0.00 - 0.90 %    Nucleated RBC 0.00 /100 WBC    Neutrophils (Absolute) 8.39 (H) 1.82 - 7.42 K/uL    Lymphs (Absolute) 0.77 (L) 1.00 - 4.80 K/uL    Monos (Absolute) 0.81 0.00 - 0.85 K/uL    Eos (Absolute) 0.02 0.00 - 0.51 K/uL    Baso (Absolute) 0.06 0.00 - 0.12 K/uL    Immature Granulocytes (abs) 0.05 0.00 - 0.11 K/uL    NRBC (Absolute) 0.00 K/uL   COMP METABOLIC PANEL    Collection Time: 08/31/20  5:55 AM   Result Value Ref Range    Sodium 137 135 - 145 mmol/L    Potassium 5.5 3.6 - 5.5 mmol/L    Chloride 101 96 - 112 mmol/L    Co2 23 20 - 33 mmol/L    Anion Gap 13.0 7.0 - 16.0    Glucose 275 (H) 65 - 99 mg/dL    Bun 35 (H) 8 - 22 mg/dL    Creatinine 0.97 0.50 - 1.40 mg/dL    Calcium 8.7 8.5 - 10.5 mg/dL    AST(SGOT) 27 12 - 45 U/L    ALT(SGPT) 23 2 - 50 U/L    Alkaline Phosphatase 343 (H) 30 - 99 U/L    Total Bilirubin 1.5 0.1 - 1.5 mg/dL    Albumin 2.8 (L) 3.2 - 4.9 g/dL    Total Protein 6.4 6.0 - 8.2 g/dL    Globulin 3.6 (H) 1.9 - 3.5 g/dL    A-G Ratio 0.8 g/dL   URINALYSIS CULTURE, IF INDICATED    Collection Time: 08/31/20  5:55 AM    Specimen: Blood   Result Value Ref Range    Color Yellow     Character Cloudy (A)     Specific Gravity 1.020 <1.035    Ph 5.0 5.0 - 8.0    Glucose 100 (A) Negative mg/dL    Ketones Negative Negative mg/dL    Protein Negative Negative mg/dL    Bilirubin Negative Negative    Urobilinogen, Urine 1.0 Negative    Nitrite Negative Negative    Leukocyte Esterase Trace (A) Negative    Occult Blood Trace (A) Negative    Micro Urine Req Microscopic    TSH    Collection Time: 08/31/20  5:55 AM   Result Value Ref Range    TSH 0.965 0.380 - 5.330 uIU/mL   TROPONIN    Collection Time: 08/31/20  5:55 AM   Result Value Ref Range    Troponin T 33 (H) 6 - 19 ng/L   FREE THYROXINE    Collection Time: 08/31/20  5:55 AM    Result Value Ref Range    Free T-4 1.58 0.93 - 1.70 ng/dL   URINE MICROSCOPIC (W/UA)    Collection Time: 20  5:55 AM   Result Value Ref Range    WBC 5-10 (A) /hpf    RBC 5-10 (A) /hpf    Bacteria Negative None /hpf    Epithelial Cells Few /hpf    Hyaline Cast 3-5 (A) /lpf   ESTIMATED GFR    Collection Time: 20  5:55 AM   Result Value Ref Range    GFR If African American >60 >60 mL/min/1.73 m 2    GFR If Non African American >60 >60 mL/min/1.73 m 2   HEMOGLOBIN A1C    Collection Time: 20  5:55 AM   Result Value Ref Range    Glycohemoglobin 8.0 (H) 0.0 - 5.6 %    Est Avg Glucose 183 mg/dL   D-DIMER    Collection Time: 20  5:55 AM   Result Value Ref Range    D-Dimer Screen 4.25 (H) 0.00 - 0.50 ug/mL (FEU)   EKG    Collection Time: 20  6:16 AM   Result Value Ref Range    Report       [facility] Emergency Dept.    Test Date:  2020  Pt Name:    NADINE CABRERA                Department: ER  MRN:        [id number]                      Room:        22  Gender:     Male                         Technician: [id number]  :        [id number]                   Requested By:WADE RODRIGUEZ  Order #:    [id number]                    Reading MD: WADE RODRIGUEZ MD    Measurements  Intervals                                Axis  Rate:       94                           P:          0  DC:         145                          QRS:        -72  QRSD:       160                          T:          58  QT:         396  QTc:        496    Interpretive Statements  SINUS RHYTHM  RIGHT BUNDLE BRANCH BLOCK  Compared to ECG 2020 15:33:57  No significant changes  Electronically Signed On 2020 6:54:02 PDT by WADE RODRIGUEZ MD     Troponin in four (4) hours    Collection Time: 20  9:33 AM   Result Value Ref Range    Troponin T 33 (H) 6 - 19 ng/L   CREATINE KINASE    Collection Time: 20  9:33 AM   Result Value Ref Range    CPK Total 280 (H) 0 - 154 U/L   Routine (COVID/SARS  COV-2 In-House PCR up to 24 hours)    Collection Time: 08/31/20  9:58 AM    Specimen: Nasopharyngeal; Respirate   Result Value Ref Range    COVID Order Status Received    SARS-CoV-2, PCR (In-House)    Collection Time: 08/31/20  9:58 AM   Result Value Ref Range    SARS-CoV-2 Source NP Swab    ACCU-CHEK GLUCOSE    Collection Time: 08/31/20 11:33 AM   Result Value Ref Range    Glucose - Accu-Ck 221 (H) 65 - 99 mg/dL   TROPONIN    Collection Time: 08/31/20 11:34 AM   Result Value Ref Range    Troponin T 34 (H) 6 - 19 ng/L       EKG : personally reviewed by NOEMY Calderon    All pertinent features of laboratory and imaging reviewed including primary images where applicable    TTE 01/2012  CONCLUSIONS   Normal left ventricular size and function. Grade I diastolic   dysfunction is present. Mild concentric left ventricular hypertrophy.   Left ventricular ejection fraction is 55% to 60%.   Mildly dilated right atrium.  Evidence of right to left shunt possible   PFO.   Moderately dilated left atrium by LAUREL = 39.5 cm³/m².  Mild LAE by 2D.   Structurally normal tricuspid valve without significant stenosis or   regurgitation.       Nuclear stress spect 04/2019   NUCLEAR IMAGING INTERPRETATION   * Equivocal small sized mild severity non-reversible defect in the apex    likely artifact. SSS 1.   * Normal left ventricular size, ejection fraction, and wall motion.   ECG INTERPRETATION   Negative stress ECG for ischemia.    Principal Problem:    Adult failure to thrive, elevated troponin, recent orthopedic surgery POA: Unknown  Active Problems:    HTN (hypertension) POA: Yes    CAD (coronary artery disease) POA: Yes      Overview: October 2013: ACS. PCI/LETA x 2 in the mid circumflex (Xience 3.25 x 23mm)       and proximal circumflex (Xience 3.6 x 12mm).    Hyperlipidemia associated with type 2 diabetes mellitus (HCC) POA: Yes    Type 2 diabetes mellitus with peripheral neuropathy (HCC) POA: Yes    Elevated troponin POA:  Unknown  Resolved Problems:    * No resolved hospital problems. *      Assessment / Plan:  75 year old man PMH HTN, HLD, CAD s/p PCI/LETA x 2 of the circumflex 2013 with RCA  presents after mechanical fall from sitting.    -continue aspirin, statin, BB, ARB  -follow up outpatient cardiology when stable for discharge  -suggest social work follow up.    I personally discussed his case with Dr Philippe    Please call back with any further cardiac questions.    It is my pleasure to participate in the care of Mr. Stone.  Please do not hesitate to contact me with questions or concerns.    Nigel Wahl MD  Cardiologist [facility]

## 2020-08-31 NOTE — ED NOTES
Med Rec completed per patient   Allergies reviewed    Patient completed a 7 day Keflex course 8-    Patient was told to decrease from BID to once daily on Coreg

## 2020-08-31 NOTE — ED PROVIDER NOTES
"ED Provider Note    CHIEF COMPLAINT  Weakness, fall    HPI  Agapito Stone is a 75 y.o. male who presents to the emergency department generalized weakness.  Patient had ankle surgery with Dr. Bolivar on 8/24.  He has been at home and having difficulty getting up even out of a chair.  Does not feel like he can care for himself.  Today he was just trying to stand up from a toilet when he collapsed.  He was too weak to get up.  Weakness is severe and getting worse.  He denies any trauma when he fell to the ground.  Denies hitting his head.  He has not had any chest pain or shortness of breath.  Denies abdominal pain, nausea vomiting.  He has not been eating very much.  He has been drinking water.  Makes normal urine.  No dysuria or hematuria.  Has not had a fever.  No leg swelling or increased pain of the extremity.  Denies drugs or alcohol.    Per paramedic report the patient came from a home that was completely out of order.  It was characterized as \"a hoarder house.\"    REVIEW OF SYSTEMS  As per HPI, otherwise a 10 point review of systems is negative    PAST MEDICAL HISTORY  Past Medical History:   Diagnosis Date   • Arthritis     hands, wrists, ankles   • CAD (coronary artery disease) October 2013    Dr. Ovalle; ACS. PCI/LETA x 2 of the mid circumflex (Xience 3.25 x 23mm) and proximal circumflex (Xience 3.6x 12mm)   • Cataract     sugery   • Diabetes     oral meds   • High cholesterol    • Hyperlipidemia    • Hypertension    • Pain     ankles   • Pneumonia 1968   • Stroke (HCC) 2010    no residual effects   • Syncope        SOCIAL HISTORY  Social History     Tobacco Use   • Smoking status: Never Smoker   • Smokeless tobacco: Never Used   Substance Use Topics   • Alcohol use: Yes     Alcohol/week: 0.0 oz     Comment: once a year   • Drug use: No       SURGICAL HISTORY  Past Surgical History:   Procedure Laterality Date   • TENDON LENGHTENING Right 8/24/2020    Procedure: LENGTHENING, TENDON- , GASTROC RECESSION; " " Surgeon: Royal Bolivar M.D.;  Location: [facility];  Service: Orthopedics   • ANKLE FUSION Right 8/24/2020    Procedure: FUSION, JOINT, ANKLE- ANKLE SUBTALAR FUSION , BONE GRAFT, MEDIAL RELEASE INCLUDING TENDONS, PATIAL EXCISION OF FIBULA;  Surgeon: Royal Bolivar M.D.;  Location: [facility];  Service: Orthopedics   • TENDON TRANSFER Right 8/24/2020    Procedure: TRANSFER, TENDON- PLANTAR FASCIA RELEASE;  Surgeon: Royal Bolivar M.D.;  Location: [facility];  Service: Orthopedics   • ORTHOPEDIC OSTEOTOMY Right 8/24/2020    Procedure: OSTEOTOMY- 1ST METATARSAL, CROSS PROCEDURE, 2-5 METATARSAL OSTEOTOMY, 2-5 PHALANGEL JOINT RECONSTRUCTION;  Surgeon: Royal Bolivar M.D.;  Location: [facility];  Service: Orthopedics   • HAMMERTOE CORRECTION Right 8/24/2020    Procedure: CORRECTION, HAMMER TOE 2-5;  Surgeon: Royal Bolivar M.D.;  Location: [facility];  Service: Orthopedics   • STENT PLACEMENT  2013    cardiac   • CAROTID ENDARTERECTOMY  7/13/2010    left; Performed by CHIQUITA CORRIGAN at [facility]   • CATARACT EXTRACTION WITH IOL Bilateral    • TONSILLECTOMY  as a child       CURRENT MEDICATIONS  Home Medications    **Home medications have not yet been reviewed for this encounter**         ALLERGIES  Allergies   Allergen Reactions   • Fish Hives     shellfish   • Pcn [Penicillins] Hives   • Amoxicillin Hives   • Food Swelling      Eggs,Melons, avocados-puffy mouth       PHYSICAL EXAM  VITAL SIGNS: /64   Pulse 97   Temp 37.2 °C (99 °F) (Temporal)   Resp 16   Ht 1.88 m (6' 2\")   Wt 104.8 kg (231 lb)   SpO2 95%   BMI 29.66 kg/m²    Constitutional: Awake and alert tired appearing elderly male  HENT:  Atraumatic, Normocephalic.Oropharynx dry mucus membranes, Nose normal inspection.   Eyes: Normal inspection  Neck: Supple  Cardiovascular: Normal heart rate, Normal rhythm.  Symmetric peripheral pulses.   Thorax & Lungs: No " respiratory distress, No wheezing, No rales, No rhonchi, No chest tenderness.   Abdomen: Bowel sounds normal, soft, non-distended, nontender, no mass  Skin: Warm, Dry, No rash.   Back: No tenderness, No CVA tenderness.   Extremities: Splint on the right lower extremity  Neurologic: Awake alert oriented.  Cranial nerves are intact.  Symmetric generalized weakness.  Moves all extremities.  Clear speech.      RADIOLOGY/PROCEDURES  DX-CHEST-PORTABLE (1 VIEW)   Final Result         1.  No acute cardiopulmonary disease.   2.  Atherosclerosis           Imaging is interpreted by radiologist    Labs:  Results for orders placed or performed during the hospital encounter of 08/31/20   CBC WITH DIFFERENTIAL   Result Value Ref Range    WBC 10.1 4.8 - 10.8 K/uL    RBC 4.15 (L) 4.70 - 6.10 M/uL    Hemoglobin 13.1 (L) 14.0 - 18.0 g/dL    Hematocrit 39.8 (L) 42.0 - 52.0 %    MCV 95.9 81.4 - 97.8 fL    MCH 31.6 27.0 - 33.0 pg    MCHC 32.9 (L) 33.7 - 35.3 g/dL    RDW 46.5 35.9 - 50.0 fL    Platelet Count 242 164 - 446 K/uL    MPV 9.6 9.0 - 12.9 fL    Neutrophils-Polys 83.10 (H) 44.00 - 72.00 %    Lymphocytes 7.60 (L) 22.00 - 41.00 %    Monocytes 8.00 0.00 - 13.40 %    Eosinophils 0.20 0.00 - 6.90 %    Basophils 0.60 0.00 - 1.80 %    Immature Granulocytes 0.50 0.00 - 0.90 %    Nucleated RBC 0.00 /100 WBC    Neutrophils (Absolute) 8.39 (H) 1.82 - 7.42 K/uL    Lymphs (Absolute) 0.77 (L) 1.00 - 4.80 K/uL    Monos (Absolute) 0.81 0.00 - 0.85 K/uL    Eos (Absolute) 0.02 0.00 - 0.51 K/uL    Baso (Absolute) 0.06 0.00 - 0.12 K/uL    Immature Granulocytes (abs) 0.05 0.00 - 0.11 K/uL    NRBC (Absolute) 0.00 K/uL   COMP METABOLIC PANEL   Result Value Ref Range    Sodium 137 135 - 145 mmol/L    Potassium 5.5 3.6 - 5.5 mmol/L    Chloride 101 96 - 112 mmol/L    Co2 23 20 - 33 mmol/L    Anion Gap 13.0 7.0 - 16.0    Glucose 275 (H) 65 - 99 mg/dL    Bun 35 (H) 8 - 22 mg/dL    Creatinine 0.97 0.50 - 1.40 mg/dL    Calcium 8.7 8.5 - 10.5 mg/dL     AST(SGOT) 27 12 - 45 U/L    ALT(SGPT) 23 2 - 50 U/L    Alkaline Phosphatase 343 (H) 30 - 99 U/L    Total Bilirubin 1.5 0.1 - 1.5 mg/dL    Albumin 2.8 (L) 3.2 - 4.9 g/dL    Total Protein 6.4 6.0 - 8.2 g/dL    Globulin 3.6 (H) 1.9 - 3.5 g/dL    A-G Ratio 0.8 g/dL   URINALYSIS CULTURE, IF INDICATED    Specimen: Blood   Result Value Ref Range    Color Yellow     Character Cloudy (A)     Specific Gravity 1.020 <1.035    Ph 5.0 5.0 - 8.0    Glucose 100 (A) Negative mg/dL    Ketones Negative Negative mg/dL    Protein Negative Negative mg/dL    Bilirubin Negative Negative    Urobilinogen, Urine 1.0 Negative    Nitrite Negative Negative    Leukocyte Esterase Trace (A) Negative    Occult Blood Trace (A) Negative    Micro Urine Req Microscopic    TSH   Result Value Ref Range    TSH 0.965 0.380 - 5.330 uIU/mL   TROPONIN   Result Value Ref Range    Troponin T 33 (H) 6 - 19 ng/L   FREE THYROXINE   Result Value Ref Range    Free T-4 1.58 0.93 - 1.70 ng/dL   URINE MICROSCOPIC (W/UA)   Result Value Ref Range    WBC 5-10 (A) /hpf    RBC 5-10 (A) /hpf    Bacteria Negative None /hpf    Epithelial Cells Few /hpf    Hyaline Cast 3-5 (A) /lpf   ESTIMATED GFR   Result Value Ref Range    GFR If African American >60 >60 mL/min/1.73 m 2    GFR If Non African American >60 >60 mL/min/1.73 m 2   EKG   Result Value Ref Range    Report       [facility] Emergency Dept.    Test Date:  2020  Pt Name:    NADINE CABRERA                Department: ER  MRN:        [id number]                      Room:        22  Gender:     Male                         Technician: [id number]  :        1945                   Requested By:WADE RODRIGUEZ  Order #:    [id number]                    Reading MD: WADE RODRIGUEZ MD    Measurements  Intervals                                Axis  Rate:       94                           P:          0  RI:         145                          QRS:        -72  QRSD:       160                          T:           58  QT:         396  QTc:        496    Interpretive Statements  SINUS RHYTHM  RIGHT BUNDLE BRANCH BLOCK  Compared to ECG 08/20/2020 15:33:57  No significant changes  Electronically Signed On 8- 6:54:02 PDT by WADE RODRIGUEZ MD           COURSE & MEDICAL DECISION MAKING  Patient presents with generalized weakness.  His vital signs were stable.  His exam was unremarkable.  Differential was broad and work-up was undertaken.    Data returned as above.  He has an elevated troponin of unclear significance.  This will need to be trended.  His BUN was mildly elevated suggesting some degree of dehydration and he was given oral fluids.  At this point because of his severe weakness he will be admitted to the hospital for further evaluation.  I consulted Dr. Ibarra.    FINAL IMPRESSION  1.  Generalized weakness  2.  Elevated troponin      This dictation was created using voice recognition software. The accuracy of the dictation is limited to the abilities of the software.  The nursing notes were reviewed and certain aspects of this information were incorporated into this note.      Electronically signed by: Wade Rodriguez M.D., 8/31/2020 5:52 AM

## 2020-08-31 NOTE — ED NOTES
0710  Received report from Mirela HARRIS assumed care done. Pt resting in bed comfortably. Updated with the poc.  Waiting for admitting MD to see pt. Noted cast in rle w/drain connected. Call light within reach. Instructed to call staff for any assistance.

## 2020-08-31 NOTE — ASSESSMENT & PLAN NOTE
History of   Followed by [facility] Cardiology recommended outpatient follow-up  Echocardiogram showed ejection fraction 55%, normal diastolic function.  Right ventricular systolic pressure 40 mmHg.

## 2020-08-31 NOTE — PROGRESS NOTES
Patient with underlying history of diabetes mellitus, dyslipidemia, hypertension underwent ankle fusion with Dr. Bolivar from orthopedics 1 week ago, subsequently now presents to the emergency department with severe debility, weakness and unable to provide care for himself/failure to thrive.  Near syncopal event as trying to get off the toilet today.  Troponin was checked and mildly elevated at 33, patient chest pain-free.    ERP, Dr Srinivasan requesting admission for:  1.  Elevated troponin, recommendations to cycle troponins and further work-up of near syncope.  2.  Weakness, further evaluation of weakness  3.  Debility/failure to thrive, consideration towards postacute placement for rehabilitation    Patient assigned to Dr. Philippe who will evaluate the patient, place appropriate admission orders observation versus inpatient unclear at this time, and complete an H&P    Chelly Ibarra M.D.  08/31/20  7:48 AM

## 2020-08-31 NOTE — ED TRIAGE NOTES
"Chief Complaint   Patient presents with   • Failure to Thrive     pt had ankle surgery X 1 week ago and has seen been unable to care for self. Pt was unable to get up from chair due to weakness, pt states he realized he was unable to care for himself and should seek help. per EMS report pt came from a \"hoarder house\" from floor to ceiling throughout all the house. pt lives with his wife   • Weakness       Pt BIB EMS for above. VSS, pt has right ankle wrapped in dressing, pt states he was suppose to follow up for on 09/03/20. Chart up for ERP  "

## 2020-09-01 NOTE — DISCHARGE PLANNING
[facility] Transitional Care Coordination     Referral from:  Dr. Knight  Facesheet indicates: SCP  Potential Rehab Diagnosis: Ankle fracture failure to thrive at home    Chart review indicates patient has limited on going medical management and therapy needs to possibly meet inpatient rehab facility criteria with the goal of returning to community. May need Social service to support return to community.     D/C support: Spouse     Physiatry consultation denied  per protocol.      Current documentation does not support CMS criteria for IRF level of care. Anticipate skilled nursing when medically cleared. SCP may not require qualifying stay for skilled nursing.       Thank you for the referral.

## 2020-09-01 NOTE — PROGRESS NOTES
Bedside report received from night shift RN. Assumed care. Pt is A&Ox4. Pt is in bed. Pt denies pain at this time. Pt was updated on plan of care. Pt has call light, personal belongings, and bedside table within reach. Bed is in the lowest position and bed alarm is on. Will continue to monitor

## 2020-09-01 NOTE — THERAPY
Physical Therapy   Initial Evaluation     Patient Name: Agapito Stone  Age:  75 y.o., Sex:  male  Medical Record #: [id number]  Today's Date: 9/1/2020     Precautions: Fall Risk, Non Weight Bearing Right Lower Extremity  Comments: POD 7 R cavus foot reconstrucion and ankle fusion    Assessment  Patient is 75 y.o. male admitted s/p GLF and elevated troponins. Pt is POD 7 R cavus foot reconstruction, hammer toe reconstruction, and ankle fusion. Pt was sent home same day post surgery without appropriate AD and without PT follow up. Pt is NWB on R LE and lives in tri-level house. In addition to R LE weight bearing status, pts L LE also has a cavus deformity limiting his single leg balance. Since surgery, pt reported he has been staying on the couch on the first floor and using his 4WW as a WC to get to bathroom. Pt currently c/o new pain after GLF at home around R ankle joint- RN aware. Pt required max cues for maintaining NWB and max assist for STS and squat pivot to bedside chair. Pt is not safe to return home and is limited by weakness, balance deficits, and general fatigue. PT will cont while in house.     Plan    Recommend Physical Therapy 4 times per week until therapy goals are met for the following treatments:  Bed Mobility, Community Re-integration, Gait Training, Neuro Re-Education / Balance, Self Care/Home Evaluation, Therapeutic Activities and Therapeutic Exercises    DC Equipment Recommendations: Unable to determine at this time  Discharge Recommendations: Recommend post-acute placement for additional physical therapy services prior to discharge home **Not safe to dc home**          09/01/20 0854   Prior Living Situation   Prior Services None   Housing / Facility 3 Story House   Steps Into Home 1   Steps In Home   (2 sets of stairs, pt has been staying on 1st floor on couch)   Equipment Owned 4-Wheel Walker   Lives with - Patient's Self Care Capacity Spouse   Comments Pt lives with his wife and has a  neighbor that helped him initially get inot and out of his house   Prior Level of Functional Mobility   Bed Mobility Independent   Transfer Status Independent   Ambulation Independent   Distance Ambulation (Feet)   (community)   Assistive Devices Used None   Comments Pt was independent prior to recent surgery. Since surgery, pt has been using 4WW as a WC but reports other AD cannot fit in house.   History of Falls   History of Falls Yes   Passive ROM Lower Body   Passive ROM Lower Body WDL   Comments with exception of R ankle   Active ROM Lower Body    Active ROM Lower Body  WDL   Comments with exception of R ankle   Strength Lower Body   Lower Body Strength  WDL   Comments generalized weakness, funcitonal for standing   Gait Analysis   Gait Level Of Assist Unable to Participate   Weight Bearing Status NWB R LE   Comments transfers only   Bed Mobility    Supine to Sit Minimal Assist   Sit to Supine Supervised   Scooting Supervised   Functional Mobility   Sit to Stand Moderate Assist   Bed, Chair, Wheelchair Transfer Maximal Assist   Transfer Method Squat Pivot   Mobility EOB, STS, SPT to chair   Short Term Goals    Short Term Goal # 1 Pt will be able to complete bed mobility with SPV in 6tx in order to return to prior level   Short Term Goal # 2 Pt will be able to complete bed<>chair transfers with LRAD and min assist in 6tx in order to progress back to prior level   Short Term Goal # 3 Pt will be able to hop 5ft with FWW and min assist in 6tx in order to intiate ambulating   Short Term Goal # 4 Pt will be able to demonstrate SPV WC mobility in 6tx in order to improve overall independence   Anticipated Discharge Equipment and Recommendations   DC Equipment Recommendations Unable to determine at this time   Discharge Recommendations Recommend post-acute placement for additional physical therapy services prior to discharge home

## 2020-09-01 NOTE — PROGRESS NOTES
Report received from Iglesia Gongora RN. Updated on POC.  Assumed care of patient upon arrival to unit. Patient currently A & O x 4; on RA; up without complaints of acute pain. Pt placed on monitor, monitor room notified. Patient oriented to unit and to call light system. Call light within reach. Pt educated to fall risk. Fall precautions in place. Pt provided with personal grooming items. Bed locked and in lowest position. All questions answered. No other needs indicated at this time.

## 2020-09-01 NOTE — WOUND TEAM
Wound consult placed on 8/31 regarding cast to LE. Chart reviewed. No images in Epic. This RN up to assess patient. Discussed with bedside RN, Malini. Cast in place. Per chart pt was evaluated by Dr. Vidales with ortho yesterday Per note pt NWB. Pt to follow up in 1 week for suture removal and re-casting. No advanced wound care indicated at this time. Wound consult completed. No further follow up unless consulted.

## 2020-09-01 NOTE — PROGRESS NOTES
[facility] Medicine Daily Progress Note    Date of Service  9/1/2020    Chief Complaint  75 y.o. male admitted 8/31/2020 with failure to thrive    Hospital Course    Mr. Agapito Stone is a 75 y.o. male who with history of hypertension, hyperlipidemia, coronary artery disease with 2 stents presented on 8/31/2020 with failure to thrive.  Status post ankle surgery 1 week ago and has been unable to care for himself.  Patient was unable to get up from his chair due to generalized weakness.  EMS was called.  Echocardiogram showed ejection fraction 55%, normal diastolic function.  Right ventricular systolic pressure 40 mmHg.  Patient was found to have an elevated troponin at 34 on presentation but with no cardiac symptoms.  Cardiology recommended outpatient follow-up.        Interval Problem Update  Patient was seen and examined at bedside.  I have personally reviewed vitals, labs, and imaging.    9/1.  Afebrile.  1 episode of hypotension yesterday.  On room air.  Noted hemoglobin drop this morning.  Denies fever, chills, chest pain, shortness of breath.  Reports right ankle pain well controlled 4/10.  Still unable to bear weight.    Consultants/Specialty  Cardiology    Code Status  Full Code    Disposition  Will need rehab.    Review of Systems  Review of Systems   Constitutional: Negative for chills and fever.   HENT: Negative for congestion and sore throat.    Eyes: Negative for blurred vision.   Respiratory: Negative for cough and shortness of breath.    Cardiovascular: Negative for chest pain, palpitations and leg swelling.   Gastrointestinal: Negative for abdominal pain, constipation, diarrhea, nausea and vomiting.   Genitourinary: Negative for dysuria, frequency and urgency.   Musculoskeletal: Positive for joint pain (Right ankle). Negative for falls.   Skin: Negative for rash.   Neurological: Negative for dizziness, weakness and headaches.   Psychiatric/Behavioral: Negative for depression. The patient is not  nervous/anxious.    All other systems reviewed and are negative.        Physical Exam  Temp:  [36.4 °C (97.6 °F)-37.3 °C (99.2 °F)] 36.4 °C (97.6 °F)  Pulse:  [70-87] 87  Resp:  [16-18] 18  BP: ()/(50-70) 114/67  SpO2:  [90 %-97 %] 97 %    Physical Exam  Vitals signs and nursing note reviewed.   Constitutional:       General: He is not in acute distress.     Appearance: Normal appearance.   HENT:      Head: Normocephalic and atraumatic.      Nose: Nose normal.      Mouth/Throat:      Mouth: Mucous membranes are moist.      Pharynx: Oropharynx is clear.   Eyes:      Extraocular Movements: Extraocular movements intact.      Conjunctiva/sclera: Conjunctivae normal.   Neck:      Musculoskeletal: Normal range of motion and neck supple.   Cardiovascular:      Rate and Rhythm: Normal rate and regular rhythm.      Pulses: Normal pulses.      Heart sounds: Normal heart sounds. No murmur. No friction rub. No gallop.    Pulmonary:      Effort: Pulmonary effort is normal. No respiratory distress.      Breath sounds: Normal breath sounds. No wheezing or rales.   Chest:      Chest wall: No tenderness.   Abdominal:      General: Abdomen is flat. Bowel sounds are normal. There is no distension.      Palpations: Abdomen is soft. There is no mass.      Tenderness: There is no abdominal tenderness. There is no guarding.   Musculoskeletal: Normal range of motion.      Comments: Right ankle with splint and dressing.   Skin:     General: Skin is warm.      Capillary Refill: Capillary refill takes less than 2 seconds.   Neurological:      General: No focal deficit present.      Mental Status: He is alert and oriented to person, place, and time. Mental status is at baseline.      Cranial Nerves: No cranial nerve deficit.      Motor: No weakness.   Psychiatric:         Mood and Affect: Mood normal.         Behavior: Behavior normal.          Fluids    Intake/Output Summary (Last 24 hours) at 9/1/2020 1341  Last data filed at 9/1/2020  0946  Gross per 24 hour   Intake 240 ml   Output --   Net 240 ml       Laboratory  Recent Labs     08/31/20  0555 09/01/20  0153   WBC 10.1 9.8   RBC 4.15* 3.74*   HEMOGLOBIN 13.1* 11.6*   HEMATOCRIT 39.8* 36.0*   MCV 95.9 96.3   MCH 31.6 31.0   MCHC 32.9* 32.2*   RDW 46.5 46.5   PLATELETCT 242 247   MPV 9.6 9.2     Recent Labs     08/31/20  0555 09/01/20  0153   SODIUM 137 137   POTASSIUM 5.5 4.5   CHLORIDE 101 102   CO2 23 23   GLUCOSE 275* 163*   BUN 35* 35*   CREATININE 0.97 0.94   CALCIUM 8.7 8.6             Recent Labs     09/01/20  0153   TRIGLYCERIDE 78   HDL 31*   LDL 45       Imaging  EC-ECHOCARDIOGRAM COMPLETE W/O CONT   Final Result      DX-CHEST-PORTABLE (1 VIEW)   Final Result         1.  No acute cardiopulmonary disease.   2.  Atherosclerosis           Assessment/Plan  * Adult failure to thrive, elevated troponin, recent orthopedic surgery  Assessment & Plan  PT/OT ordered, may need skilled  Dr. Bolivar's team, LPS consulted.  Address other issues below    Hyperlipidemia associated with type 2 diabetes mellitus (HCC)- (present on admission)  Assessment & Plan  Lab Results   Component Value Date/Time    HBA1C 8.0 (H) 08/31/2020 0555    HBA1C 8.2 (H) 05/09/2020 1040    HBA1C 7.9 (H) 09/28/2019 1021     Results from last 7 days   Lab Units 09/01/20  1724 09/01/20  1117 09/01/20  0753 08/31/20  2035 08/31/20  1741 08/31/20  1133   ACCU CHECK GLUCOSE 788 mg/dL 152* 147* 133* 174* 170* 221*     I have ordered insulin sliding scale with D50 and glucagon for hypoglycemia per protocol.  Diabetic diet  Diabetic education    Continue atorvastatin      CAD (coronary artery disease)- (present on admission)  Assessment & Plan  History of   Followed by [facility] Cardiology recommended outpatient follow-up  Echocardiogram showed ejection fraction 55%, normal diastolic function.  Right ventricular systolic pressure 40 mmHg.      HTN (hypertension)- (present on admission)  Assessment & Plan  Stable  Continue home blood  pressure medications carvedilol, valsartan    Elevated troponin  Assessment & Plan  Trend troponin  Echocardiogram showed ejection fraction 55%, normal diastolic function.  Right ventricular systolic pressure 40 mmHg.    Telemetry  Continue core cardiac measures aspirin, statin, carvedilol, valsartan  Cardiology recommends outpatient follow-up    Type 2 diabetes mellitus with peripheral neuropathy (HCC)- (present on admission)  Assessment & Plan  Management of diabetes as above.       VTE prophylaxis: Enoxaparin

## 2020-09-01 NOTE — DIETARY
"Nutrition services: Day 0 of admit.  Agapito Stone is a 75 y.o. male with admitting DX of weakness and elevated troponin.    Consult received for failure to thrive admit dx and diabetes education.    Pt seen at bedside. States was eating 3-4 meals/snacks at home daily with good appetite. Reports usual weight of 229 lbs and current weight is 225 lbs, with 4 lb weight loss (1.7%) in 1 week, which is nonsevere. Pt also reports drinking 4-5 Boost glucose shakes daily.    Assessment:  Height: 188 cm (6' 2\")  Weight: 102.1 kg (225 lb 1.4 oz) - bed scale  Body mass index is 28.9 kg/m²., BMI classification: overweight  Diet/Intake: cardiac, diabetic, 2000 ml fluid restriction; % x 1 meal    Evaluation:   1. PMH of arthritis, CAD, diabetes, high cholesterol, HLD, HTN, and stroke.  2. Pt presents with failure to thrive and weakness.  3. Pt reports good PO intake/appetite and has had 1.7% nonsevere weight loss in 1 week.  4. Pt observed with mild muscle and mild fat loss of the temples, clavical, and scapula regions, unable to assess lower body.  5. Noted LPS on consult.  6. Wound notes consult completed for cast to lower extremity.  7. Labs: glucose 163, BUN 35, Alk phos 301, total bili 1.6, A1C 8.0  8. Meds: lipitor, insulin, colace, cholecalciferol, bowel protocol  9. Last BM: PTA    Malnutrition Risk: Non-severe malnutrition in the context of acute illness as related to recent ankle surgery as evidenced by mild muscle and mild fat loss, and 1.7% weight loss in 1 week.    Recommendations/Plan:  1. Agree with cardiac, diabetic diet with 2000 ml fluid restriction per MD.  2. Encourage intake of meals.  3. Provided diabetes education during visit.  4. Document intake of all meals as % taken in ADL's to provide interdisciplinary communication across all shifts.   5. Monitor weight.  6. Nutrition rep will continue to see patient for ongoing meal and snack preferences.     RD to follow.          "

## 2020-09-01 NOTE — PROGRESS NOTES
APRN paged for a hemoglobin drop from 13.1 to 11.6. Patient is not receiving any fluids and doesn't show any signs of bleeding. No new orders at this time, monitoring patient closely.

## 2020-09-01 NOTE — CARE PLAN
Problem: Safety  Goal: Will remain free from falls  Outcome: PROGRESSING AS EXPECTED  Intervention: Implement fall precautions  Flowsheets  Taken 8/31/2020 1831  Bed Alarm: Yes - Alarm On  Bedrails: Bedrails Closest to Bathroom Down  Chair/Bed Strip Alarm: Yes - Alarm On  Taken 8/31/2020 1730  Environmental Precautions:   Treaded Slipper Socks on Patient   Personal Belongings, Wastebasket, Call Bell etc. in Easy Reach   Transferred to Stronger Side   Report Given to Other Health Care Providers Regarding Fall Risk   Bed in Low Position   Communication Sign for Patients & Families   Mobility Assessed & Appropriate Sign Placed     Problem: Skin Integrity  Goal: Risk for impaired skin integrity will decrease  Outcome: PROGRESSING AS EXPECTED  Intervention: Implement precautions to protect skin integrity in collaboration with the interdisciplinary team  Flowsheets  Taken 8/31/2020 1730  Friction Interventions: Draw Sheet / Pad Used for Repositioning  Patient Turns / Repositioning: Supine  Patient is Receiving Nutrition: Oral Intake Adequate  Vitamin Therapy in Use: No  Assistance / Tolerance for Turning/Repositioning: Assistance of One  Taken 8/31/2020 1715  Skin Preventative Measures:   Pillows in Use for Support / Positioning   Pillows in Use to Float Heels  Bed Types: Pressure Redistribution Mattress (Atmosair)  PT / OT Involved in Care: Order has been Placed  Moisturizers: Moisturizer   Activity: Bed

## 2020-09-01 NOTE — PROGRESS NOTES
Bedside report received. POC discussed with pt; all questions answered at this time. Patient is resting in bed and currently denies and further needs.

## 2020-09-01 NOTE — PROGRESS NOTES
S:   POD # 6 s/p R cavus foot recon with ankle fusion  Admitted for weakness    O:   Splint/dressings mild wear at bottom of splint and mild saturation to dressings   PAPO disconnected   Sensation blunted at baseline   Toes up/downgoing weakly   Toes all pink with< 2 sec refill   Brisk refill all toes    A:    POD# 6 s/p R cavus foot recon    P:   NWB RLE   Elevation  PT/OT: talked with patient about RNS but likely too unstable. Likely best with RW   DVT ppx: asa 81 BID if ok with primary   D/c plan: f/u in office 1 week for suture removal and casting. Ok to d/c from ortho      perspective when ok with primary team and other ocnsultants

## 2020-09-02 PROBLEM — R79.89 ELEVATED TROPONIN: Status: RESOLVED | Noted: 2020-01-01 | Resolved: 2020-01-01

## 2020-09-02 NOTE — DISCHARGE PLANNING
Anticipated Discharge Disposition: SNF    Action: RN MATT met with patient at bedside to discuss snf. RN MATT explained what snf's are and why treatment team is recommending that level of care. Patient was agreeable and gave verbal consent to send blanket referral to all [facility] skilled nursing [facility]. Choice faxed to [facility].  Per Dr. Knight, patient is medically cleared.    Barriers to Discharge: accepting facility    Plan: Case coordination to f/u with snf referrals

## 2020-09-02 NOTE — DISCHARGE PLANNING
Received Transport Form @ 9274  Spoke to Erasmo  @ [facility]    Transport is scheduled for Wednesday 9/2/2020 @1630 going to [facility].   Yulisa at [facility] notified.

## 2020-09-02 NOTE — DISCHARGE PLANNING
Received Choice form at 1158  Agency/Facility Name: [facility], [facility], [place], [facility], [facility], [facility], [facility], [facility], [facility]  Referral sent per Choice form @ 1200

## 2020-09-02 NOTE — DISCHARGE INSTRUCTIONS
Discharge Instructions    Discharged to other by medical transportation with escort. Discharged via wheelchair, hospital escort: Yes.  Special equipment needed: Not Applicable    Be sure to schedule a follow-up appointment with your primary care doctor or any specialists as instructed.     Discharge Plan:   Diet Plan: Discussed  Activity Level: Discussed  Confirmed Follow up Appointment: Appointment Scheduled  Confirmed Symptoms Management: Discussed  Medication Reconciliation Updated: Yes    I understand that a diet low in cholesterol, fat, and sodium is recommended for good health. Unless I have been given specific instructions below for another diet, I accept this instruction as my diet prescription.   Other diet: Regular Diet as tolerated    Special Instructions: None    · Is patient discharged on Warfarin / Coumadin?   No     Depression / Suicide Risk    As you are discharged from this [facility] facility, it is important to learn how to keep safe from harming yourself.    Recognize the warning signs:  · Abrupt changes in personality, positive or negative- including increase in energy   · Giving away possessions  · Change in eating patterns- significant weight changes-  positive or negative  · Change in sleeping patterns- unable to sleep or sleeping all the time   · Unwillingness or inability to communicate  · Depression  · Unusual sadness, discouragement and loneliness  · Talk of wanting to die  · Neglect of personal appearance   · Rebelliousness- reckless behavior  · Withdrawal from people/activities they love  · Confusion- inability to concentrate     If you or a loved one observes any of these behaviors or has concerns about self-harm, here's what you can do:  · Talk about it- your feelings and reasons for harming yourself  · Remove any means that you might use to hurt yourself (examples: pills, rope, extension cords, firearm)  · Get professional help from the community (Mental Health, Substance Abuse,  psychological counseling)  · Do not be alone:Call your Safe Contact- someone whom you trust who will be there for you.  · Call your local CRISIS HOTLINE 785-8356 or 111-360-0573  · Call your local Children's Mobile Crisis Response Team [place] (010) 427-8017 or www.[facility]  · Call the toll free National Suicide Prevention Hotlines   · National Suicide Prevention Lifeline 951-915-UKRF (2858)  · National Hope Line Network 800-SUICIDE (696-1028)      Failure to Thrive, Adult  Failure to thrive is a group of symptoms that affect adults, including the elderly. These symptoms include loss of appetite and weight loss. People who have this condition may do fewer and fewer activities over time. They may lose interest in being with friends, or they may not want to eat or drink. This condition is not a normal part of aging.  What are the causes?  This condition may be caused by:  · A disease, such as dementia, diabetes, cancer, or lung disease.  · A health problem, such as a vitamin deficiency or a heart problem.  · A disorder, such as depression.  · A disability.  · Medicines.  · Having tooth or mouth problems.  · Mistreatment or neglect.  In some cases, the cause may not be known.  What are the signs or symptoms?  Symptoms of this condition include:  · Loss of more than 5% of your body weight.  · Being more tired than normal after an activity.  · Having trouble getting up after sitting.  · Loss of appetite.  · Not getting out of bed.  · Not wanting to do usual activities.  · Depression.  · Getting infections often.  · Bedsores.  · Taking a long time to recover after an infection, injury, or a surgery.  · Weakness.  How is this diagnosed?  This condition may be diagnosed with a health history, physical exam, and tests. Your health care provider will ask questions about your health, behavior, and mood, such as:  · Has your activity changed?  · Do you seem sad?  · Are your eating habits different?  Tests may also  be done. They may include:  · Blood tests.  · Urine tests.  · Imaging tests, such as:  ? X-rays.  ? CT scan.  ? MRI.  · Hearing tests.  · Vision tests.  · Tests to check thinking ability (cognitive tests).  · Activity tests. Your health care provider may want to see if you can do tasks such as bathing and dressing and if you can move around safely.  You may be referred to a specialist.  How is this treated?  Treatment for this condition depends on the cause. It may be treated by:  · Treating a disease or disorder that is causing symptoms.  · Having talk therapy or taking medicine to treat depression.  · Improving diet, such as by eating more often or taking nutritional supplements.  · Changing or stopping a medicine.  · Having physical or occupational therapy.  It often takes a team of health care providers to find the right treatment.  Follow these instructions at home:    · Take over-the-counter and prescription medicines only as told by your health care provider.  · Eat a healthy, well-balanced diet. Make sure to get enough calories in each meal. Ask your health care provider how many calories you need.  · Be physically active. Include strength training as part of your exercise routine. A physical therapist can help to set up an exercise program that is right for you.  · Make sure that you are safe at home.  · Have a plan for what to do if you become unable to make decisions for yourself.  Contact a health care provider if you:  · Are not able to eat well.  · Are not able to move around.  · Feel very sad or hopeless.  Get help right away if:  · You have thoughts of ending your life.  · You cannot eat or drink.  · You do not get out of bed.  · Staying at home is no longer safe.  · You have a fever.  Summary  · Failure to thrive is a group of symptoms that affect adults, including the elderly.  · Symptoms include loss of appetite and weight loss.  · Take over-the-counter and prescription medicines only as told by  your health care provider.  · Eat a healthy, well-balanced diet. Make sure to get enough calories in each meal. Ask your health care provider how many calories you need.  · Be physically active. Include strength training as part of your exercise routine. A physical therapist can help to set up an exercise program that is right for you.  This information is not intended to replace advice given to you by your health care provider. Make sure you discuss any questions you have with your health care provider.  Document Released: 03/11/2013 Document Revised: 08/20/2019 Document Reviewed: 08/20/2019  [employer] Patient Education © 2020 [employer]        Angina    Angina is extreme discomfort in the chest, neck, arm, jaw, or back. The discomfort is caused by a lack of blood in the middle layer of the heart wall (myocardium).  There are four types of angina:  · Stable angina. This is triggered by vigorous activity or exercise. It goes away when you rest or take angina medicine.  · Unstable angina. This is a warning sign and can lead to a heart attack (acute coronary syndrome). This is a medical emergency. Symptoms come at rest and last a long time.  · Microvascular angina. This affects the small coronary arteries. Symptoms include feeling tired and being short of breath.  · Prinzmetal or variant angina. This is caused by a tightening (spasm) of the arteries that go to your heart.  What are the causes?  This condition is caused by atherosclerosis. This is the buildup of fat and cholesterol (plaque) in your arteries. The plaque may narrow or block the artery.  Other causes of angina include:  · Sudden tightening of the muscles of the arteries in the heart (coronary spasm).  · Small artery disease (microvascular dysfunction).  · Problems with any of your heart valves (heart valve disease).  · A tear in an artery in your heart (coronary artery dissection).  · Diseases of the heart muscle (cardiomyopathy), or other heart  diseases.  What increases the risk?  You are more likely to develop this condition if you have:  · High cholesterol.  · High blood pressure (hypertension).  · Diabetes.  · A family history of heart disease.  · An inactive (sedentary) lifestyle, or you do not exercise enough.  · Depression.  · Had radiation treatment to the left side of your chest.  Other risk factors include:  · Using tobacco.  · Being obese.  · Eating a diet high in saturated fats.  · Being exposed to high stress or triggers of stress.  · Using drugs, such as cocaine.  Women have a greater risk for angina if:  · They are older than 55.  · They have gone through menopause (are postmenopausal).  What are the signs or symptoms?  Common symptoms of this condition in both men and women may include:  · Chest pain, which may:  ? Feel like a crushing or squeezing in the chest, or like a tightness, pressure, fullness, or heaviness in the chest.  ? Last for more than a few minutes at a time, or it may stop and come back (recur) over the course of a few minutes.  · Pain in the neck, arm, jaw, or back.  · Unexplained heartburn or indigestion.  · Shortness of breath.  · Nausea.  · Sudden cold sweats.  Women and people with diabetes may have unusual (atypical) symptoms, such as:  · Fatigue.  · Unexplained feelings of nervousness or anxiety.  · Unexplained weakness.  · Dizziness or fainting.  How is this diagnosed?  This condition may be diagnosed based on:  · Your symptoms and medical history.  · Electrocardiogram (ECG) to measure the electrical activity in your heart.  · Blood tests.  · Stress test to look for signs of blockage when your heart is stressed.  · CT angiogram to examine your heart and the blood flow to it.  · Coronary angiogram to check your coronary arteries for blockage.  How is this treated?  Angina may be treated with:  · Medicines to:  ? Prevent blood clots and heart attack.  ? Relax blood vessels and improve blood flow to the heart  (nitrates).  ? Reduce blood pressure, improve the pumping action of the heart, and relax blood vessels that are spasming.  ? Reduce cholesterol and help treat atherosclerosis.  · A procedure to widen a narrowed or blocked coronary artery (angioplasty). A mesh tube may be placed in a coronary artery to keep it open (coronary stenting).  · Surgery to allow blood to go around a blocked artery (coronary artery bypass surgery).  Follow these instructions at home:  Medicines  · Take over-the-counter and prescription medicines only as told by your health care provider.  · Do not take the following medicines unless your health care provider approves:  ? NSAIDs, such as ibuprofen or naproxen.  ? Vitamin supplements that contain vitamin A, vitamin E, or both.  ? Hormone replacement therapy that contains estrogen with or without progestin.  Eating and drinking    · Eat a heart-healthy diet. This includes plenty of fresh fruits and vegetables, whole grains, low-fat (lean) protein, and low-fat dairy products.  · Follow instructions from your health care provider about eating or drinking restrictions.  Activity  · Follow an exercise program approved by your health care provider.  · Consider joining a cardiac rehabilitation program.  · Take a break when you feel fatigued. Plan rest periods in your daily activities.  Lifestyle    · Do not use any products that contain nicotine or tobacco, such as cigarettes, e-cigarettes, and chewing tobacco. If you need help quitting, ask your health care provider.  · If your health care provider says you can drink alcohol:  ? Limit how much you use to:  § 0-1 drink a day for nonpregnant women.  § 0-2 drinks a day for men.  ? Be aware of how much alcohol is in your drink. In the [place] one drink equals one 12 oz bottle of beer (355 mL), one 5 oz glass of wine (148 mL), or one 1½ oz glass of hard liquor (44 mL).  General instructions  · Maintain a healthy weight.  · Learn to manage stress.  · Keep  your vaccinations up to date. Get the flu (influenza) vaccine every year.  · Talk to your health care provider if you feel depressed. Take a depression screening test to see if you are at risk for depression.  · Work with your health care provider to manage other health conditions, such as hypertension or diabetes.  · Keep all follow-up visits as told by your health care provider. This is important.  Get help right away if:  · You have pain in your chest, neck, arm, jaw, or back, and the pain:  ? Lasts more than a few minutes.  ? Is recurring.  ? Is not relieved by taking medicines under the tongue (sublingual nitroglycerin).  ? Increases in intensity or frequency.  · You have a lot of sweating without cause.  · You have unexplained:  ? Heartburn or indigestion.  ? Shortness of breath or difficulty breathing.  ? Nausea or vomiting.  ? Fatigue.  ? Feelings of nervousness or anxiety.  ? Weakness.  · You have sudden light-headedness or dizziness.  · You faint.  These symptoms may represent a serious problem that is an emergency. Do not wait to see if the symptoms will go away. Get medical help right away. Call your local emergency services (911 in the [place] Do not drive yourself to the hospital.  Summary  · Angina is extreme discomfort in the chest, neck, arm, jaw, or back that is caused by a lack of blood in the heart wall.  · There are many symptoms of angina. They include chest pain, unexplained heartburn or indigestion, sudden cold sweats, and fatigue.  · Angina may be treated with behavioral changes, medicine, or surgery.  · Symptoms of angina may represent an emergency. Get medical help right away. Call your local emergency services (911 in the [place] Do not drive yourself to the hospital.  This information is not intended to replace advice given to you by your health care provider. Make sure you discuss any questions you have with your health care provider.  Document Released: 12/18/2006 Document Revised:  08/05/2019 Document Reviewed: 08/05/2019  [employer] Patient Education © 2020 [employer]      Discharge Instructions per Dr. Agapito Knight D.O.    DIET: Diet Order Cardiac, Diabetic    ACTIVITY: As tolerated    A proper diet that is low in grease, fat, and salt, along with 30 minutes of exercise per day will lead to weight loss, and better controlled blood sugar and blood pressure.    DIAGNOSIS: Adult failure to thrive    Follow up with your Primary Care Provider Brody Zamorano M.D. as scheduled or sooner if your symptoms persist or worsen.  Return to Emergency Room for sever chest pain, shortness of breath, signs of a stroke, or any other emergencies.

## 2020-09-02 NOTE — PROGRESS NOTES
Diabetes education: Met with pt this afternoon. Please see consult note.  Plan: Reviewed jardiance. CDE to continue to follow.

## 2020-09-02 NOTE — DISCHARGE PLANNING
"Hospital Care Management Discharge Planning       Anticipated Discharge Disposition:   · SNF     Action:   · PASRR completed: [id number]  · This RN CM delivered completed COBRA form and transfer packet to patient's hard chart.   · This RN CM attempted to contact patient's wife without success. BS RN aware.   · Controlled substance prescription placed inside transport envelope.     Barriers to Discharge:   · None.     Plan:   · Patient to discharge to [facility] at 1630 today via wheelchair transportation arranged by OMAIRA Yanez.   · Hospital Care Management Team to continue to provide support services and assistance with discharge planning as needed.       Current Expected Day of Discharge: 9/2/2020      Thank you for allowing me the pleasure of participating in this patient's care coordination and discharge planning.       For further assistance please contact the assigned RN Case Manager or  at the extension listed under \"Treatment Teams\".      "

## 2020-09-02 NOTE — PROGRESS NOTES
Pt is discharged to [facility], being transported by [facility]. Report was called to Luli at [facility]. Pt verbally acknowledges all discharge instructions, medications, and medications regimen. Pt gathered all personal belongings, Tele box and IV have been removed. All questions and needs have been met at this time.

## 2020-09-02 NOTE — CONSULTS
Diabetes education: Met with pt this afternoon. Pt has a hx of diabetes on Synjardy at home. Reviewed Jardiance and need to drink water. Pt was admitted with blood sugar of  275 and Hg a1c of 8.0%. Pt is currently on Lantus 10 units pm with regular insulin sliding scale coverage ac and hs. Current blood sugars are 174 (1 unit) 133, and 147.  Plan: CDE to continue to follow.

## 2020-09-02 NOTE — DISCHARGE SUMMARY
"Discharge Summary    CHIEF COMPLAINT ON ADMISSION  Chief Complaint   Patient presents with   • Failure to Thrive     pt had ankle surgery X 1 week ago and has seen been unable to care for self. Pt was unable to get up from chair due to weakness, pt states he realized he was unable to care for himself and should seek help. per EMS report pt came from a \"hoarder house\" from floor to ceiling throughout all the house. pt lives with his wife   • Weakness       Reason for Admission  EMS     Admission Date  8/31/2020    CODE STATUS  Full Code    HPI & HOSPITAL COURSE  Mr. Agapito Stone is a 75 y.o. male who with history of hypertension, hyperlipidemia, coronary artery disease with 2 stents presented on 8/31/2020 with failure to thrive.  Status post ankle surgery 1 week ago and has been unable to care for himself.  Patient was unable to get up from his chair due to generalized weakness.  EMS was called.  Echocardiogram showed ejection fraction 55%, normal diastolic function.  Right ventricular systolic pressure 40 mmHg.  Patient was found to have an elevated troponin at 34 on presentation but with no cardiac symptoms.  Cardiology recommended outpatient follow-up.  Patient was declined from rehabilitation but accepted to skilled nursing.  Medically cleared for discharge to skilled nursing.  Follow-up with Ortho in 1 week.  Follow-up with cardiology and PCP as outpatient.       Therefore, he is discharged in fair and stable condition to skilled nursing facility.    The patient met 2-midnight criteria for an inpatient stay at the time of discharge.    Discharge Date  9/2/2020    FOLLOW UP ITEMS POST DISCHARGE  None    DISCHARGE DIAGNOSES  Principal Problem:    Adult failure to thrive, elevated troponin, recent orthopedic surgery POA: Yes  Active Problems:    HTN (hypertension) POA: Yes    CAD (coronary artery disease) POA: Yes      Overview: October 2013: ACS. PCI/LETA x 2 in the mid circumflex (Xience 3.25 x 23mm)       " and proximal circumflex (Xience 3.6 x 12mm).    Hyperlipidemia associated with type 2 diabetes mellitus (HCC) POA: Yes    Type 2 diabetes mellitus with peripheral neuropathy (HCC) POA: Yes  Resolved Problems:    Elevated troponin POA: Yes      FOLLOW UP  Future Appointments   Date Time Provider Department Center   10/28/2020  1:40 PM Brody Zamorano M.D. [facility]     Brody Zamorano M.D.  [address]  [place] [id number] [phone]    In 2 weeks  As needed, If symptoms worsen    Royal Bolivar M.D.  [address]  [place] [address]  [phone]    In 1 week  As needed, If symptoms worsen      MEDICATIONS ON DISCHARGE     Medication List      START taking these medications      Instructions   oxyCODONE immediate-release 5 MG Tabs  Commonly known as: ROXICODONE   Take 1 Tab by mouth every 6 hours as needed for Severe Pain for up to 5 days.  Dose: 5 mg        CHANGE how you take these medications      Instructions   carvedilol 6.25 MG Tabs  What changed:   · medication strength  · how much to take  · when to take this  Commonly known as: COREG   Take 1 Tab by mouth 2 times a day, with meals.  Dose: 6.25 mg        CONTINUE taking these medications      Instructions   acetaminophen 500 MG Tabs  Commonly known as: TYLENOL   Take 1,000 mg by mouth every 6 hours as needed for Mild Pain or Moderate Pain. Indications: Pain  Dose: 1,000 mg     aspirin 81 MG tablet   Take 81 mg by mouth every day.  Dose: 81 mg     atorvastatin 80 MG tablet  Commonly known as: LIPITOR   TAKE ONE TABLET BY MOUTH DAILY IN THE EVENING     mag-trisilicate/al-hydrox 80-20 MG Chew  Commonly known as: GAVISCON   Take 1 Tab by mouth 3 times a day as needed.  Dose: 1 Tab     montelukast 10 MG Tabs  Commonly known as: SINGULAIR   TAKE 1 TABLET BY MOUTH EVERY DAY     nitroglycerin 0.4 MG Subl  Commonly known as: NITROSTAT   Doctor's comments: May repeat once in 5 minutes  Place 1 Tab under tongue as needed for Chest  Pain.  Dose: 0.4 mg     Synjardy 5-1000 MG Tabs  Generic drug: Empagliflozin-metFORMIN HCl   Take 1 Tab by mouth 2 Times a Day.  Dose: 1 Tab     valsartan 160 MG Tabs  Commonly known as: DIOVAN   Take 160 mg by mouth 2 times a day.  Dose: 160 mg     Vitamin D-3 125 MCG (5000 UT) Tabs   Take 1 Tab by mouth every 48 hours.  Dose: 1 Tab        STOP taking these medications    cephALEXin 500 MG Caps  Commonly known as: KEFLEX            Allergies  Allergies   Allergen Reactions   • Fish Hives     shellfish   • Pcn [Penicillins] Hives   • Amoxicillin Hives   • Food Swelling      Eggs,Melons, avocados-puffy mouth       DIET  Orders Placed This Encounter   Procedures   • Diet Order Cardiac, Diabetic     Standing Status:   Standing     Number of Occurrences:   1     Order Specific Question:   Diet:     Answer:   Cardiac [6]     Order Specific Question:   Diet:     Answer:   Diabetic [3]     Order Specific Question:   Consistency/Fluid modifications:     Answer:   2000 ml Fluid Restriction [11]       ACTIVITY  As tolerated.  Weight bearing as tolerated    CONSULTATIONS  Ortho surgery  Cardiology    PROCEDURES  None    LABORATORY  Lab Results   Component Value Date    SODIUM 137 09/02/2020    POTASSIUM 4.6 09/02/2020    CHLORIDE 102 09/02/2020    CO2 23 09/02/2020    GLUCOSE 136 (H) 09/02/2020    BUN 36 (H) 09/02/2020    CREATININE 1.01 09/02/2020    CREATININE 1.0 04/09/2007        Lab Results   Component Value Date    WBC 10.2 09/02/2020    HEMOGLOBIN 11.4 (L) 09/02/2020    HEMATOCRIT 35.5 (L) 09/02/2020    PLATELETCT 259 09/02/2020        I discussed medications and side effects with the patient.  I discussed prognosis and importance of medical compliance with the patient.  I counseled the patient about diet, exercise, weight loss, and life style modifications.  All questions and concerns have been addressed.  Total time of the discharge process was 38 minutes.

## 2020-09-02 NOTE — PROGRESS NOTES
Called wound nurse about patient's wound vac saying leakage. Changed tubing and also tried V.A.C tape. Wound vac continued to say leakage. Now going on greater than 2 hours without proper suctioning. Took off the wound vac per protocol and changed the the dressing to a wet to dry. Wound scheduled to change wound vac in the am.

## 2020-09-02 NOTE — CARE PLAN
Problem: Safety  Goal: Will remain free from injury  Outcome: PROGRESSING AS EXPECTED     Problem: Infection  Goal: Will remain free from infection  Outcome: PROGRESSING AS EXPECTED     Problem: Bowel/Gastric:  Goal: Normal bowel function is maintained or improved  Outcome: PROGRESSING AS EXPECTED     Problem: Discharge Barriers/Planning  Goal: Patient's continuum of care needs will be met  Outcome: PROGRESSING AS EXPECTED

## 2020-09-02 NOTE — PROGRESS NOTES
Received bedside report from Letitia HARRIS and Malini HARRIS regarding prior 12 hours. Pt awake sitting up in bed; Pr reports mild pain. POC reviewed with pt. Pt verbalizes understanding; appropriate fall precautions in place. Call light within reach. Will continue to monitor.

## 2020-09-10 NOTE — PROGRESS NOTES
Community Health Worker Intake    • Social determinates of health intake completed.   • Identified barriers to: none.  • Contact information provided to Agapito Stone   • Inpatient assessment completed.    CHW Alec spoke with pt at bedside to introduce CCM services. Pt accepted. He confirmed seeing PCP Brody Zamorano but denied that CHW schedule hospital f/u due to unsure of d/c disposition. For transportation to [facility], pt usually drives or has his wife assist him if he is unable to. He does not have trouble paying for resources such as food, housing or medical care. His support system consists of his wife, [employer]ab[employer]ist and neighbor. Pt lives with his wife in a house and receives social security benefits along with intermediate and  award.     Plan: Pt does not need additional resources or services at this time. CHW will watch chart for d/c disposition.     Update 9/2: Pt d/c to [facility], therefore CCM will no longer follow.

## 2020-11-12 NOTE — TELEPHONE ENCOUNTER
PC to Simran Jones, Mr. Stone's wife to discuss discharge planning needs and referral to [facility].  Mr. Stone is planned for a 11/17 discharge to return home with his wife.    No answer at home number. Messages left re referral to [facility] and [facility] at the time of his upcoming discharge from [facility].

## 2020-11-16 NOTE — TELEPHONE ENCOUNTER
PC from Simran, Agapito Stone's wife, regarding discharge planning needs and referral to [facility].  The current discharge date is 11/18, returning home.  Pt potentially is +Covid, however, is asymptomatic, and will not require continued SNF stay r/t covid.            Simran indicated that Agapito's next planned f/u appt with ortho (Osmel) may need to be rescheduled again, if he is +C-19.   Simran was educated about the referral to [facility] as well as [facility], and the differences in types of support each offers.   Simran is agreeable to both [facility] and [facility] post discharge.  [facility] has been informed of the d/c date of 11/18/2020.

## 2020-11-18 NOTE — ED PROVIDER NOTES
"CHIEF COMPLAINT  Chief Complaint   Patient presents with   • Weakness       HPI  Agapito Stone is a 75 y.o. male with recent ankle surgery by Dr. Bolivar.  [facility] and has been at home and apparently fell down because his \"left leg gave out\".  He denies any weakness at this time.  He apparently has not been able to take care of himself at home-he does state he lives with his wife.  He has a history of dyslipidemia and hypertension as well as prior stroke and coronary artery disease.  He notes no fever.  No vomiting or diarrhea.  No abdominal pain.  No chest pain or shortness of breath.  He is a poor historian.    REVIEW OF SYSTEMS  All other systems are negative.     PAST MEDICAL HISTORY  Past Medical History:   Diagnosis Date   • Arthritis     hands, wrists, ankles   • CAD (coronary artery disease) October 2013    Dr. Ovalle; ACS. PCI/LETA x 2 of the mid circumflex (Xience 3.25 x 23mm) and proximal circumflex (Xience 3.6x 12mm)   • Cataract     sugery   • Diabetes     oral meds   • High cholesterol    • Hyperlipidemia    • Hypertension    • Pain     ankles   • Pneumonia 1968   • Stroke (HCC) 2010    no residual effects   • Syncope        FAMILY HISTORY  Family History   Problem Relation Age of Onset   • Other Mother         Rheumatic fever       SOCIAL HISTORY  Social History     Socioeconomic History   • Marital status:      Spouse name: Not on file   • Number of children: Not on file   • Years of education: Not on file   • Highest education level: Not on file   Occupational History   • Not on file   Social Needs   • Financial resource strain: Not hard at all   • Food insecurity     Worry: Never true     Inability: Never true   • Transportation needs     Medical: No     Non-medical: No   Tobacco Use   • Smoking status: Never Smoker   • Smokeless tobacco: Never Used   Substance and Sexual Activity   • Alcohol use: Not Currently     Alcohol/week: 0.0 oz     Comment: once a year   • " Drug use: No   • Sexual activity: Not on file   Lifestyle   • Physical activity     Days per week: Not on file     Minutes per session: Not on file   • Stress: Not on file   Relationships   • Social connections     Talks on phone: Not on file     Gets together: Not on file     Attends Sikhism service: Not on file     Active member of club or organization: Not on file     Attends meetings of clubs or organizations: Not on file     Relationship status: Not on file   • Intimate partner violence     Fear of current or ex partner: Not on file     Emotionally abused: Not on file     Physically abused: Not on file     Forced sexual activity: Not on file   Other Topics Concern   • Not on file   Social History Narrative   • Not on file       SURGICAL HISTORY  Past Surgical History:   Procedure Laterality Date   • TENDON LENGHTENING Right 8/24/2020    Procedure: LENGTHENING, TENDON- , GASTROC RECESSION;  Surgeon: Royal Bolivar M.D.;  Location: [facility];  Service: Orthopedics   • ANKLE FUSION Right 8/24/2020    Procedure: FUSION, JOINT, ANKLE- ANKLE SUBTALAR FUSION , BONE GRAFT, MEDIAL RELEASE INCLUDING TENDONS, PATIAL EXCISION OF FIBULA;  Surgeon: Royal Bolivar M.D.;  Location: [facility];  Service: Orthopedics   • TENDON TRANSFER Right 8/24/2020    Procedure: TRANSFER, TENDON- PLANTAR FASCIA RELEASE;  Surgeon: Royal Bolivar M.D.;  Location: [facility];  Service: Orthopedics   • ORTHOPEDIC OSTEOTOMY Right 8/24/2020    Procedure: OSTEOTOMY- 1ST METATARSAL, RCOSS PROCEDURE, 2-5 METATARSAL OSTEOTOMY, 2-5 PHALANGEL JOINT RECONSTRUCTION;  Surgeon: Royal Bolivar M.D.;  Location: [facility];  Service: Orthopedics   • HAMMERTOE CORRECTION Right 8/24/2020    Procedure: CORRECTION, HAMMER TOE 2-5;  Surgeon: Royal Bolivar M.D.;  Location: [facility];  Service: Orthopedics   • STENT PLACEMENT  2013    cardiac   • CAROTID ENDARTERECTOMY  7/13/2010    left;  "Performed by CHIQUITA CORRIGAN at SURGERY [facility] ORS   • CATARACT EXTRACTION WITH IOL Bilateral    • TONSILLECTOMY  as a child       CURRENT MEDICATIONS  Home Medications    **Home medications have not yet been reviewed for this encounter**         ALLERGIES  Allergies   Allergen Reactions   • Fish Hives     shellfish   • Pcn [Penicillins] Hives   • Amoxicillin Hives   • Food Swelling      Eggs,Melons, avocados-puffy mouth       PHYSICAL EXAM  VITAL SIGNS: /78   Pulse 74   Temp 36.7 °C (98.1 °F) (Temporal)   Resp 16   Ht 1.88 m (6' 2\")   Wt 89.8 kg (198 lb)   SpO2 95%   BMI 25.42 kg/m²      Constitutional: Well developed, Well nourished, No acute distress, Non-toxic appearance.   HENT: Normocephalic, Atraumatic, TMs normal, mucous membranes moist, no erythema, exudates, swelling, or masses, nares patent  Eyes: nonicteric  Neck: Supple, no meningismus  Lymphatic: No lymphadenopathy noted.   Cardiovascular: Regular rate and rhythm, no gallops rubs or murmurs  Lungs: Clear bilaterally   Abdomen: Bowel sounds normal, Soft, No tenderness, No pulsatile masses.   Skin: Warm, Dry, no rash  Back: No tenderness, No CVA tenderness.   Genitalia: Deferred  Rectal: Deferred  Extremities: Right lower extremity is in a cast  Neurologic: Alert, appropriate, follows commands, moving all extremities, normal speech, no drift, no hemineglect, visual fields grossly intact, finger-to-nose intact  Psychiatric: Affect normal    EKG  Time is 1523, rate 94, sinus rhythm, right bundle branch block, left anterior fascicular block, no ST segment elevation or depression or T wave change    RADIOLOGY/PROCEDURES  CT-CTA CHEST PULMONARY ARTERY W/ RECONS   Final Result      1.  No CT evidence of pulmonary embolism.      2.  Some limited scattered pulmonary opacifications are noted as described above. Findings could be due to pneumonitis inflammation or other infection.      3.  Probable consolidative atelectasis noted in the left lung " base.      4.  Borderline enlarged right-sided mediastinal node is identified. Enlargement could be due to inflammation or infection or less likely neoplasm.      Imaging features can be seen with COVID-19 pneumonia, though are nonspecific and can occur with a variety of infectious and noninfectious processes.            DX-CHEST-PORTABLE (1 VIEW)   Final Result      New mild peripheral opacification could represent mild Covid pneumonia      US-EXTREMITY VENOUS LOWER BILAT    (Results Pending)     Results for orders placed or performed during the hospital encounter of 11/18/20   CBC WITH DIFFERENTIAL   Result Value Ref Range    WBC 7.2 4.8 - 10.8 K/uL    RBC 3.60 (L) 4.70 - 6.10 M/uL    Hemoglobin 10.7 (L) 14.0 - 18.0 g/dL    Hematocrit 33.5 (L) 42.0 - 52.0 %    MCV 93.1 81.4 - 97.8 fL    MCH 29.7 27.0 - 33.0 pg    MCHC 31.9 (L) 33.7 - 35.3 g/dL    RDW 54.1 (H) 35.9 - 50.0 fL    Platelet Count 180 164 - 446 K/uL    MPV 9.7 9.0 - 12.9 fL    Neutrophils-Polys 71.60 44.00 - 72.00 %    Lymphocytes 16.60 (L) 22.00 - 41.00 %    Monocytes 8.70 0.00 - 13.40 %    Eosinophils 2.20 0.00 - 6.90 %    Basophils 0.30 0.00 - 1.80 %    Immature Granulocytes 0.60 0.00 - 0.90 %    Nucleated RBC 0.00 /100 WBC    Neutrophils (Absolute) 5.18 1.82 - 7.42 K/uL    Lymphs (Absolute) 1.20 1.00 - 4.80 K/uL    Monos (Absolute) 0.63 0.00 - 0.85 K/uL    Eos (Absolute) 0.16 0.00 - 0.51 K/uL    Baso (Absolute) 0.02 0.00 - 0.12 K/uL    Immature Granulocytes (abs) 0.04 0.00 - 0.11 K/uL    NRBC (Absolute) 0.00 K/uL   COMP METABOLIC PANEL   Result Value Ref Range    Sodium 143 135 - 145 mmol/L    Potassium 5.1 3.6 - 5.5 mmol/L    Chloride 106 96 - 112 mmol/L    Co2 25 20 - 33 mmol/L    Anion Gap 12.0 7.0 - 16.0    Glucose 90 65 - 99 mg/dL    Bun 28 (H) 8 - 22 mg/dL    Creatinine 1.27 0.50 - 1.40 mg/dL    Calcium 8.4 8.4 - 10.2 mg/dL    AST(SGOT) 29 12 - 45 U/L    ALT(SGPT) 21 2 - 50 U/L    Alkaline Phosphatase 212 (H) 30 - 99 U/L    Total Bilirubin 1.1  0.1 - 1.5 mg/dL    Albumin 2.6 (L) 3.2 - 4.9 g/dL    Total Protein 5.8 (L) 6.0 - 8.2 g/dL    Globulin 3.2 1.9 - 3.5 g/dL    A-G Ratio 0.8 g/dL   TROPONIN   Result Value Ref Range    Troponin T 119 (H) 6 - 19 ng/L   COVID/SARS CoV-2 PCR    Specimen: Nasopharyngeal; Respirate   Result Value Ref Range    COVID Order Status Received    URINALYSIS (UA)    Specimen: Urine   Result Value Ref Range    Color Yellow     Character Cloudy (A)     Specific Gravity 1.025 <1.035    Ph 5.0 5.0 - 8.0    Glucose 500 (A) Negative mg/dL    Ketones Negative Negative mg/dL    Protein Negative Negative mg/dL    Bilirubin Negative Negative    Nitrite Negative Negative    Leukocyte Esterase Moderate (A) Negative    Occult Blood Moderate (A) Negative    Micro Urine Req Microscopic    ESTIMATED GFR   Result Value Ref Range    GFR If African American >60 >60 mL/min/1.73 m 2    GFR If Non African American 55 (A) >60 mL/min/1.73 m 2   URINE MICROSCOPIC (W/UA)   Result Value Ref Range    WBC  (A) /hpf    RBC 5-10 (A) /hpf    Bacteria Many (A) None /hpf    Epithelial Cells Rare Few /hpf    Urine Crystals Rare Amorphous /hpf   EKG (Now)   Result Value Ref Range    Report       [facility] Emergency Dept.    Test Date:  2020  Pt Name:    NADINE CABRERA                Department: [facility]  MRN:        [id number]                      [facility]  Gender:     Male                         Technician: KADEN  :        1945                   Requested By:DONNIE MARK  Order #:    [id number]                    Reading MD:    Measurements  Intervals                                Axis  Rate:       94                           P:          -12  KS:         138                          QRS:        -92  QRSD:       136                          T:          69  QT:         374  QTc:        468    Interpretive Statements  Sinus rhythm  RBBB and LAFB  Compared to ECG 2020 06:16:10  Left anterior fascicular block now  present         COURSE & MEDICAL DECISION MAKING  Pertinent Labs & Imaging studies reviewed. (See chart for details)  This is a 75-year-old male who presents for generalized weakness and inability to function at home.  He was recently discharged from [facility] care.  He does not have any focal neurologic deficits on exam.  The patient does appear to have an elevated troponin at around 118 but no definite EKG findings of STEMI.  He is not hypoxic or complaining of shortness of breath to suggest PE.  Labs demonstrate no leukocytosis or significant electrolyte derangement.  There is no evidence of focal infection on his exam.    CTPA ordered to exclude PE, d/w Dr. Hogan.    CT chest demonstrates no evidence of pulmonary embolism-there are some scattered pulmonary opacifications it could be COVID-19 and/or atypical pneumonia.  Covid testing is currently pending.    6:42p - Urinalysis came back demonstrating evidence of UTI-Rocephin written for.    FINAL IMPRESSION  1.  Generalized weakness  2.  Elevated troponin  3. Acute UTI         Electronically signed by: Luis Fernando Marques M.D., 11/18/2020 3:15 PM

## 2020-11-18 NOTE — ED TRIAGE NOTES
"Chief Complaint   Patient presents with   • Weakness      pt was bib [facility] from home. Pt was recently discharged from [facility] care yesterday, per pt his \" insurance lapsed\". Pt had a right ankle surgery. Pt is non ambulatory and unable to care for him self at home. Pt was told he was positive for covid but pt currently asymptomatic.     "

## 2020-11-19 PROBLEM — Z98.890 HISTORY OF ANKLE SURGERY: Status: ACTIVE | Noted: 2020-01-01

## 2020-11-19 PROBLEM — R82.90 ABNORMAL URINALYSIS: Status: ACTIVE | Noted: 2020-01-01

## 2020-11-19 PROBLEM — U07.1 COVID-19 VIRUS INFECTION: Status: ACTIVE | Noted: 2020-01-01

## 2020-11-19 PROBLEM — I82.462 ACUTE DEEP VEIN THROMBOSIS (DVT) OF CALF MUSCLE VEIN OF LEFT LOWER EXTREMITY (HCC): Status: ACTIVE | Noted: 2020-01-01

## 2020-11-19 NOTE — ASSESSMENT & PLAN NOTE
Patient has full cast of the right ankle status post surgical correction and was done on 8/24/20  Pt has missed 2 appointments with ortho office since his operation on 8/24/20   12/10 - S/p IRRIGATION AND DEBRIDEMENT, WOUND - HEEL by Dr Osmel FOLEY graft placement  Wound culture grew MRSA and appears to be localized, was on vancomycin, doxycycline.  Continue Bactrim DS and needs 14 days total (ends 01/01/2021)  Picture of right heel on 12/24 is reviewed  On wound vac  Medically clear for discharge once there is an accepting facility

## 2020-11-19 NOTE — ASSESSMENT & PLAN NOTE
Droplet precaution  Supportive care  Ordered inflammatory markers  Procalcitonin negative.   Continue dexamethasone  CTA chest consistent with COVID-19 pneumonia  Vitamin C ordered vitamin D level normal  Has no resp symptoms  Repeat covid on 12/3 is negative and on 12/8 is positive  Repeat covid on 12/17 is negative  Repeat covid ordered 12/20

## 2020-11-19 NOTE — ASSESSMENT & PLAN NOTE
Denied chest pain  Trend trop and ekg  CTPE negative for PE  Recent history of ankle surgery  Duplex scan showed L DVT  eliquis

## 2020-11-19 NOTE — THERAPY
"Physical Therapy   Initial Evaluation     Patient Name: Agapito Stone  Age:  75 y.o., Sex:  male  Medical Record #: [id number]  Today's Date: 11/19/2020     Precautions: Fall Risk  * Per ortho note on 8/31 from last admit; RLE NWB after R cavus foot recon and ankle fusion on 8/24. RLE in cast, maintained NWB though no formal orders in chart.    Assessment  Patient is a 75 y.o. male admitted following GLF with COVID-19. Pt reports he had just returned home from [facility] and fell in the driveway. He was not using a WC. Last admit 8/31 after R ankle sx on 8/24 indicated RLE NWB, then pt DC'd to SNF. Current chart states wife may not be able to care for pt anymore. At this time, pt requires assist for bed mobility and significant assist to stand. He was unable to maintain RLE and unable to transfer to chair. Recommend placement to maximize safety and functional independence. Pt likely will need a WC at home. Will follow.     Plan  Recommend Physical Therapy 3 times per week until therapy goals are met for the following treatments:  Bed Mobility, Neuro Re-Education / Balance, Self Care/Home Evaluation, Therapeutic Activities and Therapeutic Exercises  DC Equipment Recommendations: Unable to determine at this time, Wheelchair  Discharge Recommendations: Recommend post-acute placement for additional physical therapy services prior to discharge home        11/19/20 0923   Prior Living Situation   Prior Services Other (Comments)   Housing / Facility 3 Story House   Steps Into Home 0   Steps In Home pt reports ground floor was set up for him, no shower   Equipment Owned Front-Wheel Walker   Lives with - Patient's Self Care Capacity Spouse   Comments pt reports he has \"all the medical equipment I need\" however does not have a WC. pt returned home from [facility] and reports did not even make it up his driveway before falling. was not using a WC. pt reports at [facility] he was working with therapy and using a WC, " reports sometimes would SPT, sometimes would use seated slide board   Prior Level of Functional Mobility   Comments likely independent prior to sx in August, has required assist since SNF admission   Balance Assessment   Sitting Balance (Static) Good   Sitting Balance (Dynamic) Fair +   Standing Balance (Static) Trace   Standing Balance (Dynamic) Trace   Weight Shift Sitting Poor   Weight Shift Standing Absent   Comments no LOB sitting EOB, requires assist to scoot. unable to achieve full upright, unable to maintain RLE NWB   Gait Analysis   Gait Level Of Assist Unable to Participate   Weight Bearing Status RLE NWB (per last admit)   Bed Mobility    Supine to Sit Minimal Assist (HOB elevated)   Scooting Moderate Assist   Comments remained EOB post-tx   Functional Mobility   Sit to Stand Maximal Assist (x2)   Bed, Chair, WC Transfer Unable to Participate   Short Term Goals    Short Term Goal # 1 pt will perform supine <> sit without bed features with SPV in 6 visits to be able to get in/out of bed at home   Short Term Goal # 2 pt will perform STS with mod A in 6 visits to initiate SPT   Short Term Goal # 3 pt will perform SPT or seated slide board xfr with mod A in 6 visits for improved OOB mobility   Short Term Goal # 4 pt will demo WC mobility with SPV in 6 visits for improved activity

## 2020-11-19 NOTE — PROGRESS NOTES
Received patient from the Emergency Department. Patient transferred from the [facility] to the hospital bed. Patient is alert and oriented X 4. Safety precautions in place. Will continue to monitor patient.

## 2020-11-19 NOTE — ASSESSMENT & PLAN NOTE
insulin sliding scale with D50 and glucagon for hypoglycemia per protocol.  Diabetic diet  Diabetic education

## 2020-11-19 NOTE — PROGRESS NOTES
Systems Down : Delayed medications and documentation.  Received patient/report from Alok HARRIS .@ [phone]...patient alseep in bed V/S stable.Safety precautions in place and call light within reach. Rounding patient every hour administer medications per MD orders.  Patient refused medications,  Documented in mar.  Patient has remain in stable during shift will continue to monitor every hours.

## 2020-11-19 NOTE — ED NOTES
Pt reports that he does not have the paper work with him  Called pts wife (Simran) @ [phone], pts wife is not sure where the paper work would be.  Called [facility] @ [phone] to fax over MAR, per [facility] is going to print out a summary and fax it over.

## 2020-11-19 NOTE — ED NOTES
Med rec updated and complete  Allergies reviewed  Per [facility] could not find the MAR, they faxed over list of medications that pt was discharged on.  Put discharged papers in pts chart   The nurse was not able to look to see pt last dose, that medical records were gone for the day

## 2020-11-19 NOTE — H&P
Hospital Medicine History & Physical Note    Date of Service  11/18/2020    Primary Care Physician  Brody Zamorano M.D.    Consultants  none    Code Status  Prior    Chief Complaint  Chief Complaint   Patient presents with   • Weakness       History of Presenting Illness  75 y.o. male past medical history of diabetes mellitus, essential hypertension, ankle surgery history who presented 11/18/2020 with weakness.  Per wife she could not take care of him anymore.  In the ER he was found to have elevated troponin and abnormal chest x-ray.  CT PE study was negative for pulmonary embolism but found to have possible pneumonitis or infection  Covid test was positive  He will be admitted for further management    Review of Systems  Review of Systems   Constitutional: Positive for malaise/fatigue. Negative for chills, fever and weight loss.   HENT: Negative for congestion and nosebleeds.    Eyes: Negative for blurred vision, pain, discharge and redness.   Respiratory: Negative for cough, sputum production, shortness of breath and stridor.    Cardiovascular: Negative for chest pain, palpitations and orthopnea.   Gastrointestinal: Negative for abdominal pain, diarrhea, heartburn, nausea and vomiting.   Genitourinary: Negative for dysuria, frequency and urgency.   Musculoskeletal: Negative for back pain, myalgias and neck pain.   Skin: Negative for itching and rash.   Neurological: Negative for dizziness, focal weakness, seizures and headaches.   Psychiatric/Behavioral: Negative for depression. The patient is not nervous/anxious and does not have insomnia.        Past Medical History   has a past medical history of Arthritis, CAD (coronary artery disease) (October 2013), Cataract, Diabetes, High cholesterol, Hyperlipidemia, Hypertension, Pain, Pneumonia (1968), Stroke (HCC) (2010), and Syncope.    Surgical History   has a past surgical history that includes carotid endarterectomy (7/13/2010); stent placement (2013); tonsillectomy  (as a child); cataract extraction with iol (Bilateral); tendon lenghtening (Right, 8/24/2020); ankle fusion (Right, 8/24/2020); tendon transfer (Right, 8/24/2020); orthopedic osteotomy (Right, 8/24/2020); and hammertoe correction (Right, 8/24/2020).     Family History  family history includes Other in his mother.     Social History   reports that he has never smoked. He has never used smokeless tobacco. He reports previous alcohol use. He reports that he does not use drugs.    Allergies  Allergies   Allergen Reactions   • Fish Hives     shellfish   • Pcn [Penicillins] Hives   • Amoxicillin Hives   • Food Swelling      Eggs,Melons, avocados-puffy mouth       Medications  Prior to Admission Medications   Prescriptions Last Dose Informant Patient Reported? Taking?   Cholecalciferol (VITAMIN D-3) 5000 UNITS TABS  Patient Yes No   Sig: Take 1 Tab by mouth every 48 hours.   Empagliflozin-metFORMIN HCl (SYNJARDY) 5-1000 MG Tab  Patient No No   Sig: Take 1 Tab by mouth 2 Times a Day.   acetaminophen (TYLENOL) 500 MG Tab  Patient Yes No   Sig: Take 1,000 mg by mouth every 6 hours as needed for Mild Pain or Moderate Pain. Indications: Pain   aspirin 81 MG tablet  Patient Yes No   Sig: Take 81 mg by mouth every day.   atorvastatin (LIPITOR) 80 MG tablet  Patient No No   Sig: TAKE ONE TABLET BY MOUTH DAILY IN THE EVENING   carvedilol (COREG) 6.25 MG Tab   No No   Sig: Take 1 Tab by mouth 2 times a day, with meals.   mag-trisilicate/al-hydrox (GAVISCON) 80-20 MG Chew Tab  Patient Yes No   Sig: Take 1 Tab by mouth 3 times a day as needed.   montelukast (SINGULAIR) 10 MG Tab  Patient No No   Sig: TAKE 1 TABLET BY MOUTH EVERY DAY   nitroglycerin (NITROSTAT) 0.4 MG SL Tab  Patient No No   Sig: Place 1 Tab under tongue as needed for Chest Pain.   valsartan (DIOVAN) 160 MG Tab  Patient Yes No   Sig: Take 160 mg by mouth 2 times a day.      Facility-Administered Medications: None       Physical Exam  Temp:  [36.7 °C (98.1 °F)] 36.7 °C  (98.1 °F)  Pulse:  [] 100  Resp:  [16] 16  BP: (124-135)/(70-78) 124/70  SpO2:  [95 %-97 %] 97 %    Physical Exam  Vitals signs reviewed.   Constitutional:       General: He is not in acute distress.     Appearance: Normal appearance.   HENT:      Head: Normocephalic and atraumatic.      Nose: No congestion or rhinorrhea.   Eyes:      Extraocular Movements: Extraocular movements intact.      Pupils: Pupils are equal, round, and reactive to light.   Neck:      Musculoskeletal: Normal range of motion and neck supple.   Cardiovascular:      Rate and Rhythm: Normal rate and regular rhythm.      Pulses: Normal pulses.   Pulmonary:      Effort: Pulmonary effort is normal. No respiratory distress.      Breath sounds: Normal breath sounds.   Abdominal:      General: Bowel sounds are normal. There is no distension.      Palpations: Abdomen is soft.      Tenderness: There is no abdominal tenderness.   Musculoskeletal:         General: No swelling or tenderness.   Skin:     General: Skin is warm.      Findings: No erythema.   Neurological:      General: No focal deficit present.      Mental Status: He is alert and oriented to person, place, and time.         Laboratory:  Recent Labs     11/18/20  1544   WBC 7.2   RBC 3.60*   HEMOGLOBIN 10.7*   HEMATOCRIT 33.5*   MCV 93.1   MCH 29.7   MCHC 31.9*   RDW 54.1*   PLATELETCT 180   MPV 9.7     Recent Labs     11/18/20  1544   SODIUM 143   POTASSIUM 5.1   CHLORIDE 106   CO2 25   GLUCOSE 90   BUN 28*   CREATININE 1.27   CALCIUM 8.4     Recent Labs     11/18/20  1544   ALTSGPT 21   ASTSGOT 29   ALKPHOSPHAT 212*   TBILIRUBIN 1.1   GLUCOSE 90         No results for input(s): NTPROBNP in the last 72 hours.      Recent Labs     11/18/20  1544   TROPONINT 119*       Imaging:  DX-CHEST-PORTABLE (1 VIEW)   Final Result      New mild peripheral opacification could represent mild Covid pneumonia      CT-CTA CHEST PULMONARY ARTERY W/ RECONS    (Results Pending)         Assessment/Plan:  I  anticipate this patient is appropriate for observation status at this time.    HTN (hypertension)- (present on admission)  Assessment & Plan  Continue outpatient meds    COVID-19 virus infection- (present on admission)  Assessment & Plan  Droplet precaution  Supportive care  Ordered inflammatory markers  Empiric antibitics with ceftriaxone and doxy      Elevated troponin- (present on admission)  Assessment & Plan  Denied chest pain  Trend trop and ekg  CTPE to rule out PE  Recent history of ankle surgery  Duplex scan to rule out DVT    Type 2 diabetes mellitus with peripheral neuropathy (HCC)- (present on admission)  Assessment & Plan  On SSI

## 2020-11-19 NOTE — ASSESSMENT & PLAN NOTE
Patient with acute DVT left lower extremity.  Likely hypercoagulable state given COVID-19 infection as well as recent right ankle surgery currently in cast.     will need eliquis for  3 to  6 months

## 2020-11-19 NOTE — PROGRESS NOTES
[facility] Medicine Daily Progress Note    Date of Service  11/19/2020    Chief Complaint  75 y.o. male admitted 11/18/2020 with sob, weakness, covid 19+ and new DVT.    Hospital Course  75 y.o. male past medical history of diabetes mellitus, essential hypertension, recent ankle surgery history who presented 11/18/2020 with weakness.  Per wife she could not take care of him anymore.  In the ER he was found to have elevated troponin and abnormal chest x-ray.  CT PE study was negative for pulmonary embolism but found to have possible pneumonitis or infection  Covid test was positive  U/s LLE + DVT, started on eliquis.    Interval Problem Update  Patient currently on 2 L/min nasal cannula he is able to answer all questions appropriately lungs are clear to auscultation bilaterally.  Urine culture pending vitamin D level ordered procalcitonin pending.  Ultrasound of the left lower extremity found in acute DVT.  I have started patient on Eliquis DVT loading and maintenance dosing.  Patient is aware states his wife also takes Eliquis.  Abnormal urinalysis, continue Rocephin until urine culture results.    Consultants/Specialty  none    Code Status  Full Code    Disposition  Physical therapy recommending postacute placement prior to discharge home.  Skilled nursing facility order placed.  Patient is COVID-19 positive.  When asked patient about going home is that there is no way he can go home.  He lives with his wife who also stated in the ER that she was unable to care for him.    Review of Systems  Review of Systems   Constitutional: Positive for malaise/fatigue. Negative for chills, diaphoresis and fever.   HENT: Negative for congestion and sore throat.    Eyes: Negative for pain and discharge.   Respiratory: Positive for cough and shortness of breath. Negative for hemoptysis, sputum production and wheezing.    Cardiovascular: Negative for chest pain, palpitations, claudication and leg swelling.   Gastrointestinal:  Negative for abdominal pain, constipation, diarrhea, melena, nausea and vomiting.   Genitourinary: Negative for dysuria, frequency and urgency.   Musculoskeletal: Negative for back pain, joint pain, myalgias and neck pain.   Skin: Negative for itching and rash.   Neurological: Positive for weakness. Negative for dizziness, sensory change, speech change, focal weakness, loss of consciousness and headaches.   Endo/Heme/Allergies: Does not bruise/bleed easily.   Psychiatric/Behavioral: Negative for depression, substance abuse and suicidal ideas.        Physical Exam  Temp:  [36.1 °C (97 °F)-37.2 °C (99 °F)] 37.2 °C (99 °F)  Pulse:  [] 87  Resp:  [16-20] 20  BP: (109-135)/(60-91) 117/65  SpO2:  [90 %-100 %] 98 %    Physical Exam  Constitutional:       General: He is not in acute distress.     Appearance: He is not diaphoretic.   HENT:      Head: Normocephalic and atraumatic.      Mouth/Throat:      Pharynx: No oropharyngeal exudate.   Eyes:      General: No scleral icterus.        Right eye: No discharge.         Left eye: No discharge.      Conjunctiva/sclera: Conjunctivae normal.      Pupils: Pupils are equal, round, and reactive to light.   Neck:      Musculoskeletal: Normal range of motion and neck supple.      Thyroid: No thyromegaly.      Vascular: No JVD.      Trachea: No tracheal deviation.   Cardiovascular:      Rate and Rhythm: Normal rate and regular rhythm.      Heart sounds: Normal heart sounds. No murmur. No friction rub. No gallop.    Pulmonary:      Effort: Pulmonary effort is normal. No respiratory distress.      Breath sounds: Normal breath sounds. No wheezing or rales.      Comments: 2 LPM NC  Chest:      Chest wall: No tenderness.   Abdominal:      General: Bowel sounds are normal. There is no distension.      Palpations: Abdomen is soft. There is no mass.      Tenderness: There is no abdominal tenderness. There is no guarding or rebound.   Musculoskeletal: Normal range of motion.          General: Deformity (right ankle in full cast boot.) present. No tenderness.   Lymphadenopathy:      Cervical: No cervical adenopathy.   Skin:     General: Skin is warm and dry.      Findings: No erythema or rash.   Neurological:      Mental Status: He is alert and oriented to person, place, and time.      Cranial Nerves: No cranial nerve deficit.      Motor: No abnormal muscle tone.   Psychiatric:         Behavior: Behavior normal.         Thought Content: Thought content normal.         Judgment: Judgment normal.         Fluids    Intake/Output Summary (Last 24 hours) at 11/19/2020 1335  Last data filed at 11/19/2020 0600  Gross per 24 hour   Intake 1000 ml   Output 100 ml   Net 900 ml       Laboratory  Recent Labs     11/18/20  1544 11/19/20  0653   WBC 7.2 7.8   RBC 3.60* 3.49*   HEMOGLOBIN 10.7* 10.4*   HEMATOCRIT 33.5* 33.5*   MCV 93.1 96.0   MCH 29.7 29.8   MCHC 31.9* 31.0*   RDW 54.1* 55.5*   PLATELETCT 180 149*   MPV 9.7 9.8     Recent Labs     11/18/20  1544 11/19/20  0653   SODIUM 143 144   POTASSIUM 5.1 4.6   CHLORIDE 106 110   CO2 25 22   GLUCOSE 90 72   BUN 28* 26*   CREATININE 1.27 1.14   CALCIUM 8.4 8.0*                   Imaging  US-EXTREMITY VENOUS LOWER BILAT   Final Result      CT-CTA CHEST PULMONARY ARTERY W/ RECONS   Final Result      1.  No CT evidence of pulmonary embolism.      2.  Some limited scattered pulmonary opacifications are noted as described above. Findings could be due to pneumonitis inflammation or other infection.      3.  Probable consolidative atelectasis noted in the left lung base.      4.  Borderline enlarged right-sided mediastinal node is identified. Enlargement could be due to inflammation or infection or less likely neoplasm.      Imaging features can be seen with COVID-19 pneumonia, though are nonspecific and can occur with a variety of infectious and noninfectious processes.            DX-CHEST-PORTABLE (1 VIEW)   Final Result      New mild peripheral opacification  could represent mild Covid pneumonia           Assessment/Plan  HTN (hypertension)- (present on admission)  Assessment & Plan  Continue outpatient meds    Abnormal urinalysis  Assessment & Plan  UA WBC  on Rocephin pending urine culture.    History of ankle surgery  Assessment & Plan  Patient has full cast of the right ankle status post surgical correction,  now with DVT left lower extremity.  Generalized weakness physical therapy recommending post acute placement prior to discharge home.    Acute deep vein thrombosis (DVT) of calf muscle vein of left lower extremity (HCC)  Assessment & Plan  Patient with acute DVT left lower extremity.  Likely hypercoagulable state given COVID-19 infection as well as recent right ankle surgery currently in cast.  Per patient he has been too weak to move at home.  Patient started on Eliquis DVT dosing loading 10 mg p.o. twice daily for 7 days then 5 mg p.o. twice daily.  Will need anticoagulation for 6 months.    COVID-19 virus infection- (present on admission)  Assessment & Plan  Droplet precaution  Supportive care  Ordered inflammatory markers  Procalcitonin pending.  Dc doxycycline  CTA chest consistent with COVID-19 pneumonia  Requiring 2 L/min nasal cannula.  Vitamin C ordered vitamin D level pending.      Elevated troponin- (present on admission)  Assessment & Plan  Denied chest pain  Trend trop and ekg  CTPE to rule out PE  Recent history of ankle surgery  Duplex scan to rule out DVT    Type 2 diabetes mellitus with peripheral neuropathy (HCC)- (present on admission)  Assessment & Plan  On SSI       VTE prophylaxis: eliquis

## 2020-11-19 NOTE — ASSESSMENT & PLAN NOTE
UA WBC  likely contaminant  Urine culture mixed stlela   completed Rocephin X3 days  Procalcitonin negative

## 2020-11-20 NOTE — PROGRESS NOTES
Assumed day shift care at start of shift  Patient a+o x 4, c/o soreness/discomfort to left heel - LLE elevated on pillow and heel floated  Cast to RLE - patient able to move toes  Oxygen saturation 98% on 1.5lpm via nc - oxygen decreased to 1lpm and oxygen saturation = 95%, denies sob, denies cough, ls diminished throughout  No needs at this time, wctm    Fall precautions/hourly rounding maintained, call light within reach and functioning, all items within reach.  Patient encouraged to call for assistance, poc reviewed with patient, ?'s/concerns answered.

## 2020-11-20 NOTE — PROGRESS NOTES
Report received from Malathi. NWARNALDO RLE. Pt awake in bed A&Ox4; no complaints at this time. POC discussed; all questions answered. Bed locked in lowest position; call light in reach; bed alarm in use.

## 2020-11-20 NOTE — PROGRESS NOTES
[facility] Medicine Daily Progress Note    Date of Service  11/20/2020    Chief Complaint  75 y.o. male admitted 11/18/2020 with sob, weakness, covid 19+ and new DVT.    Hospital Course  75 y.o. male past medical history of diabetes mellitus, essential hypertension, recent ankle surgery history who presented 11/18/2020 with weakness.  Per wife she could not take care of him anymore.  In the ER he was found to have elevated troponin and abnormal chest x-ray.  CT PE study was negative for pulmonary embolism but found to have possible pneumonitis or infection  Covid test was positive  U/s LLE + DVT, started on eliquis.    Interval Problem Update  Patient on 2 L/min nasal cannula he is able to answer all questions appropriately lungs are clear to auscultation bilaterally.  Urine culture pending.  Completed 3 days of IV Rocephin.  vitamin D level normal   procalcitonin normal.  Ultrasound of the left lower extremity found in acute DVT on Eliquis DVT loading and maintenance dosing.  Patient is aware states his wife also takes Eliquis.       Consultants/Specialty  none    Code Status  Full Code    Disposition  Physical therapy recommending postacute placement prior to discharge home.  Skilled nursing facility order placed.  Patient is COVID-19 positive.  When asked patient about going home is that there is no way he can go home.  He lives with his wife who also stated in the ER that she was unable to care for him.    Review of Systems  Review of Systems   Constitutional: Positive for malaise/fatigue. Negative for chills, diaphoresis and fever.   HENT: Negative for congestion and sore throat.    Eyes: Negative for pain and discharge.   Respiratory: Positive for cough and shortness of breath. Negative for hemoptysis, sputum production and wheezing.    Cardiovascular: Negative for chest pain, palpitations, claudication and leg swelling.   Gastrointestinal: Negative for abdominal pain, constipation, diarrhea, melena, nausea and  vomiting.   Genitourinary: Negative for dysuria, frequency and urgency.   Musculoskeletal: Negative for back pain, joint pain, myalgias and neck pain.   Skin: Negative for itching and rash.   Neurological: Positive for weakness. Negative for dizziness, sensory change, speech change, focal weakness, loss of consciousness and headaches.   Endo/Heme/Allergies: Does not bruise/bleed easily.   Psychiatric/Behavioral: Negative for depression, substance abuse and suicidal ideas.        Physical Exam  Temp:  [36.2 °C (97.1 °F)-37.6 °C (99.6 °F)] 36.2 °C (97.1 °F)  Pulse:  [79-90] 90  Resp:  [16-20] 18  BP: ()/(61-74) 89/61  SpO2:  [94 %-98 %] 94 %    Physical Exam  Constitutional:       General: He is not in acute distress.     Appearance: He is not diaphoretic.   HENT:      Head: Normocephalic and atraumatic.      Mouth/Throat:      Pharynx: No oropharyngeal exudate.   Eyes:      General: No scleral icterus.        Right eye: No discharge.         Left eye: No discharge.      Conjunctiva/sclera: Conjunctivae normal.      Pupils: Pupils are equal, round, and reactive to light.   Neck:      Musculoskeletal: Normal range of motion and neck supple.      Thyroid: No thyromegaly.      Vascular: No JVD.      Trachea: No tracheal deviation.   Cardiovascular:      Rate and Rhythm: Normal rate and regular rhythm.      Heart sounds: Normal heart sounds. No murmur. No friction rub. No gallop.    Pulmonary:      Effort: Pulmonary effort is normal. No respiratory distress.      Breath sounds: Normal breath sounds. No wheezing or rales.      Comments: 2 LPM NC  Chest:      Chest wall: No tenderness.   Abdominal:      General: Bowel sounds are normal. There is no distension.      Palpations: Abdomen is soft. There is no mass.      Tenderness: There is no abdominal tenderness. There is no guarding or rebound.   Musculoskeletal: Normal range of motion.         General: Deformity (right ankle in full cast boot.) present. No tenderness.    Lymphadenopathy:      Cervical: No cervical adenopathy.   Skin:     General: Skin is warm and dry.      Findings: No erythema or rash.   Neurological:      Mental Status: He is alert and oriented to person, place, and time.      Cranial Nerves: No cranial nerve deficit.      Motor: No abnormal muscle tone.   Psychiatric:         Behavior: Behavior normal.         Thought Content: Thought content normal.         Judgment: Judgment normal.         Fluids    Intake/Output Summary (Last 24 hours) at 11/20/2020 1337  Last data filed at 11/20/2020 1100  Gross per 24 hour   Intake 120 ml   Output 725 ml   Net -605 ml       Laboratory  Recent Labs     11/18/20  1544 11/19/20  0653 11/20/20  0223   WBC 7.2 7.8 8.5   RBC 3.60* 3.49* 3.60*   HEMOGLOBIN 10.7* 10.4* 10.7*   HEMATOCRIT 33.5* 33.5* 34.0*   MCV 93.1 96.0 94.4   MCH 29.7 29.8 29.7   MCHC 31.9* 31.0* 31.5*   RDW 54.1* 55.5* 54.0*   PLATELETCT 180 149* 175   MPV 9.7 9.8 9.9     Recent Labs     11/18/20  1544 11/19/20  0653 11/20/20  0223   SODIUM 143 144 144   POTASSIUM 5.1 4.6 4.3   CHLORIDE 106 110 106   CO2 25 22 27   GLUCOSE 90 72 81   BUN 28* 26* 24*   CREATININE 1.27 1.14 1.13   CALCIUM 8.4 8.0* 7.8*                   Imaging  US-EXTREMITY VENOUS LOWER BILAT   Final Result      CT-CTA CHEST PULMONARY ARTERY W/ RECONS   Final Result      1.  No CT evidence of pulmonary embolism.      2.  Some limited scattered pulmonary opacifications are noted as described above. Findings could be due to pneumonitis inflammation or other infection.      3.  Probable consolidative atelectasis noted in the left lung base.      4.  Borderline enlarged right-sided mediastinal node is identified. Enlargement could be due to inflammation or infection or less likely neoplasm.      Imaging features can be seen with COVID-19 pneumonia, though are nonspecific and can occur with a variety of infectious and noninfectious processes.            DX-CHEST-PORTABLE (1 VIEW)   Final Result       New mild peripheral opacification could represent mild Covid pneumonia           Assessment/Plan  HTN (hypertension)- (present on admission)  Assessment & Plan  Continue outpatient meds    Abnormal urinalysis  Assessment & Plan  UA WBC    Urine culture pending   Rocephin X3 days  Procalcitonin negative    History of ankle surgery  Assessment & Plan  Patient has full cast of the right ankle status post surgical correction,  now with DVT left lower extremity.  Generalized weakness physical therapy recommending post acute placement prior to discharge home.    Acute deep vein thrombosis (DVT) of calf muscle vein of left lower extremity (HCC)- (present on admission)  Assessment & Plan  Patient with acute DVT left lower extremity.  Likely hypercoagulable state given COVID-19 infection as well as recent right ankle surgery currently in cast.  Per patient he has been too weak to move at home.  Patient started on Eliquis DVT dosing loading 10 mg p.o. twice daily for 7 days then 5 mg p.o. twice daily.  Will need anticoagulation for 6 months.    COVID-19 virus infection- (present on admission)  Assessment & Plan  Droplet precaution  Supportive care  Ordered inflammatory markers  Procalcitonin negative.  Dc doxycycline  CTA chest consistent with COVID-19 pneumonia  Requiring 2 L/min nasal cannula.  Vitamin C ordered vitamin D level normal    Elevated troponin- (present on admission)  Assessment & Plan  Denied chest pain  Trend trop and ekg  CTPE to rule out PE  Recent history of ankle surgery  Duplex scan to rule out DVT    Type 2 diabetes mellitus with peripheral neuropathy (HCC)- (present on admission)  Assessment & Plan  On SSI       VTE prophylaxis: eliquis

## 2020-11-21 NOTE — PROGRESS NOTES
Patient got to chair today this am and stayed in chair for around 4-5 hours. Tolerated ambulation to chair well. 1 assist with fww. Steady gait.

## 2020-11-21 NOTE — THERAPY
Occupational Therapy   Initial Evaluation     Patient Name: Agapito Stone  Age:  75 y.o., Sex:  male  Medical Record #: [id number]  Today's Date: 11/20/2020     Precautions  Precautions: Fall Risk  Comments: ? WB status, Pt unable to comply with PWB    Assessment  Patient is 75 y.o. male admit with SOB, weakness, DVT, COVID. Pt presents with confusion- has poor safety and insight, recent surgery - ankle reconstruction, fall history and unaware of WB restriction. Pt is at the Mod to Max A level for functional mobility, and LB dressing, is not at reported baseline and is at risk for falls. Pt will benefit from Acute OT services to increase functional I .    Plan    Recommend Occupational Therapy 3 times per week until therapy goals are met for the following treatments:  Neuro Re-Education / Balance, Self Care/Activities of Daily Living, Therapeutic Activities and Therapeutic Exercises.    DC Equipment Recommendations: Unable to determine at this time  Discharge Recommendations: Recommend post-acute placement for additional occupational therapy services prior to discharge home      11/20/20 0971   Prior Living Situation   Prior Services Intermittent Physical Support for ADL Per Family   Housing / Facility 3 Story House   Steps Into Home 0   Bathroom Set up Walk In Shower   Equipment Owned Front-Wheel Walker   Lives with - Patient's Self Care Capacity Spouse   Comments Wife works- per Pt report   Prior Level of ADL Function   Self Feeding Independent   Grooming / Hygiene Independent   Bathing Requires Assist   Dressing Requires Assist   Toileting Requires Assist   Prior Level of IADL Function   Medication Management Independent   Laundry Requires Assist   Kitchen Mobility Requires Assist   Finances Unable To Determine At This Time   Home Management Requires Assist   Shopping Requires Assist   Prior Level Of Mobility Supervision With Device in Home   Driving / Transportation Relatives / Others Provide Transportation    Occupation (Pre-Hospital Vocational) Retired Due To Age   Leisure Interests Family   History of Falls   History of Falls Yes   Precautions   Precautions Fall Risk   Comments ? WB status, Pt unable to comply with PWB   Cognition    Cognition / Consciousness X   Level of Consciousness Alert   Ability To Follow Commands 2 Step   Safety Awareness Impaired   New Learning Impaired   Attention Impaired   Sequencing Impaired   Initiation Impaired   Comments Poor direction following   Strength Upper Body   Upper Body Strength  X   Gross Strength Generalized Weakness, Equal Bilaterally.    Balance Assessment   Sitting Balance (Static) Good   Sitting Balance (Dynamic) Fair +   Standing Balance (Static) Fair -   Standing Balance (Dynamic) Poor +   Weight Shift Sitting Fair   Weight Shift Standing Poor   ADL Assessment   Eating Modified Independent   Grooming Supervision;Seated   Upper Body Dressing Minimal Assist   Lower Body Dressing Moderate Assist   Functional Mobility   Sit to Stand Maximal Assist   Bed, Chair, Wheelchair Transfer Moderate Assist   Transfer Method Stand Pivot   Mobility FWW   Comments Poor compliance with WB precautions   Patient / Family Goals   Patient / Family Goal #1 Home with family   Short Term Goals   Short Term Goal # 1 Pt will be able to tolerate sitting up in a chair, 3x daily for meals   Short Term Goal # 2 Pt will be able to complete functional transfers with Min A   Short Term Goal # 3 Pt will be able to complete FB dressing with Min A

## 2020-11-21 NOTE — PROGRESS NOTES
[facility] Medicine Daily Progress Note    Date of Service  11/21/2020    Chief Complaint  75 y.o. male admitted 11/18/2020 with sob, weakness, covid 19+ and new DVT.    Hospital Course  75 y.o. male past medical history of diabetes mellitus, essential hypertension, recent ankle surgery history who presented 11/18/2020 with weakness.  Per wife she could not take care of him anymore.  In the ER he was found to have elevated troponin and abnormal chest x-ray.  CT PE study was negative for pulmonary embolism but found to have possible pneumonitis or infection  Covid test was positive  U/s LLE + DVT, started on eliquis.    Interval Problem Update  Patient on 2 L/min nasal cannula he is able to answer all questions appropriately lungs are clear to auscultation bilaterally.  Urine culture pending.  Completed 3 days of IV Rocephin.  vitamin D level normal   procalcitonin normal.  Ultrasound of the left lower extremity found in acute DVT on Eliquis DVT loading and maintenance dosing.  Patient is aware states his wife also takes Eliquis.       Consultants/Specialty  none    Code Status  Full Code    Disposition  Physical therapy recommending postacute placement prior to discharge home.  Skilled nursing facility order placed.  Patient is COVID-19 positive.  When asked patient about going home is that there is no way he can go home.  He lives with his wife who also stated in the ER that she was unable to care for him.  Recently at [facility], but patient states he would never go there again.  He felt he was too weak to be discharged from [facility] last admission.    Review of Systems  Review of Systems   Constitutional: Positive for malaise/fatigue. Negative for chills, diaphoresis and fever.   HENT: Negative for congestion and sore throat.    Eyes: Negative for pain and discharge.   Respiratory: Positive for cough and shortness of breath. Negative for hemoptysis, sputum production and wheezing.    Cardiovascular: Negative for  chest pain, palpitations, claudication and leg swelling.   Gastrointestinal: Negative for abdominal pain, constipation, diarrhea, melena, nausea and vomiting.   Genitourinary: Negative for dysuria, frequency and urgency.   Musculoskeletal: Negative for back pain, joint pain, myalgias and neck pain.   Skin: Negative for itching and rash.   Neurological: Positive for weakness. Negative for dizziness, sensory change, speech change, focal weakness, loss of consciousness and headaches.   Endo/Heme/Allergies: Does not bruise/bleed easily.   Psychiatric/Behavioral: Negative for depression, substance abuse and suicidal ideas.        Physical Exam  Temp:  [35.8 °C (96.5 °F)-36.3 °C (97.4 °F)] 36.1 °C (97 °F)  Pulse:  [61-82] 74  Resp:  [16-20] 20  BP: (101-128)/(60-71) 114/60  SpO2:  [96 %-98 %] 97 %    Physical Exam  Constitutional:       General: He is not in acute distress.     Appearance: He is not diaphoretic.   HENT:      Head: Normocephalic and atraumatic.      Mouth/Throat:      Pharynx: No oropharyngeal exudate.   Eyes:      General: No scleral icterus.        Right eye: No discharge.         Left eye: No discharge.      Conjunctiva/sclera: Conjunctivae normal.      Pupils: Pupils are equal, round, and reactive to light.   Neck:      Musculoskeletal: Normal range of motion and neck supple.      Thyroid: No thyromegaly.      Vascular: No JVD.      Trachea: No tracheal deviation.   Cardiovascular:      Rate and Rhythm: Normal rate and regular rhythm.      Heart sounds: Normal heart sounds. No murmur. No friction rub. No gallop.    Pulmonary:      Effort: Pulmonary effort is normal. No respiratory distress.      Breath sounds: Normal breath sounds. No wheezing or rales.      Comments: 2 LPM NC  Chest:      Chest wall: No tenderness.   Abdominal:      General: Bowel sounds are normal. There is no distension.      Palpations: Abdomen is soft. There is no mass.      Tenderness: There is no abdominal tenderness. There is  no guarding or rebound.   Musculoskeletal: Normal range of motion.         General: Deformity (right ankle in full cast boot.) present. No tenderness.   Lymphadenopathy:      Cervical: No cervical adenopathy.   Skin:     General: Skin is warm and dry.      Findings: No erythema or rash.   Neurological:      Mental Status: He is alert and oriented to person, place, and time.      Cranial Nerves: No cranial nerve deficit.      Motor: No abnormal muscle tone.   Psychiatric:         Behavior: Behavior normal.         Thought Content: Thought content normal.         Judgment: Judgment normal.         Fluids    Intake/Output Summary (Last 24 hours) at 11/21/2020 1410  Last data filed at 11/21/2020 0700  Gross per 24 hour   Intake 540 ml   Output 200 ml   Net 340 ml       Laboratory  Recent Labs     11/18/20  1544 11/19/20  0653 11/20/20  0223   WBC 7.2 7.8 8.5   RBC 3.60* 3.49* 3.60*   HEMOGLOBIN 10.7* 10.4* 10.7*   HEMATOCRIT 33.5* 33.5* 34.0*   MCV 93.1 96.0 94.4   MCH 29.7 29.8 29.7   MCHC 31.9* 31.0* 31.5*   RDW 54.1* 55.5* 54.0*   PLATELETCT 180 149* 175   MPV 9.7 9.8 9.9     Recent Labs     11/18/20  1544 11/19/20  0653 11/20/20 0223   SODIUM 143 144 144   POTASSIUM 5.1 4.6 4.3   CHLORIDE 106 110 106   CO2 25 22 27   GLUCOSE 90 72 81   BUN 28* 26* 24*   CREATININE 1.27 1.14 1.13   CALCIUM 8.4 8.0* 7.8*                   Imaging  US-EXTREMITY VENOUS LOWER BILAT   Final Result      CT-CTA CHEST PULMONARY ARTERY W/ RECONS   Final Result      1.  No CT evidence of pulmonary embolism.      2.  Some limited scattered pulmonary opacifications are noted as described above. Findings could be due to pneumonitis inflammation or other infection.      3.  Probable consolidative atelectasis noted in the left lung base.      4.  Borderline enlarged right-sided mediastinal node is identified. Enlargement could be due to inflammation or infection or less likely neoplasm.      Imaging features can be seen with COVID-19 pneumonia,  though are nonspecific and can occur with a variety of infectious and noninfectious processes.            DX-CHEST-PORTABLE (1 VIEW)   Final Result      New mild peripheral opacification could represent mild Covid pneumonia           Assessment/Plan  HTN (hypertension)- (present on admission)  Assessment & Plan  Continue outpatient meds    Abnormal urinalysis  Assessment & Plan  UA WBC    Urine culture pending   Rocephin X3 days  Procalcitonin negative    History of ankle surgery  Assessment & Plan  Patient has full cast of the right ankle status post surgical correction,  now with DVT left lower extremity.  Generalized weakness physical therapy recommending post acute placement prior to discharge home.    Acute deep vein thrombosis (DVT) of calf muscle vein of left lower extremity (HCC)- (present on admission)  Assessment & Plan  Patient with acute DVT left lower extremity.  Likely hypercoagulable state given COVID-19 infection as well as recent right ankle surgery currently in cast.  Per patient he has been too weak to move at home.  Patient started on Eliquis DVT dosing loading 10 mg p.o. twice daily for 7 days then 5 mg p.o. twice daily.  Will need anticoagulation for 6 months.    COVID-19 virus infection- (present on admission)  Assessment & Plan  Droplet precaution  Supportive care  Ordered inflammatory markers  Procalcitonin negative.  Dc doxycycline  CTA chest consistent with COVID-19 pneumonia  Requiring 2 L/min nasal cannula.  Vitamin C ordered vitamin D level normal    Elevated troponin- (present on admission)  Assessment & Plan  Denied chest pain  Trend trop and ekg  CTPE to rule out PE  Recent history of ankle surgery  Duplex scan to rule out DVT    Type 2 diabetes mellitus with peripheral neuropathy (HCC)- (present on admission)  Assessment & Plan  On SSI       VTE prophylaxis: eliquis

## 2020-11-21 NOTE — PROGRESS NOTES
Assumed care of patient, bedside report received from STEVEN Savage. Updated on POC, call light within reach and fall precautions in place. Bed locked and in lowest position. Patient instructed to call for assistance before getting out of bed. All questions answered, no other needs at this time.

## 2020-11-22 NOTE — PROGRESS NOTES
[facility] Medicine Daily Progress Note    Date of Service  11/22/2020    Chief Complaint  75 y.o. male admitted 11/18/2020 with sob, weakness, covid 19+ and new DVT.    Hospital Course  75 y.o. male past medical history of diabetes mellitus, essential hypertension, recent ankle surgery history who presented 11/18/2020 with weakness.  Per wife she could not take care of him anymore.  In the ER he was found to have elevated troponin and abnormal chest x-ray.  CT PE study was negative for pulmonary embolism but found to have possible pneumonitis or infection  Covid test was positive  U/s LLE + DVT, started on eliquis.    Interval Problem Update  Patient on 2 L/min nasal cannula he is able to answer all questions appropriately lungs are clear to auscultation bilaterally.  Urine culture negative, mixed stella.  Completed 3 days of IV Rocephin.  vitamin D level normal   procalcitonin normal.  Ultrasound of the left lower extremity found in acute DVT on Eliquis DVT loading and maintenance dosing.  Patient is aware states his wife also takes Eliquis.       Consultants/Specialty  none    Code Status  Full Code    Disposition  Physical therapy recommending postacute placement prior to discharge home.  Skilled nursing facility order placed.  Patient is COVID-19 positive.  When asked patient about going home is that there is no way he can go home.  He lives with his wife who also stated in the ER that she was unable to care for him.  Recently at [facility], but patient states he would never go there again.  He felt he was too weak to be discharged from [facility] last admission.    Review of Systems  Review of Systems   Constitutional: Positive for malaise/fatigue. Negative for chills, diaphoresis and fever.   HENT: Negative for congestion and sore throat.    Eyes: Negative for pain and discharge.   Respiratory: Positive for cough and shortness of breath. Negative for hemoptysis, sputum production and wheezing.    Cardiovascular:  Negative for chest pain, palpitations, claudication and leg swelling.   Gastrointestinal: Negative for abdominal pain, constipation, diarrhea, melena, nausea and vomiting.   Genitourinary: Negative for dysuria, frequency and urgency.   Musculoskeletal: Negative for back pain, joint pain, myalgias and neck pain.   Skin: Negative for itching and rash.   Neurological: Positive for weakness. Negative for dizziness, sensory change, speech change, focal weakness, loss of consciousness and headaches.   Endo/Heme/Allergies: Does not bruise/bleed easily.   Psychiatric/Behavioral: Negative for depression, substance abuse and suicidal ideas.        Physical Exam  Temp:  [36.7 °C (98 °F)-36.9 °C (98.5 °F)] 36.8 °C (98.3 °F)  Pulse:  [60-94] 69  Resp:  [16-20] 18  BP: (100-125)/(60-98) 113/70  SpO2:  [89 %-97 %] 96 %    Physical Exam  Constitutional:       General: He is not in acute distress.     Appearance: He is not diaphoretic.   HENT:      Head: Normocephalic and atraumatic.      Mouth/Throat:      Pharynx: No oropharyngeal exudate.   Eyes:      General: No scleral icterus.        Right eye: No discharge.         Left eye: No discharge.      Conjunctiva/sclera: Conjunctivae normal.      Pupils: Pupils are equal, round, and reactive to light.   Neck:      Musculoskeletal: Normal range of motion and neck supple.      Thyroid: No thyromegaly.      Vascular: No JVD.      Trachea: No tracheal deviation.   Cardiovascular:      Rate and Rhythm: Normal rate and regular rhythm.      Heart sounds: Normal heart sounds. No murmur. No friction rub. No gallop.    Pulmonary:      Effort: Pulmonary effort is normal. No respiratory distress.      Breath sounds: Normal breath sounds. No wheezing or rales.      Comments: 2 LPM NC  Chest:      Chest wall: No tenderness.   Abdominal:      General: Bowel sounds are normal. There is no distension.      Palpations: Abdomen is soft. There is no mass.      Tenderness: There is no abdominal  tenderness. There is no guarding or rebound.   Musculoskeletal: Normal range of motion.         General: Deformity (right ankle in full cast boot.) present. No tenderness.   Lymphadenopathy:      Cervical: No cervical adenopathy.   Skin:     General: Skin is warm and dry.      Findings: No erythema or rash.   Neurological:      Mental Status: He is alert and oriented to person, place, and time.      Cranial Nerves: No cranial nerve deficit.      Motor: No abnormal muscle tone.   Psychiatric:         Behavior: Behavior normal.         Thought Content: Thought content normal.         Judgment: Judgment normal.         Fluids    Intake/Output Summary (Last 24 hours) at 11/22/2020 1223  Last data filed at 11/22/2020 0900  Gross per 24 hour   Intake 670 ml   Output 750 ml   Net -80 ml       Laboratory  Recent Labs     11/20/20  0223   WBC 8.5   RBC 3.60*   HEMOGLOBIN 10.7*   HEMATOCRIT 34.0*   MCV 94.4   MCH 29.7   MCHC 31.5*   RDW 54.0*   PLATELETCT 175   MPV 9.9     Recent Labs     11/20/20 0223   SODIUM 144   POTASSIUM 4.3   CHLORIDE 106   CO2 27   GLUCOSE 81   BUN 24*   CREATININE 1.13   CALCIUM 7.8*                   Imaging  US-EXTREMITY VENOUS LOWER BILAT   Final Result      CT-CTA CHEST PULMONARY ARTERY W/ RECONS   Final Result      1.  No CT evidence of pulmonary embolism.      2.  Some limited scattered pulmonary opacifications are noted as described above. Findings could be due to pneumonitis inflammation or other infection.      3.  Probable consolidative atelectasis noted in the left lung base.      4.  Borderline enlarged right-sided mediastinal node is identified. Enlargement could be due to inflammation or infection or less likely neoplasm.      Imaging features can be seen with COVID-19 pneumonia, though are nonspecific and can occur with a variety of infectious and noninfectious processes.            DX-CHEST-PORTABLE (1 VIEW)   Final Result      New mild peripheral opacification could represent mild  Covid pneumonia           Assessment/Plan  HTN (hypertension)- (present on admission)  Assessment & Plan  Continue outpatient meds    Abnormal urinalysis  Assessment & Plan  UA WBC  likely contaminant  Urine culture mixed stella   completed Rocephin X3 days  Procalcitonin negative    History of ankle surgery  Assessment & Plan  Patient has full cast of the right ankle status post surgical correction,  now with DVT left lower extremity.  Generalized weakness physical therapy recommending post acute placement prior to discharge home.  Sees [facility].    Acute deep vein thrombosis (DVT) of calf muscle vein of left lower extremity (HCC)- (present on admission)  Assessment & Plan  Patient with acute DVT left lower extremity.  Likely hypercoagulable state given COVID-19 infection as well as recent right ankle surgery currently in cast.  Per patient he has been too weak to move at home.  Patient started on Eliquis DVT dosing loading 10 mg p.o. twice daily for 7 days then 5 mg p.o. twice daily.  Will need anticoagulation for 6 months.    COVID-19 virus infection- (present on admission)  Assessment & Plan  Droplet precaution  Supportive care  Ordered inflammatory markers  Procalcitonin negative.  Dc doxycycline  CTA chest consistent with COVID-19 pneumonia  Requiring 2 L/min nasal cannula.  Vitamin C ordered vitamin D level normal    Elevated troponin- (present on admission)  Assessment & Plan  Denied chest pain  Trend trop and ekg  CTPE to rule out PE  Recent history of ankle surgery  Duplex scan to rule out DVT    Type 2 diabetes mellitus with peripheral neuropathy (HCC)- (present on admission)  Assessment & Plan  On SSI       VTE prophylaxis: eliquis

## 2020-11-23 NOTE — PROGRESS NOTES
[facility] Medicine Daily Progress Note    Date of Service  11/23/2020    Chief Complaint  75 y.o. male admitted 11/18/2020 with sob, weakness, covid 19+ and new DVT.    Hospital Course  75 y.o. male past medical history of diabetes mellitus, essential hypertension, recent ankle surgery history who presented 11/18/2020 with weakness.  Per wife she could not take care of him anymore.  In the ER he was found to have elevated troponin and abnormal chest x-ray.  CT PE study was negative for pulmonary embolism but found to have possible pneumonitis or infection  Covid test was positive  U/s LLE + DVT, started on eliquis.    Interval Problem Update  Patient on 2 L/min nasal cannula he is able to answer all questions appropriately lungs are clear to auscultation bilaterally.  Urine culture negative, mixed stella.  Completed 3 days of IV Rocephin.  vitamin D level normal   procalcitonin normal.  Ultrasound of the left lower extremity found in acute DVT on Eliquis DVT loading and maintenance dosing.  Patient is aware states his wife also takes Eliquis.       Consultants/Specialty  none    Code Status  Full Code    Disposition  Physical therapy recommending postacute placement prior to discharge home.  Skilled nursing facility order placed.  Patient is COVID-19 positive.  When asked patient about going home is that there is no way he can go home.  He lives with his wife who also stated in the ER that she was unable to care for him.  Recently at [facility], but patient states he would never go there again.  He felt he was too weak to be discharged from [facility] last admission.    Review of Systems  Review of Systems   Constitutional: Positive for malaise/fatigue. Negative for chills, diaphoresis and fever.   HENT: Negative for congestion and sore throat.    Eyes: Negative for pain and discharge.   Respiratory: Positive for cough and shortness of breath. Negative for hemoptysis, sputum production and wheezing.    Cardiovascular:  Negative for chest pain, palpitations, claudication and leg swelling.   Gastrointestinal: Negative for abdominal pain, constipation, diarrhea, melena, nausea and vomiting.   Genitourinary: Negative for dysuria, frequency and urgency.   Musculoskeletal: Negative for back pain, joint pain, myalgias and neck pain.   Skin: Negative for itching and rash.   Neurological: Positive for weakness. Negative for dizziness, sensory change, speech change, focal weakness, loss of consciousness and headaches.   Endo/Heme/Allergies: Does not bruise/bleed easily.   Psychiatric/Behavioral: Negative for depression, substance abuse and suicidal ideas.        Physical Exam  Temp:  [36.6 °C (97.9 °F)-37.2 °C (98.9 °F)] 36.6 °C (97.9 °F)  Pulse:  [62-80] 62  Resp:  [18] 18  BP: (100-131)/(57-69) 120/64  SpO2:  [96 %-97 %] 97 %    Physical Exam  Constitutional:       General: He is not in acute distress.     Appearance: He is not diaphoretic.   HENT:      Head: Normocephalic and atraumatic.      Mouth/Throat:      Pharynx: No oropharyngeal exudate.   Eyes:      General: No scleral icterus.        Right eye: No discharge.         Left eye: No discharge.      Conjunctiva/sclera: Conjunctivae normal.      Pupils: Pupils are equal, round, and reactive to light.   Neck:      Musculoskeletal: Normal range of motion and neck supple.      Thyroid: No thyromegaly.      Vascular: No JVD.      Trachea: No tracheal deviation.   Cardiovascular:      Rate and Rhythm: Normal rate and regular rhythm.      Heart sounds: Normal heart sounds. No murmur. No friction rub. No gallop.    Pulmonary:      Effort: Pulmonary effort is normal. No respiratory distress.      Breath sounds: Normal breath sounds. No wheezing or rales.      Comments: 2 LPM NC  Chest:      Chest wall: No tenderness.   Abdominal:      General: Bowel sounds are normal. There is no distension.      Palpations: Abdomen is soft. There is no mass.      Tenderness: There is no abdominal  tenderness. There is no guarding or rebound.   Musculoskeletal: Normal range of motion.         General: Deformity (right ankle in full cast boot.) present. No tenderness.   Lymphadenopathy:      Cervical: No cervical adenopathy.   Skin:     General: Skin is warm and dry.      Findings: No erythema or rash.   Neurological:      Mental Status: He is alert and oriented to person, place, and time.      Cranial Nerves: No cranial nerve deficit.      Motor: No abnormal muscle tone.   Psychiatric:         Behavior: Behavior normal.         Thought Content: Thought content normal.         Judgment: Judgment normal.         Fluids    Intake/Output Summary (Last 24 hours) at 11/23/2020 1243  Last data filed at 11/23/2020 0400  Gross per 24 hour   Intake --   Output 600 ml   Net -600 ml       Laboratory                        Imaging  US-EXTREMITY VENOUS LOWER BILAT   Final Result      CT-CTA CHEST PULMONARY ARTERY W/ RECONS   Final Result      1.  No CT evidence of pulmonary embolism.      2.  Some limited scattered pulmonary opacifications are noted as described above. Findings could be due to pneumonitis inflammation or other infection.      3.  Probable consolidative atelectasis noted in the left lung base.      4.  Borderline enlarged right-sided mediastinal node is identified. Enlargement could be due to inflammation or infection or less likely neoplasm.      Imaging features can be seen with COVID-19 pneumonia, though are nonspecific and can occur with a variety of infectious and noninfectious processes.            DX-CHEST-PORTABLE (1 VIEW)   Final Result      New mild peripheral opacification could represent mild Covid pneumonia           Assessment/Plan  HTN (hypertension)- (present on admission)  Assessment & Plan  Continue outpatient meds    Abnormal urinalysis  Assessment & Plan  UA WBC  likely contaminant  Urine culture mixed stella   completed Rocephin X3 days  Procalcitonin negative    History of ankle  surgery  Assessment & Plan  Patient has full cast of the right ankle status post surgical correction,  now with DVT left lower extremity.  Generalized weakness physical therapy recommending post acute placement prior to discharge home.  Sees [facility].    Acute deep vein thrombosis (DVT) of calf muscle vein of left lower extremity (HCC)- (present on admission)  Assessment & Plan  Patient with acute DVT left lower extremity.  Likely hypercoagulable state given COVID-19 infection as well as recent right ankle surgery currently in cast.  Per patient he has been too weak to move at home.  Patient started on Eliquis DVT dosing loading 10 mg p.o. twice daily for 7 days then 5 mg p.o. twice daily.  Will need anticoagulation for 6 months.    COVID-19 virus infection- (present on admission)  Assessment & Plan  Droplet precaution  Supportive care  Ordered inflammatory markers  Procalcitonin negative.  Dc doxycycline  CTA chest consistent with COVID-19 pneumonia  Requiring 2 L/min nasal cannula.  Vitamin C ordered vitamin D level normal    Elevated troponin- (present on admission)  Assessment & Plan  Denied chest pain  Trend trop and ekg  CTPE to rule out PE  Recent history of ankle surgery  Duplex scan to rule out DVT    Type 2 diabetes mellitus with peripheral neuropathy (HCC)- (present on admission)  Assessment & Plan  On SSI       VTE prophylaxis: eliquis

## 2020-11-24 NOTE — PROGRESS NOTES
Received pt on RA. Per report, pt desats to 88-89% on RA while asleep. Upon assessment, pt asking to sleep. RN put pt 1L NC with O2 sats at 92% while resting. KEVIN.

## 2020-11-24 NOTE — PROGRESS NOTES
Hospital Medicine Daily Progress Note    Date of Service  11/24/2020    Chief Complaint  75 y.o. male admitted 11/18/2020 with sob, weakness, covid 19+ and new DVT.    Hospital Course  Mr. Agapito Stone is a 75 y.o. male with past medical history of diabetes mellitus, essential hypertension, recent right ankle surgery who presented on 11/18/2020 with weakness.  Per wife she could not take care of him anymore.  In the ER he was found to have elevated troponin and abnormal chest x-ray.  CT PE study was negative for pulmonary embolism but found to have possible pneumonitis or infection  Covid test was positive.  US showed DVT in LLE and he was started on Eliquis.  He did complete a 3 day course of Ceftriaxone for a UTI      Interval Problem Update  Patient was seen and examined at bedside.  I have personally reviewed vitals, labs, and imaging.    11/24.  Afebrile.  Episode of hypotension yesterday.  On 1L NC.  Denies fever, chills, chest pain.  Shortness of breath is improved.  Denies abdominal pain, but did have tenderness on palpation.    Consultants/Specialty  none    Code Status  Full Code    Disposition  Physical therapy recommending postacute placement prior to discharge home.  Skilled nursing facility order placed.  Patient is COVID-19 positive.  When asked patient about going home is that there is no way he can go home.  He lives with his wife who also stated in the ER that she was unable to care for him.  Recently at [facility], but patient states he would never go there again.  He felt he was too weak to be discharged from [facility] last admission.    Review of Systems  Review of Systems   Constitutional: Positive for malaise/fatigue. Negative for chills, diaphoresis and fever.   HENT: Negative for congestion and sore throat.    Eyes: Negative for blurred vision.   Respiratory: Positive for cough and shortness of breath.    Cardiovascular: Negative for chest pain, palpitations and claudication.    Gastrointestinal: Negative for abdominal pain, constipation, diarrhea, nausea and vomiting.   Genitourinary: Negative for dysuria, frequency and urgency.   Musculoskeletal: Negative for falls.   Skin: Negative for rash.   Neurological: Negative for dizziness, loss of consciousness and headaches.   Endo/Heme/Allergies: Does not bruise/bleed easily.   Psychiatric/Behavioral: Negative for depression.        Physical Exam  Temp:  [36.3 °C (97.4 °F)-36.6 °C (97.8 °F)] 36.6 °C (97.8 °F)  Pulse:  [67-75] 67  Resp:  [18-20] 18  BP: ()/(57-67) 123/67  SpO2:  [89 %-98 %] 98 %    Physical Exam  Constitutional:       General: He is not in acute distress.     Appearance: He is not diaphoretic.   HENT:      Head: Normocephalic and atraumatic.      Right Ear: External ear normal.      Left Ear: External ear normal.      Mouth/Throat:      Pharynx: Oropharynx is clear.   Eyes:      Extraocular Movements: Extraocular movements intact.      Conjunctiva/sclera: Conjunctivae normal.      Pupils: Pupils are equal, round, and reactive to light.   Neck:      Musculoskeletal: Normal range of motion and neck supple.      Thyroid: No thyromegaly.      Vascular: No JVD.      Trachea: No tracheal deviation.   Cardiovascular:      Rate and Rhythm: Normal rate and regular rhythm.      Pulses: Normal pulses.      Heart sounds: Normal heart sounds. No murmur. No friction rub. No gallop.    Pulmonary:      Effort: Pulmonary effort is normal. No respiratory distress.      Breath sounds: Normal breath sounds. No wheezing or rales.   Chest:      Chest wall: No tenderness.   Abdominal:      General: Bowel sounds are normal. There is no distension.      Palpations: Abdomen is soft. There is no mass.      Tenderness: There is abdominal tenderness. There is no guarding or rebound.   Musculoskeletal: Normal range of motion.         General: Deformity (right ankle in full cast boot.) present. No tenderness.   Lymphadenopathy:      Cervical: No  cervical adenopathy.   Skin:     General: Skin is warm and dry.      Findings: No erythema or rash.   Neurological:      General: No focal deficit present.      Mental Status: He is alert and oriented to person, place, and time. Mental status is at baseline.      Cranial Nerves: No cranial nerve deficit.      Motor: No abnormal muscle tone.   Psychiatric:         Mood and Affect: Mood normal.         Behavior: Behavior normal.         Fluids    Intake/Output Summary (Last 24 hours) at 11/24/2020 0938  Last data filed at 11/24/2020 0600  Gross per 24 hour   Intake 240 ml   Output 600 ml   Net -360 ml       Laboratory                        Imaging  US-EXTREMITY VENOUS LOWER BILAT   Final Result      CT-CTA CHEST PULMONARY ARTERY W/ RECONS   Final Result      1.  No CT evidence of pulmonary embolism.      2.  Some limited scattered pulmonary opacifications are noted as described above. Findings could be due to pneumonitis inflammation or other infection.      3.  Probable consolidative atelectasis noted in the left lung base.      4.  Borderline enlarged right-sided mediastinal node is identified. Enlargement could be due to inflammation or infection or less likely neoplasm.      Imaging features can be seen with COVID-19 pneumonia, though are nonspecific and can occur with a variety of infectious and noninfectious processes.            DX-CHEST-PORTABLE (1 VIEW)   Final Result      New mild peripheral opacification could represent mild Covid pneumonia           Assessment/Plan  HTN (hypertension)- (present on admission)  Assessment & Plan  Continue outpatient Carvedilol with holding parameters    Abnormal urinalysis  Assessment & Plan  UA WBC  likely contaminant  Urine culture mixed stella   completed Rocephin X3 days  Procalcitonin negative    History of ankle surgery  Assessment & Plan  Patient has full cast of the right ankle status post surgical correction,  now with DVT left lower extremity.  Generalized  weakness physical therapy recommending post acute placement prior to discharge home.  Sees [facility].    Acute deep vein thrombosis (DVT) of calf muscle vein of left lower extremity (HCC)- (present on admission)  Assessment & Plan  Patient with acute DVT left lower extremity.  Likely hypercoagulable state given COVID-19 infection as well as recent right ankle surgery currently in cast.  Per patient he has been too weak to move at home.  Patient started on Eliquis DVT dosing loading 10 mg p.o. twice daily for 7 days then 5 mg p.o. twice daily.  Will need anticoagulation for 6 months.    COVID-19 virus infection- (present on admission)  Assessment & Plan  Droplet precaution  Supportive care  Ordered inflammatory markers  Procalcitonin negative.  Dc doxycycline  CTA chest consistent with COVID-19 pneumonia  Requiring 2 L/min nasal cannula.  Vitamin C ordered vitamin D level normal    Elevated troponin- (present on admission)  Assessment & Plan  Denied chest pain  Trend trop and ekg  CTPE negative for PE  Recent history of ankle surgery  Duplex scan showed L DVT    Type 2 diabetes mellitus with peripheral neuropathy (HCC)- (present on admission)  Assessment & Plan  Lab Results   Component Value Date/Time    HBA1C 8.0 (H) 08/31/2020 0555    HBA1C 8.2 (H) 05/09/2020 1040    HBA1C 7.9 (H) 09/28/2019 1021     Results from last 7 days   Lab Units 11/24/20  1104 11/24/20  0553 11/23/20  2045 11/23/20  1721 11/23/20  1132 11/23/20  0506 11/22/20  2120 11/22/20  1658   ACCU CHECK GLUCOSE 788 mg/dL 114* 83 130* 104* 93 93 106* 113*     I have ordered insulin sliding scale with D50 and glucagon for hypoglycemia per protocol.  Diabetic diet  Diabetic education       VTE prophylaxis: eliquis

## 2020-11-25 NOTE — PROGRESS NOTES
[facility] Medicine Daily Progress Note    Date of Service  11/25/2020    Chief Complaint  75 y.o. male admitted 11/18/2020 with sob, weakness, covid 19+ and new DVT.    Hospital Course  Mr. Agapito Stone is a 75 y.o. male with past medical history of diabetes mellitus, essential hypertension, recent right ankle surgery who presented on 11/18/2020 with weakness.  Per wife she could not take care of him anymore.  In the ER he was found to have elevated troponin and abnormal chest x-ray.  CT PE study was negative for pulmonary embolism but found to have possible pneumonitis or infection  Covid test was positive.  US showed DVT in LLE and he was started on Eliquis.  He did complete a 3 day course of Ceftriaxone for a UTI      Interval Problem Update  Patient was seen and examined at bedside.  I have personally reviewed vitals, labs, and imaging.    11/24.  Afebrile.  Episode of hypotension yesterday.  On 1L NC.  Denies fever, chills, chest pain.  Shortness of breath is improved.  Denies abdominal pain, but did have tenderness on palpation.  11/25.  Afebrile.  Episode of bradycardia.  On 1 L nasal cannula.  I did reach out to orthopedic surgery about ankle surgery 8/24 follow-up, getting cast off, weightbearing recommendations.  Denies fever, chills, chest pains.  Shortness of breath is improving.    Consultants/Specialty.   none    Code Status  Full Code    Disposition  Physical therapy recommending postacute placement prior to discharge home.  Skilled nursing facility order placed.  Patient is COVID-19 positive.  When asked patient about going home is that there is no way he can go home.  He lives with his wife who also stated in the ER that she was unable to care for him.  Recently at [facility], but patient states he would never go there again.  He felt he was too weak to be discharged from [facility] last admission.    Review of Systems  Review of Systems   Constitutional: Positive for malaise/fatigue. Negative  for chills, diaphoresis and fever.   HENT: Negative for congestion and sore throat.    Eyes: Negative for blurred vision.   Respiratory: Positive for cough and shortness of breath.    Cardiovascular: Negative for chest pain, palpitations and claudication.   Gastrointestinal: Negative for abdominal pain, constipation, diarrhea, nausea and vomiting.   Genitourinary: Negative for dysuria, frequency and urgency.   Musculoskeletal: Negative for falls.   Skin: Negative for rash.   Neurological: Negative for dizziness, loss of consciousness and headaches.   Endo/Heme/Allergies: Does not bruise/bleed easily.   Psychiatric/Behavioral: Negative for depression.        Physical Exam  Temp:  [36.2 °C (97.2 °F)-37.1 °C (98.8 °F)] 36.2 °C (97.2 °F)  Pulse:  [51-74] 51  Resp:  [19-20] 20  BP: (105-124)/(59-72) 124/72  SpO2:  [94 %-97 %] 96 %    Physical Exam  Constitutional:       General: He is not in acute distress.     Appearance: He is not diaphoretic.   HENT:      Head: Normocephalic and atraumatic.      Right Ear: External ear normal.      Left Ear: External ear normal.      Mouth/Throat:      Pharynx: Oropharynx is clear.   Eyes:      Extraocular Movements: Extraocular movements intact.      Conjunctiva/sclera: Conjunctivae normal.      Pupils: Pupils are equal, round, and reactive to light.   Neck:      Musculoskeletal: Normal range of motion and neck supple.      Thyroid: No thyromegaly.      Vascular: No JVD.      Trachea: No tracheal deviation.   Cardiovascular:      Rate and Rhythm: Normal rate and regular rhythm.      Pulses: Normal pulses.      Heart sounds: Normal heart sounds. No murmur. No friction rub. No gallop.    Pulmonary:      Effort: Pulmonary effort is normal. No respiratory distress.      Breath sounds: Normal breath sounds. No wheezing or rales.   Chest:      Chest wall: No tenderness.   Abdominal:      General: Bowel sounds are normal. There is no distension.      Palpations: Abdomen is soft. There is no  mass.      Tenderness: There is abdominal tenderness. There is no guarding or rebound.   Musculoskeletal: Normal range of motion.         General: Deformity (right ankle in full cast boot.) present. No tenderness.   Lymphadenopathy:      Cervical: No cervical adenopathy.   Skin:     General: Skin is warm and dry.      Findings: No erythema or rash.   Neurological:      General: No focal deficit present.      Mental Status: He is alert and oriented to person, place, and time. Mental status is at baseline.      Cranial Nerves: No cranial nerve deficit.      Motor: No abnormal muscle tone.   Psychiatric:         Mood and Affect: Mood normal.         Behavior: Behavior normal.         Fluids    Intake/Output Summary (Last 24 hours) at 11/25/2020 0751  Last data filed at 11/25/2020 0553  Gross per 24 hour   Intake --   Output 350 ml   Net -350 ml       Laboratory  Recent Labs     11/25/20  0428   WBC 3.6*   RBC 3.41*   HEMOGLOBIN 10.0*   HEMATOCRIT 31.9*   MCV 93.5   MCH 29.3   MCHC 31.3*   RDW 52.4*   PLATELETCT 192   MPV 10.2     Recent Labs     11/25/20  0428   SODIUM 141   POTASSIUM 4.2   CHLORIDE 106   CO2 25   GLUCOSE 157*   BUN 16   CREATININE 0.78   CALCIUM 7.8*                   Imaging  US-EXTREMITY VENOUS LOWER BILAT   Final Result      CT-CTA CHEST PULMONARY ARTERY W/ RECONS   Final Result      1.  No CT evidence of pulmonary embolism.      2.  Some limited scattered pulmonary opacifications are noted as described above. Findings could be due to pneumonitis inflammation or other infection.      3.  Probable consolidative atelectasis noted in the left lung base.      4.  Borderline enlarged right-sided mediastinal node is identified. Enlargement could be due to inflammation or infection or less likely neoplasm.      Imaging features can be seen with COVID-19 pneumonia, though are nonspecific and can occur with a variety of infectious and noninfectious processes.            DX-CHEST-PORTABLE (1 VIEW)   Final  Result      New mild peripheral opacification could represent mild Covid pneumonia           Assessment/Plan  HTN (hypertension)- (present on admission)  Assessment & Plan  Continue outpatient Carvedilol with holding parameters    Abnormal urinalysis  Assessment & Plan  UA WBC  likely contaminant  Urine culture mixed stella   completed Rocephin X3 days  Procalcitonin negative    History of ankle surgery  Assessment & Plan  Patient has full cast of the right ankle status post surgical correction,  now with DVT left lower extremity.  Generalized weakness physical therapy recommending post acute placement prior to discharge home.  Sees [facility].    Acute deep vein thrombosis (DVT) of calf muscle vein of left lower extremity (HCC)- (present on admission)  Assessment & Plan  Patient with acute DVT left lower extremity.  Likely hypercoagulable state given COVID-19 infection as well as recent right ankle surgery currently in cast.  Per patient he has been too weak to move at home.  Patient started on Eliquis DVT dosing loading 10 mg p.o. twice daily for 7 days then 5 mg p.o. twice daily.  Will need anticoagulation for 6 months.    COVID-19 virus infection- (present on admission)  Assessment & Plan  Droplet precaution  Supportive care  Ordered inflammatory markers  Procalcitonin negative.  Dc doxycycline  CTA chest consistent with COVID-19 pneumonia  Vitamin C ordered vitamin D level normal    Elevated troponin- (present on admission)  Assessment & Plan  Denied chest pain  Trend trop and ekg  CTPE negative for PE  Recent history of ankle surgery  Duplex scan showed L DVT  Started Apixaban    Type 2 diabetes mellitus with peripheral neuropathy (HCC)- (present on admission)  Assessment & Plan  Lab Results   Component Value Date/Time    HBA1C 8.0 (H) 08/31/2020 0555    HBA1C 8.2 (H) 05/09/2020 1040    HBA1C 7.9 (H) 09/28/2019 1021     Results from last 7 days   Lab Units 11/25/20  1154 11/25/20  0552 11/24/20 2032  11/24/20  1750 11/24/20  1104 11/24/20  0553 11/23/20  2045 11/23/20  1721   ACCU CHECK GLUCOSE 788 mg/dL 139* 146* 151* 94 114* 83 130* 104*     I have ordered insulin sliding scale with D50 and glucagon for hypoglycemia per protocol.  Diabetic diet  Diabetic education       VTE prophylaxis: eliquis

## 2020-11-25 NOTE — THERAPY
Physical Therapy   Daily Treatment     Patient Name: Agapito Stone  Age:  75 y.o., Sex:  male  Medical Record #: [id number]  Today's Date: 11/24/2020     Precautions: Fall Risk, need clarification on WB orders R LE, will instruct pt NWB R LE for now    Assessment    Pts mobility significantly limited by R LE cast, spoke with MD who will contact ortho for clarification on WB orders and possibility of getting cast removed/ walking boot. Pt is not safe to DC home, is weak and deconditioned, recommend SNF.     Plan    Continue current treatment plan.    DC Equipment Recommendations: Unable to determine at this time, Wheelchair  Discharge Recommendations: Recommend post-acute placement for additional physical therapy services prior to discharge home       11/24/20 1530   Cognition    Comments Pt is a poor historian, poor safety and insight. Agreeable for PT   Gait Analysis   Gait Level Of Assist Unable to Participate   Bed Mobility    Supine to Sit Minimal Assist   Functional Mobility   Sit to Stand Moderate Assist   Bed, Chair, Wheelchair Transfer Moderate Assist   Transfer Method Stand Pivot  (with FWW)   Comments Pt unable to maintain NWB R LE with sit < >stand and transfer   Activity Tolerance   Standing 90 sec with FWW   Short Term Goals    Short Term Goal # 1 pt will perform supine <> sit without bed features with SPV in 6 visits to be able to get in/out of bed at home   Goal Outcome # 1 Progressing as expected   Short Term Goal # 2 pt will perform STS with mod A in 6 visits to initiate SPT   Goal Outcome # 2 Goal met, new goal added   Short Term Goal # 2 B  Pt will be able to perform sit <> stand and transfer Caron in 6 visits so can transfer to chair safely.   Short Term Goal # 3 pt will perform SPT or seated slide board xfr with mod A in 6 visits for improved OOB mobility   Goal Outcome # 3 Goal met   Short Term Goal # 4 pt will demo WC mobility with SPV in 6 visits for improved activity   Goal Outcome # 4  Goal not met

## 2020-11-25 NOTE — THERAPY
Occupational Therapy  Daily Treatment     Patient Name: Agapito Stone  Age:  75 y.o., Sex:  male  Medical Record #: [id number]  Today's Date: 11/25/2020     Precautions  Precautions: Fall Risk  Comments: Pt reports he is PWB RLE, last note from MD 8/24 pt was NWB    Assessment    Pt seen for OT treatment. Pt limited by inability to keep weight off RLE. Pt reports he has missed some follow-up appointments with his orthopedic surgeon and is still in a RLE cast following surgery 8/24.      Plan    Continue current treatment plan.    DC Equipment Recommendations: Unable to determine at this time  Discharge Recommendations: Recommend post-acute placement for additional occupational therapy services prior to discharge home         Objective       11/25/20 1308   Vitals   O2 (LPM) 1   O2 Delivery Device Nasal Cannula   Pain 0 - 10 Group   Therapist Pain Assessment During Activity;Nurse Notified;0   Cognition    Level of Consciousness Alert   Comments poor historian difficulty following directions to attempt to unweight his RLE using his arms on the FWW.    Active ROM Upper Body   Active ROM Upper Body  X   Dominant Hand Right   Comments limitations to B shoulders. Left is more limited than the right due to a h/o dislocation and repair    Balance   Sitting Balance (Static) Good   Sitting Balance (Dynamic) Fair +   Standing Balance (Static) Poor +   Standing Balance (Dynamic) Poor   Weight Shift Sitting Fair   Weight Shift Standing Poor   Comments stood with FWW,    Bed Mobility    Supine to Sit Supervised  (HOB flat )   Scooting Supervised   Activities of Daily Living   Eating Modified Independent   Grooming Supervision;Seated  (wash face and comb hair, declined oral care)   Lower Body Dressing Minimal Assist  (left sock)   Comments pt needed assist to bring left sock over his heel    Functional Mobility   Sit to Stand Minimal Assist   Bed, Chair, Wheelchair Transfer Moderate Assist   Transfer Method Stand Pivot    Comments Pt unable to maintain NWB RLE and required assist throughout to try and unweight the leg during transfer. Not safe to walk until clarification on weight bearing status received,    Activity Tolerance   Comments no desaturation with activity on 1 L    Patient / Family Goals   Patient / Family Goal #1 Home with family   Short Term Goals   Short Term Goal # 1 Pt will be able to tolerate sitting up in a chair, 3x daily for meals   Short Term Goal # 2 Pt will be able to complete functional transfers with Min A   Goal Outcome # 2 Progressing slower than expected   Short Term Goal # 3 Pt will be able to complete FB dressing with Min A    Goal Outcome # 3 Progressing as expected

## 2020-11-26 NOTE — PROGRESS NOTES
[facility] Medicine Daily Progress Note    Date of Service  11/26/2020    Chief Complaint  75 y.o. male admitted 11/18/2020 with sob, weakness, covid 19+ and new DVT.    Hospital Course  Mr. Agapito Stone is a 75 y.o. male with past medical history of diabetes mellitus, essential hypertension, recent right ankle surgery who presented on 11/18/2020 with weakness.  Per wife she could not take care of him anymore.  In the ER he was found to have elevated troponin and abnormal chest x-ray.  CT PE study was negative for pulmonary embolism but found to have possible pneumonitis or infection  Covid test was positive.  US showed DVT in LLE and he was started on Eliquis.  He did complete a 3 day course of Ceftriaxone for a UTI.      Interval Problem Update  Patient was seen and examined at bedside.  I have personally reviewed vitals, labs, and imaging.    11/24.  Afebrile.  Episode of hypotension yesterday.  On 1L NC.  Denies fever, chills, chest pain.  Shortness of breath is improved.  Denies abdominal pain, but did have tenderness on palpation.  11/25.  Afebrile.  Episode of bradycardia.  On 1 L nasal cannula.  I did reach out to orthopedic surgery about ankle surgery 8/24 follow-up, getting cast off, weightbearing recommendations.  Denies fever, chills, chest pains.  Shortness of breath is improving.  11/26.  Afebrile.  Episode of hypotension last night.  On 1-1.5 L nasal cannula.  Denies fevers, chills, chest pains, shortness of breath.  Counseled about mobilization.  Discussed Ortho recommendations for weightbearing as tolerated and cast removal as outpatient.  Repeat Covid for placement    Consultants/Specialty.   none    Code Status  Full Code    Disposition  Physical therapy recommending postacute placement prior to discharge home.  Skilled nursing facility order placed.  Patient is COVID-19 positive.  When asked patient about going home is that there is no way he can go home.  He lives with his wife who also  stated in the ER that she was unable to care for him.  Recently at [facility], but patient states he would never go there again.  He felt he was too weak to be discharged from [facility] last admission.    Review of Systems  Review of Systems   Constitutional: Positive for malaise/fatigue. Negative for chills and fever.   HENT: Negative for congestion and sore throat.    Eyes: Negative for blurred vision.   Respiratory: Negative for cough and shortness of breath.    Cardiovascular: Negative for chest pain, palpitations and claudication.   Gastrointestinal: Negative for abdominal pain, constipation, diarrhea, nausea and vomiting.   Genitourinary: Negative for dysuria, frequency and urgency.   Musculoskeletal: Negative for falls.   Skin: Negative for rash.   Neurological: Negative for dizziness, loss of consciousness and headaches.   Endo/Heme/Allergies: Does not bruise/bleed easily.   Psychiatric/Behavioral: Negative for depression.        Physical Exam  Temp:  [36.4 °C (97.6 °F)-36.8 °C (98.2 °F)] 36.6 °C (97.8 °F)  Pulse:  [63-70] 63  Resp:  [17-18] 18  BP: ()/(49-75) 122/75  SpO2:  [94 %-99 %] 99 %    Physical Exam  Constitutional:       General: He is not in acute distress.     Appearance: He is not diaphoretic.   HENT:      Head: Normocephalic and atraumatic.      Right Ear: External ear normal.      Left Ear: External ear normal.      Mouth/Throat:      Pharynx: Oropharynx is clear.   Eyes:      Extraocular Movements: Extraocular movements intact.      Conjunctiva/sclera: Conjunctivae normal.      Pupils: Pupils are equal, round, and reactive to light.   Neck:      Musculoskeletal: Normal range of motion and neck supple.      Thyroid: No thyromegaly.      Vascular: No JVD.      Trachea: No tracheal deviation.   Cardiovascular:      Rate and Rhythm: Normal rate and regular rhythm.      Pulses: Normal pulses.      Heart sounds: Normal heart sounds. No murmur. No friction rub. No gallop.    Pulmonary:       Effort: Pulmonary effort is normal. No respiratory distress.      Breath sounds: Normal breath sounds. No wheezing or rales.   Chest:      Chest wall: No tenderness.   Abdominal:      General: Bowel sounds are normal. There is no distension.      Palpations: Abdomen is soft. There is no mass.      Tenderness: There is abdominal tenderness. There is no guarding or rebound.   Musculoskeletal: Normal range of motion.         General: Deformity (right ankle in full cast boot.) present. No tenderness.   Lymphadenopathy:      Cervical: No cervical adenopathy.   Skin:     General: Skin is warm and dry.      Findings: No erythema or rash.   Neurological:      General: No focal deficit present.      Mental Status: He is alert and oriented to person, place, and time. Mental status is at baseline.      Cranial Nerves: No cranial nerve deficit.      Motor: No abnormal muscle tone.   Psychiatric:         Mood and Affect: Mood normal.         Behavior: Behavior normal.         Fluids    Intake/Output Summary (Last 24 hours) at 11/26/2020 0751  Last data filed at 11/26/2020 0445  Gross per 24 hour   Intake 680 ml   Output 1175 ml   Net -495 ml       Laboratory  Recent Labs     11/25/20  0428 11/26/20  0520   WBC 3.6* 5.0   RBC 3.41* 3.41*   HEMOGLOBIN 10.0* 10.0*   HEMATOCRIT 31.9* 31.8*   MCV 93.5 93.3   MCH 29.3 29.3   MCHC 31.3* 31.4*   RDW 52.4* 51.7*   PLATELETCT 192 219   MPV 10.2 10.3     Recent Labs     11/25/20  0428 11/26/20  0520   SODIUM 141 139   POTASSIUM 4.2 4.8   CHLORIDE 106 103   CO2 25 25   GLUCOSE 157* 174*   BUN 16 20   CREATININE 0.78 0.78   CALCIUM 7.8* 8.1*                   Imaging  US-EXTREMITY VENOUS LOWER BILAT   Final Result      CT-CTA CHEST PULMONARY ARTERY W/ RECONS   Final Result      1.  No CT evidence of pulmonary embolism.      2.  Some limited scattered pulmonary opacifications are noted as described above. Findings could be due to pneumonitis inflammation or other infection.      3.  Probable  consolidative atelectasis noted in the left lung base.      4.  Borderline enlarged right-sided mediastinal node is identified. Enlargement could be due to inflammation or infection or less likely neoplasm.      Imaging features can be seen with COVID-19 pneumonia, though are nonspecific and can occur with a variety of infectious and noninfectious processes.            DX-CHEST-PORTABLE (1 VIEW)   Final Result      New mild peripheral opacification could represent mild Covid pneumonia           Assessment/Plan  HTN (hypertension)- (present on admission)  Assessment & Plan  Continue outpatient Carvedilol with holding parameters    Abnormal urinalysis  Assessment & Plan  UA WBC  likely contaminant  Urine culture mixed stella   completed Rocephin X3 days  Procalcitonin negative    History of ankle surgery  Assessment & Plan  Patient has full cast of the right ankle status post surgical correction,  now with DVT left lower extremity.  Generalized weakness physical therapy recommending post acute placement prior to discharge home.  Sees [facility].    Acute deep vein thrombosis (DVT) of calf muscle vein of left lower extremity (HCC)- (present on admission)  Assessment & Plan  Patient with acute DVT left lower extremity.  Likely hypercoagulable state given COVID-19 infection as well as recent right ankle surgery currently in cast.  Per patient he has been too weak to move at home.  Patient started on Eliquis DVT dosing loading 10 mg p.o. twice daily for 7 days then 5 mg p.o. twice daily.  Will need anticoagulation for 6 months.    COVID-19 virus infection- (present on admission)  Assessment & Plan  Droplet precaution  Supportive care  Ordered inflammatory markers  Procalcitonin negative.  Dc doxycycline  CTA chest consistent with COVID-19 pneumonia  Vitamin C ordered vitamin D level normal    Elevated troponin- (present on admission)  Assessment & Plan  Denied chest pain  Trend trop and ekg  CTPE negative for  PE  Recent history of ankle surgery  Duplex scan showed L DVT  Started Apixaban    Type 2 diabetes mellitus with peripheral neuropathy (HCC)- (present on admission)  Assessment & Plan  Lab Results   Component Value Date/Time    HBA1C 8.0 (H) 08/31/2020 0555    HBA1C 8.2 (H) 05/09/2020 1040    HBA1C 7.9 (H) 09/28/2019 1021     Results from last 7 days   Lab Units 11/26/20  1106 11/26/20  0625 11/25/20  2106 11/25/20  1629 11/25/20  1154 11/25/20  0552 11/24/20  2032 11/24/20  1750   ACCU CHECK GLUCOSE 788 mg/dL 120* 136* 192* 102* 139* 146* 151* 94     I have ordered insulin sliding scale with D50 and glucagon for hypoglycemia per protocol.  Diabetic diet  Diabetic education       VTE prophylaxis: eliquis

## 2020-11-27 NOTE — PROGRESS NOTES
[facility] Medicine Daily Progress Note    Date of Service  11/27/2020    Chief Complaint  75 y.o. male admitted 11/18/2020 with sob, weakness, covid 19+ and new DVT.    Hospital Course  Mr. Agapito Stone is a 75 y.o. male with past medical history of diabetes mellitus, essential hypertension, recent right ankle surgery who presented on 11/18/2020 with weakness.  Per wife she could not take care of him anymore.  In the ER he was found to have elevated troponin and abnormal chest x-ray.  CT PE study was negative for pulmonary embolism but found to have possible pneumonitis or infection  Covid test was positive.  US showed DVT in LLE and he was started on Eliquis.  He did complete a 3 day course of Ceftriaxone for a UTI.      Interval Problem Update  Patient was seen and examined at bedside.  I have personally reviewed vitals, labs, and imaging.    11/24.  Afebrile.  Episode of hypotension yesterday.  On 1L NC.  Denies fever, chills, chest pain.  Shortness of breath is improved.  Denies abdominal pain, but did have tenderness on palpation.  11/25.  Afebrile.  Episode of bradycardia.  On 1 L nasal cannula.  I did reach out to orthopedic surgery about ankle surgery 8/24 follow-up, getting cast off, weightbearing recommendations.  Denies fever, chills, chest pains.  Shortness of breath is improving.  11/26.  Afebrile.  Episode of hypotension last night.  On 1-1.5 L nasal cannula.  Denies fevers, chills, chest pains, shortness of breath.  Counseled about mobilization.  Discussed Ortho recommendations for weightbearing as tolerated and cast removal as outpatient.  Repeat Covid for placement  11/27.  Afebrile.  Episodes of hypotension.  On 1-1.5 L nasal cannula.  Replete mag and Phos.  Denies fever, chills, chest pains, shortness of breath.  He is disappointed that continue to this positive for Covid.  Counseled on importance of PT and mobilization    Consultants/Specialty.   none    Code Status  Full  Code    Disposition  Physical therapy recommending postacute placement prior to discharge home.  Skilled nursing facility order placed.  Patient is COVID-19 positive.  When asked patient about going home is that there is no way he can go home.  He lives with his wife who also stated in the ER that she was unable to care for him.  Recently at [facility], but patient states he would never go there again.  He felt he was too weak to be discharged from [facility] last admission.    Review of Systems  Review of Systems   Constitutional: Positive for malaise/fatigue. Negative for chills and fever.   HENT: Negative for congestion and sore throat.    Eyes: Negative for blurred vision.   Respiratory: Negative for cough and shortness of breath.    Cardiovascular: Negative for chest pain, palpitations and claudication.   Gastrointestinal: Negative for abdominal pain, constipation, diarrhea, nausea and vomiting.   Genitourinary: Negative for dysuria, frequency and urgency.   Musculoskeletal: Negative for falls.   Skin: Negative for rash.   Neurological: Negative for dizziness, loss of consciousness and headaches.   Endo/Heme/Allergies: Does not bruise/bleed easily.   Psychiatric/Behavioral: Negative for depression.        Physical Exam  Temp:  [36.1 °C (96.9 °F)-36.4 °C (97.6 °F)] 36.4 °C (97.6 °F)  Pulse:  [63-87] 63  Resp:  [18] 18  BP: ()/(62-79) 141/70  SpO2:  [96 %-98 %] 98 %    Physical Exam  Constitutional:       General: He is not in acute distress.     Appearance: He is not diaphoretic.   HENT:      Head: Normocephalic and atraumatic.      Right Ear: External ear normal.      Left Ear: External ear normal.      Mouth/Throat:      Pharynx: Oropharynx is clear.   Eyes:      Extraocular Movements: Extraocular movements intact.      Conjunctiva/sclera: Conjunctivae normal.      Pupils: Pupils are equal, round, and reactive to light.   Neck:      Musculoskeletal: Normal range of motion and neck supple.      Thyroid: No  thyromegaly.      Vascular: No JVD.      Trachea: No tracheal deviation.   Cardiovascular:      Rate and Rhythm: Normal rate and regular rhythm.      Pulses: Normal pulses.      Heart sounds: Normal heart sounds. No murmur. No friction rub. No gallop.    Pulmonary:      Effort: Pulmonary effort is normal. No respiratory distress.      Breath sounds: Normal breath sounds. No wheezing or rales.   Chest:      Chest wall: No tenderness.   Abdominal:      General: Bowel sounds are normal. There is no distension.      Palpations: Abdomen is soft. There is no mass.      Tenderness: There is abdominal tenderness. There is no guarding or rebound.   Musculoskeletal: Normal range of motion.         General: Deformity (right ankle in full cast boot.) present. No tenderness.   Lymphadenopathy:      Cervical: No cervical adenopathy.   Skin:     General: Skin is warm and dry.      Findings: No erythema or rash.   Neurological:      General: No focal deficit present.      Mental Status: He is alert and oriented to person, place, and time. Mental status is at baseline.      Cranial Nerves: No cranial nerve deficit.      Motor: No abnormal muscle tone.   Psychiatric:         Mood and Affect: Mood normal.         Behavior: Behavior normal.         Fluids    Intake/Output Summary (Last 24 hours) at 11/27/2020 0746  Last data filed at 11/26/2020 1545  Gross per 24 hour   Intake 240 ml   Output 500 ml   Net -260 ml       Laboratory  Recent Labs     11/25/20 0428 11/26/20  0520 11/27/20  0607   WBC 3.6* 5.0 5.3   RBC 3.41* 3.41* 3.70*   HEMOGLOBIN 10.0* 10.0* 10.9*   HEMATOCRIT 31.9* 31.8* 34.5*   MCV 93.5 93.3 93.2   MCH 29.3 29.3 29.5   MCHC 31.3* 31.4* 31.6*   RDW 52.4* 51.7* 51.2*   PLATELETCT 192 219 272   MPV 10.2 10.3 9.8     Recent Labs     11/25/20  0428 11/26/20  0520 11/27/20  0607   SODIUM 141 139 139   POTASSIUM 4.2 4.8 4.6   CHLORIDE 106 103 100   CO2 25 25 28   GLUCOSE 157* 174* 143*   BUN 16 20 17   CREATININE 0.78 0.78  0.81   CALCIUM 7.8* 8.1* 8.4                   Imaging  US-EXTREMITY VENOUS LOWER BILAT   Final Result      CT-CTA CHEST PULMONARY ARTERY W/ RECONS   Final Result      1.  No CT evidence of pulmonary embolism.      2.  Some limited scattered pulmonary opacifications are noted as described above. Findings could be due to pneumonitis inflammation or other infection.      3.  Probable consolidative atelectasis noted in the left lung base.      4.  Borderline enlarged right-sided mediastinal node is identified. Enlargement could be due to inflammation or infection or less likely neoplasm.      Imaging features can be seen with COVID-19 pneumonia, though are nonspecific and can occur with a variety of infectious and noninfectious processes.            DX-CHEST-PORTABLE (1 VIEW)   Final Result      New mild peripheral opacification could represent mild Covid pneumonia           Assessment/Plan  HTN (hypertension)- (present on admission)  Assessment & Plan  Continue outpatient Carvedilol with holding parameters    Abnormal urinalysis  Assessment & Plan  UA WBC  likely contaminant  Urine culture mixed stella   completed Rocephin X3 days  Procalcitonin negative    History of ankle surgery  Assessment & Plan  Patient has full cast of the right ankle status post surgical correction,  now with DVT left lower extremity.  Generalized weakness physical therapy recommending post acute placement prior to discharge home.  Sees [facility].    Acute deep vein thrombosis (DVT) of calf muscle vein of left lower extremity (HCC)- (present on admission)  Assessment & Plan  Patient with acute DVT left lower extremity.  Likely hypercoagulable state given COVID-19 infection as well as recent right ankle surgery currently in cast.  Per patient he has been too weak to move at home.  Patient started on Eliquis DVT dosing loading 10 mg p.o. twice daily for 7 days then 5 mg p.o. twice daily.  Will need anticoagulation for 6 months.    COVID-19  virus infection- (present on admission)  Assessment & Plan  Droplet precaution  Supportive care  Ordered inflammatory markers  Procalcitonin negative.  Dc doxycycline  CTA chest consistent with COVID-19 pneumonia  Vitamin C ordered vitamin D level normal    Elevated troponin- (present on admission)  Assessment & Plan  Denied chest pain  Trend trop and ekg  CTPE negative for PE  Recent history of ankle surgery  Duplex scan showed L DVT  Started Apixaban    Type 2 diabetes mellitus with peripheral neuropathy (HCC)- (present on admission)  Assessment & Plan  Lab Results   Component Value Date/Time    HBA1C 8.0 (H) 08/31/2020 0555    HBA1C 8.2 (H) 05/09/2020 1040    HBA1C 7.9 (H) 09/28/2019 1021     Results from last 7 days   Lab Units 11/27/20  1051 11/27/20  0554 11/26/20  1958 11/26/20  1633 11/26/20  1106 11/26/20  0625 11/25/20  2106 11/25/20  1629   ACCU CHECK GLUCOSE 788 mg/dL 102* 142* 152* 118* 120* 136* 192* 102*     I have ordered insulin sliding scale with D50 and glucagon for hypoglycemia per protocol.  Diabetic diet  Diabetic education       VTE prophylaxis: eliquis

## 2020-11-27 NOTE — DISCHARGE PLANNING
"Hospital Care Management Discharge Planning       Anticipated Discharge Disposition:   · SNF     Action:   · RN CM anticipates pt will need SNF placement. As of now, SNF facilities are full and requiring 2 negative COVID tests. RN MATT will send out SNF referral once pt has a negative COVID test.      Barriers to Discharge:   · SNF placement  · 2 negative COVID tests     Plan:    · Hospital Care Management Team to continue to provide support services and assistance with discharge planning as needed.       Current Expected Day of Discharge: 12/5       For further assistance please contact the assigned RN Case Manager or  at the extension listed under \"Treatment Teams\".  "

## 2020-11-27 NOTE — PROGRESS NOTES
Patient AxO x 4  Respirations normal and unlabored. VSS.  Patient encouraged to turn every 2 hours. Refusing turns by staff at this time. Educated on skin breakdown and pressure injury prevention. Patient states he understands. Small open area on buttocks, mepilex placed.   Patient has had no complaints of pain so far this shift.   Fall risk protocol in place. Bed locked and in lowest position.   Non-skid socks and bed alarm on.  Rounded on hourly. Call light with in reach.

## 2020-11-28 NOTE — PROGRESS NOTES
[facility] Medicine Daily Progress Note    Date of Service  11/28/2020    Chief Complaint  75 y.o. male admitted 11/18/2020 with sob, weakness, covid 19+ and new DVT.    Hospital Course  Mr. Agapito Stone is a 75 y.o. male with past medical history of diabetes mellitus, essential hypertension, recent right ankle surgery who presented on 11/18/2020 with weakness.  Per wife she could not take care of him anymore.  In the ER he was found to have elevated troponin and abnormal chest x-ray.  CT PE study was negative for pulmonary embolism but found to have possible pneumonitis or infection  Covid test was positive.  US showed DVT in LLE and he was started on Eliquis.  He did complete a 3 day course of Ceftriaxone for a UTI.      Interval Problem Update  Patient was seen and examined at bedside.  I have personally reviewed vitals, labs, and imaging.    11/24.  Afebrile.  Episode of hypotension yesterday.  On 1L NC.  Denies fever, chills, chest pain.  Shortness of breath is improved.  Denies abdominal pain, but did have tenderness on palpation.  11/25.  Afebrile.  Episode of bradycardia.  On 1 L nasal cannula.  I did reach out to orthopedic surgery about ankle surgery 8/24 follow-up, getting cast off, weightbearing recommendations.  Denies fever, chills, chest pains.  Shortness of breath is improving.  11/26.  Afebrile.  Episode of hypotension last night.  On 1-1.5 L nasal cannula.  Denies fevers, chills, chest pains, shortness of breath.  Counseled about mobilization.  Discussed Ortho recommendations for weightbearing as tolerated and cast removal as outpatient.  Repeat Covid for placement  11/27.  Afebrile.  Episodes of hypotension.  On 1-1.5 L nasal cannula.  Replete mag and Phos.  Denies fever, chills, chest pains, shortness of breath.  He is disappointed that continue to this positive for Covid.  Counseled on importance of PT and mobilization  11/28.  Afebrile.  Stable vitals.  On 1-1.5 L nasal cannula.  Denies  fevers, chills, chest pains, shortness of breath.  Does report some left ankle pain which has been chronic and stable.  Counseled about importance of physical therapy.    Consultants/Specialty.   none    Code Status  Full Code    Disposition  Physical therapy recommending postacute placement prior to discharge home.  Skilled nursing facility order placed.  Patient is COVID-19 positive.  When asked patient about going home is that there is no way he can go home.  He lives with his wife who also stated in the ER that she was unable to care for him.  Recently at [facility], but patient states he would never go there again.  He felt he was too weak to be discharged from [facility] last admission.    Review of Systems  Review of Systems   Constitutional: Negative for chills and fever.   HENT: Negative for congestion and sore throat.    Eyes: Negative for blurred vision.   Respiratory: Negative for cough and shortness of breath.    Cardiovascular: Negative for chest pain, palpitations and claudication.   Gastrointestinal: Negative for abdominal pain, constipation, diarrhea, nausea and vomiting.   Genitourinary: Negative for dysuria, frequency and urgency.   Musculoskeletal: Negative for falls.   Skin: Negative for rash.   Neurological: Negative for dizziness, loss of consciousness and headaches.   Endo/Heme/Allergies: Does not bruise/bleed easily.   Psychiatric/Behavioral: Negative for depression.        Physical Exam  Temp:  [36.1 °C (97 °F)-36.3 °C (97.4 °F)] 36.1 °C (97 °F)  Pulse:  [56-74] 74  Resp:  [16-20] 16  BP: (102-134)/(63-72) 134/72  SpO2:  [93 %-98 %] 98 %    Physical Exam  Constitutional:       General: He is not in acute distress.     Appearance: He is not diaphoretic.   HENT:      Head: Normocephalic and atraumatic.      Right Ear: External ear normal.      Left Ear: External ear normal.      Mouth/Throat:      Pharynx: Oropharynx is clear.   Eyes:      Extraocular Movements: Extraocular movements intact.       Conjunctiva/sclera: Conjunctivae normal.      Pupils: Pupils are equal, round, and reactive to light.   Neck:      Musculoskeletal: Normal range of motion and neck supple.      Thyroid: No thyromegaly.      Vascular: No JVD.      Trachea: No tracheal deviation.   Cardiovascular:      Rate and Rhythm: Normal rate and regular rhythm.      Pulses: Normal pulses.      Heart sounds: Normal heart sounds. No murmur. No friction rub. No gallop.    Pulmonary:      Effort: Pulmonary effort is normal. No respiratory distress.      Breath sounds: Normal breath sounds. No wheezing or rales.   Chest:      Chest wall: No tenderness.   Abdominal:      General: Bowel sounds are normal. There is no distension.      Palpations: Abdomen is soft. There is no mass.      Tenderness: There is abdominal tenderness. There is no guarding or rebound.   Musculoskeletal: Normal range of motion.         General: Deformity (right ankle in full cast boot.) present. No tenderness.   Lymphadenopathy:      Cervical: No cervical adenopathy.   Skin:     General: Skin is warm and dry.      Findings: No erythema or rash.   Neurological:      General: No focal deficit present.      Mental Status: He is alert and oriented to person, place, and time. Mental status is at baseline.      Cranial Nerves: No cranial nerve deficit.      Motor: No abnormal muscle tone.   Psychiatric:         Mood and Affect: Mood normal.         Behavior: Behavior normal.         Fluids    Intake/Output Summary (Last 24 hours) at 11/28/2020 0750  Last data filed at 11/28/2020 0700  Gross per 24 hour   Intake --   Output 1880 ml   Net -1880 ml       Laboratory  Recent Labs     11/26/20 0520 11/27/20  0607   WBC 5.0 5.3   RBC 3.41* 3.70*   HEMOGLOBIN 10.0* 10.9*   HEMATOCRIT 31.8* 34.5*   MCV 93.3 93.2   MCH 29.3 29.5   MCHC 31.4* 31.6*   RDW 51.7* 51.2*   PLATELETCT 219 272   MPV 10.3 9.8     Recent Labs     11/26/20  0520 11/27/20  0607 11/28/20  0207   SODIUM 139 139 139    POTASSIUM 4.8 4.6 4.6   CHLORIDE 103 100 103   CO2 25 28 29   GLUCOSE 174* 143* 191*   BUN 20 17 18   CREATININE 0.78 0.81 0.90   CALCIUM 8.1* 8.4 8.4                   Imaging  US-EXTREMITY VENOUS LOWER BILAT   Final Result      CT-CTA CHEST PULMONARY ARTERY W/ RECONS   Final Result      1.  No CT evidence of pulmonary embolism.      2.  Some limited scattered pulmonary opacifications are noted as described above. Findings could be due to pneumonitis inflammation or other infection.      3.  Probable consolidative atelectasis noted in the left lung base.      4.  Borderline enlarged right-sided mediastinal node is identified. Enlargement could be due to inflammation or infection or less likely neoplasm.      Imaging features can be seen with COVID-19 pneumonia, though are nonspecific and can occur with a variety of infectious and noninfectious processes.            DX-CHEST-PORTABLE (1 VIEW)   Final Result      New mild peripheral opacification could represent mild Covid pneumonia           Assessment/Plan  HTN (hypertension)- (present on admission)  Assessment & Plan  Continue outpatient Carvedilol with holding parameters    Abnormal urinalysis  Assessment & Plan  UA WBC  likely contaminant  Urine culture mixed stella   completed Rocephin X3 days  Procalcitonin negative    History of ankle surgery  Assessment & Plan  Patient has full cast of the right ankle status post surgical correction,  now with DVT left lower extremity.  Generalized weakness physical therapy recommending post acute placement prior to discharge home.  Sees [facility].  Recommended weightbearing as tolerated.  Follow-up outpatient for removal of cast.    Acute deep vein thrombosis (DVT) of calf muscle vein of left lower extremity (HCC)- (present on admission)  Assessment & Plan  Patient with acute DVT left lower extremity.  Likely hypercoagulable state given COVID-19 infection as well as recent right ankle surgery currently in cast.  Per  patient he has been too weak to move at home.  Patient started on Eliquis DVT dosing loading 10 mg p.o. twice daily for 7 days then 5 mg p.o. twice daily.  Will need anticoagulation for 6 months.    COVID-19 virus infection- (present on admission)  Assessment & Plan  Droplet precaution  Supportive care  Ordered inflammatory markers  Procalcitonin negative.  Dc doxycycline  CTA chest consistent with COVID-19 pneumonia  Vitamin C ordered vitamin D level normal    Elevated troponin- (present on admission)  Assessment & Plan  Denied chest pain  Trend trop and ekg  CTPE negative for PE  Recent history of ankle surgery  Duplex scan showed L DVT  Started Apixaban    Type 2 diabetes mellitus with peripheral neuropathy (HCC)- (present on admission)  Assessment & Plan  Lab Results   Component Value Date/Time    HBA1C 8.0 (H) 08/31/2020 0555    HBA1C 8.2 (H) 05/09/2020 1040    HBA1C 7.9 (H) 09/28/2019 1021     Results from last 7 days   Lab Units 11/28/20  1158 11/28/20  0444 11/27/20  1956 11/27/20  1625 11/27/20  1051 11/27/20  0554 11/26/20 1958 11/26/20  1633   ACCU CHECK GLUCOSE 788 mg/dL 106* 162* 123* 100* 102* 142* 152* 118*     I have ordered insulin sliding scale with D50 and glucagon for hypoglycemia per protocol.  Diabetic diet  Diabetic education       VTE prophylaxis: eliquis

## 2020-11-29 NOTE — PROGRESS NOTES
Received pt A&O x4, on RA with O2 sats noted to be 88-89%, so pt put on 1L NC with O2 sats improving to 93-94%. WCTM.

## 2020-11-29 NOTE — PROGRESS NOTES
[facility] Medicine Daily Progress Note    Date of Service  11/29/2020    Chief Complaint  75 y.o. male admitted 11/18/2020 with sob, weakness, covid 19+ and new DVT.    Hospital Course  Mr. Agapito Stone is a 75 y.o. male with past medical history of diabetes mellitus, essential hypertension, recent right ankle surgery who presented on 11/18/2020 with weakness.  Per wife she could not take care of him anymore.  In the ER he was found to have elevated troponin and abnormal chest x-ray.  CT PE study was negative for pulmonary embolism but found to have possible pneumonitis or infection  Covid test was positive.  US showed DVT in LLE and he was started on Eliquis.  He did complete a 3 day course of Ceftriaxone for a UTI.      Interval Problem Update  Patient was seen and examined at bedside.  I have personally reviewed vitals, labs, and imaging.    11/24.  Afebrile.  Episode of hypotension yesterday.  On 1L NC.  Denies fever, chills, chest pain.  Shortness of breath is improved.  Denies abdominal pain, but did have tenderness on palpation.  11/25.  Afebrile.  Episode of bradycardia.  On 1 L nasal cannula.  I did reach out to orthopedic surgery about ankle surgery 8/24 follow-up, getting cast off, weightbearing recommendations.  Denies fever, chills, chest pains.  Shortness of breath is improving.  11/26.  Afebrile.  Episode of hypotension last night.  On 1-1.5 L nasal cannula.  Denies fevers, chills, chest pains, shortness of breath.  Counseled about mobilization.  Discussed Ortho recommendations for weightbearing as tolerated and cast removal as outpatient.  Repeat Covid for placement  11/27.  Afebrile.  Episodes of hypotension.  On 1-1.5 L nasal cannula.  Replete mag and Phos.  Denies fever, chills, chest pains, shortness of breath.  He is disappointed that continue to this positive for Covid.  Counseled on importance of PT and mobilization  11/28.  Afebrile.  Stable vitals.  On 1-1.5 L nasal cannula.  Denies  fevers, chills, chest pains, shortness of breath.  Does report some left ankle pain which has been chronic and stable.  Counseled about importance of physical therapy.  11/29.  Afebrile.  Episode of bradycardia this morning.  On 1 L nasal cannula.  Replete mag.  Denies fevers, chills, chest pains, shortness of breath.  Reports some left ankle pain.    Consultants/Specialty.   none    Code Status  Full Code    Disposition  Physical therapy recommending postacute placement prior to discharge home.  Skilled nursing facility order placed.  Patient is COVID-19 positive.  When asked patient about going home is that there is no way he can go home.  He lives with his wife who also stated in the ER that she was unable to care for him.  Recently at [facility], but patient states he would never go there again.  He felt he was too weak to be discharged from [facility] last admission.    Review of Systems  Review of Systems   Constitutional: Negative for chills and fever.   HENT: Negative for congestion and sore throat.    Eyes: Negative for blurred vision.   Respiratory: Negative for cough and shortness of breath.    Cardiovascular: Negative for chest pain, palpitations and claudication.   Gastrointestinal: Negative for abdominal pain, constipation, diarrhea, nausea and vomiting.   Genitourinary: Negative for dysuria, frequency and urgency.   Musculoskeletal: Positive for joint pain. Negative for falls.   Skin: Negative for rash.   Neurological: Negative for dizziness, loss of consciousness and headaches.   Endo/Heme/Allergies: Does not bruise/bleed easily.   Psychiatric/Behavioral: Negative for depression.        Physical Exam  Temp:  [36.1 °C (97 °F)-36.5 °C (97.7 °F)] 36.4 °C (97.5 °F)  Pulse:  [55-77] 56  Resp:  [16-24] 16  BP: ()/(49-72) 133/66  SpO2:  [88 %-97 %] 97 %    Physical Exam  Constitutional:       General: He is not in acute distress.     Appearance: He is not diaphoretic.   HENT:      Head: Normocephalic  and atraumatic.      Right Ear: External ear normal.      Left Ear: External ear normal.      Mouth/Throat:      Pharynx: Oropharynx is clear.   Eyes:      Extraocular Movements: Extraocular movements intact.      Conjunctiva/sclera: Conjunctivae normal.      Pupils: Pupils are equal, round, and reactive to light.   Neck:      Musculoskeletal: Normal range of motion and neck supple.      Thyroid: No thyromegaly.      Vascular: No JVD.      Trachea: No tracheal deviation.   Cardiovascular:      Rate and Rhythm: Normal rate and regular rhythm.      Pulses: Normal pulses.      Heart sounds: Normal heart sounds. No murmur. No friction rub. No gallop.    Pulmonary:      Effort: Pulmonary effort is normal. No respiratory distress.      Breath sounds: Normal breath sounds. No wheezing or rales.   Chest:      Chest wall: No tenderness.   Abdominal:      General: Bowel sounds are normal. There is no distension.      Palpations: Abdomen is soft. There is no mass.      Tenderness: There is abdominal tenderness. There is no guarding or rebound.   Musculoskeletal: Normal range of motion.         General: Deformity (right ankle in full cast boot.) present. No tenderness.   Lymphadenopathy:      Cervical: No cervical adenopathy.   Skin:     General: Skin is warm and dry.      Findings: No erythema or rash.   Neurological:      General: No focal deficit present.      Mental Status: He is alert and oriented to person, place, and time. Mental status is at baseline.      Cranial Nerves: No cranial nerve deficit.      Motor: No abnormal muscle tone.   Psychiatric:         Mood and Affect: Mood normal.         Behavior: Behavior normal.         Fluids    Intake/Output Summary (Last 24 hours) at 11/29/2020 0752  Last data filed at 11/28/2020 232  Gross per 24 hour   Intake 440 ml   Output 350 ml   Net 90 ml       Laboratory  Recent Labs     11/27/20  0607   WBC 5.3   RBC 3.70*   HEMOGLOBIN 10.9*   HEMATOCRIT 34.5*   MCV 93.2   MCH 29.5    MCHC 31.6*   RDW 51.2*   PLATELETCT 272   MPV 9.8     Recent Labs     11/27/20  0607 11/28/20  0207 11/29/20  0131   SODIUM 139 139 136   POTASSIUM 4.6 4.6 4.5   CHLORIDE 100 103 100   CO2 28 29 26   GLUCOSE 143* 191* 202*   BUN 17 18 19   CREATININE 0.81 0.90 0.90   CALCIUM 8.4 8.4 8.1*                   Imaging  US-EXTREMITY VENOUS LOWER BILAT   Final Result      CT-CTA CHEST PULMONARY ARTERY W/ RECONS   Final Result      1.  No CT evidence of pulmonary embolism.      2.  Some limited scattered pulmonary opacifications are noted as described above. Findings could be due to pneumonitis inflammation or other infection.      3.  Probable consolidative atelectasis noted in the left lung base.      4.  Borderline enlarged right-sided mediastinal node is identified. Enlargement could be due to inflammation or infection or less likely neoplasm.      Imaging features can be seen with COVID-19 pneumonia, though are nonspecific and can occur with a variety of infectious and noninfectious processes.            DX-CHEST-PORTABLE (1 VIEW)   Final Result      New mild peripheral opacification could represent mild Covid pneumonia           Assessment/Plan  HTN (hypertension)- (present on admission)  Assessment & Plan  Continue outpatient Carvedilol with holding parameters    Abnormal urinalysis- (present on admission)  Assessment & Plan  UA WBC  likely contaminant  Urine culture mixed stella   completed Rocephin X3 days  Procalcitonin negative    History of ankle surgery- (present on admission)  Assessment & Plan  Patient has full cast of the right ankle status post surgical correction,  now with DVT left lower extremity.  Generalized weakness physical therapy recommending post acute placement prior to discharge home.  Sees [facility].  Recommended weightbearing as tolerated.  Follow-up outpatient for removal of cast.    Acute deep vein thrombosis (DVT) of calf muscle vein of left lower extremity (HCC)- (present on  admission)  Assessment & Plan  Patient with acute DVT left lower extremity.  Likely hypercoagulable state given COVID-19 infection as well as recent right ankle surgery currently in cast.  Per patient he has been too weak to move at home.  Patient started on Eliquis DVT dosing loading 10 mg p.o. twice daily for 7 days then 5 mg p.o. twice daily.  Will need anticoagulation for 6 months.    COVID-19 virus infection- (present on admission)  Assessment & Plan  Droplet precaution  Supportive care  Ordered inflammatory markers  Procalcitonin negative.  Dc doxycycline  CTA chest consistent with COVID-19 pneumonia  Vitamin C ordered vitamin D level normal    Elevated troponin- (present on admission)  Assessment & Plan  Denied chest pain  Trend trop and ekg  CTPE negative for PE  Recent history of ankle surgery  Duplex scan showed L DVT  Started Apixaban    Type 2 diabetes mellitus with peripheral neuropathy (HCC)- (present on admission)  Assessment & Plan  Lab Results   Component Value Date/Time    HBA1C 8.0 (H) 08/31/2020 0555    HBA1C 8.2 (H) 05/09/2020 1040    HBA1C 7.9 (H) 09/28/2019 1021     Results from last 7 days   Lab Units 11/29/20  1227 11/29/20  0548 11/28/20  2114 11/28/20  1639 11/28/20  1158 11/28/20  0444 11/27/20  1956 11/27/20  1625   ACCU CHECK GLUCOSE 788 mg/dL 95 159* 190* 108* 106* 162* 123* 100*     I have ordered insulin sliding scale with D50 and glucagon for hypoglycemia per protocol.  Diabetic diet  Diabetic education       VTE prophylaxis: eliquis

## 2020-11-30 PROBLEM — R82.90 ABNORMAL URINALYSIS: Status: RESOLVED | Noted: 2020-01-01 | Resolved: 2020-01-01

## 2020-11-30 PROBLEM — R79.89 ELEVATED TROPONIN: Status: RESOLVED | Noted: 2020-01-01 | Resolved: 2020-01-01

## 2020-11-30 NOTE — PROGRESS NOTES
Received pt on room air. Pt noted to desat to 88-89%, pt put on 1L NC with O2 95-97%. A&O x4. WCTM.

## 2020-11-30 NOTE — H&P
Hospital Medicine History & Physical Note    Date of Service  11/30/2020    Primary Care Physician  Brody Zamorano M.D.    Consultants  None    Code Status  Full Code    Chief Complaint  Chief Complaint   Patient presents with   • Weakness       History of Presenting Illness  Mr. Agapito Stone is a 75 y.o. male with past medical history of diabetes mellitus, essential hypertension, recent right ankle surgery who presented on 11/18/2020 with weakness.  Per wife she could not take care of him anymore.  In the ER he was found to have elevated troponin and abnormal chest x-ray.  CT PE study was negative for pulmonary embolism but found to have possible pneumonitis or infection.  Covid test was positive.  US showed DVT in LLE and he was started on Eliquis.  He did complete a 3 day course of Ceftriaxone for a UTI.  Orthopedic surgery recommended weightbearing as tolerated and keeping the cast clean and dry.  Patient is awaiting placement and refusing his life care because of previous bad experience.  He needs two negative COVIDs before he can be placed and unfortunately persistently tests positive.  He is medically stable for transfer to [facility] alternate care site.    Review of Systems  Review of Systems   Constitutional: Negative for chills and fever.   HENT: Negative for congestion and sore throat.    Eyes: Negative for blurred vision.   Respiratory: Negative for cough and shortness of breath.    Cardiovascular: Negative for chest pain, palpitations and leg swelling.   Gastrointestinal: Negative for abdominal pain, constipation, diarrhea, nausea and vomiting.   Genitourinary: Negative for dysuria, frequency and urgency.   Musculoskeletal: Positive for joint pain. Negative for falls.   Skin: Negative for rash.   Neurological: Negative for dizziness, weakness and headaches.   Psychiatric/Behavioral: Negative for depression. The patient is not nervous/anxious.    All other systems reviewed and  are negative.      Past Medical History   has a past medical history of Arthritis, CAD (coronary artery disease) (October 2013), Cataract, Diabetes, High cholesterol, Hyperlipidemia, Hypertension, Pain, Pneumonia (1968), Stroke (HCC) (2010), and Syncope.    Surgical History   has a past surgical history that includes carotid endarterectomy (7/13/2010); stent placement (2013); tonsillectomy (as a child); cataract extraction with iol (Bilateral); tendon lenghtening (Right, 8/24/2020); ankle fusion (Right, 8/24/2020); tendon transfer (Right, 8/24/2020); orthopedic osteotomy (Right, 8/24/2020); and hammertoe correction (Right, 8/24/2020).     Family History  family history includes Other in his mother.     Social History   reports that he has never smoked. He has never used smokeless tobacco. He reports previous alcohol use. He reports that he does not use drugs.    Allergies  Allergies   Allergen Reactions   • Fish Hives     shellfish   • Pcn [Penicillins] Hives   • Amoxicillin Hives   • Food Swelling      Eggs,Melons, avocados-puffy mouth       Medications  Prior to Admission Medications   Prescriptions Last Dose Informant Patient Reported? Taking?   Cholecalciferol (VITAMIN D-3) 5000 UNITS TABS Unknown at Unknown MAR from Other Facility Yes No   Sig: Take 1 Tab by mouth every 48 hours.   Empagliflozin-metFORMIN HCl (SYNJARDY) 5-1000 MG Tab Unknown at Unknown MAR from Other Facility No No   Sig: Take 1 Tab by mouth 2 Times a Day.   Probiotic Product (CULTURELLE PRO-WELL PO) Unknown at Unknown MAR from Other Facility Yes Yes   Sig: Take 1 Cap by mouth every day.   aspirin EC (ECOTRIN) 81 MG Tablet Delayed Response Unknown at Unknown MAR from Other Facility Yes Yes   Sig: Take 81 mg by mouth every day.   atorvastatin (LIPITOR) 80 MG tablet Unknown at Unknown MAR from Other Facility No No   Sig: TAKE ONE TABLET BY MOUTH DAILY IN THE EVENING   Patient taking differently: Take 80 mg by mouth every day. TAKE ONE TABLET BY  MOUTH DAILY IN THE EVENING   calcium carbonate (TUMS) 500 MG Chew Tab Unknown at Unknown MAR from Other Facility Yes Yes   Sig: Chew 500 mg as needed (For indigestion).   carvedilol (COREG) 6.25 MG Tab Unknown at Unknown MAR from Other Facility No No   Sig: Take 1 Tab by mouth 2 times a day, with meals.   insulin lispro (HUMALOG) 100 UNIT/ML Unknown at Unknown MAR from Other Facility Yes Yes   Sig: Inject 2-10 Units under the skin 3 times a day before meals. Sliding scale  151-200 2 units  201-250 4 units  251-300 6 units  301-350 8 units   351-400 10 units below 60 or above 400 notify MD   montelukast (SINGULAIR) 10 MG Tab Unknown at Unknown MAR from Other Facility No No   Sig: TAKE 1 TABLET BY MOUTH EVERY DAY   Patient taking differently: Take 10 mg by mouth every day.   nitroglycerin (NITROSTAT) 0.4 MG SL Tab Unknown at Unknown MAR from Other Facility No No   Sig: Place 1 Tab under tongue as needed for Chest Pain.   ondansetron (ZOFRAN ODT) 4 MG TABLET DISPERSIBLE Unknown at Unknown MAR from Other Facility Yes Yes   Sig: Take 4 mg by mouth every 6 hours as needed for Nausea.   oxyCODONE immediate-release (ROXICODONE) 5 MG Tab Unknown at Unknown MAR from Other Facility Yes Yes   Sig: Take 5 mg by mouth every 6 hours as needed for Severe Pain.   pioglitazone (ACTOS) 15 MG Tab Unknown at Unknown MAR from Other Facility Yes Yes   Sig: Take 15 mg by mouth every day.      Facility-Administered Medications: None       Physical Exam  Temp:  [36.3 °C (97.3 °F)-36.9 °C (98.4 °F)] 36.6 °C (97.8 °F)  Pulse:  [57-71] 60  Resp:  [18-23] 18  BP: (111-130)/(60-83) 119/67  SpO2:  [90 %-98 %] 96 %    Physical Exam  Vitals signs and nursing note reviewed.   Constitutional:       General: He is not in acute distress.     Appearance: Normal appearance.   HENT:      Head: Normocephalic and atraumatic.      Nose: Nose normal.      Mouth/Throat:      Mouth: Mucous membranes are moist.      Pharynx: Oropharynx is clear.   Eyes:       Extraocular Movements: Extraocular movements intact.      Conjunctiva/sclera: Conjunctivae normal.   Neck:      Musculoskeletal: Normal range of motion and neck supple.   Cardiovascular:      Rate and Rhythm: Normal rate and regular rhythm.      Pulses: Normal pulses.      Heart sounds: Normal heart sounds. No murmur. No friction rub. No gallop.    Pulmonary:      Effort: Pulmonary effort is normal. No respiratory distress.      Breath sounds: Normal breath sounds. No wheezing or rales.   Chest:      Chest wall: No tenderness.   Abdominal:      General: Abdomen is flat. Bowel sounds are normal. There is no distension.      Palpations: Abdomen is soft. There is no mass.      Tenderness: There is no abdominal tenderness. There is no guarding.   Musculoskeletal: Normal range of motion.      Comments: Right lower extremity cast   Skin:     General: Skin is warm.      Capillary Refill: Capillary refill takes less than 2 seconds.   Neurological:      General: No focal deficit present.      Mental Status: He is alert and oriented to person, place, and time. Mental status is at baseline.      Cranial Nerves: No cranial nerve deficit.      Motor: No weakness.   Psychiatric:         Mood and Affect: Mood normal.         Behavior: Behavior normal.         Laboratory:  Recent Labs     11/30/20  0546   WBC 5.4   RBC 3.36*   HEMOGLOBIN 10.0*   HEMATOCRIT 31.6*   MCV 94.0   MCH 29.8   MCHC 31.6*   RDW 52.3*   PLATELETCT 235   MPV 9.8     Recent Labs     11/28/20 0207 11/29/20 0131 11/30/20  0546   SODIUM 139 136 138   POTASSIUM 4.6 4.5 4.6   CHLORIDE 103 100 101   CO2 29 26 28   GLUCOSE 191* 202* 196*   BUN 18 19 21   CREATININE 0.90 0.90 1.07   CALCIUM 8.4 8.1* 8.1*     Recent Labs     11/28/20 0207 11/29/20 0131 11/30/20  0546   ALTSGPT  --   --  50   ASTSGOT  --   --  58*   ALKPHOSPHAT  --   --  248*   TBILIRUBIN  --   --  0.6   GLUCOSE 191* 202* 196*         No results for input(s): NTPROBNP in the last 72 hours.      No  results for input(s): TROPONINT in the last 72 hours.    Imaging:  US-EXTREMITY VENOUS LOWER BILAT   Final Result      CT-CTA CHEST PULMONARY ARTERY W/ RECONS   Final Result      1.  No CT evidence of pulmonary embolism.      2.  Some limited scattered pulmonary opacifications are noted as described above. Findings could be due to pneumonitis inflammation or other infection.      3.  Probable consolidative atelectasis noted in the left lung base.      4.  Borderline enlarged right-sided mediastinal node is identified. Enlargement could be due to inflammation or infection or less likely neoplasm.      Imaging features can be seen with COVID-19 pneumonia, though are nonspecific and can occur with a variety of infectious and noninfectious processes.            DX-CHEST-PORTABLE (1 VIEW)   Final Result      New mild peripheral opacification could represent mild Covid pneumonia            Assessment/Plan:  I anticipate this patient will require at least two midnights for appropriate medical management, necessitating inpatient admission.    HTN (hypertension)- (present on admission)  Assessment & Plan  Continue outpatient Carvedilol with holding parameters    History of ankle surgery- (present on admission)  Assessment & Plan  Patient has full cast of the right ankle status post surgical correction,  now with DVT left lower extremity.  Generalized weakness physical therapy recommending post acute placement prior to discharge home.  Sees [facility].  Recommended weightbearing as tolerated.  Follow-up outpatient for removal of cast.    Acute deep vein thrombosis (DVT) of calf muscle vein of left lower extremity (HCC)- (present on admission)  Assessment & Plan  Patient with acute DVT left lower extremity.  Likely hypercoagulable state given COVID-19 infection as well as recent right ankle surgery currently in cast.  Per patient he has been too weak to move at home.  Patient started on Eliquis DVT dosing loading 10 mg p.o.  twice daily for 7 days then 5 mg p.o. twice daily.  Will need anticoagulation for 6 months.    COVID-19 virus infection- (present on admission)  Assessment & Plan  Droplet precaution  Supportive care  Ordered inflammatory markers  Procalcitonin negative.  Dc doxycycline  CTA chest consistent with COVID-19 pneumonia  Vitamin C ordered vitamin D level normal  Needs two negatives for placement    Type 2 diabetes mellitus with peripheral neuropathy (HCC)- (present on admission)  Assessment & Plan  Lab Results   Component Value Date/Time    HBA1C 8.0 (H) 08/31/2020 0555    HBA1C 8.2 (H) 05/09/2020 1040    HBA1C 7.9 (H) 09/28/2019 1021     Results from last 7 days   Lab Units 11/30/20  0517 11/29/20  2050 11/29/20  1730 11/29/20  1227 11/29/20  0548 11/28/20  2114 11/28/20  1639 11/28/20  1158   ACCU CHECK GLUCOSE 788 mg/dL 190* 146* 112* 95 159* 190* 108* 106*     I have ordered insulin sliding scale with D50 and glucagon for hypoglycemia per protocol.  Diabetic diet  Diabetic education

## 2020-11-30 NOTE — DISCHARGE SUMMARY
Discharge Summary    CHIEF COMPLAINT ON ADMISSION  Chief Complaint   Patient presents with   • Weakness       Reason for Admission  EMS     Admission Date  11/18/2020    CODE STATUS  Full Code    HPI & HOSPITAL COURSE  Mr. Agapito Stone is a 75 y.o. male with past medical history of diabetes mellitus, essential hypertension, recent right ankle surgery who presented on 11/18/2020 with weakness.  Per wife she could not take care of him anymore.  In the ER he was found to have elevated troponin and abnormal chest x-ray.  CT PE study was negative for pulmonary embolism but found to have possible pneumonitis or infection.  Covid test was positive.  US showed DVT in LLE and he was started on Eliquis.  He did complete a 3 day course of Ceftriaxone for a UTI.  Orthopedic surgery recommended weightbearing as tolerated and keeping the cast clean and dry.  Patient is awaiting placement and refusing his life care because of previous bad experience.  He needs two negative COVIDs before he can be placed and unfortunately persistently tests positive.  He is medically stable for transfer to [facility] alternate care site.      Therefore, he is discharged in fair and stable condition to a short-term general hosptial for inpatient care.    The patient met 2-midnight criteria for an inpatient stay at the time of discharge.    Discharge Date  11/30/2020    FOLLOW UP ITEMS POST DISCHARGE  None    DISCHARGE DIAGNOSES  Active Problems:    HTN (hypertension) POA: Yes    Type 2 diabetes mellitus with peripheral neuropathy (HCC) POA: Yes    COVID-19 virus infection POA: Yes    Acute deep vein thrombosis (DVT) of calf muscle vein of left lower extremity (HCC) POA: Yes    History of ankle surgery POA: Yes  Resolved Problems:    Elevated troponin POA: Yes    Abnormal urinalysis POA: Yes      FOLLOW UP  No follow-up provider specified.    MEDICATIONS ON DISCHARGE     Medication List      ASK your doctor about these  medications      Instructions   aspirin EC 81 MG Tbec  Commonly known as: ECOTRIN  Ask about: Which instructions should I use?   Take 81 mg by mouth every day.  Dose: 81 mg     atorvastatin 80 MG tablet  Commonly known as: LIPITOR   TAKE ONE TABLET BY MOUTH DAILY IN THE EVENING     calcium carbonate 500 MG Chew  Commonly known as: TUMS   Chew 500 mg as needed (For indigestion).  Dose: 500 mg     carvedilol 6.25 MG Tabs  Commonly known as: COREG   Take 1 Tab by mouth 2 times a day, with meals.  Dose: 6.25 mg     CULTURELLE PRO-WELL PO   Take 1 Cap by mouth every day.  Dose: 1 Cap     HumaLOG 100 UNIT/ML  Generic drug: insulin lispro   Inject 2-10 Units under the skin 3 times a day before meals. Sliding scale  151-200 2 units  201-250 4 units  251-300 6 units  301-350 8 units   351-400 10 units below 60 or above 400 notify MD  Dose: 2-10 Units     montelukast 10 MG Tabs  Commonly known as: SINGULAIR   TAKE 1 TABLET BY MOUTH EVERY DAY     nitroglycerin 0.4 MG Subl  Commonly known as: NITROSTAT   Doctor's comments: May repeat once in 5 minutes  Place 1 Tab under tongue as needed for Chest Pain.  Dose: 0.4 mg     ondansetron 4 MG Tbdp  Commonly known as: ZOFRAN ODT   Take 4 mg by mouth every 6 hours as needed for Nausea.  Dose: 4 mg     oxyCODONE immediate-release 5 MG Tabs  Commonly known as: ROXICODONE   Take 5 mg by mouth every 6 hours as needed for Severe Pain.  Dose: 5 mg     pioglitazone 15 MG Tabs  Commonly known as: ACTOS   Take 15 mg by mouth every day.  Dose: 15 mg     Synjardy 5-1000 MG Tabs  Generic drug: Empagliflozin-metFORMIN HCl   Take 1 Tab by mouth 2 Times a Day.  Dose: 1 Tab     Vitamin D-3 125 MCG (5000 UT) Tabs   Take 1 Tab by mouth every 48 hours.  Dose: 1 Tab            Allergies  Allergies   Allergen Reactions   • Fish Hives     shellfish   • Pcn [Penicillins] Hives   • Amoxicillin Hives   • Food Swelling      Eggs,Melons, avocados-puffy mouth       DIET  Orders Placed This Encounter   Procedures    • Diet Order Diet: Consistent CHO (Diabetic)     Standing Status:   Standing     Number of Occurrences:   1     Order Specific Question:   Diet:     Answer:   Consistent CHO (Diabetic) [4]       ACTIVITY  As tolerated.  Weight bearing as tolerated    CONSULTATIONS  None    PROCEDURES  None    LABORATORY  Lab Results   Component Value Date    SODIUM 138 11/30/2020    POTASSIUM 4.6 11/30/2020    CHLORIDE 101 11/30/2020    CO2 28 11/30/2020    GLUCOSE 196 (H) 11/30/2020    BUN 21 11/30/2020    CREATININE 1.07 11/30/2020    CREATININE 1.0 04/09/2007        Lab Results   Component Value Date    WBC 5.4 11/30/2020    HEMOGLOBIN 10.0 (L) 11/30/2020    HEMATOCRIT 31.6 (L) 11/30/2020    PLATELETCT 235 11/30/2020        I discussed medications and side effects with the patient.  I discussed prognosis and importance of medical compliance with the patient.  I counseled the patient about diet, exercise, weight loss, and life style modifications.  All questions and concerns have been addressed.  Total time of the discharge process was 36 minutes.

## 2020-12-01 PROBLEM — R54 AGE-RELATED PHYSICAL DEBILITY: Status: ACTIVE | Noted: 2020-01-01

## 2020-12-01 PROBLEM — D64.9 ANEMIA: Status: ACTIVE | Noted: 2020-01-01

## 2020-12-01 NOTE — PROGRESS NOTES
[facility] Medicine Daily Progress Note    Date of Service  12/1/2020    Chief Complaint  75 y.o. male admitted 11/18/2020 with sob, weakness, covid 19+ and new DVT.    Hospital Course  Mr. Agapito Stone is a 75 y.o. male with past medical history of diabetes mellitus, essential hypertension, recent right ankle surgery who presented on 11/18/2020 with weakness.  Per wife she could not take care of him anymore.  In the ER he was found to have elevated troponin and abnormal chest x-ray.  CT PE study was negative for pulmonary embolism but found to have possible pneumonitis or infection  Covid test was positive.  US showed DVT in LLE and he was started on Eliquis.  He did complete a 3 day course of Ceftriaxone for a UTI.      Interval Problem Update  Patient was seen and examined at bedside.  I have personally reviewed vitals, labs, and imaging.    11/24.  Afebrile.  Episode of hypotension yesterday.  On 1L NC.  Denies fever, chills, chest pain.  Shortness of breath is improved.  Denies abdominal pain, but did have tenderness on palpation.  11/25.  Afebrile.  Episode of bradycardia.  On 1 L nasal cannula.  I did reach out to orthopedic surgery about ankle surgery 8/24 follow-up, getting cast off, weightbearing recommendations.  Denies fever, chills, chest pains.  Shortness of breath is improving.  11/26.  Afebrile.  Episode of hypotension last night.  On 1-1.5 L nasal cannula.  Denies fevers, chills, chest pains, shortness of breath.  Counseled about mobilization.  Discussed Ortho recommendations for weightbearing as tolerated and cast removal as outpatient.  Repeat Covid for placement  11/27.  Afebrile.  Episodes of hypotension.  On 1-1.5 L nasal cannula.  Replete mag and Phos.  Denies fever, chills, chest pains, shortness of breath.  He is disappointed that continue to this positive for Covid.  Counseled on importance of PT and mobilization  11/28.  Afebrile.  Stable vitals.  On 1-1.5 L nasal cannula.  Denies  fevers, chills, chest pains, shortness of breath.  Does report some left ankle pain which has been chronic and stable.  Counseled about importance of physical therapy.  11/29.  Afebrile.  Episode of bradycardia this morning.  On 1 L nasal cannula.  Replete mag.  Denies fevers, chills, chest pains, shortness of breath.  Reports some left ankle pain.  11/30: patient initially evaluated for transfer to [facility]. Continue Current management.  12/1: Patient was evaluated for [facility] transfer and due to his significant debility he will remain at our facility. Continue PT/OT and pending SNF placement. He will require two negative covid tests. No chest pain, palpitations, or shortness of breath. No acute overnight events reported by nursing staff.    Consultants/Specialty.   none    Code Status  Full Code    Disposition  Physical therapy recommending postacute placement prior to discharge home.  Skilled nursing facility order placed.  Patient is COVID-19 positive.  When asked patient about going home is that there is no way he can go home.  He lives with his wife who also stated in the ER that she was unable to care for him.  Recently at [facility], but patient states he would never go there again.  He felt he was too weak to be discharged from [facility] last admission.    Review of Systems  Review of Systems   Constitutional: Negative for chills and fever.   HENT: Negative for congestion and sore throat.    Eyes: Negative for blurred vision.   Respiratory: Negative for cough, sputum production, shortness of breath and wheezing.    Cardiovascular: Negative for chest pain and palpitations.   Gastrointestinal: Negative for abdominal pain, nausea and vomiting.   Genitourinary: Negative for dysuria and urgency.   Musculoskeletal: Positive for joint pain. Negative for falls.   Skin: Negative for rash.   Neurological: Negative for dizziness, loss of consciousness and headaches.   Endo/Heme/Allergies: Does not bruise/bleed easily.    Psychiatric/Behavioral: Negative for depression.        Physical Exam  Temp:  [36.2 °C (97.2 °F)-37.6 °C (99.7 °F)] 36.4 °C (97.6 °F)  Pulse:  [55-77] 55  Resp:  [18-20] 20  BP: (102-149)/(56-68) 131/67  SpO2:  [80 %-98 %] 80 %    Physical Exam  Constitutional:       General: He is not in acute distress.     Appearance: He is not diaphoretic.   HENT:      Head: Normocephalic and atraumatic.      Right Ear: External ear normal.      Left Ear: External ear normal.      Mouth/Throat:      Pharynx: Oropharynx is clear.   Eyes:      Extraocular Movements: Extraocular movements intact.      Conjunctiva/sclera: Conjunctivae normal.      Pupils: Pupils are equal, round, and reactive to light.   Neck:      Musculoskeletal: Normal range of motion and neck supple.      Thyroid: No thyromegaly.      Vascular: No JVD.      Trachea: No tracheal deviation.   Cardiovascular:      Rate and Rhythm: Normal rate and regular rhythm.      Pulses: Normal pulses.      Heart sounds: Normal heart sounds.   Pulmonary:      Effort: Pulmonary effort is normal. No respiratory distress.      Breath sounds: Normal breath sounds. No wheezing.   Abdominal:      General: Bowel sounds are normal. There is no distension.      Palpations: Abdomen is soft. There is no mass.   Musculoskeletal: Normal range of motion.         General: Deformity (right ankle in full cast boot.) present. No tenderness.   Lymphadenopathy:      Cervical: No cervical adenopathy.   Skin:     General: Skin is warm and dry.      Findings: No erythema or rash.   Neurological:      General: No focal deficit present.      Mental Status: He is alert and oriented to person, place, and time. Mental status is at baseline.      Cranial Nerves: No cranial nerve deficit.      Motor: Weakness present. No abnormal muscle tone.   Psychiatric:         Mood and Affect: Mood normal.         Behavior: Behavior normal.         Fluids    Intake/Output Summary (Last 24 hours) at 12/1/2020  1357  Last data filed at 12/1/2020 1123  Gross per 24 hour   Intake --   Output 830 ml   Net -830 ml       Laboratory  Recent Labs     11/30/20  0546 12/01/20  0525   WBC 5.4 5.5   RBC 3.36* 3.42*   HEMOGLOBIN 10.0* 10.2*   HEMATOCRIT 31.6* 32.4*   MCV 94.0 94.7   MCH 29.8 29.8   MCHC 31.6* 31.5*   RDW 52.3* 53.4*   PLATELETCT 235 226   MPV 9.8 9.9     Recent Labs     11/29/20  0131 11/30/20  0546 12/01/20  0525   SODIUM 136 138 137   POTASSIUM 4.5 4.6 4.8   CHLORIDE 100 101 102   CO2 26 28 28   GLUCOSE 202* 196* 198*   BUN 19 21 19   CREATININE 0.90 1.07 0.89   CALCIUM 8.1* 8.1* 8.2*                   Imaging  US-EXTREMITY VENOUS LOWER BILAT   Final Result      CT-CTA CHEST PULMONARY ARTERY W/ RECONS   Final Result      1.  No CT evidence of pulmonary embolism.      2.  Some limited scattered pulmonary opacifications are noted as described above. Findings could be due to pneumonitis inflammation or other infection.      3.  Probable consolidative atelectasis noted in the left lung base.      4.  Borderline enlarged right-sided mediastinal node is identified. Enlargement could be due to inflammation or infection or less likely neoplasm.      Imaging features can be seen with COVID-19 pneumonia, though are nonspecific and can occur with a variety of infectious and noninfectious processes.            DX-CHEST-PORTABLE (1 VIEW)   Final Result      New mild peripheral opacification could represent mild Covid pneumonia           Assessment/Plan  HTN (hypertension)- (present on admission)  Assessment & Plan  Continue outpatient Carvedilol with holding parameters    Anemia  Assessment & Plan  Normocytic anemia  Pending iron panel  No acute bleeding noted    Age-related physical debility  Assessment & Plan  Unable to go to [facility] 2/2 to PT needs  Pending placement to SNF    History of ankle surgery- (present on admission)  Assessment & Plan  Patient has full cast of the right ankle status post surgical correction,  now with  DVT left lower extremity.  Generalized weakness physical therapy recommending post acute placement prior to discharge home.  Sees [facility].  Recommended weightbearing as tolerated.  Follow-up outpatient for removal of cast.    Acute deep vein thrombosis (DVT) of calf muscle vein of left lower extremity (HCC)- (present on admission)  Assessment & Plan  Patient with acute DVT left lower extremity.  Likely hypercoagulable state given COVID-19 infection as well as recent right ankle surgery currently in cast.  Per patient he has been too weak to move at home.  Patient started on Eliquis DVT dosing loading 10 mg p.o. twice daily for 7 days then 5 mg p.o. twice daily.  Will need anticoagulation for 6 months.    COVID-19 virus infection- (present on admission)  Assessment & Plan  Droplet precaution  Supportive care  Ordered inflammatory markers  Procalcitonin negative.  Dc doxycycline  Continue dexamethasone  CTA chest consistent with COVID-19 pneumonia  Vitamin C ordered vitamin D level normal  Needs two negatives for placement    Type 2 diabetes mellitus with peripheral neuropathy (HCC)- (present on admission)  Assessment & Plan  Lab Results   Component Value Date/Time    HBA1C 8.0 (H) 08/31/2020 0555    HBA1C 8.2 (H) 05/09/2020 1040    HBA1C 7.9 (H) 09/28/2019 1021     Results from last 7 days   Lab Units 11/30/20  0517 11/29/20  2050 11/29/20  1730 11/29/20  1227 11/29/20  0548 11/28/20  2114 11/28/20  1639 11/28/20  1158   ACCU CHECK GLUCOSE 788 mg/dL 190* 146* 112* 95 159* 190* 108* 106*     I have ordered insulin sliding scale with D50 and glucagon for hypoglycemia per protocol.  Diabetic diet  Diabetic education       VTE prophylaxis: eliquis

## 2020-12-01 NOTE — ASSESSMENT & PLAN NOTE
Normocytic anemia  Iron panel reveals low iron however ferritin is very high, consistent with chronic disease and covid  No acute bleeding noted

## 2020-12-01 NOTE — PROGRESS NOTES
Received pt on room air with O2 sats 88-90% while sleeping. Pt requested to have 1L NC put on. O2 sats 95-97% on 1L NC. A&O x4. WCTM.

## 2020-12-01 NOTE — PROGRESS NOTES
"Contacted primary RN, Cesar Martin.  Per Cesar, pt has \"not been observed getting out of bed today\". Primary RN to assess pt's mobility and contact this RN back at [phone]  "

## 2020-12-01 NOTE — PROGRESS NOTES
Received report from STEVEN Hardwick. DX, medications, pain management, O2 needs, plan of care reviewed. Pt SpO2 91% on lL. No acute needs at this time. Care of pt fully assumed.     1123 Baseline assessment complete- See Epic. Pt tolerated well. Pt needs 1-2 assist at this time with walker;can dangle at bedside with 1 assist, but unsteady d/t previous injury to RLE. Desaturation noted upon ambulation 82-86% 2L NC. Plan of care reviewed and pt has no f/u questions at this time. Safety measures in place, personal belongings within reach and bed at lowest position. Will continue to monitor.     1135 STEVEN Rausch [id number] updated regarding pt's mobility status.

## 2020-12-01 NOTE — PROGRESS NOTES
Discussed with bedside RN Cesar Martin, 1 person assist. Can transfer to [facility].       Chelly Ibarra M.D.  12/01/20  9:49 AM

## 2020-12-02 NOTE — THERAPY
Physical Therapy   Daily Treatment     Patient Name: Agapito Stone  Age:  75 y.o., Sex:  male  Medical Record #: [id number]  Today's Date: 12/1/2020     Precautions: Fall Risk, Weight Bearing As Tolerated Right Lower Extremity    Assessment    Pt remains confused, cooperative with therapy. Pt with difficulty with attempts to ambulate due to cast R LE, required modA and would do better with a cast shoe. Order placed in chart. Pt is approp for SNF.  Plan    Continue current treatment plan.    DC Equipment Recommendations: Unable to determine at this time, Wheelchair  Discharge Recommendations: Recommend post-acute placement for additional physical therapy services prior to discharge home       12/01/20 5467   Cognition    Comments Pt is confused but agreeable for PT   Gait Analysis   Gait Level Of Assist Moderate Assist   Assistive Device Front Wheel Walker   Distance (Feet) 2   # of Times Distance was Traveled 1   Weight Bearing Status WBAT R LE, difficult to ambualte due to cast, wlil request cast shoe for R LE   Bed Mobility    Supine to Sit Minimal Assist   Functional Mobility   Sit to Stand Minimal Assist   Bed, Chair, Wheelchair Transfer Minimal Assist   Transfer Method Stand Step  (with FWW)   Activity Tolerance   Standing 2 min   Comments mild SO Bwith activity   Short Term Goals    Short Term Goal # 1 pt will perform supine <> sit without bed features with SPV in 6 visits to be able to get in/out of bed at home   Goal Outcome # 1 Progressing slower than expected   Short Term Goal # 2 B  Pt will be able to perform sit <> stand and transfer Caron in 6 visits so can transfer to chair safely.   Goal Outcome # 2 B Progressing slower than expected   Short Term Goal # 4 pt will demo WC mobility with SPV in 6 visits for improved activity   Goal Outcome # 4 Goal not met

## 2020-12-02 NOTE — PROGRESS NOTES
[facility] Medicine Daily Progress Note    Date of Service  12/2/2020    Chief Complaint  75 y.o. male admitted 11/18/2020 with sob, weakness, covid 19+ and new DVT.    Hospital Course  Mr. Agapito Stone is a 75 y.o. male with past medical history of diabetes mellitus, essential hypertension, recent right ankle surgery who presented on 11/18/2020 with weakness.  Per wife she could not take care of him anymore.  In the ER he was found to have elevated troponin and abnormal chest x-ray.  CT PE study was negative for pulmonary embolism but found to have possible pneumonitis or infection  Covid test was positive.  US showed DVT in LLE and he was started on Eliquis.  He did complete a 3 day course of Ceftriaxone for a UTI.  PT/OT evaluated the patient and are recommending SNF at discharge.      Interval Problem Update  SNF placement pending 2 negative Covid tests.  Will repeat Covid test tomorrow. patient has no new complaints, reports he is having normal bowel movements.  Patient down to 1 L nasal cannula.      Consultants/Specialty.   none    Code Status  Full Code    Disposition  Physical therapy recommending postacute placement prior to discharge home.  Skilled nursing facility order placed.  Patient is COVID-19 positive.  When asked patient about going home is that there is no way he can go home.  He lives with his wife who also stated in the ER that she was unable to care for him.  Recently at [facility], but patient states he would never go there again.  He felt he was too weak to be discharged from [facility] last admission.    Review of Systems  Review of Systems   Constitutional: Negative for chills and fever.   HENT: Negative for congestion and sore throat.    Eyes: Negative for blurred vision.   Respiratory: Negative for cough, sputum production, shortness of breath and wheezing.    Cardiovascular: Negative for chest pain and palpitations.   Gastrointestinal: Negative for abdominal pain, nausea and  vomiting.   Genitourinary: Negative for dysuria and urgency.   Musculoskeletal: Positive for joint pain. Negative for falls.   Skin: Negative for rash.   Neurological: Negative for dizziness, loss of consciousness and headaches.   Endo/Heme/Allergies: Does not bruise/bleed easily.   Psychiatric/Behavioral: Negative for depression.        Physical Exam  Temp:  [36.1 °C (97 °F)-37.1 °C (98.7 °F)] 36.1 °C (97 °F)  Pulse:  [53-74] 74  Resp:  [16-24] 24  BP: ()/(50-75) 115/62  SpO2:  [92 %-100 %] 97 %    Physical Exam  Constitutional:       General: He is not in acute distress.     Appearance: He is not diaphoretic.   HENT:      Head: Normocephalic and atraumatic.      Right Ear: External ear normal.      Left Ear: External ear normal.      Mouth/Throat:      Pharynx: Oropharynx is clear.   Eyes:      Extraocular Movements: Extraocular movements intact.      Conjunctiva/sclera: Conjunctivae normal.      Pupils: Pupils are equal, round, and reactive to light.   Neck:      Musculoskeletal: Normal range of motion and neck supple.      Thyroid: No thyromegaly.      Vascular: No JVD.      Trachea: No tracheal deviation.   Cardiovascular:      Rate and Rhythm: Normal rate and regular rhythm.      Pulses: Normal pulses.      Heart sounds: Normal heart sounds.   Pulmonary:      Effort: Pulmonary effort is normal. No respiratory distress.      Breath sounds: Normal breath sounds. No wheezing.   Abdominal:      General: Bowel sounds are normal. There is no distension.      Palpations: Abdomen is soft. There is no mass.   Musculoskeletal: Normal range of motion.         General: Deformity (right ankle in full cast boot.) present. No tenderness.   Lymphadenopathy:      Cervical: No cervical adenopathy.   Skin:     General: Skin is warm and dry.      Findings: No erythema or rash.   Neurological:      General: No focal deficit present.      Mental Status: He is alert and oriented to person, place, and time. Mental status is at  baseline.      Cranial Nerves: No cranial nerve deficit.      Motor: Weakness present. No abnormal muscle tone.   Psychiatric:         Mood and Affect: Mood normal.         Behavior: Behavior normal.         Fluids    Intake/Output Summary (Last 24 hours) at 12/2/2020 1502  Last data filed at 12/2/2020 1430  Gross per 24 hour   Intake --   Output 200 ml   Net -200 ml       Laboratory  Recent Labs     11/30/20  0546 12/01/20  0525 12/02/20  0532   WBC 5.4 5.5 6.5   RBC 3.36* 3.42* 3.49*   HEMOGLOBIN 10.0* 10.2* 10.5*   HEMATOCRIT 31.6* 32.4* 32.5*   MCV 94.0 94.7 93.1   MCH 29.8 29.8 30.1   MCHC 31.6* 31.5* 32.3*   RDW 52.3* 53.4* 52.1*   PLATELETCT 235 226 244   MPV 9.8 9.9 9.6     Recent Labs     11/30/20  0546 12/01/20  0525 12/02/20  0532   SODIUM 138 137 137   POTASSIUM 4.6 4.8 5.3   CHLORIDE 101 102 101   CO2 28 28 29   GLUCOSE 196* 198* 160*   BUN 21 19 22   CREATININE 1.07 0.89 1.02   CALCIUM 8.1* 8.2* 8.4                   Imaging  US-EXTREMITY VENOUS LOWER BILAT   Final Result      CT-CTA CHEST PULMONARY ARTERY W/ RECONS   Final Result      1.  No CT evidence of pulmonary embolism.      2.  Some limited scattered pulmonary opacifications are noted as described above. Findings could be due to pneumonitis inflammation or other infection.      3.  Probable consolidative atelectasis noted in the left lung base.      4.  Borderline enlarged right-sided mediastinal node is identified. Enlargement could be due to inflammation or infection or less likely neoplasm.      Imaging features can be seen with COVID-19 pneumonia, though are nonspecific and can occur with a variety of infectious and noninfectious processes.            DX-CHEST-PORTABLE (1 VIEW)   Final Result      New mild peripheral opacification could represent mild Covid pneumonia           Assessment/Plan  HTN (hypertension)- (present on admission)  Assessment & Plan  Continue outpatient Carvedilol with holding parameters    Anemia  Assessment &  Plan  Normocytic anemia  Iron panel reveals low iron however ferritin is very high, consistent with chronic disease and covid  No acute bleeding noted    Age-related physical debility  Assessment & Plan  [facility] to go to [facility] to PT needs  Pending placement to SNF    History of ankle surgery- (present on admission)  Assessment & Plan  Patient has full cast of the right ankle status post surgical correction,  now with DVT left lower extremity.  Generalized weakness physical therapy recommending post acute placement prior to discharge home.  Sees [facility].  Recommended weightbearing as tolerated.  Follow-up outpatient for removal of cast.    Acute deep vein thrombosis (DVT) of calf muscle vein of left lower extremity (HCC)- (present on admission)  Assessment & Plan  Patient with acute DVT left lower extremity.  Likely hypercoagulable state given COVID-19 infection as well as recent right ankle surgery currently in cast.    Continue Eliquis, will need anticoagulation for 6 months    COVID-19 virus infection- (present on admission)  Assessment & Plan  Droplet precaution  Supportive care  Ordered inflammatory markers  Procalcitonin negative.  Dc doxycycline  Continue dexamethasone  CTA chest consistent with COVID-19 pneumonia  Vitamin C ordered vitamin D level normal  Needs two negatives for placement    Type 2 diabetes mellitus with peripheral neuropathy (HCC)- (present on admission)  Assessment & Plan  insulin sliding scale with D50 and glucagon for hypoglycemia per protocol.  Diabetic diet  Diabetic education       VTE prophylaxis: eliquis    I have performed a physical exam and reviewed and updated ROS and Plan today (12/2/2020). In review of yesterday's note (12/1/2020), there are no changes except as documented above.

## 2020-12-02 NOTE — PROGRESS NOTES
Received pt on 1L NC while awake. Continuing 1L NC while asleep with O2 sats 95-97%. A& x4, intermittently forgetful. WCTM.

## 2020-12-02 NOTE — THERAPY
"Occupational Therapy  Daily Treatment     Patient Name: Agapito Stone  Age:  75 y.o., Sex:  male  Medical Record #: [id number]  Today's Date: 12/1/2020       Precautions: Fall Risk, Weight Bearing As Tolerated Right Lower Extremity  Comments: ok to WB but still in cast very unsteady     Assessment    Pt was seen for OT tx, pt is pleasant and motivated although lacks insight into on going O2 issues. Pt is making steady progress however functional mobility is greatly limited by RLE cast, he is WBAT but the uneven surface of the cast is impacting his balance. Notes indicate possible txf to [facility], if this does occur pt would likely benefit from use of a WC to access the bathroom. OT will continue to follow     Plan  Continue current treatment plan.    DC Equipment Recommendations: Unable to determine at this time, Wheelchair  Discharge Recommendations: Recommend post-acute placement for additional occupational therapy services prior to discharge home(TBD need to confirm if pt could go home at a WC level ?)    Subjective  \"Where the heck am I going now, what is this other place\"      Objective   12/01/20 1601   Precautions   Precautions Fall Risk;Weight Bearing As Tolerated Right Lower Extremity   Comments ok to WB but still in cast very unsteady    Pain 0 - 10 Group   Therapist Pain Assessment During Activity;Nurse Notified;4   Cognition    Cognition / Consciousness X   Level of Consciousness Alert   Comments pt was cooperative easily distracted, lacking insight into O2 needs but pleasant and motivated    Passive ROM Upper Body   Passive ROM Upper Body WDL   Active ROM Upper Body   Active ROM Upper Body  X   Dominant Hand Right   Comments hx of shoulder limitations baseline/chronic    Strength Upper Body   Upper Body Strength  X   Gross Strength Generalized Weakness, Equal Bilaterally.    Other Treatments   Other Treatments Provided focused on seated ADL's    Balance   Sitting Balance (Static) Fair +   Sitting Balance " (Dynamic) Fair   Standing Balance (Static) Fair -   Standing Balance (Dynamic) Poor +   Weight Shift Sitting Fair   Weight Shift Standing Poor   Skilled Intervention Verbal Cuing;Tactile Cuing;Facilitation   Comments w/fww    Bed Mobility    Supine to Sit Minimal Assist   Skilled Intervention Verbal Cuing;Tactile Cuing   Comments required EOB up for bed mobility    Activities of Daily Living   Grooming Supervision;Seated   Upper Body Dressing Minimal Assist   Lower Body Dressing Moderate Assist   Toileting   (able to use urinal w/spv )   Skilled Intervention Verbal Cuing;Facilitation   Comments assist for standing ADL's d/t poor balance    Functional Mobility   Sit to Stand Minimal Assist   Bed, Chair, Wheelchair Transfer Minimal Assist   Mobility EOB>chair    Skilled Intervention Verbal Cuing;Compensatory Strategies   Comments w/cast still in place pt would likely be safer using a WC for mobility    Visual Perception   Visual Perception  Not Tested   Activity Tolerance   Comments no c/o fatigue but was desaturating w/activity    Patient / Family Goals   Patient / Family Goal #1 Home with family   Goal #1 Outcome Goal not met   Short Term Goals   Short Term Goal # 1 Pt will be able to tolerate sitting up in a chair, 3x daily for meals   Goal Outcome # 1 Progressing as expected   Short Term Goal # 2 Pt will be able to complete functional transfers with Min A   Goal Outcome # 2 Goal met, new goal added   Short Term Goal # 2 B  pt will complete toilet txf w/spv    Short Term Goal # 3 Pt will be able to complete FB dressing with Min A    Goal Outcome # 3 Progressing as expected   Education Group   Role of Occupational Therapist Patient Response Patient;Acceptance;Explanation;Verbal Demonstration   Transfers Patient Response Patient;Acceptance;Explanation;Demonstration;Verbal Demonstration;Action Demonstration;Reinforcement Needed   ADL Patient Response Patient;Acceptance;Explanation;Demonstration;Verbal  Demonstration;Action Demonstration;Reinforcement Needed   Anticipated Discharge Equipment and Recommendations   DC Equipment Recommendations Unable to determine at this time;Wheelchair   Discharge Recommendations Recommend post-acute placement for additional occupational therapy services prior to discharge home  (TBD need to confirm if pt could go home at a WC level ?)   Interdisciplinary Plan of Care Collaboration   IDT Collaboration with  Nursing   Patient Position at End of Therapy Call Light within Reach;Tray Table within Reach;Seated;Chair Alarm On;Phone within Reach   Collaboration Comments RN aware of session    Session Information   Date / Session Number  12/1 #3 (1/3, 12/4)   Priority 2

## 2020-12-02 NOTE — DISCHARGE PLANNING
RN CM called pt regarding DC plan. Pt adamantly refusing placement at [facility]. RN CM explained the he will need two negatives to go to another SNF. Pt is agreeable to wait to see if he can test negative X2.

## 2020-12-02 NOTE — PROGRESS NOTES
Report received from STEVEN Hardwick. Dx, medications, O2 needs, and plan of care reviewed. Currently 100% on 1L. All safety measures in place and care of pt fully assumed

## 2020-12-03 NOTE — PROGRESS NOTES
[facility] Medicine Daily Progress Note    Date of Service  12/3/2020    Chief Complaint  75 y.o. male admitted 11/18/2020 with sob, weakness, covid 19+ and new DVT.    Hospital Course  Mr. Agapito Stone is a 75 y.o. male with past medical history of diabetes mellitus, essential hypertension, recent right ankle surgery who presented on 11/18/2020 with weakness.  Per wife she could not take care of him anymore.  In the ER he was found to have elevated troponin and abnormal chest x-ray.  CT PE study was negative for pulmonary embolism but found to have possible pneumonitis or infection  Covid test was positive.  US showed DVT in LLE and he was started on Eliquis.  He did complete a 3 day course of Ceftriaxone for a UTI.  PT/OT evaluated the patient and are recommending SNF at discharge. Patient refusing to go to [facility], will need negative coivd test prior to dc to another facility.       Interval Problem Update  Covid test ordered today. No acute events overnight. Vitals stable. Patient has no complaints. Asking when he can get his cast off.     Consultants/Specialty.   none    Code Status  Full Code    Disposition  Pending SNF, needs 2 negative covid tests    Review of Systems  Review of Systems   Constitutional: Negative for chills and fever.   HENT: Negative for congestion and sore throat.    Eyes: Negative for blurred vision.   Respiratory: Negative for cough, sputum production, shortness of breath and wheezing.    Cardiovascular: Negative for chest pain and palpitations.   Gastrointestinal: Negative for abdominal pain, nausea and vomiting.   Genitourinary: Negative for dysuria and urgency.   Musculoskeletal: Negative for falls and joint pain.   Skin: Negative for rash.   Neurological: Negative for dizziness, loss of consciousness and headaches.   Endo/Heme/Allergies: Does not bruise/bleed easily.   Psychiatric/Behavioral: Negative for depression.        Physical Exam  Temp:  [36.1 °C (97 °F)-36.4 °C (97.6  °F)] 36.4 °C (97.5 °F)  Pulse:  [54-79] 55  Resp:  [15-24] 15  BP: ()/(50-78) 123/78  SpO2:  [94 %-98 %] 98 %    Physical Exam  Constitutional:       General: He is not in acute distress.     Appearance: He is not diaphoretic.   HENT:      Head: Normocephalic and atraumatic.      Right Ear: External ear normal.      Left Ear: External ear normal.      Mouth/Throat:      Pharynx: Oropharynx is clear.   Eyes:      Extraocular Movements: Extraocular movements intact.      Conjunctiva/sclera: Conjunctivae normal.      Pupils: Pupils are equal, round, and reactive to light.   Neck:      Musculoskeletal: Normal range of motion and neck supple.      Thyroid: No thyromegaly.      Vascular: No JVD.      Trachea: No tracheal deviation.   Cardiovascular:      Rate and Rhythm: Normal rate and regular rhythm.      Pulses: Normal pulses.      Heart sounds: Normal heart sounds.   Pulmonary:      Effort: Pulmonary effort is normal. No respiratory distress.      Breath sounds: Normal breath sounds. No wheezing.   Abdominal:      General: Bowel sounds are normal. There is no distension.      Palpations: Abdomen is soft. There is no mass.   Musculoskeletal: Normal range of motion.         General: Deformity (right ankle in full cast boot.) present. No tenderness.   Lymphadenopathy:      Cervical: No cervical adenopathy.   Skin:     General: Skin is warm and dry.      Findings: No erythema or rash.   Neurological:      General: No focal deficit present.      Mental Status: He is alert and oriented to person, place, and time. Mental status is at baseline.      Cranial Nerves: No cranial nerve deficit.      Motor: Weakness present. No abnormal muscle tone.   Psychiatric:         Mood and Affect: Mood normal.         Behavior: Behavior normal.         Fluids    Intake/Output Summary (Last 24 hours) at 12/3/2020 0822  Last data filed at 12/3/2020 0621  Gross per 24 hour   Intake --   Output 1560 ml   Net -1560 ml        Laboratory  Recent Labs     12/01/20  0525 12/02/20  0532   WBC 5.5 6.5   RBC 3.42* 3.49*   HEMOGLOBIN 10.2* 10.5*   HEMATOCRIT 32.4* 32.5*   MCV 94.7 93.1   MCH 29.8 30.1   MCHC 31.5* 32.3*   RDW 53.4* 52.1*   PLATELETCT 226 244   MPV 9.9 9.6     Recent Labs     12/01/20  0525 12/02/20  0532   SODIUM 137 137   POTASSIUM 4.8 5.3   CHLORIDE 102 101   CO2 28 29   GLUCOSE 198* 160*   BUN 19 22   CREATININE 0.89 1.02   CALCIUM 8.2* 8.4                   Imaging  US-EXTREMITY VENOUS LOWER BILAT   Final Result      CT-CTA CHEST PULMONARY ARTERY W/ RECONS   Final Result      1.  No CT evidence of pulmonary embolism.      2.  Some limited scattered pulmonary opacifications are noted as described above. Findings could be due to pneumonitis inflammation or other infection.      3.  Probable consolidative atelectasis noted in the left lung base.      4.  Borderline enlarged right-sided mediastinal node is identified. Enlargement could be due to inflammation or infection or less likely neoplasm.      Imaging features can be seen with COVID-19 pneumonia, though are nonspecific and can occur with a variety of infectious and noninfectious processes.            DX-CHEST-PORTABLE (1 VIEW)   Final Result      New mild peripheral opacification could represent mild Covid pneumonia           Assessment/Plan  HTN (hypertension)- (present on admission)  Assessment & Plan  Continue outpatient Carvedilol with holding parameters    Anemia  Assessment & Plan  Normocytic anemia  Iron panel reveals low iron however ferritin is very high, consistent with chronic disease and covid  No acute bleeding noted    Age-related physical debility  Assessment & Plan  [facility] to go to [facility] to PT needs  Pending placement to SNF    History of ankle surgery- (present on admission)  Assessment & Plan  Patient has full cast of the right ankle status post surgical correction,  now with DVT left lower extremity.  Generalized weakness physical therapy  recommending post acute placement prior to discharge home.  Sees [facility].  Recommended weightbearing as tolerated.  Follow-up outpatient for removal of cast.    Acute deep vein thrombosis (DVT) of calf muscle vein of left lower extremity (HCC)- (present on admission)  Assessment & Plan  Patient with acute DVT left lower extremity.  Likely hypercoagulable state given COVID-19 infection as well as recent right ankle surgery currently in cast.    Continue Eliquis, will need anticoagulation for 6 months    COVID-19 virus infection- (present on admission)  Assessment & Plan  Droplet precaution  Supportive care  Ordered inflammatory markers  Procalcitonin negative.  Dc doxycycline  Continue dexamethasone  CTA chest consistent with COVID-19 pneumonia  Vitamin C ordered vitamin D level normal  Needs two negatives for placement    Type 2 diabetes mellitus with peripheral neuropathy (HCC)- (present on admission)  Assessment & Plan  insulin sliding scale with D50 and glucagon for hypoglycemia per protocol.  Diabetic diet  Diabetic education       VTE prophylaxis: eliquis    I have performed a physical exam and reviewed and updated ROS and Plan today (12/3/2020). In review of yesterday's note (12/2/2020), there are no changes except as documented above.

## 2020-12-03 NOTE — CARE PLAN
Problem: Communication  Goal: The ability to communicate needs accurately and effectively will improve  Outcome: PROGRESSING AS EXPECTED     Problem: Safety  Goal: Will remain free from injury  Outcome: PROGRESSING AS EXPECTED  Goal: Will remain free from falls  Outcome: PROGRESSING AS EXPECTED     Problem: Infection  Goal: Will remain free from infection  Outcome: PROGRESSING AS EXPECTED     Problem: Venous Thromboembolism (VTW)/Deep Vein Thrombosis (DVT) Prevention:  Goal: Patient will participate in Venous Thrombosis (VTE)/Deep Vein Thrombosis (DVT)Prevention Measures  Outcome: PROGRESSING AS EXPECTED     Problem: Bowel/Gastric:  Goal: Normal bowel function is maintained or improved  Outcome: PROGRESSING AS EXPECTED  Goal: Will not experience complications related to bowel motility  Outcome: PROGRESSING AS EXPECTED     Problem: Knowledge Deficit  Goal: Knowledge of disease process/condition, treatment plan, diagnostic tests, and medications will improve  Outcome: PROGRESSING AS EXPECTED  Goal: Knowledge of the prescribed therapeutic regimen will improve  Outcome: PROGRESSING AS EXPECTED     Problem: Discharge Barriers/Planning  Goal: Patient's continuum of care needs will be met  Outcome: PROGRESSING AS EXPECTED     Problem: Respiratory:  Goal: Respiratory status will improve  Outcome: PROGRESSING AS EXPECTED     Problem: Skin Integrity  Goal: Risk for impaired skin integrity will decrease  Outcome: PROGRESSING AS EXPECTED     Problem: Pain Management  Goal: Pain level will decrease to patient's comfort goal  Outcome: PROGRESSING AS EXPECTED     Problem: Mobility  Goal: Risk for activity intolerance will decrease  Outcome: PROGRESSING AS EXPECTED

## 2020-12-04 NOTE — PROGRESS NOTES
[facility] contacted for cast shoe for patients right foot. Confirmed [facility] tech would be coming to fit shoe.

## 2020-12-04 NOTE — DISCHARGE SUMMARY
Discharge Summary    CHIEF COMPLAINT ON ADMISSION  Chief Complaint   Patient presents with   • Weakness       Reason for Admission  EMS     Admission Date  11/18/2020    CODE STATUS  Full Code    HPI & HOSPITAL COURSE  This is a 75 y.o. male here with   past medical history of diabetes mellitus, essential hypertension, recent right ankle surgery who presented on 11/18/2020 with weakness.  Per wife she could not take care of him anymore.  In the ER he was found to have elevated troponin and abnormal chest x-ray.  CT PE study was negative for pulmonary embolism but found to have possible pneumonitis or infection.  Covid test was positive.  US showed DVT in LLE and he was started on Eliquis.  He did complete a 3 day course of Ceftriaxone for a UTI.  Orthopedic surgery recommended weightbearing as tolerated and keeping the cast clean and dry.  Patient is awaiting placement and refusing his life care because of previous bad experience.  He needs two negative COVIDs before he can be placed and unfortunately persistently tests positive.  He was admitted and has not needed  Much o2.he is doing very well resp wise and can be discharged when there is an accepting snf.    Patient with acute DVT left lower extremity.  Likely hypercoagulable state given COVID-19 infection as well as recent right ankle surgery currently in cast.    Continue Eliquis, will need anticoagulation for 6 months    Patient has full cast of the right ankle status post surgical correction,  now with DVT left lower extremity.  Generalized weakness physical therapy recommending post acute placement prior to discharge home.  Sees [facility].  Recommended weightbearing as tolerated.  Follow-up outpatient for removal of cast.      Therefore, he is discharged in good and stable condition to skilled nursing facility.    The patient met 2-midnight criteria for an inpatient stay at the time of discharge.    Discharge Date  12/4/20    FOLLOW UP ITEMS POST  DISCHARGE  Ortho in 1 to 2 weeks  pcp next week    DISCHARGE DIAGNOSES  Active Problems:    HTN (hypertension) POA: Yes    Type 2 diabetes mellitus with peripheral neuropathy (HCC) POA: Yes    COVID-19 virus infection POA: Yes    Acute deep vein thrombosis (DVT) of calf muscle vein of left lower extremity (HCC) POA: Yes    History of ankle surgery POA: Yes    Age-related physical debility POA: Unknown    Anemia POA: Unknown  Resolved Problems:    Elevated troponin POA: Yes    Abnormal urinalysis POA: Yes      FOLLOW UP  No future appointments.  No follow-up provider specified.    MEDICATIONS ON DISCHARGE     Medication List      START taking these medications      Instructions   apixaban 5mg Tabs  Commonly known as: ELIQUIS   Take 1 Tab by mouth 2 Times a Day. Indications: DVT/PE  Dose: 5 mg     magnesium oxide 400 MG Tabs tablet  Commonly known as: MAG-OX   Take 1 Tab by mouth 2 Times a Day.  Dose: 400 mg        CHANGE how you take these medications      Instructions   atorvastatin 80 MG tablet  What changed:   · when to take this  · additional instructions  Commonly known as: LIPITOR   TAKE ONE TABLET BY MOUTH DAILY IN THE EVENING     montelukast 10 MG Tabs  What changed: when to take this  Commonly known as: SINGULAIR   TAKE 1 TABLET BY MOUTH EVERY DAY        CONTINUE taking these medications      Instructions   aspirin EC 81 MG Tbec  Commonly known as: ECOTRIN   Take 81 mg by mouth every day.  Dose: 81 mg     calcium carbonate 500 MG Chew  Commonly known as: TUMS   Chew 500 mg as needed (For indigestion).  Dose: 500 mg     carvedilol 6.25 MG Tabs  Commonly known as: COREG   Take 1 Tab by mouth 2 times a day, with meals.  Dose: 6.25 mg     CULTURELLE PRO-WELL PO   Take 1 Cap by mouth every day.  Dose: 1 Cap     HumaLOG 100 UNIT/ML  Generic drug: insulin lispro   Inject 2-10 Units under the skin 3 times a day before meals. Sliding scale  151-200 2 units  201-250 4 units  251-300 6 units  301-350 8 units   351-400  10 units below 60 or above 400 notify MD  Dose: 2-10 Units     nitroglycerin 0.4 MG Subl  Commonly known as: NITROSTAT   Doctor's comments: May repeat once in 5 minutes  Place 1 Tab under tongue as needed for Chest Pain.  Dose: 0.4 mg     ondansetron 4 MG Tbdp  Commonly known as: ZOFRAN ODT   Take 4 mg by mouth every 6 hours as needed for Nausea.  Dose: 4 mg     oxyCODONE immediate-release 5 MG Tabs  Commonly known as: ROXICODONE   Take 5 mg by mouth every 6 hours as needed for Severe Pain.  Dose: 5 mg     pioglitazone 15 MG Tabs  Commonly known as: ACTOS   Take 15 mg by mouth every day.  Dose: 15 mg     Synjardy 5-1000 MG Tabs  Generic drug: Empagliflozin-metFORMIN HCl   Take 1 Tab by mouth 2 Times a Day.  Dose: 1 Tab     Vitamin D-3 125 MCG (5000 UT) Tabs   Take 1 Tab by mouth every 48 hours.  Dose: 1 Tab            Allergies  Allergies   Allergen Reactions   • Fish Hives     shellfish   • Pcn [Penicillins] Hives   • Amoxicillin Hives   • Food Swelling      Eggs,Melons, avocados-puffy mouth       DIET  Orders Placed This Encounter   Procedures   • Diet Order Diet: Consistent CHO (Diabetic)     Standing Status:   Standing     Number of Occurrences:   1     Order Specific Question:   Diet:     Answer:   Consistent CHO (Diabetic) [4]       ACTIVITY  As tolerated.  Weight bearing as tolerated    CONSULTATIONS  none    PROCEDURES  none    LABORATORY  Lab Results   Component Value Date    SODIUM 137 12/02/2020    POTASSIUM 5.3 12/02/2020    CHLORIDE 101 12/02/2020    CO2 29 12/02/2020    GLUCOSE 160 (H) 12/02/2020    BUN 22 12/02/2020    CREATININE 1.02 12/02/2020    CREATININE 1.0 04/09/2007        Lab Results   Component Value Date    WBC 6.5 12/02/2020    HEMOGLOBIN 10.5 (L) 12/02/2020    HEMATOCRIT 32.5 (L) 12/02/2020    PLATELETCT 244 12/02/2020        Total time of the discharge process exceeds 35 minutes.

## 2020-12-05 NOTE — DISCHARGE SUMMARY
Discharge Summary    CHIEF COMPLAINT ON ADMISSION  Chief Complaint   Patient presents with   • Weakness       Reason for Admission  EMS     Admission Date  11/18/2020    CODE STATUS  Full Code    HPI & HOSPITAL COURSE  This is a 75 y.o. male here with   past medical history of diabetes mellitus, essential hypertension, recent right ankle surgery who presented on 11/18/2020 with weakness.  Per wife she could not take care of him anymore.  In the ER he was found to have elevated troponin and abnormal chest x-ray.  CT PE study was negative for pulmonary embolism but found to have possible pneumonitis or infection.  Covid test was positive.  US showed DVT in LLE and he was started on Eliquis.  He did complete a 3 day course of Ceftriaxone for a UTI.  Orthopedic surgery recommended weightbearing as tolerated and keeping the cast clean and dry.  Patient is awaiting placement and refusing his life care because of previous bad experience.  He needs two negative COVIDs before he can be placed and unfortunately persistently tests positive.  He was admitted and has not needed  Much o2.he is doing very well resp wise and can be discharged when there is an accepting snf.    Patient with acute DVT left lower extremity.  Likely hypercoagulable state given COVID-19 infection as well as recent right ankle surgery currently in cast.    Continue Eliquis, will need anticoagulation for 6 months    Patient has full cast of the right ankle status post surgical correction,  now with DVT left lower extremity.  Generalized weakness physical therapy recommending post acute placement prior to discharge home.  Sees [facility].  Recommended weightbearing as tolerated.  Follow-up outpatient for removal of cast.  Pt has not been accepted by any SNF and he will be transferred to [facility].  However pt would like to go home  And will discharge home with home health.      Therefore, he is discharged in good and stable condition to skilled nursing  facility.    The patient met 2-midnight criteria for an inpatient stay at the time of discharge.    Discharge Date  12/5/20    FOLLOW UP ITEMS POST DISCHARGE  Ortho in 1 to 2 weeks  pcp next week    DISCHARGE DIAGNOSES  Active Problems:    HTN (hypertension) POA: Yes    Type 2 diabetes mellitus with peripheral neuropathy (HCC) POA: Yes    COVID-19 virus infection POA: Yes    Acute deep vein thrombosis (DVT) of calf muscle vein of left lower extremity (HCC) POA: Yes    History of ankle surgery POA: Yes    Age-related physical debility POA: Unknown    Anemia POA: Unknown  Resolved Problems:    Elevated troponin POA: Yes    Abnormal urinalysis POA: Yes      FOLLOW UP  No future appointments.  No follow-up provider specified.    MEDICATIONS ON DISCHARGE     Medication List      START taking these medications      Instructions   apixaban 5mg Tabs  Commonly known as: ELIQUIS   Take 1 Tab by mouth 2 Times a Day. Indications: DVT/PE  Dose: 5 mg     magnesium oxide 400 MG Tabs tablet  Commonly known as: MAG-OX   Take 1 Tab by mouth 2 Times a Day.  Dose: 400 mg        CHANGE how you take these medications      Instructions   atorvastatin 80 MG tablet  What changed:   · when to take this  · additional instructions  Commonly known as: LIPITOR   TAKE ONE TABLET BY MOUTH DAILY IN THE EVENING     montelukast 10 MG Tabs  What changed: when to take this  Commonly known as: SINGULAIR   TAKE 1 TABLET BY MOUTH EVERY DAY        CONTINUE taking these medications      Instructions   aspirin EC 81 MG Tbec  Commonly known as: ECOTRIN   Take 81 mg by mouth every day.  Dose: 81 mg     calcium carbonate 500 MG Chew  Commonly known as: TUMS   Chew 500 mg as needed (For indigestion).  Dose: 500 mg     carvedilol 6.25 MG Tabs  Commonly known as: COREG   Take 1 Tab by mouth 2 times a day, with meals.  Dose: 6.25 mg     CULTURELLE PRO-WELL PO   Take 1 Cap by mouth every day.  Dose: 1 Cap     HumaLOG 100 UNIT/ML  Generic drug: insulin lispro    Inject 2-10 Units under the skin 3 times a day before meals. Sliding scale  151-200 2 units  201-250 4 units  251-300 6 units  301-350 8 units   351-400 10 units below 60 or above 400 notify MD  Dose: 2-10 Units     nitroglycerin 0.4 MG Subl  Commonly known as: NITROSTAT   Doctor's comments: May repeat once in 5 minutes  Place 1 Tab under tongue as needed for Chest Pain.  Dose: 0.4 mg     ondansetron 4 MG Tbdp  Commonly known as: ZOFRAN ODT   Take 4 mg by mouth every 6 hours as needed for Nausea.  Dose: 4 mg     oxyCODONE immediate-release 5 MG Tabs  Commonly known as: ROXICODONE   Take 5 mg by mouth every 6 hours as needed for Severe Pain.  Dose: 5 mg     pioglitazone 15 MG Tabs  Commonly known as: ACTOS   Take 15 mg by mouth every day.  Dose: 15 mg     Synjardy 5-1000 MG Tabs  Generic drug: Empagliflozin-metFORMIN HCl   Take 1 Tab by mouth 2 Times a Day.  Dose: 1 Tab     Vitamin D-3 125 MCG (5000 UT) Tabs   Take 1 Tab by mouth every 48 hours.  Dose: 1 Tab            Allergies  Allergies   Allergen Reactions   • Fish Hives     shellfish   • Pcn [Penicillins] Hives   • Amoxicillin Hives   • Food Swelling      Eggs,Melons, avocados-puffy mouth       DIET  Orders Placed This Encounter   Procedures   • Diet Order Diet: Consistent CHO (Diabetic)     Standing Status:   Standing     Number of Occurrences:   1     Order Specific Question:   Diet:     Answer:   Consistent CHO (Diabetic) [4]       ACTIVITY  As tolerated.  Weight bearing as tolerated    CONSULTATIONS  none    PROCEDURES  none    LABORATORY  Lab Results   Component Value Date    SODIUM 137 12/02/2020    POTASSIUM 5.3 12/02/2020    CHLORIDE 101 12/02/2020    CO2 29 12/02/2020    GLUCOSE 160 (H) 12/02/2020    BUN 22 12/02/2020    CREATININE 1.02 12/02/2020    CREATININE 1.0 04/09/2007        Lab Results   Component Value Date    WBC 6.5 12/02/2020    HEMOGLOBIN 10.5 (L) 12/02/2020    HEMATOCRIT 32.5 (L) 12/02/2020    PLATELETCT 244 12/02/2020        Total time  of the discharge process exceeds 35 minutes.

## 2020-12-05 NOTE — FACE TO FACE
Face to Face Supporting Documentation - Home Health    The encounter with this patient was in whole or in part the primary reason for home health admission.    Date of encounter:   Patient:                    MRN:                       YOB: 2018  Agapito Stone  [id number]  1945     Home health to see patient for:  Skilled Nursing care for assessment, interventions & education    Skilled need for:  Surgical Aftercare right ankel surgery    Skilled nursing interventions to include:  Wound Care    Homebound status evidenced by:  Needs the assistance of another person in order to leave the home. Leaving home requires a considerable and taxing effort. There is a normal inability to leave the home.    Community Physician to provide follow up care: Brody Zamorano M.D.     Optional Interventions? No      I certify the face to face encounter for this home health care referral meets the CMS requirements and the encounter/clinical assessment with the patient was, in whole, or in part, for the medical condition(s) listed above, which is the primary reason for home health care. Based on my clinical findings: the service(s) are medically necessary, support the need for home health care, and the homebound criteria are met.  I certify that this patient has had a face to face encounter by myself.  CHONG Webber M.D. - NPI: [id number]

## 2020-12-05 NOTE — H&P
Hospital Medicine History & Physical Note    Date of Service  12/5/2020    Primary Care Physician  Brody Zamorano M.D.    Consultants  NONE    Code Status  Full Code    Chief Complaint  Chief Complaint   Patient presents with   • Weakness       History of Presenting Illness  75 y.o. male who presented 11/18/2020 with This is a 75 y.o. male here with   past medical history of diabetes mellitus, essential hypertension, recent right ankle surgery who presented on 11/18/2020 with weakness.  Per wife she could not take care of him anymore.  In the ER he was found to have elevated troponin and abnormal chest x-ray.  CT PE study was negative for pulmonary embolism but found to have possible pneumonitis or infection.  Covid test was positive.  US showed DVT in LLE and he was started on Eliquis.  He did complete a 3 day course of Ceftriaxone for a UTI.  Orthopedic surgery recommended weightbearing as tolerated and keeping the cast clean and dry.  Patient is awaiting placement and refusing his life care because of previous bad experience.  He needs two negative COVIDs before he can be placed and unfortunately persistently tests positive.  He was admitted and has not needed  Much o2.he is doing very well resp wise and can be discharged when there is an accepting snf.     Patient with acute DVT left lower extremity.  Likely hypercoagulable state given COVID-19 infection as well as recent right ankle surgery currently in cast.    Continue Eliquis, will need anticoagulation for 6 months     Patient has full cast of the right ankle status post surgical correction,  now with DVT left lower extremity.  Generalized weakness physical therapy recommending post acute placement prior to discharge home.  Sees [facility].  Recommended weightbearing as tolerated.  Follow-up outpatient for removal of cast.  Pt has not been accepted by any SNF and he will be transferred to [facility].        Therefore, he is discharged in good and stable  condition to skilled nursing facility.     The patient met 2-midnight criteria for an inpatient stay at the time of discharge.    Review of Systems  Review of Systems   Constitutional: Negative for chills, diaphoresis and fever.   HENT: Negative for ear discharge, ear pain and tinnitus.    Eyes: Negative for blurred vision, double vision and photophobia.   Respiratory: Negative for cough, hemoptysis and sputum production.    Cardiovascular: Negative for chest pain, palpitations and orthopnea.   Gastrointestinal: Negative for heartburn, nausea and vomiting.   Genitourinary: Negative for dysuria, frequency and urgency.   Musculoskeletal: Negative for myalgias and neck pain.   Skin: Negative for itching and rash.   Neurological: Negative for dizziness, tingling and headaches.       Past Medical History   has a past medical history of Arthritis, CAD (coronary artery disease) (October 2013), Cataract, Diabetes, High cholesterol, Hyperlipidemia, Hypertension, Pain, Pneumonia (1968), Stroke ([facility]) (2010), and Syncope.    Surgical History   has a past surgical history that includes carotid endarterectomy (7/13/2010); stent placement (2013); tonsillectomy (as a child); cataract extraction with iol (Bilateral); tendon lenghtening (Right, 8/24/2020); ankle fusion (Right, 8/24/2020); tendon transfer (Right, 8/24/2020); orthopedic osteotomy (Right, 8/24/2020); and hammertoe correction (Right, 8/24/2020).     Family History  family history includes Other in his mother.     Social History   reports that he has never smoked. He has never used smokeless tobacco. He reports previous alcohol use. He reports that he does not use drugs.    Allergies  Allergies   Allergen Reactions   • Fish Hives     shellfish   • Pcn [Penicillins] Hives   • Amoxicillin Hives   • Food Swelling      Eggs,Melons, avocados-puffy mouth       Medications  Prior to Admission Medications   Prescriptions Last Dose Informant Patient Reported? Taking?    Cholecalciferol (VITAMIN D-3) 5000 UNITS TABS Unknown at Unknown MAR from Other Facility Yes No   Sig: Take 1 Tab by mouth every 48 hours.   Empagliflozin-metFORMIN HCl (SYNJARDY) 5-1000 MG Tab Unknown at Unknown MAR from Other Facility No No   Sig: Take 1 Tab by mouth 2 Times a Day.   Probiotic Product (CULTURELLE PRO-WELL PO) Unknown at Unknown MAR from Other Facility Yes Yes   Sig: Take 1 Cap by mouth every day.   aspirin EC (ECOTRIN) 81 MG Tablet Delayed Response Unknown at Unknown MAR from Other Facility Yes Yes   Sig: Take 81 mg by mouth every day.   atorvastatin (LIPITOR) 80 MG tablet Unknown at Unknown MAR from Other Facility No No   Sig: TAKE ONE TABLET BY MOUTH DAILY IN THE EVENING   Patient taking differently: Take 80 mg by mouth every day. TAKE ONE TABLET BY MOUTH DAILY IN THE EVENING   calcium carbonate (TUMS) 500 MG Chew Tab Unknown at Unknown MAR from Other Facility Yes Yes   Sig: Chew 500 mg as needed (For indigestion).   carvedilol (COREG) 6.25 MG Tab Unknown at Unknown MAR from Other Facility No No   Sig: Take 1 Tab by mouth 2 times a day, with meals.   insulin lispro (HUMALOG) 100 UNIT/ML Unknown at Unknown MAR from Other Facility Yes Yes   Sig: Inject 2-10 Units under the skin 3 times a day before meals. Sliding scale  151-200 2 units  201-250 4 units  251-300 6 units  301-350 8 units   351-400 10 units below 60 or above 400 notify MD morrisseyst (SINGULAIR) 10 MG Tab Unknown at Unknown MAR from Other Facility No No   Sig: TAKE 1 TABLET BY MOUTH EVERY DAY   Patient taking differently: Take 10 mg by mouth every day.   nitroglycerin (NITROSTAT) 0.4 MG SL Tab Unknown at Unknown MAR from Other Facility No No   Sig: Place 1 Tab under tongue as needed for Chest Pain.   ondansetron (ZOFRAN ODT) 4 MG TABLET DISPERSIBLE Unknown at Unknown MAR from Other Facility Yes Yes   Sig: Take 4 mg by mouth every 6 hours as needed for Nausea.   oxyCODONE immediate-release (ROXICODONE) 5 MG Tab Unknown at Unknown  MAR from Other Facility Yes Yes   Sig: Take 5 mg by mouth every 6 hours as needed for Severe Pain.   pioglitazone (ACTOS) 15 MG Tab Unknown at Unknown MAR from Other Facility Yes Yes   Sig: Take 15 mg by mouth every day.      Facility-Administered Medications: None       Physical Exam  Temp:  [36.2 °C (97.2 °F)-36.7 °C (98.1 °F)] 36.6 °C (97.9 °F)  Pulse:  [68-78] 71  Resp:  [18-20] 18  BP: ()/(50-72) 139/72  SpO2:  [92 %-96 %] 92 %    Physical Exam  Constitutional:       General: He is not in acute distress.     Appearance: He is not toxic-appearing.   HENT:      Head: Normocephalic and atraumatic.      Nose: Nose normal.      Mouth/Throat:      Mouth: Mucous membranes are dry.   Eyes:      Extraocular Movements: Extraocular movements intact.      Pupils: Pupils are equal, round, and reactive to light.   Neck:      Musculoskeletal: Normal range of motion and neck supple.   Cardiovascular:      Rate and Rhythm: Normal rate and regular rhythm.      Pulses: Normal pulses.      Heart sounds: Normal heart sounds.   Pulmonary:      Effort: Pulmonary effort is normal.      Breath sounds: Normal breath sounds.   Abdominal:      General: Bowel sounds are normal.      Palpations: Abdomen is soft.   Musculoskeletal:      Comments: CAST ON THE RIGHT LEG IS NOTED   Skin:     General: Skin is warm and dry.   Neurological:      General: No focal deficit present.      Mental Status: He is alert. Mental status is at baseline.         Laboratory:          No results for input(s): ALTSGPT, ASTSGOT, ALKPHOSPHAT, TBILIRUBIN, DBILIRUBIN, GAMMAGT, AMYLASE, LIPASE, ALB, PREALBUMIN, GLUCOSE in the last 72 hours.      No results for input(s): NTPROBNP in the last 72 hours.      No results for input(s): TROPONINT in the last 72 hours.    Imaging:  US-EXTREMITY VENOUS LOWER BILAT   Final Result      CT-CTA CHEST PULMONARY ARTERY W/ RECONS   Final Result      1.  No CT evidence of pulmonary embolism.      2.  Some limited scattered  pulmonary opacifications are noted as described above. Findings could be due to pneumonitis inflammation or other infection.      3.  Probable consolidative atelectasis noted in the left lung base.      4.  Borderline enlarged right-sided mediastinal node is identified. Enlargement could be due to inflammation or infection or less likely neoplasm.      Imaging features can be seen with COVID-19 pneumonia, though are nonspecific and can occur with a variety of infectious and noninfectious processes.            DX-CHEST-PORTABLE (1 VIEW)   Final Result      New mild peripheral opacification could represent mild Covid pneumonia            Assessment/Plan:  I anticipate this patient will require at least two midnights for appropriate medical management, necessitating inpatient admission.    HTN (hypertension)- (present on admission)  Assessment & Plan  Continue outpatient Carvedilol with holding parameters    Anemia  Assessment & Plan  Normocytic anemia  Iron panel reveals low iron however ferritin is very high, consistent with chronic disease and covid  No acute bleeding noted    Age-related physical debility  Assessment & Plan  F/u with ortho    History of ankle surgery- (present on admission)  Assessment & Plan  Patient has full cast of the right ankle status post surgical correction,  now with DVT left lower extremity.  Generalized weakness physical therapy recommending post acute placement prior to discharge home.  Sees [facility].  Recommended weightbearing as tolerated.  Follow-up outpatient for removal of cast.    Acute deep vein thrombosis (DVT) of calf muscle vein of left lower extremity (HCC)- (present on admission)  Assessment & Plan  Patient with acute DVT left lower extremity.  Likely hypercoagulable state given COVID-19 infection as well as recent right ankle surgery currently in cast.    Continue Eliquis, will need anticoagulation for 6 months    COVID-19 virus infection- (present on  admission)  Assessment & Plan  Droplet precaution  Supportive care  Ordered inflammatory markers  Procalcitonin negative.  Dc doxycycline  Continue dexamethasone  CTA chest consistent with COVID-19 pneumonia  Vitamin C ordered vitamin D level normal  Needs two negatives for placement  [facility].    Type 2 diabetes mellitus with peripheral neuropathy (HCC)- (present on admission)  Assessment & Plan  insulin sliding scale with D50 and glucagon for hypoglycemia per protocol.  Diabetic diet  Diabetic education      DVT PROPHYLAXIS  ON ELIQUIS

## 2020-12-05 NOTE — DISCHARGE PLANNING
This referral has been escalated to a Clinical Supervisor for review in order to determine Home Health appropriateness.

## 2020-12-05 NOTE — THERAPY
Occupational Therapy  Daily Treatment     Patient Name: Agapito Stone  Age:  75 y.o., Sex:  male  Medical Record #: [id number]  Today's Date: 12/4/2020     Precautions  Precautions: (P) Fall Risk, Weight Bearing As Tolerated Right Lower Extremity  Comments: ok to WB but still in cast very unsteady     Assessment    Pt is making gains in functional mobility and ADLS. Spoke with Pt about getting a WC for home use as he recovers. Pt could return home with home health and the use of a WC for longer distances in the home. He will continue to benefit from Acute OT services to increase independence.    Plan    Continue current treatment plan.    DC Equipment Recommendations: (P) Unable to determine at this time  Discharge Recommendations: (P) Recommend home health for continued occupational therapy services     12/04/20 2069   Precautions   Precautions Fall Risk;Weight Bearing As Tolerated Right Lower Extremity   Cognition    Cognition / Consciousness X   Level of Consciousness Alert   Ability To Follow Commands 2 Step   Safety Awareness Impaired   New Learning Impaired   Attention Impaired   Sequencing Impaired   Initiation Impaired   Active ROM Upper Body   Active ROM Upper Body  X   Dominant Hand Right   Comments limitations   Strength Upper Body   Upper Body Strength  X   Gross Strength Generalized Weakness, Equal Bilaterally.    Balance   Sitting Balance (Static) Fair +   Sitting Balance (Dynamic) Fair   Standing Balance (Static) Fair -   Standing Balance (Dynamic) Poor +   Weight Shift Sitting Fair   Weight Shift Standing Poor   Skilled Intervention Verbal Cuing;Tactile Cuing   Bed Mobility    Supine to Sit Supervised   Scooting Supervised   Skilled Intervention Verbal Cuing;Tactile Cuing   Activities of Daily Living   Eating Modified Independent   Grooming Supervision;Seated   Upper Body Dressing Minimal Assist   Skilled Intervention Verbal Cuing;Tactile Cuing   Functional Mobility   Sit to Stand Minimal Assist    Bed, Chair, Wheelchair Transfer Minimal Assist   Transfer Method Stand Step   Mobility FWW   Skilled Intervention Verbal Cuing   Patient / Family Goals   Patient / Family Goal #1 Home with family   Short Term Goals   Short Term Goal # 1 Pt will be able to tolerate sitting up in a chair, 3x daily for meals   Goal Outcome # 1 Progressing as expected   Short Term Goal # 2 Pt will be able to complete functional transfers with Min A   Goal Outcome # 2 Progressing as expected   Short Term Goal # 2 B  pt will complete toilet txf w/spv    Goal Outcome # 2 B Progressing as expected   Short Term Goal # 3 Pt will be able to complete FB dressing with Min A    Goal Outcome # 3 Progressing as expected

## 2020-12-05 NOTE — DISCHARGE PLANNING
Received Choice form at 1500  Agency/Facility Name: [facility]  Referral sent per Choice form @ 1829

## 2020-12-06 NOTE — DISCHARGE PLANNING
Received second call from: Mariana Bowers RN / Hospitalist -[phone]    Called again to request transfer to [facility].    Asked her specifically:  · Ambulatory/steady gait/no more than assist of 1?  Answer: Yes; uses FWW for ambulation.     · Has PT worked with pt on step/stair training:        Answer: No; pt has full flight of stairs at home;          Bathroom & shower on the 2nd floor of house.     · Is pt awake/oriented/ following commands/ no confusion or AMS/not agitated or combative requiring restraints?        Answer: Yes to all     · Have SNF referrals been made?        Answer: Yes - today (Saturday)     · Have any SNFs accepted pt:                    Answer: NO.     · Is spouse able to care for pt at home with home health assistance/visits?        Answer: NO.  Please see LSW note from this         afternoon at 1608.     · Has PT been working with pt on stairs/steps:  Answer: NO.    · Is patient receiving wound care?               Answer: NO.    Transfer Center advised caller that [facility] charge RN would be asked to review one more time, but second declination may be likely.  Caller is in agreement with plan.  Transfer Center will notify her of outcome.    1724: text from Hospitalist STEVEN Bowers:  I just wanted to update that I talked to bedside rn and he said can’t do step up. Finally was able to get in touch w him. So I guess no on [facility].  I will have the pt work with him tomorrow and get their insight.

## 2020-12-06 NOTE — DISCHARGE PLANNING
ATTN: Case Management  RE: Referral for Home Health    As of 12/6/20, we have accepted the Home Health referral for the patient listed above.    A [facility] clinician will be out to see the patient within 48 hours. If you have any questions or concerns regarding the patient's transition to Home Health, please do not hesitate to contact us at [phone].      We look forward to collaborating with you,  [facility] Team

## 2020-12-06 NOTE — CONSULTS
This is a 75-year-old male who underwent an extensive right foot reconstruction with Dr. Bolivar back in August.  It sounds as though he went to a facility and there have been a lot of issues with him being able to obtain follow-up.  He is now admitted to the Covid unit and is having a hard time mobilizing with his cast in place and so I was consulted to assess for cast removal.  He complains of no pain.  He is not had any issues other than just difficulty getting around in the cast.    Past Medical History:   Diagnosis Date   • Arthritis     hands, wrists, ankles   • CAD (coronary artery disease) October 2013    Dr. Ovalle; [facility]. PCI/LETA x 2 of the mid circumflex (Xience 3.25 x 23mm) and proximal circumflex (Xience 3.6x 12mm)   • Cataract     sugery   • Diabetes     oral meds   • High cholesterol    • Hyperlipidemia    • Hypertension    • Pain     ankles   • Pneumonia 1968   • Stroke (HCC) 2010    no residual effects   • Syncope        Past Surgical History:   Procedure Laterality Date   • TENDON LENGHTENING Right 8/24/2020    Procedure: LENGTHENING, TENDON- , GASTROC RECESSION;  Surgeon: Royal Bolivar M.D.;  Location: [facility];  Service: Orthopedics   • ANKLE FUSION Right 8/24/2020    Procedure: FUSION, JOINT, ANKLE- ANKLE SUBTALAR FUSION , BONE GRAFT, MEDIAL RELEASE INCLUDING TENDONS, PATIAL EXCISION OF FIBULA;  Surgeon: Royal Bolivar M.D.;  Location: [facility];  Service: Orthopedics   • TENDON TRANSFER Right 8/24/2020    Procedure: TRANSFER, TENDON- PLANTAR FASCIA RELEASE;  Surgeon: Royal Bolivar M.D.;  Location: [facility];  Service: Orthopedics   • ORTHOPEDIC OSTEOTOMY Right 8/24/2020    Procedure: OSTEOTOMY- 1ST METATARSAL, CROSS PROCEDURE, 2-5 METATARSAL OSTEOTOMY, 2-5 PHALANGEL JOINT RECONSTRUCTION;  Surgeon: Royal Bolivar M.D.;  Location: [facility];  Service: Orthopedics   • HAMMERTOE CORRECTION Right 8/24/2020    Procedure: CORRECTION,  HAMMER TOE 2-5;  Surgeon: Royal Bolivar M.D.;  Location: SUR[facility]ERY [facility];  Service: Orthopedics   • STENT PLACEMENT  2013    cardiac   • CAROTID ENDARTERECTOMY  7/13/2010    left; Performed by CHIQUITA CORRIGAN at SURGERY [facility]   • CATARACT EXTRACTION WITH IOL Bilateral    • TONSILLECTOMY  as a child       Medications  No current facility-administered medications on file prior to encounter.      Current Outpatient Medications on File Prior to Encounter   Medication Sig Dispense Refill   • pioglitazone (ACTOS) 15 MG Tab Take 15 mg by mouth every day.     • aspirin EC (ECOTRIN) 81 MG Tablet Delayed Response Take 81 mg by mouth every day.     • calcium carbonate (TUMS) 500 MG Chew Tab Chew 500 mg as needed (For indigestion).     • Probiotic Product (CULTURELLE PRO-WELL PO) Take 1 Cap by mouth every day.     • insulin lispro (HUMALOG) 100 UNIT/ML Inject 2-10 Units under the skin 3 times a day before meals. Sliding scale  151-200 2 units  201-250 4 units  251-300 6 units  301-350 8 units   351-400 10 units below 60 or above 400 notify MD     • oxyCODONE immediate-release (ROXICODONE) 5 MG Tab Take 5 mg by mouth every 6 hours as needed for Severe Pain.     • ondansetron (ZOFRAN ODT) 4 MG TABLET DISPERSIBLE Take 4 mg by mouth every 6 hours as needed for Nausea.     • carvedilol (COREG) 6.25 MG Tab Take 1 Tab by mouth 2 times a day, with meals. 120 Tab 0   • atorvastatin (LIPITOR) 80 MG tablet TAKE ONE TABLET BY MOUTH DAILY IN THE EVENING (Patient taking differently: Take 80 mg by mouth every day. TAKE ONE TABLET BY MOUTH DAILY IN THE EVENING) 100 Tab 3   • Empagliflozin-metFORMIN HCl (SYNJARDY) 5-1000 MG Tab Take 1 Tab by mouth 2 Times a Day. 180 Tab 3   • montelukast (SINGULAIR) 10 MG Tab TAKE 1 TABLET BY MOUTH EVERY DAY (Patient taking differently: Take 10 mg by mouth every day.) 90 Tab 3   • nitroglycerin (NITROSTAT) 0.4 MG SL Tab Place 1 Tab under tongue as needed for Chest Pain. 25 Tab 1   •  "Cholecalciferol (VITAMIN D-3) 5000 UNITS TABS Take 1 Tab by mouth every 48 hours.         Allergies  Fish, Pcn [penicillins], Amoxicillin, and Food    ROS  All other systems were reviewed and found to be negative    Family History   Problem Relation Age of Onset   • Other Mother         Rheumatic fever       Social History     Socioeconomic History   • Marital status:      Spouse name: Not on file   • Number of children: Not on file   • Years of education: Not on file   • Highest education level: Not on file   Occupational History   • Not on file   Social Needs   • Financial resource strain: Not hard at all   • Food insecurity     Worry: Never true     Inability: Never true   • Transportation needs     Medical: No     Non-medical: No   Tobacco Use   • Smoking status: Never Smoker   • Smokeless tobacco: Never Used   Substance and Sexual Activity   • Alcohol use: Not Currently     Alcohol/week: 0.0 oz     Comment: once a year   • Drug use: No   • Sexual activity: Not on file   Lifestyle   • Physical activity     Days per week: Not on file     Minutes per session: Not on file   • Stress: Not on file   Relationships   • Social connections     Talks on phone: Not on file     Gets together: Not on file     Attends [employer] service: Not on file     Active member of club or organization: Not on file     Attends meetings of clubs or organizations: Not on file     Relationship status: Not on file   • Intimate partner violence     Fear of current or ex partner: Not on file     Emotionally abused: Not on file     Physically abused: Not on file     Forced sexual activity: Not on file   Other Topics Concern   • Not on file   Social History Narrative   • Not on file       Physical Exam  Vitals  /80   Pulse 76   Temp 36.6 °C (97.8 °F) (Temporal)   Resp (!) 21   Ht 1.88 m (6' 2\")   Wt 89.8 kg (198 lb)   SpO2 97%   General: Well Developed, Well Nourished, in bedside chair  HEENT: Normocephalic, atraumatic  Eyes: " Anicteric   Neck: Supple, nontender, no masses  Lungs: Non labored breathing  Heart: RRR  Abdomen: Soft, NT, ND  Skin: There is a large ulceration with necrotic skin and macerated tissue over the heel of the right   Extremities: Examination of his extremities pretty much is significant for the heel ulceration.  Otherwise no deformity or unexpected findings  Neuro: He has diminished sensation consistent with neuropathy in the foot  Vascular: I could not palpate any pulses in his foot.  He does have cap refill in the toes.    Radiographs:  US-EXTREMITY VENOUS LOWER BILAT   Final Result      CT-CTA CHEST PULMONARY ARTERY W/ RECONS   Final Result      1.  No CT evidence of pulmonary embolism.      2.  Some limited scattered pulmonary opacifications are noted as described above. Findings could be due to pneumonitis inflammation or other infection.      3.  Probable consolidative atelectasis noted in the left lung base.      4.  Borderline enlarged right-sided mediastinal node is identified. Enlargement could be due to inflammation or infection or less likely neoplasm.      Imaging features can be seen with COVID-19 pneumonia, though are nonspecific and can occur with a variety of infectious and noninfectious processes.            DX-CHEST-PORTABLE (1 VIEW)   Final Result      New mild peripheral opacification could represent mild Covid pneumonia      DX-ANKLE 3+ VIEWS RIGHT    (Results Pending)       Laboratory Values      No results for input(s): SODIUM, POTASSIUM, CHLORIDE, CO2, GLUCOSE, BUN, CPKTOTAL in the last 72 hours.          Impression:  Status post right foot and ankle reconstruction 3 months ago, now with large heel pressure ulcer    Plan:  I removed his cast.  He needs to remain nonweightbearing on the right leg and will need to float his heel to prevent any further pressure.  Will consult wound care.  I have informed Dr. Bolivar's team.  They can provide further recommendations, especially whether any  further surgery is necessary.

## 2020-12-06 NOTE — DISCHARGE PLANNING
"Anticipated Discharge Disposition: SNF vs HH    Action: Updated by RN that pt was cleared for d/c home with HH by OT. LSW called pt in his room to discuss discharge. Pt requested LSW call his wife to discuss discharge planning.     LSW called pt's wife, Simran. Spouse voiced concern over pt coming home since she hasn't seen him in two weeks and isn't sure what his mobility is like. Simran states pt has a history of falls and she's concerned since she doesn't have anyone to help her. They also live in a two story home and pt will need to navigate stairs to get to the shower. Simran tearful during conversation stating she can't take him home. Discussed other option of SNF placement. Simran would be in agreement with SNF but doesn't want him to go to [facility]. Simran stated she would call pt and discuss d/c options with him then call SW back.     LSW received call from Simran and her neighbor Sheila. Simran requested Sheila be included in discharge planning conversation. Sheila shared with SW that Simran and pt are \"hoarders\" and that home is currently cluttered and pt would be unable to navigate the home with a FWW or a WC. She is helping Simran hire a professional company to help them clean it out, but for right now they don't feel pt would be safe to return home. Simran also continues to be worried about pt falling. Simran is agreeable to a SNF blanket referral for placement. Also notified her of the possibility of pt being transferred to the [facility].     Choice form completed and faxed    Barriers to Discharge: SNF placement    Plan: F/U with SNF  "

## 2020-12-06 NOTE — THERAPY
Physical Therapy   Daily Treatment     Patient Name: Agapito Stone  Age:  75 y.o., Sex:  male  Medical Record #: [id number]  Today's Date: 12/6/2020     Precautions: Fall Risk, Weight Bearing As Tolerated Right Lower Extremity    Assessment    Pt with no c/o pain R foot with standing with cast and cast shoe applied. Spoke with hospitalist RN about f/u with Dr. Bolivar regarding cast removal. Pt is weak and is unable to tolerate ambulation more than a few steps in his room, would not be able to transfer to [facility] at this time and go up a few steps to access bathroom. Pts home does not sound like a safe DC plan at this time, recommend SNF for further PT. At the end of PT session Dr. Bui entered room to remove cast.     Plan    Continue current treatment plan.    DC Equipment Recommendations: (P) Unable to determine at this time  Discharge Recommendations: (P) Recommend post-acute placement for additional physical therapy services prior to discharge home       12/06/20 0818   Precautions   Precautions Fall Risk   Comments WBAT in cast ok (pt now has a cast shoe R LE)   Cognition    Comments Pt confused, unable to give a tineline how long he has had his cast on, no c/o pain, agreeable for PT   Gait Analysis   Gait Level Of Assist Minimal Assist   Assistive Device Front Wheel Walker   Distance (Feet)   (few steps in room)   # of Times Distance was Traveled 1   Deviation Step To;Bradykinetic   Weight Bearing Status WBAT R LE in cast show, donned custom shoe for L LE   Bed Mobility    Supine to Sit   (NT, pt sitting up in chair)   Functional Mobility   Sit to Stand Minimal Assist   Bed, Chair, Wheelchair Transfer Minimal Assist   Transfer Method Stand Step  (with FWW)   Activity Tolerance   Sitting in Chair as tolerated   Standing 3-4 min   Short Term Goals    Short Term Goal # 1 pt will perform supine <> sit without bed features with SPV in 6 visits to be able to get in/out of bed at home   Goal Outcome # 1 Progressing  as expected   Short Term Goal # 2 B  Pt will be able to perform sit <> stand and transfer Caron in 6 visits so can transfer to chair safely.   Goal Outcome # 2 B Progressing slower than expected   Short Term Goal # 4 pt will demo WC mobility with SPV in 6 visits for improved activity   Goal Outcome # 4 Goal not met

## 2020-12-06 NOTE — PROGRESS NOTES
[facility] Medicine Daily Progress Note    Date of Service  12/6/2020    Chief Complaint  75 y.o. male admitted 11/18/2020 with weakness.    Hospital Course  Mr. Agapito Stone is a 75 y.o. male with past medical history of diabetes mellitus, essential hypertension, recent right ankle surgery who presented on 11/18/2020 with weakness.  Per wife she could not take care of him anymore.  In the ER he was found to have elevated troponin and abnormal chest x-ray.  CT PE study was negative for pulmonary embolism but found to have possible pneumonitis or infection  Covid test was positive.  US showed DVT in LLE and he was started on Eliquis.  He did complete a 3 day course of Ceftriaxone for a UTI.  PT/OT evaluated the patient and are recommending SNF at discharge. Patient refusing to go to [facility] care, will need negative coivd test prior to dc to another facility.     Interval Problem Update  Awaiting for the cast to come off    Consultants/Specialty  ortho    Code Status  Full Code    Disposition  pending    Review of Systems  Review of Systems   Constitutional: Negative for chills, diaphoresis and fever.   HENT: Negative for ear discharge, ear pain and tinnitus.    Eyes: Negative for blurred vision, double vision and photophobia.   Respiratory: Negative for cough, hemoptysis and sputum production.    Cardiovascular: Negative for chest pain, palpitations and orthopnea.   Gastrointestinal: Negative for heartburn, nausea and vomiting.   Genitourinary: Negative for dysuria, frequency and urgency.   Musculoskeletal: Negative for back pain, myalgias and neck pain.   Skin: Negative for itching.   Neurological: Negative for dizziness, tingling and headaches.        Physical Exam  Temp:  [36.5 °C (97.7 °F)-37.3 °C (99.2 °F)] 36.6 °C (97.8 °F)  Pulse:  [65-77] 76  Resp:  [18-21] 21  BP: (108-140)/(56-80) 140/80  SpO2:  [92 %-97 %] 97 %    Physical Exam  Constitutional:       Appearance: Normal appearance.   HENT:      Head:  Normocephalic and atraumatic.      Nose: Nose normal.      Mouth/Throat:      Mouth: Mucous membranes are dry.   Eyes:      Extraocular Movements: Extraocular movements intact.      Pupils: Pupils are equal, round, and reactive to light.   Neck:      Musculoskeletal: Normal range of motion and neck supple.   Cardiovascular:      Rate and Rhythm: Normal rate and regular rhythm.      Pulses: Normal pulses.      Heart sounds: Normal heart sounds.   Pulmonary:      Effort: Pulmonary effort is normal.      Breath sounds: Normal breath sounds.   Abdominal:      General: Abdomen is flat.      Palpations: Abdomen is soft.   Musculoskeletal:      Comments: Cast on the right leg   Skin:     General: Skin is warm and dry.   Neurological:      General: No focal deficit present.      Mental Status: He is alert and oriented to person, place, and time.         Fluids    Intake/Output Summary (Last 24 hours) at 12/6/2020 1011  Last data filed at 12/6/2020 0609  Gross per 24 hour   Intake --   Output 1250 ml   Net -1250 ml       Laboratory                        Imaging       Assessment/Plan  HTN (hypertension)- (present on admission)  Assessment & Plan  Continue outpatient Carvedilol with holding parameters    Anemia  Assessment & Plan  Normocytic anemia  Iron panel reveals low iron however ferritin is very high, consistent with chronic disease and covid  No acute bleeding noted    Age-related physical debility  Assessment & Plan  F/u with ortho    History of ankle surgery- (present on admission)  Assessment & Plan  Patient has full cast of the right ankle status post surgical correction and was done on 8/24/20  Will call dr westfall's office  To see if cast can be taken off.  Pt has missed 2 appointments with ortho office    Acute deep vein thrombosis (DVT) of calf muscle vein of left lower extremity (HCC)- (present on admission)  Assessment & Plan  Patient with acute DVT left lower extremity.  Likely hypercoagulable state given  COVID-19 infection as well as recent right ankle surgery currently in cast.    Continue Eliquis, will need anticoagulation for 6 months    COVID-19 virus infection- (present on admission)  Assessment & Plan  Droplet precaution  Supportive care  Ordered inflammatory markers  Procalcitonin negative.  Dc doxycycline  Continue dexamethasone  CTA chest consistent with COVID-19 pneumonia  Vitamin C ordered vitamin D level normal  Needs two negatives for placement  [facility] be transferred to [facility] when accepted    Type 2 diabetes mellitus with peripheral neuropathy (HCC)- (present on admission)  Assessment & Plan  insulin sliding scale with D50 and glucagon for hypoglycemia per protocol.  Diabetic diet  Diabetic education       VTE prophylaxis: eliquis

## 2020-12-07 NOTE — PROGRESS NOTES
[facility] Medicine Daily Progress Note    Date of Service  12/7/2020    Chief Complaint  75 y.o. male admitted 11/18/2020 with weakness.    Hospital Course  Mr. Agapito Stone is a 75 y.o. male with past medical history of diabetes mellitus, essential hypertension, recent right ankle surgery who presented on 11/18/2020 with weakness.  Per wife she could not take care of him anymore.  In the ER he was found to have elevated troponin and abnormal chest x-ray.  CT PE study was negative for pulmonary embolism but found to have possible pneumonitis or infection  Covid test was positive.  US showed DVT in LLE and he was started on Eliquis.  He did complete a 3 day course of Ceftriaxone for a UTI.  PT/OT evaluated the patient and are recommending SNF at discharge. Patient refusing to go to [facility] care, will need negative coivd test prior to dc to another facility.     Interval Problem Update      Consultants/Specialty  ortho    Code Status  Full Code    Disposition  pending    Review of Systems  Review of Systems   Constitutional: Negative for chills, diaphoresis and malaise/fatigue.   HENT: Negative for ear discharge, ear pain and nosebleeds.    Eyes: Negative for double vision, photophobia and pain.   Respiratory: Negative for hemoptysis, sputum production and shortness of breath.    Cardiovascular: Negative for palpitations, orthopnea and claudication.   Gastrointestinal: Negative for abdominal pain, nausea and vomiting.   Genitourinary: Negative for frequency, hematuria and urgency.   Musculoskeletal: Negative for back pain, myalgias and neck pain.   Skin: Negative for itching.   Neurological: Negative for tingling, tremors and headaches.        Physical Exam  Temp:  [36.2 °C (97.2 °F)-36.8 °C (98.2 °F)] 36.2 °C (97.2 °F)  Pulse:  [72-86] 78  Resp:  [17-18] 18  BP: ()/(55-73) 140/73  SpO2:  [93 %-94 %] 94 %    Physical Exam  Constitutional:       General: He is not in acute distress.     Appearance: He is not  toxic-appearing.   HENT:      Head: Normocephalic and atraumatic.      Nose: No congestion or rhinorrhea.      Mouth/Throat:      Mouth: Mucous membranes are dry.   Eyes:      Extraocular Movements: Extraocular movements intact.      Pupils: Pupils are equal, round, and reactive to light.   Neck:      Musculoskeletal: No neck rigidity or muscular tenderness.   Cardiovascular:      Rate and Rhythm: Normal rate and regular rhythm.      Heart sounds: No murmur. No friction rub.   Pulmonary:      Effort: No respiratory distress.      Breath sounds: No stridor.   Abdominal:      General: Abdomen is flat. Bowel sounds are normal.   Musculoskeletal:      Comments: Boot on the right foot is noted   Skin:     Coloration: Skin is not jaundiced or pale.   Neurological:      General: No focal deficit present.      Mental Status: He is alert. Mental status is at baseline.         Fluids    Intake/Output Summary (Last 24 hours) at 12/7/2020 0920  Last data filed at 12/7/2020 0800  Gross per 24 hour   Intake --   Output 700 ml   Net -700 ml       Laboratory                        Imaging       Assessment/Plan  HTN (hypertension)- (present on admission)  Assessment & Plan  Continue outpatient Carvedilol with holding parameters    Anemia  Assessment & Plan  Normocytic anemia  Iron panel reveals low iron however ferritin is very high, consistent with chronic disease and covid  No acute bleeding noted    Age-related physical debility  Assessment & Plan  F/u with ortho    History of ankle surgery- (present on admission)  Assessment & Plan  Patient has full cast of the right ankle status post surgical correction and was done on 8/24/20  Ortho input is noted  Pt has missed 2 appointments with ortho office since his operation on 8/24/20 As per his wife's report to me on  12/6/20.  Wound care input is noted  Awaiting further recommendations from ortho    Acute deep vein thrombosis (DVT) of calf muscle vein of left lower extremity (HCC)-  (present on admission)  Assessment & Plan  Patient with acute DVT left lower extremity.  Likely hypercoagulable state given COVID-19 infection as well as recent right ankle surgery currently in cast.    Continue Eliquis, will need anticoagulation for 6 months    COVID-19 virus infection- (present on admission)  Assessment & Plan  Droplet precaution  Supportive care  Ordered inflammatory markers  Procalcitonin negative.  Dc doxycycline  Continue dexamethasone  CTA chest consistent with COVID-19 pneumonia  Vitamin C ordered vitamin D level normal  Needs two negatives for placement      Type 2 diabetes mellitus with peripheral neuropathy (HCC)- (present on admission)  Assessment & Plan  insulin sliding scale with D50 and glucagon for hypoglycemia per protocol.  Diabetic diet  Diabetic education       VTE prophylaxis: eliquis

## 2020-12-07 NOTE — WOUND TEAM
[facility] Wound & Ostomy Care  Inpatient Services  Initial Wound and Skin Care Evaluation    Admission Date: 11/18/2020     Last order of IP CONSULT TO WOUND CARE was found on 12/6/2020 from Hospital Encounter on 11/18/2020       HPI, PMH, SH: Reviewed    Unit where seen by Wound Team: 1117/01     WOUND CONSULT/FOLLOW UP RELATED TO:  R heel and L ankke     Self Report / Pain Level: no c/o pain      OBJECTIVE:  R heel floated off bed with pillow, pressure redistribution mattress    WOUND TYPE, LOCATION, CHARACTERISTICS (Pressure Injuries: location, stage, POA or date identified)  Wound 12/06/20 Pressure Injury Heel Posterior Right POA Unstageable under cast with suture in place (Active)      12/06/20 1830   Site Assessment Black;Brown;Yellow    Periwound Assessment Red    Margins Defined edges    Closure Open to air    Drainage Amount None    Treatments Cleansed    Wound Cleansing Normal Saline Irrigation    Dressing Cleansing/Solutions 3% Betadine    Dressing Options Hydrofera Blue Ready;Mepilex    Dressing Changed New    Dressing Status Intact    Dressing Change/Treatment Frequency Daily, and As Needed    NEXT Dressing Change/Treatment Date 12/07/20    NEXT Weekly Photo (Inpatient Only) 12/14/20    Pressure Injury Stage U    Wound Length (cm) 4.5 cm 12/06/20 1830   Wound Width (cm) 5.3 cm 12/06/20 1830   Wound Surface Area (cm^2) 23.85 cm^2 12/06/20 1830   Wound Bed Eschar (%) 98 % 12/06/20 1830   Wound Bed Slough % - (Post-Procedure) 2 % 12/06/20 1830   Shape irregular    Wound Odor None    Pulses 1+;DP    Exposed Structures Sutures    Number of days: 0       Wound 12/06/20 Pressure Injury Ankle Lateral Left DTI (Active)      12/06/20 1830   Site Assessment Red;Light Purple    Periwound Assessment Intact    Margins Defined edges    Closure Open to air    Drainage Amount None    Dressing Options Mepilex    Dressing Changed New    Dressing Status Intact    Dressing Change/Treatment Frequency Every 72 hrs, and As Needed     NEXT Dressing Change/Treatment Date 12/07/20    NEXT Weekly Photo (Inpatient Only) 12/14/20    Pressure Injury Stage DTPI    Wound Length (cm) 2.5 cm 12/06/20 1830   Wound Width (cm) 2.5 cm 12/06/20 1830   Wound Surface Area (cm^2) 6.25 cm^2 12/06/20 1830   Shape circular    Wound Odor None    Pulses 1+;DP    Exposed Structures None    Number of days: 0        Vascular:    HAKEEM:   No results found.      Lab Values:    Lab Results   Component Value Date/Time    WBC 6.5 12/02/2020 05:32 AM    RBC 3.49 (L) 12/02/2020 05:32 AM    HEMOGLOBIN 10.5 (L) 12/02/2020 05:32 AM    HEMATOCRIT 32.5 (L) 12/02/2020 05:32 AM    SEDRATEWES 26 (H) 11/19/2020 11:36 AM    HBA1C 8.0 (H) 08/31/2020 05:55 AM            Culture Results show:  No results found for this or any previous visit (from the past 720 hour(s)).      INTERVENTIONS BY WOUND TEAM: R foot cleaned with warm washcloths and forceps to remove dried blood and scabs <20cm^2  revealing some almost resolved wounds to anterior/dorsal ankle/foot. No c/o pain or bleeding.   Cleaned heel with NS, dried well. Painted with iodine, dried. Then applied piece of hydrofera blue and secured with mepilex cut to fit. Floated heel.     Interdisciplinary consultation: Patient, Bedside RN     EVALUATION: Pt had cast removed today  From Sx 8/24/2020 and there is an unstageable pressure injury. Eschar with sutures in R heel pressure injury. Sutures present in 2nd, 4-5 toes from Sx. More sutures in anterior ankle/-dorsal foot. L lateral ankle with DTI and small amount of dry crust in center. Unsure of stage @ this time waiting for it to reveal. Applied iodine to R heel for antimicrobial and to try to dry /maintain eschar for body to autolytic debride. Hydrofera blue and mepilex over dry iodine for antimicrobial and padding since pt c/o pain when heel touching bed. Mepilex to L lateral ankle for padding and protection waiting for DTI to reveal.   Hydrofera Blue applied for the hydrophilic  "polyurethane foam which contains ethylene oxide used as a bactericidal, fungicidal, and sporicidal disinfectant. Hydrofera Blue also aids in maintaining a moist wound environment. The absorption properties of this dressing are important in collecting exudates and bacteria from the injured area. These harmful fluid secretions bind to the dressing removing it from the wound without the foam sticking to the wound causing more harm.   Pt may need HAKEEM for wound healing potential of R heel.     Goals: Maintain DTI for body to reveal and then slowly decreased wound depth and size.  R heel maintain intact eschar since body has created a \"biological band-aid\" for body to autolytic debride at own pace.     NURSING PLAN OF CARE ORDERS (X):    Dressing changes: See Dressing Care orders: x  Skin care: See Skin Care orders:x  Rectal tube care: See Rectal Tube Care orders:   Other orders:    RSKIN:   CURRENTLY IN PLACE (X), APPLIED THIS VISIT (A), ORDERED (O):   Q shift Bradley: x   Q shift pressure point assessments:    Pressure redistribution mattress     x       Low Airloss          Bariatric ERIKA         Bariatric foam           Heel float boots   o  Heel Silicone dressing        Float Heels off Bed with Pillows  x             Barrier wipes         Barrier Cream         Barrier paste          Sacral silicone dressing  Used on lateral L ankle       Silicone O2 tubing         Anchorfast         Cannula fixation Device (Tender )          Gray Foam Ear protectors           Trach with Optifoam split foam                 Waffle cushion        Waffle Overlay         Rectal tube or BMS    Purwick/Condom Cath          Antifungal tx      Interdry          Reposition q 2 hours  x      Up to chair        Ambulate      PT/OT        Dietician        Diabetes Education      PO  x   TF     TPN     NPO   # days   Other        WOUND TEAM PLAN OF CARE   Dressing changes by wound team:          Follow up 1-2 times weekly:    x           Follow " up 3 times weekly:                NPWT change 3 times weekly:     Follow up as needed:       Other (explain):     Anticipated discharge plans:  LTACH:        SNF/Rehab:                  Home Care:           Outpatient Wound Center:            Self Care:            Other: unsure of needs @ this time, needs follow up with Dr Bolivar

## 2020-12-07 NOTE — PROGRESS NOTES
Around 2300 patient moved rooms in his bed by RN staff.     Patient seen by wound team who provided cares for the day. Patient sating well on 4L O2 overnight. No complaints of pain. Will continue to monitor.

## 2020-12-07 NOTE — DISCHARGE PLANNING
"Hospital Care Management Discharge Planning        Anticipated Discharge Disposition:   · SNF     Action:   · Pending SNF acceptance. [facility] referrals sent out on 12/5.     Barriers to Discharge:   · Medical clearance     Plan:    · Hospital Care Management Team to continue to provide support services and assistance with discharge planning as needed.         Current Expected Day of Discharge: 12/15        For further assistance please contact the assigned RN Case Manager or  at the extension listed under \"Treatment Teams\".  "

## 2020-12-08 NOTE — DISCHARGE PLANNING
Agency/Facility Name: [facility]  Spoke To: Surendra  Outcome:  This facility is currently 11 days from accepting Pts. They test again Tomorrow 12/9 to see if they have to start the 14 days over.  Surendra said he would keep the referral. Pt needs 2 neg. Covid test. First Covid test was on 12/3

## 2020-12-09 NOTE — DISCHARGE PLANNING
"Hospital Care Management Discharge Planning       Anticipated Discharge Disposition:   · SNF     Action:   · RN MATT called pts neighbor, Radha. Radha stated that pt is a hoarder and that it is not safe for him to return home. STEVEN GUTIERREZ informed Radha that pt will not be discharging home and will be going to a SNF. Radha stated that \"eventually he will need to come home and its unsafe.\" Radha requesting home health do a home safety evaluation to determine pts home is safe to live in. Radha requesting home health come to pts house prior to DC to tell them home much stuff needs to be removed from the house and to go through pts refrigerator and remove  products. STEVEN GUTIERREZ informed radha that was not what a home safety eval is intended for and that this is not a medical related issue. Radha because upset and demanded that RN CM help find resources for pt and wife. RN CM stated it sounds like pt and wife need to research companies that specialize in hoarding scenarios. Radha requested STEVEN GUTIERREZ research companies and let her know which one to use. STEVEN GUTIERREZ informed Radha she could look it up on [employer]; STEVEN Gutierrez has no experience with horarding companies and could not recommend a company. Radha stated she would be calling [employer] and requesting someone come to the pts house to do a home O2 eval.       Barriers to Discharge:   · SNF acceptance     Plan:    · Hospital Care Management Team to continue to provide support services and assistance with discharge planning as needed.       Current Expected Day of Discharge: 12/15       For further assistance please contact the assigned RN Case Manager or  at the extension listed under \"Treatment Teams\".    "

## 2020-12-09 NOTE — PROGRESS NOTES
[facility] Medicine Daily Progress Note    Date of Service  12/9/2020    Chief Complaint  75 y.o. male admitted 11/18/2020 with weakness.    Hospital Course  Mr. Agapito Stone is a 75 y.o. male with past medical history of diabetes mellitus, essential hypertension, recent right ankle surgery who presented on 11/18/2020 with weakness.  Per wife she could not take care of him anymore.  In the ER he was found to have elevated troponin and abnormal chest x-ray.  CT PE study was negative for pulmonary embolism but found to have possible pneumonitis or infection  Covid test was positive.  US showed DVT in LLE and he was started on Eliquis.  He did complete a 3 day course of Ceftriaxone for a UTI.  PT/OT evaluated the patient and are recommending SNF at discharge. Patient refusing to go to [facility] care, will need negative coivd test prior to dc to another facility.     Interval Problem Update  OR on 12/10/20    Consultants/Specialty  ortho    Code Status  Full Code    Disposition  pending    Review of Systems  Review of Systems   Constitutional: Negative for chills, diaphoresis and malaise/fatigue.   HENT: Negative for ear discharge, ear pain and nosebleeds.    Eyes: Negative for double vision, photophobia and pain.   Respiratory: Negative for hemoptysis, sputum production and shortness of breath.    Cardiovascular: Negative for chest pain, palpitations and orthopnea.   Gastrointestinal: Negative for abdominal pain, nausea and vomiting.   Genitourinary: Negative for frequency, hematuria and urgency.   Musculoskeletal: Negative for back pain, myalgias and neck pain.   Skin: Negative for itching.   Neurological: Negative for tingling, tremors and headaches.        Physical Exam  Temp:  [36.5 °C (97.7 °F)-36.8 °C (98.2 °F)] 36.5 °C (97.7 °F)  Pulse:  [66-86] 72  Resp:  [14-18] 16  BP: (102-132)/(59-79) 132/72  SpO2:  [94 %-97 %] 97 %    Physical Exam  Constitutional:       General: He is not in acute distress.      Appearance: He is not toxic-appearing.   HENT:      Head: Normocephalic and atraumatic.      Nose: No congestion or rhinorrhea.      Mouth/Throat:      Mouth: Mucous membranes are dry.   Eyes:      Extraocular Movements: Extraocular movements intact.      Pupils: Pupils are equal, round, and reactive to light.   Neck:      Musculoskeletal: No neck rigidity or muscular tenderness.   Cardiovascular:      Rate and Rhythm: Normal rate and regular rhythm.      Heart sounds: No murmur. No friction rub.   Pulmonary:      Effort: No respiratory distress.      Breath sounds: No stridor.   Abdominal:      General: Abdomen is flat. Bowel sounds are normal.   Musculoskeletal:      Comments: Boot on the right foot is noted   Skin:     General: Skin is warm and dry.   Neurological:      General: No focal deficit present.      Mental Status: He is alert. Mental status is at baseline.         Fluids    Intake/Output Summary (Last 24 hours) at 12/9/2020 1133  Last data filed at 12/8/2020 2100  Gross per 24 hour   Intake --   Output 300 ml   Net -300 ml       Laboratory  Recent Labs     12/09/20  0518   WBC 6.5   RBC 3.46*   HEMOGLOBIN 10.1*   HEMATOCRIT 32.7*   MCV 94.5   MCH 29.2   MCHC 30.9*   RDW 53.6*   PLATELETCT 179   MPV 9.7     Recent Labs     12/08/20  0518 12/09/20  0518   SODIUM 139 138   POTASSIUM 4.2 4.5   CHLORIDE 105 105   CO2 25 24   GLUCOSE 97 95   BUN 18 20   CREATININE 1.07 1.23   CALCIUM 8.5 8.5                   Imaging       Assessment/Plan  HTN (hypertension)- (present on admission)  Assessment & Plan  Continue outpatient Carvedilol with holding parameters    Anemia  Assessment & Plan  Normocytic anemia  Iron panel reveals low iron however ferritin is very high, consistent with chronic disease and covid  No acute bleeding noted    Age-related physical debility  Assessment & Plan  F/u with ortho    History of ankle surgery- (present on admission)  Assessment & Plan  Patient has full cast of the right ankle  status post surgical correction and was done on 8/24/20  Ortho input is noted  Pt has missed 2 appointments with ortho office since his operation on 8/24/20 As per his wife's report to me on  12/6/20.  Wound care input is noted  Heel pictures are reviewed  Spoke to  dr pandey this morning  12/9 and he stated that he will take him to the   O.R on 12/10/20    Acute deep vein thrombosis (DVT) of calf muscle vein of left lower extremity (HCC)- (present on admission)  Assessment & Plan  Patient with acute DVT left lower extremity.  Likely hypercoagulable state given COVID-19 infection as well as recent right ankle surgery currently in cast.    Continue Eliquis, will need anticoagulation for 6 months    COVID-19 virus infection- (present on admission)  Assessment & Plan  Droplet precaution  Supportive care  Ordered inflammatory markers  Procalcitonin negative.   Continue dexamethasone  CTA chest consistent with COVID-19 pneumonia  Vitamin C ordered vitamin D level normal  Has no resp symptoms      Type 2 diabetes mellitus with peripheral neuropathy (HCC)- (present on admission)  Assessment & Plan  insulin sliding scale with D50 and glucagon for hypoglycemia per protocol.  Diabetic diet  Diabetic education       VTE prophylaxis: eliquis

## 2020-12-09 NOTE — PROGRESS NOTES
Pt is scheduled for OR with Dr. Bolivar on Thursday 12/10 at 5 pm for R heel I&D. Consent order placed. NPO after 0700 on 12/10.

## 2020-12-09 NOTE — DISCHARGE PLANNING
LSW spoke with spouse Simran who would like pts neighbor to be added as an emergency contact.     Sheila Kent  [phone].    Sheila spoke with LSW and stated that pt and spouse are hoarders and wants a home assessment done. LSW unsure of exactly what kind of home assessment Sheila is requesting. LSW stated that [facility] therapies can help pt navigate their homes safely and stated that she would have pts CM call back with d/c updates.

## 2020-12-09 NOTE — PROGRESS NOTES
Patient sleeping most of evening and night. Sating in the 90's on room air. Patient requesting assistance with urinal but no other issues at this time.

## 2020-12-10 NOTE — THERAPY
"Occupational Therapy  Daily Treatment     Patient Name: Agapito Stone  Age:  75 y.o., Sex:  male  Medical Record #: [id number]  Today's Date: 12/9/2020       Precautions: Fall Risk, Non Weight Bearing Right Lower Extremity  Comments: orders in chart state WBAT RLE, however Dr Bui note from 12/6 states NWB RLE?  No formal orders in chart    Assessment    Pt seen for OT tx.  Nsg present during session & advised to clarify with Ortho MD what pt's WB status should be.  Order in chart is for WBAT RLE however Dr Bui note from 12 6 indicated pt should be NWB RLE?  Pt is to have I&D 12/10.  Please clarify if orders pt's correct WB status as pt currently is not able to safely maintain NWB RLE which is greatly impacting his ability to safely perform ADL's & transfers.  Pt appears to have cognitive deficits that affect his ability for new learning.    Plan    Continue current treatment plan.    DC Equipment Recommendations: Unable to determine at this time  Discharge Recommendations: Recommend post-acute placement for additional occupational therapy services prior to discharge home    Subjective    \"I'm so thirsty\"     Objective       12/09/20 1348   Cognition    Cognition / Consciousness X   Speech/ Communication Delayed Responses   Level of Consciousness Alert   Ability To Follow Commands 1 Step   Safety Awareness Impaired   New Learning Impaired   Attention Impaired   Sequencing Impaired   Initiation Impaired   Comments Pt unable to adhere to NWB RLE unless therapist held it off ground.  Pt tearful looking at his Right foot.  Pt has cognitive impairments impacting his ability to sequence ADL's   Other Treatments   Other Treatments Provided Pt educated MD has indicated NWB RLE.  Pt is to go for I&D right foot 12/10.  Pt had significant difficulty following 1 step commands   Balance   Sitting Balance (Static) Fair +   Sitting Balance (Dynamic) Fair   Standing Balance (Static) Trace +   Standing Balance (Dynamic) Trace "   Weight Shift Sitting Fair   Weight Shift Standing Absent   Comments Pt with impaired balance on LLE with FWW & Max A.   Bed Mobility    Supine to Sit Supervised   Sit to Supine Minimal Assist   Scooting Moderate Assist  (pt had difficulty scooting up into bed)   Rolling Supervised   Activities of Daily Living   Eating Modified Independent   Grooming Minimal Assist;Seated   Upper Body Dressing Minimal Assist   Lower Body Dressing Moderate Assist   Toileting Maximal Assist   Functional Mobility   Sit to Stand Maximal Assist  (Max A with FWW to maintain NWB RLE)   Comments pt unable to maintain NWB RLE during transfers   Patient / Family Goals   Patient / Family Goal #1 Home with family   Goal #1 Outcome Goal not met   Short Term Goals   Short Term Goal # 1 Pt will be able to tolerate sitting up in a chair, 3x daily for meals   Goal Outcome # 1 Goal not met   Short Term Goal # 2 Pt will be able to complete functional transfers with Min A   Goal Outcome # 2 Goal not met   Short Term Goal # 2 B  pt will complete toilet txf w/spv    Goal Outcome # 2 B Goal not met   Short Term Goal # 3 Pt will be able to complete FB dressing with Min A    Goal Outcome # 3 Goal not met

## 2020-12-10 NOTE — DISCHARGE PLANNING
"Hospital Care Management Discharge Planning       Anticipated Discharge Disposition:   · TBD     Action:   · Per multiple SNF, pt is out of SNF days. RN CM to follow up with facilities/insurance about SNF days. If pt is out of SNF days, pt will need to DC home or will need to pay out of pocket for SNF stay.      Barriers to Discharge:   · Insurance/SNF days  · Accepting facility     Plan:    · Hospital Care Management Team to continue to provide support services and assistance with discharge planning as needed.       Current Expected Day of Discharge: 12/20       For further assistance please contact the assigned RN Case Manager or  at the extension listed under \"Treatment Teams\".    "

## 2020-12-10 NOTE — ANESTHESIA PREPROCEDURE EVALUATION
Relevant Problems   NEURO   (+) History of stroke      CARDIAC   (+) Acute deep vein thrombosis (DVT) of calf muscle vein of left lower extremity (HCC)   (+) CAD (coronary artery disease)   (+) Carotid atherosclerosis   (+) HTN (hypertension)   (+) Stented coronary artery      ENDO   (+) Type 2 diabetes mellitus with both eyes affected by mild nonproliferative retinopathy without macular edema, without long-term current use of insulin (HCC)   (+) Type 2 diabetes mellitus with peripheral neuropathy (HCC)       Physical Exam    Airway   Mallampati: II  TM distance: >3 FB  Neck ROM: full       Cardiovascular - normal exam  Rhythm: regular  Rate: normal  (-) murmur     Dental       Very poor dentition   Pulmonary - normal exam  Breath sounds clear to auscultation     Abdominal    Neurological - normal exam                 Anesthesia Plan    ASA 3 (covid positive)- EMERGENT   ASA physical status 3 criteria: diabetes - poorly controlled and CAD/stents (> 3 months)ASA physical status emergent criteria: acutely contaminated wound or identified infection source    Plan - general       Airway plan will be ETT      Plan Factors:   Patient did not smoke on day of procedure.      Induction: rapid sequence and intravenous    Postoperative Plan: Postoperative administration of opioids is intended.    Pertinent diagnostic labs and testing reviewed    Informed Consent:    Anesthetic plan and risks discussed with patient.    Use of blood products discussed with: patient whom consented to blood products.

## 2020-12-10 NOTE — PROGRESS NOTES
[facility] Medicine Daily Progress Note    Date of Service  12/10/2020    Chief Complaint  75 y.o. male admitted 11/18/2020 with weakness.    Hospital Course  Mr. Agapito Stone is a 75 y.o. male with past medical history of diabetes mellitus, essential hypertension, recent right ankle surgery who presented on 11/18/2020 with weakness.  Per wife she could not take care of him anymore.  In the ER he was found to have elevated troponin and abnormal chest x-ray.  CT PE study was negative for pulmonary embolism but found to have possible pneumonitis or infection  Covid test was positive.  US showed DVT in LLE and he was started on Eliquis.  He did complete a 3 day course of Ceftriaxone for a UTI.  PT/OT evaluated the patient and are recommending SNF at discharge. Patient refusing to go to [facility] care, will need negative coivd test prior to dc to another facility.     Interval Problem Update  OR on 12/10/20    Consultants/Specialty  ortho    Code Status  Full Code    Disposition  pending    Review of Systems  Review of Systems   Constitutional: Negative for chills, fever and malaise/fatigue.   HENT: Negative for ear discharge, ear pain and tinnitus.    Eyes: Negative for blurred vision, double vision and photophobia.   Respiratory: Negative for cough, hemoptysis and sputum production.    Cardiovascular: Negative for chest pain, palpitations and orthopnea.   Gastrointestinal: Negative for heartburn, nausea and vomiting.   Genitourinary: Negative for dysuria, frequency and urgency.   Musculoskeletal: Negative for back pain, myalgias and neck pain.   Skin: Negative for itching.   Neurological: Negative for dizziness, tingling and headaches.        Physical Exam  Temp:  [36.4 °C (97.5 °F)-36.8 °C (98.2 °F)] 36.4 °C (97.5 °F)  Pulse:  [80-92] 86  Resp:  [18-21] 21  BP: (106-137)/(68-80) 123/70  SpO2:  [97 %-98 %] 98 %    Physical Exam  Constitutional:       Appearance: He is not toxic-appearing or diaphoretic.   HENT:       Head: Normocephalic and atraumatic.      Nose: Nose normal.      Mouth/Throat:      Mouth: Mucous membranes are dry.   Eyes:      Conjunctiva/sclera: Conjunctivae normal.      Pupils: Pupils are equal, round, and reactive to light.   Neck:      Musculoskeletal: Normal range of motion and neck supple.   Cardiovascular:      Rate and Rhythm: Normal rate and regular rhythm.      Pulses: Normal pulses.      Heart sounds: Normal heart sounds.   Pulmonary:      Effort: Pulmonary effort is normal.      Breath sounds: Normal breath sounds.   Abdominal:      General: Abdomen is flat.      Palpations: Abdomen is soft.   Musculoskeletal:      Comments: Boot on the right foot is noted   Skin:     Coloration: Skin is not jaundiced or pale.   Neurological:      General: No focal deficit present.      Mental Status: He is alert and oriented to person, place, and time.         Fluids    Intake/Output Summary (Last 24 hours) at 12/10/2020 1053  Last data filed at 12/10/2020 0600  Gross per 24 hour   Intake 340 ml   Output 1300 ml   Net -960 ml       Laboratory  Recent Labs     12/09/20  0518   WBC 6.5   RBC 3.46*   HEMOGLOBIN 10.1*   HEMATOCRIT 32.7*   MCV 94.5   MCH 29.2   MCHC 30.9*   RDW 53.6*   PLATELETCT 179   MPV 9.7     Recent Labs     12/08/20  0518 12/09/20  0518   SODIUM 139 138   POTASSIUM 4.2 4.5   CHLORIDE 105 105   CO2 25 24   GLUCOSE 97 95   BUN 18 20   CREATININE 1.07 1.23   CALCIUM 8.5 8.5                   Imaging       Assessment/Plan  HTN (hypertension)- (present on admission)  Assessment & Plan  Continue outpatient Carvedilol with holding parameters    Anemia  Assessment & Plan  Normocytic anemia  Iron panel reveals low iron however ferritin is very high, consistent with chronic disease and covid  No acute bleeding noted    Age-related physical debility  Assessment & Plan  F/u with ortho    History of ankle surgery- (present on admission)  Assessment & Plan  Patient has full cast of the right ankle status post  surgical correction and was done on 8/24/20  Ortho input is noted  Pt has missed 2 appointments with ortho office since his operation on 8/24/20 As per his wife's report to me on  12/6/20.  Wound care input is noted  Heel pictures are reviewed  Spoke to  dr pandey this morning  12/9 and he stated that he will take him to the   O.R on 12/10/20  Will hold eliquis    Acute deep vein thrombosis (DVT) of calf muscle vein of left lower extremity (HCC)- (present on admission)  Assessment & Plan  Patient with acute DVT left lower extremity.  Likely hypercoagulable state given COVID-19 infection as well as recent right ankle surgery currently in cast.     will need anticoagulation for  3 to  6 months  Will hold eliquis today for O.R this afternoon    COVID-19 virus infection- (present on admission)  Assessment & Plan  Droplet precaution  Supportive care  Ordered inflammatory markers  Procalcitonin negative.   Continue dexamethasone  CTA chest consistent with COVID-19 pneumonia  Vitamin C ordered vitamin D level normal  Has no resp symptoms      Type 2 diabetes mellitus with peripheral neuropathy (HCC)- (present on admission)  Assessment & Plan  insulin sliding scale with D50 and glucagon for hypoglycemia per protocol.  Diabetic diet  Diabetic education       VTE prophylaxis: eliquis on hold for today

## 2020-12-11 NOTE — DISCHARGE PLANNING
"Hospital Care Management Discharge Planning       Anticipated Discharge Disposition:   · SNF     Action:   · Pt will need SNF placement. However, pt only has 23 SNF days left. Pt now has wound vac in place which will remain in place for approx. 3 weeks. Wound vac will complicate already complicated DC plan.      Barriers to Discharge:   · Wound vac   · Lack of SNF days   · Lack of accepting SNF     Plan:    · Hospital Care Management Team to continue to provide support services and assistance with discharge planning as needed.       Current Expected Day of Discharge: 1/1/21       For further assistance please contact the assigned RN Case Manager or  at the extension listed under \"Treatment Teams\".    "

## 2020-12-11 NOTE — THERAPY
"Physical Therapy   Daily Treatment     Patient Name: Agapito Stone  Age:  75 y.o., Sex:  male  Medical Record #: [id number]  Today's Date: 12/11/2020     Precautions: Fall Risk, Toe Touch Weight Bearing Right Lower Extremity  New order placed 12/10/20 for RLE TTWB s/p R heel I&D and wound vac. No amb on heel.       Assessment  Pt motivated to participate in therapy. Pt demos understanding of RLE TTWB and is able to maintain RLE in the air when standing with FWW. However, pt with decr standing tolerance and decr time able to maintain RLE TTWB/NWB, and therefore was unable to progress to SPT or short distance ambulation. Pt requires max A for STS to FWW with height of bed elevated. Pt performed series of LE therapeutic exercises at EOB, and remained sitting EOB for lunch post-tx. Recommend sitting EOB with nursing daily, pt encouraged to perform seated LE exercises. Will follow.     Plan  Continue current treatment plan.  DC Equipment Recommendations: Unable to determine at this time  Discharge Recommendations: Recommend post-acute placement for additional physical therapy services prior to discharge home       12/11/20 1239   Precautions   Comments new order 12/10 for RLE TTWB, wound vac to R heel (WBAT on toes)   Sitting Lower Body Exercises   Long Arc Quad 1 set of 10   Marching 1 set of 10   Sit to Stand x 3 reps with height of bed elevated   Other Exercises glute squeeze x10   Neuro-Muscular Treatments   Neuro-Muscular Treatments Anterior weight shift;Verbal Cuing   Comments cues for fwd lean to sequence STS. does well with cues for \"nose over toes\"   Balance   Sitting Balance (Static) Fair +   Sitting Balance (Dynamic) Fair   Standing Balance (Static) Poor -   Standing Balance (Dynamic) Trace +   Weight Shift Sitting Fair   Weight Shift Standing Fair (able to maintain RLE NWB)   Comments standing with FWW   Gait Analysis   Gait Level Of Assist Unable to Participate   Weight Bearing Status TTWB RLE, R wound " vac to be placed to R heel   Bed Mobility    Supine to Sit Supervised (HOB elevated)   Scooting Supervised (seated)   Functional Mobility   Sit to Stand Maximal Assist (height of bed elevated, assist for lift off, VCs technique)   Bed, Chair, WC Transfer Unable to Participate   Comments STS x3. able to demo ability to keep RLE up in air. limited tolerance standing and unsafe to attempt pivot transfer d/t risk for not being able to maintain TTWB with fatigue and with I&D yesterday.   Short Term Goals    Short Term Goal # 1 pt will perform supine <> sit without bed features with SPV in 6 visits to be able to get in/out of bed at home   Goal Outcome # 1 Progressing as expected   Short Term Goal # 2 B  Pt will be able to perform sit <> stand and transfer Caron in 6 visits so can transfer to chair safely.   Goal Outcome # 2 B Progressing slower than expected   Short Term Goal # 4 pt will demo WC mobility with SPV in 6 visits for improved activity   Goal Outcome # 4 Goal not met

## 2020-12-11 NOTE — ANESTHESIA PROCEDURE NOTES
Airway    Date/Time: 12/10/2020 5:26 PM  Performed by: Merlin Downs M.D.  Authorized by: Merlin Downs M.D.     Location:  OR  Urgency:  Elective  Difficult Airway: No    Indications for Airway Management:  Anesthesia  Additional Indication: COVID-19 Active or PUI  COVID-19 Comments: I performed this patient's endotracheal intubation which required substantially increased work beyond that of the typical emergency endotracheal intubation based on the need to use COVID-19 precautions which required increased time and skill employed through use of personal protective equipment in preparing for and during the intubation as well as the additional time spent properly disposing of the equipment upon completion of the procedure.  Spontaneous Ventilation: absent    Sedation Level:  Deep  Preoxygenated: Yes    Patient Position:  Sniffing  Mask Difficulty Assessment:  0 - not attempted  Final Airway Type:  Endotracheal airway  Final Endotracheal Airway:  ETT  Cuffed: Yes    Technique Used for Successful ETT Placement:  Direct laryngoscopy  Devices/Methods Used in Placement:  Cricoid pressure    Insertion Site:  Oral  Blade Type:  Glide  Laryngoscope Blade/Videolaryngoscope Blade Size:  3  ETT Size (mm):  7.5  Measured from:  Teeth  ETT to Teeth (cm):  23  Placement Verified by: auscultation and capnometry    Cormack-Lehane Classification:  Grade I - full view of glottis  Number of Attempts at Approach:  1  Number of Other Approaches Attempted:  0

## 2020-12-11 NOTE — PROGRESS NOTES
[facility] Medicine Daily Progress Note    Date of Service  12/11/2020    Chief Complaint  75 y.o. male admitted 11/18/2020 with weakness.    Hospital Course  Mr. Agapito Stone is a 75 y.o. male with past medical history of diabetes mellitus, essential hypertension, recent right ankle surgery who presented on 11/18/2020 with weakness.  Per wife she could not take care of him anymore.  In the ER he was found to have elevated troponin and abnormal chest x-ray.  CT PE study was negative for pulmonary embolism but found to have possible pneumonitis or infection  Covid test was positive.  US showed DVT in LLE and he was started on Eliquis.  He did complete a 3 day course of Ceftriaxone for a UTI.  PT/OT evaluated the patient and are recommending SNF at discharge. Patient refusing to go to [facility] care, will need negative coivd test prior to dc to another facility.     Interval Problem Update  OR on 12/10/20    Consultants/Specialty  ortho    Code Status  Full Code    Disposition  pending    Review of Systems  Review of Systems   Constitutional: Negative for chills, diaphoresis and malaise/fatigue.   HENT: Negative for ear discharge, ear pain and nosebleeds.    Eyes: Negative for double vision, photophobia and pain.   Respiratory: Negative for hemoptysis, sputum production and shortness of breath.    Cardiovascular: Negative for chest pain, palpitations and orthopnea.   Gastrointestinal: Negative for abdominal pain, nausea and vomiting.   Genitourinary: Negative for frequency, hematuria and urgency.   Musculoskeletal: Positive for joint pain (right ankel). Negative for back pain and neck pain.   Skin: Negative for itching.   Neurological: Negative for tingling, tremors and headaches.        Physical Exam  Temp:  [36.3 °C (97.3 °F)-36.8 °C (98.2 °F)] 36.6 °C (97.8 °F)  Pulse:  [64-93] 80  Resp:  [18-20] 18  BP: ()/(55-93) 115/58  SpO2:  [92 %-100 %] 95 %    Physical Exam  Constitutional:       General: He is not in  acute distress.     Appearance: He is not toxic-appearing.   HENT:      Head: Normocephalic and atraumatic.      Nose: No congestion or rhinorrhea.      Mouth/Throat:      Mouth: Mucous membranes are dry.   Eyes:      Extraocular Movements: Extraocular movements intact.      Pupils: Pupils are equal, round, and reactive to light.   Neck:      Musculoskeletal: No neck rigidity or muscular tenderness.   Cardiovascular:      Rate and Rhythm: Normal rate and regular rhythm.      Heart sounds: No murmur. No friction rub.   Pulmonary:      Effort: No respiratory distress.      Breath sounds: No stridor.   Abdominal:      General: Abdomen is flat. Bowel sounds are normal.   Musculoskeletal:      Comments: Boot on the right foot is noted   Skin:     Coloration: Skin is not jaundiced or pale.   Neurological:      General: No focal deficit present.      Mental Status: He is alert. Mental status is at baseline.         Fluids    Intake/Output Summary (Last 24 hours) at 12/11/2020 0943  Last data filed at 12/11/2020 0224  Gross per 24 hour   Intake 700 ml   Output 350 ml   Net 350 ml       Laboratory  Recent Labs     12/09/20  0518 12/11/20  0300   WBC 6.5 6.5   RBC 3.46* 3.51*   HEMOGLOBIN 10.1* 10.3*   HEMATOCRIT 32.7* 32.9*   MCV 94.5 93.7   MCH 29.2 29.3   MCHC 30.9* 31.3*   RDW 53.6* 53.2*   PLATELETCT 179 184   MPV 9.7 9.9     Recent Labs     12/09/20  0518 12/11/20  0300   SODIUM 138 136   POTASSIUM 4.5 5.1   CHLORIDE 105 103   CO2 24 23   GLUCOSE 95 115*   BUN 20 21   CREATININE 1.23 1.20   CALCIUM 8.5 8.4                   Imaging       Assessment/Plan  HTN (hypertension)- (present on admission)  Assessment & Plan  Continue outpatient Carvedilol with holding parameters    Anemia  Assessment & Plan  Normocytic anemia  Iron panel reveals low iron however ferritin is very high, consistent with chronic disease and covid  No acute bleeding noted    Age-related physical debility  Assessment & Plan  F/u with ortho    History  of ankle surgery- (present on admission)  Assessment & Plan  Patient has full cast of the right ankle status post surgical correction and was done on 8/24/20  Ortho input is noted  Pt has missed 2 appointments with ortho office since his operation on 8/24/20 As per his wife's report to me on  12/6/20.  Wound care input is noted  Heel pictures are reviewed  Spoke to  dr pandey this morning  12/9 and he stated that he will take him to the   S/p IRRIGATION AND DEBRIDEMENT, WOUND - HEEL  ACELL graft placement  Ortho/dr gonzalez initially did not think pt needed to be on antibiotics.however noted from ortho on 12/10 states  To continue with antibiotics.  Will d/w ortho to see if they think pt should be on antibiotics..   input is noted and pt will be difficult discharge    Acute deep vein thrombosis (DVT) of calf muscle vein of left lower extremity (HCC)- (present on admission)  Assessment & Plan  Patient with acute DVT left lower extremity.  Likely hypercoagulable state given COVID-19 infection as well as recent right ankle surgery currently in cast.     will need anticoagulation for  3 to  6 months  Will hold eliquis today for O.R this afternoon    COVID-19 virus infection- (present on admission)  Assessment & Plan  Droplet precaution  Supportive care  Ordered inflammatory markers  Procalcitonin negative.   Continue dexamethasone  CTA chest consistent with COVID-19 pneumonia  Vitamin C ordered vitamin D level normal  Has no resp symptoms  Repeat covid on 12/3 is negative and on 12/8 is positive      Type 2 diabetes mellitus with peripheral neuropathy (HCC)- (present on admission)  Assessment & Plan  insulin sliding scale with D50 and glucagon for hypoglycemia per protocol.  Diabetic diet  Diabetic education       VTE prophylaxis: eliquis

## 2020-12-11 NOTE — PROGRESS NOTES
POST-OP NOTE FOR LIMB PRESERVATION SERVICE    SURGERY DATE: 12/10/2020    PROCEDURE: Procedure(s):  IRRIGATION AND DEBRIDEMENT, WOUND - HEEL  ACELL graft placement    WEIGHT BEARING STATUS: Touch toe weight bearing    PT CONSULT: Yes    ANTIBIOTICS: Continue current ABX    PLAN TO RETURN TO O.R.: No    WOUND CARE PLAN: Wound Vac  3 x week    FOLLOW-UP: OP Wound Clinic and LPS rounds in Wound Clinic    DURABLE MEDICAL EQUIPMENT: Front Wheel Walker    OTHER: VAC to ACELL graft on R heel. Change 3x/week. F/u LPS rounds at [facility] 1-2 weeks after d/c.      Erik Vidales M.D.

## 2020-12-11 NOTE — OR NURSING
1633: Pt was brought to special procedure room for pre-op procedures via gurney  by transport. Pt tolerated the transfer well, no pain or nausea, awake and alert, on room air and N95 mask in place, no coughing or SOB. Report rcvd earlier to pt's arrival from his primary RN.   1714: Patient allergies and NPO status verified, home medication reconciliation completed and belongings secured. Patient verbalizes understanding of pain scale, expected course of stay and plan of care. Surgical site verified with patient. IV access established. Sequentials placed on legs.

## 2020-12-11 NOTE — ANESTHESIA POSTPROCEDURE EVALUATION
Patient: Agapito Stone    Procedure Summary     Date: 12/10/20 Room / Location:  OR  / SURGERY [facility]    Anesthesia Start: 1708 Anesthesia Stop: 1825    Procedure: IRRIGATION AND DEBRIDEMENT, WOUND - HEEL (Right Heel) Diagnosis:     Surgeons: Royal Bolivar M.D. Responsible Provider: Merlin Downs M.D.    Anesthesia Type: general ASA Status: 3 - Emergent          Final Anesthesia Type: general  Last vitals  BP   Blood Pressure : 104/67    Temp   36.8 °C (98.2 °F)    Pulse   Pulse: 90   Resp   18    SpO2   100 %      Anesthesia Post Evaluation    Patient location during evaluation: PACU  Patient participation: complete - patient participated  Level of consciousness: awake and alert  Pain score: 0    Airway patency: patent  Anesthetic complications: no  Cardiovascular status: hemodynamically stable  Respiratory status: acceptable  Hydration status: euvolemic    PONV: none           Nurse Pain Score: 0 (NPRS)

## 2020-12-11 NOTE — ANESTHESIA TIME REPORT
Anesthesia Start and Stop Event Times     Date Time Event    12/10/2020 1649 Ready for Procedure     1708 Anesthesia Start     1825 Anesthesia Stop        Responsible Staff  12/10/20    Name Role Begin End    Merlin Downs M.D. Anesth 1708 1825        Preop Diagnosis (Free Text):  Pre-op Diagnosis             Preop Diagnosis (Codes):    Post op Diagnosis  Non-healing wound of right heel      Premium Reason  A. 3PM - 7AM    Comments: procedure: I&D of right heel wound; ASA 3E (CAD, Covid positive, sepsis)

## 2020-12-11 NOTE — OP REPORT
DATE OF SERVICE:  12/10/2020     PROCEDURE DATE: 12/10/2020     PREOPERATIVE DIAGNOSIS:  Right heel ulcer.     POSTOPERATIVE DIAGNOSIS:  Right heel ulcer.     PROCEDURES PERFORMED:    1.  Right heel irrigation, debridement posterior and plantar compartments.    2.  Right removal of internal fixation.  3.  Right bone biopsy.    4.  Right placement skin graft substitute less than 20 square cm.     SURGEON: Royal Bolivar MD     FIRST ASSISTANT:  Dr. Erik Vidales MD     SECOND ASSISTANT:  None.     ANESTHESIA:  General endotracheal.     ESTIMATED BLOOD LOSS:  None.     COMPLICATIONS:  None.     POSTOPERATIVE PLAN:    1.  Wound VAC x 7 days.  2.  Followup on cultures.  3.  Toe touch weightbearing.      INDICATIONS:  This is a patient of mine.  He was admitted to the hospital with   COVID.  He was left in his cast. Due to his medical condition, he remained in   his cast longer than followup while he was bedridden.  Cast was removed.  He   was identified with a posterior heel ulcer.  The above procedures were   discussed.  All questions were answered.  Risks of surgery were explained   limited to wound problems, infection, nerve injury, vascular injury, and need   for surgery.  He understands he could have persistent risk for ulceration,   infection, need for  further surgery.  He understands and accept these risks   and wishes to proceed.  Site was marked by myself prior to receiving   psychotropic medicines.     PROCEDURE IN DETAIL:  The patient was brought to the operating room. He   underwent general endotracheal anesthetic without complications.  His right   lower extremity was prepped and draped in sterile fashion in the supine   position.  All appropriate padding.  Positive site verification confirmed his   right lower extremity as well as above procedure and confirmation that he had   received preoperative antibiotics.  No Esmarch was used.  No tourniquet.  Leg   was crossed over his other leg with padding  placed.  Using a 15 blade, sharp   excisional debridement was brought down to the level of bone.  The   circumference was approximately 4 cm in diameter.  All necrotic tissue was   debrided back to good quality bleeding tissue.  As we went down to the level   of bone, there was noted at that time, he had a screw from his previous   internal fixation.  Appropriate screwdriver was used for hardware removal.    Thorough irrigation was performed with normal saline.  A rongeur was then used   to take a bone biopsy of the calcaneus.  This was then sent off for culture   and pathology.  Final irrigation was performed.  Vancomycin powder was then   placed into the wound followed by MicroMatrix to fill all defects.  Finally,   an ACell skin graft substitute sheet.  This is sutured in place.  Nonstick   dressing was placed.  He was transferred to recovery room in good condition.     Utilization of Dr. Vidales was necessary for patient positioning, holding,   retracting, wound closure and dressing placement.  He was present for the   entire procedure.        ______________________________  MD MARVIN OLMSTEAD/BRONWYN/JULIET    DD:  12/10/2020 18:12  DT:  12/10/2020 20:03    Job#:  [id number]

## 2020-12-12 NOTE — WOUND TEAM
Spoke with Neva HARRIS CM to update her that pt will need to be seen at LPS rounds on 12/18 if he discharges to SNF however per Neva, pt is a difficult discharge and is currently out of SNF days. Susie Kessler LPS APRN and Dr Vidales updated by Yolanda wound care PT.

## 2020-12-12 NOTE — PROGRESS NOTES
[facility] Medicine Daily Progress Note    Date of Service  12/12/2020    Chief Complaint  75 y.o. male admitted 11/18/2020 with weakness.    Hospital Course  Mr. Agapito Stone is a 75 y.o. male with past medical history of diabetes mellitus, essential hypertension, recent right ankle surgery who presented on 11/18/2020 with weakness.  Per wife she could not take care of him anymore.  In the ER he was found to have elevated troponin and abnormal chest x-ray.  CT PE study was negative for pulmonary embolism but found to have possible pneumonitis or infection  Covid test was positive.  US showed DVT in LLE and he was started on Eliquis.  He did complete a 3 day course of Ceftriaxone for a UTI.  PT/OT evaluated the patient and are recommending SNF at discharge. Patient refusing to go to [facility] care, will need negative coivd test prior to dc to another facility.     Interval Problem Update  OR on 12/10/20    Consultants/Specialty  ortho    Code Status  Full Code    Disposition  pending    Review of Systems  Review of Systems   Constitutional: Negative for chills, fever and malaise/fatigue.   HENT: Negative for ear discharge, ear pain and tinnitus.    Eyes: Negative for blurred vision, double vision and photophobia.   Respiratory: Negative for cough, hemoptysis and sputum production.    Cardiovascular: Negative for chest pain, palpitations and orthopnea.   Gastrointestinal: Negative for heartburn, nausea and vomiting.   Genitourinary: Negative for dysuria, frequency and urgency.   Musculoskeletal: Positive for joint pain (right ankel). Negative for back pain and neck pain.   Skin: Negative for itching.   Neurological: Negative for dizziness, tingling and headaches.        Physical Exam  Temp:  [36.2 °C (97.2 °F)-37.2 °C (98.9 °F)] 36.2 °C (97.2 °F)  Pulse:  [60-88] 83  Resp:  [16-25] 18  BP: ()/(47-57) 114/57  SpO2:  [92 %-97 %] 95 %    Physical Exam  Constitutional:       Appearance: He is not toxic-appearing  or diaphoretic.   HENT:      Head: Normocephalic and atraumatic.      Nose: Nose normal.      Mouth/Throat:      Mouth: Mucous membranes are dry.   Eyes:      Conjunctiva/sclera: Conjunctivae normal.      Pupils: Pupils are equal, round, and reactive to light.   Neck:      Musculoskeletal: Normal range of motion and neck supple.   Cardiovascular:      Rate and Rhythm: Normal rate and regular rhythm.      Pulses: Normal pulses.      Heart sounds: Normal heart sounds.   Pulmonary:      Effort: Pulmonary effort is normal.      Breath sounds: Normal breath sounds.   Abdominal:      General: Abdomen is flat.      Palpations: Abdomen is soft.   Musculoskeletal:      Comments: Boot on the right foot is noted   Skin:     General: Skin is warm and dry.   Neurological:      General: No focal deficit present.      Mental Status: He is alert and oriented to person, place, and time.         Fluids    Intake/Output Summary (Last 24 hours) at 12/12/2020 0903  Last data filed at 12/12/2020 0536  Gross per 24 hour   Intake --   Output 250 ml   Net -250 ml       Laboratory  Recent Labs     12/11/20  0300 12/12/20  0220   WBC 6.5 6.3   RBC 3.51* 3.12*   HEMOGLOBIN 10.3* 9.2*   HEMATOCRIT 32.9* 29.5*   MCV 93.7 94.6   MCH 29.3 29.5   MCHC 31.3* 31.2*   RDW 53.2* 53.8*   PLATELETCT 184 179   MPV 9.9 9.9     Recent Labs     12/11/20  0300 12/12/20  0220   SODIUM 136 135   POTASSIUM 5.1 5.1   CHLORIDE 103 102   CO2 23 23   GLUCOSE 115* 112*   BUN 21 30*   CREATININE 1.20 1.41*   CALCIUM 8.4 8.2*                   Imaging       Assessment/Plan  HTN (hypertension)- (present on admission)  Assessment & Plan  Continue outpatient Carvedilol with holding parameters    Anemia  Assessment & Plan  Normocytic anemia  Iron panel reveals low iron however ferritin is very high, consistent with chronic disease and covid  No acute bleeding noted    Age-related physical debility  Assessment & Plan  F/u with ortho    History of ankle surgery- (present on  admission)  Assessment & Plan  Patient has full cast of the right ankle status post surgical correction and was done on 8/24/20  Ortho input is noted  Pt has missed 2 appointments with ortho office since his operation on 8/24/20 As per his wife's report to me on  12/6/20.  Wound care input is noted  Heel pictures are reviewed  Spoke to  dr pandey this morning  12/9 and he stated that he will take him to the   S/p IRRIGATION AND DEBRIDEMENT, WOUND - HEEL  ACELL graft placement  Ortho/dr gonzalez initially did not think pt needed to be on antibiotics.however noted from ortho on 12/10 states  To continue with antibiotics.  Will d/w ortho on 12/11/20 to see if they think pt should be on antibiotics and ortho stated no need for antibiotics   input is noted and pt will be difficult discharge and awaiting SNF acceptance    Acute deep vein thrombosis (DVT) of calf muscle vein of left lower extremity (HCC)- (present on admission)  Assessment & Plan  Patient with acute DVT left lower extremity.  Likely hypercoagulable state given COVID-19 infection as well as recent right ankle surgery currently in cast.     will need anticoagulation for  3 to  6 months  eliquis    COVID-19 virus infection- (present on admission)  Assessment & Plan  Droplet precaution  Supportive care  Ordered inflammatory markers  Procalcitonin negative.   Continue dexamethasone  CTA chest consistent with COVID-19 pneumonia  Vitamin C ordered vitamin D level normal  Has no resp symptoms  Repeat covid on 12/3 is negative and on 12/8 is positive      Type 2 diabetes mellitus with peripheral neuropathy (HCC)- (present on admission)  Assessment & Plan  insulin sliding scale with D50 and glucagon for hypoglycemia per protocol.  Diabetic diet  Diabetic education       VTE prophylaxis: eliquis

## 2020-12-12 NOTE — DISCHARGE PLANNING
[facility] Transitional Care Coordination     Referral from:   Dr. Webber  Facesheet indicates:  [employer]  Potential Rehab Diagnosis: Other Ortho/Debility    Chart review indicates patient has on going medical management and therapy needs     D/C support: Spouse/Neighbors     Physiatry consultation denied.  Current documentation does not reflect discharge support/disposition to facilitate safe transition back to community.  SW note 12/05 - home is very cluttered, pt would not be able to navigate home with FWW for wheelchair; spouse stating she doesn't have anyone to help, can't take him home.  Lack of dc support is barrier to IRF.   Anticipate skilled nursing for post acute transitional care.      Last Covid test date:  12/08/20 COVID positive    Thank you for the referral.

## 2020-12-12 NOTE — WOUND TEAM
[facility]  Inpatient Services  Initial Wound and Skin Care Evaluation    Admission Date: 11/18/2020     Last order of IP CONSULT TO WOUND CARE was found on 12/10/2020 from Hospital Encounter on 11/18/2020       HPI, PMH, SH: Reviewed    Unit where seen by Wound Team: 1113/01     WOUND CONSULT/FOLLOW UP RELATED TO:  Placement of NPWT dressing after surgery     Self Report / Pain Level:  Pre medicated with PO pain medication       OBJECTIVE:  Pt lying in bed with surgical dressing in place. Right heel float boot in place    WOUND TYPE, LOCATION, CHARACTERISTICS (Pressure Injuries: location, stage, POA or date identified)          Negative Pressure Wound Therapy 12/11/20 (Active)   NPWT Pump Mode / Pressure Setting Continuous;125 mmHg    Dressing Type Small;Black Foam (Regular)    Number of Foam Pieces Used 3    NEXT Dressing Change/Treatment Date 12/14/20             Wound 12/10/20 Full Thickness Wound Heel Right Open surgical Wound. *Acell in place (Active)   Wound Image      Site Assessment Red    Periwound Assessment Pink    Margins Defined edges    Closure Secondary intention    Drainage Amount Scant    Drainage Description Serosanguineous    Treatments Cleansed;Site care;Offloading    Wound Cleansing Normal Saline Irrigation    Periwound Protectant Drape;Skin Protectant Wipes to Periwound    Dressing Cleansing/Solutions Not Applicable    Dressing Options Wound Vac    Dressing Changed New    Dressing Status Clean;Dry;Intact    Dressing Change/Treatment Frequency By Wound Team Only    NEXT Dressing Change/Treatment Date 12/14/20    NEXT Weekly Photo (Inpatient Only) 12/18/20    Non-staged Wound Description Full thickness    Wound Length (cm) 4 cm    Wound Width (cm) 2.5 cm    Wound Surface Area (cm^2) 10 cm^2    Shape circular    Wound Odor None    Exposed Structures Sutures;Other (Comments)                Vascular:    HAKEEM:   No results found.      Lab Values:    Lab Results   Component Value  Date/Time    WBC 6.5 12/11/2020 03:00 AM    RBC 3.51 (L) 12/11/2020 03:00 AM    HEMOGLOBIN 10.3 (L) 12/11/2020 03:00 AM    HEMATOCRIT 32.9 (L) 12/11/2020 03:00 AM    SEDRATEWES 26 (H) 11/19/2020 11:36 AM    HBA1C 8.0 (H) 08/31/2020 05:55 AM          Culture:   n/a   Culture Results show:  No results found for this or any previous visit (from the past 720 hour(s)).      INTERVENTIONS BY WOUND TEAM:  Patient seen with Felecia wound care RN. Removed surgical dressing. Moderate drainage noted on surgical dressing but pt was not actively bleeding when dressing was removed. Cleansed periwound with NS and gauze and only used NS on wound bed due to acell being in place. Measurements and picture done.  Prepped periwound (including around sutures) and up dorsal foot w/ no sting and drape. Adaptic applied on sutures and on acell. 1 piece of foam used over wound bed, 1 piece of foam used over sutures and to bridge up to dorsal foot. Foam secured with drape. Button and trac pad applied. Pt started on 125 mmhg cont suction. Sacral mepilex applied to protect skin from vac tubing. Re applied heel float boot. Updated bedside RN. Also placed referral for Outpatient Wound Care for LPS rounds as discussed with Susie Kessler LPS APRN. Plan for patient to be seen on 12/18.     Interdisciplinary consultation: Patient, Bedside Abdiel RN, Felecia wound Care RN, Susie Kessler APRN     EVALUATION: Patient POD 1 I&D of heel and Acell graft placement. Plan for wound team to do vac changes 2x weekly since acell is in place.     Goals: Steady decrease in wound area and depth weekly.    NURSING PLAN OF CARE ORDERS (X):    Dressing changes: See Dressing Care orders: X  Skin care: See Skin Care orders:   Rectal tube care: See Rectal Tube Care orders:   Other orders:    RSKIN:   CURRENTLY IN PLACE (X), APPLIED THIS VISIT (A), ORDERED (O):   Q shift Bradley:    Q shift pressure point assessments:    Pressure redistribution mattress    X         Low Airloss          Bariatric ERIKA         Bariatric foam           Heel float boots   X  Heel Silicone dressing      X  Float Heels off Bed with Pillows               Barrier wipes         Barrier Cream         Barrier paste          Sacral silicone dressing         Silicone O2 tubing         Anchorfast         Cannula fixation Device (Tender )          Gray Foam Ear protectors           Trach with Optifoam split foam                 Waffle cushion        Waffle Overlay         Rectal tube or BMS    Purwick/Condom Cath          Antifungal tx      Interdry          Reposition q 2 hours      X  Up to chair        Ambulate      PT/OT        Dietician        Diabetes Education      PO     TF     TPN     NPO   # days   Other        WOUND TEAM PLAN OF CARE   Dressing changes by wound team:          Follow up 1-2 times weekly:               Follow up 3 times weekly:                NPWT change 3 times weekly:     Follow up as needed:       Other (explain):  X WT to do dressing changes 2x weekly    Anticipated discharge plans: X anticipate pt will likely need SNF. Pt will also need to follow up at OP wound Care clinic for LPS rounds starting 12/18 if discharged.   LTACH:        SNF/Rehab:                  Home Care:           Outpatient Wound Center:            Self Care:

## 2020-12-12 NOTE — PROGRESS NOTES
Received bedside patient report from STEVEN [facility]ows. Patient resting comfortably in bed, no complaints at this time. Safety precautions in place. Will continue to monitor.

## 2020-12-12 NOTE — DISCHARGE PLANNING
"Hospital Care Management Discharge Planning        Anticipated Discharge Disposition:   · SNF     Action:   · Pt will need SNF placement. However, pt only has 23 SNF days left. Pt has been declined by [facility].     Barriers to Discharge:   · Accepting SNF     Plan:    · Hospital Care Management Team to continue to provide support services and assistance with discharge planning as needed.         Current Expected Day of Discharge: 1/1/21         For further assistance please contact the assigned RN Case Manager or  at the extension listed under \"Treatment Teams\".  "

## 2020-12-13 NOTE — PROGRESS NOTES
[facility] Medicine Daily Progress Note    Date of Service  12/13/2020    Chief Complaint  75 y.o. male admitted 11/18/2020 with weakness.    Hospital Course  Mr. Agapito Stone is a 75 y.o. male with past medical history of diabetes mellitus, essential hypertension, recent right ankle surgery who presented on 11/18/2020 with weakness.  Per wife she could not take care of him anymore.  In the ER he was found to have elevated troponin and abnormal chest x-ray.  CT PE study was negative for pulmonary embolism but found to have possible pneumonitis or infection  Covid test was positive.  US showed DVT in LLE and he was started on Eliquis.  He did complete a 3 day course of Ceftriaxone for a UTI.  PT/OT evaluated the patient and are recommending SNF at discharge. Patient refusing to go to [facility] care, will need negative coivd test prior to dc to another facility.     Interval Problem Update  OR on 12/10/20    Consultants/Specialty  ortho    Code Status  Full Code    Disposition  pending    Review of Systems  Review of Systems   Constitutional: Negative for chills, diaphoresis and malaise/fatigue.   HENT: Negative for ear discharge, ear pain and nosebleeds.    Eyes: Negative for double vision, photophobia and pain.   Respiratory: Negative for hemoptysis, sputum production and shortness of breath.    Cardiovascular: Negative for chest pain, palpitations and orthopnea.   Gastrointestinal: Negative for abdominal pain, nausea and vomiting.   Genitourinary: Negative for frequency, hematuria and urgency.   Musculoskeletal: Positive for joint pain (right ankel). Negative for back pain and neck pain.   Skin: Negative for itching.   Neurological: Negative for tingling, tremors and headaches.        Physical Exam  Temp:  [37 °C (98.6 °F)-37.3 °C (99.2 °F)] 37 °C (98.6 °F)  Pulse:  [71-83] 73  Resp:  [18-20] 20  BP: (104-121)/(58-69) 121/64  SpO2:  [90 %-93 %] 90 %    Physical Exam  Constitutional:       General: He is not in  acute distress.     Appearance: He is not toxic-appearing.   HENT:      Head: Normocephalic and atraumatic.      Nose: No congestion or rhinorrhea.      Mouth/Throat:      Mouth: Mucous membranes are dry.   Eyes:      Extraocular Movements: Extraocular movements intact.      Pupils: Pupils are equal, round, and reactive to light.   Neck:      Musculoskeletal: No neck rigidity or muscular tenderness.   Cardiovascular:      Rate and Rhythm: Normal rate and regular rhythm.      Heart sounds: No murmur. No friction rub.   Pulmonary:      Effort: No respiratory distress.      Breath sounds: No stridor.   Abdominal:      General: Abdomen is flat. Bowel sounds are normal.   Musculoskeletal:      Comments: Boot on the right foot is noted   Skin:     Coloration: Skin is not jaundiced or pale.   Neurological:      General: No focal deficit present.      Mental Status: He is alert. Mental status is at baseline.         Fluids    Intake/Output Summary (Last 24 hours) at 12/13/2020 0939  Last data filed at 12/13/2020 0900  Gross per 24 hour   Intake 380 ml   Output 2050 ml   Net -1670 ml       Laboratory  Recent Labs     12/11/20  0300 12/12/20  0220 12/13/20  0152   WBC 6.5 6.3 5.4   RBC 3.51* 3.12* 3.18*   HEMOGLOBIN 10.3* 9.2* 9.5*   HEMATOCRIT 32.9* 29.5* 30.4*   MCV 93.7 94.6 95.6   MCH 29.3 29.5 29.9   MCHC 31.3* 31.2* 31.3*   RDW 53.2* 53.8* 54.4*   PLATELETCT 184 179 174   MPV 9.9 9.9 9.5     Recent Labs     12/11/20  0300 12/12/20 0220 12/13/20  0152   SODIUM 136 135 138   POTASSIUM 5.1 5.1 4.5   CHLORIDE 103 102 105   CO2 23 23 25   GLUCOSE 115* 112* 107*   BUN 21 30* 21   CREATININE 1.20 1.41* 1.11   CALCIUM 8.4 8.2* 8.4                   Imaging       Assessment/Plan  HTN (hypertension)- (present on admission)  Assessment & Plan  Continue outpatient Carvedilol with holding parameters    Anemia  Assessment & Plan  Normocytic anemia  Iron panel reveals low iron however ferritin is very high, consistent with chronic  disease and covid  No acute bleeding noted    Age-related physical debility  Assessment & Plan  F/u with ortho    History of ankle surgery- (present on admission)  Assessment & Plan  Patient has full cast of the right ankle status post surgical correction and was done on 8/24/20  Ortho input is noted  Pt has missed 2 appointments with ortho office since his operation on 8/24/20 As per his wife's report to me on  12/6/20.  Wound care input is noted  Heel pictures are reviewed  Spoke to  dr pandey this morning  12/9 and he stated that he will take him to the   S/p IRRIGATION AND DEBRIDEMENT, WOUND - HEEL  ACELL graft placement  Ortho/dr gonzalez initially did not think pt needed to be on antibiotics.however noted from ortho on 12/10 states  To continue with antibiotics.  Will d/w ortho on 12/11/20 to see if they think pt should be on antibiotics and ortho stated no need for antibiotics   input is noted and pt will be difficult discharge and awaiting SNF acceptance  Wound care input is noted  Difficult discharge    Acute deep vein thrombosis (DVT) of calf muscle vein of left lower extremity (HCC)- (present on admission)  Assessment & Plan  Patient with acute DVT left lower extremity.  Likely hypercoagulable state given COVID-19 infection as well as recent right ankle surgery currently in cast.     will need anticoagulation for  3 to  6 months  eliquis    COVID-19 virus infection- (present on admission)  Assessment & Plan  Droplet precaution  Supportive care  Ordered inflammatory markers  Procalcitonin negative.   Continue dexamethasone  CTA chest consistent with COVID-19 pneumonia  Vitamin C ordered vitamin D level normal  Has no resp symptoms  Repeat covid on 12/3 is negative and on 12/8 is positive      Type 2 diabetes mellitus with peripheral neuropathy (HCC)- (present on admission)  Assessment & Plan  insulin sliding scale with D50 and glucagon for hypoglycemia per protocol.  Diabetic diet  Diabetic  education       VTE prophylaxis: eliquis

## 2020-12-13 NOTE — PROGRESS NOTES
Anderson from Lab called with critical result of right calcaneous wound culture grew MRSA at 1220. Critical lab result read back to Anderson.   Dr. Webber notified of critical lab result at 1223.  Critical lab result read back by Dr. Webber.    Order received from Dr. Webber via telephone for IV Vancomycin dosing per pharmacy. Will follow out new order and continue to monitor.

## 2020-12-13 NOTE — PROGRESS NOTES
"Pharmacy Kinetics 75 y.o. male on vancomycin day # 0 2020    Currently on Vancomycin 2250 mg iv once loading dose.  Provider specified end date: tbd    Indication for Treatment: MRSA from wound cx    Pertinent history per medical record: Admitted on 2020 for covid/possible pna/uti.    Other antibiotics: none    Allergies: Fish, Pcn [penicillins], Amoxicillin, and Food     List concerns for renal function: age, low albumin.    Pertinent cultures to date:   Left calcaneus wound growing MRSA.      Recent Labs     20  0300 20  0152   WBC 6.5 6.3 5.4   NEUTSPOLYS 83.40* 66.30 66.20     Recent Labs     20  0152   BUN 21 30* 21   CREATININE 1.20 1.41* 1.11   ALBUMIN 2.6* 2.2* 2.5*     No results for input(s): VANCOTROUGH, VANCOPEAK, VANCORANDOM in the last 72 hours.    Intake/Output Summary (Last 24 hours) at 2020 1441  Last data filed at 2020 0950  Gross per 24 hour   Intake --   Output 2060 ml   Net -2060 ml      BP (!) 96/60   Pulse 79   Temp 36.7 °C (98 °F) (Temporal)   Resp 20   Ht 1.88 m (6' 2\")   Wt 88.5 kg (195 lb 1.7 oz)   SpO2 95%  Temp (24hrs), Av °C (98.6 °F), Min:36.7 °C (98 °F), Max:37.3 °C (99.2 °F)      A/P   1. Vancomycin dose 2250mg iv once loading dose, then vanco 750mg iv q12h starting at 3am on .  2. Next vancomycin level: 12/15 @1430  3. Goal trough: 10-15mcg/ml  4. Comments:  Awaiting cx sens results.  Will monitor per protocol, order trough 12/15@1430 and adjust accordingly.      Von Johnson, Pharm.D  "

## 2020-12-14 NOTE — PROGRESS NOTES
"Pharmacy Kinetics 75 y.o. male on vancomycin day # 1 2020    Currently on Vancomycin 750 mg iv q12hr  Provider specified end date: Not yet determined    Indication for Treatment: MRSA wound culture    Pertinent history per medical record: Admitted on 2020 for covid/possible pna/uti.    Other antibiotics: None    Allergies: Fish, Pcn [penicillins], Amoxicillin, and Food     List concerns for renal function:  Age, low albumin    Pertinent cultures to date:   Left calcaneus wound growing MRSA    MRSA nares swab if pneumonia is a concern (ordered/positive/negative/n-a): N/A    Recent Labs     20  0220 20  0152   WBC 6.3 5.4   NEUTSPOLYS 66.30 66.20     Recent Labs     20  0152   BUN 30* 21   CREATININE 1.41* 1.11   ALBUMIN 2.2* 2.5*     No results for input(s): VANCOTROUGH, VANCOPEAK, VANCORANDOM in the last 72 hours.    Intake/Output Summary (Last 24 hours) at 2020 1458  Last data filed at 2020 1400  Gross per 24 hour   Intake --   Output 980 ml   Net -980 ml      /51   Pulse 83   Temp 37.1 °C (98.8 °F) (Temporal)   Resp 16   Ht 1.88 m (6' 2\")   Wt 88.5 kg (195 lb 1.7 oz)   SpO2 92%  Temp (24hrs), Av.9 °C (98.4 °F), Min:36.3 °C (97.4 °F), Max:37.4 °C (99.3 °F)      A/P     1. Next vancomycin level: 1430 hours on 12/15/20  2. Goal trough: 10-15 mcg/ml  3. Comments: Will monitor and adjust regimen per protocol.    Bairon Guerra [facility]    "

## 2020-12-14 NOTE — PROGRESS NOTES
[facility] Medicine Daily Progress Note    Date of Service  12/14/2020    Chief Complaint  75 y.o. male admitted 11/18/2020 with weakness.    Hospital Course  Mr. Agapito Stone is a 75 y.o. male with past medical history of diabetes mellitus, essential hypertension, recent right ankle surgery who presented on 11/18/2020 with weakness.  Per wife she could not take care of him anymore.  In the ER he was found to have elevated troponin and abnormal chest x-ray.  CT PE study was negative for pulmonary embolism but found to have possible pneumonitis or infection  Covid test was positive.  US showed DVT in LLE and he was started on Eliquis.  He did complete a 3 day course of Ceftriaxone for a UTI.  PT/OT evaluated the patient and are recommending SNF at discharge. Patient refusing to go to [facility] care, will need negative coivd test prior to dc to another facility.     Interval Problem Update  OR on 12/10/20    Consultants/Specialty  ortho    Code Status  Full Code    Disposition  pending    Review of Systems  Review of Systems   Constitutional: Negative for chills, fever and malaise/fatigue.   HENT: Negative for ear discharge, ear pain and tinnitus.    Eyes: Negative for blurred vision, double vision and photophobia.   Respiratory: Negative for cough, hemoptysis and sputum production.    Cardiovascular: Negative for chest pain, palpitations and orthopnea.   Gastrointestinal: Negative for heartburn, nausea and vomiting.   Genitourinary: Negative for dysuria, frequency and urgency.   Musculoskeletal: Positive for joint pain (right ankel). Negative for back pain and neck pain.   Skin: Negative for itching.   Neurological: Negative for dizziness, tingling and headaches.        Physical Exam  Temp:  [36.3 °C (97.4 °F)-37.4 °C (99.3 °F)] 36.3 °C (97.4 °F)  Pulse:  [68-89] 68  Resp:  [16-20] 16  BP: ()/(51-79) 101/51  SpO2:  [91 %-95 %] 91 %    Physical Exam  Constitutional:       Appearance: He is not toxic-appearing  or diaphoretic.   HENT:      Head: Normocephalic and atraumatic.      Nose: Nose normal.      Mouth/Throat:      Mouth: Mucous membranes are dry.   Eyes:      Conjunctiva/sclera: Conjunctivae normal.      Pupils: Pupils are equal, round, and reactive to light.   Neck:      Musculoskeletal: Normal range of motion and neck supple.   Cardiovascular:      Rate and Rhythm: Normal rate and regular rhythm.      Pulses: Normal pulses.      Heart sounds: Normal heart sounds.   Pulmonary:      Effort: Pulmonary effort is normal.      Breath sounds: Normal breath sounds.   Abdominal:      General: Abdomen is flat.      Palpations: Abdomen is soft.   Musculoskeletal:      Comments: Boot on the right foot is noted  Wound vac on the right foot   Skin:     Coloration: Skin is not jaundiced or pale.   Neurological:      General: No focal deficit present.      Mental Status: He is alert. Mental status is at baseline.         Fluids    Intake/Output Summary (Last 24 hours) at 12/14/2020 1031  Last data filed at 12/14/2020 1000  Gross per 24 hour   Intake --   Output 830 ml   Net -830 ml       Laboratory  Recent Labs     12/12/20  0220 12/13/20  0152   WBC 6.3 5.4   RBC 3.12* 3.18*   HEMOGLOBIN 9.2* 9.5*   HEMATOCRIT 29.5* 30.4*   MCV 94.6 95.6   MCH 29.5 29.9   MCHC 31.2* 31.3*   RDW 53.8* 54.4*   PLATELETCT 179 174   MPV 9.9 9.5     Recent Labs     12/12/20  0220 12/13/20  0152   SODIUM 135 138   POTASSIUM 5.1 4.5   CHLORIDE 102 105   CO2 23 25   GLUCOSE 112* 107*   BUN 30* 21   CREATININE 1.41* 1.11   CALCIUM 8.2* 8.4                   Imaging       Assessment/Plan  HTN (hypertension)- (present on admission)  Assessment & Plan  Continue outpatient Carvedilol with holding parameters    Anemia  Assessment & Plan  Normocytic anemia  Iron panel reveals low iron however ferritin is very high, consistent with chronic disease and covid  No acute bleeding noted    Age-related physical debility  Assessment & Plan  F/u with ortho    History  of ankle surgery- (present on admission)  Assessment & Plan  Patient has full cast of the right ankle status post surgical correction and was done on 8/24/20  Ortho input is noted  Pt has missed 2 appointments with ortho office since his operation on 8/24/20 As per his wife's report to me on  12/6/20.  Wound care input is noted  Heel pictures are reviewed  Spoke to  dr pandey this morning  12/9 and he stated that he will take him to the   S/p IRRIGATION AND DEBRIDEMENT, WOUND - HEEL  ACELL graft placement  Ortho/dr gonzalez initially did not think pt needed to be on antibiotics.however noted from ortho on 12/10 states  To continue with antibiotics.  Will d/w ortho on 12/11/20 to see if they think pt should be on antibiotics and ortho stated no need for antibiotics   input is noted and pt will be difficult discharge and awaiting SNF acceptance  Wound care input is noted  Difficult discharge  Wound culture grew MRSA and appears to be localized,will continue with vancomycin  And switch to doxycycline in few days and complete 14 days total    Acute deep vein thrombosis (DVT) of calf muscle vein of left lower extremity (HCC)- (present on admission)  Assessment & Plan  Patient with acute DVT left lower extremity.  Likely hypercoagulable state given COVID-19 infection as well as recent right ankle surgery currently in cast.     will need anticoagulation for  3 to  6 months  eliquis    COVID-19 virus infection- (present on admission)  Assessment & Plan  Droplet precaution  Supportive care  Ordered inflammatory markers  Procalcitonin negative.   Continue dexamethasone  CTA chest consistent with COVID-19 pneumonia  Vitamin C ordered vitamin D level normal  Has no resp symptoms  Repeat covid on 12/3 is negative and on 12/8 is positive      Type 2 diabetes mellitus with peripheral neuropathy (HCC)- (present on admission)  Assessment & Plan  insulin sliding scale with D50 and glucagon for hypoglycemia per  protocol.  Diabetic diet  Diabetic education       VTE prophylaxis: eliquis

## 2020-12-15 NOTE — PROGRESS NOTES
Patient oriented and cooperative this shift. Vital signs stable. No c/o this shift. Wound vac in place to RLE. Dressing change done by WOCN. Wound care done to stage 2 pressure injury to coccyx. Q2h turns initiated.

## 2020-12-15 NOTE — PROGRESS NOTES
Patient assisted with frequent weight shifts and ensuring that heels remain elevated off bed. Patient resting most of evening. Safety rounds completed and patient aware to call when needing assistance. Call light and belongings within reach.

## 2020-12-15 NOTE — WOUND TEAM
[facility]  Inpatient Services  Wound and Skin Care Progress note    Admission Date: 11/18/2020     Last order of IP CONSULT TO WOUND CARE was found on 12/10/2020 from Hospital Encounter on 11/18/2020     HPI, PMH, SH: Reviewed    Unit where seen by Wound Team: 1113/01     WOUND CONSULT/FOLLOW-UP RELATED TO:  NPWT Change    Self Report / Pain Level:  C/o pain when L lat ankle palpated and cleaned       OBJECTIVE:  drsg to R heel has dried blood present, pt on pressure redistribution mattress with waffle overlay, heel float boot R, heel mepilex L     WOUND TYPE, LOCATION, CHARACTERISTICS (Pressure Injuries: location, stage, POA or date identified)  Wound 12/06/20 Pressure Injury Heel Posterior Right POA Unstageable under cast with suture in place (Active)      12/14/20 1415   Site Assessment Brown    Periwound Assessment Intact    Margins Defined edges    Closure Secondary intention    Drainage Amount Small    Drainage Description Sanguineous    Treatments Cleansed    Wound Cleansing Normal Saline Irrigation    Periwound Protectant Skin Protectant Wipes to Periwound;Drape    Dressing Cleansing/Solutions Not Applicable    Dressing Options Wound Vac    Dressing Changed Changed    Dressing Status Intact    Dressing Change/Treatment Frequency Monday, Wednesday, Friday, and As Needed    NEXT Dressing Change/Treatment Date 12/16/20    NEXT Weekly Photo (Inpatient Only) 12/14/20    Pressure Injury Stage U    Wound Length (cm) 3.5 cm 12/14/20 1415   Wound Width (cm) 3.5 cm 12/14/20 1415   Wound Surface Area (cm^2) 12.25 cm^2 12/14/20 1415   Wound Bed Eschar (%) 98 % 12/06/20 1830   Wound Bed Slough % - (Post-Procedure) 2 % 12/06/20 1830   Shape irregular    Wound Odor None    Pulses 1+;DP    Exposed Structures Sutures    Number of days: 8       Wound 12/06/20 Partial Thickness Wound Ankle Lateral Left (Active)      12/06/20 1830   Site Assessment Pink;Painful    Periwound Assessment Intact    Margins Defined  edges    Closure Secondary intention    Drainage Amount None    Treatments Cleansed    Wound Cleansing Normal Saline Irrigation    Periwound Protectant Not Applicable    Dressing Cleansing/Solutions Not Applicable    Dressing Options Mepilex Heel    Dressing Changed Observed    Dressing Status Intact    Dressing Change/Treatment Frequency Every 72 hrs, and As Needed    NEXT Dressing Change/Treatment Date 12/16/20    NEXT Weekly Photo (Inpatient Only) 12/16/20    Non-staged Wound Description Partial thickness 12/14/20 1415   Wound Length (cm) 0.3 cm 12/14/20 1415   Wound Width (cm) 0.3 cm 12/14/20 1415   Wound Surface Area (cm^2) 0.09 cm^2 12/14/20 1415   Shape circular    Wound Odor None    Pulses 1+;DP    Exposed Structures None    Number of days: 8       Negative Pressure Wound Therapy 12/11/20 (Active)   NPWT Pump Mode / Pressure Setting 125 mmHg 12/14/20 0829   Dressing Type Black Foam (Regular) 12/12/20 2200   Number of Foam Pieces Used 3 12/11/20 1700   Output (mL) 0 mL 12/12/20 2200   NEXT Dressing Change/Treatment Date 12/14/20 12/12/20 2200         Vascular:    HAKEEM:   No results found.      Lab Values:    Lab Results   Component Value Date/Time    WBC 5.4 12/13/2020 01:52 AM    RBC 3.18 (L) 12/13/2020 01:52 AM    HEMOGLOBIN 9.5 (L) 12/13/2020 01:52 AM    HEMATOCRIT 30.4 (L) 12/13/2020 01:52 AM    SEDRATEWES 26 (H) 11/19/2020 11:36 AM    HBA1C 8.0 (H) 08/31/2020 05:55 AM            Culture Results show:  No results found for this or any previous visit (from the past 720 hour(s)).      INTERVENTIONS BY WOUND TEAM:  Removed drsg, cleaned wound with NS, dried. Applied No Sting and drape to periwound. Adaptic to wound bed, covered with black foam with bridge to lateral-dorsal foot. Sealed and NPWT resumed @ 125 mmHg continuous. Mepilex placed over anterior foot and ankle to protect from VAC tubing.Heel float muñoz replaced. Assessed L lateral ankle, it is pink and blanching, but pt continues to c/o pain.      Interdisciplinary consultation: Patient, Bedside RN      EVALUATION: Pt's R heel with evidence of bleeding, Acell and adaptic dark brown color. Sutures intact.     Goals: Slow steady decrease in wound area and depth weekly.    NURSING PLAN OF CARE ORDERS (X):     Dressing changes: Continue previous Dressing Maintenance orders:  x      See new Dressing Maintenance orders:       Skin care: See Skin Care orders:   x     Rectal tube care: See Rectal Tube Care orders:      Other orders:           WOUND TEAM PLAN OF CARE:   Dressing changes by wound team:                   Follow up 3 times weekly:                NPWT change 3 times weekly: x    Follow up 1-2 times weekly:      Follow up Bi-Monthly:                  Follow up as needed:      Other (explain):     Anticipated discharge plans:  LTACH:        SNF/Rehab:                  Home Care:           Outpatient Wound Center:            Self Care:           Other: unsure of needs @ this time

## 2020-12-15 NOTE — PROGRESS NOTES
Patient more alert this shift and conversing with roommate. Vital signs stable, pt O2 sat >95% on RA. No c/o pain or dyspnea. Q2h turns maintained. Complete bed bath and linen change done. Visited by PT, pt up to standing and small steps. Wound vac in place, maintaining suction. Scant sanguineous drainage in chamber. Will continue q2h turns and monitoring vital signs, and encouraging independence within the limits.

## 2020-12-15 NOTE — PROGRESS NOTES
[facility] Medicine Daily Progress Note    Date of Service  12/15/2020    Chief Complaint  75 y.o. male admitted 11/18/2020 with weakness.    Hospital Course  Mr. Agapito Stone is a 75 y.o. male with past medical history of diabetes mellitus, essential hypertension, recent right ankle surgery who presented on 11/18/2020 with weakness.  Per wife she could not take care of him anymore.  In the ER he was found to have elevated troponin and abnormal chest x-ray.  CT PE study was negative for pulmonary embolism but found to have possible pneumonitis or infection  Covid test was positive.  US showed DVT in LLE and he was started on Eliquis.  He did complete a 3 day course of Ceftriaxone for a UTI.  PT/OT evaluated the patient and are recommending SNF at discharge. Patient refusing to go to [facility] care, will need negative coivd test prior to dc to another facility.     Interval Problem Update  OR on 12/10/20    Consultants/Specialty  ortho    Code Status  Full Code    Disposition  pending    Review of Systems  Review of Systems   Constitutional: Negative for chills, diaphoresis and malaise/fatigue.   HENT: Negative for ear discharge, ear pain and nosebleeds.    Eyes: Negative for double vision, photophobia and pain.   Respiratory: Negative for hemoptysis, sputum production and shortness of breath.    Cardiovascular: Negative for palpitations, orthopnea and claudication.   Gastrointestinal: Negative for abdominal pain, nausea and vomiting.   Genitourinary: Negative for frequency, hematuria and urgency.   Musculoskeletal: Positive for joint pain (right ankel). Negative for back pain and neck pain.   Skin: Negative for itching.   Neurological: Negative for tingling, tremors and headaches.        Physical Exam  Temp:  [36.3 °C (97.3 °F)-37.4 °C (99.4 °F)] 36.8 °C (98.2 °F)  Pulse:  [73-88] 86  Resp:  [14-20] 16  BP: ()/(51-69) 124/69  SpO2:  [92 %-97 %] 94 %    Physical Exam  Constitutional:       General: He is not  in acute distress.     Appearance: He is not toxic-appearing.   HENT:      Head: Normocephalic and atraumatic.      Nose: No congestion or rhinorrhea.      Mouth/Throat:      Mouth: Mucous membranes are dry.   Eyes:      Conjunctiva/sclera: Conjunctivae normal.      Pupils: Pupils are equal, round, and reactive to light.   Neck:      Musculoskeletal: No neck rigidity or muscular tenderness.   Cardiovascular:      Rate and Rhythm: Normal rate and regular rhythm.      Heart sounds: No murmur. No friction rub.   Pulmonary:      Effort: No respiratory distress.      Breath sounds: No stridor.   Abdominal:      General: Abdomen is flat. Bowel sounds are normal.   Musculoskeletal:      Comments: Boot on the right foot is noted  Wound vac on the right foot   Skin:     General: Skin is warm and dry.   Neurological:      General: No focal deficit present.      Mental Status: He is alert. Mental status is at baseline.         Fluids    Intake/Output Summary (Last 24 hours) at 12/15/2020 1034  Last data filed at 12/15/2020 0500  Gross per 24 hour   Intake --   Output 950 ml   Net -950 ml       Laboratory  Recent Labs     12/13/20  0152 12/15/20  0521   WBC 5.4 5.3   RBC 3.18* 3.22*   HEMOGLOBIN 9.5* 9.7*   HEMATOCRIT 30.4* 31.8*   MCV 95.6 98.8*   MCH 29.9 30.1   MCHC 31.3* 30.5*   RDW 54.4* 55.8*   PLATELETCT 174 176   MPV 9.5 9.6     Recent Labs     12/13/20  0152 12/15/20  0521   SODIUM 138 142   POTASSIUM 4.5 4.8   CHLORIDE 105 109   CO2 25 24   GLUCOSE 107* 97   BUN 21 13   CREATININE 1.11 0.91   CALCIUM 8.4 8.6                   Imaging       Assessment/Plan  HTN (hypertension)- (present on admission)  Assessment & Plan  Continue outpatient Carvedilol with holding parameters    Anemia  Assessment & Plan  Normocytic anemia  Iron panel reveals low iron however ferritin is very high, consistent with chronic disease and covid  No acute bleeding noted    Age-related physical debility  Assessment & Plan  F/u with  ortho    History of ankle surgery- (present on admission)  Assessment & Plan  Patient has full cast of the right ankle status post surgical correction and was done on 8/24/20  Ortho input is noted  Pt has missed 2 appointments with ortho office since his operation on 8/24/20 As per his wife's report to me on  12/6/20.  Wound care input is noted  Heel pictures are reviewed  Spoke to  dr pandey this morning  12/9 and he stated that he will take him to the   S/p IRRIGATION AND DEBRIDEMENT, WOUND - HEEL  ACELL graft placement  Ortho/dr gonzalez initially did not think pt needed to be on antibiotics.however noted from ortho on 12/10 states  To continue with antibiotics.  Will d/w ortho on 12/11/20 to see if they think pt should be on antibiotics and ortho stated no need for antibiotics   input is noted and pt will be difficult discharge and awaiting SNF acceptance  Wound care input is noted  Difficult discharge  Wound culture grew MRSA and appears to be localized,dced  vancomycin  And switched to doxycycline for 12 more days    Acute deep vein thrombosis (DVT) of calf muscle vein of left lower extremity (HCC)- (present on admission)  Assessment & Plan  Patient with acute DVT left lower extremity.  Likely hypercoagulable state given COVID-19 infection as well as recent right ankle surgery currently in cast.     will need anticoagulation for  3 to  6 months  eliquis    COVID-19 virus infection- (present on admission)  Assessment & Plan  Droplet precaution  Supportive care  Ordered inflammatory markers  Procalcitonin negative.   Continue dexamethasone  CTA chest consistent with COVID-19 pneumonia  Vitamin C ordered vitamin D level normal  Has no resp symptoms  Repeat covid on 12/3 is negative and on 12/8 is positive      Type 2 diabetes mellitus with peripheral neuropathy (HCC)- (present on admission)  Assessment & Plan  insulin sliding scale with D50 and glucagon for hypoglycemia per protocol.  Diabetic  diet  Diabetic education       VTE prophylaxis: eliquis

## 2020-12-15 NOTE — THERAPY
"Occupational Therapy  Daily Treatment     Patient Name: Agapito Stone  Age:  75 y.o., Sex:  male  Medical Record #: [id number]  Today's Date: 12/15/2020     Precautions  Precautions: (P) Fall Risk, Toe Touch Weight Bearing Right Lower Extremity  Comments: new order 12/10 for RLE TTWB, wound vac to R heel (WBAT on toes)    Assessment    Pt motivated for activity. Improved cognition/attn span. Wound vac in place RLE. Performs sup to sit with Sup. Tolerates EOB >20\" for grooming (shave,teeth,hair,face/hand wash). Feeding with Mod Indep. Good sit balance. STS with ModA,FWW. V/c's for TTWB RLE. BTB with Sup.; assistance with pillow placement.        Plan  Continue current treatment plan.    DC Equipment Recommendations: (P) None  Discharge Recommendations: (P) Recommend post-acute placement for additional occupational therapy services prior to discharge home     12/15/20 1420   Cognition    Cognition / Consciousness   (improved mentation.)   Level of Consciousness Alert   Ability To Follow Commands 1 Step   Comments agreeable, motivated, pleasant and cooperative. improved cognition. Ox2.     Balance   Sitting Balance (Static) Good   Sitting Balance (Dynamic) Fair +   Standing Balance (Static) Poor +   Standing Balance (Dynamic) Poor   Weight Shift Sitting Fair   Weight Shift Standing Fair   Skilled Intervention Verbal Cuing;Sequencing   Comments TTWB RLE, FWW   Bed Mobility    Supine to Sit Supervised   Sit to Supine Supervised   Scooting Supervised   Skilled Intervention Verbal Cuing   Activities of Daily Living   Eating Modified Independent   Grooming Supervision;Seated  (shave,teeth,hair,face/hand wash)   Upper Body Dressing Minimal Assist   Toileting Supervision  (urinal use)   Skilled Intervention Verbal Cuing   Functional Mobility   Sit to Stand Moderate Assist   Mobility STS,3 side-steps with fww     Skilled Intervention Verbal Cuing;Sequencing   Short Term Goals   Goal Outcome # 1 Progressing slower than " expected   Goal Outcome # 2 Progressing as expected   Goal Outcome # 2 B Progressing as expected   Goal Outcome # 3 Progressing as expected

## 2020-12-15 NOTE — DISCHARGE PLANNING
Anticipated Discharge Disposition: SNF    Action: Discussed in IDT rounds; pt pending SNF acceptance & (-) COVID.    Barriers to Discharge: COVID +  SNF acceptance    Plan: Care coordination will continue to follow and assist with transfer to accepting SNF.

## 2020-12-16 NOTE — THERAPY
Physical Therapy   Daily Treatment     Patient Name: Agapito Stone  Age:  75 y.o., Sex:  male  Medical Record #: [id number]  Today's Date: 12/16/2020     Precautions: (P) Fall Risk, Toe Touch Weight Bearing Right Lower Extremity New order placed 12/10/20 for RLE TTWB s/p R heel I&D and wound vac. No amb on heel.     Assessment  Patient making minimal gains since last PT session. Patient req min cueing to verbalize RLE wbing precautions and required reinforcement during mobility to maintain wbing precautions. Patient was able to attempt x3 STS with FWW, however L foot sliding on floor and unable to maintain standing >3 sec each attempt. Stand step transfer not attempted d/t safety and concerns of inability to maintain RLE WBing precautions during transfer. Patient performed sitting there-ex at EOB and remained sitting EOB post session for lunch. Pt is not safe to DC home, is weak and deconditioned, recommend SNF.       Plan    Continue current treatment plan.    DC Equipment Recommendations: (P) Unable to determine at this time  Discharge Recommendations: (P) Recommend post-acute placement for additional physical therapy services prior to discharge home       Objective       12/16/20 1203   Gait Analysis   Gait Level Of Assist Unable to Participate   Bed Mobility    Supine to Sit Supervised   Sit to Supine Supervised   Scooting Supervised   Rolling Supervised   Skilled Intervention Verbal Cuing   Comments use of bed features   Functional Mobility   Sit to Stand Moderate Assist   Bed, Chair, Wheelchair Transfer Unable to Participate   Skilled Intervention Verbal Cuing;Sequencing   Comments STS x3, L foot sliding on ground unable to maintain stand for >3 sec. unable to attempt stand pivot  d/t risk of not maintaining TTWB precautions    Short Term Goals    Short Term Goal # 1 pt will perform supine <> sit without bed features with SPV in 6 visits to be able to get in/out of bed at home   Goal Outcome # 1  Progressing as expected   Short Term Goal # 2 pt will perform STS with mod A in 6 visits to initiate SPT   Goal Outcome # 2 Goal met, new goal added   Short Term Goal # 2 B  Pt will be able to perform sit <> stand and transfer Caron in 6 visits so can transfer to chair safely.   Goal Outcome # 2 B Progressing slower than expected   Short Term Goal # 3 pt will perform SPT or seated slide board xfr with mod A in 6 visits for improved OOB mobility   Short Term Goal # 4 pt will demo WC mobility with SPV in 6 visits for improved activity   Goal Outcome # 4 Goal not met   Anticipated Discharge Equipment and Recommendations   DC Equipment Recommendations Unable to determine at this time

## 2020-12-16 NOTE — PROGRESS NOTES
[facility] Medicine Daily Progress Note    Date of Service  12/16/2020    Chief Complaint  75 y.o. male admitted 11/18/2020 with weakness.    Hospital Course  Mr. Agapito Stone is a 75 y.o. male with past medical history of diabetes mellitus, essential hypertension, recent right ankle surgery who presented on 11/18/2020 with weakness.  Per wife she could not take care of him anymore.  In the ER he was found to have elevated troponin and abnormal chest x-ray.  CT PE study was negative for pulmonary embolism but found to have possible pneumonitis or infection  Covid test was positive.  US showed DVT in LLE and he was started on Eliquis.  He did complete a 3 day course of Ceftriaxone for a UTI.  PT/OT evaluated the patient and are recommending SNF at discharge. Patient refusing to go to [facility] care, will need negative coivd test prior to dc to another facility.     Interval Problem Update  OR on 12/10/20    Consultants/Specialty  ortho    Code Status  Full Code    Disposition  pending    Review of Systems  Review of Systems   Constitutional: Negative for chills, fever and malaise/fatigue.   HENT: Negative for ear discharge, ear pain and tinnitus.    Eyes: Negative for blurred vision, double vision and photophobia.   Respiratory: Negative for cough, hemoptysis and sputum production.    Cardiovascular: Negative for chest pain, palpitations and orthopnea.   Gastrointestinal: Negative for heartburn, nausea and vomiting.   Genitourinary: Negative for dysuria, frequency and urgency.   Musculoskeletal: Positive for joint pain (right ankel). Negative for back pain and neck pain.   Skin: Negative for itching.   Neurological: Negative for dizziness, tingling and headaches.        Physical Exam  Temp:  [36.8 °C (98.3 °F)-37.2 °C (99 °F)] 37.1 °C (98.8 °F)  Pulse:  [79-88] 84  Resp:  [16-18] 18  BP: ()/(47-67) 104/56  SpO2:  [93 %-99 %] 93 %    Physical Exam  Constitutional:       Appearance: He is not toxic-appearing or  diaphoretic.   HENT:      Head: Normocephalic and atraumatic.      Nose: Nose normal.      Mouth/Throat:      Mouth: Mucous membranes are dry.   Eyes:      Extraocular Movements: Extraocular movements intact.      Pupils: Pupils are equal, round, and reactive to light.   Neck:      Musculoskeletal: Normal range of motion and neck supple.   Cardiovascular:      Rate and Rhythm: Normal rate and regular rhythm.      Pulses: Normal pulses.      Heart sounds: Normal heart sounds.   Pulmonary:      Effort: Pulmonary effort is normal.      Breath sounds: Normal breath sounds.   Abdominal:      General: Abdomen is flat.      Palpations: Abdomen is soft.   Musculoskeletal:      Comments: Boot on the right foot is noted  Wound vac on the right foot   Skin:     Coloration: Skin is not jaundiced or pale.   Neurological:      General: No focal deficit present.      Mental Status: He is alert and oriented to person, place, and time.         Fluids    Intake/Output Summary (Last 24 hours) at 12/16/2020 1100  Last data filed at 12/16/2020 0946  Gross per 24 hour   Intake --   Output 920 ml   Net -920 ml       Laboratory  Recent Labs     12/15/20  0521   WBC 5.3   RBC 3.22*   HEMOGLOBIN 9.7*   HEMATOCRIT 31.8*   MCV 98.8*   MCH 30.1   MCHC 30.5*   RDW 55.8*   PLATELETCT 176   MPV 9.6     Recent Labs     12/15/20  0521   SODIUM 142   POTASSIUM 4.8   CHLORIDE 109   CO2 24   GLUCOSE 97   BUN 13   CREATININE 0.91   CALCIUM 8.6                   Imaging       Assessment/Plan  HTN (hypertension)- (present on admission)  Assessment & Plan  Continue outpatient Carvedilol with holding parameters    Anemia  Assessment & Plan  Normocytic anemia  Iron panel reveals low iron however ferritin is very high, consistent with chronic disease and covid  No acute bleeding noted    Age-related physical debility  Assessment & Plan  F/u with ortho    History of ankle surgery- (present on admission)  Assessment & Plan  Patient has full cast of the right  ankle status post surgical correction and was done on 8/24/20  Ortho input is noted  Pt has missed 2 appointments with ortho office since his operation on 8/24/20 As per his wife's report to me on  12/6/20.  Wound care input is noted  Heel pictures are reviewed  Spoke to  dr pandey this morning  12/9 and he stated that he will take him to the   S/p IRRIGATION AND DEBRIDEMENT, WOUND - HEEL  ACELL graft placement  Ortho/dr gonzalez initially did not think pt needed to be on antibiotics.however noted from ortho on 12/10 states  To continue with antibiotics.  Will d/w ortho on 12/11/20 to see if they think pt should be on antibiotics and ortho stated no need for antibiotics   input is noted and pt will be difficult discharge and awaiting SNF acceptance  Wound care input is noted  Difficult discharge  Wound culture grew MRSA and appears to be localized,dced  vancomycin  And switched to doxycycline to complete 14 days  Awaiting SNF acceptance    Acute deep vein thrombosis (DVT) of calf muscle vein of left lower extremity (HCC)- (present on admission)  Assessment & Plan  Patient with acute DVT left lower extremity.  Likely hypercoagulable state given COVID-19 infection as well as recent right ankle surgery currently in cast.     will need anticoagulation for  3 to  6 months  eliquis    COVID-19 virus infection- (present on admission)  Assessment & Plan  Droplet precaution  Supportive care  Ordered inflammatory markers  Procalcitonin negative.   Continue dexamethasone  CTA chest consistent with COVID-19 pneumonia  Vitamin C ordered vitamin D level normal  Has no resp symptoms  Repeat covid on 12/3 is negative and on 12/8 is positive      Type 2 diabetes mellitus with peripheral neuropathy (HCC)- (present on admission)  Assessment & Plan  insulin sliding scale with D50 and glucagon for hypoglycemia per protocol.  Diabetic diet  Diabetic education       VTE prophylaxis: eliquis

## 2020-12-16 NOTE — DISCHARGE PLANNING
Received Choice form at [phone]  Agency/Facility Name: [facility]  Referral sent per Choice form @ [phone]

## 2020-12-16 NOTE — DISCHARGE PLANNING
Anticipated Disposition:  SNF    Action:  -> This CM received a call from [employer]. We discussed the discharge plan, Pt refused to go to [facility]. The two facilities [facility] taking [facility] positive [facility]. Choice form filed and faxed to [facility]. Also last COVID was done on the 10 th. She requested repeat COVID test.       1- This CM voalted attending physician with above info, he will order COVID test.           Barriers to Discharge:   SNF acceptance.      Plan:  Please follow up with [facility] for acceptance.

## 2020-12-17 NOTE — DISCHARGE PLANNING
Agency/Facility Name: [facility]  Spoke To: Felicia  Outcome:  Declined.  Read that wife can't take care of him.  Wife is not returning calls.  Used day's at [facility].    Agency/Facility Name: [facility]  Spoke To: Ruth  Outcome: No beds at this time.  Must have two negatives before referral can be resent.

## 2020-12-17 NOTE — PROGRESS NOTES
[facility] Medicine Daily Progress Note    Date of Service  12/17/2020    Chief Complaint  75 y.o. male admitted 11/18/2020 with weakness.    Hospital Course  Mr. Agapito Stone is a 75 y.o. male with past medical history of diabetes mellitus, essential hypertension, recent right ankle surgery who presented on 11/18/2020 with weakness.  Per wife she could not take care of him anymore.  In the ER he was found to have elevated troponin and abnormal chest x-ray.  CT PE study was negative for pulmonary embolism but found to have possible pneumonitis or infection  Covid test was positive.  US showed DVT in LLE and he was started on Eliquis.  He did complete a 3 day course of Ceftriaxone for a UTI.  PT/OT evaluated the patient and are recommending SNF at discharge. Patient refusing to go to [facility] care, will need negative coivd test prior to dc to another facility.     Interval Problem Update  OR on 12/10/20    Consultants/Specialty  ortho    Code Status  Full Code    Disposition  pending    Review of Systems  Review of Systems   Constitutional: Negative for chills, diaphoresis and malaise/fatigue.   HENT: Negative for ear discharge, ear pain and nosebleeds.    Eyes: Negative for double vision, photophobia and pain.   Respiratory: Negative for hemoptysis, sputum production and shortness of breath.    Cardiovascular: Negative for chest pain, palpitations and orthopnea.   Gastrointestinal: Negative for abdominal pain, nausea and vomiting.   Genitourinary: Negative for frequency, hematuria and urgency.   Musculoskeletal: Positive for joint pain (right ankel). Negative for back pain and neck pain.   Skin: Negative for itching.   Neurological: Negative for tingling, tremors and headaches.        Physical Exam  Temp:  [36.5 °C (97.7 °F)-37 °C (98.6 °F)] 36.8 °C (98.3 °F)  Pulse:  [77-98] 98  Resp:  [17-18] 18  BP: ()/(50-68) 124/68  SpO2:  [91 %-97 %] 97 %    Physical Exam  Constitutional:       General: He is not in  acute distress.     Appearance: He is not toxic-appearing.   HENT:      Head: Normocephalic and atraumatic.      Nose: No congestion or rhinorrhea.      Mouth/Throat:      Mouth: Mucous membranes are dry.   Eyes:      Conjunctiva/sclera: Conjunctivae normal.      Pupils: Pupils are equal, round, and reactive to light.   Neck:      Musculoskeletal: No neck rigidity or muscular tenderness.   Cardiovascular:      Rate and Rhythm: Normal rate and regular rhythm.      Heart sounds: No murmur. No friction rub.   Pulmonary:      Effort: No respiratory distress.      Breath sounds: No stridor.   Abdominal:      General: Abdomen is flat. Bowel sounds are normal.   Musculoskeletal:      Comments: Boot on the right foot is noted  Wound vac on the right foot   Skin:     General: Skin is warm and dry.   Neurological:      General: No focal deficit present.      Mental Status: He is alert and oriented to person, place, and time.         Fluids    Intake/Output Summary (Last 24 hours) at 12/17/2020 1021  Last data filed at 12/17/2020 0520  Gross per 24 hour   Intake --   Output 800 ml   Net -800 ml       Laboratory  Recent Labs     12/15/20  0521   WBC 5.3   RBC 3.22*   HEMOGLOBIN 9.7*   HEMATOCRIT 31.8*   MCV 98.8*   MCH 30.1   MCHC 30.5*   RDW 55.8*   PLATELETCT 176   MPV 9.6     Recent Labs     12/15/20  0521   SODIUM 142   POTASSIUM 4.8   CHLORIDE 109   CO2 24   GLUCOSE 97   BUN 13   CREATININE 0.91   CALCIUM 8.6                   Imaging       Assessment/Plan  HTN (hypertension)- (present on admission)  Assessment & Plan  Continue outpatient Carvedilol with holding parameters    Anemia  Assessment & Plan  Normocytic anemia  Iron panel reveals low iron however ferritin is very high, consistent with chronic disease and covid  No acute bleeding noted    Age-related physical debility  Assessment & Plan  F/u with ortho    History of ankle surgery- (present on admission)  Assessment & Plan  Patient has full cast of the right ankle  status post surgical correction and was done on 8/24/20  Ortho input is noted  Pt has missed 2 appointments with ortho office since his operation on 8/24/20 As per his wife's report to me on  12/6/20.  Wound care input is noted  Heel pictures are reviewed  Spoke to  dr pandey this morning  12/9 and he stated that he will take him to the   S/p IRRIGATION AND DEBRIDEMENT, WOUND - HEEL  ACELL graft placement  Ortho/dr gonzalez initially did not think pt needed to be on antibiotics.however noted from ortho on 12/10 states  To continue with antibiotics.  Will d/w ortho on 12/11/20 to see if they think pt should be on antibiotics and ortho stated no need for antibiotics   input is noted and pt will be difficult discharge and awaiting SNF acceptance  Wound care input is noted  Difficult discharge  Wound culture grew MRSA and appears to be localized,dced  vancomycin  And switched to doxycycline to complete 14 days   input is noted  Repeat covid test is pending  Medically clear for discharge once there is an accepting facility    Acute deep vein thrombosis (DVT) of calf muscle vein of left lower extremity (HCC)- (present on admission)  Assessment & Plan  Patient with acute DVT left lower extremity.  Likely hypercoagulable state given COVID-19 infection as well as recent right ankle surgery currently in cast.     will need anticoagulation for  3 to  6 months  eliquis    COVID-19 virus infection- (present on admission)  Assessment & Plan  Droplet precaution  Supportive care  Ordered inflammatory markers  Procalcitonin negative.   Continue dexamethasone  CTA chest consistent with COVID-19 pneumonia  Vitamin C ordered vitamin D level normal  Has no resp symptoms  Repeat covid on 12/3 is negative and on 12/8 is positive      Type 2 diabetes mellitus with peripheral neuropathy (HCC)- (present on admission)  Assessment & Plan  insulin sliding scale with D50 and glucagon for hypoglycemia per protocol.  Diabetic  diet  Diabetic education       VTE prophylaxis: eliquis

## 2020-12-17 NOTE — INFECTION CONTROL
This patient is considered COVID RECOVERED.    Patient initially tested positive for COVID on 11/18/2020.  Patient is greater than or equal to 21 days from symptom onset and/or positive test, with symptom improvement.  Per the CDC guidance, this patient no longer requires transmission based precautions.  Patient may be placed on any unit per the bed assignment policy (except CNU), including placement in a semi-private room with a roommate.

## 2020-12-18 NOTE — PROGRESS NOTES
Received report from day shift at 1900. Assumed care of pt. Pt a&ox 4. VSS. Pt complains of 5/10 pain in right foot/ankle. Tylenol given per MAR. Dressing to right ankle CDI, reinforced. Wound vac to right ankle in place, scant sanguineous drainage in canister. Mepilex dressing to left ankle CDI. Mepilex to saccrum CDI. No nausea reported. Q2 turns in place. No further needs at this time. Bed locked and in lowest position. Call light within reach. Will continue to monitor.

## 2020-12-18 NOTE — WOUND TEAM
[facility]  Inpatient Services  Wound and Skin Care Progress note    Admission Date: 11/18/2020     HPI, PMH, SH: Reviewed    Unit where seen by Wound Team: 220    WOUND CONSULT/FOLLOW-UP RELATED TO:  NPWT Change    Subjective: Reports he gets VAC change 2xwk, and he will be here for a while since he has not been shown how to walk yet    Self Report / Pain Level:  No c/o pain    OBJECTIVE:   pt on pressure redistribution mattress with waffle overlay, heel float boot R, heel mepilex L and pillow support   Negative Pressure Wound Therapy 12/11/20 (Active)   NPWT Pump Mode / Pressure Setting 125 mmHg    Dressing Type Black Foam (Regular)    Number of Foam Pieces Used 2    Canister Changed No      Wound 12/06/20 Pressure Injury Heel Posterior Right POA Unstageable under cast with suture in place (Active)   Wound Image   12/14/20   Site Assessment IDA    Periwound Assessment Intact    Margins Defined edges    Drainage Amount Scant    Drainage Description Serosanguineous    Treatments Cleansed    Wound Cleansing Normal Saline Irrigation    Periwound Protectant Skin Protectant Wipes to Periwound;Drape    Dressing Options Vac Drape    Dressing Changed Changed    Dressing Change/Treatment Frequency By Wound Team Only    NEXT Dressing Change/Treatment Date 12/21/20    NEXT Weekly Photo (Inpatient Only) 12/21/20    Pressure Injury Stage U    Wound Length (cm) 3.5 cm Measured 12/14/20   Wound Width (cm) 3.5 cm    Wound Surface Area (cm^2) 12.25 cm^2    Wound Bed Eschar (%) 98 %    Wound Odor None    Pulses 2+;DP    Exposed Structures None        Wound 12/06/20 Partial Thickness Wound Ankle Lateral Left (Active)   Wound Image   12/06/20   Site Assessment Yellow    Periwound Assessment Blanchable erythema    Margins Defined edges    Drainage Amount Scant    Drainage Description Serous    Treatments Cleansed    Wound Cleansing Normal Saline Irrigation    Dressing Options Mepilex    Dressing Change/Treatment  Frequency Every 72 hrs, and As Needed    NEXT Dressing Change/Treatment Date 12/21/20    NEXT Weekly Photo (Inpatient Only) 12/23/20    Non-staged Wound Description Partial thickness    Wound Length (cm) 0.3 cm Measured 12/14/20   Wound Width (cm) 0.3 cm    Wound Surface Area (cm^2) 0.09 cm^2    Wound Odor None    Exposed Structures None            Lab Values:    Lab Results   Component Value Date/Time    WBC 5.3 12/15/2020 05:21 AM    RBC 3.22 (L) 12/15/2020 05:21 AM    HEMOGLOBIN 9.7 (L) 12/15/2020 05:21 AM    HEMATOCRIT 31.8 (L) 12/15/2020 05:21 AM    SEDRATEWES 26 (H) 11/19/2020 11:36 AM    HBA1C 8.0 (H) 08/31/2020 05:55 AM        Culture Results show:  No results found for this or any previous visit (from the past 720 hour(s)).      INTERVENTIONS BY WOUND TEAM:  Removed dressings. Cleaned each with NS. Covered R heel Adaptic with one black foam, 2nd foam to track to medial dorsal side, the 3rd foam for TRAC pad. Resumed VAC at 125 mmHg continuous. Removed L lateral ankle dressing, cleaned , then applied to Mepilex. Replaced heel float to R foot, and supported L with pillow.    Interdisciplinary consultation: Patient, Bedside RN      EVALUATION: R heel has Acell biologic, covered with Adpatic, remains in place, then black foam over this. L lateral ankle has blanchable red to periwound , with small yellow wound bed.    Factors affecting wound healing: DM, hx ankle surgery with fixed case and Pressure injury to heel, non ambulatory    Goals: Slow steady decrease in wound area and depth weekly.    NURSING PLAN OF CARE ORDERS (X):     Dressing changes: Continue previous Dressing Maintenance orders:  x      See new Dressing Maintenance orders:       Skin care: See Skin Care orders:   x     Rectal tube care: See Rectal Tube Care orders:      Other orders:           WOUND TEAM PLAN OF CARE:   Dressing changes by wound team:                   Follow up 3 times weekly:                NPWT change 3 times weekly: x     Follow up 1-2 times weekly:      Follow up Bi-Monthly:                  Follow up as needed:      Other (explain):     Anticipated discharge plans:  LTACH:        SNF/Rehab:  X                Home Care:           Outpatient Wound Center:            Self Care:           Other:

## 2020-12-18 NOTE — PROGRESS NOTES
[facility] Medicine Daily Progress Note    Date of Service  12/18/2020    Chief Complaint  75 y.o. male admitted 11/18/2020 with weakness.    Hospital Course  Mr. Agapito Stone is a 75 y.o. male with past medical history of diabetes mellitus, essential hypertension, recent right ankle surgery who presented on 11/18/2020 with weakness.  Per wife she could not take care of him anymore.  In the ER he was found to have elevated troponin and abnormal chest x-ray.  CT PE study was negative for pulmonary embolism but found to have possible pneumonitis or infection  Covid test was positive.  US showed DVT in LLE and he was started on Eliquis.  He did complete a 3 day course of Ceftriaxone for a UTI.  PT/OT evaluated the patient and are recommending SNF at discharge. Patient refusing to go to [facility] care, will need negative coivd test prior to dc to another facility.     Interval Problem Update  OR on 12/10/20    Consultants/Specialty  ortho    Code Status  Full Code    Disposition  pending    Review of Systems  Review of Systems   Constitutional: Negative for chills, fever and malaise/fatigue.   HENT: Negative for ear discharge, ear pain and tinnitus.    Eyes: Negative for blurred vision, double vision and photophobia.   Respiratory: Negative for cough, hemoptysis and sputum production.    Cardiovascular: Negative for palpitations, orthopnea and claudication.   Gastrointestinal: Negative for heartburn, nausea and vomiting.   Genitourinary: Negative for dysuria, frequency and urgency.   Musculoskeletal: Positive for joint pain (right ankel). Negative for back pain and neck pain.   Skin: Negative for itching.   Neurological: Negative for dizziness, tingling and headaches.        Physical Exam  Temp:  [36.6 °C (97.8 °F)-36.8 °C (98.2 °F)] 36.6 °C (97.8 °F)  Pulse:  [74-87] 81  Resp:  [18] 18  BP: ()/(56-70) 120/56  SpO2:  [93 %-96 %] 93 %    Physical Exam  Constitutional:       General: He is not in acute distress.      Appearance: He is not toxic-appearing.   HENT:      Head: Normocephalic and atraumatic.      Nose: No congestion or rhinorrhea.      Mouth/Throat:      Mouth: Mucous membranes are dry.   Eyes:      Conjunctiva/sclera: Conjunctivae normal.      Pupils: Pupils are equal, round, and reactive to light.   Neck:      Musculoskeletal: No neck rigidity or muscular tenderness.   Cardiovascular:      Rate and Rhythm: Normal rate and regular rhythm.      Heart sounds: No murmur. No friction rub.   Pulmonary:      Effort: No respiratory distress.      Breath sounds: No stridor.   Abdominal:      General: Abdomen is flat. Bowel sounds are normal.   Musculoskeletal:      Comments: Boot on the right foot is noted  Wound vac on the right foot   Skin:     General: Skin is warm and dry.   Neurological:      General: No focal deficit present.      Mental Status: He is alert. Mental status is at baseline.         Fluids    Intake/Output Summary (Last 24 hours) at 12/18/2020 0932  Last data filed at 12/18/2020 0555  Gross per 24 hour   Intake 0 ml   Output 200 ml   Net -200 ml       Laboratory                        Imaging       Assessment/Plan  HTN (hypertension)- (present on admission)  Assessment & Plan  Continue outpatient Carvedilol with holding parameters    Anemia  Assessment & Plan  Normocytic anemia  Iron panel reveals low iron however ferritin is very high, consistent with chronic disease and covid  No acute bleeding noted    Age-related physical debility  Assessment & Plan  F/u with ortho    History of ankle surgery- (present on admission)  Assessment & Plan  Patient has full cast of the right ankle status post surgical correction and was done on 8/24/20  Ortho input is noted  Pt has missed 2 appointments with ortho office since his operation on 8/24/20 As per his wife's report to me on  12/6/20.  Wound care input is noted  Heel pictures are reviewed  Spoke to  dr pandey this morning  12/9 and he stated that he will take  him to the   S/p IRRIGATION AND DEBRIDEMENT, WOUND - HEEL  ACELL graft placement  Ortho/dr gonzalez initially did not think pt needed to be on antibiotics.however noted from ortho on 12/10 states  To continue with antibiotics.  Will d/w ortho on 12/11/20 to see if they think pt should be on antibiotics and ortho stated no need for antibiotics   input is noted and pt will be difficult discharge and awaiting SNF acceptance  Wound care input is noted  Difficult discharge  Wound culture grew MRSA and appears to be localized,dced  vancomycin  And switched to doxycycline to complete 14 days   input is noted  Repeat covid test is negative on 12/17/20  Medically clear for discharge once there is an accepting facility    Acute deep vein thrombosis (DVT) of calf muscle vein of left lower extremity (HCC)- (present on admission)  Assessment & Plan  Patient with acute DVT left lower extremity.  Likely hypercoagulable state given COVID-19 infection as well as recent right ankle surgery currently in cast.     will need anticoagulation for  3 to  6 months  eliquis    COVID-19 virus infection- (present on admission)  Assessment & Plan  Droplet precaution  Supportive care  Ordered inflammatory markers  Procalcitonin negative.   Continue dexamethasone  CTA chest consistent with COVID-19 pneumonia  Vitamin C ordered vitamin D level normal  Has no resp symptoms  Repeat covid on 12/3 is negative and on 12/8 is positive      Type 2 diabetes mellitus with peripheral neuropathy (HCC)- (present on admission)  Assessment & Plan  insulin sliding scale with D50 and glucagon for hypoglycemia per protocol.  Diabetic diet  Diabetic education       VTE prophylaxis: eliquis

## 2020-12-18 NOTE — PROGRESS NOTES
Pt transferred to the floor. A+Ox4, CMS intact denies numbness and tingling. VSS. States 2/10 pain in his feet bilaterally and denies need for pain medication at this time. Call light in reach, bed locked in lowest position.

## 2020-12-19 NOTE — PROGRESS NOTES
Received report from nightshift RN and assumed care of patient at 0700. Patient is AXO4 denies SOB, pain, N/V or further needs at this time. Labs noted. VSS. R ankle wound vac in place. Patient educated on importance of repositioning. Call light in reach. Bed in lowest locked position. Hourly rounding to continue.

## 2020-12-19 NOTE — PROGRESS NOTES
[facility] Medicine Daily Progress Note    Date of Service  12/19/2020    Chief Complaint  75 y.o. male admitted 11/18/2020 with weakness.    Hospital Course  Mr. Agapito Stone is a 75 y.o. male with past medical history of diabetes mellitus, essential hypertension, recent right ankle surgery who presented on 11/18/2020 with weakness.  Per wife she could not take care of him anymore.  In the ER he was found to have elevated troponin and abnormal chest x-ray.  CT PE study was negative for pulmonary embolism but found to have possible pneumonitis or infection  Covid test was positive.  US showed DVT in LLE and he was started on Eliquis.  He did complete a 3 day course of Ceftriaxone for a UTI.  PT/OT evaluated the patient and are recommending SNF at discharge. Patient refusing to go to [facility] care, will need negative coivd test prior to dc to another facility.     Interval Problem Update  OR on 12/10/20    Consultants/Specialty  ortho    Code Status  Full Code    Disposition  pending    Review of Systems  Review of Systems   Constitutional: Negative for chills, diaphoresis and malaise/fatigue.   HENT: Negative for ear discharge, ear pain and nosebleeds.    Eyes: Negative for double vision, photophobia and pain.   Respiratory: Negative for hemoptysis, sputum production and shortness of breath.    Cardiovascular: Negative for chest pain, palpitations and orthopnea.   Gastrointestinal: Negative for abdominal pain, nausea and vomiting.   Genitourinary: Negative for frequency, hematuria and urgency.   Musculoskeletal: Positive for joint pain (right ankel). Negative for back pain and neck pain.   Skin: Negative for itching.   Neurological: Negative for tingling, tremors and headaches.        Physical Exam  Temp:  [36.2 °C (97.2 °F)-36.7 °C (98 °F)] 36.2 °C (97.2 °F)  Pulse:  [73-88] 82  Resp:  [17-18] 18  BP: (110-131)/(61-85) 131/85  SpO2:  [90 %-95 %] 94 %    Physical Exam  Constitutional:       Appearance: He is not  toxic-appearing or diaphoretic.   HENT:      Head: Normocephalic and atraumatic.      Nose: Nose normal.      Mouth/Throat:      Mouth: Mucous membranes are dry.   Eyes:      Extraocular Movements: Extraocular movements intact.      Pupils: Pupils are equal, round, and reactive to light.   Neck:      Musculoskeletal: Normal range of motion and neck supple.   Cardiovascular:      Rate and Rhythm: Normal rate and regular rhythm.      Pulses: Normal pulses.      Heart sounds: Normal heart sounds.   Pulmonary:      Effort: Pulmonary effort is normal.      Breath sounds: Normal breath sounds.   Abdominal:      General: Abdomen is flat.      Palpations: Abdomen is soft.   Musculoskeletal:      Comments: Boot on the right foot is noted  Wound vac on the right foot   Skin:     Coloration: Skin is not jaundiced or pale.   Neurological:      General: No focal deficit present.      Mental Status: He is alert and oriented to person, place, and time.         Fluids    Intake/Output Summary (Last 24 hours) at 12/19/2020 0954  Last data filed at 12/19/2020 0236  Gross per 24 hour   Intake 0 ml   Output 650 ml   Net -650 ml       Laboratory                        Imaging       Assessment/Plan  HTN (hypertension)- (present on admission)  Assessment & Plan  Continue outpatient Carvedilol with holding parameters    Anemia  Assessment & Plan  Normocytic anemia  Iron panel reveals low iron however ferritin is very high, consistent with chronic disease and covid  No acute bleeding noted    Age-related physical debility  Assessment & Plan  F/u with ortho    History of ankle surgery- (present on admission)  Assessment & Plan  Patient has full cast of the right ankle status post surgical correction and was done on 8/24/20  Ortho input is noted  Pt has missed 2 appointments with ortho office since his operation on 8/24/20 As per his wife's report to me on  12/6/20.  Wound care input is noted  Heel pictures are reviewed  Spoke to  dr pandey  this morning  12/9 and he stated that he will take him to the   S/p IRRIGATION AND DEBRIDEMENT, WOUND - HEEL  ACELL graft placement  Ortho/dr gonzalez initially did not think pt needed to be on antibiotics.however noted from ortho on 12/10 states  To continue with antibiotics.  Will d/w ortho on 12/11/20 to see if they think pt should be on antibiotics and ortho stated no need for antibiotics   input is noted and pt will be difficult discharge and awaiting SNF acceptance  Wound care input is noted  Difficult discharge  Wound culture grew MRSA and appears to be localized,dced  vancomycin  And switched to doxycycline and dced since it is resistant.  Started bactrim DS and needs 14 days total.  Picture of right heel on 12/14 is reviewed  On wound vac   input is noted  Repeat covid test is negative on 12/17/20  Medically clear for discharge once there is an accepting facility    Acute deep vein thrombosis (DVT) of calf muscle vein of left lower extremity (HCC)- (present on admission)  Assessment & Plan  Patient with acute DVT left lower extremity.  Likely hypercoagulable state given COVID-19 infection as well as recent right ankle surgery currently in cast.     will need anticoagulation for  3 to  6 months  eliquis    COVID-19 virus infection- (present on admission)  Assessment & Plan  Droplet precaution  Supportive care  Ordered inflammatory markers  Procalcitonin negative.   Continue dexamethasone  CTA chest consistent with COVID-19 pneumonia  Vitamin C ordered vitamin D level normal  Has no resp symptoms  Repeat covid on 12/3 is negative and on 12/8 is positive  Repeat covid on 12/17 is negative      Type 2 diabetes mellitus with peripheral neuropathy (HCC)- (present on admission)  Assessment & Plan  insulin sliding scale with D50 and glucagon for hypoglycemia per protocol.  Diabetic diet  Diabetic education       VTE prophylaxis: eliquis

## 2020-12-19 NOTE — PROGRESS NOTES
Received report from day shift. Assumed care of pt. Pt a&ox 4. VSS. Pt complains of 3/10 pain in right ankle, refuses pain medication at this time. Dressing on right ankle CDI with mepilex and wound vac in place. No nausea reported. Educated pt the importance of repositioning self. No further needs at this time. Bed locked and in lowest position. Call light within reach. Will continue to monitor.

## 2020-12-20 NOTE — CARE PLAN
Received report from day shift nurse.   Assumed pt care   Pt is A&Ox4 forgetful at times, resting comfortably in bed.   Pt on r.a.. No signs of SOB/respiratory distress.   Labs noted, VSS.   Pt c/o 5/10 pain at this moment, medicated per MAR.   Assessment completed, wound vac present on RLE dressing C/D/I. CMS intact.   Fall precautions in place.   Bed at lowest position.   Call light and personal belongings within reach. Continue to monitor         Problem: Communication  Goal: The ability to communicate needs accurately and effectively will improve  Outcome: PROGRESSING AS EXPECTED     Problem: Pain Management  Goal: Pain level will decrease to patient's comfort goal  Outcome: PROGRESSING AS EXPECTED

## 2020-12-20 NOTE — PROGRESS NOTES
"Hospital Medicine Daily Progress Note    Date of Service  12/20/2020    Chief Complaint  75 y.o. male admitted 11/18/2020 with weakness.    Hospital Course  Mr. Agapito Stone is a 75 y.o. male with past medical history of diabetes mellitus, essential hypertension, recent right ankle surgery who presented on 11/18/2020 with weakness.  Per wife she could not take care of him anymore.  In the ER he was found to have elevated troponin and abnormal chest x-ray.  CT PE study was negative for pulmonary embolism but found to have possible pneumonitis or infection  Covid test was positive.  US showed DVT in LLE and he was started on Eliquis.  He did complete a 3 day course of Ceftriaxone for a UTI.  PT/OT evaluated the patient and are recommending SNF at discharge. [facility], will need negative coivd test prior to dc to another facility.     Interval Problem Update  Patient \"feeling discouraged\" that he isn't getting more PT here. Discussed the role of post-acute placement for ongoing PT treatment and he expressed understanding.  He states \"that wound care gal was the best one I've ever had\".  No insulin needs. Dc'd ISS, accu-checks.  No acute complaints.    Consultants/Specialty  ortho    Code Status  Full Code    Disposition  Pending placement.   Repeat COVID test ordered per SNF requirements.    Review of Systems  Review of Systems   Constitutional: Negative for chills and fever.   Respiratory: Negative for cough and shortness of breath.    Cardiovascular: Negative for chest pain.   All other systems reviewed and are negative.       Physical Exam  Temp:  [36.4 °C (97.6 °F)-36.5 °C (97.7 °F)] 36.5 °C (97.7 °F)  Pulse:  [74-80] 74  Resp:  [18] 18  BP: ()/(57-68) 112/68  SpO2:  [94 %-95 %] 95 %    Physical Exam  Vitals signs and nursing note reviewed.   Constitutional:       General: He is not in acute distress.     Appearance: He is normal weight.   HENT:      Head: Normocephalic and " atraumatic.      Right Ear: External ear normal.      Left Ear: External ear normal.      Nose: Nose normal.      Mouth/Throat:      Mouth: Mucous membranes are moist.      Pharynx: Oropharynx is clear.   Eyes:      General: No scleral icterus.     Conjunctiva/sclera: Conjunctivae normal.   Neck:      Musculoskeletal: Normal range of motion and neck supple.   Cardiovascular:      Rate and Rhythm: Normal rate and regular rhythm.   Pulmonary:      Effort: Pulmonary effort is normal. No respiratory distress.   Abdominal:      Palpations: Abdomen is soft.      Tenderness: There is no abdominal tenderness. There is no guarding.   Musculoskeletal:         General: Signs of injury (RLE wound VAC in place to heel, suction intact, serosanguinous output. Surrounding skin nonerythematous.) present. No swelling.   Skin:     General: Skin is warm and dry.   Neurological:      General: No focal deficit present.      Mental Status: He is alert. Mental status is at baseline.   Psychiatric:         Mood and Affect: Mood normal.         Behavior: Behavior normal.         Fluids    Intake/Output Summary (Last 24 hours) at 12/20/2020 1132  Last data filed at 12/19/2020 1704  Gross per 24 hour   Intake --   Output 500 ml   Net -500 ml       Laboratory  Recent Labs     12/20/20  0451   WBC 5.4   RBC 3.43*   HEMOGLOBIN 10.3*   HEMATOCRIT 33.1*   MCV 96.5   MCH 30.0   MCHC 31.1*   RDW 54.4*   PLATELETCT 225   MPV 9.2     Recent Labs     12/20/20  0451   SODIUM 141   POTASSIUM 4.8   CHLORIDE 106   CO2 25   GLUCOSE 89   BUN 15   CREATININE 1.09   CALCIUM 8.7                   Imaging  None new in past 24h    Assessment/Plan  HTN (hypertension)- (present on admission)  Assessment & Plan  Continue outpatient Carvedilol with holding parameters    Anemia  Assessment & Plan  Normocytic anemia  Iron panel reveals low iron however ferritin is very high, consistent with chronic disease and covid  No acute bleeding noted    Age-related physical  debility  Assessment & Plan  F/u with ortho    History of ankle surgery- (present on admission)  Assessment & Plan  Patient has full cast of the right ankle status post surgical correction and was done on 8/24/20  Ortho input is noted  Pt has missed 2 appointments with ortho office since his operation on 8/24/20 As per his wife's report to me on  12/6/20.  Wound care input is noted  Heel pictures are reviewed  Spoke to  dr pandey this morning  12/9 and he stated that he will take him to the   S/p IRRIGATION AND DEBRIDEMENT, WOUND - HEEL  ACELL graft placement  Ortho/dr gonzalez initially did not think pt needed to be on antibiotics.however noted from ortho on 12/10 states  To continue with antibiotics.  Will d/w ortho on 12/11/20 to see if they think pt should be on antibiotics and ortho stated no need for antibiotics   input is noted and pt will be difficult discharge and awaiting SNF acceptance  Wound care input is noted  Difficult discharge  Wound culture grew MRSA and appears to be localized,dced  vancomycin  And switched to doxycycline and dced since it is resistant.  Started bactrim DS and needs 14 days total.  Picture of right heel on 12/14 is reviewed  On wound vac   input is noted  Repeat covid test is negative on 12/17/20  Medically clear for discharge once there is an accepting facility    Acute deep vein thrombosis (DVT) of calf muscle vein of left lower extremity (HCC)- (present on admission)  Assessment & Plan  Patient with acute DVT left lower extremity.  Likely hypercoagulable state given COVID-19 infection as well as recent right ankle surgery currently in cast.     will need anticoagulation for  3 to  6 months  eliquis    COVID-19 virus infection- (present on admission)  Assessment & Plan  Droplet precaution  Supportive care  Ordered inflammatory markers  Procalcitonin negative.   Continue dexamethasone  CTA chest consistent with COVID-19 pneumonia  Vitamin C ordered vitamin D  level normal  Has no resp symptoms  Repeat covid on 12/3 is negative and on 12/8 is positive  Repeat covid on 12/17 is negative  Repeat covid ordered 12/20    Type 2 diabetes mellitus with peripheral neuropathy (HCC)- (present on admission)  Assessment & Plan  insulin sliding scale with D50 and glucagon for hypoglycemia per protocol.  Diabetic diet  Diabetic education       VTE prophylaxis: eliquis

## 2020-12-20 NOTE — PROGRESS NOTES
Report received from night shift RN. Assume care. Pt. AAOx4 pt is bed,  Assessment completed. VSS. Denies pain,Dressing in place DCI, Pt was update for the care for the day. White board updated, All question answered. Pt has call light within reach,  bed is in the lowest position. Pt has no other needs at this time.

## 2020-12-21 NOTE — DISCHARGE PLANNING
Anticipated Discharge Disposition: SNF     Action: [facility] referral placed 12/12. However, pt refuses to go to [facility]. Note from Neva HERNANDEZ from 12/10 stating that [facility] has stated that pt has 23 SNF days.     Pt declined by [facility]- admission hold  [facility]- requesting 2 negative COVID tests  [facility]- requesting 2 negative COVID tests  [facility]- admission hold  [place] [facility]- No SNF days?   [facility]- non contracted insurance  [facility]- no comment as to why declined  [facility]- no comment as to why declined     Negative COVID results for test on 12/17 and pending results for COVID test from 12/20.     Barriers to Discharge: 2 negative COVID test results, SNF acceptance     Plan: Await COVID results from 12/20, Resend SNF referrals, LSW to assist as needed

## 2020-12-21 NOTE — THERAPY
Physical Therapy   Daily Treatment     Patient Name: Agapito Stone  Age:  75 y.o., Sex:  male  Medical Record #: [id number]  Today's Date: 12/21/2020     Precautions: Toe Touch Weight Bearing Right Lower Extremity, Fall Risk    Assessment    Pt mobility has declined since new orders for TTWB R LE due to R heel wound. Pt is unable to stand and maintain TTWB R LE, limited by impaired balance but also R foot weakness from previous surgery and had been in a cast for 3 months. Pt is familiar with SB transfers, states was performing them at SNF after R foot surgery 3 months ago. Pt required modA but was able to perform it safely bed > chair today. Will continue to practice SB transfers. Pt is unsafe to DC home and will require further therapy at this time at SNF prior to DC home, pt has become weaker since hospital admit and extended hospital stay.     Plan    Treatment plan modified to 5 times per week until therapy goals are met for the following treatments:  Bed Mobility, Neuro Re-Education / Balance, Therapeutic Activities and Therapeutic Exercises.    DC Equipment Recommendations: Unable to determine at this time  Discharge Recommendations: Recommend post-acute placement for additional physical therapy services prior to discharge home       12/21/20 1145   Cognition    Comments Pt with impaired memory and insight, agreeable for PT. Pt does recall that he is TTWB R LE   Gait Analysis   Gait Level Of Assist Unable to Participate   Bed Mobility    Supine to Sit Minimal Assist   Functional Mobility   Sit to Stand Moderate Assist   Bed, Chair, Wheelchair Transfer Moderate Assist   Transfer Method Slide Board   Comments STS x2 reps, unable to maintain TTWB R LE so returned to sitting, modA for balance   Activity Tolerance   Sitting in Chair 1 hr 30 min   Sitting Edge of Bed 15 min +   Standing 5 sec x2 reps with FWW   Short Term Goals    Short Term Goal # 1 pt will perform supine <> sit without bed features with SPV in  6 visits to be able to get in/out of bed at home   Goal Outcome # 1 Progressing slower than expected   Short Term Goal # 2 B  Pt will be able to perform SB transfer [place] bed <> chair in 6 visits working toward independence so can DC home safely.   Goal Outcome # 2 B Progressing as expected   Short Term Goal # 4 pt will demo WC mobility with SPV in 6 visits for improved activity   Goal Outcome # 4 Goal not met

## 2020-12-21 NOTE — PROGRESS NOTES
[facility] Medicine Daily Progress Note    Date of Service  12/21/2020    Chief Complaint  75 y.o. male admitted 11/18/2020 with weakness.    Hospital Course  Mr. Agapito Stone is a 75 y.o. male with past medical history of diabetes mellitus, essential hypertension, recent right ankle surgery who presented on 11/18/2020 with weakness.  Per wife she could not take care of him anymore.  In the ER he was found to have elevated troponin and abnormal chest x-ray.  CT PE study was negative for pulmonary embolism but found to have possible pneumonitis or infection  Covid test was positive.  US showed DVT in LLE and he was started on Eliquis.  He did complete a 3 day course of Ceftriaxone for a UTI.  PT/OT evaluated the patient and are recommending SNF at discharge. Patient refusing to go to [facility] care, will need negative covid test prior to dc to another facility.     Interval Problem Update  Repeat Covid test collected for SNF placement, in process.  No acute complaints.    Consultants/Specialty  ortho    Code Status  Full Code    Disposition  Pending placement.   Repeat COVID test ordered per SNF requirements.  Medically cleared.    Review of Systems  Review of Systems   Constitutional: Negative for chills and fever.   Respiratory: Negative for cough and shortness of breath.    Cardiovascular: Negative for chest pain.   All other systems reviewed and are negative.       Physical Exam  Temp:  [36.4 °C (97.6 °F)-36.8 °C (98.3 °F)] 36.7 °C (98.1 °F)  Pulse:  [74-81] 79  Resp:  [18] 18  BP: ()/(42-70) 103/68  SpO2:  [94 %-98 %] 94 %    Physical Exam  Vitals signs and nursing note reviewed.   Constitutional:       General: He is not in acute distress.     Appearance: He is normal weight.   HENT:      Head: Normocephalic and atraumatic.      Right Ear: External ear normal.      Left Ear: External ear normal.      Nose: Nose normal.      Mouth/Throat:      Mouth: Mucous membranes are moist.      Pharynx: Oropharynx is  clear.   Eyes:      General: No scleral icterus.     Conjunctiva/sclera: Conjunctivae normal.   Neck:      Musculoskeletal: Normal range of motion and neck supple.   Cardiovascular:      Rate and Rhythm: Normal rate and regular rhythm.   Pulmonary:      Effort: Pulmonary effort is normal. No respiratory distress.   Abdominal:      Palpations: Abdomen is soft.      Tenderness: There is no abdominal tenderness. There is no guarding.   Musculoskeletal:         General: Signs of injury (RLE wound VAC in place to heel, suction intact, serosanguinous output. Surrounding skin nonerythematous.) present. No swelling.   Skin:     General: Skin is warm and dry.   Neurological:      General: No focal deficit present.      Mental Status: He is alert. Mental status is at baseline.   Psychiatric:         Mood and Affect: Mood normal.         Behavior: Behavior normal.         Fluids    Intake/Output Summary (Last 24 hours) at 12/21/2020 1039  Last data filed at 12/21/2020 0800  Gross per 24 hour   Intake 480 ml   Output 1000 ml   Net -520 ml       Laboratory  Recent Labs     12/20/20  0451   WBC 5.4   RBC 3.43*   HEMOGLOBIN 10.3*   HEMATOCRIT 33.1*   MCV 96.5   MCH 30.0   MCHC 31.1*   RDW 54.4*   PLATELETCT 225   MPV 9.2     Recent Labs     12/20/20  0451   SODIUM 141   POTASSIUM 4.8   CHLORIDE 106   CO2 25   GLUCOSE 89   BUN 15   CREATININE 1.09   CALCIUM 8.7                   Imaging  None new in past 24h    Assessment/Plan  HTN (hypertension)- (present on admission)  Assessment & Plan  Continue outpatient Carvedilol with holding parameters    Anemia  Assessment & Plan  Normocytic anemia  Iron panel reveals low iron however ferritin is very high, consistent with chronic disease and covid  No acute bleeding noted    Age-related physical debility  Assessment & Plan  F/u with ortho    History of ankle surgery- (present on admission)  Assessment & Plan  Patient has full cast of the right ankle status post surgical correction and was  done on 8/24/20  Ortho input is noted  Pt has missed 2 appointments with ortho office since his operation on 8/24/20 As per his wife's report to me on  12/6/20.  Wound care input is noted  Heel pictures are reviewed  Spoke to  dr pandey this morning  12/9 and he stated that he will take him to the   S/p IRRIGATION AND DEBRIDEMENT, WOUND - HEEL  ACELL graft placement  Ortho/dr gonzalez initially did not think pt needed to be on antibiotics.however noted from ortho on 12/10 states  To continue with antibiotics.  Will d/w ortho on 12/11/20 to see if they think pt should be on antibiotics and ortho stated no need for antibiotics   input is noted and pt will be difficult discharge and awaiting SNF acceptance  Wound care input is noted  Difficult discharge  Wound culture grew MRSA and appears to be localized,dced  vancomycin  And switched to doxycycline and dced since it is resistant.  Started bactrim DS and needs 14 days total.  Picture of right heel on 12/14 is reviewed  On wound vac   input is noted  Repeat covid test is negative on 12/17/20  Medically clear for discharge once there is an accepting facility    Acute deep vein thrombosis (DVT) of calf muscle vein of left lower extremity (HCC)- (present on admission)  Assessment & Plan  Patient with acute DVT left lower extremity.  Likely hypercoagulable state given COVID-19 infection as well as recent right ankle surgery currently in cast.     will need anticoagulation for  3 to  6 months  eliquis    COVID-19 virus infection- (present on admission)  Assessment & Plan  Droplet precaution  Supportive care  Ordered inflammatory markers  Procalcitonin negative.   Continue dexamethasone  CTA chest consistent with COVID-19 pneumonia  Vitamin C ordered vitamin D level normal  Has no resp symptoms  Repeat covid on 12/3 is negative and on 12/8 is positive  Repeat covid on 12/17 is negative  Repeat covid ordered 12/20    Type 2 diabetes mellitus with  peripheral neuropathy (HCC)- (present on admission)  Assessment & Plan  insulin sliding scale with D50 and glucagon for hypoglycemia per protocol.  Diabetic diet  Diabetic education       VTE prophylaxis: eliquis

## 2020-12-21 NOTE — CARE PLAN
Received report from day shift nurse.   Assumed pt care   Pt is A&Ox4, resting comfortably in bed.   Pt on r.a.. No signs of SOB/respiratory distress.   Labs noted, VSS.   Pt c/o no pain at this moment.   Assessment completed, dressing with wound vac present on R heel dressing is C/D/I. Dressing present on R dorsal foot C/D/I. COVID swab collected and sent to lab.   Fall precautions in place.   Bed at lowest position.   Call light and personal belongings within reach. Continue to monitor         Problem: Communication  Goal: The ability to communicate needs accurately and effectively will improve  Outcome: PROGRESSING AS EXPECTED     Problem: Discharge Barriers/Planning  Goal: Patient's continuum of care needs will be met  Outcome: PROGRESSING AS EXPECTED

## 2020-12-22 PROBLEM — U07.1 COVID-19 VIRUS INFECTION: Status: RESOLVED | Noted: 2020-01-01 | Resolved: 2020-01-01

## 2020-12-22 NOTE — WOUND TEAM
[facility]  Inpatient Services  Wound and Skin Care Progress note     Admission Date: 11/18/2020     Last order of IP CONSULT TO WOUND CARE was found on 12/10/2020 from Hospital Encounter on 11/18/2020      HPI, PMH, SH: Reviewed    Unit where seen by Wound Team: 1113/01      WOUND CONSULT/FOLLOW-UP RELATED TO:  NPWT Changeb      Self Report / Pain Level:  C/o pain when L lat ankle palpated and cleaned        OBJECTIVE:  drsg to R heel has dried blood present, pt on pressure redistribution mattress with waffle overlay, heel float boot R, heel mepilex L    WOUND TYPE, LOCATION, CHARACTERISTICS (Pressure Injuries: location, stage, POA or date identified)    Negative Pressure Wound Therapy 12/11/20 (Active)   NPWT Pump Mode / Pressure Setting 125 mmHg;Continuous;[place] 12/21/20 1700   Dressing Type Small;Black Foam (Regular) 12/21/20 1700   Number of Foam Pieces Used 3 12/21/20 1700   Canister Changed No 12/21/20 1700   Output (mL) 0 mL 12/12/20 2200   NEXT Dressing Change/Treatment Date 12/26/20 12/21/20 1700   VAC VeraFlo Pressure (mm/Hg) Continuous;125 mmHg 12/21/20 1700   WOUND NURSE ONLY - Time Spent with Patient (mins) 60 12/21/20 1700           Wound 12/06/20 Pressure Injury Heel Posterior Right POA Unstageable under cast with suture in place (Active)   Wound Image   12/14/20 1415   Site Assessment IDA 12/21/20 1700   Periwound Assessment Clean;Intact;Dry 12/21/20 1700   Margins Attached edges;Defined edges 12/21/20 1700   Closure Secondary intention 12/21/20 1700   Drainage Amount Scant 12/21/20 1700   Drainage Description Serosanguineous 12/21/20 1700   Treatments Cleansed;Tape change 12/21/20 1700   Wound Cleansing Normal Saline Irrigation 12/21/20 1700   Periwound Protectant Skin Protectant Wipes to Periwound;Drape 12/21/20 1700   Dressing Cleansing/Solutions Not Applicable 12/21/20 1700   Dressing Options Wound Vac;Mepilex 12/21/20 1700   Dressing Changed Changed 12/21/20 1700   Dressing  Status Clean;Dry;Intact 12/21/20 1700   Dressing Change/Treatment Frequency By Wound Team Only 12/21/20 1700   NEXT Dressing Change/Treatment Date 12/26/20 12/21/20 1700   NEXT Weekly Photo (Inpatient Only) 12/26/20 12/21/20 1700   Pressure Injury Stage U 12/14/20 1415          INTERVENTIONS BY WOUND TEAM: in to see patient for NPWT change to right heel. Patient agreeable. Turned off NPWT and removed dressing, leaving adaptic in place, no retained foam. Cleansed periwound with NS and gauze. Rinsed wound with NS. Prepped periwound with no sting skin prep and drape, and bridged up to dorsal foot with no sting and drape. One piece of black foam over adaptic and wound, One piece of foam to bridge, and button. All secured with drape, and trak pad was applied. Suction obtained at 125mmhg, no leaks present. Applied half of heel mepilex under tubing, replaced heel float boot.  Updated bedside Rn    Interdisciplinary consultation: Patient, Bedside RN Charlene     EVALUATION: Pt's R heel with evidence of bleeding, Acell and adaptic dark brown color. Sutures intact. . L lateral ankle has blanchable red to periwound , with small yellow wound bed.     Goals: Slow steady decrease in wound area and depth weekly.     NURSING PLAN OF CARE ORDERS (X):      Dressing changes: Continue previous Dressing Maintenance orders:  x      See new Dressing Maintenance orders:       Skin care: See Skin Care orders:   x     Rectal tube care: See Rectal Tube Care orders:      Other orders:            WOUND TEAM PLAN OF CARE:   Dressing changes by wound team:                   Follow up 3 times weekly:                NPWT change 3 times weekly: x    Follow up 1-2 times weekly:      Follow up Bi-Monthly:                  Follow up as needed:      Other (explain):      Anticipated discharge plans:  LTACH:        SNF/Rehab:                  Home Care:           Outpatient Wound Center:            Self Care:           Other: unsure of needs @ this  time

## 2020-12-22 NOTE — DISCHARGE PLANNING
Anticipated Discharge Disposition: SNF    Action: Pt's results showing for COVID test from 12/20- not detected.   Pt now has two negative COVID results from 12/17 and 12/20.   LSW requested for Jt CASTANO to resent SNF referrals.      Pt previously declined-  [facility]- admission hold  [facility]- requesting 2 negative COVID tests  [facility]- requesting 2 negative COVID tests  [facility]- admission hold  [facility]- No SNF days?   [facility]- non contracted insurance  [facility]- no comment as to why declined  [facility]- no comment as to why declined     PT able to see pt on 12/21 and still recommending post acute.     Barriers to Discharge: None    Plan: Await SNF acceptance, LSW to assist as needed     Addendum 1553  LSW received a call from Ruth with [facility]. They cannot take pt with wound vac. They currently do not have a wound care RN at [facility].     LSW also informed by Jt CASTANO that Dr. Boucher messaged her via Vo[employer] requesting for pt's #1 SNF CHOICE to be [facility].

## 2020-12-22 NOTE — DISCHARGE PLANNING
Agency/Facility Name: [facility]  Spoke To: Ruth  Outcome: Ruth at [facility] is talking to DON to see if they can take Patient.    Patient has requested [facility] as his #1 place to go. This CCA called Ruth to let her know.

## 2020-12-22 NOTE — PROGRESS NOTES
Assumed Pt. Care  Pt. A&Ox4  No sign of cardiac and respiratory distress.  Pain is 3/10 from his abdomen.Normal bowel sound on 4 quadrant. Patient requested Milk of Magnesia for constipation. He said this medication works on him best. Last reported BM was 12/19/2020. Patient is comfortable.Bed at lowest position. Call light and personal belonging within reach.

## 2020-12-22 NOTE — DISCHARGE PLANNING
Received Choice form at 1329 12/5  Agency/Facility Name: [facility] to [facility] from previous Choice form  Referral sent per Choice form @ 1010     @0309  [facility] from [facility] Accepted Pt. They will start accepting Patients in 2 weeks.

## 2020-12-23 NOTE — PROGRESS NOTES
Bedside report received from day RN. Pt is AAO x 4.Pt reports 2/10 pain. POC discussed.Wound vac intact.CMS intact.All needs met at this time.Bed in low position.Call light within reach.Rounding in place.

## 2020-12-23 NOTE — PROGRESS NOTES
[facility] Medicine Daily Progress Note    Date of Service  12/23/2020    Chief Complaint  75 y.o. male admitted 11/18/2020 with weakness    Hospital Course  75M PMH T2DM, HTN, recent ankle sx 11/18/20 admitted for weakness.  Patient's wife could no longer care for the patient.  Patient had elevated troponin on admisison and abnormal CXR.  On admission, patient was positive for Covid.  Ultrasound was positive for Left DVT, s/p right ankle surgery on Apixaban.  Patient had completed ceftriaxone for 30 days for a UTI.  Prior to discharge, patient was recommended to go to SNF placement as per PT/OT.  Patient was requiring negative Covid test.  Currently, the patient tested negative on 12/17 and 12/20.  Patient is requesting to go to [facility] for discharge.  Patient is medically cleared to go to skilled nursing facility.    Interval Problem Update  I have seen and examined this patient this morning.  Patient stated he was doing well this morning, no acute issues.  Explained to him the acceptance by [facility] and that [facility] denied the patient admission.    Care plan discussed with patient in detail.  Care team notified of plan as well.    Consultants/Specialty  Orthopedic surgery    Code Status  Full Code    Disposition  Patient accepted to [facility] SNF, but bed is not available until 12/30/20. Patient has had  2 negative Covid test.  Medically cleared for discharge.    Review of Systems  Review of Systems   Constitutional: Negative for diaphoresis and fever.   HENT: Negative for ear pain and nosebleeds.    Respiratory: Negative for cough and shortness of breath.    Cardiovascular: Negative for chest pain and palpitations.   Gastrointestinal: Negative for abdominal pain, constipation, diarrhea, nausea and vomiting.   Musculoskeletal: Negative for back pain and joint pain.   Skin: Negative for itching and rash.   Neurological: Negative for dizziness and headaches.   Psychiatric/Behavioral: The patient is not  nervous/anxious and does not have insomnia.       Physical Exam  Temp:  [36.4 °C (97.6 °F)-36.8 °C (98.3 °F)] 36.7 °C (98.1 °F)  Pulse:  [67-81] 67  Resp:  [18] 18  BP: ()/(55-57) 99/55  SpO2:  [93 %-96 %] 95 %    Physical Exam  Vitals signs and nursing note reviewed.   Constitutional:       General: He is not in acute distress.     Appearance: Normal appearance. He is not diaphoretic.   Cardiovascular:      Rate and Rhythm: Normal rate and regular rhythm.      Pulses: Normal pulses.      Heart sounds: No murmur.   Pulmonary:      Effort: Pulmonary effort is normal. No respiratory distress.      Breath sounds: Normal breath sounds. No wheezing or rales.   Abdominal:      General: Abdomen is flat. There is no distension.      Palpations: Abdomen is soft.      Tenderness: There is no abdominal tenderness.   Musculoskeletal: Normal range of motion.         General: No swelling or tenderness.   Skin:     General: Skin is warm.      Capillary Refill: Capillary refill takes less than 2 seconds.      Coloration: Skin is not jaundiced or pale.   Neurological:      General: No focal deficit present.      Mental Status: He is alert. Mental status is at baseline.      Motor: Weakness present.   Psychiatric:         Mood and Affect: Mood normal.         Behavior: Behavior normal.       Fluids    Intake/Output Summary (Last 24 hours) at 12/23/2020 1314  Last data filed at 12/23/2020 0744  Gross per 24 hour   Intake 240 ml   Output 1100 ml   Net -860 ml       Laboratory  Recent Labs     12/23/20  0843   WBC 5.3   RBC 3.30*   HEMOGLOBIN 10.0*   HEMATOCRIT 31.8*   MCV 96.4   MCH 30.3   MCHC 31.4*   RDW 55.7*   PLATELETCT 253   MPV 9.1     Recent Labs     12/23/20  0843   SODIUM 137   POTASSIUM 4.5   CHLORIDE 102   CO2 25   GLUCOSE 120*   BUN 16   CREATININE 1.50*   CALCIUM 8.5                   Imaging  DX-ANKLE 3+ VIEWS RIGHT   Final Result      Hindfoot/tibiotalar fusion hardware with no evidence of loosening or fracture       US-EXTREMITY VENOUS LOWER BILAT   Final Result      CT-CTA CHEST PULMONARY ARTERY W/ RECONS   Final Result      1.  No CT evidence of pulmonary embolism.      2.  Some limited scattered pulmonary opacifications are noted as described above. Findings could be due to pneumonitis inflammation or other infection.      3.  Probable consolidative atelectasis noted in the left lung base.      4.  Borderline enlarged right-sided mediastinal node is identified. Enlargement could be due to inflammation or infection or less likely neoplasm.      Imaging features can be seen with COVID-19 pneumonia, though are nonspecific and can occur with a variety of infectious and noninfectious processes.            DX-CHEST-PORTABLE (1 VIEW)   Final Result      New mild peripheral opacification could represent mild Covid pneumonia           Assessment/Plan  HTN (hypertension)- (present on admission)  Assessment & Plan  Continue outpatient Carvedilol with holding parameters    Anemia- (present on admission)  Assessment & Plan  Normocytic anemia  Iron panel reveals low iron however ferritin is very high, consistent with chronic disease and covid  No acute bleeding noted    Age-related physical debility- (present on admission)  Assessment & Plan  F/u with ortho  Continue therapy skilled nursing facility    History of ankle surgery- (present on admission)  Assessment & Plan  Patient has full cast of the right ankle status post surgical correction and was done on 8/24/20  Ortho input is noted  Pt has missed 2 appointments with ortho office since his operation on 8/24/20 As per his wife's report to me on  12/6/20.  Wound care input is noted  Heel pictures are reviewed  Spoke to  dr pandey this morning  12/9 and he stated that he will take him to the   S/p IRRIGATION AND DEBRIDEMENT, WOUND - HEEL  ACELL graft placement  Ortho/dr gonzalez initially did not think pt needed to be on antibiotics.however noted from ortho on 12/10 states  To continue  with antibiotics.  Will d/w ortho on 12/11/20 to see if they think pt should be on antibiotics and ortho stated no need for antibiotics   input is noted and pt will be difficult discharge and awaiting SNF acceptance  Wound care input is noted  Difficult discharge  Wound culture grew MRSA and appears to be localized,dced  vancomycin  And switched to doxycycline and dced since it is resistant.  Started bactrim DS and needs 14 days total.  Picture of right heel on 12/14 is reviewed  On wound vac   input is noted  Repeat covid test is negative on 12/17/20  Medically clear for discharge once there is an accepting facility    Acute deep vein thrombosis (DVT) of calf muscle vein of left lower extremity (HCC)- (present on admission)  Assessment & Plan  Patient with acute DVT left lower extremity.  Likely hypercoagulable state given COVID-19 infection as well as recent right ankle surgery currently in cast.     will need eliquis for  3 to  6 months    Type 2 diabetes mellitus with peripheral neuropathy (HCC)- (present on admission)  Assessment & Plan  insulin sliding scale with D50 and glucagon for hypoglycemia per protocol.  Diabetic diet  Diabetic education     VTE prophylaxis: Eliquis

## 2020-12-23 NOTE — DISCHARGE PLANNING
Agency/Facility Name: [facility]  Spoke To: Barbara  Outcome: [facility] declined. Barbara says there is no DC plan since wife refuses to take him back. Barbara also stated that she has no beds available until after the first of the years. She states she has them all booked out.    @1010  Called and talked to Surendra @ [facility]. They previously accepted Pt. They are on an admissions hold until 12/30. They are happy to take Pt at that time as long as they have no more Covid +.

## 2020-12-23 NOTE — PROGRESS NOTES
[facility] Medicine Daily Progress Note    Date of Service  12/22/2020    Chief Complaint  75 y.o. male admitted 11/18/2020 with weakness    Hospital Course  75M PMH T2DM, HTN, recent ankle sx 11/18/20 admitted for weakness.  Patient's wife could no longer care for the patient.  Patient had elevated troponin on admisison and abnormal CXR.  On admission, patient was positive for Covid.  Ultrasound was positive for Left DVT, s/p right ankle surgery on Apixaban.  Patient had completed ceftriaxone for 30 days for a UTI.  Prior to discharge, patient was recommended to go to SNF placement as per PT/OT.  Patient was requiring negative Covid test.  Currently, the patient tested negative on 12/17 and 12/20.  Patient is requesting to go to [facility] for discharge.  Patient is medically cleared to go to skilled nursing facility.    Interval Problem Update  I have seen and examined this patient this morning.  Patient stated he was doing well this morning, no acute issues.    Care plan discussed with patient in detail.  Care team notified of plan as well.    Consultants/Specialty  Orthopedic surgery    Code Status  Full Code    Disposition  Pending placement to skilled nursing facility, patient requesting to go to [facility].  2 negative Covid test have been provided.    Review of Systems  Review of Systems   Constitutional: Negative for diaphoresis and fever.   HENT: Negative for ear pain and nosebleeds.    Respiratory: Negative for cough and shortness of breath.    Cardiovascular: Negative for chest pain and palpitations.   Gastrointestinal: Negative for abdominal pain, constipation, diarrhea, nausea and vomiting.   Musculoskeletal: Negative for back pain and joint pain.   Skin: Negative for itching and rash.   Neurological: Negative for dizziness and headaches.   Psychiatric/Behavioral: The patient is not nervous/anxious and does not have insomnia.         Physical Exam  Temp:  [36.4 °C (97.6 °F)-36.9 °C (98.4 °F)] 36.4  °C (97.6 °F)  Pulse:  [75-93] 76  Resp:  [18] 18  BP: ()/(48-93) 99/55  SpO2:  [90 %-95 %] 95 %    Physical Exam  Vitals signs and nursing note reviewed.   Constitutional:       General: He is not in acute distress.     Appearance: Normal appearance. He is not diaphoretic.   Cardiovascular:      Rate and Rhythm: Normal rate and regular rhythm.      Pulses: Normal pulses.      Heart sounds: No murmur.   Pulmonary:      Effort: Pulmonary effort is normal. No respiratory distress.      Breath sounds: Normal breath sounds. No wheezing or rales.   Abdominal:      General: Abdomen is flat. There is no distension.      Palpations: Abdomen is soft.      Tenderness: There is no abdominal tenderness.   Musculoskeletal: Normal range of motion.         General: No swelling or tenderness.   Skin:     General: Skin is warm.      Capillary Refill: Capillary refill takes less than 2 seconds.      Coloration: Skin is not jaundiced or pale.   Neurological:      General: No focal deficit present.      Mental Status: He is alert. Mental status is at baseline.      Motor: Weakness present.   Psychiatric:         Mood and Affect: Mood normal.         Behavior: Behavior normal.       Fluids    Intake/Output Summary (Last 24 hours) at 12/22/2020 1829  Last data filed at 12/22/2020 0730  Gross per 24 hour   Intake --   Output 450 ml   Net -450 ml       Laboratory  Recent Labs     12/20/20  0451   WBC 5.4   RBC 3.43*   HEMOGLOBIN 10.3*   HEMATOCRIT 33.1*   MCV 96.5   MCH 30.0   MCHC 31.1*   RDW 54.4*   PLATELETCT 225   MPV 9.2     Recent Labs     12/20/20  0451   SODIUM 141   POTASSIUM 4.8   CHLORIDE 106   CO2 25   GLUCOSE 89   BUN 15   CREATININE 1.09   CALCIUM 8.7                   Imaging  DX-ANKLE 3+ VIEWS RIGHT   Final Result      Hindfoot/tibiotalar fusion hardware with no evidence of loosening or fracture      US-EXTREMITY VENOUS LOWER BILAT   Final Result      CT-CTA CHEST PULMONARY ARTERY W/ RECONS   Final Result      1.  No  CT evidence of pulmonary embolism.      2.  Some limited scattered pulmonary opacifications are noted as described above. Findings could be due to pneumonitis inflammation or other infection.      3.  Probable consolidative atelectasis noted in the left lung base.      4.  Borderline enlarged right-sided mediastinal node is identified. Enlargement could be due to inflammation or infection or less likely neoplasm.      Imaging features can be seen with COVID-19 pneumonia, though are nonspecific and can occur with a variety of infectious and noninfectious processes.            DX-CHEST-PORTABLE (1 VIEW)   Final Result      New mild peripheral opacification could represent mild Covid pneumonia           Assessment/Plan  HTN (hypertension)- (present on admission)  Assessment & Plan  Continue outpatient Carvedilol with holding parameters    Anemia- (present on admission)  Assessment & Plan  Normocytic anemia  Iron panel reveals low iron however ferritin is very high, consistent with chronic disease and covid  No acute bleeding noted    Age-related physical debility- (present on admission)  Assessment & Plan  F/u with ortho  Continue therapy skilled nursing facility    History of ankle surgery- (present on admission)  Assessment & Plan  Patient has full cast of the right ankle status post surgical correction and was done on 8/24/20  Ortho input is noted  Pt has missed 2 appointments with ortho office since his operation on 8/24/20 As per his wife's report to me on  12/6/20.  Wound care input is noted  Heel pictures are reviewed  Spoke to  dr pandey this morning  12/9 and he stated that he will take him to the   S/p IRRIGATION AND DEBRIDEMENT, WOUND - HEEL  ACELL graft placement  Ortho/dr gonzalez initially did not think pt needed to be on antibiotics.however noted from ortho on 12/10 states  To continue with antibiotics.  Will d/w ortho on 12/11/20 to see if they think pt should be on antibiotics and ortho stated no need  for antibiotics   input is noted and pt will be difficult discharge and awaiting SNF acceptance  Wound care input is noted  Difficult discharge  Wound culture grew MRSA and appears to be localized,dced  vancomycin  And switched to doxycycline and dced since it is resistant.  Started bactrim DS and needs 14 days total.  Picture of right heel on 12/14 is reviewed  On wound vac   input is noted  Repeat covid test is negative on 12/17/20  Medically clear for discharge once there is an accepting facility    Acute deep vein thrombosis (DVT) of calf muscle vein of left lower extremity (HCC)- (present on admission)  Assessment & Plan  Patient with acute DVT left lower extremity.  Likely hypercoagulable state given COVID-19 infection as well as recent right ankle surgery currently in cast.     will need eliquis for  3 to  6 months    Type 2 diabetes mellitus with peripheral neuropathy (HCC)- (present on admission)  Assessment & Plan  insulin sliding scale with D50 and glucagon for hypoglycemia per protocol.  Diabetic diet  Diabetic education     VTE prophylaxis: Eliquis

## 2020-12-24 NOTE — WOUND TEAM
[facility]  Inpatient Services  Wound and Skin Care Progress note     Admission Date: 11/18/2020     Last order of IP CONSULT TO WOUND CARE was found on 12/10/2020 from Hospital Encounter on 11/18/2020      HPI, PMH, SH: Reviewed    Unit where seen by Wound Team: [id number]     WOUND CONSULT/FOLLOW-UP RELATED TO:  NPWT Change per protocol     Self Report / Pain Level:  Denied pain     OBJECTIVE:  Right heel on heel float boot. Heel mepilex on left heel. NPWT dressing intact. Pt requested for this RN to assess left ankle.     WOUND TYPE, LOCATION, CHARACTERISTICS (Pressure Injuries: location, stage, POA or date identified)           Negative Pressure Wound Therapy 12/11/20 (Active)   NPWT Pump Mode / Pressure Setting Continuous;125 mmHg    Dressing Type Small;Black Foam (Regular)    Number of Foam Pieces Used 3    Canister Changed No    NEXT Dressing Change/Treatment Date 12/28/20         Wound 12/06/20 Pressure Injury Heel Posterior Right POA Unstageable under cast with suture in place (Active)   Wound Image      Site Assessment IDA    Periwound Assessment Pink    Margins Defined edges    Closure Secondary intention    Drainage Amount Scant    Drainage Description Serosanguineous    Treatments Cleansed;Site care    Wound Cleansing Normal Saline Irrigation    Periwound Protectant Skin Protectant Wipes to Periwound;Drape    Dressing Cleansing/Solutions Not Applicable    Dressing Options Wound Vac    Dressing Changed Changed    Dressing Status Clean;Dry;Intact    Dressing Change/Treatment Frequency By Wound Team Only    NEXT Dressing Change/Treatment Date 12/28/20    NEXT Weekly Photo (Inpatient Only) 12/31/20    Pressure Injury Stage U    Wound Length (cm) 3.5 cm    Wound Width (cm) 3.5 cm    Wound Surface Area (cm^2) 12.25 cm^2    Shape irregular    Wound Odor None    Exposed Structures None             Wound 12/06/20 Partial Thickness Wound Ankle Lateral Left (Active)   Wound Image      Site Assessment  Yellow;Pink    Periwound Assessment Blanchable erythema    Margins Defined edges    Closure None    Drainage Amount Scant    Drainage Description Yellow    Treatments Cleansed;Site care;Autolytic Debridement    Wound Cleansing Normal Saline Irrigation    Periwound Protectant Skin Protectant Wipes to Periwound    Dressing Cleansing/Solutions Not Applicable    Dressing Options Hydrocolloid Thin;Mepilex Heel    Dressing Changed New    Dressing Status Clean;Dry;Intact    Dressing Change/Treatment Frequency Every 72 hrs, and As Needed    NEXT Dressing Change/Treatment Date 12/27/20    NEXT Weekly Photo (Inpatient Only) 12/31/20    Wound Length (cm) 1 cm    Wound Width (cm) 1 cm    Wound Surface Area (cm^2) 1 cm^2    Shape circular    Wound Odor None    Exposed Structures None                 INTERVENTIONS BY WOUND TEAM: Pt seen with wound care rojelio Correia. Removed NPWT dressing, adaptic came off with foam, no retained foam noted. Cleansed periwound with NS and gauze. Rinsed wound with NS. Prepped periwound with no sting skin prep and drape, and bridged up to dorsal foot with no sting and drape. Adaptic applied over sutures then One piece of black foam over adaptic and wound, One piece of foam over sutures and up to dorsal foot to bridge, and 1 piece of foam used as a button. All secured with drape, and trac pad was applied. Suction obtained at 125mmhg, no leaks present. Applied heel mepilex under tubing, reapplied heel float boot. Left heel mepilex removed. Left ankle noted to have wound with some slough. Cleansed wound w/ NS and gauze. Applied hydrocolloid thin dressing secured with heel mepilex.     Interdisciplinary consultation: Patient, Bedside RN      EVALUATION: Pt's R heel with evidence of bleeding, Acell and adaptic dark brown color. Sutures intact. L lateral ankle has blanchable red to periwound, with small yellow wound bed. Hydrocolloid thin dressing applied for autolytic debridement. Heel mepilex applied to  offload and redistribute pressure.      Goals: Slow steady decrease in wound area and depth weekly.     NURSING PLAN OF CARE ORDERS (X):      Dressing changes: See UPDATED Dressing Maintenance orders:  x      See new Dressing Maintenance orders:       Skin care: See Skin Care orders:   x     Rectal tube care: See Rectal Tube Care orders:      Other orders:            WOUND TEAM PLAN OF CARE:   Dressing changes by wound team:                   Follow up 3 times weekly:                NPWT change 3 times weekly: x    Follow up 1-2 times weekly:      Follow up Bi-Monthly:                  Follow up as needed:      Other (explain):      Anticipated discharge plans:  LTACH:        SNF/Rehab:                  Home Care:           Outpatient Wound Center:            Self Care:           Other: unsure of needs @ this time

## 2020-12-24 NOTE — PROGRESS NOTES
Hospital Medicine Daily Progress Note    Date of Service  12/24/2020    Chief Complaint  75 y.o. male admitted 11/18/2020 with weakness    Hospital Course  75M PMH T2DM, HTN, recent ankle sx 11/18/20 admitted for weakness.  Patient's wife could no longer care for the patient.  Patient had elevated troponin on admisison and abnormal CXR.  On admission, patient was positive for Covid.  Ultrasound was positive for Left DVT, s/p right ankle surgery on Apixaban.  Patient had completed ceftriaxone for 30 days for a UTI.  Prior to discharge, patient was recommended to go to SNF placement as per PT/OT.  Patient was requiring negative Covid test.  Currently, the patient tested negative on 12/17 and 12/20.  Patient is requesting to go to [place] for discharge.  Patient is medically cleared to go to skilled nursing facility.    Interval Problem Update  I have seen and examined this patient this morning.  Patient stated he was doing well this morning, no acute issues. Wound care changed patient's dressings today.    Care plan discussed with patient in detail.  Care team notified of plan as well.    Consultants/Specialty  Orthopedic surgery    Code Status  Full Code    Disposition  Patient accepted to [facility] SNF, but bed is not available until 12/30/20. Patient has had  2 negative Covid test.  Medically cleared for discharge.    Review of Systems  Review of Systems   Constitutional: Negative for diaphoresis and fever.   HENT: Negative for ear pain and nosebleeds.    Respiratory: Negative for cough and shortness of breath.    Cardiovascular: Negative for chest pain and palpitations.   Gastrointestinal: Negative for abdominal pain, constipation, diarrhea, nausea and vomiting.   Musculoskeletal: Negative for back pain and joint pain.   Skin: Negative for itching and rash.   Neurological: Negative for dizziness and headaches.   Psychiatric/Behavioral: The patient is not nervous/anxious and does not have insomnia.        Physical Exam  Temp:  [36.2 °C (97.2 °F)-36.8 °C (98.2 °F)] 36.2 °C (97.2 °F)  Pulse:  [70-83] 83  Resp:  [16-18] 18  BP: ()/(53-67) 103/53  SpO2:  [93 %-96 %] 94 %    Physical Exam  Vitals signs and nursing note reviewed.   Constitutional:       General: He is not in acute distress.     Appearance: Normal appearance. He is not diaphoretic.   Cardiovascular:      Rate and Rhythm: Normal rate and regular rhythm.      Pulses: Normal pulses.      Heart sounds: No murmur.   Pulmonary:      Effort: Pulmonary effort is normal. No respiratory distress.      Breath sounds: Normal breath sounds. No wheezing or rales.   Abdominal:      General: Abdomen is flat. There is no distension.      Palpations: Abdomen is soft.      Tenderness: There is no abdominal tenderness.   Musculoskeletal: Normal range of motion.         General: No swelling or tenderness.   Skin:     General: Skin is warm.      Capillary Refill: Capillary refill takes less than 2 seconds.      Coloration: Skin is not jaundiced or pale.   Neurological:      General: No focal deficit present.      Mental Status: He is alert. Mental status is at baseline.      Motor: Weakness present.   Psychiatric:         Mood and Affect: Mood normal.         Behavior: Behavior normal.       Fluids    Intake/Output Summary (Last 24 hours) at 12/24/2020 1444  Last data filed at 12/24/2020 0200  Gross per 24 hour   Intake 420 ml   Output 250 ml   Net 170 ml       Laboratory  Recent Labs     12/23/20  0843   WBC 5.3   RBC 3.30*   HEMOGLOBIN 10.0*   HEMATOCRIT 31.8*   MCV 96.4   MCH 30.3   MCHC 31.4*   RDW 55.7*   PLATELETCT 253   MPV 9.1     Recent Labs     12/23/20  0843   SODIUM 137   POTASSIUM 4.5   CHLORIDE 102   CO2 25   GLUCOSE 120*   BUN 16   CREATININE 1.50*   CALCIUM 8.5                   Imaging  DX-ANKLE 3+ VIEWS RIGHT   Final Result      Hindfoot/tibiotalar fusion hardware with no evidence of loosening or fracture      US-EXTREMITY VENOUS LOWER BILAT   Final  Result      CT-CTA CHEST PULMONARY ARTERY W/ RECONS   Final Result      1.  No CT evidence of pulmonary embolism.      2.  Some limited scattered pulmonary opacifications are noted as described above. Findings could be due to pneumonitis inflammation or other infection.      3.  Probable consolidative atelectasis noted in the left lung base.      4.  Borderline enlarged right-sided mediastinal node is identified. Enlargement could be due to inflammation or infection or less likely neoplasm.      Imaging features can be seen with COVID-19 pneumonia, though are nonspecific and can occur with a variety of infectious and noninfectious processes.            DX-CHEST-PORTABLE (1 VIEW)   Final Result      New mild peripheral opacification could represent mild Covid pneumonia           Assessment/Plan  History of ankle surgery- (present on admission)  Assessment & Plan  Patient has full cast of the right ankle status post surgical correction and was done on 8/24/20  Pt has missed 2 appointments with ortho office since his operation on 8/24/20   12/10 - S/p IRRIGATION AND DEBRIDEMENT, WOUND - HEEL by Dr Bolivar  ACEMERLINE graft placement  Wound culture grew MRSA and appears to be localized, was on vancomycin, doxycycline.  Continue Bactrim DS and needs 14 days total (ends 01/01/2021)  Picture of right heel on 12/24 is reviewed  On wound vac  Medically clear for discharge once there is an accepting facility    HTN (hypertension)- (present on admission)  Assessment & Plan  Continue outpatient Carvedilol with holding parameters    Anemia- (present on admission)  Assessment & Plan  Normocytic anemia  Iron panel reveals low iron however ferritin is very high, consistent with chronic disease and covid  No acute bleeding noted    Age-related physical debility- (present on admission)  Assessment & Plan  F/u with ortho  Continue therapy skilled nursing facility    Acute deep vein thrombosis (DVT) of calf muscle vein of left lower  extremity (HCC)- (present on admission)  Assessment & Plan  Patient with acute DVT left lower extremity.  Likely hypercoagulable state given COVID-19 infection as well as recent right ankle surgery currently in cast.     will need eliquis for  3 to  6 months    Type 2 diabetes mellitus with peripheral neuropathy (HCC)- (present on admission)  Assessment & Plan  insulin sliding scale with D50 and glucagon for hypoglycemia per protocol.  Diabetic diet  Diabetic education     VTE prophylaxis: Eliquis

## 2020-12-24 NOTE — PROGRESS NOTES
Assumed care of pt, Resting in bed A/O x 4 Forgetful at times Able to verbalize own needs Denies needs or c/os at present time Wound Vac to right ankle suctioning well at 125 mmhg Meplex to left heel dry and intact Denies needs or c/os at this time Call bell in reach.

## 2020-12-25 PROBLEM — N17.9 AKI (ACUTE KIDNEY INJURY) (HCC): Status: ACTIVE | Noted: 2020-01-01

## 2020-12-25 NOTE — PROGRESS NOTES
[facility] Medicine Daily Progress Note    Date of Service  12/25/2020    Chief Complaint  75 y.o. male admitted 11/18/2020 with weakness    Hospital Course  75M PMH T2DM, HTN, recent ankle sx 11/18/20 admitted for weakness.  Patient's wife could no longer care for the patient.  Patient had elevated troponin on admisison and abnormal CXR.  On admission, patient was positive for Covid.  Ultrasound was positive for Left DVT, s/p right ankle surgery on Apixaban.  Patient had completed ceftriaxone for 30 days for a UTI.  Prior to discharge, patient was recommended to go to SNF placement as per PT/OT.  Patient was requiring negative Covid test.  Currently, the patient tested negative on 12/17 and 12/20.  Patient is requesting to go to [place] for discharge.  Patient is medically cleared to go to skilled nursing facility.    Interval Problem Update  I have seen and examined this patient this morning.  Patient stated he was doing well this morning, only complained of a headache, asking for 2 Tylenols. Otherwise, he stated no other complaints.    Care plan discussed with patient in detail.  Care team notified of plan as well.    Consultants/Specialty  Orthopedic surgery    Code Status  Full Code    Disposition  Patient accepted to [facility] SNF, but bed is not available until 12/30/20. Patient has had  2 negative Covid test.  Medically cleared for discharge.    Review of Systems  Review of Systems   Constitutional: Negative for diaphoresis and fever.   HENT: Negative for ear pain and nosebleeds.    Respiratory: Negative for cough and shortness of breath.    Cardiovascular: Negative for chest pain and palpitations.   Gastrointestinal: Negative for abdominal pain, constipation, diarrhea, nausea and vomiting.   Musculoskeletal: Negative for back pain and joint pain.   Skin: Negative for itching and rash.   Neurological: Negative for dizziness and headaches.   Psychiatric/Behavioral: The patient is not nervous/anxious and does  not have insomnia.       Physical Exam  Temp:  [36.3 °C (97.4 °F)-36.7 °C (98.1 °F)] 36.7 °C (98 °F)  Pulse:  [72-76] 76  Resp:  [18] 18  BP: (100-118)/(52-65) 118/65  SpO2:  [92 %-95 %] 93 %    Physical Exam  Vitals signs and nursing note reviewed.   Constitutional:       General: He is not in acute distress.     Appearance: Normal appearance. He is not diaphoretic.   Cardiovascular:      Rate and Rhythm: Normal rate and regular rhythm.      Pulses: Normal pulses.      Heart sounds: No murmur.   Pulmonary:      Effort: Pulmonary effort is normal. No respiratory distress.      Breath sounds: Normal breath sounds. No wheezing or rales.   Abdominal:      General: Abdomen is flat. There is no distension.      Palpations: Abdomen is soft.      Tenderness: There is no abdominal tenderness.   Musculoskeletal: Normal range of motion.         General: No swelling or tenderness.   Skin:     General: Skin is warm.      Capillary Refill: Capillary refill takes less than 2 seconds.      Coloration: Skin is not jaundiced or pale.   Neurological:      General: No focal deficit present.      Mental Status: He is alert. Mental status is at baseline.      Motor: Weakness present.   Psychiatric:         Mood and Affect: Mood normal.         Behavior: Behavior normal.       Fluids    Intake/Output Summary (Last 24 hours) at 12/25/2020 1204  Last data filed at 12/25/2020 1100  Gross per 24 hour   Intake 650 ml   Output 1375 ml   Net -725 ml       Laboratory  Recent Labs     12/23/20  0843   WBC 5.3   RBC 3.30*   HEMOGLOBIN 10.0*   HEMATOCRIT 31.8*   MCV 96.4   MCH 30.3   MCHC 31.4*   RDW 55.7*   PLATELETCT 253   MPV 9.1     Recent Labs     12/23/20  0843 12/25/20  0105   SODIUM 137 135   POTASSIUM 4.5 4.6   CHLORIDE 102 102   CO2 25 23   GLUCOSE 120* 79   BUN 16 19   CREATININE 1.50* 1.51*   CALCIUM 8.5 8.3*                   Imaging  DX-ANKLE 3+ VIEWS RIGHT   Final Result      Hindfoot/tibiotalar fusion hardware with no evidence of  loosening or fracture      US-EXTREMITY VENOUS LOWER BILAT   Final Result      CT-CTA CHEST PULMONARY ARTERY W/ RECONS   Final Result      1.  No CT evidence of pulmonary embolism.      2.  Some limited scattered pulmonary opacifications are noted as described above. Findings could be due to pneumonitis inflammation or other infection.      3.  Probable consolidative atelectasis noted in the left lung base.      4.  Borderline enlarged right-sided mediastinal node is identified. Enlargement could be due to inflammation or infection or less likely neoplasm.      Imaging features can be seen with COVID-19 pneumonia, though are nonspecific and can occur with a variety of infectious and noninfectious processes.            DX-CHEST-PORTABLE (1 VIEW)   Final Result      New mild peripheral opacification could represent mild Covid pneumonia           Assessment/Plan  History of ankle surgery- (present on admission)  Assessment & Plan  Patient has full cast of the right ankle status post surgical correction and was done on 8/24/20  Pt has missed 2 appointments with ortho office since his operation on 8/24/20   12/10 - S/p IRRIGATION AND DEBRIDEMENT, WOUND - HEEL by Dr Bolivar  ACEMERLINE graft placement  Wound culture grew MRSA and appears to be localized, was on vancomycin, doxycycline.  Continue Bactrim DS and needs 14 days total (ends 01/01/2021)  Picture of right heel on 12/24 is reviewed  On wound vac  Medically clear for discharge once there is an accepting facility    HTN (hypertension)- (present on admission)  Assessment & Plan  Continue outpatient Carvedilol with holding parameters    Anemia- (present on admission)  Assessment & Plan  Normocytic anemia  Iron panel reveals low iron however ferritin is very high, consistent with chronic disease and covid  No acute bleeding noted    Age-related physical debility- (present on admission)  Assessment & Plan  F/u with ortho  Continue therapy skilled nursing facility    Acute  deep vein thrombosis (DVT) of calf muscle vein of left lower extremity (HCC)- (present on admission)  Assessment & Plan  Patient with acute DVT left lower extremity.  Likely hypercoagulable state given COVID-19 infection as well as recent right ankle surgery currently in cast.     will need eliquis for  3 to  6 months    ADRIENNE (acute kidney injury) ([facility])  Assessment & Plan  Tried to give patient IVF yesterday, his IV line required a new placement but patient declined for a new IV line.  Cr maintained today about 1.5, not worsening  - continue to emphasize to patient on volume replacement via PO as he does not want another IV line    Type 2 diabetes mellitus with peripheral neuropathy ([facility])- (present on admission)  Assessment & Plan  insulin sliding scale with D50 and glucagon for hypoglycemia per protocol.  Diabetic diet  Diabetic education     VTE prophylaxis: Eliquis

## 2020-12-25 NOTE — PROGRESS NOTES
Report received from day RN.Pt is alert and orient. Pt denies pain at this time Vitals stable. Dressing to left Heel intact and Right foot wound vac intact at   125 mmhg. Call Light within reached. Bed locked in lowest position. Hourly rounding in place.

## 2020-12-25 NOTE — ASSESSMENT & PLAN NOTE
Tried to give patient IVF yesterday, his IV line required a new placement but patient declined for a new IV line.  Cr maintained today about 1.5, not worsening  - continue to emphasize to patient on volume replacement via PO as he does not want another IV line

## 2020-12-26 NOTE — PROGRESS NOTES
Hospital Medicine Daily Progress Note    Date of Service  12/26/2020    Chief Complaint  75 y.o. male admitted 11/18/2020 with weakness    Hospital Course  75M PMH T2DM, HTN, recent ankle sx 11/18/20 admitted for weakness.  Patient's wife could no longer care for the patient.  Patient had elevated troponin on admisison and abnormal CXR.  On admission, patient was positive for Covid.  Ultrasound was positive for Left DVT, s/p right ankle surgery on Apixaban.  Patient had completed ceftriaxone for 30 days for a UTI.  Prior to discharge, patient was recommended to go to SNF placement as per PT/OT.  Patient was requiring negative Covid test.  Currently, the patient tested negative on 12/17 and 12/20.  Patient is requesting to go to [place] for discharge.  Patient is medically cleared to go to skilled nursing facility.    Interval Problem Update  I have seen and examined this patient this morning.  Patient stated he was doing well this morning, no other complaints.    Care plan discussed with patient in detail.  Care team notified of plan as well.    Consultants/Specialty  Orthopedic surgery    Code Status  Full Code    Disposition  Patient accepted to [facility], but bed is not available until 12/30/20. Patient has had  2 negative Covid test.  Medically cleared for discharge.    Review of Systems  Review of Systems   Constitutional: Negative for diaphoresis and fever.   HENT: Negative for ear pain and nosebleeds.    Respiratory: Negative for cough and shortness of breath.    Cardiovascular: Negative for chest pain and palpitations.   Gastrointestinal: Negative for abdominal pain, constipation, diarrhea, nausea and vomiting.   Musculoskeletal: Negative for back pain and joint pain.   Skin: Negative for itching and rash.   Neurological: Negative for dizziness and headaches.   Psychiatric/Behavioral: The patient is not nervous/anxious and does not have insomnia.       Physical Exam  Temp:  [36.3 °C (97.4 °F)-36.8 °C  (98.2 °F)] 36.5 °C (97.7 °F)  Pulse:  [73-81] 75  Resp:  [17-18] 17  BP: ()/(54-63) 97/63  SpO2:  [91 %-95 %] 95 %    Physical Exam  Vitals signs and nursing note reviewed.   Constitutional:       General: He is not in acute distress.     Appearance: Normal appearance. He is not diaphoretic.   Cardiovascular:      Rate and Rhythm: Normal rate and regular rhythm.      Pulses: Normal pulses.      Heart sounds: No murmur.   Pulmonary:      Effort: Pulmonary effort is normal. No respiratory distress.      Breath sounds: Normal breath sounds. No wheezing or rales.   Abdominal:      General: Abdomen is flat. There is no distension.      Palpations: Abdomen is soft.      Tenderness: There is no abdominal tenderness.   Musculoskeletal: Normal range of motion.         General: No swelling or tenderness.   Skin:     General: Skin is warm.      Capillary Refill: Capillary refill takes less than 2 seconds.      Coloration: Skin is not jaundiced or pale.   Neurological:      General: No focal deficit present.      Mental Status: He is alert. Mental status is at baseline.      Motor: Weakness present.   Psychiatric:         Mood and Affect: Mood normal.         Behavior: Behavior normal.       Fluids    Intake/Output Summary (Last 24 hours) at 12/26/2020 1343  Last data filed at 12/26/2020 1020  Gross per 24 hour   Intake 240 ml   Output 850 ml   Net -610 ml       Laboratory      Recent Labs     12/25/20  0105 12/26/20  0558   SODIUM 135 135   POTASSIUM 4.6 4.9   CHLORIDE 102 104   CO2 23 24   GLUCOSE 79 85   BUN 19 24*   CREATININE 1.51* 1.53*   CALCIUM 8.3* 8.5                   Imaging  DX-ANKLE 3+ VIEWS RIGHT   Final Result      Hindfoot/tibiotalar fusion hardware with no evidence of loosening or fracture      US-EXTREMITY VENOUS LOWER BILAT   Final Result      CT-CTA CHEST PULMONARY ARTERY W/ RECONS   Final Result      1.  No CT evidence of pulmonary embolism.      2.  Some limited scattered pulmonary opacifications  are noted as described above. Findings could be due to pneumonitis inflammation or other infection.      3.  Probable consolidative atelectasis noted in the left lung base.      4.  Borderline enlarged right-sided mediastinal node is identified. Enlargement could be due to inflammation or infection or less likely neoplasm.      Imaging features can be seen with COVID-19 pneumonia, though are nonspecific and can occur with a variety of infectious and noninfectious processes.            DX-CHEST-PORTABLE (1 VIEW)   Final Result      New mild peripheral opacification could represent mild Covid pneumonia           Assessment/Plan  History of ankle surgery- (present on admission)  Assessment & Plan  Patient has full cast of the right ankle status post surgical correction and was done on 8/24/20  Pt has missed 2 appointments with ortho office since his operation on 8/24/20   12/10 - S/p IRRIGATION AND DEBRIDEMENT, WOUND - HEEL by Dr Osmel FOLEY graft placement  Wound culture grew MRSA and appears to be localized, was on vancomycin, doxycycline.  Continue Bactrim DS and needs 14 days total (ends 01/01/2021)  Picture of right heel on 12/24 is reviewed  On wound vac  Medically clear for discharge once there is an accepting facility    HTN (hypertension)- (present on admission)  Assessment & Plan  Continue outpatient Carvedilol with holding parameters    Anemia- (present on admission)  Assessment & Plan  Normocytic anemia  Iron panel reveals low iron however ferritin is very high, consistent with chronic disease and covid  No acute bleeding noted    Age-related physical debility- (present on admission)  Assessment & Plan  F/u with ortho  Continue therapy skilled nursing facility    Acute deep vein thrombosis (DVT) of calf muscle vein of left lower extremity (HCC)- (present on admission)  Assessment & Plan  Patient with acute DVT left lower extremity.  Likely hypercoagulable state given COVID-19 infection as well as recent  right ankle surgery currently in cast.     will need eliquis for  3 to  6 months    ADRIENNE (acute kidney injury) (HCC)  Assessment & Plan  Tried to give patient IVF yesterday, his IV line required a new placement but patient declined for a new IV line.  Cr maintained today about 1.5, not worsening  - continue to emphasize to patient on volume replacement via PO as he does not want another IV line    Type 2 diabetes mellitus with peripheral neuropathy (HCC)- (present on admission)  Assessment & Plan  insulin sliding scale with D50 and glucagon for hypoglycemia per protocol.  Diabetic diet  Diabetic education     VTE prophylaxis: Eliquis

## 2020-12-27 NOTE — PROGRESS NOTES
[facility] Medicine Daily Progress Note    Date of Service  12/27/2020    Chief Complaint  75 y.o. male admitted 11/18/2020 with weakness    Hospital Course  75M PMH T2DM, HTN, recent ankle sx 11/18/20 admitted for weakness.  Patient's wife could no longer care for the patient.  Patient had elevated troponin on admisison and abnormal CXR.  On admission, patient was positive for Covid.  Ultrasound was positive for Left DVT, s/p right ankle surgery on Apixaban.  Patient had completed ceftriaxone for 30 days for a UTI.  Prior to discharge, patient was recommended to go to SNF placement as per PT/OT.  Patient was requiring negative Covid test.  Currently, the patient tested negative on 12/17 and 12/20.  Patient is requesting to go to [place] for discharge.  Patient is medically cleared to go to skilled nursing facility.    Interval Problem Update  I have seen and examined this patient this morning.  Patient stated he was doing well this morning, no other complaints.    Care plan discussed with patient in detail.  Care team notified of plan as well.    Consultants/Specialty  Orthopedic surgery    Code Status  Full Code    Disposition  Patient accepted to [facility], but bed is not available until 12/30/20. Patient has had  2 negative Covid test.  Medically cleared for discharge.    Review of Systems  Review of Systems   Constitutional: Negative for diaphoresis and fever.   HENT: Negative for ear pain and nosebleeds.    Respiratory: Negative for cough and shortness of breath.    Cardiovascular: Negative for chest pain and palpitations.   Gastrointestinal: Negative for abdominal pain, constipation, diarrhea, nausea and vomiting.   Musculoskeletal: Negative for back pain and joint pain.   Skin: Negative for itching and rash.   Neurological: Negative for dizziness and headaches.   Psychiatric/Behavioral: The patient is not nervous/anxious and does not have insomnia.       Physical Exam  Temp:  [36.5 °C (97.7 °F)-36.7 °C  (98 °F)] 36.6 °C (97.9 °F)  Pulse:  [65-78] 77  Resp:  [17-18] 18  BP: (102-117)/(59-66) 114/66  SpO2:  [93 %-95 %] 95 %    Physical Exam  Vitals signs and nursing note reviewed.   Constitutional:       General: He is not in acute distress.     Appearance: Normal appearance. He is not diaphoretic.   Cardiovascular:      Rate and Rhythm: Normal rate and regular rhythm.      Pulses: Normal pulses.      Heart sounds: No murmur.   Pulmonary:      Effort: Pulmonary effort is normal. No respiratory distress.      Breath sounds: Normal breath sounds. No wheezing or rales.   Abdominal:      General: Abdomen is flat. There is no distension.      Palpations: Abdomen is soft.      Tenderness: There is no abdominal tenderness.   Musculoskeletal: Normal range of motion.         General: No swelling or tenderness.   Skin:     General: Skin is warm.      Capillary Refill: Capillary refill takes less than 2 seconds.      Coloration: Skin is not jaundiced or pale.   Neurological:      General: No focal deficit present.      Mental Status: He is alert. Mental status is at baseline.      Motor: Weakness present.   Psychiatric:         Mood and Affect: Mood normal.         Behavior: Behavior normal.       Fluids    Intake/Output Summary (Last 24 hours) at 12/27/2020 1135  Last data filed at 12/27/2020 0900  Gross per 24 hour   Intake 480 ml   Output 1275 ml   Net -795 ml       Laboratory      Recent Labs     12/25/20  0105 12/26/20  0558   SODIUM 135 135   POTASSIUM 4.6 4.9   CHLORIDE 102 104   CO2 23 24   GLUCOSE 79 85   BUN 19 24*   CREATININE 1.51* 1.53*   CALCIUM 8.3* 8.5                   Imaging  DX-ANKLE 3+ VIEWS RIGHT   Final Result      Hindfoot/tibiotalar fusion hardware with no evidence of loosening or fracture      US-EXTREMITY VENOUS LOWER BILAT   Final Result      CT-CTA CHEST PULMONARY ARTERY W/ RECONS   Final Result      1.  No CT evidence of pulmonary embolism.      2.  Some limited scattered pulmonary opacifications  are noted as described above. Findings could be due to pneumonitis inflammation or other infection.      3.  Probable consolidative atelectasis noted in the left lung base.      4.  Borderline enlarged right-sided mediastinal node is identified. Enlargement could be due to inflammation or infection or less likely neoplasm.      Imaging features can be seen with COVID-19 pneumonia, though are nonspecific and can occur with a variety of infectious and noninfectious processes.            DX-CHEST-PORTABLE (1 VIEW)   Final Result      New mild peripheral opacification could represent mild Covid pneumonia           Assessment/Plan  History of ankle surgery- (present on admission)  Assessment & Plan  Patient has full cast of the right ankle status post surgical correction and was done on 8/24/20  Pt has missed 2 appointments with ortho office since his operation on 8/24/20   12/10 - S/p IRRIGATION AND DEBRIDEMENT, WOUND - HEEL by Dr Osmel FOLEY graft placement  Wound culture grew MRSA and appears to be localized, was on vancomycin, doxycycline.  Continue Bactrim DS and needs 14 days total (ends 01/01/2021)  Picture of right heel on 12/24 is reviewed  On wound vac  Medically clear for discharge once there is an accepting facility    HTN (hypertension)- (present on admission)  Assessment & Plan  Continue outpatient Carvedilol with holding parameters    Anemia- (present on admission)  Assessment & Plan  Normocytic anemia  Iron panel reveals low iron however ferritin is very high, consistent with chronic disease and covid  No acute bleeding noted    Age-related physical debility- (present on admission)  Assessment & Plan  F/u with ortho  Continue therapy skilled nursing facility    Acute deep vein thrombosis (DVT) of calf muscle vein of left lower extremity (HCC)- (present on admission)  Assessment & Plan  Patient with acute DVT left lower extremity.  Likely hypercoagulable state given COVID-19 infection as well as recent  right ankle surgery currently in cast.     will need eliquis for  3 to  6 months    ADRIENNE (acute kidney injury) (HCC)  Assessment & Plan  Tried to give patient IVF yesterday, his IV line required a new placement but patient declined for a new IV line.  Cr maintained today about 1.5, not worsening  - continue to emphasize to patient on volume replacement via PO as he does not want another IV line    Type 2 diabetes mellitus with peripheral neuropathy (HCC)- (present on admission)  Assessment & Plan  insulin sliding scale with D50 and glucagon for hypoglycemia per protocol.  Diabetic diet  Diabetic education     VTE prophylaxis: Eliquis

## 2020-12-28 PROBLEM — N17.9 AKI (ACUTE KIDNEY INJURY) (HCC): Status: RESOLVED | Noted: 2020-01-01 | Resolved: 2020-01-01

## 2020-12-28 NOTE — DISCHARGE SUMMARY
Discharge Summary    CHIEF COMPLAINT ON ADMISSION  Chief Complaint   Patient presents with   • Weakness       Reason for Admission  EMS     CODE STATUS  Full Code    HPI & HOSPITAL COURSE  75M PMH T2DM, HTN, recent ankle sx admitted on 11/18/20 for weakness.  Patient's wife could no longer care for the patient.  Patient had elevated troponin on admisison and abnormal CXR.      On admission, patient was positive for COVID screening, CT scan did show some evidence of possible infection.  He was treated with dexamethasone and oxygen support, but otherwise did not require other treatments.    Patient had a right ankle surgery on 11/18/2020. Ultrasound was positive for Left DVT on 11/19/2020.  Given diagnosis of left leg DVT, he was started on Apixaban goal is 6 months treatment.    He did require right heel ulcer I&D with skin graft with Dr. Bolivar on 12/10/20.  To continue Bactrim and finish until 01/01/2021.  Patient had completed ceftriaxone for 3 days for a UTI.    Prior to discharge, patient was recommended to go to SNF placement as per PT/OT.  Currently, the patient tested negative on 12/17 and 12/20 for COVID-19.  Patient is medically cleared to go to skilled nursing facility.    Patient had acute on chronic CKD, will need encouragement to continue oral hydration.    Therefore, he is discharged in fair and stable condition to skilled nursing facility - [facility].    The patient met 2-midnight criteria for an inpatient stay at the time of discharge.      FOLLOW UP ITEMS POST DISCHARGE  Listed below with PCP.    DISCHARGE DIAGNOSES  Active Problems:    HTN (hypertension) POA: Yes    History of ankle surgery POA: Yes    Acute deep vein thrombosis (DVT) of calf muscle vein of left lower extremity (HCC) POA: Yes    Age-related physical debility POA: Yes    Anemia POA: Yes    Type 2 diabetes mellitus with peripheral neuropathy (HCC) POA: Yes  Resolved Problems:    Elevated troponin POA: Yes    COVID-19 virus infection POA:  Yes    Abnormal urinalysis POA: Yes    ADRIENNE (acute kidney injury) (HCC) POA: Yes      FOLLOW UP  No future appointments.  Brody Zamorano M.D.  [address]  [place] [id number] [phone]    Schedule an appointment as soon as possible for a visit in 2 weeks  F/U for left DVT on Eliquis for 6 months. Found left leg DVT on 11/19/20. Recheck BMP for renal function.    Royal Bolivar M.D.  [address]  [place] [id number] [phone]    Schedule an appointment as soon as possible for a visit in 1 week  F/U for right heelp surgeries.      MEDICATIONS ON DISCHARGE     Medication List      START taking these medications      Instructions   apixaban 5mg Tabs  Commonly known as: ELIQUIS   Take 1 Tab by mouth 2 Times a Day.  Dose: 5 mg     magnesium oxide 400 MG Tabs tablet  Commonly known as: MAG-OX   Take 1 Tab by mouth 2 Times a Day.  Dose: 400 mg     sulfamethoxazole-trimethoprim 800-160 MG tablet  Commonly known as: BACTRIM DS   Take 1 Tab by mouth every 12 hours for 4 days.  Dose: 1 Tab        CHANGE how you take these medications      Instructions   atorvastatin 80 MG tablet  What changed:   · when to take this  · additional instructions  Commonly known as: LIPITOR   TAKE ONE TABLET BY MOUTH DAILY IN THE EVENING     montelukast 10 MG Tabs  What changed: when to take this  Commonly known as: SINGULAIR   TAKE 1 TABLET BY MOUTH EVERY DAY        CONTINUE taking these medications      Instructions   aspirin EC 81 MG Tbec  Commonly known as: ECOTRIN   Take 81 mg by mouth every day.  Dose: 81 mg     calcium carbonate 500 MG Chew  Commonly known as: TUMS   Chew 500 mg as needed (For indigestion).  Dose: 500 mg     carvedilol 6.25 MG Tabs  Commonly known as: COREG   Take 1 Tab by mouth 2 times a day, with meals.  Dose: 6.25 mg     CULTURELLE PRO-WELL PO   Take 1 Cap by mouth every day.  Dose: 1 Cap     HumaLOG 100 UNIT/ML  Generic drug: insulin lispro   Inject 2-10 Units under the skin 3 times a day  before meals. Sliding scale  151-200 2 units  201-250 4 units  251-300 6 units  301-350 8 units   351-400 10 units below 60 or above 400 notify MD  Dose: 2-10 Units     nitroglycerin 0.4 MG Subl  Commonly known as: NITROSTAT   Doctor's comments: May repeat once in 5 minutes  Place 1 Tab under tongue as needed for Chest Pain.  Dose: 0.4 mg     ondansetron 4 MG Tbdp  Commonly known as: ZOFRAN ODT   Take 4 mg by mouth every 6 hours as needed for Nausea.  Dose: 4 mg     oxyCODONE immediate-release 5 MG Tabs  Commonly known as: ROXICODONE   Take 5 mg by mouth every 6 hours as needed for Severe Pain.  Dose: 5 mg     pioglitazone 15 MG Tabs  Commonly known as: ACTOS   Take 15 mg by mouth every day.  Dose: 15 mg     Synjardy 5-1000 MG Tabs  Generic drug: Empagliflozin-metFORMIN HCl   Take 1 Tab by mouth 2 Times a Day.  Dose: 1 Tab     Vitamin D-3 125 MCG (5000 UT) Tabs   Take 1 Tab by mouth every 48 hours.  Dose: 1 Tab            Allergies  Allergies   Allergen Reactions   • Fish Hives     shellfish   • Pcn [Penicillins] Hives   • Amoxicillin Hives   • Food Swelling      Eggs,Melons, avocados-puffy mouth       DIET  Orders Placed This Encounter   Procedures   • Diet Order Diet: Consistent CHO (Diabetic) (low potassium, NO EGGS); Tray Modifications (optional): Double Portions     Standing Status:   Standing     Number of Occurrences:   1     Order Specific Question:   Diet:     Answer:   Consistent CHO (Diabetic) [4]     Comments:   low potassium, NO EGGS     Order Specific Question:   Tray Modifications (optional)     Answer:   Double Portions       ACTIVITY  As tolerated and directed by rehab.  Weight bearing as tolerated    LINES, DRAINS, AND WOUNDS  This is an automated list. Peripheral IVs will be removed prior to discharge.       Wound 08/24/20 Incision Ankle Right (Active)       Wound 12/06/20 Pressure Injury Heel Posterior Right POA (Active)   Wound Image   12/24/20 1007   Site Assessment IDA 12/27/20 0717    Periwound Assessment [place] 12/24/20 1007   Margins Defined edges 12/24/20 1007   Closure Secondary intention 12/24/20 1007   Drainage Amount Scant 12/24/20 1007   Drainage Description Serosanguineous 12/24/20 1007   Treatments Cleansed;Site care 12/24/20 1007   Wound Cleansing Normal Saline Irrigation 12/24/20 1007   Periwound Protectant Skin Protectant Wipes to Periwound;Drape 12/24/20 1007   Dressing Cleansing/Solutions Not Applicable 12/24/20 1007   Dressing Options Wound Vac 12/24/20 1007   Dressing Changed Changed 12/24/20 1007   Dressing Status Clean;Dry;Intact 12/24/20 1007   Dressing Change/Treatment Frequency By Wound Team Only 12/24/20 1007   NEXT Dressing Change/Treatment Date 12/28/20 12/24/20 1007   NEXT Weekly Photo (Inpatient Only) 12/31/20 12/24/20 1007   Pressure Injury Stage U 12/14/20 1415   Wound Length (cm) 3.5 cm 12/24/20 1007   Wound Width (cm) 3.5 cm 12/24/20 1007   Wound Surface Area (cm^2) 12.25 cm^2 12/24/20 1007   Wound Bed Eschar (%) 98 % 12/06/20 1830   Wound Bed Slough % - (Post-Procedure) 2 % 12/06/20 1830   Shape irregular 12/24/20 1007   Wound Odor None 12/24/20 1007   Pulses 1+;DP 12/14/20 1415   Exposed Structures None 12/24/20 1007   WOUND NURSE ONLY - Time Spent with Patient (mins) 75 12/24/20 1007       Wound 12/06/20 Partial Thickness Wound Ankle Lateral Left (Active)   Wound Image   12/24/20 1007   Site Assessment Yellow;[place] 12/27/20 1100   Periwound Assessment Blanchable erythema 12/27/20 1100   Margins Defined edges 12/27/20 1100   Closure None 12/27/20 1100   Drainage Amount Scant 12/27/20 1100   Drainage Description Yellow 12/27/20 1100   Treatments Offloading 12/27/20 1100   Wound Cleansing Normal Saline Irrigation 12/27/20 1100   Periwound Protectant Skin Protectant Wipes to Periwound 12/24/20 1007   Dressing Cleansing/Solutions Not Applicable 12/24/20 1007   Dressing Options Hydrocolloid Thin;Mepilex Heel 12/27/20 1100   Dressing Changed Changed 12/27/20 1100    Dressing Status Clean;Dry;Intact 12/27/20 1100   Dressing Change/Treatment Frequency Every 72 hrs, and As Needed 12/27/20 1100   NEXT Dressing Change/Treatment Date 12/30/20 12/27/20 1100   NEXT Weekly Photo (Inpatient Only) 12/31/20 12/24/20 1007   Pressure Injury Stage DTPI 12/10/20 1954   Non-staged Wound Description Partial thickness 12/18/20 1000   Wound Length (cm) 1 cm 12/24/20 1007   Wound Width (cm) 1 cm 12/24/20 1007   Wound Surface Area (cm^2) 1 cm^2 12/24/20 1007   Shape circular 12/24/20 1007   Wound Odor None 12/24/20 1007   Pulses 1+;DP 12/14/20 1415   Exposed Structures None 12/24/20 1007       Wound 12/14/20 Pressure Injury Buttocks;Coccyx Midline Bilateral (Active)   Site Assessment Pink;Red 12/19/20 1011   Periwound Assessment Intact;Red;Pink 12/19/20 0600   Margins Attached edges 12/19/20 1011   Closure Adhesive bandage 12/19/20 1011   Drainage Amount None 12/19/20 1011   Drainage Description Serosanguineous 12/17/20 0850   Treatments Cleansed 12/19/20 0600   Dressing Options Mepilex 12/24/20 0750   Dressing Changed New 12/21/20 2130   Dressing Status Clean;Dry;Intact 12/24/20 0750                  MENTAL STATUS ON TRANSFER  Level of Consciousness: Alert  Orientation : Oriented x 4  Speech: Speech Clear    CONSULTATIONS  Orthopedic surgery Dr. Bolivar    PROCEDURES  S/p I&D 12/10/2020    LABORATORY  Lab Results   Component Value Date    SODIUM 137 12/28/2020    POTASSIUM 5.2 12/28/2020    CHLORIDE 104 12/28/2020    CO2 24 12/28/2020    GLUCOSE 72 12/28/2020    BUN 21 12/28/2020    CREATININE 1.71 (H) 12/28/2020    CREATININE 1.0 04/09/2007        Lab Results   Component Value Date    WBC 5.3 12/23/2020    HEMOGLOBIN 10.0 (L) 12/23/2020    HEMATOCRIT 31.8 (L) 12/23/2020    PLATELETCT 253 12/23/2020        Total time of the discharge process exceeds 35 minutes.  More than 50% of time was spent face to face with patient.  This included but not limited to review of hospital course with patient,  treatment goals upon discharge, recommendations to PCP, continued and new medications and their adverse reactions and nursing instructions for patient.

## 2020-12-28 NOTE — DISCHARGE INSTRUCTIONS
Anticoagulation, Generic  Anticoagulants are medications used to prevent clots from developing in your veins. These medications are also known as blood thinners. If blood clots are untreated, they could travel to your lungs. This is called a pulmonary embolus. A blood clot in your lungs can be fatal.   Caregivers often use anticoagulants to prevent clots following surgery. Anticoagulants are also used along with aspirin when the heart is not getting enough blood.  Another anticoagulant called warfarin is started 2 to 3 days after a rapid-acting injectable anticoagulant is started. The rapid-acting anticoagulants are usually continued until warfarin has begun to work. Your caregiver will  this length of time by blood tests known as the prothrombin time (PT) and International Normalization Ratio (INR). This means that your blood is at the necessary and best level to prevent clots.  RISKS AND COMPLICATIONS  · If you have received recent epidural anesthesia, spinal anesthesia, or a spinal tap while receiving anticoagulants, you are at risk for developing a blood clot in or around the spine. This condition could result in long-term or permanent paralysis.  · Because anticoagulants thin your blood, severe bleeding may occur from any tissue or organ. Symptoms of the blood being too thin may include:  · Bleeding from the nose or gums that does not stop quickly.  · Unusual bruising or bruising easily.  · Swelling or pain at an injection site.  · A cut that does not stop bleeding within 10 minutes.  · Continual nausea for more than 1 day or vomiting blood.  · Coughing up blood.  · Blood in the urine which may appear as pink, red, or brown urine.  · Blood in bowel movements which may appear as red, dark or black stools.  · Sudden weakness or numbness of the face, arm, or leg, especially on one side of the body.  · Sudden confusion.  · Trouble speaking (aphasia) or understanding.  · Sudden trouble seeing in one or both  eyes.  · Sudden trouble walking.  · Dizziness.  · Loss of balance or coordination.  · Severe pain, such as a headache, joint pain, or back pain.  · Fever.  · Too little anticoagulation continues to allow the risk for blood clots.  HOME CARE INSTRUCTIONS   · Due to the complications of anticoagulants, it is very important that you take your anticoagulant as directed by your caregiver. Anticoagulants need to be taken exactly as instructed. Be sure you understand all your anticoagulant instructions.  · Warfarin. Your caregiver will advise you on the length of treatment (usually 3 6 months, sometimes lifelong).  · Take warfarin exactly as directed by your caregiver. It is recommended that you take your warfarin dose at the same time of the day. It is preferred that you take warfarin in the late afternoon. If you have been told to stop taking warfarin, do not resume taking warfarin until directed to do so by your caregiver. Follow your caregiver's instructions if you accidentally take an extra dose or miss a dose of warfarin. It is very important to take warfarin as directed since bleeding or blood clots could result in chronic or permanent injury, pain, or disability.  · Too much and too little warfarin are both dangerous. Too much warfarin increases the risk of bleeding. Too little warfarin continues to allow the risk for blood clots. While taking warfarin, you will need to have regular blood tests to measure your blood clotting time. These blood tests usually include both the PT and INR tests. The PT and INR results allow your caregiver to adjust your dose of warfarin. The dose can change for many reasons. It is critically important that you take warfarin exactly as prescribed, and that you have your PT and INR levels drawn exactly as directed. Follow up with your laboratory test appointments as directed. It is very important to keep your lab appointments. Not keeping lab appointments could result in a chronic or  permanent injury, pain, or disability.  · Many foods, especially foods high in vitamin K can interfere with warfarin and affect the PT and INR results. Foods high in vitamin K include spinach, kale, broccoli, cabbage, arlen and turnip greens, brussels sprouts, peas, cauliflower, seaweed, and parsley as well as beef and pork liver, green tea, and soybean oil. You should eat a consistent amount of foods high in vitamin K. Avoid major changes in your diet, or notify your caregiver before changing your diet. Arrange a visit with a dietitian to answer your questions.  · Many medicines can interfere with warfarin and affect the PT and INR results. You must tell your caregiver about any and all medicines you take, this includes all vitamins and supplements. Ask your caregiver before taking these. Prescription and over-the-counter medicine consistency is critical to warfarin management. It is important that potential interactions are checked before you start a new medicine. Be especially cautious with aspirin and anti-inflammatory medicines. Ask your caregiver before taking these. Medicines such as antibiotics and acid-reducing medicine can interact with warfarin and can cause an increased warfarin effect. Warfarin can also interfere with the effectiveness of medicines you are taking. Do not take or discontinue any prescribed or over-the-counter medicine except on the advice of your caregiver or pharmacist.  · Some vitamins, supplements, and herbal products interfere with the effectiveness of warfarin. Vitamin E may increase the anticoagulant effects of warfarin. Vitamin K may can cause warfarin to be less effective. Do not take or discontinue any vitamin, supplement, or herbal product except on the advice of your caregiver or pharmacist.  · Alcohol can change the body's ability to handle warfarin. It is best to avoid alcoholic drinks or consume only very small amounts while taking warfarin. Notify your caregiver if you  change your alcohol intake. A sudden increase in alcohol use can increase your risk of bleeding. Chronic alcohol use can cause warfarin to be less effective.  · If you have a loss of appetite or get the stomach flu (viral gastroenteritis), talk to your caregiver as soon as possible. A decrease in your normal vitamin K intake can make you more sensitive to your usual dose of warfarin.  · Some medical conditions may increase your risk for bleeding while you are taking warfarin. A fever, diarrhea lasting more than a day, worsening heart failure, or worsening liver function are some medical conditions that could affect warfarin. Contact your caregiver if you have any of these medical conditions.  · Warfarin can have side effects, such as excessive bruising or bleeding. You will need to hold pressure over cuts for longer than usual.  · Be careful not to cut yourself when using sharp objects.  · Notify your dentist or other caregivers before procedures.  · Limit physical activities or sports that could result in a fall or cause injury. Avoid contact sports.  · Wear a medical alert bracelet or carry a medical alert card.  SEEK MEDICAL CARE IF:   · You develop any rashes.  · You have any worsening of the condition for which you are receiving anticoagulation therapy.  SEEK IMMEDIATE MEDICAL CARE IF:   · Bleeding from the nose or gums does not stop quickly.  · You have unusual bruising or are bruising easily.  · Swelling or pain occurs at an injection site.  · A cut does not stop bleeding within 10 minutes.  · You have continual nausea for more than 1 day or are vomiting blood.  · You are coughing up blood.  · You have blood in the urine.  · You have dark or black stools.  · You have sudden weakness or numbness of the face, arm, or leg, especially on one side of the body.  · You have sudden confusion.  · You have trouble speaking (aphasia) or understanding.  · You have sudden trouble seeing in one or both eyes.  · You have  sudden trouble walking.  · You have dizziness.  · You have a loss of balance or coordination.  · You have severe pain, such as a headache, joint pain, or back pain.  · You have a serious fall or head injury, even if you are not bleeding.  · You have an oral temperature above 102° F (38.9° C), not controlled by medicine.  ANY OF THESE SYMPTOMS MAY REPRESENT A SERIOUS PROBLEM THAT IS AN EMERGENCY. Do not wait to see if the symptoms will go away. Get medical help right away. Call your local emergency services (911 in [place] DO NOT drive yourself to the hospital.  MAKE SURE YOU:   · Understand these instructions.  · Will watch your condition.  · Will get help right away if you are not doing well or get worse.  Document Released: 12/18/2006 Document Revised: 09/11/2013 Document Reviewed: 07/22/2009  [employer] Patient Information ©2014 [employer].    Discharge Instructions    Discharged to [facility] by medical transport with escort. Discharged via wheelchair, hospital escort: Yes.  Special equipment needed: Not Applicable    Be sure to schedule a follow-up appointment with your primary care doctor or any specialists as instructed.     Discharge Plan:   Diet Plan: Discussed  Activity Level: Discussed  Confirmed Follow up Appointment: Patient to Call and Schedule Appointment  Confirmed Symptoms Management: Discussed  Medication Reconciliation Updated: Yes  Influenza Vaccine Indication: Not indicated: Previously immunized this influenza season and > 8 years of age    I understand that a diet low in cholesterol, fat, and sodium is recommended for good health. Unless I have been given specific instructions below for another diet, I accept this instruction as my diet prescription.   Other diet: Regular diet    Special Instructions: None    · Is patient discharged on Warfarin / Coumadin?   No     Depression / Suicide Risk    As you are discharged from this [facility] facility, it is important to learn how to keep safe from  harming yourself.    Recognize the warning signs:  · Abrupt changes in personality, positive or negative- including increase in energy   · Giving away possessions  · Change in eating patterns- significant weight changes-  positive or negative  · Change in sleeping patterns- unable to sleep or sleeping all the time   · Unwillingness or inability to communicate  · Depression  · Unusual sadness, discouragement and loneliness  · Talk of wanting to die  · Neglect of personal appearance   · Rebelliousness- reckless behavior  · Withdrawal from people/activities they love  · Confusion- inability to concentrate     If you or a loved one observes any of these behaviors or has concerns about self-harm, here's what you can do:  · Talk about it- your feelings and reasons for harming yourself  · Remove any means that you might use to hurt yourself (examples: pills, rope, extension cords, firearm)  · Get professional help from the community (Mental Health, Substance Abuse, psychological counseling)  · Do not be alone:Call your Safe Contact- someone whom you trust who will be there for you.  · Call your local CRISIS HOTLINE 037-1239 or 651-915-0852  · Call your local Children's Mobile Crisis Response Team [place] (914) 863-5905 or www.AMSC  · Call the toll free National Suicide Prevention Hotlines   · National Suicide Prevention Lifeline 511-215-MVII (7158)  · National Hope Line Network 800-SUICIDE (705-1258)

## 2020-12-28 NOTE — WOUND TEAM
Patient discharging to [facility] today. They remove dressings on arrival and place their own so did not change wound vac dressing today.

## 2020-12-28 NOTE — DISCHARGE PLANNING
Anticipated Discharge Disposition: SNF    Action: LSW requested for Jt CASTANO to see if [facility] can take pt. Jt CASTANO informed LSW that they can take pt today and transportation available.     LSW informed Jt that LSW to f/u with pt and pt's spouse.     Barriers to Discharge: None     Plan: f/u with pt, LSW to coordinate transport when appropriate, LSW to assist as needed     Addendum 1152  LSW called pt's bedside phone and LSW provided update that [facility] can take him today.   Pt aware that [facility] on admission hold. Pt stated he would like to speak to spouse before making any decisions.   LSW asked if it was okay for LSW to call and provide update to spouse. Pt provided consent.    Addendum 1157  LSW called Simran- ([phone]) and provided update. Simran feeling overwhelmed with all of the information and feels that everything is moving so quickly. Simran wanted to wait until tomorrow. LSW explained that bed may not be available tomorrow. LSW also informed Simran that pt is medically cleared and has accepting facility. LSW informed that insurance could potentially stop paying pt's hospital stay.     Simran aware that strict visitation restrictions have been put in place for all SNF's.     Simran would like to speak to pt and call LSW back. LSW informed that LSW would be in rounds and to please leave a voicemail and LSW would get back to her.     Addendum 1300  LSW received a voicemail from Simran informing LSW that pt okay to move forward with [facility].   LSW also received a message from Dr. Boucher via [employer] informing LSW that pt willing to move forward with [facility]. LSW able to get signature for COBRA during IDT rounds.     Addendum 1312  LSW faxed transport request form to J[facility] CA[facility].   LSW messaged bedside RN, Gianni informing that LSW working on transport.   Await transport time.     Addendum 8943  LSW faxed approved service with supervisor signature to [facility].   LSW to place completed transfer packet in pt's chart.      Addendum 1333  LSW informed by Jt CASTANO that pt's transport confirmed for 1600 to [facility].   LSW called pt and provided update. LSW and pt discussed IMM. Pt provided verbal consent for IMM and COBRA.  LSW provided update to bedside RN, Gianni and Dr. Boucher.   LSW requested Jt CASTANO to call [facility] and provide transport time.   LSW to complete transfer packet when d/c summary is available.     Addendum 1403  LSW able to scan and email approved service to supervisor, Brook.     Addendum 1541  [facility].   [facility] with name and number for call back.       Addendum 1644  LSW informed by bedside RN that transport not here yet.   LSW called [facility] and spoke to Sumaya and they have just arrived.   LSW provided update to bedside RN.

## 2020-12-28 NOTE — DISCHARGE PLANNING
Received Transport Form @ 2709  Spoke to Bree @ [facility]    Transport is scheduled for 12/28 @1600 going to [place].  [address] [address]    Notified Antonette [facility]

## 2020-12-29 NOTE — PROGRESS NOTES
Discharge instructions reviewed and questions answered. Pt to transfer to [facility]. This RN gave report to Hilda HARRIS at [facility]. Wet to dry dressing applied since patient will need new wound vac placement at [facility].  1630   MT at bedside to transport patient.

## 2021-01-01 ENCOUNTER — APPOINTMENT (OUTPATIENT)
Dept: RADIOLOGY | Facility: MEDICAL CENTER | Age: 76
DRG: 186 | End: 2021-01-01
Attending: STUDENT IN AN ORGANIZED HEALTH CARE EDUCATION/TRAINING PROGRAM
Payer: MEDICARE

## 2021-01-01 ENCOUNTER — APPOINTMENT (OUTPATIENT)
Dept: CARDIOLOGY | Facility: MEDICAL CENTER | Age: 76
DRG: 186 | End: 2021-01-01
Attending: STUDENT IN AN ORGANIZED HEALTH CARE EDUCATION/TRAINING PROGRAM
Payer: MEDICARE

## 2021-01-01 ENCOUNTER — APPOINTMENT (OUTPATIENT)
Dept: RADIOLOGY | Facility: MEDICAL CENTER | Age: 76
DRG: 186 | End: 2021-01-01
Attending: RADIOLOGY
Payer: MEDICARE

## 2021-01-01 ENCOUNTER — APPOINTMENT (OUTPATIENT)
Dept: WOUND CARE | Facility: MEDICAL CENTER | Age: 76
End: 2021-01-01
Attending: NURSE PRACTITIONER
Payer: MEDICARE

## 2021-01-01 ENCOUNTER — PATIENT OUTREACH (OUTPATIENT)
Dept: HEALTH INFORMATION MANAGEMENT | Facility: OTHER | Age: 76
End: 2021-01-01

## 2021-01-01 ENCOUNTER — HOSPITAL ENCOUNTER (INPATIENT)
Facility: MEDICAL CENTER | Age: 76
LOS: 1 days | DRG: 871 | End: 2021-08-17
Attending: EMERGENCY MEDICINE | Admitting: INTERNAL MEDICINE
Payer: MEDICARE

## 2021-01-01 ENCOUNTER — TELEPHONE (OUTPATIENT)
Dept: HEALTH INFORMATION MANAGEMENT | Facility: OTHER | Age: 76
End: 2021-01-01

## 2021-01-01 ENCOUNTER — ANESTHESIA (OUTPATIENT)
Dept: SURGERY | Facility: MEDICAL CENTER | Age: 76
DRG: 617 | End: 2021-01-01
Payer: MEDICARE

## 2021-01-01 ENCOUNTER — APPOINTMENT (OUTPATIENT)
Dept: RADIOLOGY | Facility: MEDICAL CENTER | Age: 76
DRG: 617 | End: 2021-01-01
Attending: INTERNAL MEDICINE
Payer: MEDICARE

## 2021-01-01 ENCOUNTER — HOSPITAL ENCOUNTER (INPATIENT)
Facility: MEDICAL CENTER | Age: 76
LOS: 16 days | DRG: 186 | End: 2021-06-04
Attending: EMERGENCY MEDICINE | Admitting: STUDENT IN AN ORGANIZED HEALTH CARE EDUCATION/TRAINING PROGRAM
Payer: MEDICARE

## 2021-01-01 ENCOUNTER — APPOINTMENT (OUTPATIENT)
Dept: RADIOLOGY | Facility: MEDICAL CENTER | Age: 76
DRG: 871 | End: 2021-01-01
Attending: INTERNAL MEDICINE
Payer: MEDICARE

## 2021-01-01 ENCOUNTER — APPOINTMENT (OUTPATIENT)
Dept: RADIOLOGY | Facility: MEDICAL CENTER | Age: 76
DRG: 186 | End: 2021-01-01
Attending: INTERNAL MEDICINE
Payer: MEDICARE

## 2021-01-01 ENCOUNTER — PATIENT MESSAGE (OUTPATIENT)
Dept: HEALTH INFORMATION MANAGEMENT | Facility: OTHER | Age: 76
End: 2021-01-01

## 2021-01-01 ENCOUNTER — APPOINTMENT (OUTPATIENT)
Dept: MEDICAL GROUP | Facility: MEDICAL CENTER | Age: 76
End: 2021-01-01
Payer: MEDICARE

## 2021-01-01 ENCOUNTER — APPOINTMENT (OUTPATIENT)
Dept: INFECTIOUS DISEASES | Facility: MEDICAL CENTER | Age: 76
End: 2021-01-01
Payer: MEDICARE

## 2021-01-01 ENCOUNTER — APPOINTMENT (OUTPATIENT)
Dept: RADIOLOGY | Facility: MEDICAL CENTER | Age: 76
DRG: 186 | End: 2021-01-01
Attending: EMERGENCY MEDICINE
Payer: MEDICARE

## 2021-01-01 ENCOUNTER — TELEPHONE (OUTPATIENT)
Dept: INFECTIOUS DISEASES | Facility: MEDICAL CENTER | Age: 76
End: 2021-01-01

## 2021-01-01 ENCOUNTER — APPOINTMENT (OUTPATIENT)
Dept: RADIOLOGY | Facility: MEDICAL CENTER | Age: 76
DRG: 186 | End: 2021-01-01
Attending: GENERAL PRACTICE
Payer: MEDICARE

## 2021-01-01 ENCOUNTER — APPOINTMENT (OUTPATIENT)
Dept: RADIOLOGY | Facility: MEDICAL CENTER | Age: 76
DRG: 871 | End: 2021-01-01
Attending: EMERGENCY MEDICINE
Payer: MEDICARE

## 2021-01-01 ENCOUNTER — DOCUMENTATION (OUTPATIENT)
Dept: INFECTIOUS DISEASES | Facility: MEDICAL CENTER | Age: 76
End: 2021-01-01

## 2021-01-01 ENCOUNTER — APPOINTMENT (OUTPATIENT)
Dept: RADIOLOGY | Facility: MEDICAL CENTER | Age: 76
DRG: 617 | End: 2021-01-01
Attending: EMERGENCY MEDICINE
Payer: MEDICARE

## 2021-01-01 ENCOUNTER — APPOINTMENT (OUTPATIENT)
Dept: WOUND CARE | Facility: MEDICAL CENTER | Age: 76
DRG: 186 | End: 2021-01-01
Attending: EMERGENCY MEDICINE
Payer: MEDICARE

## 2021-01-01 ENCOUNTER — APPOINTMENT (OUTPATIENT)
Dept: CARDIOLOGY | Facility: MEDICAL CENTER | Age: 76
DRG: 871 | End: 2021-01-01
Attending: INTERNAL MEDICINE
Payer: MEDICARE

## 2021-01-01 ENCOUNTER — ANESTHESIA EVENT (OUTPATIENT)
Dept: SURGERY | Facility: MEDICAL CENTER | Age: 76
DRG: 617 | End: 2021-01-01
Payer: MEDICARE

## 2021-01-01 ENCOUNTER — APPOINTMENT (OUTPATIENT)
Dept: RADIOLOGY | Facility: MEDICAL CENTER | Age: 76
DRG: 871 | End: 2021-01-01
Attending: STUDENT IN AN ORGANIZED HEALTH CARE EDUCATION/TRAINING PROGRAM
Payer: MEDICARE

## 2021-01-01 ENCOUNTER — APPOINTMENT (OUTPATIENT)
Dept: SLEEP MEDICINE | Facility: MEDICAL CENTER | Age: 76
End: 2021-01-01
Payer: MEDICARE

## 2021-01-01 ENCOUNTER — HOSPITAL ENCOUNTER (INPATIENT)
Facility: MEDICAL CENTER | Age: 76
LOS: 5 days | DRG: 617 | End: 2021-04-27
Attending: EMERGENCY MEDICINE | Admitting: INTERNAL MEDICINE
Payer: MEDICARE

## 2021-01-01 VITALS
HEART RATE: 82 BPM | OXYGEN SATURATION: 92 % | HEIGHT: 74 IN | RESPIRATION RATE: 16 BRPM | WEIGHT: 198.85 LBS | SYSTOLIC BLOOD PRESSURE: 101 MMHG | BODY MASS INDEX: 25.52 KG/M2 | DIASTOLIC BLOOD PRESSURE: 72 MMHG | TEMPERATURE: 97.1 F

## 2021-01-01 VITALS
HEIGHT: 72 IN | TEMPERATURE: 98.6 F | BODY MASS INDEX: 25.73 KG/M2 | RESPIRATION RATE: 18 BRPM | SYSTOLIC BLOOD PRESSURE: 132 MMHG | WEIGHT: 190 LBS | OXYGEN SATURATION: 94 % | DIASTOLIC BLOOD PRESSURE: 76 MMHG | HEART RATE: 103 BPM

## 2021-01-01 VITALS
BODY MASS INDEX: 25.67 KG/M2 | WEIGHT: 200 LBS | DIASTOLIC BLOOD PRESSURE: 42 MMHG | TEMPERATURE: 97 F | OXYGEN SATURATION: 65 % | SYSTOLIC BLOOD PRESSURE: 59 MMHG | HEIGHT: 74 IN

## 2021-01-01 DIAGNOSIS — L08.9 DIABETIC FOOT INFECTION (HCC): ICD-10-CM

## 2021-01-01 DIAGNOSIS — E87.5 HYPERKALEMIA: ICD-10-CM

## 2021-01-01 DIAGNOSIS — Z23 NEED FOR VACCINATION: ICD-10-CM

## 2021-01-01 DIAGNOSIS — R54 AGE-RELATED PHYSICAL DEBILITY: ICD-10-CM

## 2021-01-01 DIAGNOSIS — J86.9 EMPYEMA, LEFT (HCC): ICD-10-CM

## 2021-01-01 DIAGNOSIS — R18.8 OTHER ASCITES: ICD-10-CM

## 2021-01-01 DIAGNOSIS — M86.9 OSTEOMYELITIS OF LEFT FOOT, UNSPECIFIED TYPE (HCC): ICD-10-CM

## 2021-01-01 DIAGNOSIS — M86.8X7 OTHER OSTEOMYELITIS OF LEFT FOOT (HCC): ICD-10-CM

## 2021-01-01 DIAGNOSIS — Z98.890 HISTORY OF ANKLE SURGERY: ICD-10-CM

## 2021-01-01 DIAGNOSIS — J30.1 CHRONIC SEASONAL ALLERGIC RHINITIS DUE TO POLLEN: ICD-10-CM

## 2021-01-01 DIAGNOSIS — M86.072 ACUTE HEMATOGENOUS OSTEOMYELITIS OF LEFT FOOT (HCC): ICD-10-CM

## 2021-01-01 DIAGNOSIS — J95.811 POSTPROCEDURAL PNEUMOTHORAX: ICD-10-CM

## 2021-01-01 DIAGNOSIS — J90 PLEURAL EFFUSION, BILATERAL: ICD-10-CM

## 2021-01-01 DIAGNOSIS — Z78.9 PROBLEM WITH VASCULAR ACCESS: ICD-10-CM

## 2021-01-01 DIAGNOSIS — M20.40 HAMMER TOE, UNSPECIFIED LATERALITY: ICD-10-CM

## 2021-01-01 DIAGNOSIS — E11.628 DIABETIC FOOT INFECTION (HCC): ICD-10-CM

## 2021-01-01 DIAGNOSIS — M20.41 HAMMERTOES OF BOTH FEET: ICD-10-CM

## 2021-01-01 DIAGNOSIS — Z98.890 HISTORY OF ANKLE SURGERY: Primary | ICD-10-CM

## 2021-01-01 DIAGNOSIS — J90 PLEURAL EFFUSION: ICD-10-CM

## 2021-01-01 DIAGNOSIS — R06.00 DYSPNEA, UNSPECIFIED TYPE: ICD-10-CM

## 2021-01-01 DIAGNOSIS — M20.42 HAMMERTOES OF BOTH FEET: ICD-10-CM

## 2021-01-01 LAB
ALBUMIN SERPL BCP-MCNC: 1.8 G/DL (ref 3.2–4.9)
ALBUMIN SERPL BCP-MCNC: 1.9 G/DL (ref 3.2–4.9)
ALBUMIN SERPL BCP-MCNC: 2 G/DL (ref 3.2–4.9)
ALBUMIN SERPL BCP-MCNC: 2.1 G/DL (ref 3.2–4.9)
ALBUMIN SERPL BCP-MCNC: 2.3 G/DL (ref 3.2–4.9)
ALBUMIN SERPL BCP-MCNC: 2.4 G/DL (ref 3.2–4.9)
ALBUMIN SERPL BCP-MCNC: 2.5 G/DL (ref 3.2–4.9)
ALBUMIN SERPL BCP-MCNC: 2.6 G/DL (ref 3.2–4.9)
ALBUMIN SERPL BCP-MCNC: 2.8 G/DL (ref 3.2–4.9)
ALBUMIN/GLOB SERPL: 0.5 G/DL
ALBUMIN/GLOB SERPL: 0.5 G/DL
ALBUMIN/GLOB SERPL: 0.6 G/DL
ALBUMIN/GLOB SERPL: 0.7 G/DL
ALBUMIN/GLOB SERPL: 0.8 G/DL
ALBUMIN/GLOB SERPL: 0.8 G/DL
ALP SERPL-CCNC: 198 U/L (ref 30–99)
ALP SERPL-CCNC: 221 U/L (ref 30–99)
ALP SERPL-CCNC: 236 U/L (ref 30–99)
ALP SERPL-CCNC: 262 U/L (ref 30–99)
ALP SERPL-CCNC: 263 U/L (ref 30–99)
ALP SERPL-CCNC: 282 U/L (ref 30–99)
ALP SERPL-CCNC: 286 U/L (ref 30–99)
ALP SERPL-CCNC: 298 U/L (ref 30–99)
ALP SERPL-CCNC: 359 U/L (ref 30–99)
ALP SERPL-CCNC: 390 U/L (ref 30–99)
ALP SERPL-CCNC: 472 U/L (ref 30–99)
ALP SERPL-CCNC: 535 U/L (ref 30–99)
ALT SERPL-CCNC: 11 U/L (ref 2–50)
ALT SERPL-CCNC: 13 U/L (ref 2–50)
ALT SERPL-CCNC: 14 U/L (ref 2–50)
ALT SERPL-CCNC: 16 U/L (ref 2–50)
ALT SERPL-CCNC: 28 U/L (ref 2–50)
ALT SERPL-CCNC: 29 U/L (ref 2–50)
ALT SERPL-CCNC: 5 U/L (ref 2–50)
ALT SERPL-CCNC: 5 U/L (ref 2–50)
ALT SERPL-CCNC: 6 U/L (ref 2–50)
ALT SERPL-CCNC: 9 U/L (ref 2–50)
AMYLASE FLD-CCNC: 12 U/L
AMYLASE FLD-CCNC: 8 U/L
ANION GAP SERPL CALC-SCNC: 10 MMOL/L (ref 7–16)
ANION GAP SERPL CALC-SCNC: 2 MMOL/L (ref 7–16)
ANION GAP SERPL CALC-SCNC: 3 MMOL/L (ref 7–16)
ANION GAP SERPL CALC-SCNC: 3 MMOL/L (ref 7–16)
ANION GAP SERPL CALC-SCNC: 4 MMOL/L (ref 7–16)
ANION GAP SERPL CALC-SCNC: 6 MMOL/L (ref 7–16)
ANION GAP SERPL CALC-SCNC: 7 MMOL/L (ref 7–16)
ANION GAP SERPL CALC-SCNC: 8 MMOL/L (ref 7–16)
ANION GAP SERPL CALC-SCNC: 9 MMOL/L (ref 7–16)
ANISOCYTOSIS BLD QL SMEAR: ABNORMAL
APPEARANCE FLD: NORMAL
APPEARANCE FLD: NORMAL
APPEARANCE UR: CLEAR
APTT PPP: 31.1 SEC (ref 24.7–36)
AST SERPL-CCNC: 15 U/L (ref 12–45)
AST SERPL-CCNC: 17 U/L (ref 12–45)
AST SERPL-CCNC: 17 U/L (ref 12–45)
AST SERPL-CCNC: 22 U/L (ref 12–45)
AST SERPL-CCNC: 23 U/L (ref 12–45)
AST SERPL-CCNC: 24 U/L (ref 12–45)
AST SERPL-CCNC: 24 U/L (ref 12–45)
AST SERPL-CCNC: 25 U/L (ref 12–45)
AST SERPL-CCNC: 25 U/L (ref 12–45)
AST SERPL-CCNC: 26 U/L (ref 12–45)
AST SERPL-CCNC: 30 U/L (ref 12–45)
AST SERPL-CCNC: 36 U/L (ref 12–45)
BACTERIA #/AREA URNS HPF: ABNORMAL /HPF
BACTERIA BLD CULT: NORMAL
BACTERIA FLD AEROBE CULT: ABNORMAL
BACTERIA FLD AEROBE CULT: NORMAL
BACTERIA SPEC ANAEROBE CULT: NORMAL
BACTERIA SPEC RESP CULT: NORMAL
BACTERIA WND AEROBE CULT: ABNORMAL
BASE EXCESS BLDA CALC-SCNC: 13 MMOL/L (ref -4–3)
BASE EXCESS BLDA CALC-SCNC: 14 MMOL/L (ref -4–3)
BASE EXCESS BLDA CALC-SCNC: 16 MMOL/L (ref -4–3)
BASE EXCESS BLDA CALC-SCNC: 19 MMOL/L (ref -4–3)
BASOPHILS # BLD AUTO: 0.1 % (ref 0–1.8)
BASOPHILS # BLD AUTO: 0.2 % (ref 0–1.8)
BASOPHILS # BLD AUTO: 0.5 % (ref 0–1.8)
BASOPHILS # BLD AUTO: 0.6 % (ref 0–1.8)
BASOPHILS # BLD AUTO: 0.7 % (ref 0–1.8)
BASOPHILS # BLD AUTO: 0.8 % (ref 0–1.8)
BASOPHILS # BLD AUTO: 0.9 % (ref 0–1.8)
BASOPHILS # BLD: 0.01 K/UL (ref 0–0.12)
BASOPHILS # BLD: 0.03 K/UL (ref 0–0.12)
BASOPHILS # BLD: 0.04 K/UL (ref 0–0.12)
BASOPHILS # BLD: 0.05 K/UL (ref 0–0.12)
BASOPHILS # BLD: 0.06 K/UL (ref 0–0.12)
BASOPHILS # BLD: 0.1 K/UL (ref 0–0.12)
BILIRUB SERPL-MCNC: 0.2 MG/DL (ref 0.1–1.5)
BILIRUB SERPL-MCNC: 0.2 MG/DL (ref 0.1–1.5)
BILIRUB SERPL-MCNC: 0.3 MG/DL (ref 0.1–1.5)
BILIRUB SERPL-MCNC: 0.4 MG/DL (ref 0.1–1.5)
BILIRUB SERPL-MCNC: 0.4 MG/DL (ref 0.1–1.5)
BILIRUB SERPL-MCNC: 0.5 MG/DL (ref 0.1–1.5)
BILIRUB SERPL-MCNC: 0.7 MG/DL (ref 0.1–1.5)
BILIRUB SERPL-MCNC: <0.2 MG/DL (ref 0.1–1.5)
BILIRUB UR QL STRIP.AUTO: NEGATIVE
BODY FLD TYPE: NORMAL
BODY TEMPERATURE: 36.2 CENTIGRADE
BODY TEMPERATURE: ABNORMAL CENTIGRADE
BODY TEMPERATURE: ABNORMAL DEGREES
BODY TEMPERATURE: ABNORMAL DEGREES
BREATHS SETTING VENT: 24
BREATHS SETTING VENT: 26
BUN SERPL-MCNC: 13 MG/DL (ref 8–22)
BUN SERPL-MCNC: 14 MG/DL (ref 8–22)
BUN SERPL-MCNC: 16 MG/DL (ref 8–22)
BUN SERPL-MCNC: 16 MG/DL (ref 8–22)
BUN SERPL-MCNC: 17 MG/DL (ref 8–22)
BUN SERPL-MCNC: 18 MG/DL (ref 8–22)
BUN SERPL-MCNC: 18 MG/DL (ref 8–22)
BUN SERPL-MCNC: 21 MG/DL (ref 8–22)
BUN SERPL-MCNC: 21 MG/DL (ref 8–22)
BUN SERPL-MCNC: 23 MG/DL (ref 8–22)
BUN SERPL-MCNC: 26 MG/DL (ref 8–22)
BUN SERPL-MCNC: 27 MG/DL (ref 8–22)
BUN SERPL-MCNC: 28 MG/DL (ref 8–22)
BUN SERPL-MCNC: 29 MG/DL (ref 8–22)
BUN SERPL-MCNC: 29 MG/DL (ref 8–22)
BUN SERPL-MCNC: 40 MG/DL (ref 8–22)
BUN SERPL-MCNC: 41 MG/DL (ref 8–22)
BUN SERPL-MCNC: 43 MG/DL (ref 8–22)
BUN SERPL-MCNC: 50 MG/DL (ref 8–22)
BURR CELLS BLD QL SMEAR: NORMAL
CALCIUM SERPL-MCNC: 7.6 MG/DL (ref 8.5–10.5)
CALCIUM SERPL-MCNC: 7.7 MG/DL (ref 8.5–10.5)
CALCIUM SERPL-MCNC: 7.8 MG/DL (ref 8.5–10.5)
CALCIUM SERPL-MCNC: 7.9 MG/DL (ref 8.5–10.5)
CALCIUM SERPL-MCNC: 8 MG/DL (ref 8.5–10.5)
CALCIUM SERPL-MCNC: 8.1 MG/DL (ref 8.5–10.5)
CALCIUM SERPL-MCNC: 8.2 MG/DL (ref 8.5–10.5)
CALCIUM SERPL-MCNC: 8.3 MG/DL (ref 8.5–10.5)
CALCIUM SERPL-MCNC: 8.3 MG/DL (ref 8.5–10.5)
CALCIUM SERPL-MCNC: 8.4 MG/DL (ref 8.5–10.5)
CALCIUM SERPL-MCNC: 8.4 MG/DL (ref 8.5–10.5)
CALCIUM SERPL-MCNC: 8.5 MG/DL (ref 8.5–10.5)
CALCIUM SERPL-MCNC: 8.8 MG/DL (ref 8.5–10.5)
CALCIUM SERPL-MCNC: 8.9 MG/DL (ref 8.5–10.5)
CALCIUM SERPL-MCNC: 8.9 MG/DL (ref 8.5–10.5)
CALCIUM SERPL-MCNC: 9.1 MG/DL (ref 8.5–10.5)
CHLORIDE SERPL-SCNC: 100 MMOL/L (ref 96–112)
CHLORIDE SERPL-SCNC: 101 MMOL/L (ref 96–112)
CHLORIDE SERPL-SCNC: 101 MMOL/L (ref 96–112)
CHLORIDE SERPL-SCNC: 102 MMOL/L (ref 96–112)
CHLORIDE SERPL-SCNC: 102 MMOL/L (ref 96–112)
CHLORIDE SERPL-SCNC: 103 MMOL/L (ref 96–112)
CHLORIDE SERPL-SCNC: 104 MMOL/L (ref 96–112)
CHLORIDE SERPL-SCNC: 105 MMOL/L (ref 96–112)
CHLORIDE SERPL-SCNC: 106 MMOL/L (ref 96–112)
CHLORIDE SERPL-SCNC: 106 MMOL/L (ref 96–112)
CHLORIDE SERPL-SCNC: 107 MMOL/L (ref 96–112)
CHLORIDE SERPL-SCNC: 108 MMOL/L (ref 96–112)
CHLORIDE SERPL-SCNC: 98 MMOL/L (ref 96–112)
CHOLEST SERPL-MCNC: 90 MG/DL (ref 100–199)
CK SERPL-CCNC: 38 U/L (ref 0–154)
CO2 BLDA-SCNC: 45 MMOL/L (ref 20–33)
CO2 BLDA-SCNC: 47 MMOL/L (ref 20–33)
CO2 SERPL-SCNC: 26 MMOL/L (ref 20–33)
CO2 SERPL-SCNC: 27 MMOL/L (ref 20–33)
CO2 SERPL-SCNC: 28 MMOL/L (ref 20–33)
CO2 SERPL-SCNC: 28 MMOL/L (ref 20–33)
CO2 SERPL-SCNC: 29 MMOL/L (ref 20–33)
CO2 SERPL-SCNC: 30 MMOL/L (ref 20–33)
CO2 SERPL-SCNC: 31 MMOL/L (ref 20–33)
CO2 SERPL-SCNC: 35 MMOL/L (ref 20–33)
CO2 SERPL-SCNC: 38 MMOL/L (ref 20–33)
CO2 SERPL-SCNC: 39 MMOL/L (ref 20–33)
CO2 SERPL-SCNC: 40 MMOL/L (ref 20–33)
COLOR FLD: NORMAL
COLOR FLD: NORMAL
COLOR UR: YELLOW
COMMENT 1642: NORMAL
CREAT SERPL-MCNC: 0.57 MG/DL (ref 0.5–1.4)
CREAT SERPL-MCNC: 0.58 MG/DL (ref 0.5–1.4)
CREAT SERPL-MCNC: 0.59 MG/DL (ref 0.5–1.4)
CREAT SERPL-MCNC: 0.63 MG/DL (ref 0.5–1.4)
CREAT SERPL-MCNC: 0.64 MG/DL (ref 0.5–1.4)
CREAT SERPL-MCNC: 0.65 MG/DL (ref 0.5–1.4)
CREAT SERPL-MCNC: 0.68 MG/DL (ref 0.5–1.4)
CREAT SERPL-MCNC: 0.69 MG/DL (ref 0.5–1.4)
CREAT SERPL-MCNC: 0.75 MG/DL (ref 0.5–1.4)
CREAT SERPL-MCNC: 0.77 MG/DL (ref 0.5–1.4)
CREAT SERPL-MCNC: 0.78 MG/DL (ref 0.5–1.4)
CREAT SERPL-MCNC: 0.78 MG/DL (ref 0.5–1.4)
CREAT SERPL-MCNC: 0.79 MG/DL (ref 0.5–1.4)
CREAT SERPL-MCNC: 0.8 MG/DL (ref 0.5–1.4)
CREAT SERPL-MCNC: 0.91 MG/DL (ref 0.5–1.4)
CREAT SERPL-MCNC: 0.95 MG/DL (ref 0.5–1.4)
CREAT SERPL-MCNC: 0.97 MG/DL (ref 0.5–1.4)
CREAT SERPL-MCNC: 1.01 MG/DL (ref 0.5–1.4)
CREAT SERPL-MCNC: 1.28 MG/DL (ref 0.5–1.4)
CREAT SERPL-MCNC: 1.35 MG/DL (ref 0.5–1.4)
CRP SERPL HS-MCNC: 13.87 MG/DL (ref 0–0.75)
CRP SERPL HS-MCNC: 6 MG/DL (ref 0–0.75)
CRP SERPL HS-MCNC: 6.65 MG/DL (ref 0–0.75)
CSF COMMENTS 1658: NORMAL
CYTOLOGY REG CYTOL: NORMAL
CYTOLOGY REG CYTOL: NORMAL
DELSYS IDSYS: ABNORMAL
DELSYS IDSYS: ABNORMAL
EKG IMPRESSION: NORMAL
END TIDAL CARBON DIOXIDE IECO2: 42 MMHG
END TIDAL CARBON DIOXIDE IECO2: 55 MMHG
EOSINOPHIL # BLD AUTO: 0 K/UL (ref 0–0.51)
EOSINOPHIL # BLD AUTO: 0.01 K/UL (ref 0–0.51)
EOSINOPHIL # BLD AUTO: 0.06 K/UL (ref 0–0.51)
EOSINOPHIL # BLD AUTO: 0.06 K/UL (ref 0–0.51)
EOSINOPHIL # BLD AUTO: 0.1 K/UL (ref 0–0.51)
EOSINOPHIL # BLD AUTO: 0.1 K/UL (ref 0–0.51)
EOSINOPHIL # BLD AUTO: 0.11 K/UL (ref 0–0.51)
EOSINOPHIL # BLD AUTO: 0.2 K/UL (ref 0–0.51)
EOSINOPHIL # BLD AUTO: 0.34 K/UL (ref 0–0.51)
EOSINOPHIL # BLD AUTO: 0.35 K/UL (ref 0–0.51)
EOSINOPHIL NFR BLD: 0 % (ref 0–6.9)
EOSINOPHIL NFR BLD: 0.1 % (ref 0–6.9)
EOSINOPHIL NFR BLD: 0.6 % (ref 0–6.9)
EOSINOPHIL NFR BLD: 0.7 % (ref 0–6.9)
EOSINOPHIL NFR BLD: 0.9 % (ref 0–6.9)
EOSINOPHIL NFR BLD: 1.3 % (ref 0–6.9)
EOSINOPHIL NFR BLD: 1.7 % (ref 0–6.9)
EOSINOPHIL NFR BLD: 2.5 % (ref 0–6.9)
EOSINOPHIL NFR BLD: 4.2 % (ref 0–6.9)
EOSINOPHIL NFR BLD: 5.1 % (ref 0–6.9)
EOSINOPHIL NFR FLD: 1 %
EPI CELLS #/AREA URNS HPF: ABNORMAL /HPF
ERYTHROCYTE [DISTWIDTH] IN BLOOD BY AUTOMATED COUNT: 53.6 FL (ref 35.9–50)
ERYTHROCYTE [DISTWIDTH] IN BLOOD BY AUTOMATED COUNT: 53.9 FL (ref 35.9–50)
ERYTHROCYTE [DISTWIDTH] IN BLOOD BY AUTOMATED COUNT: 54.3 FL (ref 35.9–50)
ERYTHROCYTE [DISTWIDTH] IN BLOOD BY AUTOMATED COUNT: 54.3 FL (ref 35.9–50)
ERYTHROCYTE [DISTWIDTH] IN BLOOD BY AUTOMATED COUNT: 54.4 FL (ref 35.9–50)
ERYTHROCYTE [DISTWIDTH] IN BLOOD BY AUTOMATED COUNT: 54.4 FL (ref 35.9–50)
ERYTHROCYTE [DISTWIDTH] IN BLOOD BY AUTOMATED COUNT: 55.1 FL (ref 35.9–50)
ERYTHROCYTE [DISTWIDTH] IN BLOOD BY AUTOMATED COUNT: 55.2 FL (ref 35.9–50)
ERYTHROCYTE [DISTWIDTH] IN BLOOD BY AUTOMATED COUNT: 55.7 FL (ref 35.9–50)
ERYTHROCYTE [DISTWIDTH] IN BLOOD BY AUTOMATED COUNT: 55.8 FL (ref 35.9–50)
ERYTHROCYTE [DISTWIDTH] IN BLOOD BY AUTOMATED COUNT: 56 FL (ref 35.9–50)
ERYTHROCYTE [DISTWIDTH] IN BLOOD BY AUTOMATED COUNT: 56 FL (ref 35.9–50)
ERYTHROCYTE [DISTWIDTH] IN BLOOD BY AUTOMATED COUNT: 56.1 FL (ref 35.9–50)
ERYTHROCYTE [DISTWIDTH] IN BLOOD BY AUTOMATED COUNT: 56.3 FL (ref 35.9–50)
ERYTHROCYTE [DISTWIDTH] IN BLOOD BY AUTOMATED COUNT: 57 FL (ref 35.9–50)
ERYTHROCYTE [DISTWIDTH] IN BLOOD BY AUTOMATED COUNT: 57.1 FL (ref 35.9–50)
ERYTHROCYTE [DISTWIDTH] IN BLOOD BY AUTOMATED COUNT: 57.4 FL (ref 35.9–50)
ERYTHROCYTE [DISTWIDTH] IN BLOOD BY AUTOMATED COUNT: 57.6 FL (ref 35.9–50)
ERYTHROCYTE [DISTWIDTH] IN BLOOD BY AUTOMATED COUNT: 57.7 FL (ref 35.9–50)
ERYTHROCYTE [DISTWIDTH] IN BLOOD BY AUTOMATED COUNT: 58.7 FL (ref 35.9–50)
EST. AVERAGE GLUCOSE BLD GHB EST-MCNC: 94 MG/DL
FLUAV RNA SPEC QL NAA+PROBE: NEGATIVE
FLUAV RNA SPEC QL NAA+PROBE: NEGATIVE
FLUBV RNA SPEC QL NAA+PROBE: NEGATIVE
FLUBV RNA SPEC QL NAA+PROBE: NEGATIVE
FUNGUS SPEC CULT: NORMAL
GLOBULIN SER CALC-MCNC: 2.9 G/DL (ref 1.9–3.5)
GLOBULIN SER CALC-MCNC: 3 G/DL (ref 1.9–3.5)
GLOBULIN SER CALC-MCNC: 3.1 G/DL (ref 1.9–3.5)
GLOBULIN SER CALC-MCNC: 3.2 G/DL (ref 1.9–3.5)
GLOBULIN SER CALC-MCNC: 3.2 G/DL (ref 1.9–3.5)
GLOBULIN SER CALC-MCNC: 3.4 G/DL (ref 1.9–3.5)
GLOBULIN SER CALC-MCNC: 3.5 G/DL (ref 1.9–3.5)
GLOBULIN SER CALC-MCNC: 4 G/DL (ref 1.9–3.5)
GLOBULIN SER CALC-MCNC: 4 G/DL (ref 1.9–3.5)
GLOBULIN SER CALC-MCNC: 4.1 G/DL (ref 1.9–3.5)
GLUCOSE BLD-MCNC: 100 MG/DL (ref 65–99)
GLUCOSE BLD-MCNC: 101 MG/DL (ref 65–99)
GLUCOSE BLD-MCNC: 104 MG/DL (ref 65–99)
GLUCOSE BLD-MCNC: 104 MG/DL (ref 65–99)
GLUCOSE BLD-MCNC: 105 MG/DL (ref 65–99)
GLUCOSE BLD-MCNC: 106 MG/DL (ref 65–99)
GLUCOSE BLD-MCNC: 108 MG/DL (ref 65–99)
GLUCOSE BLD-MCNC: 109 MG/DL (ref 65–99)
GLUCOSE BLD-MCNC: 110 MG/DL (ref 65–99)
GLUCOSE BLD-MCNC: 111 MG/DL (ref 65–99)
GLUCOSE BLD-MCNC: 112 MG/DL (ref 65–99)
GLUCOSE BLD-MCNC: 112 MG/DL (ref 65–99)
GLUCOSE BLD-MCNC: 113 MG/DL (ref 65–99)
GLUCOSE BLD-MCNC: 115 MG/DL (ref 65–99)
GLUCOSE BLD-MCNC: 118 MG/DL (ref 65–99)
GLUCOSE BLD-MCNC: 119 MG/DL (ref 65–99)
GLUCOSE BLD-MCNC: 119 MG/DL (ref 65–99)
GLUCOSE BLD-MCNC: 121 MG/DL (ref 65–99)
GLUCOSE BLD-MCNC: 121 MG/DL (ref 65–99)
GLUCOSE BLD-MCNC: 122 MG/DL (ref 65–99)
GLUCOSE BLD-MCNC: 125 MG/DL (ref 65–99)
GLUCOSE BLD-MCNC: 126 MG/DL (ref 65–99)
GLUCOSE BLD-MCNC: 126 MG/DL (ref 65–99)
GLUCOSE BLD-MCNC: 127 MG/DL (ref 65–99)
GLUCOSE BLD-MCNC: 127 MG/DL (ref 65–99)
GLUCOSE BLD-MCNC: 128 MG/DL (ref 65–99)
GLUCOSE BLD-MCNC: 129 MG/DL (ref 65–99)
GLUCOSE BLD-MCNC: 130 MG/DL (ref 65–99)
GLUCOSE BLD-MCNC: 130 MG/DL (ref 65–99)
GLUCOSE BLD-MCNC: 136 MG/DL (ref 65–99)
GLUCOSE BLD-MCNC: 136 MG/DL (ref 65–99)
GLUCOSE BLD-MCNC: 137 MG/DL (ref 65–99)
GLUCOSE BLD-MCNC: 141 MG/DL (ref 65–99)
GLUCOSE BLD-MCNC: 142 MG/DL (ref 65–99)
GLUCOSE BLD-MCNC: 144 MG/DL (ref 65–99)
GLUCOSE BLD-MCNC: 146 MG/DL (ref 65–99)
GLUCOSE BLD-MCNC: 166 MG/DL (ref 65–99)
GLUCOSE BLD-MCNC: 167 MG/DL (ref 65–99)
GLUCOSE BLD-MCNC: 167 MG/DL (ref 65–99)
GLUCOSE BLD-MCNC: 168 MG/DL (ref 65–99)
GLUCOSE BLD-MCNC: 170 MG/DL (ref 65–99)
GLUCOSE BLD-MCNC: 178 MG/DL (ref 65–99)
GLUCOSE BLD-MCNC: 181 MG/DL (ref 65–99)
GLUCOSE BLD-MCNC: 185 MG/DL (ref 65–99)
GLUCOSE BLD-MCNC: 71 MG/DL (ref 65–99)
GLUCOSE BLD-MCNC: 76 MG/DL (ref 65–99)
GLUCOSE BLD-MCNC: 77 MG/DL (ref 65–99)
GLUCOSE BLD-MCNC: 77 MG/DL (ref 65–99)
GLUCOSE BLD-MCNC: 78 MG/DL (ref 65–99)
GLUCOSE BLD-MCNC: 79 MG/DL (ref 65–99)
GLUCOSE BLD-MCNC: 79 MG/DL (ref 65–99)
GLUCOSE BLD-MCNC: 80 MG/DL (ref 65–99)
GLUCOSE BLD-MCNC: 80 MG/DL (ref 65–99)
GLUCOSE BLD-MCNC: 85 MG/DL (ref 65–99)
GLUCOSE BLD-MCNC: 86 MG/DL (ref 65–99)
GLUCOSE BLD-MCNC: 86 MG/DL (ref 65–99)
GLUCOSE BLD-MCNC: 88 MG/DL (ref 65–99)
GLUCOSE BLD-MCNC: 90 MG/DL (ref 65–99)
GLUCOSE BLD-MCNC: 91 MG/DL (ref 65–99)
GLUCOSE BLD-MCNC: 91 MG/DL (ref 65–99)
GLUCOSE BLD-MCNC: 92 MG/DL (ref 65–99)
GLUCOSE BLD-MCNC: 93 MG/DL (ref 65–99)
GLUCOSE BLD-MCNC: 93 MG/DL (ref 65–99)
GLUCOSE BLD-MCNC: 95 MG/DL (ref 65–99)
GLUCOSE BLD-MCNC: 97 MG/DL (ref 65–99)
GLUCOSE BLD-MCNC: 99 MG/DL (ref 65–99)
GLUCOSE FLD-MCNC: 113 MG/DL
GLUCOSE FLD-MCNC: 87 MG/DL
GLUCOSE SERPL-MCNC: 102 MG/DL (ref 65–99)
GLUCOSE SERPL-MCNC: 107 MG/DL (ref 65–99)
GLUCOSE SERPL-MCNC: 110 MG/DL (ref 65–99)
GLUCOSE SERPL-MCNC: 110 MG/DL (ref 65–99)
GLUCOSE SERPL-MCNC: 111 MG/DL (ref 65–99)
GLUCOSE SERPL-MCNC: 116 MG/DL (ref 65–99)
GLUCOSE SERPL-MCNC: 121 MG/DL (ref 65–99)
GLUCOSE SERPL-MCNC: 121 MG/DL (ref 65–99)
GLUCOSE SERPL-MCNC: 123 MG/DL (ref 65–99)
GLUCOSE SERPL-MCNC: 123 MG/DL (ref 65–99)
GLUCOSE SERPL-MCNC: 125 MG/DL (ref 65–99)
GLUCOSE SERPL-MCNC: 126 MG/DL (ref 65–99)
GLUCOSE SERPL-MCNC: 127 MG/DL (ref 65–99)
GLUCOSE SERPL-MCNC: 130 MG/DL (ref 65–99)
GLUCOSE SERPL-MCNC: 131 MG/DL (ref 65–99)
GLUCOSE SERPL-MCNC: 139 MG/DL (ref 65–99)
GLUCOSE SERPL-MCNC: 86 MG/DL (ref 65–99)
GLUCOSE SERPL-MCNC: 89 MG/DL (ref 65–99)
GLUCOSE SERPL-MCNC: 90 MG/DL (ref 65–99)
GLUCOSE SERPL-MCNC: 92 MG/DL (ref 65–99)
GLUCOSE SERPL-MCNC: 92 MG/DL (ref 65–99)
GLUCOSE SERPL-MCNC: 94 MG/DL (ref 65–99)
GLUCOSE UR STRIP.AUTO-MCNC: NEGATIVE MG/DL
GRAM STN SPEC: ABNORMAL
GRAM STN SPEC: ABNORMAL
GRAM STN SPEC: NORMAL
HBA1C MFR BLD: 4.9 % (ref 4–5.6)
HCO3 BLDA-SCNC: 42 MMOL/L (ref 17–25)
HCO3 BLDA-SCNC: 42.9 MMOL/L (ref 17–25)
HCO3 BLDA-SCNC: 43 MMOL/L (ref 17–25)
HCO3 BLDA-SCNC: 45 MMOL/L (ref 17–25)
HCT VFR BLD AUTO: 25.3 % (ref 42–52)
HCT VFR BLD AUTO: 25.6 % (ref 42–52)
HCT VFR BLD AUTO: 26.5 % (ref 42–52)
HCT VFR BLD AUTO: 28.4 % (ref 42–52)
HCT VFR BLD AUTO: 28.4 % (ref 42–52)
HCT VFR BLD AUTO: 28.5 % (ref 42–52)
HCT VFR BLD AUTO: 28.6 % (ref 42–52)
HCT VFR BLD AUTO: 28.6 % (ref 42–52)
HCT VFR BLD AUTO: 28.7 % (ref 42–52)
HCT VFR BLD AUTO: 29.1 % (ref 42–52)
HCT VFR BLD AUTO: 29.5 % (ref 42–52)
HCT VFR BLD AUTO: 29.5 % (ref 42–52)
HCT VFR BLD AUTO: 30.1 % (ref 42–52)
HCT VFR BLD AUTO: 30.4 % (ref 42–52)
HCT VFR BLD AUTO: 30.7 % (ref 42–52)
HCT VFR BLD AUTO: 31.2 % (ref 42–52)
HCT VFR BLD AUTO: 31.3 % (ref 42–52)
HCT VFR BLD AUTO: 32.3 % (ref 42–52)
HCT VFR BLD AUTO: 33.1 % (ref 42–52)
HCT VFR BLD AUTO: 33.7 % (ref 42–52)
HCT VFR BLD AUTO: 35.5 % (ref 42–52)
HCT VFR BLD AUTO: 36.8 % (ref 42–52)
HDLC SERPL-MCNC: 32 MG/DL
HGB BLD-MCNC: 10.3 G/DL (ref 14–18)
HGB BLD-MCNC: 11 G/DL (ref 14–18)
HGB BLD-MCNC: 11.2 G/DL (ref 14–18)
HGB BLD-MCNC: 7.8 G/DL (ref 14–18)
HGB BLD-MCNC: 7.8 G/DL (ref 14–18)
HGB BLD-MCNC: 7.9 G/DL (ref 14–18)
HGB BLD-MCNC: 8.5 G/DL (ref 14–18)
HGB BLD-MCNC: 8.6 G/DL (ref 14–18)
HGB BLD-MCNC: 8.7 G/DL (ref 14–18)
HGB BLD-MCNC: 8.8 G/DL (ref 14–18)
HGB BLD-MCNC: 8.8 G/DL (ref 14–18)
HGB BLD-MCNC: 8.9 G/DL (ref 14–18)
HGB BLD-MCNC: 9.1 G/DL (ref 14–18)
HGB BLD-MCNC: 9.1 G/DL (ref 14–18)
HGB BLD-MCNC: 9.3 G/DL (ref 14–18)
HGB BLD-MCNC: 9.5 G/DL (ref 14–18)
HGB BLD-MCNC: 9.6 G/DL (ref 14–18)
HISTIOCYTES NFR FLD: 1 %
HISTIOCYTES NFR FLD: 11 %
HOROWITZ INDEX BLDA+IHG-RTO: 250 MM[HG]
HOROWITZ INDEX BLDA+IHG-RTO: 90 MM[HG]
HYALINE CASTS #/AREA URNS LPF: ABNORMAL /LPF
HYPOCHROMIA BLD QL SMEAR: ABNORMAL
HYPOCHROMIA BLD QL SMEAR: ABNORMAL
IMM GRANULOCYTES # BLD AUTO: 0.03 K/UL (ref 0–0.11)
IMM GRANULOCYTES # BLD AUTO: 0.04 K/UL (ref 0–0.11)
IMM GRANULOCYTES # BLD AUTO: 0.05 K/UL (ref 0–0.11)
IMM GRANULOCYTES # BLD AUTO: 0.12 K/UL (ref 0–0.11)
IMM GRANULOCYTES NFR BLD AUTO: 0.3 % (ref 0–0.9)
IMM GRANULOCYTES NFR BLD AUTO: 0.4 % (ref 0–0.9)
IMM GRANULOCYTES NFR BLD AUTO: 0.5 % (ref 0–0.9)
IMM GRANULOCYTES NFR BLD AUTO: 0.6 % (ref 0–0.9)
IMM GRANULOCYTES NFR BLD AUTO: 0.6 % (ref 0–0.9)
IMM GRANULOCYTES NFR BLD AUTO: 0.7 % (ref 0–0.9)
INHALED O2 FLOW RATE: 4 L/MIN (ref 2–10)
INR PPP: 1.12 (ref 0.87–1.13)
KETONES UR STRIP.AUTO-MCNC: 15 MG/DL
LACTATE BLD-SCNC: 0.9 MMOL/L (ref 0.5–2)
LACTATE BLD-SCNC: 1.2 MMOL/L (ref 0.5–2)
LACTATE BLD-SCNC: 1.2 MMOL/L (ref 0.5–2)
LACTATE BLD-SCNC: 2 MMOL/L (ref 0.5–2)
LDH FLD L TO P-CCNC: 200 U/L
LDH FLD L TO P-CCNC: 248 U/L
LDH SERPL L TO P-CCNC: 214 U/L (ref 107–266)
LDLC SERPL CALC-MCNC: 45 MG/DL
LEUKOCYTE ESTERASE UR QL STRIP.AUTO: ABNORMAL
LIPASE SERPL-CCNC: 16 U/L (ref 11–82)
LV EJECT FRACT  99904: 55
LV EJECT FRACT  99904: 60
LV EJECT FRACT MOD 2C 99903: 56.56
LV EJECT FRACT MOD 2C 99903: 62.89
LV EJECT FRACT MOD 4C 99902: 56.09
LV EJECT FRACT MOD 4C 99902: 58.68
LV EJECT FRACT MOD BP 99901: 58.5
LV EJECT FRACT MOD BP 99901: 58.8
LYMPHOCYTES # BLD AUTO: 0.2 K/UL (ref 1–4.8)
LYMPHOCYTES # BLD AUTO: 0.38 K/UL (ref 1–4.8)
LYMPHOCYTES # BLD AUTO: 0.74 K/UL (ref 1–4.8)
LYMPHOCYTES # BLD AUTO: 0.77 K/UL (ref 1–4.8)
LYMPHOCYTES # BLD AUTO: 0.77 K/UL (ref 1–4.8)
LYMPHOCYTES # BLD AUTO: 0.92 K/UL (ref 1–4.8)
LYMPHOCYTES # BLD AUTO: 0.92 K/UL (ref 1–4.8)
LYMPHOCYTES # BLD AUTO: 1.25 K/UL (ref 1–4.8)
LYMPHOCYTES # BLD AUTO: 1.29 K/UL (ref 1–4.8)
LYMPHOCYTES # BLD AUTO: 1.45 K/UL (ref 1–4.8)
LYMPHOCYTES NFR BLD: 1.8 % (ref 22–41)
LYMPHOCYTES NFR BLD: 10.4 % (ref 22–41)
LYMPHOCYTES NFR BLD: 10.8 % (ref 22–41)
LYMPHOCYTES NFR BLD: 14.5 % (ref 22–41)
LYMPHOCYTES NFR BLD: 15.7 % (ref 22–41)
LYMPHOCYTES NFR BLD: 15.7 % (ref 22–41)
LYMPHOCYTES NFR BLD: 16.4 % (ref 22–41)
LYMPHOCYTES NFR BLD: 4.6 % (ref 22–41)
LYMPHOCYTES NFR BLD: 5 % (ref 22–41)
LYMPHOCYTES NFR BLD: 9.4 % (ref 22–41)
LYMPHOCYTES NFR FLD: 62 %
LYMPHOCYTES NFR FLD: 84 %
MACROCYTES BLD QL SMEAR: ABNORMAL
MACROCYTES BLD QL SMEAR: ABNORMAL
MAGNESIUM SERPL-MCNC: 1.7 MG/DL (ref 1.5–2.5)
MAGNESIUM SERPL-MCNC: 1.7 MG/DL (ref 1.5–2.5)
MAGNESIUM SERPL-MCNC: 1.8 MG/DL (ref 1.5–2.5)
MAGNESIUM SERPL-MCNC: 1.9 MG/DL (ref 1.5–2.5)
MAGNESIUM SERPL-MCNC: 2.5 MG/DL (ref 1.5–2.5)
MANUAL DIFF BLD: NORMAL
MCH RBC QN AUTO: 26.8 PG (ref 27–33)
MCH RBC QN AUTO: 27.6 PG (ref 27–33)
MCH RBC QN AUTO: 27.7 PG (ref 27–33)
MCH RBC QN AUTO: 27.8 PG (ref 27–33)
MCH RBC QN AUTO: 27.9 PG (ref 27–33)
MCH RBC QN AUTO: 28.1 PG (ref 27–33)
MCH RBC QN AUTO: 28.1 PG (ref 27–33)
MCH RBC QN AUTO: 28.2 PG (ref 27–33)
MCH RBC QN AUTO: 28.2 PG (ref 27–33)
MCH RBC QN AUTO: 28.3 PG (ref 27–33)
MCH RBC QN AUTO: 28.4 PG (ref 27–33)
MCH RBC QN AUTO: 28.5 PG (ref 27–33)
MCH RBC QN AUTO: 28.7 PG (ref 27–33)
MCH RBC QN AUTO: 28.7 PG (ref 27–33)
MCH RBC QN AUTO: 29 PG (ref 27–33)
MCH RBC QN AUTO: 29.1 PG (ref 27–33)
MCHC RBC AUTO-ENTMCNC: 27.6 G/DL (ref 33.7–35.3)
MCHC RBC AUTO-ENTMCNC: 28.2 G/DL (ref 33.7–35.3)
MCHC RBC AUTO-ENTMCNC: 29 G/DL (ref 33.7–35.3)
MCHC RBC AUTO-ENTMCNC: 29.1 G/DL (ref 33.7–35.3)
MCHC RBC AUTO-ENTMCNC: 29.2 G/DL (ref 33.7–35.3)
MCHC RBC AUTO-ENTMCNC: 29.4 G/DL (ref 33.7–35.3)
MCHC RBC AUTO-ENTMCNC: 29.4 G/DL (ref 33.7–35.3)
MCHC RBC AUTO-ENTMCNC: 29.5 G/DL (ref 33.7–35.3)
MCHC RBC AUTO-ENTMCNC: 29.8 G/DL (ref 33.7–35.3)
MCHC RBC AUTO-ENTMCNC: 29.9 G/DL (ref 33.7–35.3)
MCHC RBC AUTO-ENTMCNC: 30.1 G/DL (ref 33.7–35.3)
MCHC RBC AUTO-ENTMCNC: 30.1 G/DL (ref 33.7–35.3)
MCHC RBC AUTO-ENTMCNC: 30.2 G/DL (ref 33.7–35.3)
MCHC RBC AUTO-ENTMCNC: 30.5 G/DL (ref 33.7–35.3)
MCHC RBC AUTO-ENTMCNC: 30.7 G/DL (ref 33.7–35.3)
MCHC RBC AUTO-ENTMCNC: 30.8 G/DL (ref 33.7–35.3)
MCHC RBC AUTO-ENTMCNC: 31.1 G/DL (ref 33.7–35.3)
MCHC RBC AUTO-ENTMCNC: 31.5 G/DL (ref 33.7–35.3)
MCV RBC AUTO: 91 FL (ref 81.4–97.8)
MCV RBC AUTO: 92 FL (ref 81.4–97.8)
MCV RBC AUTO: 92.3 FL (ref 81.4–97.8)
MCV RBC AUTO: 92.4 FL (ref 81.4–97.8)
MCV RBC AUTO: 92.9 FL (ref 81.4–97.8)
MCV RBC AUTO: 93.1 FL (ref 81.4–97.8)
MCV RBC AUTO: 93.5 FL (ref 81.4–97.8)
MCV RBC AUTO: 93.7 FL (ref 81.4–97.8)
MCV RBC AUTO: 93.7 FL (ref 81.4–97.8)
MCV RBC AUTO: 93.8 FL (ref 81.4–97.8)
MCV RBC AUTO: 94 FL (ref 81.4–97.8)
MCV RBC AUTO: 94.4 FL (ref 81.4–97.8)
MCV RBC AUTO: 94.4 FL (ref 81.4–97.8)
MCV RBC AUTO: 94.5 FL (ref 81.4–97.8)
MCV RBC AUTO: 94.9 FL (ref 81.4–97.8)
MCV RBC AUTO: 95 FL (ref 81.4–97.8)
MCV RBC AUTO: 95.3 FL (ref 81.4–97.8)
MCV RBC AUTO: 97.1 FL (ref 81.4–97.8)
MCV RBC AUTO: 97.7 FL (ref 81.4–97.8)
MCV RBC AUTO: 99.7 FL (ref 81.4–97.8)
MESOTHL CELL NFR FLD: 1 %
MICRO URNS: ABNORMAL
MICROCYTES BLD QL SMEAR: ABNORMAL
MODE IMODE: ABNORMAL
MODE IMODE: ABNORMAL
MONOCYTES # BLD AUTO: 0.45 K/UL (ref 0–0.85)
MONOCYTES # BLD AUTO: 0.6 K/UL (ref 0–0.85)
MONOCYTES # BLD AUTO: 0.6 K/UL (ref 0–0.85)
MONOCYTES # BLD AUTO: 0.65 K/UL (ref 0–0.85)
MONOCYTES # BLD AUTO: 0.72 K/UL (ref 0–0.85)
MONOCYTES # BLD AUTO: 0.79 K/UL (ref 0–0.85)
MONOCYTES # BLD AUTO: 0.84 K/UL (ref 0–0.85)
MONOCYTES # BLD AUTO: 0.87 K/UL (ref 0–0.85)
MONOCYTES # BLD AUTO: 0.9 K/UL (ref 0–0.85)
MONOCYTES # BLD AUTO: 0.97 K/UL (ref 0–0.85)
MONOCYTES NFR BLD AUTO: 10.2 % (ref 0–13.4)
MONOCYTES NFR BLD AUTO: 10.7 % (ref 0–13.4)
MONOCYTES NFR BLD AUTO: 12.2 % (ref 0–13.4)
MONOCYTES NFR BLD AUTO: 5.1 % (ref 0–13.4)
MONOCYTES NFR BLD AUTO: 5.3 % (ref 0–13.4)
MONOCYTES NFR BLD AUTO: 6 % (ref 0–13.4)
MONOCYTES NFR BLD AUTO: 8.7 % (ref 0–13.4)
MONOCYTES NFR BLD AUTO: 8.8 % (ref 0–13.4)
MONOCYTES NFR BLD AUTO: 8.9 % (ref 0–13.4)
MONOCYTES NFR BLD AUTO: 9.7 % (ref 0–13.4)
MONONUC CELLS NFR FLD: 13 %
MONONUC CELLS NFR FLD: 9 %
MORPHOLOGY BLD-IMP: NORMAL
MRSA DNA SPEC QL NAA+PROBE: NORMAL
MUCOUS THREADS #/AREA URNS HPF: ABNORMAL /HPF
NEUTROPHILS # BLD AUTO: 10.29 K/UL (ref 1.82–7.42)
NEUTROPHILS # BLD AUTO: 15.12 K/UL (ref 1.82–7.42)
NEUTROPHILS # BLD AUTO: 4.59 K/UL (ref 1.82–7.42)
NEUTROPHILS # BLD AUTO: 5.09 K/UL (ref 1.82–7.42)
NEUTROPHILS # BLD AUTO: 5.44 K/UL (ref 1.82–7.42)
NEUTROPHILS # BLD AUTO: 5.55 K/UL (ref 1.82–7.42)
NEUTROPHILS # BLD AUTO: 6.27 K/UL (ref 1.82–7.42)
NEUTROPHILS # BLD AUTO: 6.69 K/UL (ref 1.82–7.42)
NEUTROPHILS # BLD AUTO: 6.74 K/UL (ref 1.82–7.42)
NEUTROPHILS # BLD AUTO: 6.99 K/UL (ref 1.82–7.42)
NEUTROPHILS NFR BLD: 68.5 % (ref 44–72)
NEUTROPHILS NFR BLD: 70.6 % (ref 44–72)
NEUTROPHILS NFR BLD: 72.2 % (ref 44–72)
NEUTROPHILS NFR BLD: 73 % (ref 44–72)
NEUTROPHILS NFR BLD: 74.3 % (ref 44–72)
NEUTROPHILS NFR BLD: 76.5 % (ref 44–72)
NEUTROPHILS NFR BLD: 79.1 % (ref 44–72)
NEUTROPHILS NFR BLD: 88.5 % (ref 44–72)
NEUTROPHILS NFR BLD: 89.3 % (ref 44–72)
NEUTROPHILS NFR BLD: 91.1 % (ref 44–72)
NEUTROPHILS NFR FLD: 16 %
NITRITE UR QL STRIP.AUTO: POSITIVE
NRBC # BLD AUTO: 0 K/UL
NRBC BLD-RTO: 0 /100 WBC
NT-PROBNP SERPL IA-MCNC: ABNORMAL PG/ML (ref 0–125)
O2/TOTAL GAS SETTING VFR VENT: 100 %
O2/TOTAL GAS SETTING VFR VENT: 60 %
OVALOCYTES BLD QL SMEAR: NORMAL
OVALOCYTES BLD QL SMEAR: NORMAL
PATHOLOGY CONSULT NOTE: NORMAL
PCO2 BLDA: 59.9 MMHG (ref 26–37)
PCO2 BLDA: 71.5 MMHG (ref 26–37)
PCO2 BLDA: 74.4 MMHG (ref 26–37)
PCO2 BLDA: 92.8 MMHG (ref 26–37)
PCO2 TEMP ADJ BLDA: 56.3 MMHG (ref 26–37)
PCO2 TEMP ADJ BLDA: 69.8 MMHG (ref 26–37)
PCO2 TEMP ADJ BLDA: 89.6 MMHG (ref 26–37)
PEAK INSPIRATORY PRESSURE IPIP: 22 CMH20
PEAK INSPIRATORY PRESSURE IPIP: 24 CMH20
PEEP END EXPIRATORY PRESSURE IPEEP: 8 CMH20
PEEP END EXPIRATORY PRESSURE IPEEP: 8 CMH20
PH BLDA: 7.28 [PH] (ref 7.4–7.5)
PH BLDA: 7.37 [PH] (ref 7.4–7.5)
PH BLDA: 7.39 [PH] (ref 7.4–7.5)
PH BLDA: 7.48 [PH] (ref 7.4–7.5)
PH FLD: 8 [PH]
PH FLD: 8 [PH]
PH TEMP ADJ BLDA: 7.29 [PH] (ref 7.4–7.5)
PH TEMP ADJ BLDA: 7.39 [PH] (ref 7.4–7.5)
PH TEMP ADJ BLDA: 7.5 [PH] (ref 7.4–7.5)
PH UR STRIP.AUTO: 5.5 [PH] (ref 5–8)
PHOSPHATE SERPL-MCNC: 2.3 MG/DL (ref 2.5–4.5)
PLATELET # BLD AUTO: 212 K/UL (ref 164–446)
PLATELET # BLD AUTO: 235 K/UL (ref 164–446)
PLATELET # BLD AUTO: 260 K/UL (ref 164–446)
PLATELET # BLD AUTO: 266 K/UL (ref 164–446)
PLATELET # BLD AUTO: 273 K/UL (ref 164–446)
PLATELET # BLD AUTO: 280 K/UL (ref 164–446)
PLATELET # BLD AUTO: 283 K/UL (ref 164–446)
PLATELET # BLD AUTO: 284 K/UL (ref 164–446)
PLATELET # BLD AUTO: 285 K/UL (ref 164–446)
PLATELET # BLD AUTO: 287 K/UL (ref 164–446)
PLATELET # BLD AUTO: 294 K/UL (ref 164–446)
PLATELET # BLD AUTO: 296 K/UL (ref 164–446)
PLATELET # BLD AUTO: 304 K/UL (ref 164–446)
PLATELET # BLD AUTO: 306 K/UL (ref 164–446)
PLATELET # BLD AUTO: 315 K/UL (ref 164–446)
PLATELET # BLD AUTO: 321 K/UL (ref 164–446)
PLATELET # BLD AUTO: 339 K/UL (ref 164–446)
PLATELET # BLD AUTO: 357 K/UL (ref 164–446)
PLATELET # BLD AUTO: 363 K/UL (ref 164–446)
PLATELET # BLD AUTO: 399 K/UL (ref 164–446)
PLATELET BLD QL SMEAR: NORMAL
PMV BLD AUTO: 8.8 FL (ref 9–12.9)
PMV BLD AUTO: 8.9 FL (ref 9–12.9)
PMV BLD AUTO: 8.9 FL (ref 9–12.9)
PMV BLD AUTO: 9 FL (ref 9–12.9)
PMV BLD AUTO: 9.1 FL (ref 9–12.9)
PMV BLD AUTO: 9.2 FL (ref 9–12.9)
PMV BLD AUTO: 9.3 FL (ref 9–12.9)
PMV BLD AUTO: 9.3 FL (ref 9–12.9)
PMV BLD AUTO: 9.4 FL (ref 9–12.9)
PMV BLD AUTO: 9.6 FL (ref 9–12.9)
PMV BLD AUTO: 9.6 FL (ref 9–12.9)
PMV BLD AUTO: 9.7 FL (ref 9–12.9)
PMV BLD AUTO: 9.8 FL (ref 9–12.9)
PO2 BLDA: 136.6 MMHG (ref 64–87)
PO2 BLDA: 140.4 MMHG (ref 64–87)
PO2 BLDA: 250 MMHG (ref 64–87)
PO2 BLDA: 54 MMHG (ref 64–87)
PO2 TEMP ADJ BLDA: 135.6 MMHG (ref 64–87)
PO2 TEMP ADJ BLDA: 243 MMHG (ref 64–87)
PO2 TEMP ADJ BLDA: 52 MMHG (ref 64–87)
POIKILOCYTOSIS BLD QL SMEAR: NORMAL
POIKILOCYTOSIS BLD QL SMEAR: NORMAL
POLYCHROMASIA BLD QL SMEAR: NORMAL
POTASSIUM SERPL-SCNC: 3.7 MMOL/L (ref 3.6–5.5)
POTASSIUM SERPL-SCNC: 4.1 MMOL/L (ref 3.6–5.5)
POTASSIUM SERPL-SCNC: 4.2 MMOL/L (ref 3.6–5.5)
POTASSIUM SERPL-SCNC: 4.3 MMOL/L (ref 3.6–5.5)
POTASSIUM SERPL-SCNC: 4.3 MMOL/L (ref 3.6–5.5)
POTASSIUM SERPL-SCNC: 4.4 MMOL/L (ref 3.6–5.5)
POTASSIUM SERPL-SCNC: 4.5 MMOL/L (ref 3.6–5.5)
POTASSIUM SERPL-SCNC: 4.8 MMOL/L (ref 3.6–5.5)
POTASSIUM SERPL-SCNC: 5 MMOL/L (ref 3.6–5.5)
POTASSIUM SERPL-SCNC: 5.1 MMOL/L (ref 3.6–5.5)
POTASSIUM SERPL-SCNC: 5.2 MMOL/L (ref 3.6–5.5)
POTASSIUM SERPL-SCNC: 5.4 MMOL/L (ref 3.6–5.5)
POTASSIUM SERPL-SCNC: 5.4 MMOL/L (ref 3.6–5.5)
POTASSIUM SERPL-SCNC: 5.6 MMOL/L (ref 3.6–5.5)
POTASSIUM SERPL-SCNC: 5.9 MMOL/L (ref 3.6–5.5)
PROCALCITONIN SERPL-MCNC: 0.08 NG/ML
PROT FLD-MCNC: 2.7 G/DL
PROT FLD-MCNC: 2.9 G/DL
PROT SERPL-MCNC: 5 G/DL (ref 6–8.2)
PROT SERPL-MCNC: 5.2 G/DL (ref 6–8.2)
PROT SERPL-MCNC: 5.3 G/DL (ref 6–8.2)
PROT SERPL-MCNC: 5.4 G/DL (ref 6–8.2)
PROT SERPL-MCNC: 5.5 G/DL (ref 6–8.2)
PROT SERPL-MCNC: 5.6 G/DL (ref 6–8.2)
PROT SERPL-MCNC: 5.8 G/DL (ref 6–8.2)
PROT SERPL-MCNC: 5.9 G/DL (ref 6–8.2)
PROT SERPL-MCNC: 6.6 G/DL (ref 6–8.2)
PROT SERPL-MCNC: 6.9 G/DL (ref 6–8.2)
PROT UR QL STRIP: NEGATIVE MG/DL
PROTHROMBIN TIME: 14.8 SEC (ref 12–14.6)
RBC # BLD AUTO: 2.77 M/UL (ref 4.7–6.1)
RBC # BLD AUTO: 3.02 M/UL (ref 4.7–6.1)
RBC # BLD AUTO: 3.02 M/UL (ref 4.7–6.1)
RBC # BLD AUTO: 3.03 M/UL (ref 4.7–6.1)
RBC # BLD AUTO: 3.08 M/UL (ref 4.7–6.1)
RBC # BLD AUTO: 3.1 M/UL (ref 4.7–6.1)
RBC # BLD AUTO: 3.11 M/UL (ref 4.7–6.1)
RBC # BLD AUTO: 3.12 M/UL (ref 4.7–6.1)
RBC # BLD AUTO: 3.14 M/UL (ref 4.7–6.1)
RBC # BLD AUTO: 3.15 M/UL (ref 4.7–6.1)
RBC # BLD AUTO: 3.2 M/UL (ref 4.7–6.1)
RBC # BLD AUTO: 3.22 M/UL (ref 4.7–6.1)
RBC # BLD AUTO: 3.35 M/UL (ref 4.7–6.1)
RBC # BLD AUTO: 3.42 M/UL (ref 4.7–6.1)
RBC # BLD AUTO: 3.47 M/UL (ref 4.7–6.1)
RBC # BLD AUTO: 3.54 M/UL (ref 4.7–6.1)
RBC # BLD AUTO: 3.88 M/UL (ref 4.7–6.1)
RBC # BLD AUTO: 3.9 M/UL (ref 4.7–6.1)
RBC # FLD: NORMAL CELLS/UL
RBC # FLD: NORMAL CELLS/UL
RBC # URNS HPF: ABNORMAL /HPF
RBC BLD AUTO: PRESENT
RBC UR QL AUTO: ABNORMAL
RHODAMINE-AURAMINE STN SPEC: NORMAL
RSV RNA SPEC QL NAA+PROBE: NEGATIVE
RSV RNA SPEC QL NAA+PROBE: NEGATIVE
SAO2 % BLDA: 100 % (ref 93–99)
SAO2 % BLDA: 86 % (ref 93–99)
SAO2 % BLDA: 98.4 % (ref 93–99)
SAO2 % BLDA: 98.8 % (ref 93–99)
SARS-COV+SARS-COV-2 AG RESP QL IA.RAPID: NOTDETECTED
SARS-COV-2 RNA RESP QL NAA+PROBE: NOTDETECTED
SIGNIFICANT IND 70042: ABNORMAL
SIGNIFICANT IND 70042: ABNORMAL
SIGNIFICANT IND 70042: NORMAL
SITE SITE: ABNORMAL
SITE SITE: ABNORMAL
SITE SITE: NORMAL
SODIUM SERPL-SCNC: 135 MMOL/L (ref 135–145)
SODIUM SERPL-SCNC: 136 MMOL/L (ref 135–145)
SODIUM SERPL-SCNC: 137 MMOL/L (ref 135–145)
SODIUM SERPL-SCNC: 138 MMOL/L (ref 135–145)
SODIUM SERPL-SCNC: 139 MMOL/L (ref 135–145)
SODIUM SERPL-SCNC: 140 MMOL/L (ref 135–145)
SODIUM SERPL-SCNC: 141 MMOL/L (ref 135–145)
SODIUM SERPL-SCNC: 141 MMOL/L (ref 135–145)
SODIUM SERPL-SCNC: 142 MMOL/L (ref 135–145)
SODIUM SERPL-SCNC: 143 MMOL/L (ref 135–145)
SODIUM SERPL-SCNC: 143 MMOL/L (ref 135–145)
SODIUM SERPL-SCNC: 146 MMOL/L (ref 135–145)
SODIUM SERPL-SCNC: 146 MMOL/L (ref 135–145)
SOURCE SOURCE: ABNORMAL
SOURCE SOURCE: ABNORMAL
SOURCE SOURCE: NORMAL
SP GR UR STRIP.AUTO: >=1.03
SPECIMEN DRAWN FROM PATIENT: ABNORMAL
SPECIMEN DRAWN FROM PATIENT: ABNORMAL
SPECIMEN SOURCE: NORMAL
TRIGL SERPL-MCNC: 63 MG/DL (ref 0–149)
TROPONIN T SERPL-MCNC: 62 NG/L (ref 6–19)
TROPONIN T SERPL-MCNC: 84 NG/L (ref 6–19)
TROPONIN T SERPL-MCNC: 94 NG/L (ref 6–19)
UROBILINOGEN UR STRIP.AUTO-MCNC: 0.2 MG/DL
VANCOMYCIN PEAK SERPL-MCNC: 27.5 UG/ML (ref 20–40)
VANCOMYCIN PEAK SERPL-MCNC: 31 UG/ML (ref 20–40)
VANCOMYCIN SERPL-MCNC: 22.3 UG/ML
VANCOMYCIN TROUGH SERPL-MCNC: 16.2 UG/ML (ref 10–20)
VANCOMYCIN TROUGH SERPL-MCNC: 24.6 UG/ML (ref 10–20)
VANCOMYCIN TROUGH SERPL-MCNC: 24.7 UG/ML (ref 10–20)
WBC # BLD AUTO: 10 K/UL (ref 4.8–10.8)
WBC # BLD AUTO: 11.3 K/UL (ref 4.8–10.8)
WBC # BLD AUTO: 16.9 K/UL (ref 4.8–10.8)
WBC # BLD AUTO: 5.2 K/UL (ref 4.8–10.8)
WBC # BLD AUTO: 6.4 K/UL (ref 4.8–10.8)
WBC # BLD AUTO: 6.6 K/UL (ref 4.8–10.8)
WBC # BLD AUTO: 6.7 K/UL (ref 4.8–10.8)
WBC # BLD AUTO: 6.8 K/UL (ref 4.8–10.8)
WBC # BLD AUTO: 6.9 K/UL (ref 4.8–10.8)
WBC # BLD AUTO: 7.6 K/UL (ref 4.8–10.8)
WBC # BLD AUTO: 7.8 K/UL (ref 4.8–10.8)
WBC # BLD AUTO: 7.9 K/UL (ref 4.8–10.8)
WBC # BLD AUTO: 8 K/UL (ref 4.8–10.8)
WBC # BLD AUTO: 8.2 K/UL (ref 4.8–10.8)
WBC # BLD AUTO: 8.6 K/UL (ref 4.8–10.8)
WBC # BLD AUTO: 8.8 K/UL (ref 4.8–10.8)
WBC # BLD AUTO: 8.9 K/UL (ref 4.8–10.8)
WBC # BLD AUTO: 9.3 K/UL (ref 4.8–10.8)
WBC # BLD AUTO: 9.3 K/UL (ref 4.8–10.8)
WBC # BLD AUTO: 9.9 K/UL (ref 4.8–10.8)
WBC # FLD: 163 CELLS/UL
WBC # FLD: 369 CELLS/UL
WBC #/AREA URNS HPF: ABNORMAL /HPF
WBC OTHER NFR FLD: 2 %

## 2021-01-01 PROCEDURE — 700102 HCHG RX REV CODE 250 W/ 637 OVERRIDE(OP): Performed by: STUDENT IN AN ORGANIZED HEALTH CARE EDUCATION/TRAINING PROGRAM

## 2021-01-01 PROCEDURE — 82150 ASSAY OF AMYLASE: CPT

## 2021-01-01 PROCEDURE — 93010 ELECTROCARDIOGRAM REPORT: CPT | Performed by: INTERNAL MEDICINE

## 2021-01-01 PROCEDURE — 770006 HCHG ROOM/CARE - MED/SURG/GYN SEMI*

## 2021-01-01 PROCEDURE — 31500 INSERT EMERGENCY AIRWAY: CPT

## 2021-01-01 PROCEDURE — 700102 HCHG RX REV CODE 250 W/ 637 OVERRIDE(OP): Performed by: INTERNAL MEDICINE

## 2021-01-01 PROCEDURE — 99233 SBSQ HOSP IP/OBS HIGH 50: CPT | Performed by: STUDENT IN AN ORGANIZED HEALTH CARE EDUCATION/TRAINING PROGRAM

## 2021-01-01 PROCEDURE — A9270 NON-COVERED ITEM OR SERVICE: HCPCS | Performed by: STUDENT IN AN ORGANIZED HEALTH CARE EDUCATION/TRAINING PROGRAM

## 2021-01-01 PROCEDURE — 99291 CRITICAL CARE FIRST HOUR: CPT | Mod: 25 | Performed by: INTERNAL MEDICINE

## 2021-01-01 PROCEDURE — 302214 INTUBATION BOX: Performed by: INTERNAL MEDICINE

## 2021-01-01 PROCEDURE — 87075 CULTR BACTERIA EXCEPT BLOOD: CPT

## 2021-01-01 PROCEDURE — 86140 C-REACTIVE PROTEIN: CPT

## 2021-01-01 PROCEDURE — 37799 UNLISTED PX VASCULAR SURGERY: CPT

## 2021-01-01 PROCEDURE — 700101 HCHG RX REV CODE 250: Performed by: INTERNAL MEDICINE

## 2021-01-01 PROCEDURE — 700111 HCHG RX REV CODE 636 W/ 250 OVERRIDE (IP): Performed by: ANESTHESIOLOGY

## 2021-01-01 PROCEDURE — 87015 SPECIMEN INFECT AGNT CONCNTJ: CPT

## 2021-01-01 PROCEDURE — 99233 SBSQ HOSP IP/OBS HIGH 50: CPT | Mod: GC | Performed by: INTERNAL MEDICINE

## 2021-01-01 PROCEDURE — 82945 GLUCOSE OTHER FLUID: CPT

## 2021-01-01 PROCEDURE — 36415 COLL VENOUS BLD VENIPUNCTURE: CPT

## 2021-01-01 PROCEDURE — 87186 SC STD MICRODIL/AGAR DIL: CPT

## 2021-01-01 PROCEDURE — 700101 HCHG RX REV CODE 250: Performed by: ANESTHESIOLOGY

## 2021-01-01 PROCEDURE — 82962 GLUCOSE BLOOD TEST: CPT | Mod: 91

## 2021-01-01 PROCEDURE — 96367 TX/PROPH/DG ADDL SEQ IV INF: CPT

## 2021-01-01 PROCEDURE — 87205 SMEAR GRAM STAIN: CPT

## 2021-01-01 PROCEDURE — 80048 BASIC METABOLIC PNL TOTAL CA: CPT

## 2021-01-01 PROCEDURE — 85027 COMPLETE CBC AUTOMATED: CPT

## 2021-01-01 PROCEDURE — G0378 HOSPITAL OBSERVATION PER HR: HCPCS

## 2021-01-01 PROCEDURE — 700111 HCHG RX REV CODE 636 W/ 250 OVERRIDE (IP): Performed by: STUDENT IN AN ORGANIZED HEALTH CARE EDUCATION/TRAINING PROGRAM

## 2021-01-01 PROCEDURE — 92610 EVALUATE SWALLOWING FUNCTION: CPT

## 2021-01-01 PROCEDURE — A9270 NON-COVERED ITEM OR SERVICE: HCPCS | Performed by: INTERNAL MEDICINE

## 2021-01-01 PROCEDURE — 71045 X-RAY EXAM CHEST 1 VIEW: CPT

## 2021-01-01 PROCEDURE — 87040 BLOOD CULTURE FOR BACTERIA: CPT

## 2021-01-01 PROCEDURE — 80061 LIPID PANEL: CPT

## 2021-01-01 PROCEDURE — 71260 CT THORAX DX C+: CPT | Mod: ME

## 2021-01-01 PROCEDURE — 80053 COMPREHEN METABOLIC PANEL: CPT

## 2021-01-01 PROCEDURE — 85025 COMPLETE CBC W/AUTO DIFF WBC: CPT

## 2021-01-01 PROCEDURE — 94760 N-INVAS EAR/PLS OXIMETRY 1: CPT

## 2021-01-01 PROCEDURE — 99232 SBSQ HOSP IP/OBS MODERATE 35: CPT | Performed by: INTERNAL MEDICINE

## 2021-01-01 PROCEDURE — 82803 BLOOD GASES ANY COMBINATION: CPT

## 2021-01-01 PROCEDURE — 700105 HCHG RX REV CODE 258: Performed by: INTERNAL MEDICINE

## 2021-01-01 PROCEDURE — 96366 THER/PROPH/DIAG IV INF ADDON: CPT

## 2021-01-01 PROCEDURE — 700102 HCHG RX REV CODE 250 W/ 637 OVERRIDE(OP): Performed by: ANESTHESIOLOGY

## 2021-01-01 PROCEDURE — 97166 OT EVAL MOD COMPLEX 45 MIN: CPT

## 2021-01-01 PROCEDURE — 04HY32Z INSERTION OF MONITORING DEVICE INTO LOWER ARTERY, PERCUTANEOUS APPROACH: ICD-10-PCS | Performed by: INTERNAL MEDICINE

## 2021-01-01 PROCEDURE — 93306 TTE W/DOPPLER COMPLETE: CPT

## 2021-01-01 PROCEDURE — 700105 HCHG RX REV CODE 258: Performed by: EMERGENCY MEDICINE

## 2021-01-01 PROCEDURE — 99232 SBSQ HOSP IP/OBS MODERATE 35: CPT | Performed by: NURSE PRACTITIONER

## 2021-01-01 PROCEDURE — 700105 HCHG RX REV CODE 258: Performed by: HOSPITALIST

## 2021-01-01 PROCEDURE — 700111 HCHG RX REV CODE 636 W/ 250 OVERRIDE (IP): Performed by: INTERNAL MEDICINE

## 2021-01-01 PROCEDURE — 88305 TISSUE EXAM BY PATHOLOGIST: CPT

## 2021-01-01 PROCEDURE — 32554 ASPIRATE PLEURA W/O IMAGING: CPT

## 2021-01-01 PROCEDURE — 87070 CULTURE OTHR SPECIMN AEROBIC: CPT

## 2021-01-01 PROCEDURE — 99232 SBSQ HOSP IP/OBS MODERATE 35: CPT | Performed by: GENERAL PRACTICE

## 2021-01-01 PROCEDURE — 84484 ASSAY OF TROPONIN QUANT: CPT

## 2021-01-01 PROCEDURE — U0005 INFEC AGEN DETEC AMPLI PROBE: HCPCS

## 2021-01-01 PROCEDURE — 85014 HEMATOCRIT: CPT

## 2021-01-01 PROCEDURE — 99233 SBSQ HOSP IP/OBS HIGH 50: CPT | Performed by: INTERNAL MEDICINE

## 2021-01-01 PROCEDURE — 93005 ELECTROCARDIOGRAM TRACING: CPT | Performed by: EMERGENCY MEDICINE

## 2021-01-01 PROCEDURE — 96368 THER/DIAG CONCURRENT INF: CPT

## 2021-01-01 PROCEDURE — 99220 PR INITIAL OBSERVATION CARE,LEVL III: CPT | Performed by: HOSPITALIST

## 2021-01-01 PROCEDURE — 94669 MECHANICAL CHEST WALL OSCILL: CPT

## 2021-01-01 PROCEDURE — 700105 HCHG RX REV CODE 258: Performed by: STUDENT IN AN ORGANIZED HEALTH CARE EDUCATION/TRAINING PROGRAM

## 2021-01-01 PROCEDURE — 160002 HCHG RECOVERY MINUTES (STAT): Performed by: ORTHOPAEDIC SURGERY

## 2021-01-01 PROCEDURE — 80202 ASSAY OF VANCOMYCIN: CPT

## 2021-01-01 PROCEDURE — 96365 THER/PROPH/DIAG IV INF INIT: CPT

## 2021-01-01 PROCEDURE — 700117 HCHG RX CONTRAST REV CODE 255: Performed by: INTERNAL MEDICINE

## 2021-01-01 PROCEDURE — 95816 EEG AWAKE AND DROWSY: CPT | Performed by: STUDENT IN AN ORGANIZED HEALTH CARE EDUCATION/TRAINING PROGRAM

## 2021-01-01 PROCEDURE — C1751 CATH, INF, PER/CENT/MIDLINE: HCPCS

## 2021-01-01 PROCEDURE — 700102 HCHG RX REV CODE 250 W/ 637 OVERRIDE(OP): Performed by: EMERGENCY MEDICINE

## 2021-01-01 PROCEDURE — 500881 HCHG PACK, EXTREMITY: Performed by: ORTHOPAEDIC SURGERY

## 2021-01-01 PROCEDURE — 83986 ASSAY PH BODY FLUID NOS: CPT

## 2021-01-01 PROCEDURE — 700111 HCHG RX REV CODE 636 W/ 250 OVERRIDE (IP)

## 2021-01-01 PROCEDURE — 85730 THROMBOPLASTIN TIME PARTIAL: CPT

## 2021-01-01 PROCEDURE — 96376 TX/PRO/DX INJ SAME DRUG ADON: CPT

## 2021-01-01 PROCEDURE — 4410569 US-THORACENTESIS PUNCTURE

## 2021-01-01 PROCEDURE — 700117 HCHG RX CONTRAST REV CODE 255: Performed by: EMERGENCY MEDICINE

## 2021-01-01 PROCEDURE — 0W9B30Z DRAINAGE OF LEFT PLEURAL CAVITY WITH DRAINAGE DEVICE, PERCUTANEOUS APPROACH: ICD-10-PCS | Performed by: RADIOLOGY

## 2021-01-01 PROCEDURE — 93005 ELECTROCARDIOGRAM TRACING: CPT | Performed by: INTERNAL MEDICINE

## 2021-01-01 PROCEDURE — 4410114 CT-CHEST TUBE-EMPYEMA

## 2021-01-01 PROCEDURE — 4A10X4Z MONITORING OF CENTRAL NERVOUS ELECTRICAL ACTIVITY, EXTERNAL APPROACH: ICD-10-PCS | Performed by: STUDENT IN AN ORGANIZED HEALTH CARE EDUCATION/TRAINING PROGRAM

## 2021-01-01 PROCEDURE — 99285 EMERGENCY DEPT VISIT HI MDM: CPT

## 2021-01-01 PROCEDURE — 99220 PR INITIAL OBSERVATION CARE,LEVL III: CPT | Performed by: STUDENT IN AN ORGANIZED HEALTH CARE EDUCATION/TRAINING PROGRAM

## 2021-01-01 PROCEDURE — 700111 HCHG RX REV CODE 636 W/ 250 OVERRIDE (IP): Performed by: HOSPITALIST

## 2021-01-01 PROCEDURE — 02HV33Z INSERTION OF INFUSION DEVICE INTO SUPERIOR VENA CAVA, PERCUTANEOUS APPROACH: ICD-10-PCS | Performed by: INTERNAL MEDICINE

## 2021-01-01 PROCEDURE — 87040 BLOOD CULTURE FOR BACTERIA: CPT | Mod: 91

## 2021-01-01 PROCEDURE — 83735 ASSAY OF MAGNESIUM: CPT

## 2021-01-01 PROCEDURE — 36573 INSJ PICC RS&I 5 YR+: CPT

## 2021-01-01 PROCEDURE — 5A1935Z RESPIRATORY VENTILATION, LESS THAN 24 CONSECUTIVE HOURS: ICD-10-PCS | Performed by: INTERNAL MEDICINE

## 2021-01-01 PROCEDURE — 97535 SELF CARE MNGMENT TRAINING: CPT

## 2021-01-01 PROCEDURE — 93005 ELECTROCARDIOGRAM TRACING: CPT | Performed by: STUDENT IN AN ORGANIZED HEALTH CARE EDUCATION/TRAINING PROGRAM

## 2021-01-01 PROCEDURE — 99223 1ST HOSP IP/OBS HIGH 75: CPT | Mod: AI | Performed by: INTERNAL MEDICINE

## 2021-01-01 PROCEDURE — 83605 ASSAY OF LACTIC ACID: CPT

## 2021-01-01 PROCEDURE — 93306 TTE W/DOPPLER COMPLETE: CPT | Mod: 26 | Performed by: INTERNAL MEDICINE

## 2021-01-01 PROCEDURE — 770022 HCHG ROOM/CARE - ICU (200)

## 2021-01-01 PROCEDURE — 85610 PROTHROMBIN TIME: CPT

## 2021-01-01 PROCEDURE — 83690 ASSAY OF LIPASE: CPT

## 2021-01-01 PROCEDURE — 99232 SBSQ HOSP IP/OBS MODERATE 35: CPT | Performed by: STUDENT IN AN ORGANIZED HEALTH CARE EDUCATION/TRAINING PROGRAM

## 2021-01-01 PROCEDURE — U0003 INFECTIOUS AGENT DETECTION BY NUCLEIC ACID (DNA OR RNA); SEVERE ACUTE RESPIRATORY SYNDROME CORONAVIRUS 2 (SARS-COV-2) (CORONAVIRUS DISEASE [COVID-19]), AMPLIFIED PROBE TECHNIQUE, MAKING USE OF HIGH THROUGHPUT TECHNOLOGIES AS DESCRIBED BY CMS-2020-01-R: HCPCS

## 2021-01-01 PROCEDURE — 97162 PT EVAL MOD COMPLEX 30 MIN: CPT

## 2021-01-01 PROCEDURE — 83036 HEMOGLOBIN GLYCOSYLATED A1C: CPT

## 2021-01-01 PROCEDURE — 36620 INSERTION CATHETER ARTERY: CPT | Performed by: INTERNAL MEDICINE

## 2021-01-01 PROCEDURE — 88311 DECALCIFY TISSUE: CPT

## 2021-01-01 PROCEDURE — 99152 MOD SED SAME PHYS/QHP 5/>YRS: CPT

## 2021-01-01 PROCEDURE — 84157 ASSAY OF PROTEIN OTHER: CPT

## 2021-01-01 PROCEDURE — C1729 CATH, DRAINAGE: HCPCS | Performed by: STUDENT IN AN ORGANIZED HEALTH CARE EDUCATION/TRAINING PROGRAM

## 2021-01-01 PROCEDURE — 99233 SBSQ HOSP IP/OBS HIGH 50: CPT | Mod: 25 | Performed by: INTERNAL MEDICINE

## 2021-01-01 PROCEDURE — 31500 INSERT EMERGENCY AIRWAY: CPT | Mod: GC | Performed by: INTERNAL MEDICINE

## 2021-01-01 PROCEDURE — 94002 VENT MGMT INPAT INIT DAY: CPT

## 2021-01-01 PROCEDURE — C9803 HOPD COVID-19 SPEC COLLECT: HCPCS | Performed by: EMERGENCY MEDICINE

## 2021-01-01 PROCEDURE — 87086 URINE CULTURE/COLONY COUNT: CPT

## 2021-01-01 PROCEDURE — 87116 MYCOBACTERIA CULTURE: CPT

## 2021-01-01 PROCEDURE — 83880 ASSAY OF NATRIURETIC PEPTIDE: CPT

## 2021-01-01 PROCEDURE — 85018 HEMOGLOBIN: CPT

## 2021-01-01 PROCEDURE — 700111 HCHG RX REV CODE 636 W/ 250 OVERRIDE (IP): Performed by: EMERGENCY MEDICINE

## 2021-01-01 PROCEDURE — 97530 THERAPEUTIC ACTIVITIES: CPT

## 2021-01-01 PROCEDURE — 94799 UNLISTED PULMONARY SVC/PX: CPT

## 2021-01-01 PROCEDURE — 93970 EXTREMITY STUDY: CPT

## 2021-01-01 PROCEDURE — 71275 CT ANGIOGRAPHY CHEST: CPT | Mod: ME

## 2021-01-01 PROCEDURE — C1729 CATH, DRAINAGE: HCPCS | Performed by: INTERNAL MEDICINE

## 2021-01-01 PROCEDURE — 36620 INSERTION CATHETER ARTERY: CPT

## 2021-01-01 PROCEDURE — 99239 HOSP IP/OBS DSCHRG MGMT >30: CPT | Performed by: INTERNAL MEDICINE

## 2021-01-01 PROCEDURE — 97112 NEUROMUSCULAR REEDUCATION: CPT

## 2021-01-01 PROCEDURE — 94003 VENT MGMT INPAT SUBQ DAY: CPT

## 2021-01-01 PROCEDURE — 88112 CYTOPATH CELL ENHANCE TECH: CPT

## 2021-01-01 PROCEDURE — 70450 CT HEAD/BRAIN W/O DYE: CPT | Mod: ME

## 2021-01-01 PROCEDURE — 0241U HCHG SARS-COV-2 COVID-19 NFCT DS RESP RNA 4 TRGT MIC: CPT

## 2021-01-01 PROCEDURE — B548ZZA ULTRASONOGRAPHY OF SUPERIOR VENA CAVA, GUIDANCE: ICD-10-PCS | Performed by: INTERNAL MEDICINE

## 2021-01-01 PROCEDURE — 87147 CULTURE TYPE IMMUNOLOGIC: CPT

## 2021-01-01 PROCEDURE — 71250 CT THORAX DX C-: CPT | Mod: MG

## 2021-01-01 PROCEDURE — BB4BZZZ ULTRASONOGRAPHY OF PLEURA: ICD-10-PCS | Performed by: RADIOLOGY

## 2021-01-01 PROCEDURE — 700101 HCHG RX REV CODE 250

## 2021-01-01 PROCEDURE — 83615 LACTATE (LD) (LDH) ENZYME: CPT

## 2021-01-01 PROCEDURE — 85007 BL SMEAR W/DIFF WBC COUNT: CPT

## 2021-01-01 PROCEDURE — 302146: Performed by: INTERNAL MEDICINE

## 2021-01-01 PROCEDURE — 87102 FUNGUS ISOLATION CULTURE: CPT

## 2021-01-01 PROCEDURE — 92526 ORAL FUNCTION THERAPY: CPT

## 2021-01-01 PROCEDURE — 99239 HOSP IP/OBS DSCHRG MGMT >30: CPT | Performed by: GENERAL PRACTICE

## 2021-01-01 PROCEDURE — 700111 HCHG RX REV CODE 636 W/ 250 OVERRIDE (IP): Mod: JG | Performed by: STUDENT IN AN ORGANIZED HEALTH CARE EDUCATION/TRAINING PROGRAM

## 2021-01-01 PROCEDURE — 700101 HCHG RX REV CODE 250: Performed by: EMERGENCY MEDICINE

## 2021-01-01 PROCEDURE — 302146: Performed by: STUDENT IN AN ORGANIZED HEALTH CARE EDUCATION/TRAINING PROGRAM

## 2021-01-01 PROCEDURE — 0Y6N0ZF DETACHMENT AT LEFT FOOT, PARTIAL 5TH RAY, OPEN APPROACH: ICD-10-PCS | Performed by: ORTHOPAEDIC SURGERY

## 2021-01-01 PROCEDURE — 82962 GLUCOSE BLOOD TEST: CPT

## 2021-01-01 PROCEDURE — 82550 ASSAY OF CK (CPK): CPT

## 2021-01-01 PROCEDURE — 96375 TX/PRO/DX INJ NEW DRUG ADDON: CPT

## 2021-01-01 PROCEDURE — 99204 OFFICE O/P NEW MOD 45 MIN: CPT | Mod: 25 | Performed by: PSYCHIATRY & NEUROLOGY

## 2021-01-01 PROCEDURE — 87426 SARSCOV CORONAVIRUS AG IA: CPT

## 2021-01-01 PROCEDURE — 99222 1ST HOSP IP/OBS MODERATE 55: CPT | Performed by: NURSE PRACTITIONER

## 2021-01-01 PROCEDURE — 501838 HCHG SUTURE GENERAL: Performed by: ORTHOPAEDIC SURGERY

## 2021-01-01 PROCEDURE — 700102 HCHG RX REV CODE 250 W/ 637 OVERRIDE(OP): Performed by: HOSPITALIST

## 2021-01-01 PROCEDURE — 99223 1ST HOSP IP/OBS HIGH 75: CPT | Performed by: INTERNAL MEDICINE

## 2021-01-01 PROCEDURE — 95816 EEG AWAKE AND DROWSY: CPT | Mod: 26 | Performed by: STUDENT IN AN ORGANIZED HEALTH CARE EDUCATION/TRAINING PROGRAM

## 2021-01-01 PROCEDURE — 160048 HCHG OR STATISTICAL LEVEL 1-5: Performed by: ORTHOPAEDIC SURGERY

## 2021-01-01 PROCEDURE — 81001 URINALYSIS AUTO W/SCOPE: CPT

## 2021-01-01 PROCEDURE — 99153 MOD SED SAME PHYS/QHP EA: CPT

## 2021-01-01 PROCEDURE — 84100 ASSAY OF PHOSPHORUS: CPT

## 2021-01-01 PROCEDURE — A9270 NON-COVERED ITEM OR SERVICE: HCPCS | Performed by: HOSPITALIST

## 2021-01-01 PROCEDURE — 73630 X-RAY EXAM OF FOOT: CPT | Mod: LT

## 2021-01-01 PROCEDURE — 90715 TDAP VACCINE 7 YRS/> IM: CPT | Performed by: EMERGENCY MEDICINE

## 2021-01-01 PROCEDURE — 90471 IMMUNIZATION ADMIN: CPT

## 2021-01-01 PROCEDURE — 160027 HCHG SURGERY MINUTES - 1ST 30 MINS LEVEL 2: Performed by: ORTHOPAEDIC SURGERY

## 2021-01-01 PROCEDURE — 99292 CRITICAL CARE ADDL 30 MIN: CPT | Mod: 25 | Performed by: INTERNAL MEDICINE

## 2021-01-01 PROCEDURE — 89051 BODY FLUID CELL COUNT: CPT

## 2021-01-01 PROCEDURE — 36556 INSERT NON-TUNNEL CV CATH: CPT

## 2021-01-01 PROCEDURE — 93922 UPR/L XTREMITY ART 2 LEVELS: CPT

## 2021-01-01 PROCEDURE — 160035 HCHG PACU - 1ST 60 MINS PHASE I: Performed by: ORTHOPAEDIC SURGERY

## 2021-01-01 PROCEDURE — 99223 1ST HOSP IP/OBS HIGH 75: CPT | Mod: GC | Performed by: INTERNAL MEDICINE

## 2021-01-01 PROCEDURE — 76775 US EXAM ABDO BACK WALL LIM: CPT

## 2021-01-01 PROCEDURE — 93005 ELECTROCARDIOGRAM TRACING: CPT | Performed by: GENERAL PRACTICE

## 2021-01-01 PROCEDURE — 87077 CULTURE AEROBIC IDENTIFY: CPT

## 2021-01-01 PROCEDURE — 0W9B3ZZ DRAINAGE OF LEFT PLEURAL CAVITY, PERCUTANEOUS APPROACH: ICD-10-PCS | Performed by: RADIOLOGY

## 2021-01-01 PROCEDURE — 87206 SMEAR FLUORESCENT/ACID STAI: CPT

## 2021-01-01 PROCEDURE — 84132 ASSAY OF SERUM POTASSIUM: CPT

## 2021-01-01 PROCEDURE — 0BH17EZ INSERTION OF ENDOTRACHEAL AIRWAY INTO TRACHEA, VIA NATURAL OR ARTIFICIAL OPENING: ICD-10-PCS | Performed by: INTERNAL MEDICINE

## 2021-01-01 PROCEDURE — A9270 NON-COVERED ITEM OR SERVICE: HCPCS | Performed by: ANESTHESIOLOGY

## 2021-01-01 PROCEDURE — 700105 HCHG RX REV CODE 258: Performed by: ANESTHESIOLOGY

## 2021-01-01 PROCEDURE — 0W993ZZ DRAINAGE OF RIGHT PLEURAL CAVITY, PERCUTANEOUS APPROACH: ICD-10-PCS | Performed by: INTERNAL MEDICINE

## 2021-01-01 PROCEDURE — 160009 HCHG ANES TIME/MIN: Performed by: ORTHOPAEDIC SURGERY

## 2021-01-01 PROCEDURE — 84145 PROCALCITONIN (PCT): CPT

## 2021-01-01 PROCEDURE — 99233 SBSQ HOSP IP/OBS HIGH 50: CPT | Performed by: GENERAL PRACTICE

## 2021-01-01 PROCEDURE — 87641 MR-STAPH DNA AMP PROBE: CPT

## 2021-01-01 PROCEDURE — 32555 ASPIRATE PLEURA W/ IMAGING: CPT | Mod: RT | Performed by: INTERNAL MEDICINE

## 2021-01-01 PROCEDURE — 307059 PAD,EAR PROTECTOR: Performed by: INTERNAL MEDICINE

## 2021-01-01 PROCEDURE — 160036 HCHG PACU - EA ADDL 30 MINS PHASE I: Performed by: ORTHOPAEDIC SURGERY

## 2021-01-01 PROCEDURE — 160038 HCHG SURGERY MINUTES - EA ADDL 1 MIN LEVEL 2: Performed by: ORTHOPAEDIC SURGERY

## 2021-01-01 PROCEDURE — 99291 CRITICAL CARE FIRST HOUR: CPT | Mod: GC | Performed by: INTERNAL MEDICINE

## 2021-01-01 RX ORDER — ONDANSETRON 2 MG/ML
4 INJECTION INTRAMUSCULAR; INTRAVENOUS PRN
Status: ACTIVE | OUTPATIENT
Start: 2021-01-01 | End: 2021-01-01

## 2021-01-01 RX ORDER — OXYCODONE HYDROCHLORIDE 5 MG/1
5 TABLET ORAL
Status: DISCONTINUED | OUTPATIENT
Start: 2021-01-01 | End: 2021-01-01

## 2021-01-01 RX ORDER — GABAPENTIN 100 MG/1
100 CAPSULE ORAL 2 TIMES DAILY
COMMUNITY

## 2021-01-01 RX ORDER — ONDANSETRON 2 MG/ML
4 INJECTION INTRAMUSCULAR; INTRAVENOUS EVERY 4 HOURS PRN
Status: DISCONTINUED | OUTPATIENT
Start: 2021-01-01 | End: 2021-01-01 | Stop reason: HOSPADM

## 2021-01-01 RX ORDER — ONDANSETRON 4 MG/1
4 TABLET, ORALLY DISINTEGRATING ORAL EVERY 4 HOURS PRN
Status: DISCONTINUED | OUTPATIENT
Start: 2021-01-01 | End: 2021-01-01 | Stop reason: HOSPADM

## 2021-01-01 RX ORDER — FUROSEMIDE 10 MG/ML
60 INJECTION INTRAMUSCULAR; INTRAVENOUS
Status: DISCONTINUED | OUTPATIENT
Start: 2021-01-01 | End: 2021-01-01

## 2021-01-01 RX ORDER — PHENYLEPHRINE HCL IN 0.9% NACL 0.5 MG/5ML
400 SYRINGE (ML) INTRAVENOUS ONCE
Status: COMPLETED | OUTPATIENT
Start: 2021-01-01 | End: 2021-01-01

## 2021-01-01 RX ORDER — CALCIUM CARBONATE 500 MG/1
1500 TABLET, CHEWABLE ORAL EVERY 4 HOURS PRN
Status: DISCONTINUED | OUTPATIENT
Start: 2021-01-01 | End: 2021-01-01 | Stop reason: HOSPADM

## 2021-01-01 RX ORDER — ACETAMINOPHEN 325 MG/1
650 TABLET ORAL EVERY 6 HOURS PRN
Status: DISCONTINUED | OUTPATIENT
Start: 2021-01-01 | End: 2021-01-01 | Stop reason: HOSPADM

## 2021-01-01 RX ORDER — FERROUS SULFATE 325(65) MG
325 TABLET ORAL 2 TIMES DAILY
COMMUNITY

## 2021-01-01 RX ORDER — MULTIVIT WITH MINERALS/LUTEIN
1000 TABLET ORAL DAILY
COMMUNITY

## 2021-01-01 RX ORDER — LORAZEPAM 2 MG/ML
1 INJECTION INTRAMUSCULAR
Status: DISCONTINUED | OUTPATIENT
Start: 2021-01-01 | End: 2021-08-18 | Stop reason: HOSPADM

## 2021-01-01 RX ORDER — ACETAMINOPHEN 325 MG/1
650 TABLET ORAL EVERY 6 HOURS PRN
Status: DISCONTINUED | OUTPATIENT
Start: 2021-01-01 | End: 2021-01-01

## 2021-01-01 RX ORDER — DILTIAZEM HYDROCHLORIDE 5 MG/ML
20 INJECTION INTRAVENOUS ONCE
Status: COMPLETED | OUTPATIENT
Start: 2021-01-01 | End: 2021-01-01

## 2021-01-01 RX ORDER — PREDNISONE 10 MG/1
10-40 TABLET ORAL DAILY
Status: ON HOLD | COMMUNITY
Start: 2021-01-01 | End: 2021-01-01

## 2021-01-01 RX ORDER — ATROPINE SULFATE 10 MG/ML
2 SOLUTION/ DROPS OPHTHALMIC EVERY 4 HOURS PRN
Status: DISCONTINUED | OUTPATIENT
Start: 2021-01-01 | End: 2021-08-18 | Stop reason: HOSPADM

## 2021-01-01 RX ORDER — SODIUM CHLORIDE 9 MG/ML
INJECTION, SOLUTION INTRAVENOUS CONTINUOUS
Status: DISCONTINUED | OUTPATIENT
Start: 2021-01-01 | End: 2021-01-01

## 2021-01-01 RX ORDER — NOREPINEPHRINE BITARTRATE 0.03 MG/ML
0-30 INJECTION, SOLUTION INTRAVENOUS CONTINUOUS
Status: DISCONTINUED | OUTPATIENT
Start: 2021-01-01 | End: 2021-01-01

## 2021-01-01 RX ORDER — DILTIAZEM HYDROCHLORIDE 60 MG/1
60 TABLET, FILM COATED ORAL EVERY 6 HOURS
Qty: 120 TABLET | Status: SHIPPED
Start: 2021-01-01

## 2021-01-01 RX ORDER — FUROSEMIDE 40 MG/1
40 TABLET ORAL ONCE
COMMUNITY
Start: 2021-01-01

## 2021-01-01 RX ORDER — ACETAMINOPHEN 325 MG/1
650 TABLET ORAL EVERY 4 HOURS PRN
Status: DISCONTINUED | OUTPATIENT
Start: 2021-01-01 | End: 2021-01-01 | Stop reason: HOSPADM

## 2021-01-01 RX ORDER — ALBUTEROL SULFATE 90 UG/1
2 AEROSOL, METERED RESPIRATORY (INHALATION) EVERY 6 HOURS PRN
Status: DISCONTINUED | OUTPATIENT
Start: 2021-01-01 | End: 2021-01-01

## 2021-01-01 RX ORDER — ALBUTEROL SULFATE 90 UG/1
2 AEROSOL, METERED RESPIRATORY (INHALATION) EVERY 6 HOURS PRN
COMMUNITY

## 2021-01-01 RX ORDER — SODIUM CHLORIDE 9 MG/ML
500 INJECTION, SOLUTION INTRAVENOUS
Status: ACTIVE | OUTPATIENT
Start: 2021-01-01 | End: 2021-01-01

## 2021-01-01 RX ORDER — CEFAZOLIN SODIUM 1 G/3ML
INJECTION, POWDER, FOR SOLUTION INTRAMUSCULAR; INTRAVENOUS PRN
Status: DISCONTINUED | OUTPATIENT
Start: 2021-01-01 | End: 2021-01-01 | Stop reason: SURG

## 2021-01-01 RX ORDER — MIDAZOLAM HYDROCHLORIDE 1 MG/ML
INJECTION INTRAMUSCULAR; INTRAVENOUS
Status: COMPLETED
Start: 2021-01-01 | End: 2021-01-01

## 2021-01-01 RX ORDER — PROPOFOL 10 MG/ML
60-200 INJECTION, EMULSION INTRAVENOUS ONCE
Status: ACTIVE | OUTPATIENT
Start: 2021-01-01 | End: 2021-01-01

## 2021-01-01 RX ORDER — FAMOTIDINE 20 MG/1
20 TABLET, FILM COATED ORAL EVERY 12 HOURS
Status: DISCONTINUED | OUTPATIENT
Start: 2021-01-01 | End: 2021-01-01

## 2021-01-01 RX ORDER — LEVETIRACETAM 10 MG/ML
1000 INJECTION INTRAVASCULAR ONCE
Status: COMPLETED | OUTPATIENT
Start: 2021-01-01 | End: 2021-01-01

## 2021-01-01 RX ORDER — DILTIAZEM HYDROCHLORIDE 60 MG/1
60 TABLET, FILM COATED ORAL EVERY 6 HOURS
Status: DISCONTINUED | OUTPATIENT
Start: 2021-01-01 | End: 2021-01-01

## 2021-01-01 RX ORDER — DIPHENHYDRAMINE HYDROCHLORIDE 50 MG/ML
12.5 INJECTION INTRAMUSCULAR; INTRAVENOUS
Status: DISCONTINUED | OUTPATIENT
Start: 2021-01-01 | End: 2021-01-01 | Stop reason: HOSPADM

## 2021-01-01 RX ORDER — OXYCODONE HYDROCHLORIDE 5 MG/1
2.5 TABLET ORAL
Status: DISCONTINUED | OUTPATIENT
Start: 2021-01-01 | End: 2021-01-01 | Stop reason: HOSPADM

## 2021-01-01 RX ORDER — CARVEDILOL 6.25 MG/1
6.25 TABLET ORAL 2 TIMES DAILY WITH MEALS
Status: DISCONTINUED | OUTPATIENT
Start: 2021-01-01 | End: 2021-01-01

## 2021-01-01 RX ORDER — HALOPERIDOL 5 MG/ML
1 INJECTION INTRAMUSCULAR
Status: DISCONTINUED | OUTPATIENT
Start: 2021-01-01 | End: 2021-01-01 | Stop reason: HOSPADM

## 2021-01-01 RX ORDER — POLYETHYLENE GLYCOL 3350 17 G/17G
1 POWDER, FOR SOLUTION ORAL
Status: DISCONTINUED | OUTPATIENT
Start: 2021-01-01 | End: 2021-01-01

## 2021-01-01 RX ORDER — DEXTROSE MONOHYDRATE 25 G/50ML
50 INJECTION, SOLUTION INTRAVENOUS
Status: DISCONTINUED | OUTPATIENT
Start: 2021-01-01 | End: 2021-01-01 | Stop reason: HOSPADM

## 2021-01-01 RX ORDER — SULFAMETHOXAZOLE AND TRIMETHOPRIM 800; 160 MG/1; MG/1
2 TABLET ORAL EVERY 12 HOURS
Status: DISCONTINUED | OUTPATIENT
Start: 2021-01-01 | End: 2021-01-01 | Stop reason: HOSPADM

## 2021-01-01 RX ORDER — OXYCODONE HCL 5 MG/5 ML
5 SOLUTION, ORAL ORAL
Status: COMPLETED | OUTPATIENT
Start: 2021-01-01 | End: 2021-01-01

## 2021-01-01 RX ORDER — ATORVASTATIN CALCIUM 40 MG/1
80 TABLET, FILM COATED ORAL
Status: DISCONTINUED | OUTPATIENT
Start: 2021-01-01 | End: 2021-01-01 | Stop reason: HOSPADM

## 2021-01-01 RX ORDER — BISACODYL 10 MG
10 SUPPOSITORY, RECTAL RECTAL
Status: DISCONTINUED | OUTPATIENT
Start: 2021-01-01 | End: 2021-01-01 | Stop reason: HOSPADM

## 2021-01-01 RX ORDER — LORAZEPAM 2 MG/ML
2 INJECTION INTRAMUSCULAR ONCE
Status: COMPLETED | OUTPATIENT
Start: 2021-01-01 | End: 2021-01-01

## 2021-01-01 RX ORDER — FERROUS SULFATE 325(65) MG
325 TABLET ORAL
Status: DISCONTINUED | OUTPATIENT
Start: 2021-01-01 | End: 2021-01-01 | Stop reason: HOSPADM

## 2021-01-01 RX ORDER — ETOMIDATE 2 MG/ML
20 INJECTION INTRAVENOUS ONCE
Status: COMPLETED | OUTPATIENT
Start: 2021-01-01 | End: 2021-01-01

## 2021-01-01 RX ORDER — CALCIUM CARBONATE 750 MG/1
1500 TABLET ORAL EVERY 4 HOURS PRN
COMMUNITY
End: 2021-01-01

## 2021-01-01 RX ORDER — ROCURONIUM BROMIDE 10 MG/ML
50 INJECTION, SOLUTION INTRAVENOUS ONCE
Status: COMPLETED | OUTPATIENT
Start: 2021-01-01 | End: 2021-01-01

## 2021-01-01 RX ORDER — GABAPENTIN 100 MG/1
100 CAPSULE ORAL 2 TIMES DAILY
Status: DISCONTINUED | OUTPATIENT
Start: 2021-01-01 | End: 2021-01-01 | Stop reason: HOSPADM

## 2021-01-01 RX ORDER — NOREPINEPHRINE BITARTRATE 0.03 MG/ML
INJECTION, SOLUTION INTRAVENOUS
Status: COMPLETED
Start: 2021-01-01 | End: 2021-01-01

## 2021-01-01 RX ORDER — FLUTICASONE PROPIONATE 50 MCG
2 SPRAY, SUSPENSION (ML) NASAL DAILY
Status: DISCONTINUED | OUTPATIENT
Start: 2021-01-01 | End: 2021-01-01

## 2021-01-01 RX ORDER — CEFTRIAXONE 2 G/1
2 INJECTION, POWDER, FOR SOLUTION INTRAMUSCULAR; INTRAVENOUS DAILY
COMMUNITY
Start: 2021-01-01 | End: 2021-01-01

## 2021-01-01 RX ORDER — OXYCODONE HYDROCHLORIDE 5 MG/1
5 TABLET ORAL EVERY 6 HOURS PRN
Qty: 12 TABLET | Refills: 0 | Status: SHIPPED | OUTPATIENT
Start: 2021-01-01 | End: 2021-01-01

## 2021-01-01 RX ORDER — LIDOCAINE HYDROCHLORIDE 20 MG/ML
INJECTION, SOLUTION EPIDURAL; INFILTRATION; INTRACAUDAL; PERINEURAL PRN
Status: DISCONTINUED | OUTPATIENT
Start: 2021-01-01 | End: 2021-01-01 | Stop reason: SURG

## 2021-01-01 RX ORDER — CEFAZOLIN SODIUM 2 G/100ML
2 INJECTION, SOLUTION INTRAVENOUS EVERY 8 HOURS
Qty: 3000 ML | Refills: 0 | Status: ON HOLD
Start: 2021-01-01 | End: 2021-01-01

## 2021-01-01 RX ORDER — HEPARIN SODIUM 5000 [USP'U]/ML
5000 INJECTION, SOLUTION INTRAVENOUS; SUBCUTANEOUS EVERY 8 HOURS
Status: DISCONTINUED | OUTPATIENT
Start: 2021-01-01 | End: 2021-01-01

## 2021-01-01 RX ORDER — HYDROMORPHONE HYDROCHLORIDE 1 MG/ML
0.25 INJECTION, SOLUTION INTRAMUSCULAR; INTRAVENOUS; SUBCUTANEOUS
Status: DISCONTINUED | OUTPATIENT
Start: 2021-01-01 | End: 2021-01-01

## 2021-01-01 RX ORDER — DILTIAZEM HYDROCHLORIDE 5 MG/ML
10 INJECTION INTRAVENOUS ONCE
Status: COMPLETED | OUTPATIENT
Start: 2021-01-01 | End: 2021-01-01

## 2021-01-01 RX ORDER — POTASSIUM CHLORIDE 20 MEQ/1
20 TABLET, EXTENDED RELEASE ORAL ONCE
COMMUNITY
Start: 2021-01-01

## 2021-01-01 RX ORDER — MONTELUKAST SODIUM 10 MG/1
10 TABLET ORAL DAILY
Status: DISCONTINUED | OUTPATIENT
Start: 2021-01-01 | End: 2021-01-01

## 2021-01-01 RX ORDER — OXYCODONE HYDROCHLORIDE 5 MG/1
5 TABLET ORAL
Status: ACTIVE | OUTPATIENT
Start: 2021-01-01 | End: 2021-01-01

## 2021-01-01 RX ORDER — MORPHINE SULFATE 4 MG/ML
2 INJECTION, SOLUTION INTRAMUSCULAR; INTRAVENOUS
Status: DISCONTINUED | OUTPATIENT
Start: 2021-01-01 | End: 2021-01-01 | Stop reason: HOSPADM

## 2021-01-01 RX ORDER — AMOXICILLIN 250 MG
2 CAPSULE ORAL 2 TIMES DAILY
Status: DISCONTINUED | OUTPATIENT
Start: 2021-01-01 | End: 2021-01-01 | Stop reason: HOSPADM

## 2021-01-01 RX ORDER — LORAZEPAM 2 MG/ML
1 CONCENTRATE ORAL
Status: DISCONTINUED | OUTPATIENT
Start: 2021-01-01 | End: 2021-08-18 | Stop reason: HOSPADM

## 2021-01-01 RX ORDER — SODIUM CHLORIDE, SODIUM LACTATE, POTASSIUM CHLORIDE, CALCIUM CHLORIDE 600; 310; 30; 20 MG/100ML; MG/100ML; MG/100ML; MG/100ML
INJECTION, SOLUTION INTRAVENOUS
Status: DISCONTINUED | OUTPATIENT
Start: 2021-01-01 | End: 2021-01-01 | Stop reason: SURG

## 2021-01-01 RX ORDER — HYDROMORPHONE HYDROCHLORIDE 2 MG/ML
4 INJECTION, SOLUTION INTRAMUSCULAR; INTRAVENOUS; SUBCUTANEOUS
Status: DISCONTINUED | OUTPATIENT
Start: 2021-01-01 | End: 2021-08-18 | Stop reason: HOSPADM

## 2021-01-01 RX ORDER — MIDAZOLAM HYDROCHLORIDE 1 MG/ML
.5-2 INJECTION INTRAMUSCULAR; INTRAVENOUS PRN
Status: ACTIVE | OUTPATIENT
Start: 2021-01-01 | End: 2021-01-01

## 2021-01-01 RX ORDER — CEFTRIAXONE 1 G/1
1 INJECTION, POWDER, FOR SOLUTION INTRAMUSCULAR; INTRAVENOUS DAILY
COMMUNITY
Start: 2021-01-01

## 2021-01-01 RX ORDER — TAMSULOSIN HYDROCHLORIDE 0.4 MG/1
0.4 CAPSULE ORAL
Status: DISCONTINUED | OUTPATIENT
Start: 2021-01-01 | End: 2021-01-01 | Stop reason: HOSPADM

## 2021-01-01 RX ORDER — MEPERIDINE HYDROCHLORIDE 25 MG/ML
12.5 INJECTION INTRAMUSCULAR; INTRAVENOUS; SUBCUTANEOUS
Status: DISCONTINUED | OUTPATIENT
Start: 2021-01-01 | End: 2021-01-01 | Stop reason: HOSPADM

## 2021-01-01 RX ORDER — ASCORBIC ACID 500 MG
1000 TABLET ORAL DAILY
Status: DISCONTINUED | OUTPATIENT
Start: 2021-01-01 | End: 2021-01-01 | Stop reason: HOSPADM

## 2021-01-01 RX ORDER — DIPHENHYDRAMINE HCL 25 MG
25 TABLET ORAL EVERY 6 HOURS PRN
Status: DISCONTINUED | OUTPATIENT
Start: 2021-01-01 | End: 2021-01-01 | Stop reason: HOSPADM

## 2021-01-01 RX ORDER — DILTIAZEM HYDROCHLORIDE 5 MG/ML
INJECTION INTRAVENOUS
Status: COMPLETED
Start: 2021-01-01 | End: 2021-01-01

## 2021-01-01 RX ORDER — OXYCODONE HCL 5 MG/5 ML
10 SOLUTION, ORAL ORAL
Status: COMPLETED | OUTPATIENT
Start: 2021-01-01 | End: 2021-01-01

## 2021-01-01 RX ORDER — CEFAZOLIN SODIUM 2 G/100ML
2 INJECTION, SOLUTION INTRAVENOUS EVERY 8 HOURS
Status: DISCONTINUED | OUTPATIENT
Start: 2021-01-01 | End: 2021-01-01 | Stop reason: HOSPADM

## 2021-01-01 RX ORDER — TAMSULOSIN HYDROCHLORIDE 0.4 MG/1
0.4 CAPSULE ORAL
Status: DISCONTINUED | OUTPATIENT
Start: 2021-01-01 | End: 2021-01-01

## 2021-01-01 RX ORDER — OXYCODONE HYDROCHLORIDE 5 MG/1
2.5 TABLET ORAL
Status: DISCONTINUED | OUTPATIENT
Start: 2021-01-01 | End: 2021-01-01

## 2021-01-01 RX ORDER — MIDAZOLAM HYDROCHLORIDE 1 MG/ML
1-2 INJECTION INTRAMUSCULAR; INTRAVENOUS
Status: DISCONTINUED | OUTPATIENT
Start: 2021-01-01 | End: 2021-01-01

## 2021-01-01 RX ORDER — MORPHINE SULFATE 10 MG/ML
5 INJECTION, SOLUTION INTRAMUSCULAR; INTRAVENOUS
Status: DISCONTINUED | OUTPATIENT
Start: 2021-01-01 | End: 2021-08-18 | Stop reason: HOSPADM

## 2021-01-01 RX ORDER — CALCIUM CARBONATE 500 MG/1
500 TABLET, CHEWABLE ORAL 2 TIMES DAILY
Status: DISCONTINUED | OUTPATIENT
Start: 2021-01-01 | End: 2021-01-01 | Stop reason: HOSPADM

## 2021-01-01 RX ORDER — OXYCODONE HYDROCHLORIDE 5 MG/1
5 TABLET ORAL
Status: DISCONTINUED | OUTPATIENT
Start: 2021-01-01 | End: 2021-01-01 | Stop reason: HOSPADM

## 2021-01-01 RX ORDER — BISACODYL 10 MG
10 SUPPOSITORY, RECTAL RECTAL
Status: DISCONTINUED | OUTPATIENT
Start: 2021-01-01 | End: 2021-01-01

## 2021-01-01 RX ORDER — MONTELUKAST SODIUM 10 MG/1
10 TABLET ORAL DAILY
Status: DISCONTINUED | OUTPATIENT
Start: 2021-01-01 | End: 2021-01-01 | Stop reason: HOSPADM

## 2021-01-01 RX ORDER — ALBUTEROL SULFATE 90 UG/1
2 AEROSOL, METERED RESPIRATORY (INHALATION) EVERY 6 HOURS PRN
Status: DISCONTINUED | OUTPATIENT
Start: 2021-01-01 | End: 2021-01-01 | Stop reason: HOSPADM

## 2021-01-01 RX ORDER — DEXTROSE MONOHYDRATE 25 G/50ML
50 INJECTION, SOLUTION INTRAVENOUS ONCE
Status: COMPLETED | OUTPATIENT
Start: 2021-01-01 | End: 2021-01-01

## 2021-01-01 RX ORDER — ACETAMINOPHEN 325 MG/1
650 TABLET ORAL EVERY 4 HOURS PRN
COMMUNITY
End: 2021-01-01

## 2021-01-01 RX ORDER — MIDAZOLAM HYDROCHLORIDE 1 MG/ML
1 INJECTION INTRAMUSCULAR; INTRAVENOUS
Status: DISCONTINUED | OUTPATIENT
Start: 2021-01-01 | End: 2021-01-01 | Stop reason: HOSPADM

## 2021-01-01 RX ORDER — POLYETHYLENE GLYCOL 3350 17 G/17G
1 POWDER, FOR SOLUTION ORAL
Status: DISCONTINUED | OUTPATIENT
Start: 2021-01-01 | End: 2021-01-01 | Stop reason: HOSPADM

## 2021-01-01 RX ORDER — DILTIAZEM HYDROCHLORIDE 60 MG/1
60 TABLET, FILM COATED ORAL EVERY 6 HOURS
Status: DISCONTINUED | OUTPATIENT
Start: 2021-01-01 | End: 2021-01-01 | Stop reason: HOSPADM

## 2021-01-01 RX ORDER — ENALAPRILAT 1.25 MG/ML
1.25 INJECTION INTRAVENOUS EVERY 6 HOURS PRN
Status: DISCONTINUED | OUTPATIENT
Start: 2021-01-01 | End: 2021-01-01

## 2021-01-01 RX ORDER — CARVEDILOL 3.12 MG/1
3.12 TABLET ORAL 2 TIMES DAILY WITH MEALS
Status: ON HOLD | COMMUNITY
End: 2021-01-01

## 2021-01-01 RX ORDER — METHYLPREDNISOLONE SODIUM SUCCINATE 125 MG/2ML
125 INJECTION, POWDER, LYOPHILIZED, FOR SOLUTION INTRAMUSCULAR; INTRAVENOUS ONCE
COMMUNITY
Start: 2021-01-01

## 2021-01-01 RX ORDER — CEFAZOLIN SODIUM 2 G/100ML
2 INJECTION, SOLUTION INTRAVENOUS EVERY 8 HOURS
Status: DISCONTINUED | OUTPATIENT
Start: 2021-01-01 | End: 2021-01-01

## 2021-01-01 RX ORDER — LABETALOL HYDROCHLORIDE 5 MG/ML
10 INJECTION, SOLUTION INTRAVENOUS EVERY 4 HOURS PRN
Status: DISCONTINUED | OUTPATIENT
Start: 2021-01-01 | End: 2021-01-01

## 2021-01-01 RX ORDER — SODIUM CHLORIDE, SODIUM LACTATE, POTASSIUM CHLORIDE, CALCIUM CHLORIDE 600; 310; 30; 20 MG/100ML; MG/100ML; MG/100ML; MG/100ML
INJECTION, SOLUTION INTRAVENOUS CONTINUOUS
Status: DISCONTINUED | OUTPATIENT
Start: 2021-01-01 | End: 2021-01-01 | Stop reason: HOSPADM

## 2021-01-01 RX ORDER — AMOXICILLIN 250 MG
2 CAPSULE ORAL 2 TIMES DAILY
Status: DISCONTINUED | OUTPATIENT
Start: 2021-01-01 | End: 2021-01-01

## 2021-01-01 RX ORDER — TAMSULOSIN HYDROCHLORIDE 0.4 MG/1
0.4 CAPSULE ORAL
COMMUNITY

## 2021-01-01 RX ORDER — DEXMEDETOMIDINE HYDROCHLORIDE 4 UG/ML
0-1.5 INJECTION, SOLUTION INTRAVENOUS CONTINUOUS
Status: DISCONTINUED | OUTPATIENT
Start: 2021-01-01 | End: 2021-01-01

## 2021-01-01 RX ORDER — OXYCODONE HYDROCHLORIDE 10 MG/1
10 TABLET ORAL
Status: ACTIVE | OUTPATIENT
Start: 2021-01-01 | End: 2021-01-01

## 2021-01-01 RX ORDER — LEVETIRACETAM 10 MG/ML
1000 INJECTION INTRAVASCULAR EVERY 12 HOURS
Status: DISCONTINUED | OUTPATIENT
Start: 2021-01-01 | End: 2021-01-01

## 2021-01-01 RX ORDER — VITAMIN B COMPLEX
5000 TABLET ORAL
Status: DISCONTINUED | OUTPATIENT
Start: 2021-01-01 | End: 2021-01-01 | Stop reason: HOSPADM

## 2021-01-01 RX ORDER — ONDANSETRON 2 MG/ML
4 INJECTION INTRAMUSCULAR; INTRAVENOUS
Status: DISCONTINUED | OUTPATIENT
Start: 2021-01-01 | End: 2021-01-01 | Stop reason: HOSPADM

## 2021-01-01 RX ORDER — MONTELUKAST SODIUM 10 MG/1
10 TABLET ORAL DAILY
Qty: 90 TABLET | Refills: 0 | Status: SHIPPED | OUTPATIENT
Start: 2021-01-01

## 2021-01-01 RX ORDER — FUROSEMIDE 10 MG/ML
40 INJECTION INTRAMUSCULAR; INTRAVENOUS ONCE
Status: COMPLETED | OUTPATIENT
Start: 2021-01-01 | End: 2021-01-01

## 2021-01-01 RX ADMIN — AMPICILLIN SODIUM 2000 MG: 2 INJECTION, POWDER, FOR SOLUTION INTRAMUSCULAR; INTRAVENOUS at 19:12

## 2021-01-01 RX ADMIN — Medication 400 MG: at 05:47

## 2021-01-01 RX ADMIN — FERROUS SULFATE TAB 325 MG (65 MG ELEMENTAL FE) 325 MG: 325 (65 FE) TAB at 08:35

## 2021-01-01 RX ADMIN — DOCUSATE SODIUM 50 MG AND SENNOSIDES 8.6 MG 2 TABLET: 8.6; 5 TABLET, FILM COATED ORAL at 04:54

## 2021-01-01 RX ADMIN — LEVETIRACETAM INJECTION 1000 MG: 10 INJECTION INTRAVENOUS at 10:45

## 2021-01-01 RX ADMIN — ATORVASTATIN CALCIUM 80 MG: 40 TABLET, FILM COATED ORAL at 21:06

## 2021-01-01 RX ADMIN — SULFAMETHOXAZOLE AND TRIMETHOPRIM 2 TABLET: 800; 160 TABLET ORAL at 18:09

## 2021-01-01 RX ADMIN — CEFAZOLIN SODIUM 2 G: 2 INJECTION, SOLUTION INTRAVENOUS at 00:51

## 2021-01-01 RX ADMIN — MONTELUKAST 10 MG: 10 TABLET, FILM COATED ORAL at 05:06

## 2021-01-01 RX ADMIN — DILTIAZEM HYDROCHLORIDE 60 MG: 60 TABLET, FILM COATED ORAL at 12:57

## 2021-01-01 RX ADMIN — ANTACID TABLETS 500 MG: 500 TABLET, CHEWABLE ORAL at 17:57

## 2021-01-01 RX ADMIN — GABAPENTIN 100 MG: 100 CAPSULE ORAL at 05:18

## 2021-01-01 RX ADMIN — APIXABAN 5 MG: 5 TABLET, FILM COATED ORAL at 17:57

## 2021-01-01 RX ADMIN — FLUTICASONE PROPIONATE 100 MCG: 50 SPRAY, METERED NASAL at 10:32

## 2021-01-01 RX ADMIN — Medication 400 MG: at 17:57

## 2021-01-01 RX ADMIN — Medication 400 MG: at 17:26

## 2021-01-01 RX ADMIN — DOCUSATE SODIUM 50 MG AND SENNOSIDES 8.6 MG 2 TABLET: 8.6; 5 TABLET, FILM COATED ORAL at 17:01

## 2021-01-01 RX ADMIN — GABAPENTIN 100 MG: 100 CAPSULE ORAL at 17:26

## 2021-01-01 RX ADMIN — GABAPENTIN 100 MG: 100 CAPSULE ORAL at 17:24

## 2021-01-01 RX ADMIN — DOCUSATE SODIUM 50 MG AND SENNOSIDES 8.6 MG 2 TABLET: 8.6; 5 TABLET, FILM COATED ORAL at 04:17

## 2021-01-01 RX ADMIN — Medication 400 MG: at 18:27

## 2021-01-01 RX ADMIN — HEPARIN SODIUM 5000 UNITS: 5000 INJECTION, SOLUTION INTRAVENOUS; SUBCUTANEOUS at 20:43

## 2021-01-01 RX ADMIN — Medication 400 MG: at 18:44

## 2021-01-01 RX ADMIN — FENTANYL CITRATE 100 MCG: 50 INJECTION INTRAMUSCULAR; INTRAVENOUS at 23:33

## 2021-01-01 RX ADMIN — CEFAZOLIN SODIUM 2 G: 2 INJECTION, SOLUTION INTRAVENOUS at 10:45

## 2021-01-01 RX ADMIN — SULFAMETHOXAZOLE AND TRIMETHOPRIM 2 TABLET: 800; 160 TABLET ORAL at 06:00

## 2021-01-01 RX ADMIN — CEFAZOLIN 2.5 G: 330 INJECTION, POWDER, FOR SOLUTION INTRAMUSCULAR; INTRAVENOUS at 18:51

## 2021-01-01 RX ADMIN — MONTELUKAST 10 MG: 10 TABLET, FILM COATED ORAL at 04:35

## 2021-01-01 RX ADMIN — MAGNESIUM OXIDE TAB 400 MG (241.3 MG ELEMENTAL MG) 400 MG: 400 (241.3 MG) TAB at 17:23

## 2021-01-01 RX ADMIN — Medication 400 MCG: at 17:24

## 2021-01-01 RX ADMIN — ANTACID TABLETS 500 MG: 500 TABLET, CHEWABLE ORAL at 05:06

## 2021-01-01 RX ADMIN — VANCOMYCIN HYDROCHLORIDE 750 MG: 500 INJECTION, POWDER, LYOPHILIZED, FOR SOLUTION INTRAVENOUS at 02:22

## 2021-01-01 RX ADMIN — APIXABAN 5 MG: 5 TABLET, FILM COATED ORAL at 18:09

## 2021-01-01 RX ADMIN — SULFAMETHOXAZOLE AND TRIMETHOPRIM 2 TABLET: 800; 160 TABLET ORAL at 04:16

## 2021-01-01 RX ADMIN — DOCUSATE SODIUM 50 MG AND SENNOSIDES 8.6 MG 2 TABLET: 8.6; 5 TABLET, FILM COATED ORAL at 04:35

## 2021-01-01 RX ADMIN — Medication 400 MG: at 18:17

## 2021-01-01 RX ADMIN — GABAPENTIN 100 MG: 100 CAPSULE ORAL at 06:00

## 2021-01-01 RX ADMIN — APIXABAN 5 MG: 5 TABLET, FILM COATED ORAL at 18:35

## 2021-01-01 RX ADMIN — FENTANYL CITRATE 50 MCG: 50 INJECTION, SOLUTION INTRAMUSCULAR; INTRAVENOUS at 19:53

## 2021-01-01 RX ADMIN — CEFAZOLIN SODIUM 2 G: 2 INJECTION, SOLUTION INTRAVENOUS at 02:14

## 2021-01-01 RX ADMIN — TAMSULOSIN HYDROCHLORIDE 0.4 MG: 0.4 CAPSULE ORAL at 22:12

## 2021-01-01 RX ADMIN — ANTACID TABLETS 500 MG: 500 TABLET, CHEWABLE ORAL at 18:29

## 2021-01-01 RX ADMIN — VANCOMYCIN HYDROCHLORIDE 2250 MG: 500 INJECTION, POWDER, LYOPHILIZED, FOR SOLUTION INTRAVENOUS at 18:59

## 2021-01-01 RX ADMIN — FERROUS SULFATE TAB 325 MG (65 MG ELEMENTAL FE) 325 MG: 325 (65 FE) TAB at 08:25

## 2021-01-01 RX ADMIN — VANCOMYCIN HYDROCHLORIDE 1000 MG: 500 INJECTION, POWDER, LYOPHILIZED, FOR SOLUTION INTRAVENOUS at 21:59

## 2021-01-01 RX ADMIN — ANTACID TABLETS 500 MG: 500 TABLET, CHEWABLE ORAL at 04:45

## 2021-01-01 RX ADMIN — NOREPINEPHRINE BITARTRATE 30 MCG/MIN: 0.03 INJECTION, SOLUTION INTRAVENOUS at 17:27

## 2021-01-01 RX ADMIN — DOCUSATE SODIUM 50 MG AND SENNOSIDES 8.6 MG 2 TABLET: 8.6; 5 TABLET, FILM COATED ORAL at 17:48

## 2021-01-01 RX ADMIN — HYDROMORPHONE HYDROCHLORIDE 0.25 MG: 1 INJECTION, SOLUTION INTRAMUSCULAR; INTRAVENOUS; SUBCUTANEOUS at 08:50

## 2021-01-01 RX ADMIN — ONDANSETRON 4 MG: 2 INJECTION INTRAMUSCULAR; INTRAVENOUS at 06:41

## 2021-01-01 RX ADMIN — TAMSULOSIN HYDROCHLORIDE 0.4 MG: 0.4 CAPSULE ORAL at 20:40

## 2021-01-01 RX ADMIN — Medication 400 MG: at 04:16

## 2021-01-01 RX ADMIN — GABAPENTIN 100 MG: 100 CAPSULE ORAL at 17:41

## 2021-01-01 RX ADMIN — OXYCODONE HYDROCHLORIDE AND ACETAMINOPHEN 1000 MG: 500 TABLET ORAL at 04:54

## 2021-01-01 RX ADMIN — VANCOMYCIN HYDROCHLORIDE 1250 MG: 500 INJECTION, POWDER, LYOPHILIZED, FOR SOLUTION INTRAVENOUS at 11:03

## 2021-01-01 RX ADMIN — APIXABAN 5 MG: 5 TABLET, FILM COATED ORAL at 06:19

## 2021-01-01 RX ADMIN — FERROUS SULFATE TAB 325 MG (65 MG ELEMENTAL FE) 325 MG: 325 (65 FE) TAB at 09:47

## 2021-01-01 RX ADMIN — APIXABAN 5 MG: 5 TABLET, FILM COATED ORAL at 07:51

## 2021-01-01 RX ADMIN — DOCUSATE SODIUM 50 MG AND SENNOSIDES 8.6 MG 2 TABLET: 8.6; 5 TABLET, FILM COATED ORAL at 18:35

## 2021-01-01 RX ADMIN — HEPARIN SODIUM 5000 UNITS: 5000 INJECTION, SOLUTION INTRAVENOUS; SUBCUTANEOUS at 13:20

## 2021-01-01 RX ADMIN — IOHEXOL 62 ML: 350 INJECTION, SOLUTION INTRAVENOUS at 17:00

## 2021-01-01 RX ADMIN — APIXABAN 5 MG: 5 TABLET, FILM COATED ORAL at 04:35

## 2021-01-01 RX ADMIN — DEXMEDETOMIDINE HYDROCHLORIDE 0.2 MCG/KG/HR: 100 INJECTION, SOLUTION INTRAVENOUS at 16:01

## 2021-01-01 RX ADMIN — SODIUM CHLORIDE: 9 INJECTION, SOLUTION INTRAVENOUS at 21:30

## 2021-01-01 RX ADMIN — FERROUS SULFATE TAB 325 MG (65 MG ELEMENTAL FE) 325 MG: 325 (65 FE) TAB at 08:07

## 2021-01-01 RX ADMIN — DOCUSATE SODIUM 50 MG AND SENNOSIDES 8.6 MG 2 TABLET: 8.6; 5 TABLET, FILM COATED ORAL at 17:43

## 2021-01-01 RX ADMIN — ACETAMINOPHEN 650 MG: 325 TABLET ORAL at 13:43

## 2021-01-01 RX ADMIN — APIXABAN 5 MG: 5 TABLET, FILM COATED ORAL at 04:45

## 2021-01-01 RX ADMIN — PIPERACILLIN AND TAZOBACTAM 4.5 G: 4; .5 INJECTION, POWDER, LYOPHILIZED, FOR SOLUTION INTRAVENOUS; PARENTERAL at 16:21

## 2021-01-01 RX ADMIN — CEFEPIME 2 G: 2 INJECTION, POWDER, FOR SOLUTION INTRAVENOUS at 11:39

## 2021-01-01 RX ADMIN — FAMOTIDINE 20 MG: 10 INJECTION INTRAVENOUS at 05:10

## 2021-01-01 RX ADMIN — SODIUM CHLORIDE: 9 INJECTION, SOLUTION INTRAVENOUS at 16:48

## 2021-01-01 RX ADMIN — MONTELUKAST 10 MG: 10 TABLET, FILM COATED ORAL at 05:49

## 2021-01-01 RX ADMIN — TAMSULOSIN HYDROCHLORIDE 0.4 MG: 0.4 CAPSULE ORAL at 20:08

## 2021-01-01 RX ADMIN — Medication 400 MG: at 04:35

## 2021-01-01 RX ADMIN — FENTANYL CITRATE 25 MCG: 50 INJECTION, SOLUTION INTRAMUSCULAR; INTRAVENOUS at 09:22

## 2021-01-01 RX ADMIN — GABAPENTIN 100 MG: 100 CAPSULE ORAL at 05:36

## 2021-01-01 RX ADMIN — TAMSULOSIN HYDROCHLORIDE 0.4 MG: 0.4 CAPSULE ORAL at 20:28

## 2021-01-01 RX ADMIN — LORAZEPAM 2 MG: 2 INJECTION INTRAMUSCULAR; INTRAVENOUS at 10:26

## 2021-01-01 RX ADMIN — GABAPENTIN 100 MG: 100 CAPSULE ORAL at 18:28

## 2021-01-01 RX ADMIN — PROPOFOL 120 MG: 10 INJECTION, EMULSION INTRAVENOUS at 18:49

## 2021-01-01 RX ADMIN — GABAPENTIN 100 MG: 100 CAPSULE ORAL at 17:02

## 2021-01-01 RX ADMIN — FENTANYL CITRATE 50 MCG: 50 INJECTION, SOLUTION INTRAMUSCULAR; INTRAVENOUS at 19:33

## 2021-01-01 RX ADMIN — MONTELUKAST 10 MG: 10 TABLET, FILM COATED ORAL at 22:19

## 2021-01-01 RX ADMIN — CEFAZOLIN SODIUM 2 G: 2 INJECTION, SOLUTION INTRAVENOUS at 11:49

## 2021-01-01 RX ADMIN — DOCUSATE SODIUM 50 MG AND SENNOSIDES 8.6 MG 2 TABLET: 8.6; 5 TABLET, FILM COATED ORAL at 17:23

## 2021-01-01 RX ADMIN — APIXABAN 5 MG: 5 TABLET, FILM COATED ORAL at 06:20

## 2021-01-01 RX ADMIN — GABAPENTIN 100 MG: 100 CAPSULE ORAL at 17:59

## 2021-01-01 RX ADMIN — DOCUSATE SODIUM 50 MG AND SENNOSIDES 8.6 MG 2 TABLET: 8.6; 5 TABLET, FILM COATED ORAL at 17:47

## 2021-01-01 RX ADMIN — CEFAZOLIN SODIUM 2 G: 2 INJECTION, SOLUTION INTRAVENOUS at 17:48

## 2021-01-01 RX ADMIN — AMPICILLIN SODIUM 2000 MG: 2 INJECTION, POWDER, FOR SOLUTION INTRAMUSCULAR; INTRAVENOUS at 01:38

## 2021-01-01 RX ADMIN — AMPICILLIN SODIUM 2000 MG: 2 INJECTION, POWDER, FOR SOLUTION INTRAMUSCULAR; INTRAVENOUS at 05:10

## 2021-01-01 RX ADMIN — DOCUSATE SODIUM 50 MG AND SENNOSIDES 8.6 MG 2 TABLET: 8.6; 5 TABLET, FILM COATED ORAL at 18:28

## 2021-01-01 RX ADMIN — APIXABAN 5 MG: 5 TABLET, FILM COATED ORAL at 17:31

## 2021-01-01 RX ADMIN — DILTIAZEM HYDROCHLORIDE 60 MG: 60 TABLET, FILM COATED ORAL at 00:00

## 2021-01-01 RX ADMIN — TAMSULOSIN HYDROCHLORIDE 0.4 MG: 0.4 CAPSULE ORAL at 22:19

## 2021-01-01 RX ADMIN — ANTACID TABLETS 500 MG: 500 TABLET, CHEWABLE ORAL at 17:26

## 2021-01-01 RX ADMIN — VANCOMYCIN HYDROCHLORIDE 1000 MG: 500 INJECTION, POWDER, LYOPHILIZED, FOR SOLUTION INTRAVENOUS at 23:10

## 2021-01-01 RX ADMIN — DILTIAZEM HYDROCHLORIDE 60 MG: 60 TABLET, FILM COATED ORAL at 03:00

## 2021-01-01 RX ADMIN — TAMSULOSIN HYDROCHLORIDE 0.4 MG: 0.4 CAPSULE ORAL at 20:14

## 2021-01-01 RX ADMIN — VANCOMYCIN HYDROCHLORIDE 750 MG: 500 INJECTION, POWDER, LYOPHILIZED, FOR SOLUTION INTRAVENOUS at 14:02

## 2021-01-01 RX ADMIN — Medication 400 MG: at 05:18

## 2021-01-01 RX ADMIN — GABAPENTIN 100 MG: 100 CAPSULE ORAL at 17:03

## 2021-01-01 RX ADMIN — CEFAZOLIN SODIUM 2 G: 2 INJECTION, SOLUTION INTRAVENOUS at 22:20

## 2021-01-01 RX ADMIN — PIPERACILLIN AND TAZOBACTAM 4.5 G: 4; .5 INJECTION, POWDER, LYOPHILIZED, FOR SOLUTION INTRAVENOUS; PARENTERAL at 13:20

## 2021-01-01 RX ADMIN — MAGNESIUM OXIDE TAB 400 MG (241.3 MG ELEMENTAL MG) 400 MG: 400 (241.3 MG) TAB at 05:49

## 2021-01-01 RX ADMIN — DAPTOMYCIN 690 MG: 350 INJECTION, POWDER, LYOPHILIZED, FOR SOLUTION INTRAVENOUS at 11:03

## 2021-01-01 RX ADMIN — POLYETHYLENE GLYCOL 3350 1 PACKET: 17 POWDER, FOR SOLUTION ORAL at 17:59

## 2021-01-01 RX ADMIN — CEFTRIAXONE SODIUM 2 G: 2 INJECTION, POWDER, FOR SOLUTION INTRAMUSCULAR; INTRAVENOUS at 05:49

## 2021-01-01 RX ADMIN — GABAPENTIN 100 MG: 100 CAPSULE ORAL at 04:53

## 2021-01-01 RX ADMIN — VANCOMYCIN HYDROCHLORIDE 1250 MG: 500 INJECTION, POWDER, LYOPHILIZED, FOR SOLUTION INTRAVENOUS at 06:32

## 2021-01-01 RX ADMIN — Medication 400 MG: at 04:46

## 2021-01-01 RX ADMIN — OXYCODONE HYDROCHLORIDE AND ACETAMINOPHEN 1000 MG: 500 TABLET ORAL at 05:49

## 2021-01-01 RX ADMIN — DOCUSATE SODIUM 50 MG AND SENNOSIDES 8.6 MG 2 TABLET: 8.6; 5 TABLET, FILM COATED ORAL at 18:18

## 2021-01-01 RX ADMIN — Medication 400 MG: at 05:22

## 2021-01-01 RX ADMIN — SULFAMETHOXAZOLE AND TRIMETHOPRIM 2 TABLET: 800; 160 TABLET ORAL at 17:56

## 2021-01-01 RX ADMIN — CEFAZOLIN SODIUM 2 G: 2 INJECTION, SOLUTION INTRAVENOUS at 02:19

## 2021-01-01 RX ADMIN — CLOSTRIDIUM TETANI TOXOID ANTIGEN (FORMALDEHYDE INACTIVATED), CORYNEBACTERIUM DIPHTHERIAE TOXOID ANTIGEN (FORMALDEHYDE INACTIVATED), BORDETELLA PERTUSSIS TOXOID ANTIGEN (GLUTARALDEHYDE INACTIVATED), BORDETELLA PERTUSSIS FILAMENTOUS HEMAGGLUTININ ANTIGEN (FORMALDEHYDE INACTIVATED), BORDETELLA PERTUSSIS PERTACTIN ANTIGEN, AND BORDETELLA PERTUSSIS FIMBRIAE 2/3 ANTIGEN 0.5 ML: 5; 2; 2.5; 5; 3; 5 INJECTION, SUSPENSION INTRAMUSCULAR at 17:11

## 2021-01-01 RX ADMIN — ATORVASTATIN CALCIUM 80 MG: 40 TABLET, FILM COATED ORAL at 22:20

## 2021-01-01 RX ADMIN — FERROUS SULFATE TAB 325 MG (65 MG ELEMENTAL FE) 325 MG: 325 (65 FE) TAB at 08:50

## 2021-01-01 RX ADMIN — TAMSULOSIN HYDROCHLORIDE 0.4 MG: 0.4 CAPSULE ORAL at 21:30

## 2021-01-01 RX ADMIN — Medication 400 MG: at 17:24

## 2021-01-01 RX ADMIN — GABAPENTIN 100 MG: 100 CAPSULE ORAL at 06:17

## 2021-01-01 RX ADMIN — MONTELUKAST 10 MG: 10 TABLET, FILM COATED ORAL at 05:46

## 2021-01-01 RX ADMIN — APIXABAN 5 MG: 5 TABLET, FILM COATED ORAL at 17:38

## 2021-01-01 RX ADMIN — CEFAZOLIN SODIUM 2 G: 2 INJECTION, SOLUTION INTRAVENOUS at 01:46

## 2021-01-01 RX ADMIN — Medication 400 MG: at 04:31

## 2021-01-01 RX ADMIN — MAGNESIUM HYDROXIDE 30 ML: 400 SUSPENSION ORAL at 10:36

## 2021-01-01 RX ADMIN — GABAPENTIN 100 MG: 100 CAPSULE ORAL at 04:54

## 2021-01-01 RX ADMIN — CEFAZOLIN SODIUM 2 G: 2 INJECTION, SOLUTION INTRAVENOUS at 23:25

## 2021-01-01 RX ADMIN — DOCUSATE SODIUM 50 MG AND SENNOSIDES 8.6 MG 2 TABLET: 8.6; 5 TABLET, FILM COATED ORAL at 05:37

## 2021-01-01 RX ADMIN — GABAPENTIN 100 MG: 100 CAPSULE ORAL at 05:21

## 2021-01-01 RX ADMIN — CEFAZOLIN SODIUM 2 G: 2 INJECTION, SOLUTION INTRAVENOUS at 18:02

## 2021-01-01 RX ADMIN — Medication 400 MG: at 21:31

## 2021-01-01 RX ADMIN — Medication 400 MG: at 18:09

## 2021-01-01 RX ADMIN — APIXABAN 5 MG: 5 TABLET, FILM COATED ORAL at 05:47

## 2021-01-01 RX ADMIN — CEFTRIAXONE SODIUM 2 G: 2 INJECTION, POWDER, FOR SOLUTION INTRAMUSCULAR; INTRAVENOUS at 22:12

## 2021-01-01 RX ADMIN — INSULIN HUMAN 2 UNITS: 100 INJECTION, SOLUTION PARENTERAL at 20:38

## 2021-01-01 RX ADMIN — TAMSULOSIN HYDROCHLORIDE 0.4 MG: 0.4 CAPSULE ORAL at 20:20

## 2021-01-01 RX ADMIN — DOCUSATE SODIUM 50 MG AND SENNOSIDES 8.6 MG 2 TABLET: 8.6; 5 TABLET, FILM COATED ORAL at 05:47

## 2021-01-01 RX ADMIN — APIXABAN 5 MG: 5 TABLET, FILM COATED ORAL at 08:37

## 2021-01-01 RX ADMIN — Medication 5000 UNITS: at 05:48

## 2021-01-01 RX ADMIN — IOHEXOL 100 ML: 350 INJECTION, SOLUTION INTRAVENOUS at 13:15

## 2021-01-01 RX ADMIN — DOCUSATE SODIUM 50 MG AND SENNOSIDES 8.6 MG 2 TABLET: 8.6; 5 TABLET, FILM COATED ORAL at 05:48

## 2021-01-01 RX ADMIN — ANTACID TABLETS 500 MG: 500 TABLET, CHEWABLE ORAL at 04:31

## 2021-01-01 RX ADMIN — VANCOMYCIN HYDROCHLORIDE 1000 MG: 500 INJECTION, POWDER, LYOPHILIZED, FOR SOLUTION INTRAVENOUS at 02:41

## 2021-01-01 RX ADMIN — MONTELUKAST 10 MG: 10 TABLET, FILM COATED ORAL at 06:00

## 2021-01-01 RX ADMIN — HYDROMORPHONE HYDROCHLORIDE 0.25 MG: 1 INJECTION, SOLUTION INTRAMUSCULAR; INTRAVENOUS; SUBCUTANEOUS at 02:46

## 2021-01-01 RX ADMIN — ANTACID TABLETS 500 MG: 500 TABLET, CHEWABLE ORAL at 05:45

## 2021-01-01 RX ADMIN — TAMSULOSIN HYDROCHLORIDE 0.4 MG: 0.4 CAPSULE ORAL at 21:31

## 2021-01-01 RX ADMIN — CEFAZOLIN SODIUM 2 G: 2 INJECTION, SOLUTION INTRAVENOUS at 09:22

## 2021-01-01 RX ADMIN — GABAPENTIN 100 MG: 100 CAPSULE ORAL at 21:29

## 2021-01-01 RX ADMIN — VANCOMYCIN HYDROCHLORIDE 750 MG: 500 INJECTION, POWDER, LYOPHILIZED, FOR SOLUTION INTRAVENOUS at 01:39

## 2021-01-01 RX ADMIN — VANCOMYCIN HYDROCHLORIDE 1250 MG: 500 INJECTION, POWDER, LYOPHILIZED, FOR SOLUTION INTRAVENOUS at 20:11

## 2021-01-01 RX ADMIN — ACETAMINOPHEN 650 MG: 325 TABLET ORAL at 23:20

## 2021-01-01 RX ADMIN — CEFAZOLIN SODIUM 2 G: 2 INJECTION, SOLUTION INTRAVENOUS at 18:11

## 2021-01-01 RX ADMIN — CEFTRIAXONE SODIUM 2 G: 2 INJECTION, POWDER, FOR SOLUTION INTRAMUSCULAR; INTRAVENOUS at 05:36

## 2021-01-01 RX ADMIN — VANCOMYCIN HYDROCHLORIDE 750 MG: 500 INJECTION, POWDER, LYOPHILIZED, FOR SOLUTION INTRAVENOUS at 14:06

## 2021-01-01 RX ADMIN — ANTACID TABLETS 500 MG: 500 TABLET, CHEWABLE ORAL at 17:02

## 2021-01-01 RX ADMIN — FENTANYL CITRATE 50 MCG: 50 INJECTION, SOLUTION INTRAMUSCULAR; INTRAVENOUS at 19:45

## 2021-01-01 RX ADMIN — TAMSULOSIN HYDROCHLORIDE 0.4 MG: 0.4 CAPSULE ORAL at 21:07

## 2021-01-01 RX ADMIN — MONTELUKAST 10 MG: 10 TABLET, FILM COATED ORAL at 05:07

## 2021-01-01 RX ADMIN — FERROUS SULFATE TAB 325 MG (65 MG ELEMENTAL FE) 325 MG: 325 (65 FE) TAB at 08:39

## 2021-01-01 RX ADMIN — FAMOTIDINE 20 MG: 10 INJECTION INTRAVENOUS at 18:28

## 2021-01-01 RX ADMIN — APIXABAN 5 MG: 5 TABLET, FILM COATED ORAL at 05:06

## 2021-01-01 RX ADMIN — Medication 400 MG: at 18:18

## 2021-01-01 RX ADMIN — DOCUSATE SODIUM 50 MG AND SENNOSIDES 8.6 MG 2 TABLET: 8.6; 5 TABLET, FILM COATED ORAL at 21:29

## 2021-01-01 RX ADMIN — GABAPENTIN 100 MG: 100 CAPSULE ORAL at 18:44

## 2021-01-01 RX ADMIN — APIXABAN 5 MG: 5 TABLET, FILM COATED ORAL at 17:47

## 2021-01-01 RX ADMIN — MONTELUKAST 10 MG: 10 TABLET, FILM COATED ORAL at 05:22

## 2021-01-01 RX ADMIN — GABAPENTIN 100 MG: 100 CAPSULE ORAL at 06:20

## 2021-01-01 RX ADMIN — VANCOMYCIN HYDROCHLORIDE 1000 MG: 500 INJECTION, POWDER, LYOPHILIZED, FOR SOLUTION INTRAVENOUS at 03:41

## 2021-01-01 RX ADMIN — ALTEPLASE 2 MG: 2.2 INJECTION, POWDER, LYOPHILIZED, FOR SOLUTION INTRAVENOUS at 01:05

## 2021-01-01 RX ADMIN — VANCOMYCIN HYDROCHLORIDE 1250 MG: 500 INJECTION, POWDER, LYOPHILIZED, FOR SOLUTION INTRAVENOUS at 05:59

## 2021-01-01 RX ADMIN — APIXABAN 5 MG: 5 TABLET, FILM COATED ORAL at 05:45

## 2021-01-01 RX ADMIN — INSULIN HUMAN 2 UNITS: 100 INJECTION, SOLUTION PARENTERAL at 12:38

## 2021-01-01 RX ADMIN — Medication 400 MG: at 18:10

## 2021-01-01 RX ADMIN — DOCUSATE SODIUM 50 MG AND SENNOSIDES 8.6 MG 2 TABLET: 8.6; 5 TABLET, FILM COATED ORAL at 05:51

## 2021-01-01 RX ADMIN — NOREPINEPHRINE BITARTRATE 5 MCG/MIN: 1 INJECTION, SOLUTION, CONCENTRATE INTRAVENOUS at 20:11

## 2021-01-01 RX ADMIN — INSULIN HUMAN 2 UNITS: 100 INJECTION, SOLUTION PARENTERAL at 21:40

## 2021-01-01 RX ADMIN — DILTIAZEM HYDROCHLORIDE 60 MG: 60 TABLET, FILM COATED ORAL at 18:29

## 2021-01-01 RX ADMIN — ROCURONIUM BROMIDE 50 MG: 10 INJECTION, SOLUTION INTRAVENOUS at 17:19

## 2021-01-01 RX ADMIN — MONTELUKAST 10 MG: 10 TABLET, FILM COATED ORAL at 05:47

## 2021-01-01 RX ADMIN — GABAPENTIN 100 MG: 100 CAPSULE ORAL at 05:26

## 2021-01-01 RX ADMIN — SULFAMETHOXAZOLE AND TRIMETHOPRIM 2 TABLET: 800; 160 TABLET ORAL at 05:47

## 2021-01-01 RX ADMIN — FERROUS SULFATE TAB 325 MG (65 MG ELEMENTAL FE) 325 MG: 325 (65 FE) TAB at 08:44

## 2021-01-01 RX ADMIN — DOCUSATE SODIUM 50 MG AND SENNOSIDES 8.6 MG 2 TABLET: 8.6; 5 TABLET, FILM COATED ORAL at 05:45

## 2021-01-01 RX ADMIN — OXYCODONE HYDROCHLORIDE AND ACETAMINOPHEN 1000 MG: 500 TABLET ORAL at 05:31

## 2021-01-01 RX ADMIN — FERROUS SULFATE TAB 325 MG (65 MG ELEMENTAL FE) 325 MG: 325 (65 FE) TAB at 08:37

## 2021-01-01 RX ADMIN — NOREPINEPHRINE BITARTRATE 30 MCG/MIN: 1 INJECTION INTRAVENOUS at 17:27

## 2021-01-01 RX ADMIN — GABAPENTIN 100 MG: 100 CAPSULE ORAL at 05:49

## 2021-01-01 RX ADMIN — GABAPENTIN 100 MG: 100 CAPSULE ORAL at 21:32

## 2021-01-01 RX ADMIN — CEFAZOLIN SODIUM 2 G: 2 INJECTION, SOLUTION INTRAVENOUS at 18:19

## 2021-01-01 RX ADMIN — VANCOMYCIN HYDROCHLORIDE 1000 MG: 500 INJECTION, POWDER, LYOPHILIZED, FOR SOLUTION INTRAVENOUS at 02:49

## 2021-01-01 RX ADMIN — DILTIAZEM HYDROCHLORIDE 60 MG: 60 TABLET, FILM COATED ORAL at 17:57

## 2021-01-01 RX ADMIN — INSULIN HUMAN 2 UNITS: 100 INJECTION, SOLUTION PARENTERAL at 12:03

## 2021-01-01 RX ADMIN — MONTELUKAST 10 MG: 10 TABLET, FILM COATED ORAL at 06:17

## 2021-01-01 RX ADMIN — CEFAZOLIN SODIUM 2 G: 2 INJECTION, SOLUTION INTRAVENOUS at 17:24

## 2021-01-01 RX ADMIN — FERROUS SULFATE TAB 325 MG (65 MG ELEMENTAL FE) 325 MG: 325 (65 FE) TAB at 11:39

## 2021-01-01 RX ADMIN — FENTANYL CITRATE 100 MCG: 50 INJECTION INTRAMUSCULAR; INTRAVENOUS at 21:16

## 2021-01-01 RX ADMIN — CEFAZOLIN SODIUM 2 G: 2 INJECTION, SOLUTION INTRAVENOUS at 17:38

## 2021-01-01 RX ADMIN — FERROUS SULFATE TAB 325 MG (65 MG ELEMENTAL FE) 325 MG: 325 (65 FE) TAB at 08:31

## 2021-01-01 RX ADMIN — APIXABAN 5 MG: 5 TABLET, FILM COATED ORAL at 05:07

## 2021-01-01 RX ADMIN — FERROUS SULFATE TAB 325 MG (65 MG ELEMENTAL FE) 325 MG: 325 (65 FE) TAB at 08:48

## 2021-01-01 RX ADMIN — ETOMIDATE 20 MG: 2 INJECTION INTRAVENOUS at 17:19

## 2021-01-01 RX ADMIN — DOCUSATE SODIUM 50 MG AND SENNOSIDES 8.6 MG 2 TABLET: 8.6; 5 TABLET, FILM COATED ORAL at 04:32

## 2021-01-01 RX ADMIN — ANTACID TABLETS 500 MG: 500 TABLET, CHEWABLE ORAL at 05:47

## 2021-01-01 RX ADMIN — Medication 400 MCG: at 17:26

## 2021-01-01 RX ADMIN — GABAPENTIN 100 MG: 100 CAPSULE ORAL at 05:47

## 2021-01-01 RX ADMIN — ONDANSETRON 4 MG: 2 INJECTION INTRAMUSCULAR; INTRAVENOUS at 04:56

## 2021-01-01 RX ADMIN — Medication 400 MG: at 05:08

## 2021-01-01 RX ADMIN — MAGNESIUM HYDROXIDE 30 ML: 400 SUSPENSION ORAL at 17:23

## 2021-01-01 RX ADMIN — CEFTRIAXONE SODIUM 2 G: 2 INJECTION, POWDER, FOR SOLUTION INTRAMUSCULAR; INTRAVENOUS at 05:51

## 2021-01-01 RX ADMIN — GABAPENTIN 100 MG: 100 CAPSULE ORAL at 18:10

## 2021-01-01 RX ADMIN — SULFAMETHOXAZOLE AND TRIMETHOPRIM 2 TABLET: 800; 160 TABLET ORAL at 04:31

## 2021-01-01 RX ADMIN — Medication 5000 UNITS: at 05:34

## 2021-01-01 RX ADMIN — MONTELUKAST 10 MG: 10 TABLET, FILM COATED ORAL at 05:45

## 2021-01-01 RX ADMIN — Medication 400 MG: at 17:59

## 2021-01-01 RX ADMIN — FERROUS SULFATE TAB 325 MG (65 MG ELEMENTAL FE) 325 MG: 325 (65 FE) TAB at 08:54

## 2021-01-01 RX ADMIN — GABAPENTIN 100 MG: 100 CAPSULE ORAL at 17:48

## 2021-01-01 RX ADMIN — FERROUS SULFATE TAB 325 MG (65 MG ELEMENTAL FE) 325 MG: 325 (65 FE) TAB at 07:51

## 2021-01-01 RX ADMIN — SULFAMETHOXAZOLE AND TRIMETHOPRIM 2 TABLET: 800; 160 TABLET ORAL at 04:45

## 2021-01-01 RX ADMIN — GABAPENTIN 100 MG: 100 CAPSULE ORAL at 18:18

## 2021-01-01 RX ADMIN — SULFAMETHOXAZOLE AND TRIMETHOPRIM 2 TABLET: 800; 160 TABLET ORAL at 17:41

## 2021-01-01 RX ADMIN — TAMSULOSIN HYDROCHLORIDE 0.4 MG: 0.4 CAPSULE ORAL at 21:06

## 2021-01-01 RX ADMIN — CEFAZOLIN SODIUM 2 G: 2 INJECTION, SOLUTION INTRAVENOUS at 06:18

## 2021-01-01 RX ADMIN — DOCUSATE SODIUM 50 MG AND SENNOSIDES 8.6 MG 2 TABLET: 8.6; 5 TABLET, FILM COATED ORAL at 06:20

## 2021-01-01 RX ADMIN — MAGNESIUM OXIDE TAB 400 MG (241.3 MG ELEMENTAL MG) 400 MG: 400 (241.3 MG) TAB at 21:30

## 2021-01-01 RX ADMIN — SULFAMETHOXAZOLE AND TRIMETHOPRIM 2 TABLET: 800; 160 TABLET ORAL at 17:37

## 2021-01-01 RX ADMIN — GABAPENTIN 100 MG: 100 CAPSULE ORAL at 05:34

## 2021-01-01 RX ADMIN — SULFAMETHOXAZOLE AND TRIMETHOPRIM 2 TABLET: 800; 160 TABLET ORAL at 05:06

## 2021-01-01 RX ADMIN — TAMSULOSIN HYDROCHLORIDE 0.4 MG: 0.4 CAPSULE ORAL at 20:43

## 2021-01-01 RX ADMIN — DOCUSATE SODIUM 50 MG AND SENNOSIDES 8.6 MG 2 TABLET: 8.6; 5 TABLET, FILM COATED ORAL at 04:45

## 2021-01-01 RX ADMIN — PHENYLEPHRINE HYDROCHLORIDE 100 MCG/MIN: 10 INJECTION INTRAVENOUS at 16:25

## 2021-01-01 RX ADMIN — ATORVASTATIN CALCIUM 80 MG: 40 TABLET, FILM COATED ORAL at 21:07

## 2021-01-01 RX ADMIN — CEFEPIME 2 G: 2 INJECTION, POWDER, FOR SOLUTION INTRAVENOUS at 02:57

## 2021-01-01 RX ADMIN — Medication 400 MG: at 18:35

## 2021-01-01 RX ADMIN — MONTELUKAST 10 MG: 10 TABLET, FILM COATED ORAL at 05:26

## 2021-01-01 RX ADMIN — CEFAZOLIN SODIUM 2 G: 2 INJECTION, SOLUTION INTRAVENOUS at 18:44

## 2021-01-01 RX ADMIN — OXYCODONE HYDROCHLORIDE 10 MG: 5 SOLUTION ORAL at 19:31

## 2021-01-01 RX ADMIN — APIXABAN 5 MG: 5 TABLET, FILM COATED ORAL at 17:43

## 2021-01-01 RX ADMIN — DOCUSATE SODIUM 50 MG AND SENNOSIDES 8.6 MG 2 TABLET: 8.6; 5 TABLET, FILM COATED ORAL at 05:18

## 2021-01-01 RX ADMIN — FERROUS SULFATE TAB 325 MG (65 MG ELEMENTAL FE) 325 MG: 325 (65 FE) TAB at 09:27

## 2021-01-01 RX ADMIN — Medication 400 MG: at 06:17

## 2021-01-01 RX ADMIN — INSULIN HUMAN 2 UNITS: 100 INJECTION, SOLUTION PARENTERAL at 12:18

## 2021-01-01 RX ADMIN — FLUTICASONE PROPIONATE 100 MCG: 50 SPRAY, METERED NASAL at 06:17

## 2021-01-01 RX ADMIN — Medication 400 MG: at 17:37

## 2021-01-01 RX ADMIN — GABAPENTIN 100 MG: 100 CAPSULE ORAL at 17:57

## 2021-01-01 RX ADMIN — Medication 400 MG: at 05:36

## 2021-01-01 RX ADMIN — POLYETHYLENE GLYCOL 3350 1 PACKET: 17 POWDER, FOR SOLUTION ORAL at 12:09

## 2021-01-01 RX ADMIN — CEFAZOLIN SODIUM 2 G: 2 INJECTION, SOLUTION INTRAVENOUS at 10:03

## 2021-01-01 RX ADMIN — MONTELUKAST 10 MG: 10 TABLET, FILM COATED ORAL at 05:18

## 2021-01-01 RX ADMIN — DILTIAZEM HYDROCHLORIDE 60 MG: 60 TABLET, FILM COATED ORAL at 00:41

## 2021-01-01 RX ADMIN — VANCOMYCIN HYDROCHLORIDE 1250 MG: 500 INJECTION, POWDER, LYOPHILIZED, FOR SOLUTION INTRAVENOUS at 21:30

## 2021-01-01 RX ADMIN — HEPARIN SODIUM 5000 UNITS: 5000 INJECTION, SOLUTION INTRAVENOUS; SUBCUTANEOUS at 13:32

## 2021-01-01 RX ADMIN — SULFAMETHOXAZOLE AND TRIMETHOPRIM 2 TABLET: 800; 160 TABLET ORAL at 18:34

## 2021-01-01 RX ADMIN — Medication 400 MG: at 17:02

## 2021-01-01 RX ADMIN — FENTANYL CITRATE 100 MCG: 50 INJECTION INTRAMUSCULAR; INTRAVENOUS at 18:13

## 2021-01-01 RX ADMIN — DOCUSATE SODIUM 50 MG AND SENNOSIDES 8.6 MG 2 TABLET: 8.6; 5 TABLET, FILM COATED ORAL at 17:26

## 2021-01-01 RX ADMIN — ANTACID TABLETS 500 MG: 500 TABLET, CHEWABLE ORAL at 17:38

## 2021-01-01 RX ADMIN — VANCOMYCIN HYDROCHLORIDE 1000 MG: 500 INJECTION, POWDER, LYOPHILIZED, FOR SOLUTION INTRAVENOUS at 14:18

## 2021-01-01 RX ADMIN — FENTANYL CITRATE 50 MCG: 50 INJECTION INTRAMUSCULAR; INTRAVENOUS at 01:36

## 2021-01-01 RX ADMIN — CEFAZOLIN SODIUM 2 G: 2 INJECTION, SOLUTION INTRAVENOUS at 10:27

## 2021-01-01 RX ADMIN — INSULIN HUMAN 5 UNITS: 100 INJECTION, SOLUTION PARENTERAL at 15:15

## 2021-01-01 RX ADMIN — ANTACID TABLETS 500 MG: 500 TABLET, CHEWABLE ORAL at 05:59

## 2021-01-01 RX ADMIN — DEXTROSE MONOHYDRATE 50 ML: 25 INJECTION, SOLUTION INTRAVENOUS at 15:16

## 2021-01-01 RX ADMIN — DILTIAZEM HYDROCHLORIDE 10 MG: 5 INJECTION INTRAVENOUS at 16:52

## 2021-01-01 RX ADMIN — SODIUM PHOSPHATE, MONOBASIC, MONOHYDRATE AND SODIUM PHOSPHATE, DIBASIC, ANHYDROUS 20 MMOL: 276; 142 INJECTION, SOLUTION INTRAVENOUS at 13:20

## 2021-01-01 RX ADMIN — SODIUM CHLORIDE, POTASSIUM CHLORIDE, SODIUM LACTATE AND CALCIUM CHLORIDE: 600; 310; 30; 20 INJECTION, SOLUTION INTRAVENOUS at 18:44

## 2021-01-01 RX ADMIN — CEFAZOLIN SODIUM 2 G: 2 INJECTION, SOLUTION INTRAVENOUS at 01:21

## 2021-01-01 RX ADMIN — Medication 400 MG: at 06:00

## 2021-01-01 RX ADMIN — CEFAZOLIN SODIUM 2 G: 2 INJECTION, SOLUTION INTRAVENOUS at 18:18

## 2021-01-01 RX ADMIN — TAMSULOSIN HYDROCHLORIDE 0.4 MG: 0.4 CAPSULE ORAL at 21:28

## 2021-01-01 RX ADMIN — MONTELUKAST 10 MG: 10 TABLET, FILM COATED ORAL at 05:34

## 2021-01-01 RX ADMIN — DAPTOMYCIN 690 MG: 350 INJECTION, POWDER, LYOPHILIZED, FOR SOLUTION INTRAVENOUS at 08:44

## 2021-01-01 RX ADMIN — ANTACID TABLETS 500 MG: 500 TABLET, CHEWABLE ORAL at 17:41

## 2021-01-01 RX ADMIN — GABAPENTIN 100 MG: 100 CAPSULE ORAL at 04:35

## 2021-01-01 RX ADMIN — DOCUSATE SODIUM 50 MG AND SENNOSIDES 8.6 MG 2 TABLET: 8.6; 5 TABLET, FILM COATED ORAL at 05:26

## 2021-01-01 RX ADMIN — MONTELUKAST 10 MG: 10 TABLET, FILM COATED ORAL at 04:45

## 2021-01-01 RX ADMIN — GABAPENTIN 100 MG: 100 CAPSULE ORAL at 17:38

## 2021-01-01 RX ADMIN — DOCUSATE SODIUM 50 MG AND SENNOSIDES 8.6 MG 2 TABLET: 8.6; 5 TABLET, FILM COATED ORAL at 17:37

## 2021-01-01 RX ADMIN — INSULIN HUMAN 2 UNITS: 100 INJECTION, SOLUTION PARENTERAL at 22:12

## 2021-01-01 RX ADMIN — SULFAMETHOXAZOLE AND TRIMETHOPRIM 2 TABLET: 800; 160 TABLET ORAL at 17:01

## 2021-01-01 RX ADMIN — Medication 400 MG: at 05:06

## 2021-01-01 RX ADMIN — Medication 400 MG: at 17:43

## 2021-01-01 RX ADMIN — APIXABAN 5 MG: 5 TABLET, FILM COATED ORAL at 04:55

## 2021-01-01 RX ADMIN — APIXABAN 5 MG: 5 TABLET, FILM COATED ORAL at 05:59

## 2021-01-01 RX ADMIN — CEFAZOLIN SODIUM 2 G: 2 INJECTION, SOLUTION INTRAVENOUS at 13:36

## 2021-01-01 RX ADMIN — FLUTICASONE PROPIONATE 100 MCG: 50 SPRAY, METERED NASAL at 04:32

## 2021-01-01 RX ADMIN — DOCUSATE SODIUM 50 MG AND SENNOSIDES 8.6 MG 2 TABLET: 8.6; 5 TABLET, FILM COATED ORAL at 17:04

## 2021-01-01 RX ADMIN — VANCOMYCIN HYDROCHLORIDE 1000 MG: 500 INJECTION, POWDER, LYOPHILIZED, FOR SOLUTION INTRAVENOUS at 14:41

## 2021-01-01 RX ADMIN — Medication 400 MG: at 06:20

## 2021-01-01 RX ADMIN — ANTACID TABLETS 500 MG: 500 TABLET, CHEWABLE ORAL at 06:17

## 2021-01-01 RX ADMIN — SULFAMETHOXAZOLE AND TRIMETHOPRIM 2 TABLET: 800; 160 TABLET ORAL at 10:37

## 2021-01-01 RX ADMIN — GABAPENTIN 100 MG: 100 CAPSULE ORAL at 18:17

## 2021-01-01 RX ADMIN — CEFAZOLIN SODIUM 2 G: 2 INJECTION, SOLUTION INTRAVENOUS at 05:31

## 2021-01-01 RX ADMIN — OXYCODONE HYDROCHLORIDE AND ACETAMINOPHEN 1000 MG: 500 TABLET ORAL at 05:52

## 2021-01-01 RX ADMIN — POLYETHYLENE GLYCOL 3350 1 PACKET: 17 POWDER, FOR SOLUTION ORAL at 04:55

## 2021-01-01 RX ADMIN — APIXABAN 5 MG: 5 TABLET, FILM COATED ORAL at 04:31

## 2021-01-01 RX ADMIN — VANCOMYCIN HYDROCHLORIDE 2000 MG: 500 INJECTION, POWDER, LYOPHILIZED, FOR SOLUTION INTRAVENOUS at 15:14

## 2021-01-01 RX ADMIN — Medication 400 MG: at 17:41

## 2021-01-01 RX ADMIN — ANTACID TABLETS 500 MG: 500 TABLET, CHEWABLE ORAL at 18:09

## 2021-01-01 RX ADMIN — FUROSEMIDE 60 MG: 40 TABLET ORAL at 19:32

## 2021-01-01 RX ADMIN — NOREPINEPHRINE BITARTRATE 4 MCG/MIN: 1 INJECTION, SOLUTION, CONCENTRATE INTRAVENOUS at 13:20

## 2021-01-01 RX ADMIN — CEFTRIAXONE SODIUM 2 G: 2 INJECTION, POWDER, FOR SOLUTION INTRAMUSCULAR; INTRAVENOUS at 18:45

## 2021-01-01 RX ADMIN — BISACODYL 10 MG: 10 SUPPOSITORY RECTAL at 06:20

## 2021-01-01 RX ADMIN — HYDROMORPHONE HYDROCHLORIDE 0.25 MG: 1 INJECTION, SOLUTION INTRAMUSCULAR; INTRAVENOUS; SUBCUTANEOUS at 02:14

## 2021-01-01 RX ADMIN — MAGNESIUM OXIDE TAB 400 MG (241.3 MG ELEMENTAL MG) 400 MG: 400 (241.3 MG) TAB at 05:51

## 2021-01-01 RX ADMIN — ANTACID TABLETS 500 MG: 500 TABLET, CHEWABLE ORAL at 18:10

## 2021-01-01 RX ADMIN — MAGNESIUM OXIDE TAB 400 MG (241.3 MG ELEMENTAL MG) 400 MG: 400 (241.3 MG) TAB at 17:04

## 2021-01-01 RX ADMIN — DOCUSATE SODIUM 50 MG AND SENNOSIDES 8.6 MG 2 TABLET: 8.6; 5 TABLET, FILM COATED ORAL at 05:31

## 2021-01-01 RX ADMIN — APIXABAN 5 MG: 5 TABLET, FILM COATED ORAL at 19:32

## 2021-01-01 RX ADMIN — MAGNESIUM OXIDE TAB 400 MG (241.3 MG ELEMENTAL MG) 400 MG: 400 (241.3 MG) TAB at 05:34

## 2021-01-01 RX ADMIN — AMPICILLIN SODIUM 2000 MG: 2 INJECTION, POWDER, FOR SOLUTION INTRAMUSCULAR; INTRAVENOUS at 11:30

## 2021-01-01 RX ADMIN — DOCUSATE SODIUM 50 MG AND SENNOSIDES 8.6 MG 2 TABLET: 8.6; 5 TABLET, FILM COATED ORAL at 05:59

## 2021-01-01 RX ADMIN — INSULIN HUMAN 2 UNITS: 100 INJECTION, SOLUTION PARENTERAL at 20:56

## 2021-01-01 RX ADMIN — LIDOCAINE HYDROCHLORIDE 40 MG: 20 INJECTION, SOLUTION EPIDURAL; INFILTRATION; INTRACAUDAL at 18:49

## 2021-01-01 RX ADMIN — MONTELUKAST 10 MG: 10 TABLET, FILM COATED ORAL at 05:52

## 2021-01-01 RX ADMIN — HEPARIN SODIUM 5000 UNITS: 5000 INJECTION, SOLUTION INTRAVENOUS; SUBCUTANEOUS at 05:52

## 2021-01-01 RX ADMIN — SULFAMETHOXAZOLE AND TRIMETHOPRIM 2 TABLET: 800; 160 TABLET ORAL at 05:07

## 2021-01-01 RX ADMIN — MAGNESIUM OXIDE TAB 400 MG (241.3 MG ELEMENTAL MG) 400 MG: 400 (241.3 MG) TAB at 17:47

## 2021-01-01 RX ADMIN — FERROUS SULFATE TAB 325 MG (65 MG ELEMENTAL FE) 325 MG: 325 (65 FE) TAB at 08:43

## 2021-01-01 RX ADMIN — MAGNESIUM OXIDE TAB 400 MG (241.3 MG ELEMENTAL MG) 400 MG: 400 (241.3 MG) TAB at 04:55

## 2021-01-01 RX ADMIN — FUROSEMIDE 40 MG: 10 INJECTION, SOLUTION INTRAMUSCULAR; INTRAVENOUS at 14:42

## 2021-01-01 RX ADMIN — DAPTOMYCIN 690 MG: 350 INJECTION, POWDER, LYOPHILIZED, FOR SOLUTION INTRAVENOUS at 08:37

## 2021-01-01 RX ADMIN — ANTACID TABLETS 500 MG: 500 TABLET, CHEWABLE ORAL at 04:17

## 2021-01-01 RX ADMIN — ACETAMINOPHEN 650 MG: 325 TABLET, FILM COATED ORAL at 12:56

## 2021-01-01 RX ADMIN — ATORVASTATIN CALCIUM 80 MG: 40 TABLET, FILM COATED ORAL at 21:29

## 2021-01-01 RX ADMIN — APIXABAN 5 MG: 5 TABLET, FILM COATED ORAL at 18:28

## 2021-01-01 RX ADMIN — APIXABAN 5 MG: 5 TABLET, FILM COATED ORAL at 04:16

## 2021-01-01 RX ADMIN — DOCUSATE SODIUM 50 MG AND SENNOSIDES 8.6 MG 2 TABLET: 8.6; 5 TABLET, FILM COATED ORAL at 17:59

## 2021-01-01 RX ADMIN — GABAPENTIN 100 MG: 100 CAPSULE ORAL at 05:45

## 2021-01-01 RX ADMIN — DOCUSATE SODIUM 50 MG AND SENNOSIDES 8.6 MG 2 TABLET: 8.6; 5 TABLET, FILM COATED ORAL at 17:24

## 2021-01-01 RX ADMIN — DOCUSATE SODIUM 50 MG AND SENNOSIDES 8.6 MG 2 TABLET: 8.6; 5 TABLET, FILM COATED ORAL at 04:46

## 2021-01-01 RX ADMIN — CEFAZOLIN SODIUM 2 G: 2 INJECTION, SOLUTION INTRAVENOUS at 02:05

## 2021-01-01 RX ADMIN — CEFAZOLIN SODIUM 2 G: 2 INJECTION, SOLUTION INTRAVENOUS at 04:55

## 2021-01-01 RX ADMIN — DOCUSATE SODIUM 50 MG AND SENNOSIDES 8.6 MG 2 TABLET: 8.6; 5 TABLET, FILM COATED ORAL at 06:17

## 2021-01-01 RX ADMIN — DILTIAZEM HYDROCHLORIDE 60 MG: 60 TABLET, FILM COATED ORAL at 12:19

## 2021-01-01 RX ADMIN — GABAPENTIN 100 MG: 100 CAPSULE ORAL at 05:52

## 2021-01-01 RX ADMIN — TAMSULOSIN HYDROCHLORIDE 0.4 MG: 0.4 CAPSULE ORAL at 20:49

## 2021-01-01 RX ADMIN — CEFAZOLIN SODIUM 2 G: 2 INJECTION, SOLUTION INTRAVENOUS at 02:00

## 2021-01-01 RX ADMIN — DOCUSATE SODIUM 50 MG AND SENNOSIDES 8.6 MG 2 TABLET: 8.6; 5 TABLET, FILM COATED ORAL at 05:07

## 2021-01-01 RX ADMIN — CEFAZOLIN SODIUM 2 G: 2 INJECTION, SOLUTION INTRAVENOUS at 14:16

## 2021-01-01 RX ADMIN — ANTACID TABLETS 500 MG: 500 TABLET, CHEWABLE ORAL at 05:07

## 2021-01-01 RX ADMIN — Medication 50 MCG/HR: at 03:35

## 2021-01-01 RX ADMIN — INSULIN HUMAN 2 UNITS: 100 INJECTION, SOLUTION PARENTERAL at 21:15

## 2021-01-01 RX ADMIN — APIXABAN 5 MG: 5 TABLET, FILM COATED ORAL at 05:34

## 2021-01-01 RX ADMIN — APIXABAN 5 MG: 5 TABLET, FILM COATED ORAL at 18:17

## 2021-01-01 RX ADMIN — ATORVASTATIN CALCIUM 80 MG: 40 TABLET, FILM COATED ORAL at 20:43

## 2021-01-01 RX ADMIN — DILTIAZEM HYDROCHLORIDE 20 MG: 5 INJECTION INTRAVENOUS at 18:03

## 2021-01-01 RX ADMIN — APIXABAN 5 MG: 5 TABLET, FILM COATED ORAL at 17:02

## 2021-01-01 RX ADMIN — DOCUSATE SODIUM 50 MG AND SENNOSIDES 8.6 MG 2 TABLET: 8.6; 5 TABLET, FILM COATED ORAL at 17:57

## 2021-01-01 RX ADMIN — FLUTICASONE PROPIONATE 100 MCG: 50 SPRAY, METERED NASAL at 06:25

## 2021-01-01 RX ADMIN — MONTELUKAST 10 MG: 10 TABLET, FILM COATED ORAL at 04:55

## 2021-01-01 RX ADMIN — MONTELUKAST 10 MG: 10 TABLET, FILM COATED ORAL at 04:31

## 2021-01-01 RX ADMIN — SULFAMETHOXAZOLE AND TRIMETHOPRIM 2 TABLET: 800; 160 TABLET ORAL at 17:31

## 2021-01-01 RX ADMIN — FERROUS SULFATE TAB 325 MG (65 MG ELEMENTAL FE) 325 MG: 325 (65 FE) TAB at 08:51

## 2021-01-01 RX ADMIN — DOCUSATE SODIUM 50 MG AND SENNOSIDES 8.6 MG 2 TABLET: 8.6; 5 TABLET, FILM COATED ORAL at 18:17

## 2021-01-01 RX ADMIN — SULFAMETHOXAZOLE AND TRIMETHOPRIM 2 TABLET: 800; 160 TABLET ORAL at 05:45

## 2021-01-01 RX ADMIN — DOCUSATE SODIUM 50 MG AND SENNOSIDES 8.6 MG 2 TABLET: 8.6; 5 TABLET, FILM COATED ORAL at 18:09

## 2021-01-01 RX ADMIN — GABAPENTIN 100 MG: 100 CAPSULE ORAL at 18:09

## 2021-01-01 RX ADMIN — FENTANYL CITRATE 50 MCG: 50 INJECTION INTRAMUSCULAR; INTRAVENOUS at 19:34

## 2021-01-01 RX ADMIN — ALTEPLASE 2 MG: 2.2 INJECTION, POWDER, LYOPHILIZED, FOR SOLUTION INTRAVENOUS at 23:16

## 2021-01-01 RX ADMIN — AMPICILLIN SODIUM 2000 MG: 2 INJECTION, POWDER, FOR SOLUTION INTRAMUSCULAR; INTRAVENOUS at 22:45

## 2021-01-01 RX ADMIN — TAMSULOSIN HYDROCHLORIDE 0.4 MG: 0.4 CAPSULE ORAL at 20:59

## 2021-01-01 RX ADMIN — FUROSEMIDE 60 MG: 10 INJECTION, SOLUTION INTRAMUSCULAR; INTRAVENOUS at 11:31

## 2021-01-01 RX ADMIN — GABAPENTIN 100 MG: 100 CAPSULE ORAL at 05:06

## 2021-01-01 RX ADMIN — VANCOMYCIN HYDROCHLORIDE 750 MG: 500 INJECTION, POWDER, LYOPHILIZED, FOR SOLUTION INTRAVENOUS at 14:32

## 2021-01-01 RX ADMIN — TAMSULOSIN HYDROCHLORIDE 0.4 MG: 0.4 CAPSULE ORAL at 22:20

## 2021-01-01 RX ADMIN — Medication 400 MG: at 05:45

## 2021-01-01 RX ADMIN — GABAPENTIN 100 MG: 100 CAPSULE ORAL at 04:45

## 2021-01-01 RX ADMIN — MONTELUKAST 10 MG: 10 TABLET, FILM COATED ORAL at 06:20

## 2021-01-01 RX ADMIN — IOHEXOL 75 ML: 350 INJECTION, SOLUTION INTRAVENOUS at 14:12

## 2021-01-01 RX ADMIN — GABAPENTIN 100 MG: 100 CAPSULE ORAL at 18:34

## 2021-01-01 RX ADMIN — Medication 400 MG: at 17:48

## 2021-01-01 RX ADMIN — VANCOMYCIN HYDROCHLORIDE 2250 MG: 500 INJECTION, POWDER, LYOPHILIZED, FOR SOLUTION INTRAVENOUS at 22:05

## 2021-01-01 RX ADMIN — FENTANYL CITRATE 100 MCG: 50 INJECTION INTRAMUSCULAR; INTRAVENOUS at 15:19

## 2021-01-01 RX ADMIN — GABAPENTIN 100 MG: 100 CAPSULE ORAL at 05:07

## 2021-01-01 RX ADMIN — VANCOMYCIN HYDROCHLORIDE 1250 MG: 500 INJECTION, POWDER, LYOPHILIZED, FOR SOLUTION INTRAVENOUS at 10:02

## 2021-01-01 RX ADMIN — APIXABAN 5 MG: 5 TABLET, FILM COATED ORAL at 17:42

## 2021-01-01 RX ADMIN — GABAPENTIN 100 MG: 100 CAPSULE ORAL at 04:31

## 2021-01-01 RX ADMIN — Medication 400 MG: at 05:26

## 2021-01-01 RX ADMIN — MIDAZOLAM HYDROCHLORIDE 1 MG: 1 INJECTION INTRAMUSCULAR; INTRAVENOUS at 09:22

## 2021-01-01 RX ADMIN — HYDROMORPHONE HYDROCHLORIDE 0.2 MG: 10 INJECTION, SOLUTION INTRAMUSCULAR; INTRAVENOUS; SUBCUTANEOUS at 17:35

## 2021-01-01 RX ADMIN — GABAPENTIN 100 MG: 100 CAPSULE ORAL at 04:17

## 2021-01-01 RX ADMIN — ANTACID TABLETS 500 MG: 500 TABLET, CHEWABLE ORAL at 18:34

## 2021-01-01 RX ADMIN — HEPARIN SODIUM 5000 UNITS: 5000 INJECTION, SOLUTION INTRAVENOUS; SUBCUTANEOUS at 05:49

## 2021-01-01 RX ADMIN — APIXABAN 5 MG: 5 TABLET, FILM COATED ORAL at 17:23

## 2021-01-01 RX ADMIN — FENTANYL CITRATE 100 MCG: 50 INJECTION, SOLUTION INTRAMUSCULAR; INTRAVENOUS at 18:47

## 2021-01-01 RX ADMIN — APIXABAN 5 MG: 5 TABLET, FILM COATED ORAL at 18:02

## 2021-01-01 RX ADMIN — CEFAZOLIN SODIUM 2 G: 2 INJECTION, SOLUTION INTRAVENOUS at 21:07

## 2021-01-01 RX ADMIN — MONTELUKAST 10 MG: 10 TABLET, FILM COATED ORAL at 05:36

## 2021-01-01 RX ADMIN — CEFAZOLIN SODIUM 2 G: 2 INJECTION, SOLUTION INTRAVENOUS at 10:24

## 2021-01-01 RX ADMIN — TAMSULOSIN HYDROCHLORIDE 0.4 MG: 0.4 CAPSULE ORAL at 22:08

## 2021-01-01 RX ADMIN — GABAPENTIN 100 MG: 100 CAPSULE ORAL at 17:43

## 2021-01-01 RX ADMIN — Medication 100 MCG: at 15:56

## 2021-01-01 RX ADMIN — MIDAZOLAM HYDROCHLORIDE 1 MG: 1 INJECTION, SOLUTION INTRAMUSCULAR; INTRAVENOUS at 09:22

## 2021-01-01 RX ADMIN — ALBUTEROL SULFATE 2 PUFF: 90 AEROSOL, METERED RESPIRATORY (INHALATION) at 02:02

## 2021-01-01 RX ADMIN — SULFAMETHOXAZOLE AND TRIMETHOPRIM 2 TABLET: 800; 160 TABLET ORAL at 18:29

## 2021-01-01 ASSESSMENT — ENCOUNTER SYMPTOMS
HEMOPTYSIS: 0
PHOTOPHOBIA: 0
ABDOMINAL PAIN: 0
PALPITATIONS: 0
SHORTNESS OF BREATH: 1
TINGLING: 0
SENSORY CHANGE: 0
GASTROINTESTINAL NEGATIVE: 1
DIZZINESS: 0
NAUSEA: 0
ORTHOPNEA: 0
MYALGIAS: 1
VOMITING: 0
NAUSEA: 0
CONSTITUTIONAL NEGATIVE: 1
FEVER: 0
SPUTUM PRODUCTION: 0
PALPITATIONS: 0
CHILLS: 0
FOCAL WEAKNESS: 0
TINGLING: 0
COUGH: 0
NECK PAIN: 0
TINGLING: 0
PHOTOPHOBIA: 0
VOMITING: 0
SHORTNESS OF BREATH: 1
HEADACHES: 0
WHEEZING: 0
HEADACHES: 0
CONSTIPATION: 0
FEVER: 0
DEPRESSION: 0
GASTROINTESTINAL NEGATIVE: 1
DEPRESSION: 0
ABDOMINAL PAIN: 0
NECK PAIN: 0
HEMOPTYSIS: 0
DEPRESSION: 0
FEVER: 0
HEMOPTYSIS: 0
EYES NEGATIVE: 1
SPUTUM PRODUCTION: 0
ABDOMINAL PAIN: 0
MYALGIAS: 1
CONSTIPATION: 0
CHILLS: 0
DIARRHEA: 0
FEVER: 0
MUSCULOSKELETAL NEGATIVE: 1
SHORTNESS OF BREATH: 1
SHORTNESS OF BREATH: 1
DIZZINESS: 0
DEPRESSION: 0
PSYCHIATRIC NEGATIVE: 1
MYALGIAS: 0
VOMITING: 0
CARDIOVASCULAR NEGATIVE: 1
NAUSEA: 0
TINGLING: 0
MYALGIAS: 1
GASTROINTESTINAL NEGATIVE: 1
DIZZINESS: 0
SINUS PAIN: 0
SPUTUM PRODUCTION: 0
BACK PAIN: 0
WHEEZING: 0
VOMITING: 0
HEADACHES: 0
FEVER: 0
CONSTITUTIONAL NEGATIVE: 1
BLURRED VISION: 0
COUGH: 0
NAUSEA: 0
WEIGHT LOSS: 0
CHILLS: 0
SINUS PAIN: 0
SHORTNESS OF BREATH: 1
BACK PAIN: 0
SHORTNESS OF BREATH: 0
NAUSEA: 0
CARDIOVASCULAR NEGATIVE: 1
SORE THROAT: 0
FEVER: 0
WEAKNESS: 1
ABDOMINAL PAIN: 0
EYE PAIN: 0
GASTROINTESTINAL NEGATIVE: 1
MEMORY LOSS: 0
DOUBLE VISION: 0
SPEECH CHANGE: 0
SHORTNESS OF BREATH: 1
DEPRESSION: 0
ABDOMINAL PAIN: 0
FEVER: 0
GASTROINTESTINAL NEGATIVE: 1
CARDIOVASCULAR NEGATIVE: 1
BACK PAIN: 0
DIARRHEA: 0
COUGH: 1
CHILLS: 0
FEVER: 0
SPUTUM PRODUCTION: 0
MUSCULOSKELETAL NEGATIVE: 1
DIARRHEA: 0
MYALGIAS: 1
COUGH: 1
VOMITING: 0
NAUSEA: 0
EYES NEGATIVE: 1
HEADACHES: 0
GASTROINTESTINAL NEGATIVE: 1
DOUBLE VISION: 0
SPUTUM PRODUCTION: 0
BLURRED VISION: 0
DOUBLE VISION: 0
WEIGHT LOSS: 0
DOUBLE VISION: 0
CARDIOVASCULAR NEGATIVE: 1
FLANK PAIN: 0
HEADACHES: 0
MYALGIAS: 0
VOMITING: 0
NAUSEA: 0
COUGH: 1
RESPIRATORY NEGATIVE: 1
PHOTOPHOBIA: 0
VOMITING: 0
FEVER: 0
PALPITATIONS: 0
CONSTIPATION: 0
PALPITATIONS: 0
DOUBLE VISION: 0
MYALGIAS: 1
ORTHOPNEA: 0
WEIGHT LOSS: 0
GASTROINTESTINAL NEGATIVE: 1
PALPITATIONS: 0
DIZZINESS: 0
PALPITATIONS: 0
GASTROINTESTINAL NEGATIVE: 1
VOMITING: 0
SHORTNESS OF BREATH: 1
VOMITING: 0
WEAKNESS: 0
ORTHOPNEA: 0
PHOTOPHOBIA: 0
ORTHOPNEA: 0
BACK PAIN: 0
HEMOPTYSIS: 0
SHORTNESS OF BREATH: 1
PSYCHIATRIC NEGATIVE: 1
PHOTOPHOBIA: 0
SINUS PAIN: 0
HEADACHES: 0
NAUSEA: 0
CHILLS: 0
MUSCULOSKELETAL NEGATIVE: 1
ABDOMINAL PAIN: 0
MUSCULOSKELETAL NEGATIVE: 1
SPEECH CHANGE: 0
NAUSEA: 0
ABDOMINAL PAIN: 0
FOCAL WEAKNESS: 0
HEADACHES: 0
VOMITING: 0
MYALGIAS: 0
MUSCULOSKELETAL NEGATIVE: 1
CHILLS: 0
MYALGIAS: 0
HEADACHES: 0
DOUBLE VISION: 0
NECK PAIN: 0
MYALGIAS: 0
GASTROINTESTINAL NEGATIVE: 1
MYALGIAS: 0
HEADACHES: 0
WHEEZING: 0
EYES NEGATIVE: 1
HEMOPTYSIS: 0
HEMOPTYSIS: 0
BACK PAIN: 0
FEVER: 0
SPUTUM PRODUCTION: 0
BRUISES/BLEEDS EASILY: 1
CONSTITUTIONAL NEGATIVE: 1
BLURRED VISION: 0
BLURRED VISION: 0
FEVER: 0
CARDIOVASCULAR NEGATIVE: 1
ABDOMINAL PAIN: 0
WHEEZING: 0
FEVER: 0
ABDOMINAL PAIN: 0
NECK PAIN: 0
MUSCULOSKELETAL NEGATIVE: 1
SPUTUM PRODUCTION: 0
SHORTNESS OF BREATH: 1
CARDIOVASCULAR NEGATIVE: 1
SPUTUM PRODUCTION: 0
CONSTIPATION: 0
SHORTNESS OF BREATH: 1
HEMOPTYSIS: 0
HEADACHES: 0
GASTROINTESTINAL NEGATIVE: 1
SHORTNESS OF BREATH: 1
CARDIOVASCULAR NEGATIVE: 1
ABDOMINAL PAIN: 0
SPEECH CHANGE: 0
PHOTOPHOBIA: 0
WEIGHT LOSS: 0
PHOTOPHOBIA: 0
MYALGIAS: 1
HEADACHES: 0
FOCAL WEAKNESS: 0
VOMITING: 0
RESPIRATORY NEGATIVE: 1
CONSTIPATION: 0
ABDOMINAL PAIN: 0
COUGH: 1
MYALGIAS: 1
RESPIRATORY NEGATIVE: 1
HALLUCINATIONS: 0
CARDIOVASCULAR NEGATIVE: 1
PALPITATIONS: 0
DEPRESSION: 0
DOUBLE VISION: 0
CHILLS: 0
MUSCULOSKELETAL NEGATIVE: 1
BACK PAIN: 0
ORTHOPNEA: 0
WEIGHT LOSS: 0
DIARRHEA: 0
DIZZINESS: 0
MUSCULOSKELETAL NEGATIVE: 1
SPUTUM PRODUCTION: 0
DIARRHEA: 0
COUGH: 0
COUGH: 0
WEAKNESS: 1
DEPRESSION: 0
COUGH: 0
ABDOMINAL PAIN: 0
COUGH: 0
DIZZINESS: 0
SPUTUM PRODUCTION: 0
DOUBLE VISION: 0
WHEEZING: 0
FEVER: 0
DIARRHEA: 0
FOCAL WEAKNESS: 0
COUGH: 0
VOMITING: 0
ABDOMINAL PAIN: 0
TINGLING: 0
NERVOUS/ANXIOUS: 0
PALPITATIONS: 0
CARDIOVASCULAR NEGATIVE: 1
HEADACHES: 0
SHORTNESS OF BREATH: 0
SHORTNESS OF BREATH: 1
SHORTNESS OF BREATH: 1
ABDOMINAL PAIN: 0
CHILLS: 0
FEVER: 0
CONSTITUTIONAL NEGATIVE: 1
SENSORY CHANGE: 1
SHORTNESS OF BREATH: 0
NAUSEA: 0
FOCAL WEAKNESS: 0
DOUBLE VISION: 0
HEADACHES: 0
COUGH: 0
SPEECH CHANGE: 0
SORE THROAT: 0
ORTHOPNEA: 0
SHORTNESS OF BREATH: 0
WEIGHT LOSS: 0
DOUBLE VISION: 0
ORTHOPNEA: 0
SHORTNESS OF BREATH: 1
SINUS PAIN: 0
CARDIOVASCULAR NEGATIVE: 1
CARDIOVASCULAR NEGATIVE: 1
CHILLS: 0
MUSCULOSKELETAL NEGATIVE: 1
DOUBLE VISION: 0
VOMITING: 0
HEADACHES: 0
HEADACHES: 0
BLURRED VISION: 0
COUGH: 0
CARDIOVASCULAR NEGATIVE: 1
VOMITING: 0
CONSTITUTIONAL NEGATIVE: 1
SPUTUM PRODUCTION: 0
GASTROINTESTINAL NEGATIVE: 1
NAUSEA: 0
MYALGIAS: 1
EYE DISCHARGE: 0
DIZZINESS: 0
BLURRED VISION: 0
CHILLS: 0
CARDIOVASCULAR NEGATIVE: 1
ORTHOPNEA: 0
NERVOUS/ANXIOUS: 0
CHILLS: 0
GASTROINTESTINAL NEGATIVE: 1
SORE THROAT: 0
SINUS PAIN: 0
ABDOMINAL PAIN: 0
COUGH: 0
PALPITATIONS: 0
FOCAL WEAKNESS: 0
WHEEZING: 0
FOCAL WEAKNESS: 0
EYE REDNESS: 0
NERVOUS/ANXIOUS: 0
LOSS OF CONSCIOUSNESS: 0
NAUSEA: 0
DIARRHEA: 0
VOMITING: 0
GASTROINTESTINAL NEGATIVE: 1
VOMITING: 0
DEPRESSION: 0
SINUS PAIN: 0
NERVOUS/ANXIOUS: 0
DIZZINESS: 0
ORTHOPNEA: 0
COUGH: 0
DIAPHORESIS: 0
DIARRHEA: 0
PALPITATIONS: 0
CHILLS: 0
COUGH: 0
TINGLING: 0
DIZZINESS: 0
FOCAL WEAKNESS: 0
COUGH: 1
COUGH: 0
CHILLS: 0
ABDOMINAL PAIN: 0
SHORTNESS OF BREATH: 0
NAUSEA: 0
DIARRHEA: 0
WEAKNESS: 1
WEIGHT LOSS: 0
NAUSEA: 0
NECK PAIN: 0
CONSTITUTIONAL NEGATIVE: 1
SORE THROAT: 0
SENSORY CHANGE: 0
COUGH: 0
PSYCHIATRIC NEGATIVE: 1
NECK PAIN: 0
BLURRED VISION: 0
NECK PAIN: 0
BACK PAIN: 0
SORE THROAT: 0
WHEEZING: 0
NERVOUS/ANXIOUS: 0
FEVER: 0
SPEECH CHANGE: 0
TINGLING: 0
BLURRED VISION: 0
NAUSEA: 0
MYALGIAS: 1
NECK PAIN: 0
BACK PAIN: 0
MYALGIAS: 0
SENSORY CHANGE: 1
MYALGIAS: 0
SHORTNESS OF BREATH: 0
COUGH: 0
COUGH: 1
WHEEZING: 0
HEADACHES: 0
SENSORY CHANGE: 1

## 2021-01-01 ASSESSMENT — LIFESTYLE VARIABLES
EVER HAD A DRINK FIRST THING IN THE MORNING TO STEADY YOUR NERVES TO GET RID OF A HANGOVER: NO
HAVE PEOPLE ANNOYED YOU BY CRITICIZING YOUR DRINKING: NO
CONSUMPTION TOTAL: NEGATIVE
HAVE YOU EVER FELT YOU SHOULD CUT DOWN ON YOUR DRINKING: NO
CONSUMPTION TOTAL: NEGATIVE
HOW MANY TIMES IN THE PAST YEAR HAVE YOU HAD 5 OR MORE DRINKS IN A DAY: 0
HAVE YOU EVER FELT YOU SHOULD CUT DOWN ON YOUR DRINKING: NO
ALCOHOL_USE: NO
ALCOHOL_USE: NO
TOTAL SCORE: 0
AVERAGE NUMBER OF DAYS PER WEEK YOU HAVE A DRINK CONTAINING ALCOHOL: 0
HOW MANY TIMES IN THE PAST YEAR HAVE YOU HAD 5 OR MORE DRINKS IN A DAY: 0
DO YOU DRINK ALCOHOL: NO
ON A TYPICAL DAY WHEN YOU DRINK ALCOHOL HOW MANY DRINKS DO YOU HAVE: 0
TOTAL SCORE: 0
DOES PATIENT WANT TO STOP DRINKING: NO
TOTAL SCORE: 0
TOTAL SCORE: 0
AVERAGE NUMBER OF DAYS PER WEEK YOU HAVE A DRINK CONTAINING ALCOHOL: 0
EVER HAD A DRINK FIRST THING IN THE MORNING TO STEADY YOUR NERVES TO GET RID OF A HANGOVER: NO
ON A TYPICAL DAY WHEN YOU DRINK ALCOHOL HOW MANY DRINKS DO YOU HAVE: 0
TOTAL SCORE: 0
TOTAL SCORE: 0
DOES PATIENT WANT TO STOP DRINKING: NO
SUBSTANCE_ABUSE: 0
EVER FELT BAD OR GUILTY ABOUT YOUR DRINKING: NO
HAVE PEOPLE ANNOYED YOU BY CRITICIZING YOUR DRINKING: NO
EVER FELT BAD OR GUILTY ABOUT YOUR DRINKING: NO

## 2021-01-01 ASSESSMENT — CHA2DS2 SCORE
CHA2DS2 VASC SCORE: 6
DIABETES: YES
AGE 65 TO 74: YES
VASCULAR DISEASE: NO
DIABETES: YES
HYPERTENSION: YES
AGE 75 OR GREATER: NO
AGE 75 OR GREATER: NO
AGE 65 TO 74: YES
CHF OR LEFT VENTRICULAR DYSFUNCTION: NO
SEX: MALE
SEX: MALE
CHF OR LEFT VENTRICULAR DYSFUNCTION: NO
VASCULAR DISEASE: YES
PRIOR STROKE OR TIA OR THROMBOEMBOLISM: YES
PRIOR STROKE OR TIA OR THROMBOEMBOLISM: YES
CHA2DS2 VASC SCORE: 5
HYPERTENSION: YES

## 2021-01-01 ASSESSMENT — PAIN DESCRIPTION - PAIN TYPE
TYPE: ACUTE PAIN
TYPE: SURGICAL PAIN
TYPE: ACUTE PAIN
TYPE: ACUTE PAIN;CHRONIC PAIN
TYPE: ACUTE PAIN
TYPE: SURGICAL PAIN
TYPE: ACUTE PAIN
TYPE: ACUTE PAIN;CHRONIC PAIN
TYPE: SURGICAL PAIN
TYPE: ACUTE PAIN
TYPE: ACUTE PAIN
TYPE: SURGICAL PAIN
TYPE: ACUTE PAIN
TYPE: ACUTE PAIN
TYPE: CHRONIC PAIN
TYPE: ACUTE PAIN
TYPE: ACUTE PAIN;CHRONIC PAIN
TYPE: SURGICAL PAIN
TYPE: ACUTE PAIN
TYPE: SURGICAL PAIN
TYPE: ACUTE PAIN
TYPE: ACUTE PAIN;CHRONIC PAIN
TYPE: ACUTE PAIN
TYPE: ACUTE PAIN;CHRONIC PAIN
TYPE: ACUTE PAIN
TYPE: SURGICAL PAIN
TYPE: ACUTE PAIN
TYPE: ACUTE PAIN
TYPE: SURGICAL PAIN
TYPE: ACUTE PAIN;CHRONIC PAIN
TYPE: ACUTE PAIN

## 2021-01-01 ASSESSMENT — COGNITIVE AND FUNCTIONAL STATUS - GENERAL
PERSONAL GROOMING: A LOT
HELP NEEDED FOR BATHING: A LOT
WALKING IN HOSPITAL ROOM: TOTAL
SUGGESTED CMS G CODE MODIFIER DAILY ACTIVITY: CK
DRESSING REGULAR LOWER BODY CLOTHING: A LOT
TURNING FROM BACK TO SIDE WHILE IN FLAT BAD: UNABLE
EATING MEALS: TOTAL
DRESSING REGULAR UPPER BODY CLOTHING: TOTAL
MOVING TO AND FROM BED TO CHAIR: A LOT
TOILETING: A LOT
DRESSING REGULAR LOWER BODY CLOTHING: A LOT
DAILY ACTIVITIY SCORE: 6
PERSONAL GROOMING: A LITTLE
SUGGESTED CMS G CODE MODIFIER MOBILITY: CL
MOVING TO AND FROM BED TO CHAIR: UNABLE
SUGGESTED CMS G CODE MODIFIER MOBILITY: CN
MOVING FROM LYING ON BACK TO SITTING ON SIDE OF FLAT BED: UNABLE
HELP NEEDED FOR BATHING: A LOT
CLIMB 3 TO 5 STEPS WITH RAILING: TOTAL
WALKING IN HOSPITAL ROOM: TOTAL
WALKING IN HOSPITAL ROOM: A LOT
STANDING UP FROM CHAIR USING ARMS: TOTAL
MOBILITY SCORE: 7
DRESSING REGULAR UPPER BODY CLOTHING: A LOT
PERSONAL GROOMING: A LITTLE
DRESSING REGULAR LOWER BODY CLOTHING: A LOT
TOILETING: A LOT
DAILY ACTIVITIY SCORE: 14
MOVING FROM LYING ON BACK TO SITTING ON SIDE OF FLAT BED: UNABLE
MOVING TO AND FROM BED TO CHAIR: A LOT
DAILY ACTIVITIY SCORE: 15
CLIMB 3 TO 5 STEPS WITH RAILING: TOTAL
WALKING IN HOSPITAL ROOM: TOTAL
SUGGESTED CMS G CODE MODIFIER MOBILITY: CL
STANDING UP FROM CHAIR USING ARMS: A LOT
STANDING UP FROM CHAIR USING ARMS: A LOT
SUGGESTED CMS G CODE MODIFIER DAILY ACTIVITY: CK
DRESSING REGULAR LOWER BODY CLOTHING: TOTAL
DAILY ACTIVITIY SCORE: 13
TOILETING: A LOT
EATING MEALS: A LITTLE
SUGGESTED CMS G CODE MODIFIER DAILY ACTIVITY: CN
CLIMB 3 TO 5 STEPS WITH RAILING: TOTAL
WALKING IN HOSPITAL ROOM: A LOT
MOBILITY SCORE: 8
SUGGESTED CMS G CODE MODIFIER MOBILITY: CM
MOBILITY SCORE: 10
STANDING UP FROM CHAIR USING ARMS: TOTAL
STANDING UP FROM CHAIR USING ARMS: A LOT
MOBILITY SCORE: 10
TURNING FROM BACK TO SIDE WHILE IN FLAT BAD: A LITTLE
SUGGESTED CMS G CODE MODIFIER DAILY ACTIVITY: CK
EATING MEALS: A LITTLE
MOBILITY SCORE: 10
MOBILITY SCORE: 6
MOVING TO AND FROM BED TO CHAIR: UNABLE
SUGGESTED CMS G CODE MODIFIER MOBILITY: CL
HELP NEEDED FOR BATHING: A LOT
DRESSING REGULAR UPPER BODY CLOTHING: A LOT
WALKING IN HOSPITAL ROOM: TOTAL
MOVING FROM LYING ON BACK TO SITTING ON SIDE OF FLAT BED: UNABLE
MOVING TO AND FROM BED TO CHAIR: UNABLE
MOVING TO AND FROM BED TO CHAIR: A LOT
CLIMB 3 TO 5 STEPS WITH RAILING: TOTAL
TOILETING: TOTAL
MOVING FROM LYING ON BACK TO SITTING ON SIDE OF FLAT BED: UNABLE
HELP NEEDED FOR BATHING: A LOT
STANDING UP FROM CHAIR USING ARMS: A LOT
TURNING FROM BACK TO SIDE WHILE IN FLAT BAD: A LOT
TURNING FROM BACK TO SIDE WHILE IN FLAT BAD: A LOT
MOVING FROM LYING ON BACK TO SITTING ON SIDE OF FLAT BED: UNABLE
CLIMB 3 TO 5 STEPS WITH RAILING: TOTAL
TOILETING: A LOT
TURNING FROM BACK TO SIDE WHILE IN FLAT BAD: A LOT
PERSONAL GROOMING: TOTAL
PERSONAL GROOMING: A LITTLE
DRESSING REGULAR UPPER BODY CLOTHING: A LOT
SUGGESTED CMS G CODE MODIFIER DAILY ACTIVITY: CL
TURNING FROM BACK TO SIDE WHILE IN FLAT BAD: A LOT
DAILY ACTIVITIY SCORE: 15
HELP NEEDED FOR BATHING: TOTAL
SUGGESTED CMS G CODE MODIFIER MOBILITY: CM
DRESSING REGULAR LOWER BODY CLOTHING: A LOT
CLIMB 3 TO 5 STEPS WITH RAILING: TOTAL
DRESSING REGULAR UPPER BODY CLOTHING: A LOT
MOVING FROM LYING ON BACK TO SITTING ON SIDE OF FLAT BED: UNABLE

## 2021-01-01 ASSESSMENT — PATIENT HEALTH QUESTIONNAIRE - PHQ9
SUM OF ALL RESPONSES TO PHQ9 QUESTIONS 1 AND 2: 0
2. FEELING DOWN, DEPRESSED, IRRITABLE, OR HOPELESS: NOT AT ALL
2. FEELING DOWN, DEPRESSED, IRRITABLE, OR HOPELESS: NOT AT ALL
1. LITTLE INTEREST OR PLEASURE IN DOING THINGS: NOT AT ALL
SUM OF ALL RESPONSES TO PHQ9 QUESTIONS 1 AND 2: 0
1. LITTLE INTEREST OR PLEASURE IN DOING THINGS: NOT AT ALL

## 2021-01-01 ASSESSMENT — GAIT ASSESSMENTS
GAIT LEVEL OF ASSIST: UNABLE TO PARTICIPATE

## 2021-01-01 ASSESSMENT — PAIN SCALES - GENERAL: PAIN_LEVEL: 2

## 2021-01-01 ASSESSMENT — FIBROSIS 4 INDEX
FIB4 SCORE: 4.06
FIB4 SCORE: 1.658312395177699925
FIB4 SCORE: 1.55
FIB4 SCORE: 1.75
FIB4 SCORE: 2.66
FIB4 SCORE: 1.46
FIB4 SCORE: 1.55

## 2021-01-01 ASSESSMENT — COPD QUESTIONNAIRES
DURING THE PAST 4 WEEKS HOW MUCH DID YOU FEEL SHORT OF BREATH: NONE/LITTLE OF THE TIME
COPD SCREENING SCORE: 2
HAVE YOU SMOKED AT LEAST 100 CIGARETTES IN YOUR ENTIRE LIFE: NO/DON'T KNOW
DO YOU EVER COUGH UP ANY MUCUS OR PHLEGM?: NO/ONLY WITH OCCASIONAL COLDS OR INFECTIONS

## 2021-01-01 ASSESSMENT — ACTIVITIES OF DAILY LIVING (ADL)
TOILETING: REQUIRES ASSIST
TOILETING: UNABLE TO DETERMINE AT THIS TIME

## 2021-01-19 NOTE — TELEPHONE ENCOUNTER
PC to Simran Cheatham (spouse) to discuss discharge planning needs and referral to [facility].  Mr. Stone remains at [facility], recovering from Covid as well as continued difficulty with mobility r/t foot surgery.  Effective 1/20/21, he will have regenerated his skilled benefit as per the Medicare rules r/t covid.   The [facility] program and benefit was reviewed with Simran, who stated that she is agreeable to both [facility] and [facility] referrals when Agapito is closer to returning home.    Agapito has been patient's of Dr. Zamorano (who has left the [facility] system), and will be transferring his primary care to a different [facility] provider once he has returned home.    TCN will continue follow up calls to Agapito as well as his wife, in anticipation of his eventual return home.

## 2021-03-26 NOTE — TELEPHONE ENCOUNTER
PC to Simran (pt's wife) on 3/25/21 regarding follow up for discharge planning and referral to [facility].    Per Simran, she is looking into the possibility of having a stair lift installed, as Mr. Stone is not currently able to ambulate and will likely remain at WC level at d/c.   Clarification with Simran r/t number of skilled days left for Mr. Stone; TCN clarified that the patient must continue to progress in his abilities in order to continue being in skilled care.  She indicated understanding.    TCN will continue to follow.

## 2021-04-12 NOTE — TELEPHONE ENCOUNTER
Please advise patient to establish with new PCP. Thanks! Remedios Davis P.A.-C. covering for Brody Zamorano M.D.

## 2021-04-22 PROBLEM — I82.409 DEEP VEIN THROMBOSIS (DVT) OF LOWER EXTREMITY (HCC): Status: ACTIVE | Noted: 2020-01-01

## 2021-04-22 PROBLEM — M86.9 OSTEOMYELITIS OF LEFT FOOT (HCC): Status: ACTIVE | Noted: 2021-01-01
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
## 2021-04-22 NOTE — ED NOTES
Med Rec completed per patient's MAR from [facility]   Allergies reviewed  No ORAL antibiotics in last 14 days

## 2021-04-22 NOTE — ED TRIAGE NOTES
Agapito Stone  Chief Complaint   Patient presents with   • Vascular Access Problem     Pt presents today for placement of a picc line. Pt is from [facility] and Pt states he was told to come to [facility] for placement of picc line. Pt in ER  due to not being able to find process for picc line placement. Pt casually mentioned that he has bone infection in his left foot which is why he needs the picc line.   Pt bib ems from [facility]    /73   Pulse 90   Temp 36.8 °C (98.3 °F) (Temporal)   Resp 14   Ht 1.829 m (6')   Wt 86.2 kg (190 lb)   SpO2 92%   BMI 25.77 kg/m²

## 2021-04-23 NOTE — ED NOTES
Pt resting comfortably in bed. Monitors in place VSS. No s/s of distress noted. Pt denies any needs at this time. Will continue to monitor.

## 2021-04-23 NOTE — CARE PLAN
Problem: Safety  Goal: Will remain free from injury  Outcome: PROGRESSING AS EXPECTED  Note: Educated not to try to get out of bed without staff present     Problem: Pain Management  Goal: Pain level will decrease to patient's comfort goal  Outcome: PROGRESSING AS EXPECTED  Note: Medicate per MAR prn     Problem: Skin Integrity  Goal: Risk for impaired skin integrity will decrease  Outcome: PROGRESSING AS EXPECTED  Note: Incontinent of urine, frequent linen changes in use

## 2021-04-23 NOTE — PROGRESS NOTES
Pharmacy Kinetics 76 y.o. male on vancomycin day # 2    2021    Currently on Vancomycin 1250 mg iv q12hr (1000 2200)  Provider specified end date: TBD    Indication for Treatment: SSTI    Pertinent history per medical record: Admitted on 2021 for Osteo. 77 y/o M who presented 2021 with PMHx of coronary artery disease, diabetes, hypertension, CVA and left lower extremity DVT on chronic anticoagulation with Eliquis presents with worsening left foot pain x1 week.  X-ray of the foot is consistent with osteomyelitis of the fifth digit.  We will follow-up MRI of the foot.  Limb preservation services has been consulted, will follow up recommendation.    Other antibiotics: Rocephin 2g q24hr     Allergies: Fish, Pcn [penicillins], Amoxicillin, and Food     List concerns for renal function: malnutrition/low albumin    Pertinent cultures to date:   21:PBCx2:NGTD    MRSA nares swab if pneumonia is a concern (ordered/positive/negative/n-a): NA     Recent Labs     21  1612 21  0400   WBC 9.3 7.9   NEUTSPOLYS 73.00* 70.60     Recent Labs     21  1612 21  0400   BUN 29* 28*   CREATININE 1.01 0.97   ALBUMIN 2.8*  --      No results for input(s): VANCOTROUGH, VANCOPEAK, VANCORANDOM in the last 72 hours.    Intake/Output Summary (Last 24 hours) at 2021 0830  Last data filed at 2021 0804  Gross per 24 hour   Intake 332 ml   Output 100 ml   Net 232 ml      /67   Pulse 95   Temp 36.9 °C (98.4 °F) (Temporal)   Resp 18   Ht 1.829 m (6')   Wt 86.2 kg (190 lb)   SpO2 98%  Temp (24hrs), Av.8 °C (98.2 °F), Min:36.6 °C (97.8 °F), Max:36.9 °C (98.4 °F)      A/P   1. Vancomycin dose change: No change   2. Next vancomycin level: 21@0930  3. Goal trough: 10-15 mcg/mL   4. Comments: No leukocytosis , afebrile, cx NGTD. Trending renal indices, vancomycin level ordered. Continue current dose.     Andres Sullivan PharmD BCPS

## 2021-04-23 NOTE — H&P
Hospital Medicine History & Physical Note    Date of Service  4/22/2021    Primary Care Physician  Brody Zamorano M.D.    Consultants  LPS    Code Status  Full Code    Chief Complaint  Chief Complaint   Patient presents with   • Vascular Access Problem     Pt presents today for placement of a picc line. Pt is from [facility] and Pt states he was told to come to [facility] for placement of picc line. Pt in ER  due to not being able to find process for picc line placement. Pt casually mentioned that he has bone infection in his left foot which is why he needs the picc line.       History of Presenting Illness  76 y.o. male who presented 4/22/2021 with history of coronary artery disease, diabetes, hypertension, CVA and left lower extremity DVT on chronic anticoagulation with Eliquis presents with worsening left foot pain x1 week.  Patient was sent to the emergency room for PICC line placement for suspected osteomyelitis of the foot.  He had sustained an injury a week ago with noted worsening lower extremity wound.  As per patient outpatient culture grew 3 different organisms per emergency room records.  X-ray of the foot is consistent with osteomyelitis of the fifth digit.  We will follow-up MRI of the foot.  Limb preservation services has been consulted, will follow up recommendation.    We will repeat wound culture, ID to consult in a.m. for antibiotic management assistance.    We will keep n.p.o. after midnight for surgical evaluation.    On evaluation, patient reports pain, controlled on prescription.  Denies any fever, chills, chest pain, shortness of breath or recent illness aside from above.    Patient is on chronic anticoagulation with Eliquis which is now held.    Review of Systems  Review of Systems   Constitutional: Negative for chills, diaphoresis, fever and malaise/fatigue.   HENT: Negative for congestion and hearing loss.    Eyes: Negative for blurred vision and double vision.   Respiratory: Negative for  cough and shortness of breath.    Cardiovascular: Negative for chest pain, palpitations and leg swelling.   Gastrointestinal: Negative for abdominal pain, nausea and vomiting.   Genitourinary: Negative for dysuria and flank pain.   Musculoskeletal: Positive for myalgias. Negative for back pain and joint pain.   Neurological: Negative for dizziness, sensory change, speech change, focal weakness, weakness and headaches.   Psychiatric/Behavioral: Negative for depression and memory loss. The patient is not nervous/anxious.        Past Medical History   has a past medical history of Arthritis, CAD (coronary artery disease) (October 2013), Cataract, Diabetes, High cholesterol, Hyperlipidemia, Hypertension, Pain, Pneumonia (1968), Stroke ([facility]) (2010), and Syncope.    Surgical History   has a past surgical history that includes carotid endarterectomy (7/13/2010); stent placement (2013); tonsillectomy (as a child); cataract extraction with iol (Bilateral); tendon lenghtening (Right, 8/24/2020); ankle fusion (Right, 8/24/2020); tendon transfer (Right, 8/24/2020); orthopedic osteotomy (Right, 8/24/2020); hammertoe correction (Right, 8/24/2020); and irrigation & debridement ortho (Right, 12/10/2020).     Family History  family history includes Other in his mother.     Social History   reports that he has never smoked. He has never used smokeless tobacco. He reports previous alcohol use. He reports that he does not use drugs.    Allergies  Allergies   Allergen Reactions   • Fish Hives     shellfish   • Pcn [Penicillins] Hives     Tolerates cephalosporins   • Amoxicillin Hives   • Food Swelling      Eggs,Melons, avocados-puffy mouth       Medications  Prior to Admission Medications   Prescriptions Last Dose Informant Patient Reported? Taking?   Ascorbic Acid (VITAMIN C) 1000 MG Tab 4/22/2021 at 0800 MAR from Other Facility Yes Yes   Sig: Take 1,000 mg by mouth every day.   Cholecalciferol (VITAMIN D-3) 5000 UNITS TABS 4/22/2021  at 0800 MAR from Other Facility Yes No   Sig: Take 1 Tab by mouth every 48 hours.   Empagliflozin-metFORMIN HCl (SYNJARDY) 5-1000 MG Tab 4/22/2021 at 0800 MAR from Other Facility No No   Sig: Take 1 Tab by mouth 2 Times a Day.   Multiple Vitamin (MULTI VITAMIN PO) 4/22/2021 at 0800 MAR from Other Facility Yes Yes   Sig: Take 1 tablet by mouth every day.   acetaminophen (TYLENOL) 325 MG Tab 4/22/2021 at 1130 MAR from Other Facility Yes Yes   Sig: Take 650 mg by mouth every four hours as needed.   apixaban (ELIQUIS) 5mg Tab 4/22/2021 at 0800 MAR from Other Facility No No   Sig: Take 1 Tab by mouth 2 Times a Day.   aspirin EC (ECOTRIN) 81 MG Tablet Delayed Response 4/22/2021 at 0800 MAR from Other Facility Yes No   Sig: Take 81 mg by mouth every day.   atorvastatin (LIPITOR) 80 MG tablet 4/21/2021 at 2000 MAR from Other Facility No No   Sig: TAKE ONE TABLET BY MOUTH DAILY IN THE EVENING   Patient taking differently: Take 80 mg by mouth at bedtime. TAKE ONE TABLET BY MOUTH DAILY IN THE EVENING   calcium carbonate (TUMS E-X 750) 750 MG chewable tablet PRN at PRN MAR from Other Facility Yes Yes   Sig: Chew 1,500 mg every four hours as needed.   carvedilol (COREG) 3.125 MG Tab 4/22/2021 at 0800 MAR from Other Facility Yes Yes   Sig: Take 3.125 mg by mouth 2 times a day with meals.   carvedilol (COREG) 6.25 MG Tab NOT TAKING at NOT TAKING MAR from Other Facility No No   Sig: Take 1 Tab by mouth 2 times a day, with meals.   Patient not taking: Reported on 4/22/2021   gabapentin (NEURONTIN) 100 MG Cap 4/22/2021 at 0800 MAR from Other Facility Yes Yes   Sig: Take 100 mg by mouth 2 times a day.   magnesium oxide (MAG-OX) 400 MG Tab tablet 4/22/2021 at 0800 MAR from Other Facility No No   Sig: Take 1 Tab by mouth 2 Times a Day.   montelukast (SINGULAIR) 10 MG Tab 4/21/2021 at 2000 MAR from Other Facility No No   Sig: Take 1 tablet by mouth every day.   nitroglycerin (NITROSTAT) 0.4 MG SL Tab PRN at PRN MAR from Other Facility No  No   Sig: Place 1 Tab under tongue as needed for Chest Pain.   ondansetron (ZOFRAN ODT) 4 MG TABLET DISPERSIBLE PRN at PRN MAR from Other Facility Yes No   Sig: Take 4 mg by mouth every 6 hours as needed for Nausea.   pioglitazone (ACTOS) 15 MG Tab 4/22/2021 at 0800 MAR from Other Facility Yes No   Sig: Take 15 mg by mouth every day.   tamsulosin (FLOMAX) 0.4 MG capsule 4/21/2021 at 2000 MAR from Other Facility Yes Yes   Sig: Take 0.4 mg by mouth at bedtime.      Facility-Administered Medications: None       Physical Exam  Temp:  [36.8 °C (98.3 °F)] 36.8 °C (98.3 °F)  Pulse:  [87-96] 96  Resp:  [14] 14  BP: ()/(58-74) 115/74  SpO2:  [92 %-93 %] 92 %    Physical Exam  Vitals and nursing note reviewed.   Constitutional:       General: He is not in acute distress.     Appearance: He is not ill-appearing, toxic-appearing or diaphoretic.   HENT:      Head: Normocephalic and atraumatic.      Nose: Nose normal.   Eyes:      General:         Right eye: No discharge.         Left eye: No discharge.      Pupils: Pupils are equal, round, and reactive to light.   Neck:      Thyroid: No thyromegaly.      Vascular: No JVD.   Cardiovascular:      Rate and Rhythm: Normal rate.      Heart sounds: No murmur.   Pulmonary:      Effort: No respiratory distress.      Breath sounds: Normal breath sounds. No wheezing.   Abdominal:      General: Bowel sounds are normal. There is no distension.      Palpations: Abdomen is soft. There is no mass.      Tenderness: There is no abdominal tenderness.      Hernia: No hernia is present.   Musculoskeletal:         General: Tenderness present. No swelling.      Cervical back: Neck supple.      Right lower leg: No edema.      Left lower leg: No edema.   Skin:     General: Skin is warm and dry.      Coloration: Skin is not pale.      Findings: Erythema present. No rash.      Comments: Left lateral foot open ulceration measuring 2 x 3 cm, minimal tenderness to palpation   Neurological:      Mental  Status: He is alert and oriented to person, place, and time.      Cranial Nerves: No cranial nerve deficit.      Sensory: No sensory deficit.      Motor: No weakness.      Coordination: Coordination abnormal.   Psychiatric:         Behavior: Behavior normal.         Thought Content: Thought content normal.         Laboratory:  Recent Labs     04/22/21  1612   WBC 9.3   RBC 3.90*   HEMOGLOBIN 11.2*   HEMATOCRIT 35.5*   MCV 91.0   MCH 28.7   MCHC 31.5*   RDW 53.9*   PLATELETCT 363   MPV 8.9*     Recent Labs     04/22/21  1612   SODIUM 139   POTASSIUM 4.5   CHLORIDE 103   CO2 27   GLUCOSE 94   BUN 29*   CREATININE 1.01   CALCIUM 8.8     Recent Labs     04/22/21  1612   ALTSGPT 16   ASTSGOT 23   ALKPHOSPHAT 262*   TBILIRUBIN 0.7   GLUCOSE 94         No results for input(s): NTPROBNP in the last 72 hours.      No results for input(s): TROPONINT in the last 72 hours.    Imaging:  DX-FOOT-COMPLETE 3+ LEFT   Final Result      Findings in keeping with osteomyelitis involving the fifth distal metatarsal, proximal and middle phalanges.      US-EXTREMITY ARTERY LOWER UNILAT W/HAKEEM (COMBO) LEFT    (Results Pending)   MR-FOOT-W/O LEFT    (Results Pending)         Assessment/Plan:  I anticipate this patient will require at least two midnights for appropriate medical management, necessitating inpatient admission.    * Osteomyelitis of left foot (HCC)  Assessment & Plan  Patient will be admitted to the surgical inpatient unit  He has been started on IV antibiotics with Zosyn and vancomycin  Prior lower extremity wound was positive for MRSA and diphtheroids in December 2020  As per patient, cultures at outlying facility grew 3 organisms  We will consult ID in a.m. to assist with antibiotic regimen  We will follow up repeat wound culture  Pain control  Blood sugar control    Secondary to diabetes and peripheral vascular disease  We will follow-up HAKEEM  Limb preservation services has been consulted, will maintain n.p.o. for surgical  evaluation, appreciate input  Follow-up MRI of the foot  X-ray consistent with osteomyelitis of the lateral fifth digit      Hyperlipidemia associated with type 2 diabetes mellitus (HCC)- (present on admission)  Assessment & Plan  Follow-up lipid profile    CAD (coronary artery disease)- (present on admission)  Assessment & Plan  History of  Follow-up EKG    HTN (hypertension)- (present on admission)  Assessment & Plan  Controlled, continue home medication    Anemia- (present on admission)  Assessment & Plan  Monitor    Age-related physical debility- (present on admission)  Assessment & Plan  .    Deep vein thrombosis (DVT) of lower extremity (HCC)- (present on admission)  Assessment & Plan  On Eliquis, now held, possible procedure in a.m.  On heparin subcu    History of stroke- (present on admission)  Assessment & Plan  History of    Type 2 diabetes mellitus with both eyes affected by mild nonproliferative retinopathy without macular edema, without long-term current use of insulin (HCC)- (present on admission)  Assessment & Plan  .    Type 2 diabetes mellitus with peripheral neuropathy (HCC)- (present on admission)  Assessment & Plan  Continue gabapentin  Sliding scale insulin  Hold home medications  Follow-up A1c

## 2021-04-23 NOTE — PROGRESS NOTES
[facility] Medicine Daily Progress Note    Date of Service  4/23/2021    Chief Complaint  76 y.o. male admitted 4/22/2021 with for PICC line placement, osteomyelitis    Hospital Course  76 y.o. male who presented 4/22/2021 with history of coronary artery disease, diabetes, hypertension, CVA and left lower extremity DVT on chronic anticoagulation with Eliquis presents with worsening left foot pain x1 week.  Patient was sent to the emergency room for PICC line placement for suspected osteomyelitis of the foot.  He had sustained an injury a week ago with noted worsening lower extremity wound.  As per patient outpatient culture grew 3 different organisms per emergency room records.  X-ray of the foot is consistent with osteomyelitis of the fifth digit.  LPS, ID, orthopedics consulted.  HAKEEM no signs of occlusion.  MRI pending.  C-reactive elevated.  He is on ceftriaxone and vancomycin.  Cultures to follow.  Eliquis is held due to possible surgery.      Interval Problem Update  Pt believes that he will go back to [place]. I did explain that pending eval from surgery, ID before transfer to [place]. Continue current antibiotic.     Consultants/Specialty  LPS  Ortho  ID     Code Status  Full Code    Disposition  Inpatient     Review of Systems  Review of Systems   Constitutional: Negative for fever and malaise/fatigue.   Respiratory: Negative for cough and shortness of breath.    Cardiovascular: Positive for leg swelling. Negative for chest pain.   Gastrointestinal: Negative for abdominal pain, nausea and vomiting.   Genitourinary: Negative for dysuria and urgency.   Musculoskeletal: Positive for joint pain.   Neurological: Negative for speech change and headaches.        Physical Exam  Temp:  [36.6 °C (97.8 °F)-36.9 °C (98.4 °F)] 36.9 °C (98.4 °F)  Pulse:  [] 95  Resp:  [14-18] 18  BP: ()/(58-74) 109/67  SpO2:  [92 %-98 %] 98 %    Physical Exam  Vitals and nursing note reviewed.   Constitutional:       Appearance: He is  ill-appearing.   HENT:      Head: Normocephalic and atraumatic.      Right Ear: External ear normal.      Left Ear: External ear normal.      Nose: No congestion.   Eyes:      General: No scleral icterus.  Cardiovascular:      Rate and Rhythm: Normal rate.      Heart sounds: No murmur.   Pulmonary:      Effort: No respiratory distress.   Abdominal:      General: There is no distension.      Palpations: Abdomen is soft.      Tenderness: There is no abdominal tenderness. There is no guarding.   Musculoskeletal:         General: Tenderness and signs of injury present.      Comments: Left lateral open wound, + discharges    Skin:     General: Skin is dry.      Findings: Rash present.   Neurological:      Mental Status: He is alert and oriented to person, place, and time.         Fluids    Intake/Output Summary (Last 24 hours) at 4/23/2021 1429  Last data filed at 4/23/2021 1200  Gross per 24 hour   Intake 914 ml   Output 400 ml   Net 514 ml       Laboratory  Recent Labs     04/22/21  1612 04/23/21  0400   WBC 9.3 7.9   RBC 3.90* 3.54*   HEMOGLOBIN 11.2* 10.3*   HEMATOCRIT 35.5* 33.1*   MCV 91.0 93.5   MCH 28.7 29.1   MCHC 31.5* 31.1*   RDW 53.9* 56.3*   PLATELETCT 363 304   MPV 8.9* 8.9*     Recent Labs     04/22/21  1612 04/23/21  0400   SODIUM 139 140   POTASSIUM 4.5 4.5   CHLORIDE 103 106   CO2 27 28   GLUCOSE 94 92   BUN 29* 28*   CREATININE 1.01 0.97   CALCIUM 8.8 8.5             Recent Labs     04/23/21  0400   TRIGLYCERIDE 63   HDL 32*   LDL 45       Imaging  US-HAKEEM SINGLE LEVEL BILAT   Final Result      DX-FOOT-COMPLETE 3+ LEFT   Final Result      Findings in keeping with osteomyelitis involving the fifth distal metatarsal, proximal and middle phalanges.      MR-FOOT-W/O LEFT    (Results Pending)        Assessment/Plan  * Osteomyelitis of left foot (HCC)- (present on admission)  Assessment & Plan  Patient will be admitted to the surgical inpatient unit  He has been started on IV antibiotics with Zosyn and  vancomycin  Prior lower extremity wound was positive for MRSA and diphtheroids in December 2020  As per patient, cultures at outlying facility grew 3 organisms  ID consulted   We will follow up repeat wound culture  Pain control  Blood sugar control    Secondary to diabetes and peripheral vascular disease   HAKEEM 4/23- negative   Limb preservation services has been consulted, will maintain n.p.o. for surgical evaluation, appreciate input  Follow-up MRI of the foot  X-ray consistent with osteomyelitis of the lateral fifth digit      Hyperlipidemia associated with type 2 diabetes mellitus (HCC)- (present on admission)  Assessment & Plan  Follow-up lipid profile    CAD (coronary artery disease)- (present on admission)  Assessment & Plan  History of  Follow-up EKG no acute findings     HTN (hypertension)- (present on admission)  Assessment & Plan  Controlled, continue home medication    Anemia- (present on admission)  Assessment & Plan  Monitor    Age-related physical debility- (present on admission)  Assessment & Plan  .    Deep vein thrombosis (DVT) of lower extremity (HCC)- (present on admission)  Assessment & Plan  On Eliquis, now held, possible procedure in a.m.  On heparin subcu    History of stroke- (present on admission)  Assessment & Plan  History of    Type 2 diabetes mellitus with both eyes affected by mild nonproliferative retinopathy without macular edema, without long-term current use of insulin (HCC)- (present on admission)  Assessment & Plan  .    Type 2 diabetes mellitus with peripheral neuropathy (HCC)- (present on admission)  Assessment & Plan  Continue gabapentin  Sliding scale insulin  Hold home medications  Follow-up A1c 4.9 (4/22)        VTE prophylaxis: heparin

## 2021-04-23 NOTE — ED NOTES
Called report to Elbert HARRIS. Pt is aware of POC. Pt belongings are on bed. Pt transferred to floor by transport tech.

## 2021-04-23 NOTE — ASSESSMENT & PLAN NOTE
Patient will be admitted to the surgical inpatient unit  He has been started on IV antibiotics with Zosyn and vancomycin  Prior lower extremity wound was positive for MRSA and diphtheroids in December 2020  As per patient, cultures at outlying facility grew 3 organisms  ID consulted   We will follow up repeat wound culture  Pain control  Blood sugar control    Secondary to diabetes and peripheral vascular disease   HAKEEM 4/23- negative   Limb preservation services has been consulted,  X-ray consistent with osteomyelitis of the lateral fifth digit  S/*p amputation tow 4/25  ID did recommend dapto/ceftriaxone until 6/3.  to follow up with Cele

## 2021-04-23 NOTE — DISCHARGE PLANNING
Anticipated Discharge Disposition: SNF    Action: STEVEN Romero CM with SCP informed LSW, this patient was transferred form [facility] and his discharge disposition is to return to SNF.    LSW met with this patient at bedside to issue choice. The patient agreed to return to [facility] [place], choice form faxed to [facility].    Barriers to Discharge: Medical Clearance.    Plan: SNF, pending medical clearance and transfer arrangements.

## 2021-04-23 NOTE — PROGRESS NOTES
Assumed care of patient from night shift RN.  Patient is alert and oriented times 4, state garrett of 2/10, declines intervention at this time.  VSS /67   Pulse 97   Temp 36.6 °C (97.8 °F) (Temporal)   Resp 18   Ht 1.829 m (6')   Wt 86.2 kg (190 lb)   SpO2 98%   BMI 25.77 kg/m²   PIV in the LAC, patent and running NS at 83mL/hr.  On 1L oxygen with saturations in the mid 90s.  Last BM 4/22, PTA.  Incontinent of urine, brief in use.  Frequent changes.  NPO at this time, tolerating well.  Scattered abrasions noted throughout.  Generalized bruising, fragile skin.  Wound to the L lateral foot, dressed with non adhesive foam and tape.  Wound consulted.  Scattered wounds to BLE.  Patient is chair bound per report.    POC discussed for the day, possible surgery.  Bed is locked and in the lowest position, call light is within reach.  All needs are met a this time, hourly rounding is in place.

## 2021-04-23 NOTE — PROGRESS NOTES
Pharmacy Kinetics 76 y.o. male on vancomycin day # 1 2021    Indication for Treatment: SSTI    Pertinent history per medical record: Admitted on 2021 for possible osteomyelitis of foot. Empiric antibiotic therapy initiated, surgery to consult.    Other antibiotics: Ceftriaxone 2 g IV q24h    Allergies: Fish, Pcn [penicillins], Amoxicillin, and Food     List concerns for renal function: Elevated BUN/SCr ratio, age    Pertinent cultures to date:     None    Recent Labs     21  1612   WBC 9.3   NEUTSPOLYS 73.00*     Recent Labs     21  1612   BUN 29*   CREATININE 1.01   ALBUMIN 2.8*       /74   Pulse 96   Temp 36.8 °C (98.3 °F) (Temporal)   Resp 14   Ht 1.829 m (6')   Wt 86.2 kg (190 lb)   SpO2 92%  Temp (24hrs), Av.8 °C (98.3 °F), Min:36.8 °C (98.3 °F), Max:36.8 °C (98.3 °F)      A/P   1. Vancomycin dose change: Give 2250 mg IV loading dose followed by 1250 mg IV q12h scheduled dosing  2. Next vancomycin level: Trough in ~2 days (not ordered)  3. Goal trough: 10-15 mcg/mL  4. Comments: Therapy with vancomycin initiated empirically for SSTI. With limited concerns for accumulation, will provide loading dose and start scheduled dosing thereafter as outlined above. Will plan to check trough level when patient closer to steady state in order to ensure appropriate clearance and drug levels. Recommend de-escalation if MRSA can be ruled out.  Pharmacy will continue to follow.     Jose Luis KohlerD, BCPS

## 2021-04-23 NOTE — HOSPITAL COURSE
76 y.o. male who presented 4/22/2021 with history of coronary artery disease, diabetes, hypertension, CVA and left lower extremity DVT on chronic anticoagulation with Eliquis presents with worsening left foot pain x1 week.  Patient was sent to the emergency room for PICC line placement for suspected osteomyelitis of the foot.  He had sustained an injury a week ago with noted worsening lower extremity wound.  As per patient outpatient culture grew 3 different organisms per emergency room records.  X-ray of the foot is consistent with osteomyelitis of the fifth digit.  LPS, ID, orthopedics consulted.  HAKEEM no signs of occlusion.  MRI pending.  C-reactive elevated.  He was started on ceftriaxone and vancomycin.  ID recommend to daptomycin and ceftriaxone to continue until 6/3. Pt will need weekly CBC, CMP, and CPK. Eliquis was held due to surgery and resumed 4/25.  CTA chest 4/25 negative for PE.

## 2021-04-24 PROBLEM — R05.9 COUGH: Status: RESOLVED | Noted: 2019-05-23 | Resolved: 2021-01-01

## 2021-04-24 NOTE — PROGRESS NOTES
Medium diabetic boot sized and delivered to pt. If size is too small for comfort, contact traction to order a large for pt.

## 2021-04-24 NOTE — WOUND TEAM
Wound consult received for patient Left lateral foot. Patient going to OR this evening and seen by [facility] APN for wound. Wound team signing off at this time, please reconsult if our services are needed.

## 2021-04-24 NOTE — PROGRESS NOTES
Pharmacy Kinetics 76 y.o. male on vancomycin day # 3   2021    Currently on Vancomycin 1250 mg iv q12hr (1000 2200)  Provider specified end date: TBD     Indication for Treatment: SSTI     Pertinent history per medical record: Admitted on 2021 for Osteo. 77 y/o M who presented 2021 with PMHx of coronary artery disease, diabetes, hypertension, CVA and left lower extremity DVT on chronic anticoagulation with Eliquis presents with worsening left foot pain x1 week.  X-ray of the foot is consistent with osteomyelitis of the fifth digit.  We will follow-up MRI of the foot.  Limb preservation services has been consulted, will follow up recommendation.     Other antibiotics: Rocephin 2g q24hr      Allergies: Fish, Pcn [penicillins], Amoxicillin, and Food      List concerns for renal function: malnutrition/low albumin     Pertinent cultures to date:   21:PBCx2:NGTD  21:Lt Ft,WND:Rare Gram positive cocci.      MRSA nares swab if pneumonia is a concern (ordered/positive/negative/n-a): NA     Recent Labs     21  1612 21  0400 21  0432   WBC 9.3 7.9 8.0   NEUTSPOLYS 73.00* 70.60 68.50     Recent Labs     21  1612 21  0400 21  0432   BUN 29* 28* 23*   CREATININE 1.01 0.97 0.91   ALBUMIN 2.8*  --  2.0*     No results for input(s): VANCOTROUGH, VANCOPEAK, VANCORANDOM in the last 72 hours.    Intake/Output Summary (Last 24 hours) at 2021 0810  Last data filed at 2021 0400  Gross per 24 hour   Intake 1918 ml   Output 810 ml   Net 1108 ml      /61   Pulse 100   Temp 36.5 °C (97.7 °F) (Temporal)   Resp 18   Ht 1.829 m (6')   Wt 86.2 kg (190 lb)   SpO2 98%  Temp (24hrs), Av.4 °C (97.6 °F), Min:36.1 °C (97 °F), Max:37 °C (98.6 °F)      A/P        1. Vancomycin dose change: No change   2. Next vancomycin level: 21@0930  3. Goal trough: 10-15 mcg/mL   4. Comments: No leukocytosis , afebrile, cx listed above. Trending renal indices, vancomycin  level ordered. Continue current dose.          Andres Sullivan PharmD BCPS        Results for NADINE CABRERA (MRN [id number]) as of 4/24/2021 11:57   4/24/2021 09:14   Vancomycin Trough 24.7 (H)     A/P        5. Vancomycin dose change: Held  6. Next vancomycin level: 04/25/21@1500  7. Goal trough: 10-15 mcg/mL   8. Comments: Level above goal, scheduled dose held and follow up level ordered.          Andres Sullivan PharmD BCPS

## 2021-04-24 NOTE — CONSULTS
"LIMB PRESERVATION SERVICE CONSULT      REFERRED BY: Rbuia Titus M.D.     DATE OF CONSULTATION: 4/23/2021    REASON FOR CONSULT: left 5th lateral MTH wound     HISTORY OF PRESENT ILLNESS: Agapito Stone is a 76 y.o.  with a past medical history that includes type 2 diabetes, CAD, HLD, HTN, CVA, s/p right foot irrigation and debridement (Dr. Bolivar 08/2020), s/p right GSR (Dr. Bolivar 08/2020) s/p right plantar fascia release (Dr. Bolivar 08/2020), admitted 4/22/2021 for Osteomyelitis (HCC) [M86.9].   LPS has been consulted for evaluation to left lateral 5th MTH wound. Patient states he does not know how he sustained wound but states that it has been present since approximately January 2021. He states that he is residing at [facility] and \"Dr. Horn\" sees him everyday, changes his dressings, and manages his wound care. He states he thought his wound was heeling well until approximately 4/16/2021 when his wound had an increase in bleeding and became increasingly painful with touch. He denies swelling, redness, or drainage from wound. He denies fever, chills, nausea, vomiting, diarrhea, chest pain, headache, or shortness of breath. He states that he was sent to the ER from [facility] for a suspected toe fracture.      IV antibiotics were started on this admission.  Infectious diseases has been consulted.    Xray completed and is positive  for osteomyelitis.  Ortho has not been consulted yet.    Diagnosed with diabetes in 1999, and is currently managing with oral agents.  Checks blood sugars 5 times per day and reports that these typically average .  Has had previous diabetes education.  Does have numbness to feet.  Checks feet routinely. Usually wears nonprescriptive shoe to Left foot and an offloading boot to right foot for OT/PT at [facility]. Has had previous foot surgeries including right foot irrigation and debridement (Dr. Bolivar 08/2020), s/p right GSR (Dr. Bolivar 08/2020) s/p right plantar fascia release " (Dr. Bolivar 08/2020), right 2-5 hammer toe corrections (Dr. Bolivar 08/2020) .  He does not work currently and resides in [facility].         Smoking:   reports that he has never smoked. He has never used smokeless tobacco.    Alcohol:   reports previous alcohol use.    Drug:   reports no history of drug use.      PAST MEDICAL HISTORY:   Past Medical History:   Diagnosis Date   • Arthritis     hands, wrists, ankles   • CAD (coronary artery disease) October 2013    Dr. Ovalle; ACS. PCI/LETA x 2 of the mid circumflex (Xience 3.25 x 23mm) and proximal circumflex (Xience 3.6x 12mm)   • Cataract     sugery   • Diabetes     oral meds   • High cholesterol    • Hyperlipidemia    • Hypertension    • Pain     ankles   • Pneumonia 1968   • Stroke (HCC) 2010    no residual effects   • Syncope         PAST SURGICAL HISTORY:   Past Surgical History:   Procedure Laterality Date   • IRRIGATION & DEBRIDEMENT ORTHO Right 12/10/2020    Procedure: IRRIGATION AND DEBRIDEMENT, WOUND - HEEL;  Surgeon: Royal Bolivar M.D.;  Location: [facility];  Service: Orthopedics   • TENDON LENGHTENING Right 8/24/2020    Procedure: LENGTHENING, TENDON- , GASTROC RECESSION;  Surgeon: Royal Bolivar M.D.;  Location: [facility];  Service: Orthopedics   • ANKLE FUSION Right 8/24/2020    Procedure: FUSION, JOINT, ANKLE- ANKLE SUBTALAR FUSION , BONE GRAFT, MEDIAL RELEASE INCLUDING TENDONS, PATIAL EXCISION OF FIBULA;  Surgeon: Royal Bolivar M.D.;  Location: [facility];  Service: Orthopedics   • TENDON TRANSFER Right 8/24/2020    Procedure: TRANSFER, TENDON- PLANTAR FASCIA RELEASE;  Surgeon: Royal Bolivar M.D.;  Location: [facility];  Service: Orthopedics   • ORTHOPEDIC OSTEOTOMY Right 8/24/2020    Procedure: OSTEOTOMY- 1ST METATARSAL, CROSS PROCEDURE, 2-5 METATARSAL OSTEOTOMY, 2-5 PHALANGEL JOINT RECONSTRUCTION;  Surgeon: Royal Bolivar M.D.;  Location: [facility];  Service: Orthopedics    • HAMMERTOE CORRECTION Right 8/24/2020    Procedure: CORRECTION, HAMMER TOE 2-5;  Surgeon: Royal Bolivar M.D.;  Location: SURGERY [facility];  Service: Orthopedics   • STENT PLACEMENT  2013    cardiac   • CAROTID ENDARTERECTOMY  7/13/2010    left; Performed by CHIQUITA CORRIGAN at SURGERY [facility]   • CATARACT EXTRACTION WITH IOL Bilateral    • TONSILLECTOMY  as a child       MEDICATIONS:   Current Facility-Administered Medications   Medication Dose   • vancomycin (VANCOCIN) 1,250 mg in  mL IVPB  15 mg/kg   • acetaminophen (Tylenol) tablet 650 mg  650 mg   • ascorbic acid (Vitamin C) tablet 1,000 mg  1,000 mg   • atorvastatin (LIPITOR) tablet 80 mg  80 mg   • calcium carbonate (TUMS) chewable tab 1,500 mg  1,500 mg   • vitamin D (cholecalciferol) tablet 5,000 Units  5,000 Units   • gabapentin (NEURONTIN) capsule 100 mg  100 mg   • magnesium oxide (MAG-OX) tablet 400 mg  400 mg   • montelukast (SINGULAIR) tablet 10 mg  10 mg   • tamsulosin (FLOMAX) capsule 0.4 mg  0.4 mg   • senna-docusate (PERICOLACE or SENOKOT S) 8.6-50 MG per tablet 2 tablet  2 tablet    And   • polyethylene glycol/lytes (MIRALAX) PACKET 1 Packet  1 Packet    And   • magnesium hydroxide (MILK OF MAGNESIA) suspension 30 mL  30 mL    And   • bisacodyl (DULCOLAX) suppository 10 mg  10 mg   • NS infusion     • heparin injection 5,000 Units  5,000 Units   • Pharmacy Consult Request ...Pain Management Review 1 Each  1 Each   • oxyCODONE immediate-release (ROXICODONE) tablet 2.5 mg  2.5 mg    Or   • oxyCODONE immediate-release (ROXICODONE) tablet 5 mg  5 mg    Or   • morphine (pf) 4 mg/mL injection 2 mg  2 mg   • MD Alert...Vancomycin per Pharmacy  1 Each   • ondansetron (ZOFRAN) syringe/vial injection 4 mg  4 mg   • ondansetron (ZOFRAN ODT) dispertab 4 mg  4 mg   • insulin regular (HumuLIN R,NovoLIN R) injection  2-9 Units    And   • glucose 4 g chewable tablet 16 g  16 g    And   • dextrose 50% (D50W) injection 50 mL  50 mL   •  diphenhydrAMINE (BENADRYL) tablet/capsule 25 mg  25 mg   • cefTRIAXone (ROCEPHIN) 2 g in  mL IVPB  2 g       ALLERGIES:    Allergies   Allergen Reactions   • Fish Hives     shellfish   • Pcn [Penicillins] Hives     Tolerates cephalosporins   • Amoxicillin Hives   • Food Swelling      Eggs,Melons, avocados-puffy mouth        FAMILY HISTORY:   Family History   Problem Relation Age of Onset   • Other Mother         Rheumatic fever         REVIEW OF SYSTEMS:   Constitutional: Negative for chills, fever   Respiratory: Negative for cough and shortness of breath.    Cardiovascular:Negative for chest pain, and claudication.   Gastrointestinal: Negative for constipation, diarrhea, nausea and vomiting.   Lower extremities: positive for wound and bleeding to left lateral foot, negative swelling and redness.  Neurological: positive for numbness to feet and lower legs  All other systems reviewed and are negative     RESULTS:     Recent Labs     04/22/21  1612 04/23/21  0400   WBC 9.3 7.9   RBC 3.90* 3.54*   HEMOGLOBIN 11.2* 10.3*   HEMATOCRIT 35.5* 33.1*   MCV 91.0 93.5   MCH 28.7 29.1   MCHC 31.5* 31.1*   RDW 53.9* 56.3*   PLATELETCT 363 304   MPV 8.9* 8.9*     Recent Labs     04/22/21  1612 04/23/21  0400   SODIUM 139 140   POTASSIUM 4.5 4.5   CHLORIDE 103 106   CO2 27 28   GLUCOSE 94 92   BUN 29* 28*         ESR:         CRP:       Results from last 7 days   Lab Units 04/23/21  0400 04/22/21  1612   C REACTIVE PROTEIN 4596 mg/dL 6.65* 6.00*         COVID-19: Not detected 4/22/2021    X-ray: dx-foot-complete 3+ left 4/22/2021  IMPRESSION:     Findings in keeping with osteomyelitis involving the fifth distal metatarsal, proximal and middle phalanges.    MRI: none this admission    Arterial studies:   US-HAKEEM single level bilatearl 4/23/221    RIGHT      Waveform            Systolic BPs (mmHg)                              125           Brachial   Triphasic                                Common Femoral   Triphasic             "      276           Posterior Tibial   Triphasic                  267           Dorsalis Pedis                                            Peroneal                              2.12          HAKEEM                                            TBI                           LEFT   Waveform        Systolic BPs (mmHg)                              130           Brachial   Triphasic                                Common Femoral   Triphasic                  240           Posterior Tibial   Triphasic                  226           Dorsalis Pedis                                            Peroneal                              1.85          HAKEEM                                            TBI         Findings   Bilateral.    Doppler waveforms of the common femoral arteries are of high amplitude and    triphasic.    Doppler waveforms at the ankle are brisk and triphasic.    Ankle pressures are not accurately measured due to calcification and    noncompressibility of the tibial vessels.    Toe-brachial indices are normal. Right: 0.81  Left: 0.85    Bilateral.    There is no evidence of major arterial disease demonstrated.       A1c:  Lab Results   Component Value Date/Time    HBA1C 4.9 04/22/2021 04:12 PM            Microbiology:  Results     Procedure Component Value Units Date/Time    BLOOD CULTURE [[id number]] Collected: 04/22/21 1655    Order Status: Completed Specimen: Blood from Peripheral Updated: 04/23/21 0705     Significant Indicator NEG     Source BLD     Site PERIPHERAL     Culture Result No Growth  Note: Blood cultures are incubated for 5 days and  are monitored continuously.Positive blood cultures  are called to the RN and reported as soon as  they are identified.      Narrative:      Per Hospital Policy: Only change Specimen Src: to \"Line\" if  specified by physician order.  Left AC    BLOOD CULTURE [[id number]] Collected: 04/22/21 1655    Order Status: Completed Specimen: Blood from Peripheral Updated: 04/23/21 0705     " "Significant Indicator NEG     Source BLD     Site PERIPHERAL     Culture Result No Growth  Note: Blood cultures are incubated for 5 days and  are monitored continuously.Positive blood cultures  are called to the RN and reported as soon as  they are identified.      Narrative:      Per Hospital Policy: Only change Specimen Src: to \"Line\" if  specified by physician order.  Right Hand    SARS-CoV-2 PCR (24 hour In-House): Collect NP swab in [facility] [[id number]] Collected: 04/22/21 1710    Order Status: Completed Specimen: Respirate Updated: 04/22/21 2202     SARS-CoV-2 Source NP Swab     SARS-CoV-2 by PCR NotDetected     Comment: PATIENTS: Important information regarding your results and instructions can  be found at [email or web address]   \"After your  Covid-19 Test\"  [facility] providers: PLEASE REFER TO DE-ESCALATION AND RETESTING PROTOCOL  on [email or web address]  **The Roche SARS-CoV-2 RT-PCR test has been made available for use under the  Emergency Use Authorization (EUA) only.         Narrative:      Have you been in close contact with a person who is suspected  or known to be positive for COVID-19 within the last 30 days  (e.g. last seen that person < 30 days ago)->No    CULTURE WOUND W/ GRAM STAIN [[id number]]     Order Status: Sent Specimen: Wound from Left Foot            PHYSICAL EXAMINATION:     VITAL SIGNS: /67   Pulse 95   Temp 36.9 °C (98.4 °F) (Temporal)   Resp 18   Ht 1.829 m (6')   Wt 86.2 kg (190 lb)   SpO2 98%   BMI 25.77 kg/m²       General Appearance:  Well developed, well nourished, in no acute distress    Lower Extremity Assessment:    Edema:   Minimal edema LLE      ROM dorsi/plantarflexion   Dorsiflexion decreased LLE    Structural /mechanical changes:   hammertoes to left 1-5 toes.   Bilateral Hallux valgus      Sensory Assessment  Feet Insensate to light touch      Pulses:  R foot: +2 DP, +2 PT, Triphasic DP tones heard via doppler, Biphasic PT tones heard via " doppler  L foot: +2 DP, +2 PT, Biphasic DP/PT tones heard via doppler      Wound Assessment:  Left lateral 5th MTH  Full thickness ulceration with pink tissue and small amount of yellow slough.   Serosanguinous drainage.   Mild surrounding edema and erythema  Edges attached.   No odor.               Wound care completed by APRN. nonadhesive foam dressing with hypafix tape to left lateral 5th MTH      ASSESSMENT AND PLAN:   76 year old male with a history of t2dm, neuropathy, CAD, CVA, HTN who presents with left lateral 5th MTH ulcer. xrays obtained +OM. CRP elevated. hospitalist team contacted ID for consultation. Dr. Scott from orthopedics consulted and agreeable for surgical consult. Pt NPO since 4/22/2021, pt to go to OR today.     Wound care:   -Wound care orders placed for nursing postoperatively    Imaging/Labs:  -COVID-19: not detected 4/22/2021    Vascular status:   R foot: +2 DP, +2 PT, Triphasic DP tones heard via doppler, Biphasic PT tones heard via doppler  L foot: +2 DP, +2 PT, Biphasic DP/PT tones heard via doppler    Surgery:   -To OR with Dr. Scott for anticipated left 5 toe vs ray amputation    Antibiotics:   ID consulted by hospitalist team    Weight Bearing Status:   -Heel weight bearing with offloading footware postopeatively    Offloading:   -footware TBD pending surgery.   -Orthotic company: none    PT/OT:   -consultrecommended postoperatively.       Diabetes Education:   - consult CDE and RD placed by hospitalist team    - Implications of loss of protective sensation (LOPS) discussed with patient- including increased risk for amputation.  Advised to check feet at least daily, moisturize feet, and to always wear protective foot wear.   -avoid trimming own nails. See podiatrist or certified foot and nail RN  -keep blood sugars <150 for improved wound healing        D/W: pt, Mahi RN, Dr. Rodriguez, Dr. Scott.       Discharge Plan:  Remain inpatient for ongoing medical care. Pending OR today  with Dr. Scott    Follow up: Dr. Scott and LPS rounds.         Please note that this dictation was created using voice recognition software. I have  worked with technical experts from [facility] to optimize the interface.  I have made every reasonable attempt to correct obvious errors, but there may be errors of grammar and possibly content that I did not discover before finalizing the note.    Please contact [facility] through [employer].

## 2021-04-24 NOTE — PROGRESS NOTES
[facility] Medicine Daily Progress Note    Date of Service  4/24/2021    Chief Complaint  76 y.o. male admitted 4/22/2021 with for PICC line placement, osteomyelitis    Hospital Course  76 y.o. male who presented 4/22/2021 with history of coronary artery disease, diabetes, hypertension, CVA and left lower extremity DVT on chronic anticoagulation with Eliquis presents with worsening left foot pain x1 week.  Patient was sent to the emergency room for PICC line placement for suspected osteomyelitis of the foot.  He had sustained an injury a week ago with noted worsening lower extremity wound.  As per patient outpatient culture grew 3 different organisms per emergency room records.  X-ray of the foot is consistent with osteomyelitis of the fifth digit.  LPS, ID, orthopedics consulted.  HAKEEM no signs of occlusion.  MRI pending.  C-reactive elevated.  He is on ceftriaxone and vancomycin.  Cultures to follow.  Eliquis is held due to possible surgery.      Interval Problem Update  Pt believes that he will go back to [place]. I did explain that pending eval from surgery, ID before transfer to [place]. Continue current antibiotic.   4/24- pain is well controled.surgery successful. Boot delivered. Pt not medical cleared by ID yet in regards to antibiotic. Do not transfer until cleared by ID    Consultants/Specialty  LPS  Ortho  ID     Code Status  Full Code    Disposition  Inpatient     Review of Systems  Review of Systems   Constitutional: Negative for fever and malaise/fatigue.   Respiratory: Negative for cough and shortness of breath.    Cardiovascular: Positive for leg swelling. Negative for chest pain.   Gastrointestinal: Negative for abdominal pain, nausea and vomiting.   Genitourinary: Negative for dysuria and urgency.   Musculoskeletal: Positive for joint pain.   Neurological: Negative for speech change and headaches.        Physical Exam  Temp:  [36.1 °C (97 °F)-37 °C (98.6 °F)] 36.8 °C (98.2 °F)  Pulse:  [] 117  Resp:   [12-20] 18  BP: ()/(54-99) 140/76  SpO2:  [94 %-100 %] 95 %    Physical Exam  Vitals and nursing note reviewed.   Constitutional:       Appearance: He is ill-appearing.   HENT:      Head: Normocephalic and atraumatic.      Right Ear: External ear normal.      Left Ear: External ear normal.      Nose: No congestion.   Eyes:      General: No scleral icterus.  Cardiovascular:      Rate and Rhythm: Normal rate.      Heart sounds: No murmur.   Pulmonary:      Effort: No respiratory distress.   Abdominal:      General: There is no distension.      Palpations: Abdomen is soft.      Tenderness: There is no abdominal tenderness. There is no guarding.   Musculoskeletal:         General: Tenderness and signs of injury present.      Comments: Left wound is wrapped    Skin:     General: Skin is dry.      Findings: Rash present.   Neurological:      Mental Status: He is alert and oriented to person, place, and time.         Fluids    Intake/Output Summary (Last 24 hours) at 4/24/2021 1324  Last data filed at 4/24/2021 1130  Gross per 24 hour   Intake 1576 ml   Output 960 ml   Net 616 ml       Laboratory  Recent Labs     04/22/21  1612 04/23/21  0400 04/24/21  0432   WBC 9.3 7.9 8.0   RBC 3.90* 3.54* 3.20*   HEMOGLOBIN 11.2* 10.3* 9.1*   HEMATOCRIT 35.5* 33.1* 30.4*   MCV 91.0 93.5 95.0   MCH 28.7 29.1 28.4   MCHC 31.5* 31.1* 29.9*   RDW 53.9* 56.3* 55.8*   PLATELETCT 363 304 280   MPV 8.9* 8.9* 9.0     Recent Labs     04/22/21  1612 04/23/21  0400 04/24/21  0432   SODIUM 139 140 139   POTASSIUM 4.5 4.5 4.3   CHLORIDE 103 106 107   CO2 27 28 26   GLUCOSE 94 92 86   BUN 29* 28* 23*   CREATININE 1.01 0.97 0.91   CALCIUM 8.8 8.5 8.2*             Recent Labs     04/23/21  0400   TRIGLYCERIDE 63   HDL 32*   LDL 45       Imaging  US-HAKEEM SINGLE LEVEL BILAT   Final Result      DX-FOOT-COMPLETE 3+ LEFT   Final Result      Findings in keeping with osteomyelitis involving the fifth distal metatarsal, proximal and middle phalanges.            Assessment/Plan  * Osteomyelitis of left foot (HCC)- (present on admission)  Assessment & Plan  Patient will be admitted to the surgical inpatient unit  He has been started on IV antibiotics with Zosyn and vancomycin  Prior lower extremity wound was positive for MRSA and diphtheroids in December 2020  As per patient, cultures at outlying facility grew 3 organisms  ID consulted   We will follow up repeat wound culture  Pain control  Blood sugar control    Secondary to diabetes and peripheral vascular disease   HAKEEM 4/23- negative   Limb preservation services has been consulted, will maintain n.p.o. for surgical evaluation, appreciate input  X-ray consistent with osteomyelitis of the lateral fifth digit  Pending recs from ID      Hyperlipidemia associated with type 2 diabetes mellitus (HCC)- (present on admission)  Assessment & Plan  Follow-up lipid profile    CAD (coronary artery disease)- (present on admission)  Assessment & Plan  History of  Follow-up EKG no acute findings     HTN (hypertension)- (present on admission)  Assessment & Plan  Controlled, continue home medication    Anemia- (present on admission)  Assessment & Plan  Monitor    Age-related physical debility- (present on admission)  Assessment & Plan  Pt will transfer back to [place] once cleared by ID     Deep vein thrombosis (DVT) of lower extremity (HCC)- (present on admission)  Assessment & Plan  On Eliquis, now held, possible procedure in a.m.  On heparin subcu    History of stroke- (present on admission)  Assessment & Plan  History of    Type 2 diabetes mellitus with both eyes affected by mild nonproliferative retinopathy without macular edema, without long-term current use of insulin (HCC)- (present on admission)  Assessment & Plan  .    Type 2 diabetes mellitus with peripheral neuropathy (HCC)- (present on admission)  Assessment & Plan  Continue gabapentin  Sliding scale insulin  Hold home medications  Follow-up A1c 4.9 (4/22)        VTE  prophylaxis: heparin

## 2021-04-24 NOTE — CARE PLAN
Problem: Skin Integrity  Goal: Risk for impaired skin integrity will decrease  Outcome: PROGRESSING AS EXPECTED    Q2 turns in place; patient refusing waffle overlay. Pillows in use for support/positioning.     Problem: Pain Management  Goal: Pain level will decrease to patient's comfort goal  Outcome: PROGRESSING AS EXPECTED

## 2021-04-24 NOTE — OP REPORT
DATE OF SERVICE:  04/23/2021     PREOPERATIVE DIAGNOSIS:  Left fifth ray osteomyelitis.     POSTOPERATIVE DIAGNOSIS:  Left fifth ray osteomyelitis.     PROCEDURE:  Left fifth ray amputation.     SURGEON:  Remi Scott MD     ASSISTANT:  Fede Ashton DO     ESTIMATED BLOOD LOSS:  None.     INDICATIONS:  This is a 76-year-old gentleman with a fifth ray osteomyelitis,   who was consented for a fifth ray amputation.  Risks and benefits were   discussed, which include but not limited to bleeding, infection, neurovascular   damage, pain, stiffness, DVT, PE, MI, stroke, death, need for more proximal   amputation.  He understands all these risks and wished to proceed.     DESCRIPTION OF PROCEDURE:  The patient was sedated with LMA anesthesia and   administration of antibiotics.  His left leg was prepped and draped in usual   sterile fashion and his fifth ray was excised down to 1/3 the base of the   tarsal shaft.  An oblique cut was made with a microsagittal saw.  All necrotic   tissue was excised and sent for culture and sensitivity and the wound was   irrigated and closed in layers with nonabsorbable suture.  Sterile dressings   were applied.  The patient tolerated the procedure well.     POSTOPERATIVE PLAN:  The patient to be admitted, weightbearing through the   heel only, on perioperative antibiotics and pain control.        ______________________________  REMI SCOTT MD PLA/BRITNEY/JULIET    DD:  04/23/2021 19:11  DT:  04/23/2021 19:25    Job#:  [id number]

## 2021-04-24 NOTE — ANESTHESIA POSTPROCEDURE EVALUATION
Patient: Agapito Stone    Procedure Summary     Date: 04/23/21 Room / Location: [place] [address] / SURGERY [facility]    Anesthesia Start: 1844 Anesthesia Stop: 1921    Procedure: AMPUTATION, TOE - 5TH TOE (Left Toe) Diagnosis: (Left fifth ray osteomyelitis)    Surgeons: Remi Scott M.D. Responsible Provider: Gerald Montanez M.D.    Anesthesia Type: general ASA Status: 4          Final Anesthesia Type: general  Last vitals  BP   Blood Pressure : 116/54    Temp   36.9 °C (98.4 °F)    Pulse   93   Resp   15    SpO2   95 %      Anesthesia Post Evaluation    Patient location during evaluation: PACU  Patient participation: complete - patient participated  Level of consciousness: awake and alert  Pain score: 2    Airway patency: patent  Anesthetic complications: no  Cardiovascular status: hemodynamically stable  Respiratory status: acceptable  Hydration status: euvolemic    PONV: none          No complications documented.     Nurse Pain Score: 7 (NPRS)

## 2021-04-24 NOTE — ANESTHESIA TIME REPORT
Anesthesia Start and Stop Event Times     Date Time Event    4/23/2021 1836 Ready for Procedure     1844 Anesthesia Start     1921 Anesthesia Stop        Responsible Staff  04/23/21    Name Role Begin End    Gerald Montanez M.D. Anesth 1844 1921        Preop Diagnosis (Free Text):  Pre-op Diagnosis     Left fifth ray osteomyelitis        Preop Diagnosis (Codes):    Post op Diagnosis  Infected blister of fifth toe of left foot, initial encounter      Premium Reason  A. 3PM - 7AM    Comments:

## 2021-04-24 NOTE — ANESTHESIA PREPROCEDURE EVALUATION
Relevant Problems   NEURO   (+) History of stroke      CARDIAC   (+) CAD (coronary artery disease)   (+) Carotid atherosclerosis   (+) Deep vein thrombosis (DVT) of lower extremity (HCC)   (+) HTN (hypertension)   (+) Stented coronary artery      ENDO   (+) Type 2 diabetes mellitus with both eyes affected by mild nonproliferative retinopathy without macular edema, without long-term current use of insulin (HCC)   (+) Type 2 diabetes mellitus with peripheral neuropathy (HCC)      Other   (+) Osteomyelitis of left foot (HCC)       Physical Exam    Airway   Mallampati: II  TM distance: >3 FB  Neck ROM: full       Cardiovascular - normal exam  Rhythm: regular  Rate: normal  (-) murmur     Dental - normal exam           Pulmonary - normal exam  (+) decreased breath sounds     Abdominal    Neurological - normal exam                 Anesthesia Plan    ASA 4 (HTN)   ASA physical status 4 criteria: CAD/stents - recent (< 3 months) and CVA or TIA - recent (< 3 months)    Plan - general       Airway plan will be LMA          Induction: intravenous    Postoperative Plan: Postoperative administration of opioids is intended.    Pertinent diagnostic labs and testing reviewed    Informed Consent:    Anesthetic plan and risks discussed with patient.    Use of blood products discussed with: patient whom consented to blood products.

## 2021-04-24 NOTE — PROGRESS NOTES
" LIMB PRESERVATION SERVICE   POST SURGICAL PROGRESS NOTE      HPI:  Agapito Stone is a 76 y.o.  with a past medical history that includes type 2 diabetes, CAD, HLD, HTN, CVA, s/p right foot irrigation and debridement (Dr. Bolivar 08/2020), s/p right GSR (Dr. Bolivar 08/2020) s/p right plantar fascia release (Dr. Bolivar 08/2020), admitted 4/22/2021 for Osteomyelitis (HCC) [M86.9].   LPS has been consulted for evaluation to left lateral 5th MTH wound. Patient states he does not know how he sustained wound but states that it has been present since approximately January 2021. He states that he is residing at [facility] and \"Dr. Horn\" sees him everyday, changes his dressings, and manages his wound care. He states he thought his wound was heeling well until approximately 4/16/2021 when his wound had an increase in bleeding and became increasingly painful with touch. He denies swelling, redness, or drainage from wound. He denies fever, chills, nausea, vomiting, diarrhea, chest pain, headache, or shortness of breath. He states that he was sent to the ER from [facility] for a suspected toe fracture.     SURGERY DATE: 4/23/2021 with Dr. Scott  PROCEDURE:   Left fifth ray amputation      INTERVAL HISTORY:  4/24/2021: POD #1. Patient denies fevers, chills, nausea, vomiting.  Pain well controlled.  Offloading boot delivered to bedside for left lower extremity.  Dressings changed photos obtained.      PERTINENT LPS RESULTS:   OR culture and sensitivity from 4/23/2021 pending.    Lab 04/23/21  0400 04/22/21  1612   C REACTIVE PROTEIN 4596 6.65* 6.00*            COVID-19: Not detected 4/22/2021     X-ray: dx-foot-complete 3+ left 4/22/2021  IMPRESSION:     Findings in keeping with osteomyelitis involving the fifth distal metatarsal, proximal and middle phalanges.     MRI: none this admission     Arterial studies:   US-HAKEEM single level bilatearl 4/23/221     RIGHT      Waveform            Systolic BPs " (mmHg)                              125           Brachial   Triphasic                                Common Femoral   Triphasic                  276           Posterior Tibial   Triphasic                  267           Dorsalis Pedis                                            Peroneal                              2.12          HAKEEM                                            TBI                           LEFT   Waveform        Systolic BPs (mmHg)                              130           Brachial   Triphasic                                Common Femoral   Triphasic                  240           Posterior Tibial   Triphasic                  226           Dorsalis Pedis                                            Peroneal                              1.85          HAKEEM                                            TBI         Findings   Bilateral.    Doppler waveforms of the common femoral arteries are of high amplitude and    triphasic.    Doppler waveforms at the ankle are brisk and triphasic.    Ankle pressures are not accurately measured due to calcification and    noncompressibility of the tibial vessels.    Toe-brachial indices are normal. Right: 0.81  Left: 0.85     Bilateral.    There is no evidence of major arterial disease demonstrated.            SURGICAL SITE EXAM:      /76   Pulse (!) 117   Temp 36.8 °C (98.2 °F) (Temporal)   Resp 18   Ht 1.829 m (6')   Wt 86.2 kg (190 lb)   SpO2 95%   BMI 25.77 kg/m²     Pedal Pulses: palpable bilaterally   Sensation: insensate  Surgical foot warm     Left fifth amputation incision POD #1    Incision well approximated with sutures.  Mild edema but no erythema.  Small amount of bleeding but controlled manual pressure.  Mild tenderness but no fluctuance with palpation.  No odor.          Wound care completed by APRN.  Cleanse with normal saline and pat dry with gauze.  Single-layer Adaptic, gauze, secured with roll gauze and Hypafix tape.  Ace wrap for  "compression.  Change daily or as needed saturation or dislodgment.        DIABETES MANAGEMENT:    Blood glucose:   Results from last 7 days   Lab Units 04/24/21  0850 04/23/21  1139 04/23/21  0759   ACCU CHECK GLUCOSE 788 mg/dL 86 80 85     A1c:   Lab Results   Component Value Date/Time    HBA1C 4.9 04/22/2021 04:12 PM            INFECTION MANAGEMENT:    Results from last 7 days   Lab Units 04/24/21  0432 04/23/21  0400 04/22/21  1612   WBC 1501 K/uL 8.0 7.9 9.3   PLATELET COUNT 1518 K/uL 280 304 363     Wound culture results:   Results     Procedure Component Value Units Date/Time    GRAM STAIN [[id number]] Collected: 04/23/21 1904    Order Status: Completed Specimen: Wound Updated: 04/24/21 0025     Significant Indicator .     Source WND     Site LEFT FOOT     Gram Stain Result Few WBCs.  Rare Gram positive cocci.      Narrative:      Surgery - swabs received    CULTURE WOUND W/ GRAM STAIN [[id number]] Collected: 04/23/21 1904    Order Status: Completed Specimen: Wound from Left Foot Updated: 04/24/21 0019     Significant Indicator NEG     Source WND     Site LEFT FOOT     Culture Result -     Gram Stain Result Few WBCs.  Rare Gram positive cocci.      Narrative:      Surgery - swabs received    Anaerobic Culture [[id number]] Collected: 04/23/21 1904    Order Status: Completed Specimen: Wound Updated: 04/24/21 0019     Significant Indicator NEG     Source WND     Site LEFT FOOT     Culture Result -    Narrative:      Surgery - swabs received    BLOOD CULTURE [[id number]] Collected: 04/22/21 1655    Order Status: Completed Specimen: Blood from Peripheral Updated: 04/23/21 0705     Significant Indicator NEG     Source BLD     Site PERIPHERAL     Culture Result No Growth  Note: Blood cultures are incubated for 5 days and  are monitored continuously.Positive blood cultures  are called to the RN and reported as soon as  they are identified.      Narrative:      Per Hospital Policy: Only change Specimen Src: to \"Line\" " "if  specified by physician order.  Left AC    BLOOD CULTURE [[id number]] Collected: 04/22/21 1655    Order Status: Completed Specimen: Blood from Peripheral Updated: 04/23/21 0705     Significant Indicator NEG     Source BLD     Site PERIPHERAL     Culture Result No Growth  Note: Blood cultures are incubated for 5 days and  are monitored continuously.Positive blood cultures  are called to the RN and reported as soon as  they are identified.      Narrative:      Per Hospital Policy: Only change Specimen Src: to \"Line\" if  specified by physician order.  Right Hand    SARS-CoV-2 PCR (24 hour In-House): Collect NP swab in VTM [[id number]] Collected: 04/22/21 1714    Order Status: Completed Specimen: Respirate Updated: 04/22/21 2202     SARS-CoV-2 Source NP Swab     SARS-CoV-2 by PCR NotDetected     Comment: PATIENTS: Important information regarding your results and instructions can  be found at [email or web address]   \"After your  Covid-19 Test\"  [facility] providers: PLEASE REFER TO DE-ESCALATION AND RETESTING PROTOCOL  on [email or web address]  **The Roche SARS-CoV-2 RT-PCR test has been made available for use under the  Emergency Use Authorization (EUA) only.         Narrative:      Have you been in close contact with a person who is suspected  or known to be positive for COVID-19 within the last 30 days  (e.g. last seen that person < 30 days ago)->No               ASSESSMENT/PLAN:   POD #1 S/P left fifth ray amputation with .  Surgical site is well approximated with sutures.  No surrounding erythema, minimal edema.  Appears to be healing well postoperatively.  Recommend continuing antibiotics, de-escalating antibiotics pending OR cultures and sensitivity results, dressings, offloading.  Offloading boot ordered in place to bedside.       Wound care:   -Wound care orders updated for nursing  -Left foot: Cleanse with normal saline and pat dry with gauze.  Single-layer Adaptic, gauze, secured " with roll gauze and Hypafix tape.  Ace wrap for compression.  Change daily or as needed saturation or dislodgment.    Imaging/Labs:  -COVID-19: not detected this admission 4/22/2021    Vascular status:   -palpable pedal pulses bilaterally     Surgery:   --no further surgeries planned at this time    Antibiotics:   Patient on vancomycin and ceftriaxone per hospitalist.  Per H&P note, infectious disease to be consulted.  Discussed with Dr. Rodriguez recommending infectious disease to review OR cultures when resulted for management of antimicrobial therapy and for outpatient follow-up.    Weight Bearing Status:   -Heel weight bearing with use of offloading boot when ambulating LLE    Offloading:   -Offloading boot  when ambulating;  at bedside  -Orthotic company: Prescription to ability for diabetic shoes and inserts provided    PT/OT:   -Consult placed.  Patient receives PT/OT at [facility]    Diabetes Education:   Consulted by hospitalist 4/22/2021..  Hemoglobin A1c 4.9 on 4/22/2021.  - Implications of loss of protective sensation (LOPS) discussed with patient- including increased risk for amputation.  Advised to check feet at least daily, moisturize feet, and to always wear protective foot wear.   -avoid trimming own nails. See podiatrist or certified foot and nail RN  -keep blood sugars <150 for improved wound healing        DISCHARGE PLAN:    Disposition: Patient pending transfer back to [facility].  OR cultures are pending at this time.  Pending results and ID recommendations for antimicrobial therapy, patient clear from [facility] to return to [facility].  Dressing orders written and provided to case management.    Follow-up: sutures to be removed approximately 3 weeks post-op  Follow up LPS rounds: 5/21/2021 for evaluation and correction of hammertoe deformities.      Discussed with: pt, RN, Dr. Rodriguez    Please note that this dictation was created using voice recognition software. I have  worked with technical experts from [facility] to optimize the interface.  I have made every reasonable attempt to correct obvious errors, but there may be errors of grammar and possibly content that I did not discover before finalizing the note.      Keyana Davila, A.P.R.N.    If any questions or concerns, please contact LPS through voalte.

## 2021-04-24 NOTE — PROGRESS NOTES
Patient seen and examined  Diabetic boot placed this AM  OR cultures pending    /76   Pulse (!) 117   Temp 36.8 °C (98.2 °F) (Temporal)   Resp 18   Ht 1.829 m (6')   Wt 86.2 kg (190 lb)   SpO2 95%     Recent Labs     04/22/21  1612 04/23/21  0400 04/24/21  0432   WBC 9.3 7.9 8.0   RBC 3.90* 3.54* 3.20*   HEMOGLOBIN 11.2* 10.3* 9.1*   HEMATOCRIT 35.5* 33.1* 30.4*   MCV 91.0 93.5 95.0   MCH 28.7 29.1 28.4   MCHC 31.5* 31.1* 29.9*   RDW 53.9* 56.3* 55.8*   PLATELETCT 363 304 280   MPV 8.9* 8.9* 9.0       No acute distress  Dressing clean dry and intact  Neurovascularly intact    POD# 1  S/P Ray amputation    Plan:  DVT Prophylaxis- TEDS/SCDs  Weight Bearing Status- WBAT through heel  PT/OT  Antibiotics: per ID  Case Coordination

## 2021-04-24 NOTE — ANESTHESIA PROCEDURE NOTES
Airway    Date/Time: 4/23/2021 6:50 PM  Performed by: Gerald Montanez M.D.  Authorized by: Gerald Montanez M.D.     Location:  OR  Urgency:  Elective  Difficult Airway: No    Indications for Airway Management:  Anesthesia      Spontaneous Ventilation: absent    Sedation Level:  Deep  Preoxygenated: Yes    Final Airway Type:  Supraglottic airway  Final Supraglottic Airway:  Standard LMA    SGA Size:  4  Number of Attempts at Approach:  1  Number of Other Approaches Attempted:  0

## 2021-04-24 NOTE — OR NURSING
Patient arrived to PACU, vital signs stable. Denies nausea, tolerating sips of water. C/o pain treated with IV and PO medications, patient states pain improving and tolerable. Wife Simran updated on patient condition and plan of care.

## 2021-04-24 NOTE — CONSULTS
4/23/2021      Agapito Stone is a 76 y.o. male who presents with a left foot infection and is here for management. Patient denies numbness, parasthesias, loss of conciousness or other trauma    Past Medical History:   Diagnosis Date   • Arthritis     hands, wrists, ankles   • CAD (coronary artery disease) October 2013    Dr. Ovalle; ACS. PCI/LETA x 2 of the mid circumflex (Xience 3.25 x 23mm) and proximal circumflex (Xience 3.6x 12mm)   • Cataract     sugery   • Diabetes     oral meds   • High cholesterol    • Hyperlipidemia    • Hypertension    • Pain     ankles   • Pneumonia 1968   • Stroke (HCC) 2010    no residual effects   • Syncope        Past Surgical History:   Procedure Laterality Date   • IRRIGATION & DEBRIDEMENT ORTHO Right 12/10/2020    Procedure: IRRIGATION AND DEBRIDEMENT, WOUND - HEEL;  Surgeon: Royal Bolivar M.D.;  Location: [facility];  Service: Orthopedics   • TENDON LENGHTENING Right 8/24/2020    Procedure: LENGTHENING, TENDON- , GASTROC RECESSION;  Surgeon: Royal Bolivar M.D.;  Location: [facility];  Service: Orthopedics   • ANKLE FUSION Right 8/24/2020    Procedure: FUSION, JOINT, ANKLE- ANKLE SUBTALAR FUSION , BONE GRAFT, MEDIAL RELEASE INCLUDING TENDONS, PATIAL EXCISION OF FIBULA;  Surgeon: Royal Bolivar M.D.;  Location: [facility];  Service: Orthopedics   • TENDON TRANSFER Right 8/24/2020    Procedure: TRANSFER, TENDON- PLANTAR FASCIA RELEASE;  Surgeon: Royal Bolivar M.D.;  Location: [facility];  Service: Orthopedics   • ORTHOPEDIC OSTEOTOMY Right 8/24/2020    Procedure: OSTEOTOMY- 1ST METATARSAL, CROSS PROCEDURE, 2-5 METATARSAL OSTEOTOMY, 2-5 PHALANGEL JOINT RECONSTRUCTION;  Surgeon: Royal Bolivar M.D.;  Location: [facility];  Service: Orthopedics   • HAMMERTOE CORRECTION Right 8/24/2020    Procedure: CORRECTION, HAMMER TOE 2-5;  Surgeon: Royal Bolivar M.D.;  Location: [facility];  Service:  Orthopedics   • STENT PLACEMENT  2013    cardiac   • CAROTID ENDARTERECTOMY  7/13/2010    left; Performed by CHIQUITA CORRIGAN at [facility] ORS   • CATARACT EXTRACTION WITH IOL Bilateral    • TONSILLECTOMY  as a child       Medications  No current facility-administered medications on file prior to encounter.     Current Outpatient Medications on File Prior to Encounter   Medication Sig Dispense Refill   • acetaminophen (TYLENOL) 325 MG Tab Take 650 mg by mouth every four hours as needed.     • carvedilol (COREG) 3.125 MG Tab Take 3.125 mg by mouth 2 times a day with meals.     • tamsulosin (FLOMAX) 0.4 MG capsule Take 0.4 mg by mouth at bedtime.     • gabapentin (NEURONTIN) 100 MG Cap Take 100 mg by mouth 2 times a day.     • Multiple Vitamin (MULTI VITAMIN PO) Take 1 tablet by mouth every day.     • calcium carbonate (TUMS E-X 750) 750 MG chewable tablet Chew 1,500 mg every four hours as needed.     • Ascorbic Acid (VITAMIN C) 1000 MG Tab Take 1,000 mg by mouth every day.     • montelukast (SINGULAIR) 10 MG Tab Take 1 tablet by mouth every day. 90 tablet 0   • apixaban (ELIQUIS) 5mg Tab Take 1 Tab by mouth 2 Times a Day. 60 Tab    • magnesium oxide (MAG-OX) 400 MG Tab tablet Take 1 Tab by mouth 2 Times a Day. 30 Tab 0   • pioglitazone (ACTOS) 15 MG Tab Take 15 mg by mouth every day.     • aspirin EC (ECOTRIN) 81 MG Tablet Delayed Response Take 81 mg by mouth every day.     • ondansetron (ZOFRAN ODT) 4 MG TABLET DISPERSIBLE Take 4 mg by mouth every 6 hours as needed for Nausea.     • carvedilol (COREG) 6.25 MG Tab Take 1 Tab by mouth 2 times a day, with meals. (Patient not taking: Reported on 4/22/2021) 120 Tab 0   • atorvastatin (LIPITOR) 80 MG tablet TAKE ONE TABLET BY MOUTH DAILY IN THE EVENING (Patient taking differently: Take 80 mg by mouth at bedtime. TAKE ONE TABLET BY MOUTH DAILY IN THE EVENING) 100 Tab 3   • Empagliflozin-metFORMIN HCl (SYNJARDY) 5-1000 MG Tab Take 1 Tab by mouth 2 Times a Day. 180  Tab 3   • nitroglycerin (NITROSTAT) 0.4 MG SL Tab Place 1 Tab under tongue as needed for Chest Pain. 25 Tab 1   • Cholecalciferol (VITAMIN D-3) 5000 UNITS TABS Take 1 Tab by mouth every 48 hours.         Allergies  Fish, Pcn [penicillins], Amoxicillin, and Food    ROS  Left foot infection. All other systems were reviewed and found to be negative    Family History   Problem Relation Age of Onset   • Other Mother         Rheumatic fever       Social History     Socioeconomic History   • Marital status:      Spouse name: Not on file   • Number of children: Not on file   • Years of education: Not on file   • Highest education level: Not on file   Occupational History   • Not on file   Tobacco Use   • Smoking status: Never Smoker   • Smokeless tobacco: Never Used   Substance and Sexual Activity   • Alcohol use: Not Currently     Alcohol/week: 0.0 oz     Comment: once a year   • Drug use: No   • Sexual activity: Not on file   Other Topics Concern   • Not on file   Social History Narrative   • Not on file     Social Determinants of Health     Financial Resource Strain: Low Risk    • Difficulty of Paying Living Expenses: Not hard at all   Food Insecurity: No Food Insecurity   • Worried About Running Out of Food in the Last Year: Never true   • Ran Out of Food in the Last Year: Never true   Transportation Needs: No Transportation Needs   • Lack of Transportation (Medical): No   • Lack of Transportation (Non-Medical): No   Physical Activity:    • Days of Exercise per Week:    • Minutes of Exercise per Session:    Stress:    • Feeling of Stress :    Social Connections:    • Frequency of Communication with Friends and Family:    • Frequency of Social Gatherings with Friends and Family:    • Attends [employer]anda[employer] Services:    • Active Member of Clubs or Organizations:    • Attends Club or Organization Meetings:    • Marital Status:    Intimate Partner Violence:    • Fear of Current or Ex-Partner:    • Emotionally Abused:    •  Physically Abused:    • Sexually Abused:        Physical Exam  Vitals  /67   Pulse 95   Temp 36.9 °C (98.4 °F) (Temporal)   Resp 18   Ht 1.829 m (6')   Wt 86.2 kg (190 lb)   SpO2 98%   General: Well Developed, Well Nourished, no acute distress  HEENT: Normocephalic, atraumatic  Eyes: Anicteric, PERRLA, EOMI  Neck: Supple, nontender, no masses  Lungs: CTA, no wheezes or crackles  Heart: RRR, no murmurs, rubs or gallops  Abdomen: Soft, NT, ND  Pelvis: Stable to AP and Lateral Compression  Skin: Intact, no open wounds  Extremities: Left foot draining wound  Neuro: Decreased sensation left foot  Vascular: 1+DP/PT, Capillary refill <2 seconds    Radiographs:  US-HAKEEM SINGLE LEVEL BILAT   Final Result      DX-FOOT-COMPLETE 3+ LEFT   Final Result      Findings in keeping with osteomyelitis involving the fifth distal metatarsal, proximal and middle phalanges.          Laboratory Values  Recent Labs     04/22/21  1612 04/23/21  0400   WBC 9.3 7.9   RBC 3.90* 3.54*   HEMOGLOBIN 11.2* 10.3*   HEMATOCRIT 35.5* 33.1*   MCV 91.0 93.5   MCH 28.7 29.1   MCHC 31.5* 31.1*   RDW 53.9* 56.3*   PLATELETCT 363 304   MPV 8.9* 8.9*     Recent Labs     04/22/21  1612 04/23/21  0400   SODIUM 139 140   POTASSIUM 4.5 4.5   CHLORIDE 103 106   CO2 27 28   GLUCOSE 94 92   BUN 29* 28*             Impression: Left fifth ray osteomyelitis    Plan:Operative intervention. Risks and benefits of surgery were discussed which include but are not limited to bleeding, infection, neurovascular damage, malunion, nonunion, instability, limb length discrepancy, DVT, PE, MI, Stroke and death. They understand these risks and wish to proceed.

## 2021-04-24 NOTE — PROGRESS NOTES
Bedside report received. Assessment completed.  Pt is A&O x4. Pt on room 1L   Medicating for pain PRN per MAR Pain 6/10  Denies nausea.   - numbness, - tingling.  DIP to LLE    Last BM PTA . +flatus,   +void.  Diabetic diet. Tolerates well.   Pt chairfast; max assist.  Call light and belongings within reach. All needs met at this time. Fall Precautions and hourly rounding in place.

## 2021-04-24 NOTE — PROGRESS NOTES
Pt arrived back to T 435-1 from PACU  AOx4  Pain rated at 3/10, repositioned   2 L of O2 via nasal cannula  No complaints of nausea a this time, advanced to diabetic diet  Dressing in place to LLE  +void  Call light within reach  Bed locked and in low position   POC discussed with patient  All needs met at this time.

## 2021-04-24 NOTE — CONSULTS
[facility] INFECTIOUS DISEASES INPATIENT CONSULT NOTE     Date of Service: 4/24/2021    Consult Requested By: Serina Rodriguez M.D.    Reason for Consultation: Osteomyelitis    Chief Complaint: Infection    History of Present Illness:     Agapito Stone is a 76 y.o. male admitted 4/22/2021.  Patient with known history of CAD, type 2 diabetes, hypertension, CVA, left lower extremity DVT on Eliquis, presented to the ER with 1 week of worsening left foot pain following an injury about a week prior resulting in a worsening wound.  X-ray of the foot was concerning for osteomyelitis of the fifth digit and metatarsal.  Patient history of prior surgeries including right foot I&D in August 2020, GSR.  LPS was consulted for left lateral fifth metatarsal head wound.  Patient states that the wound has been present for the past few months, unsure of exact timeline.  He resides at [facility].  Patient was afebrile here no leukocytosis, started on vancomycin and ceftriaxone.  Wound culture with GPC on Gram stain, growth pending.  Prior wound culture from December 2020 grew MRSA and diphtheroids but this was from the right heel.    On 4/23, patient was taken to the OR and underwent left fifth ray amputation.  No pathology obtained.  ID consulted for recommendations.    Regarding penicillin allergy, patient states that he had a positive skin test as a child, also had significant hives to amoxicillin in adulthood.    Review of Systems:  All other systems reviewed and are negative expect as noted in HPI    Past Medical History:   Diagnosis Date   • Arthritis     hands, wrists, ankles   • CAD (coronary artery disease) October 2013    Dr. Ovalle; ACS. PCI/LETA x 2 of the mid circumflex (Xience 3.25 x 23mm) and proximal circumflex (Xience 3.6x 12mm)   • Cataract     sugery   • Diabetes     oral meds   • High cholesterol    • Hyperlipidemia    • Hypertension    • Pain     ankles   • Pneumonia 1968   • Stroke (HCC) 2010    no  residual effects   • Syncope        Past Surgical History:   Procedure Laterality Date   • IRRIGATION & DEBRIDEMENT ORTHO Right 12/10/2020    Procedure: IRRIGATION AND DEBRIDEMENT, WOUND - HEEL;  Surgeon: Royal Bolivar M.D.;  Location: SURGERY [facility];  Service: Orthopedics   • TENDON LENGHTENING Right 8/24/2020    Procedure: LENGTHENING, TENDON- , GASTROC RECESSION;  Surgeon: Royal Bolivar M.D.;  Location: SURGERY [facility];  Service: Orthopedics   • ANKLE FUSION Right 8/24/2020    Procedure: FUSION, JOINT, ANKLE- ANKLE SUBTALAR FUSION , BONE GRAFT, MEDIAL RELEASE INCLUDING TENDONS, PATIAL EXCISION OF FIBULA;  Surgeon: Royal Bolivar M.D.;  Location: SURGERY [facility];  Service: Orthopedics   • TENDON TRANSFER Right 8/24/2020    Procedure: TRANSFER, TENDON- PLANTAR FASCIA RELEASE;  Surgeon: Royal Bolivar M.D.;  Location: [facility];  Service: Orthopedics   • ORTHOPEDIC OSTEOTOMY Right 8/24/2020    Procedure: OSTEOTOMY- 1ST METATARSAL, CROSS PROCEDURE, 2-5 METATARSAL OSTEOTOMY, 2-5 PHALANGEL JOINT RECONSTRUCTION;  Surgeon: Royal Bolivar M.D.;  Location: SURGERY [facility];  Service: Orthopedics   • HAMMERTOE CORRECTION Right 8/24/2020    Procedure: CORRECTION, HAMMER TOE 2-5;  Surgeon: Royal Bolivar M.D.;  Location: [facility];  Service: Orthopedics   • STENT PLACEMENT  2013    cardiac   • CAROTID ENDARTERECTOMY  7/13/2010    left; Performed by CHIQUITA CORRIGAN at [facility]   • CATARACT EXTRACTION WITH IOL Bilateral    • TONSILLECTOMY  as a child       Family History   Problem Relation Age of Onset   • Other Mother         Rheumatic fever       Social History     Socioeconomic History   • Marital status:      Spouse name: Not on file   • Number of children: Not on file   • Years of education: Not on file   • Highest education level: Not on file   Occupational History   • Not on file   Tobacco Use   • Smoking status: Never Smoker    • Smokeless tobacco: Never Used   Substance and Sexual Activity   • Alcohol use: Not Currently     Alcohol/week: 0.0 oz     Comment: once a year   • Drug use: No   • Sexual activity: Not on file   Other Topics Concern   • Not on file   Social History Narrative   • Not on file     Social Determinants of Health     Financial Resource Strain: Low Risk    • Difficulty of Paying Living Expenses: Not hard at all   Food Insecurity: No Food Insecurity   • Worried About Running Out of Food in the Last Year: Never true   • Ran Out of Food in the Last Year: Never true   Transportation Needs: No Transportation Needs   • Lack of Transportation (Medical): No   • Lack of Transportation (Non-Medical): No   Physical Activity:    • Days of Exercise per Week:    • Minutes of Exercise per Session:    Stress:    • Feeling of Stress :    Social Connections:    • Frequency of Communication with Friends and Family:    • Frequency of Social Gatherings with Friends and Family:    • Attends Church Services:    • Active Member of Clubs or Organizations:    • Attends Club or Organization Meetings:    • Marital Status:    Intimate Partner Violence:    • Fear of Current or Ex-Partner:    • Emotionally Abused:    • Physically Abused:    • Sexually Abused:        Allergies   Allergen Reactions   • Fish Hives     shellfish   • Pcn [Penicillins] Hives     Tolerates cephalosporins   • Amoxicillin Hives   • Food Swelling      Eggs,Melons, avocados-puffy mouth       Medications:    Current Facility-Administered Medications:   •  vancomycin (VANCOCIN) 1,250 mg in  mL IVPB, 15 mg/kg, Intravenous, Q12HR, Serina Rodriguez M.D., Last Rate: 125 mL/hr at 04/24/21 1103, 1,250 mg at 04/24/21 1103  •  acetaminophen (Tylenol) tablet 650 mg, 650 mg, Oral, Q4HRS PRN, Rubia Titus D.O., 650 mg at 04/23/21 2320  •  ascorbic acid (Vitamin C) tablet 1,000 mg, 1,000 mg, Oral, DAILY, Rubia Titus D.O., 1,000 mg at 04/24/21 0578  •  atorvastatin (LIPITOR) tablet 80  mg, 80 mg, Oral, QHS, OCTAVIA Leo.O., 80 mg at 04/23/21 2106  •  calcium carbonate (TUMS) chewable tab 1,500 mg, 1,500 mg, Oral, Q4HRS PRN, Rubia Titus D.O.  •  vitamin D (cholecalciferol) tablet 5,000 Units, 5,000 Units, Oral, Q48HRS, ANKITA LeoOJuan M, Stopped at 04/23/21 0600  •  gabapentin (NEURONTIN) capsule 100 mg, 100 mg, Oral, BID, OCTAVIA Leo.O., 100 mg at 04/24/21 0552  •  magnesium oxide (MAG-OX) tablet 400 mg, 400 mg, Oral, BID, OCTAVIA Leo.O., 400 mg at 04/24/21 0551  •  montelukast (SINGULAIR) tablet 10 mg, 10 mg, Oral, DAILY, OCTAVIA Leo.OJuan M, 10 mg at 04/24/21 0552  •  tamsulosin (FLOMAX) capsule 0.4 mg, 0.4 mg, Oral, QHS, OCTAVIA Leo.O., 0.4 mg at 04/23/21 2106  •  senna-docusate (PERICOLACE or SENOKOT S) 8.6-50 MG per tablet 2 tablet, 2 tablet, Oral, BID, 2 tablet at 04/24/21 0551 **AND** polyethylene glycol/lytes (MIRALAX) PACKET 1 Packet, 1 Packet, Oral, QDAY PRN **AND** magnesium hydroxide (MILK OF MAGNESIA) suspension 30 mL, 30 mL, Oral, QDAY PRN **AND** bisacodyl (DULCOLAX) suppository 10 mg, 10 mg, Rectal, QDAY PRN, Rubia Titus D.O.  •  NS infusion, , Intravenous, Continuous, Rubia Titus D.O., Last Rate: 83 mL/hr at 04/23/21 2107, Restarted at 04/23/21 2107  •  heparin injection 5,000 Units, 5,000 Units, Subcutaneous, Q8HRS, Rubia Titus D.O., 5,000 Units at 04/24/21 0552  •  Notify provider if pain remains uncontrolled, , , CONTINUOUS **AND** Use the Numeric Rating Scale (NRS), Rojas-Baker Faces (WBF), or FLACC on regular floors and Critical-Care Pain Observation Tool (CPOT) on ICUs/Trauma to assess pain, , , CONTINUOUS **AND** Pulse Ox, , , CONTINUOUS **AND** Pharmacy Consult Request ...Pain Management Review 1 Each, 1 Each, Other, PHARMACY TO DOSE **AND** If patient difficult to arouse and/or has respiratory depression (respiratory rate of 10 or less), stop any opiates that are currently infusing and call a Rapid Response., , , CONTINUOUS, Rubia Titus D.O.  •   oxyCODONE immediate-release (ROXICODONE) tablet 2.5 mg, 2.5 mg, Oral, Q3HRS PRN **OR** oxyCODONE immediate-release (ROXICODONE) tablet 5 mg, 5 mg, Oral, Q3HRS PRN **OR** morphine (pf) 4 mg/mL injection 2 mg, 2 mg, Intravenous, Q3HRS PRN, Rubia Titus D.O.  •  [COMPLETED] piperacillin-tazobactam (ZOSYN) 3.375 g in  mL IVPB, 3.375 g, Intravenous, Once **AND** [DISCONTINUED] piperacillin-tazobactam (ZOSYN) 3.375 g in  mL IVPB, 3.375 g, Intravenous, Q8HRS **AND** MD Alert...Vancomycin per Pharmacy, 1 Each, Other, PHARMACY TO DOSE, Rubia Titus D.O.  •  ondansetron (ZOFRAN) syringe/vial injection 4 mg, 4 mg, Intravenous, Q4HRS PRN, Rubia Titus D.O.  •  ondansetron (ZOFRAN ODT) dispertab 4 mg, 4 mg, Oral, Q4HRS PRN, Rubia Titus D.O.  •  insulin regular (HumuLIN R,NovoLIN R) injection, 2-9 Units, Subcutaneous, 4X/DAY ACHS, Stopped at 21 2100 **AND** POC blood glucose manual result, , , Q AC AND BEDTIME(S) **AND** NOTIFY MD and PharmD, , , Once **AND** glucose 4 g chewable tablet 16 g, 16 g, Oral, Q15 MIN PRN **AND** dextrose 50% (D50W) injection 50 mL, 50 mL, Intravenous, Q15 MIN PRN, Rubia Titus D.O.  •  diphenhydrAMINE (BENADRYL) tablet/capsule 25 mg, 25 mg, Oral, Q6HRS PRN, Rubia Titus D.O.  •  cefTRIAXone (ROCEPHIN) 2 g in  mL IVPB, 2 g, Intravenous, Q24HRS, Rubia Titus D.O., Stopped at 21 0621    Physical Exam:   Vital Signs: /76   Pulse (!) 117   Temp 36.8 °C (98.2 °F) (Temporal)   Resp 18   Ht 1.829 m (6')   Wt 86.2 kg (190 lb)   SpO2 95%   BMI 25.77 kg/m²   Temp  Av.6 °C (97.8 °F)  Min: 36.1 °C (97 °F)  Max: 37 °C (98.6 °F)  Vital signs reviewed  Constitutional: Patient is oriented to person, place, and time. Appears elderly and frail. No distress  Head: Atraumatic, normocephalic  Eyes: Conjunctivae normal, EOM intact.  Mouth/Throat: Lips without lesions  Neck: Neck supple. No masses/lymphadenopathy  Cardiovascular: Normal rate, regular rhythm, normal S1S2  and intact distal pulses. No murmur, gallop, or friction rub. No pedal edema.  Pulmonary/Chest: No respiratory distress. Unlabored respiratory effort, lungs clear to auscultation. No wheezes or rales.   Abdominal: Soft, non tender. BS + x 4. No masses or hepatosplenomegaly.   Musculoskeletal: Left foot with surgical dressing in place  Neurological: Alert and oriented to person, place, and time. No gross cranial nerve deficit. No focal neural deficit noted  Skin: Skin is warm and dry. No rashes or embolic phenomena noted on exposed skin  Psychiatric: Pleasant.  Normal mood and affect. Behavior is normal.     LABS:  Recent Labs     04/22/21  1612 04/23/21  0400 04/24/21  0432   WBC 9.3 7.9 8.0      Recent Labs     04/22/21  1612 04/23/21  0400 04/24/21  0432   HEMOGLOBIN 11.2* 10.3* 9.1*   HEMATOCRIT 35.5* 33.1* 30.4*   MCV 91.0 93.5 95.0   MCH 28.7 29.1 28.4   ANISOCYTOSIS  --   --  1+   PLATELETCT 363 304 280       Recent Labs     04/22/21  1612 04/23/21  0400 04/24/21  0432   SODIUM 139 140 139   POTASSIUM 4.5 4.5 4.3   CHLORIDE 103 106 107   CO2 27 28 26   CREATININE 1.01 0.97 0.91        Recent Labs     04/22/21  1612 04/24/21  0432   ALBUMIN 2.8* 2.0*        MICRO:  No results found for: BLOODCULTU, BLDCULT, BCHOLD     Latest pertinent labs were reviewed    IMAGING STUDIES:  As above    Hospital Course/Assessment:   Agapito Stone is a pleasant 76 y.o. male SNF resident with known history of CAD, type 2 diabetes, hypertension, CVA, left lower extremity DVT on Eliquis, admitted with worsening left metatarsal heel wound with underlying osteomyelitis on imaging.  Patient underwent left fifth ray amputation on 4/23, cultures are pending.    Pertinent Diagnoses:  Left foot osteomyelitis  Polymicrobial infection  Type 2 diabetes  Penicillin allergy    Plan:   -Continue IV vancomycin and ceftriaxone for now.  Follow pending cultures, growing MRSA and a GNR thus far  -No pathology obtained.  Anticipate 6 weeks of  antibiotics to treat for residual osteomyelitis.  Choice of antibiotics depending on culture sensitivity results    Plan was discussed with the primary team, Dr. Rodriguez    ID will follow.  Please call with questions.      Gomez Reveles M.D.    Please note that this dictation was created using voice recognition software. I have worked with technical experts from [facility] to optimize the interface.  I have made every reasonable attempt to correct obvious errors, but there may be errors of grammar and possibly content that I did not discover before finalizing the note.

## 2021-04-24 NOTE — CARE PLAN
Problem: Communication  Goal: The ability to communicate needs accurately and effectively will improve  Outcome: PROGRESSING AS EXPECTED     Pt able to communicate needs effectively to staff     Problem: Safety  Goal: Will remain free from falls  Outcome: PROGRESSING AS EXPECTED     Pt educated to call for assistance before getting out of bed, verbalizes understanding.

## 2021-04-24 NOTE — PROGRESS NOTES
2 RN skin check complete  Generalized integumentary fragile  BUE bruising/abrasions  RUE scab with dressing in place   BLE bruising/abrasions  Dressing in place to LLE  Healed wound to right heel  RLE dry  Redness to patient's sacrum, blanching  Pt refusing waffle overlay at this time  Heel float boots on.

## 2021-04-25 NOTE — PROGRESS NOTES
Hospital Medicine Daily Progress Note    Date of Service  4/25/2021    Chief Complaint  76 y.o. male admitted 4/22/2021 with for PICC line placement, osteomyelitis    Hospital Course  76 y.o. male who presented 4/22/2021 with history of coronary artery disease, diabetes, hypertension, CVA and left lower extremity DVT on chronic anticoagulation with Eliquis presents with worsening left foot pain x1 week.  Patient was sent to the emergency room for PICC line placement for suspected osteomyelitis of the foot.  He had sustained an injury a week ago with noted worsening lower extremity wound.  As per patient outpatient culture grew 3 different organisms per emergency room records.  X-ray of the foot is consistent with osteomyelitis of the fifth digit.  LPS, ID, orthopedics consulted.  HAKEEM no signs of occlusion.  MRI pending.  C-reactive elevated.  He was started on ceftriaxone and vancomycin.  ID recommend to daptomycin and ceftriaxone to continue until 6/3. Pt will need weekly CBC, CMP, and CPK. Eliquis was held due to surgery and resumed 4/25.      Interval Problem Update  Pt believes that he will go back to [facility]. I did explain that pending eval from surgery, ID before transfer to [facility]. Continue current antibiotic.   4/24- pain is well controled.surgery successful. Boot delivered. Pt not medical cleared by ID yet in regards to antibiotic. Do not transfer until cleared by ID  4/25- pt reports right lower rib pain, worse with breathing. Pt has h/o DVT. CTA ordered and eliquis resumed . Antibiotic per ID provided. Hopefully transition tomorrow at [facility]     Consultants/Specialty  LPS  Ortho  ID     Code Status  Full Code    Disposition  Inpatient     Review of Systems  Review of Systems   Constitutional: Negative for fever and malaise/fatigue.   Respiratory: Negative for cough and shortness of breath.    Cardiovascular: Positive for chest pain and leg swelling. Negative for orthopnea.   Gastrointestinal: Negative for  abdominal pain, nausea and vomiting.   Genitourinary: Negative for dysuria and urgency.   Musculoskeletal: Positive for joint pain.   Neurological: Negative for speech change and headaches.        Physical Exam  Temp:  [36.7 °C (98 °F)-37.4 °C (99.4 °F)] (P) 37.4 °C (99.4 °F)  Pulse:  [101-113] (P) 108  Resp:  [16-18] (P) 18  BP: ()/(55-89) 133/73  SpO2:  [92 %-94 %] (P) 94 %    Physical Exam  Vitals and nursing note reviewed.   Constitutional:       Appearance: He is ill-appearing.   HENT:      Head: Normocephalic and atraumatic.      Right Ear: External ear normal.      Left Ear: External ear normal.      Nose: No congestion.   Eyes:      General: No scleral icterus.  Cardiovascular:      Rate and Rhythm: Normal rate.      Heart sounds: No murmur.   Pulmonary:      Effort: No respiratory distress.   Abdominal:      General: There is no distension.      Palpations: Abdomen is soft.      Tenderness: There is no abdominal tenderness. There is no guarding.   Musculoskeletal:         General: Tenderness and signs of injury present.      Comments: Left wound is wrapped    Skin:     General: Skin is dry.      Findings: Rash present.   Neurological:      Mental Status: He is alert and oriented to person, place, and time.         Fluids    Intake/Output Summary (Last 24 hours) at 4/25/2021 1613  Last data filed at 4/25/2021 0900  Gross per 24 hour   Intake 480 ml   Output 580 ml   Net -100 ml       Laboratory  Recent Labs     04/23/21  0400 04/24/21  0432 04/25/21  0548   WBC 7.9 8.0 6.4   RBC 3.54* 3.20* 3.14*   HEMOGLOBIN 10.3* 9.1* 9.1*   HEMATOCRIT 33.1* 30.4* 31.3*   MCV 93.5 95.0 99.7*   MCH 29.1 28.4 29.0   MCHC 31.1* 29.9* 29.1*   RDW 56.3* 55.8* 57.4*   PLATELETCT 304 280 273   MPV 8.9* 9.0 8.8*     Recent Labs     04/23/21  0400 04/24/21  0432 04/25/21  0548   SODIUM 140 139 136   POTASSIUM 4.5 4.3 3.7   CHLORIDE 106 107 103   CO2 28 26 27   GLUCOSE 92 86 89   BUN 28* 23* 16   CREATININE 0.97 0.91 0.77    CALCIUM 8.5 8.2* 8.1*             Recent Labs     04/23/21  0400   TRIGLYCERIDE 63   HDL 32*   LDL 45       Imaging  US-HAKEEM SINGLE LEVEL BILAT   Final Result      DX-FOOT-COMPLETE 3+ LEFT   Final Result      Findings in keeping with osteomyelitis involving the fifth distal metatarsal, proximal and middle phalanges.      CT-CTA CHEST PULMONARY ARTERY W/ RECONS    (Results Pending)        Assessment/Plan  * Osteomyelitis of left foot (HCC)- (present on admission)  Assessment & Plan  Patient will be admitted to the surgical inpatient unit  He has been started on IV antibiotics with Zosyn and vancomycin  Prior lower extremity wound was positive for MRSA and diphtheroids in December 2020  As per patient, cultures at outlying facility grew 3 organisms  ID consulted   We will follow up repeat wound culture  Pain control  Blood sugar control    Secondary to diabetes and peripheral vascular disease   HAKEEM 4/23- negative   Limb preservation services has been consulted, will maintain n.p.o. for surgical evaluation, appreciate input  X-ray consistent with osteomyelitis of the lateral fifth digit  ID did recommend dapto/ceftriaxone until 6/3.  to follow up with [facility]       Hyperlipidemia associated with type 2 diabetes mellitus (HCC)- (present on admission)  Assessment & Plan  Follow-up lipid profile    CAD (coronary artery disease)- (present on admission)  Assessment & Plan  History of  Follow-up EKG no acute findings     HTN (hypertension)- (present on admission)  Assessment & Plan  Controlled, continue home medication    Anemia- (present on admission)  Assessment & Plan  Monitor    Age-related physical debility- (present on admission)  Assessment & Plan  Pt will transfer back to [facility] once cleared by ID     Deep vein thrombosis (DVT) of lower extremity (HCC)- (present on admission)  Assessment & Plan  4/25- resume eliquis. Follow up chest CTA     History of stroke- (present on admission)  Assessment & Plan  History  of    Type 2 diabetes mellitus with both eyes affected by mild nonproliferative retinopathy without macular edema, without long-term current use of insulin (HCC)- (present on admission)  Assessment & Plan  .    Type 2 diabetes mellitus with peripheral neuropathy (HCC)- (present on admission)  Assessment & Plan  Continue gabapentin  Sliding scale insulin  Hold home medications  Follow-up A1c 4.9 (4/22)        VTE prophylaxis: heparin

## 2021-04-25 NOTE — PROGRESS NOTES
Received Report from STEVEN Vargas.     Assumed care of pt. Pt sleeping comfortably at this time.    Fall precautions in place. Belongings and call light within reach. Educated patient to call for assistance as needed. All needs met at this time.

## 2021-04-25 NOTE — PROGRESS NOTES
Infectious Disease Progress Note    Author: Gomez Reveles M.D. Date & Time of service: 2021  9:35 AM    Chief Complaint:  Follow-up for osteomyelitis    Interval History:   patient remains afebrile, white count 6.4, tolerating antibiotics.  Culture results as below.    Labs Reviewed and Medications Reviewed.    Review of Systems:  Review of Systems   Constitutional: Negative for chills and fever.   Gastrointestinal: Negative for abdominal pain, diarrhea, nausea and vomiting.   Musculoskeletal: Positive for myalgias.   Skin: Negative for rash.   All other systems reviewed and are negative.      Hemodynamics:  Temp (24hrs), Av °C (98.6 °F), Min:36.7 °C (98 °F), Max:37.4 °C (99.3 °F)  Temperature: 36.9 °C (98.4 °F)  Pulse  Av.3  Min: 87  Max: 117   Blood Pressure : 133/73       Physical Exam:  Physical Exam  Vitals and nursing note reviewed.   Constitutional:       General: He is not in acute distress.     Comments: Elderly and frail-appearing   HENT:      Mouth/Throat:      Pharynx: No oropharyngeal exudate.   Eyes:      General:         Right eye: No discharge.         Left eye: No discharge.      Conjunctiva/sclera: Conjunctivae normal.   Cardiovascular:      Rate and Rhythm: Normal rate and regular rhythm.      Heart sounds: No murmur.   Pulmonary:      Effort: Pulmonary effort is normal. No respiratory distress.   Abdominal:      General: Abdomen is flat. There is no distension.      Tenderness: There is no abdominal tenderness.   Musculoskeletal:      Cervical back: No rigidity.      Comments: Left foot with surgical dressing in place   Skin:     Findings: No erythema or rash.   Neurological:      General: No focal deficit present.      Mental Status: He is alert and oriented to person, place, and time.   Psychiatric:         Mood and Affect: Mood normal.         Behavior: Behavior normal.      Comments: Very pleasant         Meds:    Current Facility-Administered Medications:   •   ceFAZolin  •  acetaminophen  •  ascorbic acid  •  atorvastatin  •  calcium carbonate  •  vitamin D  •  gabapentin  •  magnesium oxide  •  montelukast  •  tamsulosin  •  senna-docusate **AND** polyethylene glycol/lytes **AND** magnesium hydroxide **AND** bisacodyl  •  heparin  •  Notify provider if pain remains uncontrolled **AND** Use the Numeric Rating Scale (NRS), Rojas-Baker Faces (WBF), or FLACC on regular floors and Critical-Care Pain Observation Tool (CPOT) on ICUs/Trauma to assess pain **AND** Pulse Ox **AND** Pharmacy Consult Request **AND** If patient difficult to arouse and/or has respiratory depression (respiratory rate of 10 or less), stop any opiates that are currently infusing and call a Rapid Response.  •  oxyCODONE immediate-release **OR** oxyCODONE immediate-release **OR** morphine injection  •  [COMPLETED] piperacillin-tazobactam **AND** [DISCONTINUED] piperacillin-tazobactam **AND** MD Alert...Vancomycin per Pharmacy  •  ondansetron  •  ondansetron  •  insulin regular **AND** POC blood glucose manual result **AND** NOTIFY MD and PharmD **AND** glucose **AND** dextrose 50%  •  diphenhydrAMINE    Labs:  Recent Labs     04/23/21 0400 04/24/21 0432 04/25/21  0548   WBC 7.9 8.0 6.4   RBC 3.54* 3.20* 3.14*   HEMOGLOBIN 10.3* 9.1* 9.1*   HEMATOCRIT 33.1* 30.4* 31.3*   MCV 93.5 95.0 99.7*   MCH 29.1 28.4 29.0   RDW 56.3* 55.8* 57.4*   PLATELETCT 304 280 273   MPV 8.9* 9.0 8.8*   NEUTSPOLYS 70.60 68.50 72.20*   LYMPHOCYTES 16.40* 15.70* 14.50*   MONOCYTES 10.70 12.20 10.20   EOSINOPHILS 1.30 2.50 1.70   BASOPHILS 0.60 0.60 0.80   RBCMORPHOLO  --  Present  --      Recent Labs     04/23/21 0400 04/24/21 0432 04/25/21  0548   SODIUM 140 139 136   POTASSIUM 4.5 4.3 3.7   CHLORIDE 106 107 103   CO2 28 26 27   GLUCOSE 92 86 89   BUN 28* 23* 16     Recent Labs     04/22/21  1612 04/22/21  1612 04/23/21  0400 04/24/21  0432 04/25/21  0548   ALBUMIN 2.8*  --   --  2.0* 2.0*   TBILIRUBIN 0.7  --   --  0.4 0.5    ALKPHOSPHAT 262*  --   --  198* 221*   TOTPROTEIN 6.9  --   --  5.5* 5.4*   ALTSGPT 16  --   --  13 9   ASTSGOT 23  --   --  17 17   CREATININE 1.01   < > 0.97 0.91 0.77    < > = values in this interval not displayed.       Imaging:  DX-FOOT-COMPLETE 3+ LEFT    Result Date: 2021 3:42 PM HISTORY/REASON FOR EXAM:  Pain/Deformity Following Trauma (Wound TECHNIQUE/EXAM DESCRIPTION AND NUMBER OF VIEWS: 3 views of the LEFT foot. COMPARISON:  None. FINDINGS: There is soft tissue swelling along the lateral aspect of the foot. There is bony destruction seen throughout the fifth proximal phalanx, the mid/distal phalangeal base and involving the fifth metacarpal head/neck consistent with osteomyelitis. There may  be septic arthritis involving the interposed joints as well. There is generalized osteopenia. No evidence of osteomyelitis elsewhere within the foot. Mild degenerative changes the great toe MTP joint.     Findings in keeping with osteomyelitis involving the fifth distal metatarsal, proximal and middle phalanges.    US-HAKEEM SINGLE LEVEL BILAT    Result Date: 2021   Vascular Laboratory  Conclusions  Bilateral.  There is no evidence of major arterial disease demonstrated.  NADINE CABRERA  Age:    76    Gender:     M  MRN:    [id number]  :    1945      BSA:  Exam Date:     2021 10:46  Room #:     Inpatient  Priority:     Routine  Ht (in):             Wt (lb):  Ordering Physician:        ANN HOYT  Referring Physician:       [id number]  Sonographer:               Cami Alves RDMS                             RVT  Study Type:                Complete Bilateral  Technical Quality:         Adequate  Indications:     Ulceration of LE  CPT Codes:       77615  ICD Codes:       707.1  History:         Non-healing wound of left lower limb. Diabetes.  Limitations:                 RIGHT  Waveform            Systolic BPs (mmHg)                              125           Brachial  Triphasic                                Common Femoral  Triphasic                  276           Posterior Tibial  Triphasic                  267           Dorsalis Pedis                                           Peroneal                             2.12          HAKEEM                                           TBI                       LEFT  Waveform        Systolic BPs (mmHg)                             130           Brachial  Triphasic                                Common Femoral  Triphasic                  240           Posterior Tibial  Triphasic                  226           Dorsalis Pedis                                           Peroneal                             1.85          HAKEEM                                           TBI  Findings  Bilateral.  Doppler waveforms of the common femoral arteries are of high amplitude and  triphasic.  Doppler waveforms at the ankle are brisk and triphasic.  Ankle pressures are not accurately measured due to calcification and  noncompressibility of the tibial vessels.  Toe-brachial indices are normal. Right: 0.81  Left: 0.85  Additional testing was not performed in accordance with lower extremity  arterial evaluation protocol.  Cristiano Mccormack  (Electronically Signed)  Final Date:      23 April 2021                   13:32      Micro:  Results     Procedure Component Value Units Date/Time    CULTURE WOUND W/ GRAM STAIN [[id number]]  (Abnormal)  (Susceptibility) Collected: 04/23/21 1904    Order Status: Completed Specimen: Wound from Left Foot Updated: 04/25/21 0828     Significant Indicator POS     Source WND     Site LEFT FOOT     Culture Result -     Gram Stain Result Few WBCs.  Rare Gram positive cocci.       Culture Result Methicillin Resistant Staphylococcus aureus  Moderate growth  This isolate is presumed to be clindamycin resistant based on  detection of inducible resistance.  Clindamycin may still  be effective in some patients.        Proteus  mirabilis  Rare growth      Narrative:      CALL  Luis  141 tel. [phone],  CALLED  141 tel. [phone] 04/24/2021, 14:52, RB PERF. RESULTS CALLED TO: RN  [id number]  Surgery - swabs received    Susceptibility     Methicillin resistant staphylococcus aureus (1)     Antibiotic Interpretation Microscan Method Status    Azithromycin Resistant >4 mcg/mL NADEGE Final    Clindamycin Resistant 0.5 mcg/mL NADEGE Final    Cefazolin Resistant <=8 mcg/mL NADEGE Final    Cefepime Resistant 16 mcg/mL NADEGE Final    Ceftaroline Sensitive <=0.5 mcg/mL NADEGE Final    Daptomycin Sensitive <=0.5 mcg/mL NADEGE Final    Erythromycin Resistant >4 mcg/mL NADEGE Final    Ampicillin/sulbactam Resistant <=8/4 mcg/mL NADEGE Final    Oxacillin Resistant >2 mcg/mL NADEGE Final    Trimeth/Sulfa Sensitive <=0.5/9.5 mcg/mL NADEGE Final    Tetracycline Resistant >8 mcg/mL NADEGE Final    Vancomycin Sensitive 1 mcg/mL NADEGE Final          Proteus mirabilis (2)     Antibiotic Interpretation Microscan Method Status    Ampicillin Sensitive <=8 mcg/mL NADEGE Final    Ceftriaxone Sensitive <=1 mcg/mL NADEGE Final    Cefazolin Sensitive <=2 mcg/mL NADEGE Final    Ciprofloxacin Resistant >2 mcg/mL NADEGE Final    Cefepime Sensitive <=2 mcg/mL NADEGE Final    Gentamicin Sensitive <=2 mcg/mL NADEGE Final    Tobramycin Sensitive <=2 mcg/mL NADEGE Final    Trimeth/Sulfa Sensitive <=0.5/9.5 mcg/mL NADEGE Final    Ceftazidime Sensitive <=1 mcg/mL NADEGE Final    Cefuroxime Sensitive <=4 mcg/mL NADEGE Final    Ertapenem Sensitive <=0.5 mcg/mL NADEGE Final    Moxifloxacin Resistant >4 mcg/mL NADEGE Final    Pip/Tazobactam Sensitive <=8 mcg/mL NADEGE Final                   Anaerobic Culture [[id number]] Collected: 04/23/21 1904    Order Status: Completed Specimen: Wound Updated: 04/25/21 0828     Significant Indicator NEG     Source WND     Site LEFT FOOT     Culture Result Culture in progress.    Narrative:      CALL  Luis  141 tel. [phone],  CALLED  141 tel. [phone] 04/24/2021, 14:52, RB PERF. RESULTS CALLED TO:  "RN  [id number]  Surgery - swabs received    GRAM STAIN [[id number]] Collected: 04/23/21 1904    Order Status: Completed Specimen: Wound Updated: 04/24/21 0025     Significant Indicator .     Source WND     Site LEFT FOOT     Gram Stain Result Few WBCs.  Rare Gram positive cocci.      Narrative:      Surgery - swabs received    BLOOD CULTURE [[id number]] Collected: 04/22/21 1655    Order Status: Completed Specimen: Blood from Peripheral Updated: 04/23/21 0705     Significant Indicator NEG     Source BLD     Site PERIPHERAL     Culture Result No Growth  Note: Blood cultures are incubated for 5 days and  are monitored continuously.Positive blood cultures  are called to the RN and reported as soon as  they are identified.      Narrative:      Per Hospital Policy: Only change Specimen Src: to \"Line\" if  specified by physician order.  Left AC    BLOOD CULTURE [[id number]] Collected: 04/22/21 1655    Order Status: Completed Specimen: Blood from Peripheral Updated: 04/23/21 0705     Significant Indicator NEG     Source BLD     Site PERIPHERAL     Culture Result No Growth  Note: Blood cultures are incubated for 5 days and  are monitored continuously.Positive blood cultures  are called to the RN and reported as soon as  they are identified.      Narrative:      Per Hospital Policy: Only change Specimen Src: to \"Line\" if  specified by physician order.  Right Hand    SARS-CoV-2 PCR (24 hour In-House): Collect NP swab in VTM [[id number]] Collected: 04/22/21 1714    Order Status: Completed Specimen: Respirate Updated: 04/22/21 2202     SARS-CoV-2 Source NP Swab     SARS-CoV-2 by PCR NotDetected     Comment: PATIENTS: Important information regarding your results and instructions can  be found at [email or web address]   \"After your  Covid-19 Test\"  [facility] providers: PLEASE REFER TO DE-ESCALATION AND RETESTING PROTOCOL  on inside[email or web address]  **The Roche SARS-CoV-2 RT-PCR test has been made available for use under " the  Emergency Use Authorization (EUA) only.         Narrative:      Have you been in close contact with a person who is suspected  or known to be positive for COVID-19 within the last 30 days  (e.g. last seen that person < 30 days ago)->No          Assessment:  Agapito Stone is a pleasant 76 y.o. male SNF resident with known history of CAD, type 2 diabetes, hypertension, CVA, left lower extremity DVT on Eliquis, admitted with worsening left metatarsal heel wound with underlying osteomyelitis on imaging.  Patient underwent left fifth ray amputation on 4/23, cultures as below.     Pertinent Diagnoses:  Left foot osteomyelitis  Polymicrobial infection  Type 2 diabetes  Penicillin allergy    Plan:  -Cultures growing doxycycline resistant MRSA, ciprofloxacin resistant Proteus mirabilis  -No pathology obtained.  Anticipate 6 weeks of antibiotics to treat for residual osteomyelitis.  -Continue IV vancomycin.  Monitor vancomycin levels and renal function.  Change ceftriaxone to IV Ancef 2 g every 8 hours  -On discharge back to SNF, recommend IV daptomycin 8 mg/KG every 24 hours + IV Ancef 2 g every 8 hours through 6/3/2021  -Okay to use vancomycin instead of daptomycin only if the facility has access to a trained pharmacist for vancomycin dosing and monitoring of levels and renal function  -At least weekly CBC with differential, CMP, CPK while on IV antibiotics  -Continue wound care     Plan was discussed with the primary team, Dr. Rodriguez     ID will sign off.  Please call with questions.      ID clinic follow-up in 2 to 3 weeks

## 2021-04-25 NOTE — PROGRESS NOTES
AA&Ox4.    SpO2 94% on 0.5L O2 via NC. Denies SOB.    Reporting 4/10 pain. Medicated per MAR.    SKIN bottom has a little redness and blanching. Barrier cream applied and mepilex in place. Amputation to the LLE, surgical dressing in place; CDI. All other skin is CDI.    Tolerating Diabetic diet. Denies N/V.    + void.    + BM / LBM 4/22/2021.    Pt does not get out of bed.    All needs met at this time. Call light within reach. Pt calls appropriately.

## 2021-04-25 NOTE — PROGRESS NOTES
Pharmacy Kinetics 76 y.o. male on vancomycin day # 4     2021    Currently on Held  Provider specified end date: TBD     Indication for Treatment: SSTI     Pertinent history per medical record: Admitted on 2021 for Osteo. 75 y/o M who presented 2021 with PMHx of coronary artery disease, diabetes, hypertension, CVA and left lower extremity DVT on chronic anticoagulation with Eliquis presents with worsening left foot pain x1 week.  X-ray of the foot is consistent with osteomyelitis of the fifth digit.  We will follow-up MRI of the foot.  Limb preservation services has been consulted, will follow up recommendation.     Other antibiotics: Rocephin 2g q24hr      Allergies: Fish, Pcn [penicillins], Amoxicillin, and Food      List concerns for renal function: malnutrition/low albumin     Pertinent cultures to date:   21:PBCx2:NGTD  21:Lt Ft,WND:Methicillin Resistant Staphylococcus aureus, Proteus mirabilis      MRSA nares swab if pneumonia is a concern (ordered/positive/negative/n-a): NA     Recent Labs     21  1612 21  0400 21  0432 21  0548   WBC 9.3 7.9 8.0 6.4   NEUTSPOLYS 73.00* 70.60 68.50 72.20*     Recent Labs     21  1612 21  0400 21  0432 21  0548   BUN 29* 28* 23* 16   CREATININE 1.01 0.97 0.91 0.77   ALBUMIN 2.8*  --  2.0* 2.0*     Recent Labs     21  0914   [facility] 24.7*       Intake/Output Summary (Last 24 hours) at 2021 0853  Last data filed at 2021 0552  Gross per 24 hour   Intake 480 ml   Output 630 ml   Net -150 ml      /73   Pulse (!) 104   Temp 36.9 °C (98.4 °F) (Temporal)   Resp 16   Ht 1.829 m (6')   Wt 86.2 kg (190 lb)   SpO2 92%  Temp (24hrs), Av °C (98.6 °F), Min:36.7 °C (98 °F), Max:37.4 °C (99.3 °F)      A/P        1. Vancomycin dose change: No change   2. Next vancomycin level: 21@1500  3. Goal trough: 10-15 mcg/mL   4. Comments: No leukocytosis , afebrile, cx listed above.  Trending renal indices. Last vancomycin level above goal , follow up vancomycin level ordered.          Andres Sullivan PharmD BCPS       4/25/2021 13:34   Vancomycin Unknown Level 22.3     A/P        5. Vancomycin dose change: Vancomycin 1000 mg iv q24hr (2200)  6. Next vancomycin level: 04/26/21@2130  7. Goal trough: 10-15 mcg/mL   8. Comments: Will attempt q24hr dosing , scheduled maintenance dose and ordered follow up level.         Andres Sullivan PharmD BCPS

## 2021-04-25 NOTE — PROGRESS NOTES
Report received. Assessment complete.  AAOx4. Patient calls appropriately.  Pt 4/10 pain Declines interventions at this time. Will continue to monitor.   Pt denies N/V, SOB, or chest pain.  GIBBONS, max assist - q2 turns in place.  + void, + flatus, LBM PTA  Pt is tolerating 2000kcal diet.   LLE 5th toe amputation, soft ace roll with dry gauze DIP, CDI      Plan of care discussed with patient. Fall precautions in place. Belongings and call light within reach. Educated patient to call for assistance as needed. All needs met at this time.

## 2021-04-26 NOTE — PROGRESS NOTES
Report received.  Assessment complete.  A&O x 4. Patient calls appropriately.  Patient has 0/10 pain. Pain managed with prescribed medications.  Denies N&V. Tolerating 2000 kcal diet.  + void, + flatus, last BM PTA.  Patient denies SOB.    LLE 5th toe amputation, with dry gauze and soft ace roll DIP, CDI.     Pt sitting in bed. Review plan with of care with patient. Call light and personal belongings with in reach. Hourly rounding in place. All needs met at this time.

## 2021-04-26 NOTE — PROGRESS NOTES
[facility] Medicine Daily Progress Note    Date of Service  4/26/2021    Chief Complaint  76 y.o. male admitted 4/22/2021 with for PICC line placement, osteomyelitis    Hospital Course  76 y.o. male who presented 4/22/2021 with history of coronary artery disease, diabetes, hypertension, CVA and left lower extremity DVT on chronic anticoagulation with Eliquis presents with worsening left foot pain x1 week.  Patient was sent to the emergency room for PICC line placement for suspected osteomyelitis of the foot.  He had sustained an injury a week ago with noted worsening lower extremity wound.  As per patient outpatient culture grew 3 different organisms per emergency room records.  X-ray of the foot is consistent with osteomyelitis of the fifth digit.  LPS, ID, orthopedics consulted.  HAKEEM no signs of occlusion.  MRI pending.  C-reactive elevated.  He was started on ceftriaxone and vancomycin.  ID recommend to daptomycin and ceftriaxone to continue until 6/3. Pt will need weekly CBC, CMP, and CPK. Eliquis was held due to surgery and resumed 4/25.  CTA chest 4/25 negative for PE.      Interval Problem Update  Pt believes that he will go back to [place]. I did explain that pending eval from surgery, ID before transfer to [place]. Continue current antibiotic.   4/24- pain is well controled.surgery successful. Boot delivered. Pt not medical cleared by ID yet in regards to antibiotic. Do not transfer until cleared by ID  4/25- pt reports right lower rib pain, worse with breathing. Pt has h/o DVT. CTA ordered and eliquis resumed . Antibiotic per ID provided. Hopefully transition tomorrow at [place]   4/26-chest pain is resolved. Pt doing well. He has no concerns. Medical cleared to be transfer at [place] with IV antibiotic    Consultants/Specialty  LPS  Ortho  ID     Code Status  Full Code    Disposition  Inpatient     Review of Systems  Review of Systems   Constitutional: Negative for fever and malaise/fatigue.   Respiratory: Negative for  cough and shortness of breath.    Cardiovascular: Negative for chest pain, orthopnea and leg swelling.   Gastrointestinal: Negative for abdominal pain, nausea and vomiting.   Genitourinary: Negative for dysuria and urgency.   Musculoskeletal: Positive for joint pain.   Neurological: Negative for speech change and headaches.        Physical Exam  Temp:  [36.6 °C (97.8 °F)-37.7 °C (99.9 °F)] 37.2 °C (98.9 °F)  Pulse:  [105-115] 105  Resp:  [16-18] 18  BP: ()/(54-65) 99/64  SpO2:  [91 %-96 %] 96 %    Physical Exam  Vitals and nursing note reviewed.   Constitutional:       Appearance: He is ill-appearing.   HENT:      Head: Normocephalic and atraumatic.      Right Ear: External ear normal.      Left Ear: External ear normal.      Nose: No congestion.   Eyes:      General: No scleral icterus.  Cardiovascular:      Rate and Rhythm: Normal rate.      Heart sounds: No murmur.   Pulmonary:      Effort: No respiratory distress.   Abdominal:      General: There is no distension.      Palpations: Abdomen is soft.      Tenderness: There is no abdominal tenderness. There is no guarding.   Musculoskeletal:         General: Tenderness and signs of injury present.      Comments: Left wound is wrapped    Skin:     General: Skin is dry.      Findings: Rash present.   Neurological:      Mental Status: He is alert and oriented to person, place, and time.         Fluids    Intake/Output Summary (Last 24 hours) at 4/26/2021 1425  Last data filed at 4/26/2021 0800  Gross per 24 hour   Intake 260 ml   Output 875 ml   Net -615 ml       Laboratory  Recent Labs     04/24/21  0432 04/25/21  0548 04/26/21  0721   WBC 8.0 6.4 8.8   RBC 3.20* 3.14* 3.35*   HEMOGLOBIN 9.1* 9.1* 9.6*   HEMATOCRIT 30.4* 31.3* 31.2*   MCV 95.0 99.7* 93.1   MCH 28.4 29.0 28.7   MCHC 29.9* 29.1* 30.8*   RDW 55.8* 57.4* 54.4*   PLATELETCT 280 273 306   MPV 9.0 8.8* 9.2     Recent Labs     04/24/21  0432 04/25/21  0548 04/26/21  0721   SODIUM 139 136 136   POTASSIUM  4.3 3.7 4.2   CHLORIDE 107 103 102   CO2 26 27 27   GLUCOSE 86 89 123*   BUN 23* 16 17   CREATININE 0.91 0.77 0.78   CALCIUM 8.2* 8.1* 8.4*                   Imaging  DX-CHEST-PORTABLE (1 VIEW)   Final Result      Large left pleural effusion with associated compressive atelectasis. Correlate clinically for infection.      CT-CTA CHEST PULMONARY ARTERY W/ RECONS   Final Result      New large left, small to medium right pleural effusions      No pulmonary embolism      Moderate coronary artery disease      Remote granulomatous disease and/or calcified hamartoma         US-HAKEEM SINGLE LEVEL BILAT   Final Result      DX-FOOT-COMPLETE 3+ LEFT   Final Result      Findings in keeping with osteomyelitis involving the fifth distal metatarsal, proximal and middle phalanges.           Assessment/Plan  * Osteomyelitis of left foot (HCC)- (present on admission)  Assessment & Plan  Patient will be admitted to the surgical inpatient unit  He has been started on IV antibiotics with Zosyn and vancomycin  Prior lower extremity wound was positive for MRSA and diphtheroids in December 2020  As per patient, cultures at outlying facility grew 3 organisms  ID consulted   We will follow up repeat wound culture  Pain control  Blood sugar control    Secondary to diabetes and peripheral vascular disease   HAKEEM 4/23- negative   Limb preservation services has been consulted,  X-ray consistent with osteomyelitis of the lateral fifth digit  S/*p amputation tow 4/25  ID did recommend dapto/ceftriaxone until 6/3.  to follow up with [facility]       Hyperlipidemia associated with type 2 diabetes mellitus (HCC)- (present on admission)  Assessment & Plan  Follow-up lipid profile    CAD (coronary artery disease)- (present on admission)  Assessment & Plan  History of  Follow-up EKG no acute findings     HTN (hypertension)- (present on admission)  Assessment & Plan  Controlled, continue home medication    Anemia- (present on admission)  Assessment &  Plan  Monitor    Age-related physical debility- (present on admission)  Assessment & Plan  Pt will transfer back to [place] once cleared by ID     Deep vein thrombosis (DVT) of lower extremity (HCC)- (present on admission)  Assessment & Plan  4/25- resume eliquis.   chest CTA 4/25-negative for PE    History of stroke- (present on admission)  Assessment & Plan  History of    Type 2 diabetes mellitus with both eyes affected by mild nonproliferative retinopathy without macular edema, without long-term current use of insulin (HCC)- (present on admission)  Assessment & Plan  .    Type 2 diabetes mellitus with peripheral neuropathy (HCC)- (present on admission)  Assessment & Plan  Continue gabapentin  Sliding scale insulin  Hold home medications  Follow-up A1c 4.9 (4/22)        VTE prophylaxis: heparin

## 2021-04-26 NOTE — PROGRESS NOTES
AA&Ox4.     SpO2 96% on 1L O2 via NC. Denies SOB.     Reporting 3/10 pain. Medicated per MAR.     SKIN bottom has a little redness and blanching. Barrier cream applied and mepilex in place. Amputation to the LLE, gauze and dry rolled gauze applied, surgical incision was leaking dark red blood. CDI. All other skin is CDI.     Tolerating Diabetic diet. Denies N/V.     + void.     + BM / LBM 4/22/2021.     Pt does not get out of bed.     All needs met at this time. Call light within reach. Pt calls appropriately.

## 2021-04-26 NOTE — PROGRESS NOTES
2 RN Skin Check   With Bren, RN    Generalized fragility  Red sacrum, barrier cream and mepilex in place   LLE 5th toe amputation with dry gauze and soft ace roll, CDI  All other skin intact    PIV and NC in place. Skin under devices assessed, skin intact.

## 2021-04-26 NOTE — DISCHARGE PLANNING
Anticipated Discharge Disposition: SNF    Action: Per MD, this patient is medically clear for transfer to [facility]. This patient will be transferring on IV Daptomycin and IV and IV Ancef.     LSW spoke with Artur, Admissions Coordinator at [facility]. Per Artur, their pharmacy will check on the price of the Dapto before official acceptance can be provided to [facility].    Barriers to Discharge: SNF Acceptance.    Plan: SNF, pending acceptance and transfer arrangements.

## 2021-04-26 NOTE — CARE PLAN
Problem: Communication  Goal: The ability to communicate needs accurately and effectively will improve  Outcome: PROGRESSING AS EXPECTED  Note: Pt utilizes call light when assistance is needed.      Problem: Skin Integrity  Goal: Risk for impaired skin integrity will decrease  Outcome: PROGRESSING AS EXPECTED  Note: Waffle matress, Q2 turns, and barrier cream/mepilex in place. Heels elevated in float boots.

## 2021-04-26 NOTE — PROGRESS NOTES
Pharmacy Kinetics 76 y.o. male on vancomycin day # 5  4/26/2021    Currently Dose: Vancomycin 1000 mg iv q24hr (~13 mg/kg/dose)  Received Load Dose: Yes    Indication for Treatment: SSTI  Provider Specified End Date: None  ID Service Following: Yes    Pertinent history per medical record: Admitted on 4/22/2021 for concerns of SSTI with possible OM. ID consulted.     Other antibiotics: cefazolin 2 gm iv q8h    Allergies: Fish, Pcn [penicillins], Amoxicillin, and Food     List concerns for accumulation of vancomycin: age 76, abnormal LFT's, low albumin/malnutrition, electrolyte derangement    Pertinent cultures to date:   Results     Procedure Component Value Units Date/Time    CULTURE WOUND W/ GRAM STAIN [[id number]]  (Abnormal)  (Susceptibility) Collected: 04/23/21 1904    Order Status: Completed Specimen: Wound from Left Foot Updated: 04/26/21 1030     Significant Indicator POS     Source WND     Site LEFT FOOT     Culture Result -     Gram Stain Result Few WBCs.  Rare Gram positive cocci.       Culture Result Methicillin Resistant Staphylococcus aureus  Moderate growth  This isolate is presumed to be clindamycin resistant based on  detection of inducible resistance.  Clindamycin may still  be effective in some patients.        Proteus mirabilis  Rare growth      Narrative:      CALL  Luis  141 tel. [phone],  CALLED  141 tel. [phone] 04/24/2021, 14:52, RB PERF. RESULTS CALLED TO: RN  [id number]  Surgery - swabs received    Susceptibility     Methicillin resistant staphylococcus aureus (1)     Antibiotic Interpretation Microscan Method Status    Ampicillin [*]  Resistant >8 mcg/mL NADEGE Final    Amoxicillin/CA [*]  Resistant <=4/2 mcg/mL NADEGE Final    Azithromycin Resistant >4 mcg/mL NADEGE Final    Ceftriaxone [*]  Resistant <=4 mcg/mL NADEGE Final    Clindamycin Resistant 0.5 mcg/mL NADEGE Final    Cephalothin [*]  Resistant <=8 mcg/mL NADEGE Final    Cefoxitin Screen [*]  Positive >4 mcg/mL NADEGE Final    Cefazolin Resistant <=8  mcg/mL NADEGE Final    Ciprofloxacin [*]  Resistant >2 mcg/mL NADEGE Final    Cefepime Resistant 16 mcg/mL NADEGE Final    Ceftaroline Sensitive <=0.5 mcg/mL NADEGE Final    Daptomycin Sensitive <=0.5 mcg/mL NADEGE Final    Erythromycin Resistant >4 mcg/mL NADEGE Final    Gentamicin [*]  Resistant >8 mcg/mL NADEGE Final    Inducible Clindamycin Test [*]  Positive >4/0.5 mcg/mL NADEGE Final    Imipenem [*]  Resistant <=4 mcg/mL NADEGE Final    Levofloxacin [*]  Resistant >4 mcg/mL NADEGE Final    Ampicillin/sulbactam Resistant <=8/4 mcg/mL NADEGE Final    Linezolid [*]  Sensitive 2 mcg/mL NADEGE Final    Meropenem [*]  Resistant <=2 mcg/mL NADEGE Final    Oxacillin Resistant >2 mcg/mL NADEGE Final    Rifampin [*]  Sensitive <=1 mcg/mL NADEGE Final    Synercid [*]  Sensitive <=0.5 mcg/mL NADEGE Final    Trimeth/Sulfa Sensitive <=0.5/9.5 mcg/mL NADEGE Final    Tetracycline Resistant >8 mcg/mL NADEGE Final    Tigecycline [*]  Sensitive <=0.25 mcg/mL NADEGE Final    Vancomycin Sensitive 1 mcg/mL NADEGE Final          Proteus mirabilis (2)     Antibiotic Interpretation Microscan Method Status    Ampicillin Sensitive <=8 mcg/mL NADEGE Final    Amoxicillin/CA [*]  Sensitive <=8/4 mcg/mL NADEGE Final    Ceftriaxone Sensitive <=1 mcg/mL NADEGE Final    Cefazolin Sensitive <=2 mcg/mL NADEGE Final    Ciprofloxacin Resistant >2 mcg/mL NADEGE Final    Cefepime Sensitive <=2 mcg/mL NADEGE Final    Gentamicin Sensitive <=2 mcg/mL NADEGE Final    Levofloxacin [*]  Resistant 4 mcg/mL NADEGE Final    Ampicillin/sulbactam [*]  Sensitive <=4/2 mcg/mL NADEGE Final    Tobramycin Sensitive <=2 mcg/mL NADEGE Final    Meropenem [*]  Sensitive <=1 mcg/mL NADEGE Final    Trimeth/Sulfa Sensitive <=0.5/9.5 mcg/mL NADEGE Final    Tetracycline [*]  Resistant >8 mcg/mL NADEGE Final    Amikacin [*]  Sensitive <=16 mcg/mL NADEGE Final    Aztreonam [*]  Sensitive <=4 mcg/mL NADEGE Final    Ceftolozane+Tazobactam [*]  Sensitive <=2 mcg/mL NADEGE Final    Ceftazidime Sensitive <=1 mcg/mL NADEGE Final    Cefuroxime Sensitive <=4 mcg/mL NADEGE Final    Ertapenem  "Sensitive <=0.5 mcg/mL NADEGE Final    Meropenem/Vaborbactam [*]  Sensitive <=2 mcg/mL NADEGE Final    Moxifloxacin Resistant >4 mcg/mL NADEGE Final    Pip/Tazobactam Sensitive <=8 mcg/mL NADEGE Final           [*]   Suppressed Antibiotic                 Anaerobic Culture [[id number]] Collected: 04/23/21 1904    Order Status: Completed Specimen: Wound Updated: 04/26/21 1030     Significant Indicator NEG     Source WND     Site LEFT FOOT     Culture Result No Anaerobes isolated.    Narrative:      CALL  Luis  141 tel. [phone],  CALLED  141 tel. [phone] 04/24/2021, 14:52, RB PERF. RESULTS CALLED TO: RN  [id number]  Surgery - swabs received    GRAM STAIN [[id number]] Collected: 04/23/21 1904    Order Status: Completed Specimen: Wound Updated: 04/24/21 0025     Significant Indicator .     Source WND     Site LEFT FOOT     Gram Stain Result Few WBCs.  Rare Gram positive cocci.      Narrative:      Surgery - swabs received    BLOOD CULTURE [[id number]] Collected: 04/22/21 1655    Order Status: Completed Specimen: Blood from Peripheral Updated: 04/23/21 0705     Significant Indicator NEG     Source BLD     Site PERIPHERAL     Culture Result No Growth  Note: Blood cultures are incubated for 5 days and  are monitored continuously.Positive blood cultures  are called to the RN and reported as soon as  they are identified.      Narrative:      Per Hospital Policy: Only change Specimen Src: to \"Line\" if  specified by physician order.  Left AC    BLOOD CULTURE [[id number]] Collected: 04/22/21 1655    Order Status: Completed Specimen: Blood from Peripheral Updated: 04/23/21 0705     Significant Indicator NEG     Source BLD     Site PERIPHERAL     Culture Result No Growth  Note: Blood cultures are incubated for 5 days and  are monitored continuously.Positive blood cultures  are called to the RN and reported as soon as  they are identified.      Narrative:      Per Hospital Policy: Only change Specimen Src: to \"Line\" if  specified by physician " "order.  Right Hand    SARS-CoV-2 PCR (24 hour In-House): Collect NP swab in [facility] [[id number]] Collected: 21 1714    Order Status: Completed Specimen: Respirate Updated: 21     SARS-CoV-2 Source NP Swab     SARS-CoV-2 by PCR NotDetected     Comment: PATIENTS: Important information regarding your results and instructions can  be found at [email or web address]   \"After your  Covid-19 Test\"  [facility] providers: PLEASE REFER TO DE-ESCALATION AND RETESTING PROTOCOL  on [email or web address]  **The Roche SARS-CoV-2 RT-PCR test has been made available for use under the  Emergency Use Authorization (EUA) only.         Narrative:      Have you been in close contact with a person who is suspected  or known to be positive for COVID-19 within the last 30 days  (e.g. last seen that person < 30 days ago)->No        MRSA nares swab if pneumonia is a concern: n/a    Recent Labs     21  0432 21  0548   WBC 8.0 6.4   NEUTSPOLYS 68.50 72.20*     Recent Labs     21  0432 21  0548   BUN 23* 16   CREATININE 0.91 0.77   ALBUMIN 2.0* 2.0*     Recent Labs     21  0914 21  1334   [facility]  --    VANCORANDOM  --  22.3     Intake/Output Summary (Last 24 hours) at 2021 0738  Last data filed at 2021 0451  Gross per 24 hour   Intake 820 ml   Output 875 ml   Net -55 ml      /65   Pulse (!) 105   Temp 36.6 °C (97.8 °F) (Temporal)   Resp 16   Ht 1.829 m (6')   Wt 86.2 kg (190 lb)   SpO2 94%  Temp (24hrs), Av.2 °C (98.9 °F), Min:36.6 °C (97.8 °F), Max:37.7 °C (99.9 °F)    Estimated Creatinine Clearance: 92.4 mL/min (by C-G formula based on SCr of 0.77 mg/dL).    A/P   1. Vancomycin dose change: not indicated   2. Next vancomycin level: 21 @2130 (ordered)  3. Goal trough: 10-15 mcg/mL  4. Comments: VS stable. Afebrile. WBC stable. SCr stable. Microbiology noted. Recent vancomycin levels noted. Repeat vancomycin level in place prior to PM dose " 4/26/21 to assess clearance. BMP with AM labs. ID consulted and recommending vancomycin through 6/3/21. Pharmacy will continue to follow.    Jorden Cornelius, PharmD

## 2021-04-27 NOTE — CARE PLAN
Problem: Communication  Goal: The ability to communicate needs accurately and effectively will improve  Outcome: PROGRESSING AS EXPECTED   Pt education provided on pt's need to communicate pain level.     Problem: Safety  Goal: Will remain free from injury  Outcome: PROGRESSING AS EXPECTED  Goal: Will remain free from falls  Outcome: PROGRESSING AS EXPECTED   Safety precautions in place. Education on safety and falls provided to pt.

## 2021-04-27 NOTE — CONSULTS
Diabetes education: pt has a hx of diabetes on Actos and Synjardy at home. Pt was admitted with blood sugar of   94 and Hg a1c of 4.9% .  Pt is currently on regular insulin sliding scale coverage ac and hs with blood sugars of 121, 130, and 132.  Spoke with pt to review need to drink water with Jardiance ( part of Synjardy) and why. Pt states he normally drinks water well when available . Explained pt not on it here and may not start using until discharged from SNF.  Plan: Questions answered. No further needs at this time. Please send text via Voalte if needs change.

## 2021-04-27 NOTE — DISCHARGE PLANNING
Agency/Facility Name: [facility]  Spoke To: Artur  Outcome: Per Artur, this Pt is accepted and can go today     TORI Chew notified    SCP auth #: [id number]

## 2021-04-27 NOTE — PROGRESS NOTES
Diabetes education: Met with pt this afternoon. Please see consult note.  Plan: Questions answered. No further needs at this time. Please send text via Voalte if needs change.

## 2021-04-27 NOTE — DISCHARGE PLANNING
Anticipated Discharge Disposition: SNF    Action: LSW requested DPA contacts [facility] for an update regarding [facility] ability to manage the IV ABX.    Barriers to Discharge: Accepting SNF.    Plan: SNF, pending accepting facility and transfer arrangements.

## 2021-04-27 NOTE — PROGRESS NOTES
Pharmacy Kinetics 76 y.o. male on vancomycin day # 6  4/27/2021    Currently Dose: Vancomycin 1000 mg iv q24hr (~13 mg/kg/dose)  Received Load Dose: Yes     Indication for Treatment: SSTI  Provider Specified End Date: None  ID Service Following: Yes     Pertinent history per medical record: Admitted on 4/22/2021 for concerns of SSTI with possible OM. ID consulted.      Other antibiotics: cefazolin 2 gm iv q8h     Allergies: Fish, Pcn [penicillins], Amoxicillin, and Food      List concerns for accumulation of vancomycin: age 76, abnormal LFT's, low albumin/malnutrition, electrolyte derangement     Pertinent cultures to date:   Results     Procedure Component Value Units Date/Time    CULTURE WOUND W/ GRAM STAIN [[id number]]  (Abnormal)  (Susceptibility) Collected: 04/23/21 1904    Order Status: Completed Specimen: Wound from Left Foot Updated: 04/26/21 1030     Significant Indicator POS     Source WND     Site LEFT FOOT     Culture Result -     Gram Stain Result Few WBCs.  Rare Gram positive cocci.       Culture Result Methicillin Resistant Staphylococcus aureus  Moderate growth  This isolate is presumed to be clindamycin resistant based on  detection of inducible resistance.  Clindamycin may still  be effective in some patients.        Proteus mirabilis  Rare growth      Narrative:      CALL  Luis  141 tel. [phone],  CALLED  141 tel. [phone] 04/24/2021, 14:52, RB PERF. RESULTS CALLED TO: RN  [id number]  Surgery - swabs received    Susceptibility     Methicillin resistant staphylococcus aureus (1)     Antibiotic Interpretation Microscan Method Status    Ampicillin [*]  Resistant >8 mcg/mL NADEGE Final    Amoxicillin/CA [*]  Resistant <=4/2 mcg/mL NADEGE Final    Azithromycin Resistant >4 mcg/mL NADEGE Final    Ceftriaxone [*]  Resistant <=4 mcg/mL NADEGE Final    Clindamycin Resistant 0.5 mcg/mL NADEGE Final    Cephalothin [*]  Resistant <=8 mcg/mL NADEGE Final    Cefoxitin Screen [*]  Positive >4 mcg/mL NADEGE Final    Cefazolin  Resistant <=8 mcg/mL NADEGE Final    Ciprofloxacin [*]  Resistant >2 mcg/mL NADEGE Final    Cefepime Resistant 16 mcg/mL NADEGE Final    Ceftaroline Sensitive <=0.5 mcg/mL NADEGE Final    Daptomycin Sensitive <=0.5 mcg/mL NADEGE Final    Erythromycin Resistant >4 mcg/mL NADEGE Final    Gentamicin [*]  Resistant >8 mcg/mL NADEGE Final    Inducible Clindamycin Test [*]  Positive >4/0.5 mcg/mL NADEGE Final    Imipenem [*]  Resistant <=4 mcg/mL NADEGE Final    Levofloxacin [*]  Resistant >4 mcg/mL NADEGE Final    Ampicillin/sulbactam Resistant <=8/4 mcg/mL NADEGE Final    Linezolid [*]  Sensitive 2 mcg/mL NADEGE Final    Meropenem [*]  Resistant <=2 mcg/mL NADEGE Final    Oxacillin Resistant >2 mcg/mL NADEGE Final    Rifampin [*]  Sensitive <=1 mcg/mL NADEGE Final    Synercid [*]  Sensitive <=0.5 mcg/mL NADEGE Final    Trimeth/Sulfa Sensitive <=0.5/9.5 mcg/mL NADEGE Final    Tetracycline Resistant >8 mcg/mL NADEGE Final    Tigecycline [*]  Sensitive <=0.25 mcg/mL NADEGE Final    Vancomycin Sensitive 1 mcg/mL NADEGE Final          Proteus mirabilis (2)     Antibiotic Interpretation Microscan Method Status    Ampicillin Sensitive <=8 mcg/mL NADEGE Final    Amoxicillin/CA [*]  Sensitive <=8/4 mcg/mL NADEGE Final    Ceftriaxone Sensitive <=1 mcg/mL NADEGE Final    Cefazolin Sensitive <=2 mcg/mL NADEGE Final    Ciprofloxacin Resistant >2 mcg/mL NADEGE Final    Cefepime Sensitive <=2 mcg/mL NADEGE Final    Gentamicin Sensitive <=2 mcg/mL NADEGE Final    Levofloxacin [*]  Resistant 4 mcg/mL NADEGE Final    Ampicillin/sulbactam [*]  Sensitive <=4/2 mcg/mL NADEGE Final    Tobramycin Sensitive <=2 mcg/mL NADEGE Final    Meropenem [*]  Sensitive <=1 mcg/mL NADEGE Final    Trimeth/Sulfa Sensitive <=0.5/9.5 mcg/mL NADEGE Final    Tetracycline [*]  Resistant >8 mcg/mL NADEGE Final    Amikacin [*]  Sensitive <=16 mcg/mL NADEGE Final    Aztreonam [*]  Sensitive <=4 mcg/mL NADEGE Final    Ceftolozane+Tazobactam [*]  Sensitive <=2 mcg/mL NADEGE Final    Ceftazidime Sensitive <=1 mcg/mL NADEGE Final    Cefuroxime Sensitive <=4 mcg/mL NADEGE Final     "Ertapenem Sensitive <=0.5 mcg/mL NADEGE Final    Meropenem/Vaborbactam [*]  Sensitive <=2 mcg/mL NADEGE Final    Moxifloxacin Resistant >4 mcg/mL NADEGE Final    Pip/Tazobactam Sensitive <=8 mcg/mL NADEGE Final           [*]   Suppressed Antibiotic                 Anaerobic Culture [[id number]] Collected: 04/23/21 1904    Order Status: Completed Specimen: Wound Updated: 04/26/21 1030     Significant Indicator NEG     Source WND     Site LEFT FOOT     Culture Result No Anaerobes isolated.    Narrative:      CALL  Luis  141 tel. [phone],  CALLED  141 tel. [phone] 04/24/2021, 14:52, RB PERF. RESULTS CALLED TO: RN  [id number]  Surgery - swabs received    GRAM STAIN [[id number]] Collected: 04/23/21 1904    Order Status: Completed Specimen: Wound Updated: 04/24/21 0025     Significant Indicator .     Source WND     Site LEFT FOOT     Gram Stain Result Few WBCs.  Rare Gram positive cocci.      Narrative:      Surgery - swabs received    BLOOD CULTURE [[id number]] Collected: 04/22/21 1655    Order Status: Completed Specimen: Blood from Peripheral Updated: 04/23/21 0705     Significant Indicator NEG     Source BLD     Site PERIPHERAL     Culture Result No Growth  Note: Blood cultures are incubated for 5 days and  are monitored continuously.Positive blood cultures  are called to the RN and reported as soon as  they are identified.      Narrative:      Per Hospital Policy: Only change Specimen Src: to \"Line\" if  specified by physician order.  Left AC    BLOOD CULTURE [[id number]] Collected: 04/22/21 1655    Order Status: Completed Specimen: Blood from Peripheral Updated: 04/23/21 0705     Significant Indicator NEG     Source BLD     Site PERIPHERAL     Culture Result No Growth  Note: Blood cultures are incubated for 5 days and  are monitored continuously.Positive blood cultures  are called to the RN and reported as soon as  they are identified.      Narrative:      Per Hospital Policy: Only change Specimen Src: to \"Line\" if  specified by " "physician order.  Right Hand    SARS-CoV-2 PCR (24 hour In-House): Collect NP swab in [facility] [[id number]] Collected: 21 1714    Order Status: Completed Specimen: Respirate Updated: 21     SARS-CoV-2 Source NP Swab     SARS-CoV-2 by PCR NotDetected     Comment: PATIENTS: Important information regarding your results and instructions can  be found at [email or web address]   \"After your  Covid-19 Test\"  [facility] providers: PLEASE REFER TO DE-ESCALATION AND RETESTING PROTOCOL  on [email or web address]  **The Roche SARS-CoV-2 RT-PCR test has been made available for use under the  Emergency Use Authorization (EUA) only.         Narrative:      Have you been in close contact with a person who is suspected  or known to be positive for COVID-19 within the last 30 days  (e.g. last seen that person < 30 days ago)->No        MRSA nares swab if pneumonia is a concern (ordered/positive/negative/n-a): n/a    Recent Labs     21  0548 21  0721   WBC 6.4 8.8   NEUTSPOLYS 72.20* 79.10*     Recent Labs     21  0548 21  0721 21  0208   BUN 16 17 16   CREATININE 0.77 0.78 0.80   ALBUMIN 2.0* 1.9*  --      Recent Labs     21  1334 21  2130   [facility]  --  16.2   VANCORANDOM 22.3  --      Intake/Output Summary (Last 24 hours) at 2021 1222  Last data filed at 2021 0800  Gross per 24 hour   Intake 360 ml   Output --   Net 360 ml      /71   Pulse 100   Temp 37.2 °C (98.9 °F) (Temporal)   Resp 16   Ht 1.829 m (6')   Wt 86.2 kg (190 lb)   SpO2 95%  Temp (24hrs), Av.3 °C (99.1 °F), Min:36.9 °C (98.5 °F), Max:37.7 °C (99.8 °F)    Estimated Creatinine Clearance: 88.9 mL/min (by C-G formula based on SCr of 0.8 mg/dL).    A/P   1. Vancomycin dose change: 750 iv q24h (~10 mg/kg/dose)   2. Next vancomycin level: 21 @2130 (ordered)  3. Goal trough: 10-15 mcg/mL  4. Comments: Hypotensive. Afebrile. No new CBC. SCr increased. Microbiology noted. " Vancomycin level noted and above goal. Vancomycin dose adjusted via linear kinetics for goal trough of ~12.5 mcg/mL. Repeat vancomycin level in place prior to PM dose 4/29/21 to assess clearance. ID recommendations for therapy through 6/3/21 noted. Pharmacy will continue to follow.    Jorden Cornelius, PharmD

## 2021-04-27 NOTE — DISCHARGE SUMMARY
Discharge Summary    CHIEF COMPLAINT ON ADMISSION  Chief Complaint   Patient presents with   • Vascular Access Problem     Pt presents today for placement of a picc line. Pt is from [facility] and Pt states he was told to come to [facility] for placement of picc line. Pt in ER  due to not being able to find process for picc line placement. Pt casually mentioned that he has bone infection in his left foot which is why he needs the picc line.       Reason for Admission  ems     CODE STATUS  Full Code    HPI & HOSPITAL COURSE  This is a 76 y.o. male here for PICC line placement      76 y.o. male who presented 4/22/2021 with history of coronary artery disease, diabetes, hypertension, CVA and left lower extremity DVT on chronic anticoagulation with Eliquis presents with worsening left foot pain x1 week.  Patient was sent to the emergency room for PICC line placement for suspected osteomyelitis of the foot.  He had sustained an injury a week ago with noted worsening lower extremity wound.  As per patient outpatient culture grew 3 different organisms per emergency room records.  X-ray of the foot is consistent with osteomyelitis of the fifth digit.  LPS, ID, orthopedics consulted.  HAKEEM no signs of occlusion. C-reactive elevated.  He was started on ceftriaxone and vancomycin.  ID recommend to daptomycin 8 mg/kg Q24hr and Ancef 2 g Q8H to continue until 6/3 for his left foot osteomyelitis. Pt will need weekly CBC, CMP, and CPK while on IV antibiotics. Eliquis was held due to surgery and resumed 4/25.  CTA chest 4/25 negative for PE.  Patient's vital signs are stable and he is ready for discharge back to skilled nursing facility.  He is to return to the ER if his condition worsens.      Therefore, he is discharged in good and stable condition to skilled nursing facility.    The patient met 2-midnight criteria for an inpatient stay at the time of discharge.      FOLLOW UP ITEMS POST DISCHARGE  Follow-up with primary care  physician.  Follow-up with infectious disease  Follow-up with orthopedic surgery    DISCHARGE DIAGNOSES  Principal Problem:    Osteomyelitis of left foot (HCC) POA: Yes  Active Problems:    HTN (hypertension) POA: Yes    CAD (coronary artery disease) POA: Yes      Overview: October 2013: ACS. PCI/LETA x 2 in the mid circumflex (Xience 3.25 x 23mm)       and proximal circumflex (Xience 3.6 x 12mm).    Hyperlipidemia associated with type 2 diabetes mellitus (HCC) POA: Yes    History of stroke POA: Yes      Overview: 2010 in June.    Deep vein thrombosis (DVT) of lower extremity (HCC) POA: Yes    Age-related physical debility POA: Yes    Anemia POA: Yes    Type 2 diabetes mellitus with both eyes affected by mild nonproliferative retinopathy without macular edema, without long-term current use of insulin (HCC) POA: Yes    Type 2 diabetes mellitus with peripheral neuropathy (HCC) POA: Yes  Resolved Problems:    * No resolved hospital problems. *      FOLLOW UP  Future Appointments   Date Time Provider Department Center   5/21/2021  8:30 AM Royal Bolivar M.D. ND [facility] [facility] SKilled Nursing  [address]  [facility] [address]  [phone]        Wound Care Center  [address] [place] [address]  [facility] [id number] [phone]  Go on 5/21/2021  Follow up at [facility] rounds. Patient will see ORTHOPEDIC SURGEON, Infectious Disease APRN, LPS APRNs, and ORTHOTIST. Will plan to remove sutures and evaluate/perform flexor tenotomies to left 1-4 toes.     Brody Zamorano M.D.  [address]   [place] [id number] [phone]      Follow up with primary care    Remi Scott M.D.  [address]  [place] [address]  [phone]      Follow up with orthopedic surgery     Gomez Reveles M.D.  [address]  [place] [id number] [phone]      FOllow up with infectious disease in 2-3 weeks       MEDICATIONS ON DISCHARGE     Medication List      START taking these medications       Instructions   ceFAZolin in dextrose 2-4 GM/100ML-% IVPB  Commonly known as: ANCEF   Infuse 100 mL into a venous catheter every 8 hours for 37 days.  Dose: 2 g     daptomycin 500 MG Solr  Commonly known as: CUBICIN   Infuse 700 mg into a venous catheter every day for 37 days.  Dose: 8 mg/kg        CHANGE how you take these medications      Instructions   atorvastatin 80 MG tablet  What changed:   · when to take this  · additional instructions  Commonly known as: LIPITOR   TAKE ONE TABLET BY MOUTH DAILY IN THE EVENING        CONTINUE taking these medications      Instructions   acetaminophen 325 MG Tabs  Commonly known as: Tylenol   Take 650 mg by mouth every four hours as needed.  Dose: 650 mg     apixaban 5mg Tabs  Commonly known as: ELIQUIS   Take 1 Tab by mouth 2 Times a Day.  Dose: 5 mg     gabapentin 100 MG Caps  Commonly known as: NEURONTIN   Take 100 mg by mouth 2 times a day.  Dose: 100 mg     magnesium oxide 400 MG Tabs tablet  Commonly known as: MAG-OX   Take 1 Tab by mouth 2 Times a Day.  Dose: 400 mg     montelukast 10 MG Tabs  Commonly known as: SINGULAIR   Take 1 tablet by mouth every day.  Dose: 10 mg     MULTI VITAMIN PO   Take 1 tablet by mouth every day.  Dose: 1 tablet     nitroglycerin 0.4 MG Subl  Commonly known as: NITROSTAT   Doctor's comments: May repeat once in 5 minutes  Place 1 Tab under tongue as needed for Chest Pain.  Dose: 0.4 mg     ondansetron 4 MG Tbdp  Commonly known as: ZOFRAN ODT   Take 4 mg by mouth every 6 hours as needed for Nausea.  Dose: 4 mg     pioglitazone 15 MG Tabs  Commonly known as: ACTOS   Take 15 mg by mouth every day.  Dose: 15 mg     Synjardy 5-1000 MG Tabs  Generic drug: Empagliflozin-metFORMIN HCl   Take 1 Tab by mouth 2 Times a Day.  Dose: 1 tablet     tamsulosin 0.4 MG capsule  Commonly known as: FLOMAX   Take 0.4 mg by mouth at bedtime.  Dose: 0.4 mg     Tums E-X 750 750 MG chewable tablet  Generic drug: calcium carbonate   Chew 1,500 mg every four hours as  needed.  Dose: 1,500 mg     Vitamin C 1000 MG Tabs   Take 1,000 mg by mouth every day.  Dose: 1,000 mg     Vitamin D-3 125 MCG (5000 UT) Tabs   Take 1 Tab by mouth every 48 hours.  Dose: 1 tablet        STOP taking these medications    aspirin EC 81 MG Tbec  Commonly known as: ECOTRIN     carvedilol 3.125 MG Tabs  Commonly known as: COREG     carvedilol 6.25 MG Tabs  Commonly known as: COREG            Allergies  Allergies   Allergen Reactions   • Fish Hives     shellfish   • Pcn [Penicillins] Hives     Positive penicillin skin test as a child.  Significant hives to amoxicillin as an adult.  Tolerates cephalosporins   • Amoxicillin Hives   • Food Swelling      Eggs,Melons, avocados-puffy mouth       DIET  Orders Placed This Encounter   Procedures   • Diet Order Diet: Consistent CHO (Diabetic); Calorie modifications: (optional): 2000 kcals     Standing Status:   Standing     Number of Occurrences:   1     Order Specific Question:   Diet:     Answer:   Consistent CHO (Diabetic) [4]     Order Specific Question:   Calorie modifications: (optional)     Answer:   2000 kcals [5]       ACTIVITY  As tolerated.  Weight bearing as tolerated    LINES, DRAINS, AND WOUNDS  This is an automated list. Peripheral IVs will be removed prior to discharge.  Peripheral IV 04/23/21 20 G Anterior;Left Forearm (Active)   Site Assessment Clean;Dry;Intact 04/26/21 2100   Dressing Type Transparent 04/26/21 2100   Line Status Scrubbed the hub prior to access;Infusing;Flushed 04/26/21 2100   Dressing Status Clean;Dry;Intact 04/26/21 2100   Dressing Intervention N/A 04/26/21 2100   Dressing Change Due 04/30/21 04/26/21 0830   Date Primary Tubing Changed 04/25/21 04/26/21 0830   Date Secondary Tubing Changed 04/26/21 04/26/21 0830   NEXT Primary Tubing Change  04/30/21 04/26/21 2100   NEXT Secondary Tubing Change  04/30/21 04/26/21 2100   Infiltration Grading ([facility], [facility]) 0 04/26/21 2100   Phlebitis Scale ([facility] Only) 0 04/26/21 2100        Wound 08/24/20 Incision Ankle Right (Active)       Wound 12/06/20 Pressure Injury Heel Posterior Right POA (Active)       Wound 12/06/20 Partial Thickness Wound Ankle Lateral Left (Active)       Wound 12/14/20 Pressure Injury Buttocks;Coccyx Midline Bilateral (Active)       Wound 04/23/21 Incision Foot Left Xeroform, 4x4, soft roll, ace (Active)   Site Assessment [facility] 04/26/21 2000   Periwound Assessment [facility] 04/26/21 2000   Margins [facility] 04/26/21 2000   Closure [facility] 04/26/21 2000   Drainage Amount [facility] 04/26/21 2000   Drainage Description [facility] 04/26/21 2000   Treatments Cleansed 04/26/21 0830   Wound Cleansing Approved Wound Cleanser 04/26/21 0830   Dressing Options Dry Gauze;Dry Roll Gauze 04/26/21 2000   Dressing Changed New 04/26/21 0830   Dressing Status Clean;Dry;Intact 04/26/21 0830       Peripheral IV 04/23/21 20 G Anterior;Left Forearm (Active)   Site Assessment Clean;Dry;Intact 04/26/21 2100   Dressing Type Transparent 04/26/21 2100   Line Status Scrubbed the hub prior to access;Infusing;Flushed 04/26/21 2100   Dressing Status Clean;Dry;Intact 04/26/21 2100   Dressing Intervention N/A 04/26/21 2100   Dressing Change Due 04/30/21 04/26/21 0830   Date Primary Tubing Changed 04/25/21 04/26/21 0830   Date Secondary Tubing Changed 04/26/21 04/26/21 0830   NEXT Primary Tubing Change  04/30/21 04/26/21 2100   NEXT Secondary Tubing Change  04/30/21 04/26/21 2100   Infiltration Grading ([facility], [facility]) 0 04/26/21 2100   Phlebitis Scale ([facility] Only) 0 04/26/21 2100               MENTAL STATUS ON TRANSFER             CONSULTATIONS  Infectious disease  Orthopedic surgery  LPS    PROCEDURES  Left fifth ray amputation    LABORATORY  Lab Results   Component Value Date    SODIUM 139 04/27/2021    POTASSIUM 4.2 04/27/2021    CHLORIDE 105 04/27/2021    CO2 27 04/27/2021    GLUCOSE 110 (H) 04/27/2021    BUN 16 04/27/2021    CREATININE 0.80 04/27/2021    CREATININE 1.0 04/09/2007        Lab Results   Component Value Date    WBC 8.8  04/26/2021    HEMOGLOBIN 9.6 (L) 04/26/2021    HEMATOCRIT 31.2 (L) 04/26/2021    PLATELETCT 306 04/26/2021        Total time of the discharge process exceeds 35 minutes.

## 2021-04-27 NOTE — PROGRESS NOTES
AA&Ox4.     SpO2 95% on 1L O2 via NC. Denies SOB.     Reporting 1/10 pain. Medicated per MAR.     SKIN bottom has a little redness and blanching. Barrier cream applied and mepilex in place. Amputation to the LLE, gauze and dry rolled gauze applied, surgical incision was leaking dark red blood. CDI. All other skin is CDI.     Tolerating Diabetic diet. Denies N/V.     + void.     + BM / LBM 4/22/2021.     Pt does not get out of bed.     All needs met at this time. Call light within reach. Pt calls appropriately.

## 2021-04-27 NOTE — DISCHARGE PLANNING
Anticipated Discharge Disposition: SNF    Action: Per MD, this patient is medically cleared for transfer to back to [facility] today, pending PICC. Per DPA, [facility] reviewed the information regarding the IV ABX and is able to accept this patient today.    LSW faxed the [facility] Form to [facility], tentative transport time is 1700, pending insurance authorization for [facility].    LSW completed the Transfer Packet and included the paper script from the [facility] W[facility] Team. LSW met with this patient and explained the transfer arrangements.     The patient stated he agrees with the transfer back to [facility]; however, he wants to speak with the doctor before signing the [facility] Form as he thought he will stay at [facility] for 3 more weeks before returning to [facility]. LSW placed the transfer packet and [facility] Form in the patient's drawer for him to sign prior to transfer, STEVEN Gómez notified by LSW.      Barriers to Discharge: None.    Plan: SNF, pending transportation arrangements and patient signing the consent for transfer.    3:00pm - Per STEVEN Gómez this patient spoke with the doctor and agrees with the transfer today to [facility]; however, the PICC has not been placed.     3:30pm - Per Heidy Herzog RN, the PICC will be place in time for this patient to transfer today at 1700.

## 2021-04-28 NOTE — PROGRESS NOTES
1700 attempted to call report to [facility]. Attempted several times and am not able to get through.

## 2021-04-28 NOTE — DISCHARGE INSTRUCTIONS
Discharge Instructions    Discharged to other by ambulance with escort. Discharged via ambulance, hospital escort: Yes.  Special equipment needed: Not Applicable    Be sure to schedule a follow-up appointment with your primary care doctor or any specialists as instructed.     Discharge Plan:   Diet Plan: Discussed  Activity Level: Discussed  Confirmed Follow up Appointment: No (Comments)  Confirmed Symptoms Management: Discussed  Medication Reconciliation Updated: Yes    I understand that a diet low in cholesterol, fat, and sodium is recommended for good health. Unless I have been given specific instructions below for another diet, I accept this instruction as my diet prescription.   Other diet: Diabetic    Special Instructions: None    · Is patient discharged on Warfarin / Coumadin?   No     Depression / Suicide Risk    As you are discharged from [facility], it is important to learn how to keep safe from harming yourself.    Recognize the warning signs:  · Abrupt changes in personality, positive or negative- including increase in energy   · Giving away possessions  · Change in eating patterns- significant weight changes-  positive or negative  · Change in sleeping patterns- unable to sleep or sleeping all the time   · Unwillingness or inability to communicate  · Depression  · Unusual sadness, discouragement and loneliness  · Talk of wanting to die  · Neglect of personal appearance   · Rebelliousness- reckless behavior  · Withdrawal from people/activities they love  · Confusion- inability to concentrate     If you or a loved one observes any of these behaviors or has concerns about self-harm, here's what you can do:  · Talk about it- your feelings and reasons for harming yourself  · Remove any means that you might use to hurt yourself (examples: pills, rope, extension cords, firearm)  · Get professional help from the community (Mental Health, Substance Abuse, psychological counseling)  · Do not be  alone:Call your Safe Contact- someone whom you trust who will be there for you.  · Call your local CRISIS HOTLINE 650-8554 or 777-288-8654  · Call your local Children's Mobile Crisis Response Team [place] (948) 419-8140 or www.Visualead  · Call the toll free National Suicide Prevention Hotlines   · National Suicide Prevention Lifeline 278-411-DZQI (2682)  · National Hope Line Network 800-SUICIDE (055-1598)        Toe Amputation, Care After  This sheet gives you information about how to care for yourself after your procedure. Your health care provider may also give you more specific instructions. If you have problems or questions, contact your health care provider.  What can I expect after the procedure?  After the procedure, it is common to have some pain. Pain usually improves within a week.  Follow these instructions at home:  Medicines  · Take over-the-counter and prescription medicines only as told by your health care provider.  · If you were prescribed an antibiotic medicine, take it as told by your health care provider. Do not stop taking the antibiotic even if you start to feel better.  · Ask your health care provider if the medicine prescribed to you:  ? Requires you to avoid driving or using heavy machinery.  ? Can cause constipation. You may need to take actions to prevent or treat constipation, such as:  § Drink enough fluid to keep your urine pale yellow.  § Take over-the-counter or prescription medicines.  § Eat foods that are high in fiber, such as beans, whole grains, and fresh fruits and vegetables.  § Limit foods that are high in fat and processed sugars, such as fried or sweet foods.  Managing pain, stiffness, and swelling    · If directed, put ice on the painful area.  ? Put ice in a plastic bag.  ? Place a towel between your skin and the bag.  ? Leave the ice on for 20 minutes, 2-3 times a day.  · Move your other toes often to reduce stiffness and swelling.  · Raise (elevate) your  foot above the level of your heart while you are sitting or lying down.  Incision care         · Follow instructions from your health care provider about how to take care of your incision. Make sure you:  ? Wash your hands with soap and water before and after you change your bandage (dressing). If soap and water are not available, use hand .  ? Change your dressing as told by your health care provider.  ? Leave stitches (sutures), skin glue, or adhesive strips in place. These skin closures may need to stay in place for 2 weeks or longer. If adhesive strip edges start to loosen and curl up, you may trim the loose edges. Do not remove adhesive strips completely unless your health care provider tells you to do that.  · Check your incision area every day for signs of infection. Check for:  ? More redness, swelling, or pain.  ? Fluid or blood.  ? Warmth.  ? Pus or a bad smell.  Bathing  · Do not take baths, swim, use a hot tub, or soak your foot until your health care provider approves. Ask your health care provider if you may take showers. You may only be allowed to take sponge baths.  · If your dressing has been removed, you may wash your skin with warm water and soap.  Activity  · Rest as told by your health care provider.  · Avoid sitting for a long time without moving. Get up to take short walks every 1-2 hours. This is important to improve blood flow and breathing. Ask for help if you feel weak or unsteady.  · If you have been given crutches, use them as told by your health care provider.  · Do exercises as told by your health care provider.  · Return to your normal activities as told by your health care provider. Ask your health care provider what activities are safe for you.  General instructions  · Do not drive for 24 hours if you were given a sedative during your procedure.  · Do not use any products that contain nicotine or tobacco, such as cigarettes, e-cigarettes, and chewing tobacco. If you need  help quitting, ask your health care provider.  · Ask your health care provider about wearing supportive shoes or using inserts to help with your balance when walking.  · If you have diabetes, keep your blood sugar under good control and check your feet daily for sores or open areas.  · Keep all follow-up visits as told by your health care provider. This is important.  Contact a health care provider if:  · You have signs of infection at your incision, such as:  ? Redness, swelling, or pain.  ? Fluid or blood.  ? Warmth.  ? Pus or a bad smell.  · You have a fever or chills.  · Your dressing is soaked with blood.  · Your sutures tear or they separate.  · You have numbness or tingling in your toes or foot.  · Your foot is cool or pale, or it changes color.  · Your pain does not improve after you take your medicine.  Get help right away if you have:  · Pain or swelling near your incision that gets worse or does not go away.  · Red streaks on your skin near your toes, foot, or leg.  · Pain in your calf or behind your knee.  · Shortness of breath.  · Chest pain.  Summary  · After the procedure, it is common to have some pain. Pain usually improves within a week.  · If you were prescribed an antibiotic medicine, take it as told by your health care provider. Do not stop taking the antibiotic even if you start to feel better.  · Change your dressing as told by your health care provider.  · Contact a health care provider if you have signs of infection.  · Keep all follow-up visits as told by your health care provider. This is important.  This information is not intended to replace advice given to you by your health care provider. Make sure you discuss any questions you have with your health care provider.  Document Released: 11/28/2016 Document Revised: 04/08/2020 Document Reviewed: 12/10/2019  Elsevier Patient Education © 2020 [employer]

## 2021-04-28 NOTE — PROCEDURES
Vascular Access Team     Date of Insertion: 4/27/2021  Arm Circumference: 29  Internal length: 42  External Length: 0 (hubbed)  Vein Occupancy %: 27   Reason for PICC: IV abx  Labs: WBC 8.8, , BUN 16, Cr 0.80, GFR >60, INR n/a     Consents confirmed, vessel patency confirmed with ultrasound. Risks and benefits of procedure explained to patient and education regarding central line associated bloodstream infections provided. Questions answered.      PICC placed in LUE per licensed provider order with ultrasound guidance.  4 Fr, 1 lumen PICC placed in brachial vein after 1 attempt(s). 2 mL of 1% lidocaine injected intradermally, 21 gauge microintroducer needle and modified Seldinger technique used. 42 cm catheter inserted with good blood return. Secured at hub. Each lumen flushed without resistance with 10 mL 0.9% normal saline. PICC line secured with Biopatch and Tegaderm.     PICC tip placement location could not yet be confirmed by nurse to be in the Superior Vena Cava (SVC) utilizing 3CG technology. Chest x-ray ordered.  PICC line will be appropriate for use after chest x-ray confirms tip is in the SVC. Patient tolerated procedure well, without complications.  Patient condition relayed to unit RN or ordering physician via this post procedure note in the EMR.      Ultrasound images uploaded to PACS and viewable in the EMR - yes  Ultrasound imaged printed and placed in paper chart - no     [employer] Power PICC ref # [id number], Lot # [id number], Expiration Date 6/30/2021

## 2021-04-28 NOTE — PROGRESS NOTES
Spoke with radiologist for PICC tip location confirmation.  Received verbal confirmation that imaging results indicated PICC tip is in the [id number] SVC.  PICC line is appropriate for use at this time.  [facility] transport at bedside assisting patient for discharge to outside facility.  Primary RN Drea updated on PICC line confirmation.

## 2021-05-04 NOTE — TELEPHONE ENCOUNTER
RE: Edit  Received: Today  Message Contents   Gomez Reveles M.D.  Stacy Kim, Med Ass't   Please let the facility know that the last hemoglobin from here on 4/26 was 9.9.  On this lab, it appears to be 7.7.  They will need to assess for any bleeding, and trend the hemoglobin.

## 2021-05-18 PROBLEM — R09.02 HYPOXIA: Status: ACTIVE | Noted: 2021-01-01

## 2021-05-18 PROBLEM — T82.898A OCCLUDED PICC LINE, INITIAL ENCOUNTER (HCC): Status: ACTIVE | Noted: 2021-01-01

## 2021-05-18 PROBLEM — J90 PLEURAL EFFUSION: Status: ACTIVE | Noted: 2021-01-01

## 2021-05-18 NOTE — ED TRIAGE NOTES
Chief Complaint   Patient presents with   • Vascular Access Problem     Left upper arm PICC line won't flush per staff at [facility]. PICC line accessed and it does flush with NS, however, it is very slow and hard to flush. No discomfort, redness, or swelling noted.     Patient BIBA from [place] for above complaint. 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 Patient is neurologically at baseline and currently has no complaints.    Vitals:    05/18/21 1443   BP: 114/55   Pulse: 76   Resp: 18   Temp: 36.6 °C (97.9 °F)   SpO2: 97%

## 2021-05-18 NOTE — ED PROVIDER NOTES
ED Provider Note    CHIEF COMPLAINT  Chief Complaint   Patient presents with   • Vascular Access Problem     Left upper arm PICC line won't flush per staff at [facility]. PICC line accessed and it does flush with NS, however, it is very slow and hard to flush. No discomfort, redness, or swelling noted.       HPI  Agapito Stone is a 76 y.o. male who presents to the emergency department sent in here from his skilled nursing facility because his PICC line is not functioning.  The patient has a left upper extremity PICC line for long-term antibiotic therapy for cellulitis and osteomyelitis of his foot.  Apparently it was not functioning adequately he was sent here for further evaluation.    The patient has a complaints of pain or any other specific complaints.  He states he has been having worsening dyspnea over the last couple of days.  He believes he might have pneumonia.  He feels like he is being treated for pneumonia at his skilled nursing facility.  He denies any chest pain, cough, fevers or chills.  He denies history of PE or DVT.  He denies any other acute concerns or complaints.        REVIEW OF SYSTEMS  See HPI for further details. All other systems are negative.    PAST MEDICAL HISTORY  Past Medical History:   Diagnosis Date   • Arthritis     hands, wrists, ankles   • CAD (coronary artery disease) October 2013    Dr. Ovalle; ACS. PCI/LETA x 2 of the mid circumflex (Xience 3.25 x 23mm) and proximal circumflex (Xience 3.6x 12mm)   • Cataract     sugery   • Diabetes     oral meds   • High cholesterol    • Hyperlipidemia    • Hypertension    • Pain     ankles   • Pneumonia 1968   • Stroke (HCC) 2010    no residual effects   • Syncope        FAMILY HISTORY  Family History   Problem Relation Age of Onset   • Other Mother         Rheumatic fever       SOCIAL HISTORY  Social History     Socioeconomic History   • Marital status:      Spouse name: Not on file   • Number of children: Not on file   • Years of  education: Not on file   • Highest education level: Not on file   Occupational History   • Not on file   Tobacco Use   • Smoking status: Never Smoker   • Smokeless tobacco: Never Used   Substance and Sexual Activity   • Alcohol use: Not Currently     Alcohol/week: 0.0 oz     Comment: once a year   • Drug use: No   • Sexual activity: Not on file   Other Topics Concern   • Not on file   Social History Narrative   • Not on file     Social Determinants of Health     Financial Resource Strain: Low Risk    • Difficulty of Paying Living Expenses: Not hard at all   Food Insecurity: No Food Insecurity   • Worried About Running Out of Food in the Last Year: Never true   • Ran Out of Food in the Last Year: Never true   Transportation Needs: No Transportation Needs   • Lack of Transportation (Medical): No   • Lack of Transportation (Non-Medical): No   Physical Activity:    • Days of Exercise per Week:    • Minutes of Exercise per Session:    Stress:    • Feeling of Stress :    Social Connections:    • Frequency of Communication with Friends and Family:    • Frequency of Social Gatherings with Friends and Family:    • Attends Synagogue Services:    • Active Member of Clubs or Organizations:    • Attends Club or Organization Meetings:    • Marital Status:    Intimate Partner Violence:    • Fear of Current or Ex-Partner:    • Emotionally Abused:    • Physically Abused:    • Sexually Abused:        SURGICAL HISTORY  Past Surgical History:   Procedure Laterality Date   • TOE AMPUTATION Left 4/23/2021    Procedure: AMPUTATION, TOE - 5TH TOE;  Surgeon: Remi Sctot M.D.;  Location: SURGERY [facility];  Service: Orthopedics   • IRRIGATION & DEBRIDEMENT ORTHO Right 12/10/2020    Procedure: IRRIGATION AND DEBRIDEMENT, WOUND - HEEL;  Surgeon: Royal Bolivar M.D.;  Location: SURGERY [facility];  Service: Orthopedics   • TENDON LENGHTENING Right 8/24/2020    Procedure: LENGTHENING, TENDON- , GASTROC RECESSION;  Surgeon: Royal  DESIREE Bolivar;  Location: [facility];  Service: Orthopedics   • ANKLE FUSION Right 8/24/2020    Procedure: FUSION, JOINT, ANKLE- ANKLE SUBTALAR FUSION , BONE GRAFT, MEDIAL RELEASE INCLUDING TENDONS, PATIAL EXCISION OF FIBULA;  Surgeon: Royal Bolivar M.D.;  Location: [facility];  Service: Orthopedics   • TENDON TRANSFER Right 8/24/2020    Procedure: TRANSFER, TENDON- PLANTAR FASCIA RELEASE;  Surgeon: Royal Bolivar M.D.;  Location: [facility];  Service: Orthopedics   • ORTHOPEDIC OSTEOTOMY Right 8/24/2020    Procedure: OSTEOTOMY- 1ST METATARSAL, CROSS PROCEDURE, 2-5 METATARSAL OSTEOTOMY, 2-5 PHALANGEL JOINT RECONSTRUCTION;  Surgeon: Royal Bolivar M.D.;  Location: [facility];  Service: Orthopedics   • HAMMERTOE CORRECTION Right 8/24/2020    Procedure: CORRECTION, HAMMER TOE 2-5;  Surgeon: Royal Bolivar M.D.;  Location: [facility];  Service: Orthopedics   • STENT PLACEMENT  2013    cardiac   • CAROTID ENDARTERECTOMY  7/13/2010    left; Performed by CHIQUITA CORRIGAN at [facility]   • CATARACT EXTRACTION WITH IOL Bilateral    • TONSILLECTOMY  as a child       CURRENT MEDICATIONS  Home Medications     Reviewed by Kemar Motta R.N. (Registered Nurse) on 05/18/21 at 1449  Med List Status: Partial   Medication Last Dose Status   acetaminophen (TYLENOL) 325 MG Tab  Active   apixaban (ELIQUIS) 5mg Tab  Active   Ascorbic Acid (VITAMIN C) 1000 MG Tab  Active   atorvastatin (LIPITOR) 80 MG tablet  Active   calcium carbonate (TUMS E-X 750) 750 MG chewable tablet  Active   ceFAZolin in dextrose (ANCEF) 2-4 GM/100ML-% IVPB  Active   Cholecalciferol (VITAMIN D-3) 5000 UNITS TABS  Active   daptomycin (CUBICIN) 500 MG Recon Soln  Active   Empagliflozin-metFORMIN HCl (SYNJARDY) 5-1000 MG Tab  Active   gabapentin (NEURONTIN) 100 MG Cap  Active   magnesium oxide (MAG-OX) 400 MG Tab tablet  Active   montelukast (SINGULAIR) 10 MG Tab  Active  "  Multiple Vitamin (MULTI VITAMIN PO)  Active   nitroglycerin (NITROSTAT) 0.4 MG SL Tab  Active   ondansetron (ZOFRAN ODT) 4 MG TABLET DISPERSIBLE  Active   pioglitazone (ACTOS) 15 MG Tab  Active   tamsulosin (FLOMAX) 0.4 MG capsule  Active                ALLERGIES  Allergies   Allergen Reactions   • Fish Hives     shellfish   • Pcn [Penicillins] Hives     Positive penicillin skin test as a child.  Significant hives to amoxicillin as an adult.  Tolerates cephalosporins   • Amoxicillin Hives   • Food Swelling      Eggs,Melons, avocados-puffy mouth       PHYSICAL EXAM  VITAL SIGNS: /55   Pulse 76   Temp 36.6 °C (97.9 °F) (Temporal)   Resp 18   Ht 1.88 m (6' 2\")   Wt 86.2 kg (190 lb)   SpO2 97%   BMI 24.39 kg/m²    Constitutional: Awake, alert, nontoxic, chronically ill-appearing  HENT: Normocephalic, Atraumatic, Bilateral external ears normal  Eyes: PERRL, EOMI, Conjunctiva normal, No discharge.   Neck: Normal range of motion  Cardiovascular: Normal heart rate, Normal rhythm, No murmurs, No rubs, No gallops.   Thorax & Lungs: Diminished breath sounds in the left compared to the right otherwise clear to auscultation no crackles.  Abdomen: Bowel sounds normal, Soft, No tenderness  Musculoskeletal: Good range of motion in all major joints.  Dressing from surgical site are not removed  Neurologic: Alert, No focal deficits noted.     Results for orders placed or performed during the hospital encounter of 05/18/21   CBC WITH DIFFERENTIAL   Result Value Ref Range    WBC 11.3 (H) 4.8 - 10.8 K/uL    RBC 3.42 (L) 4.70 - 6.10 M/uL    Hemoglobin 9.5 (L) 14.0 - 18.0 g/dL    Hematocrit 32.3 (L) 42.0 - 52.0 %    MCV 94.4 81.4 - 97.8 fL    MCH 27.8 27.0 - 33.0 pg    MCHC 29.4 (L) 33.7 - 35.3 g/dL    RDW 57.1 (H) 35.9 - 50.0 fL    Platelet Count 399 164 - 446 K/uL    MPV 9.4 9.0 - 12.9 fL    Neutrophils-Polys 91.10 (H) 44.00 - 72.00 %    Lymphocytes 1.80 (L) 22.00 - 41.00 %    Monocytes 5.30 0.00 - 13.40 %    Eosinophils " 0.90 0.00 - 6.90 %    Basophils 0.90 0.00 - 1.80 %    Nucleated RBC 0.00 /100 WBC    Neutrophils (Absolute) 10.29 (H) 1.82 - 7.42 K/uL    Lymphs (Absolute) 0.20 (L) 1.00 - 4.80 K/uL    Monos (Absolute) 0.60 0.00 - 0.85 K/uL    Eos (Absolute) 0.10 0.00 - 0.51 K/uL    Baso (Absolute) 0.10 0.00 - 0.12 K/uL    NRBC (Absolute) 0.00 K/uL    Anisocytosis 1+     Macrocytosis 1+    COMP METABOLIC PANEL   Result Value Ref Range    Sodium 142 135 - 145 mmol/L    Potassium 5.1 3.6 - 5.5 mmol/L    Chloride 104 96 - 112 mmol/L    Co2 28 20 - 33 mmol/L    Anion Gap 10.0 7.0 - 16.0    Glucose 123 (H) 65 - 99 mg/dL    Bun 29 (H) 8 - 22 mg/dL    Creatinine 0.95 0.50 - 1.40 mg/dL    Calcium 8.9 8.5 - 10.5 mg/dL    AST(SGOT) 24 12 - 45 U/L    ALT(SGPT) 6 2 - 50 U/L    Alkaline Phosphatase 286 (H) 30 - 99 U/L    Total Bilirubin 0.3 0.1 - 1.5 mg/dL    Albumin 2.6 (L) 3.2 - 4.9 g/dL    Total Protein 6.6 6.0 - 8.2 g/dL    Globulin 4.0 (H) 1.9 - 3.5 g/dL    A-G Ratio 0.7 g/dL   DIFFERENTIAL MANUAL   Result Value Ref Range    Manual Diff Status PERFORMED    PERIPHERAL SMEAR REVIEW   Result Value Ref Range    Peripheral Smear Review see below    PLATELET ESTIMATE   Result Value Ref Range    Plt Estimation Normal    MORPHOLOGY   Result Value Ref Range    RBC Morphology Present     Polychromia 1+     Poikilocytosis 1+     Ovalocytes 1+     Echinocytes 1+    ESTIMATED GFR   Result Value Ref Range    GFR If African American >60 >60 mL/min/1.73 m 2    GFR If Non African American >60 >60 mL/min/1.73 m 2   COV-2, FLU A/B, AND RSV BY PCR (2-4 HOURS CEPHEID): Collect NP swab in VTM    Specimen: Respirate   Result Value Ref Range    Influenza virus A RNA Negative Negative    Influenza virus B, PCR Negative Negative    RSV, PCR Negative Negative    SARS-CoV-2 by PCR NotDetected     SARS-CoV-2 Source NP Swab       DX-CHEST-PORTABLE (1 VIEW)   Final Result      Stable large left pleural effusion with associated compressive atelectasis. Correlate  clinically for infection.      Possible small right pleural effusion with mildly increased opacity in the right lung.      CT-CHEST (THORAX) W/O    (Results Pending)   EC-ECHOCARDIOGRAM COMPLETE W/O CONT    (Results Pending)         COURSE & MEDICAL DECISION MAKING  Pertinent Labs & Imaging studies reviewed. (See chart for details)    Patient presents because his PICC line is nonfunctional.  This is irrigated by the nursing staff and checked by me in a safety working normally.  The nurses confirm is working normally.  Feel this is appropriately addressed.    Patient complains of shortness of breath.  He states is been progressive the last several days but worsening.  Denies any cough or fever.  Is concerned about pneumonia.  I have reviewed the patient's chart his x-ray from his last visit showed a large effusion and this may be the culprit again.  With an x-ray and some basic blood test.    Patient not significant change from prior but his x-ray still shows a large persistent effusion.    I spoke with the physician at the [facility] nursing [facility], Dr. Aly.  I discussed the patient's dyspnea and chest x-ray findings and he request the patient be hospitalized for thoracentesis for symptomatic relief.    The patient is not febrile or toxic.  I ordered a Covid test this is negative.  Because he does not appear septic have not ordered cultures.  I did order pro calcitonin.  I discussed the case with the hospitalist and the patient be hospitalized for further work-up and treatment.        FINAL IMPRESSION  1. Pleural effusion     2. Dyspnea, unspecified type     3. Problem with vascular access         2.   3.         Electronically signed by: Jason Vallejo M.D., 5/18/2021 3:02 PM

## 2021-05-19 PROBLEM — J96.01 ACUTE RESPIRATORY FAILURE WITH HYPOXIA (HCC): Status: ACTIVE | Noted: 2021-01-01

## 2021-05-19 PROBLEM — J95.811 POSTPROCEDURAL PNEUMOTHORAX: Status: ACTIVE | Noted: 2021-01-01

## 2021-05-19 NOTE — PROGRESS NOTES
The patient was transported to ultrasound for a diagnostic ultrasound guided thoracentesis of the LEFT chest. Dr. Minor consented the patient and a time out was performed. Dr. Minor performed the procedure and vitals were monitored throughout. 1500 mL were removed and 1230 mL were sent to the lab. A post thoracentesis chest x-ray was performed. The RN, Simran, was updated. The patient tolerated the procedure and left in a stable condition.

## 2021-05-19 NOTE — ASSESSMENT & PLAN NOTE
Unknown etiology  Was seen on last admission and was asymptomatic. Now with worsening SOB and worsening effusion  CT chest -findings of large left-sided pleural effusion and moderate right pleural effusion.    2D echo with preserved EF and no valvular abnormalities.  S/p thoracentesis of left pleural cavity 1.5 L of fluid removed.  S/p thoracentesis of right pleural cavity about 100 cc fluid removed, bloody.  Complicated by left-sided postprocedure hydropneumothorax, chest tube in place 5/21.   Fluid studies slightly consistent with exudative effusion.  Cytology negative    No improvement of pneumothorax x1 week  Trial of chest tube removal and assessment, removed 6/1

## 2021-05-19 NOTE — ASSESSMENT & PLAN NOTE
History of DVT on Eliquis.  Repeat ultrasound Dopplers with findings of persistent nonocclusive left lower extremity DVT.  On Eliquis

## 2021-05-19 NOTE — PROGRESS NOTES
[facility] Medicine Daily Progress Note    Date of Service  5/19/2021    Chief Complaint  76 y.o. male admitted 5/18/2021 with PICC line dysfunction and new onset pleural effusion.    Interval Problem Update  Patient seen and examined at bedside this morning.  No acute events overnight.     Admitted overnight with complaints regarding PICC line dysfunction. This was fixed in the ED. Also had complains of worsening shortness of breath and CT chest with finding of large left-sided pleural effusion.  Plan for thoracentesis/fluid studies today.    Addendum:  Post thoracentesis, chest x-ray with finding of a small left loculated pneumothorax. We will repeat serial chest x-ray and closely monitor vital signs.    Consultants/Specialty  none    Code Status  Full Code    Disposition  [facility]    Review of Systems  Review of Systems   Constitutional: Negative.    Respiratory: Positive for shortness of breath.    Cardiovascular: Negative.    Gastrointestinal: Negative.    Genitourinary: Negative.    Musculoskeletal: Negative.    All other systems reviewed and are negative.       Physical Exam  Temp:  [36.1 °C (97 °F)-37.1 °C (98.8 °F)] 36.2 °C (97.2 °F)  Pulse:  [] 65  Resp:  [16-21] 20  BP: ()/() 102/44  SpO2:  [92 %-100 %] 94 %    Physical Exam  Constitutional:       Appearance: Normal appearance.   HENT:      Head: Normocephalic and atraumatic.   Eyes:      Conjunctiva/sclera: Conjunctivae normal.   Cardiovascular:      Rate and Rhythm: Normal rate.      Pulses: Normal pulses.   Pulmonary:      Effort: Pulmonary effort is normal.      Breath sounds: Rales present.   Abdominal:      General: Bowel sounds are normal.      Palpations: Abdomen is soft.   Musculoskeletal:         General: No swelling or tenderness.   Skin:     General: Skin is warm.   Neurological:      General: No focal deficit present.      Mental Status: He is alert. Mental status is at baseline.   Psychiatric:         Mood and Affect: Mood normal.          Behavior: Behavior normal.         Fluids    Intake/Output Summary (Last 24 hours) at 5/19/2021 1502  Last data filed at 5/19/2021 0539  Gross per 24 hour   Intake --   Output 220 ml   Net -220 ml       Laboratory  Recent Labs     05/18/21  1521 05/19/21  0200   WBC 11.3* 8.2   RBC 3.42* 3.15*   HEMOGLOBIN 9.5* 8.7*   HEMATOCRIT 32.3* 29.5*   MCV 94.4 93.7   MCH 27.8 27.6   MCHC 29.4* 29.5*   RDW 57.1* 56.0*   PLATELETCT 399 339   MPV 9.4 9.8     Recent Labs     05/18/21  1521 05/19/21  0200   SODIUM 142 141   POTASSIUM 5.1 4.3   CHLORIDE 104 104   CO2 28 30   GLUCOSE 123* 107*   BUN 29* 27*   CREATININE 0.95 0.79   CALCIUM 8.9 8.4*     Recent Labs     05/19/21  1205   APTT 31.1   INR 1.12               Imaging  US-THORACENTESIS PUNCTURE LEFT   Final Result      1. Ultrasound guided left sided diagnostic and therapeutic thoracentesis.      2. 1550 mL of fluid withdrawn.      DX-CHEST-PORTABLE (1 VIEW)   Final Result      Interval decrease in size of the left pleural effusion which is now small.      Small small loculated left pneumothorax.      Small loculated right pleural effusion.      Left perihilar and basilar opacity may represent atelectasis/consolidation.      Increased ill-defined airspace opacities on the right may represent a combination of atelectasis, edema and pneumonia.      Atherosclerotic plaque.      Findings discussed with the covering clinician.         CT-CHEST (THORAX) W/O   Final Result         1.  Large left and moderate pleural effusions.   2.  Peripheral reticular pulmonary opacities, appearance compatible with Covid infiltrates.   3.  Linear consolidations in the bilateral lung bases, likely atelectasis.   4.  Hepatomegaly and irregular hepatic contour, appearance most compatible changes of cirrhosis.   5.  Diverticulosis      DX-CHEST-PORTABLE (1 VIEW)   Final Result      Stable large left pleural effusion with associated compressive atelectasis. Correlate clinically for infection.       Possible small right pleural effusion with mildly increased opacity in the right lung.      EC-ECHOCARDIOGRAM COMPLETE W/O CONT    (Results Pending)        Assessment/Plan  * Pleural effusion  Assessment & Plan  Unknown etiology  Was seen on last admission and was asymptomatic. Now with worsening SOB and worsening effusion  CT chest -findings of large left-sided pleural effusion.    Will obtain ECHO  IR consulted for thoracentesis.  We will follow-up on fluid studies.    Postprocedural pneumothorax  Assessment & Plan  Post thoracentesis, chest x-ray with finding of a small left loculated pneumothorax.   We will repeat serial chest x-ray and closely monitor vital signs.    Acute respiratory failure with hypoxia (HCC)  Assessment & Plan  Secondary to large pleural effusion  See above for plan    Occluded PICC line, initial encounter ([facility])  Assessment & Plan  Patient sent by nursing Romeo for occluded PICC line  Was flushed by ED staff and reportedly working currently  Consider new PICC line placement if continues to be nonfunctional as will need to continue Abx until 6/3    Osteomyelitis of left foot (HCC)- (present on admission)  Assessment & Plan  Patient last admitted to [facility] with osteomyelitis of left foot  S/p amputation of left 5th toe, amputation well healing  ID was consulted last admission and plan for Daptomycin and Ancef until 6/3, will continue Abx per their prior recommendation.       Deep vein thrombosis (DVT) of lower extremity (HCC)- (present on admission)  Assessment & Plan  History of DVT on Eliquis, last taken this morning  Will hold until after thoracentesis    Type 2 diabetes mellitus with peripheral neuropathy (HCC)- (present on admission)  Assessment & Plan  Patient with long history of DM with doccumented neuropathy and retinopathy  Hold home oral medications (ACTOS and Synjardy)  Insulin sliding scale for now  Diabetic diet  Last A1C 4.9 month ago    CAD (coronary artery disease)- (present  on admission)  Assessment & Plan  Patient not currently taking statin, can be resumed as outpatient  Reported ACS in 2013 s/p stents  No current chest pain  ECHO ordered  Will continue to monitor    HTN (hypertension)- (present on admission)  Assessment & Plan  Does not appear to be on antihypertensives at nursing home currently  Will continue to monitor    History of stroke- (present on admission)  Assessment & Plan  Prior history of stroke with some aphasia  Some deconditioning  PT/OT       VTE prophylaxis: Eliquis

## 2021-05-19 NOTE — CARE PLAN
Problem: Nutritional:  Goal: Achieve adequate nutritional intake  Description: Patient will consume >50% of meals  Outcome: Not Met   See RD note; RD following.

## 2021-05-19 NOTE — ASSESSMENT & PLAN NOTE
Patient not currently taking statin, can be resumed as outpatient  Reported ACS in 2013 s/p stents  No current chest pain  ECHO WNL LVEF 60%  Will continue to monitor

## 2021-05-19 NOTE — ED NOTES
Rounded on patient, updated on plan of care. Patient currently on waffle cushion and has no complaints.

## 2021-05-19 NOTE — ASSESSMENT & PLAN NOTE
Post thoracentesis, chest x-ray with finding of a small left loculated pneumothorax.   We will repeat serial chest x-ray and closely monitor vital signs.    Repeat chest x-ray with findings of persistent left-sided hydropneumothorax concerning for trapped lung.  CT chest 5/21 with findings of persistent moderate-sized left pneumothorax.  S/p IR for pigtail placement into left pleural cavity.     Chest tube removed on 06/1

## 2021-05-19 NOTE — CARE PLAN
The patient is Stable - Low risk of patient condition declining or worsening    Shift Goals  Clinical Goals: skin preventive measures to reduce occurrence of new pressure injuries      Problem: Skin Integrity  Goal: Skin integrity is maintained or improved  Outcome: Progressing     Problem: Fall Risk  Goal: Patient will remain free from falls  Outcome: Progressing     Problem: Knowledge Deficit - Standard  Goal: Patient and family/care givers will demonstrate understanding of plan of care, disease process/condition, diagnostic tests and medications  Outcome: Progressing       Progress made toward(s) clinical / shift goals: impaired skin integrity present on admission mepilex applied to bilateral elbows and heels; heel float boots in place. 2 RN skin check complete.

## 2021-05-19 NOTE — DIETARY
"Nutrition services: Day 1 of admit.  Agapito Stone is a 76 y.o. male with admitting DX of pleural effusion.  Consult received for MST 5 (history of pressure ulcers, 34+ lb wt loss x 6 mo, poor PO intake).    Attempted to speak with patient this afternoon; however, patient was being transported after room, assume for thoracentesis/fluid studies today.     Assessment:  Height: 188 cm (6' 2\")  Weight: 86.2 kg (190 lb) via other health care provider 5/18 - new weight requested from RN/CNA.  Body mass index is 24.39 kg/m²., BMI classification: normal  Diet/Intake: Carbohydrate Consistent, Diabetic Diet. No documented PO intake yet.    Evaluation:   1. PMH: stroke, CAD s/p stents, HTN, T2DM  2. Weight history per chart review: severe 15.5% wt loss x 8 mo if other healthcare provider weight accurate  · 102 kg via bed scale 9/2/20  3. MAR: ferrous sulfate, SSI, magnesium oxide  4. Labs: glu 107, BUN 27 (H), Alk Phos 236 (H), most recent A1C from 4/22 4.9 (WNL)  5. No documented pressure injuries at this time.   6. Thoracentesis/fluid studies today.     Malnutrition Risk: Unable to determine at this time.    Recommendations/Plan:   1. Obtain weight/PO history, nutrition focused physical exam as able.   2. Encourage intake of meals.  3. Document intake of all meals as % taken in ADL's to provide interdisciplinary communication across all shifts.   4. Monitor weight- measured weight requested for assessment.   5. Nutrition rep will continue to see patient for ongoing meal and snack preferences.     RD following.         "

## 2021-05-19 NOTE — PROGRESS NOTES
Pt arrived to floor via transport. Pt is A&Ox4. Pt is resting in bed, no signs of labored breathing or pain. Pt on 3 L NC. Call light & personal belongings within reach, bed in lowest position & locked, and bed alarm is on. Fall precautions in place and education provided on how to use call light. Pt updated on plan of care for the shift. Pt declines any additional needs at this time.

## 2021-05-19 NOTE — WOUND TEAM
[facility]  Inpatient Services  Initial Wound and Skin Care Evaluation    Admission Date: 5/18/2021     Last order of IP CONSULT TO WOUND CARE was found on 5/19/2021 from Hospital Encounter on 5/18/2021     HPI, PMH, SH: Reviewed    Past Surgical History:   Procedure Laterality Date   • TOE AMPUTATION Left 4/23/2021    Procedure: AMPUTATION, TOE - 5TH TOE;  Surgeon: Remi Scott M.D.;  Location: [facility];  Service: Orthopedics   • IRRIGATION & DEBRIDEMENT ORTHO Right 12/10/2020    Procedure: IRRIGATION AND DEBRIDEMENT, WOUND - HEEL;  Surgeon: Royal Bolivar M.D.;  Location: [facility];  Service: Orthopedics   • TENDON LENGHTENING Right 8/24/2020    Procedure: LENGTHENING, TENDON- , GASTROC RECESSION;  Surgeon: Royal Bolivar M.D.;  Location: [facility];  Service: Orthopedics   • ANKLE FUSION Right 8/24/2020    Procedure: FUSION, JOINT, ANKLE- ANKLE SUBTALAR FUSION , BONE GRAFT, MEDIAL RELEASE INCLUDING TENDONS, PATIAL EXCISION OF FIBULA;  Surgeon: Royal Bolivar M.D.;  Location: [facility];  Service: Orthopedics   • TENDON TRANSFER Right 8/24/2020    Procedure: TRANSFER, TENDON- PLANTAR FASCIA RELEASE;  Surgeon: Royal Bolivar M.D.;  Location: [facility];  Service: Orthopedics   • ORTHOPEDIC OSTEOTOMY Right 8/24/2020    Procedure: OSTEOTOMY- 1ST METATARSAL, CROSS PROCEDURE, 2-5 METATARSAL OSTEOTOMY, 2-5 PHALANGEL JOINT RECONSTRUCTION;  Surgeon: Royal Bolivar M.D.;  Location: [facility];  Service: Orthopedics   • HAMMERTOE CORRECTION Right 8/24/2020    Procedure: CORRECTION, HAMMER TOE 2-5;  Surgeon: Royal Bolivar M.D.;  Location: [facility];  Service: Orthopedics   • STENT PLACEMENT  2013    cardiac   • CAROTID ENDARTERECTOMY  7/13/2010    left; Performed by CHIQUITA CORRIGAN at [facility]   • CATARACT EXTRACTION WITH IOL Bilateral    • TONSILLECTOMY  as a child     Social History          Tobacco Use   • Smoking status: Never Smoker   • Smokeless tobacco: Never Used   Substance Use Topics   • Alcohol use: Not Currently     Alcohol/week: 0.0 oz     Comment: once a year     Chief Complaint   Patient presents with   • Vascular Access Problem     Left upper arm PICC line won't flush per staff at [facility]. PICC line accessed and it does flush with NS, however, it is very slow and hard to flush. No discomfort, redness, or swelling noted.     Diagnosis: Pleural effusion [J90]    Unit where seen by Wound Team: S626/01     WOUND CONSULT/FOLLOW UP RELATED TO:  Sacrum, right and left foot, right arm and hand.      WOUND HISTORY:  Patient had surgery with Dr. Bolivar on 4/23 patient was discharged home and     WOUND ASSESSMENT/LDA    Wound 12/14/20 Pressure Injury Buttocks;Coccyx Midline Bilateral (Active)   Wound Image     05/19/21 1644   Site Assessment Clean;Dry;Intact;Purple 05/19/21 1644   Periwound Assessment Dry;Clean;Intact;Red 05/19/21 1644   Margins Attached edges 05/19/21 1644   Closure Open to air 05/19/21 1644   Drainage Amount None 05/19/21 1644   Wound Cleansing Foam Cleanser/Washcloth 05/19/21 1644   Dressing Cleansing/Solutions Not Applicable 05/19/21 1644   Dressing Options Open to Air 05/19/21 1644   Dressing Status Open to Air 05/19/21 1644   Wound 05/19/21 Pressure Injury Foot;Heel;Ankle Right POA (Active)   Wound Image      05/19/21 1600   Site Assessment Purple;Red 05/19/21 1600   Periwound Assessment Purple;Red 05/19/21 1600   Margins Defined edges 05/19/21 1600   Closure Open to air 05/19/21 1600   Drainage Amount None 05/19/21 1600   Treatments Offloading 05/19/21 1600   Wound Cleansing Not Applicable 05/19/21 1600   Periwound Protectant Not Applicable 05/19/21 1600   Dressing Cleansing/Solutions Not Applicable 05/19/21 1600   Dressing Options Mepilex Heel 05/19/21 1600   Dressing Changed New 05/19/21 1600   Dressing Status Clean;Dry;Intact 05/19/21 1600   Dressing Change/Treatment  Frequency Every 72 hrs, and As Needed 05/19/21 1600   NEXT Dressing Change/Treatment Date 05/22/21 05/19/21 1600   NEXT Weekly Photo (Inpatient Only) 05/26/21 05/19/21 1600   Pressure Injury Stage DTPI 05/19/21 1600   Wound Length (cm) 0.6 cm 05/19/21 1600   Wound Width (cm) 0.6 cm 05/19/21 1600   Wound Surface Area (cm^2) 0.36 cm^2 05/19/21 1600   Shape circular 05/19/21 1600   Wound Odor None 05/19/21 1600   Exposed Structures IDA 05/19/21 1600   WOUND NURSE ONLY - Time Spent with Patient (mins) 60 05/19/21 1600       Wound 05/19/21 Pressure Injury Foot;Ankle;Toe, Hallux;Toe, 4th Left POA (Active)   Wound Image      05/19/21 1600   Site Assessment Purple;Red 05/19/21 1600   Periwound Assessment Purple;Red 05/19/21 1600   Margins Defined edges 05/19/21 1600   Closure Open to air 05/19/21 1600   Drainage Amount None 05/19/21 1600   Treatments Offloading 05/19/21 1600   Wound Cleansing Not Applicable 05/19/21 1600   Periwound Protectant Not Applicable 05/19/21 1600   Dressing Cleansing/Solutions 3% Betadine 05/19/21 1600   Dressing Options Mepilex Heel;Open to Air 05/19/21 1600   Dressing Changed New 05/19/21 1600   Dressing Status Clean;Dry;Intact 05/19/21 1600   Dressing Change/Treatment Frequency Every 72 hrs, and As Needed 05/19/21 1600   NEXT Dressing Change/Treatment Date 05/22/21 05/19/21 1600   NEXT Weekly Photo (Inpatient Only) 05/26/21 05/19/21 1600   Pressure Injury Stage DTPI 05/19/21 1600   Wound Length (cm) 0.6 cm 05/19/21 1600   Wound Width (cm) 0.6 cm 05/19/21 1600   Wound Surface Area (cm^2) 0.36 cm^2 05/19/21 1600   Shape circular 05/19/21 1600   Exposed Structures IDA 05/19/21 1600   WOUND NURSE ONLY - Time Spent with Patient (mins) 60 05/19/21 1600       Wound 05/19/21 Skin Tear Arm;Hand (Active)   Wound Image      05/19/21 1600   Site Assessment Red 05/19/21 1600   Periwound Assessment Fragile;Intact 05/19/21 1600   Margins Defined edges 05/19/21 1600   Closure Open to air 05/19/21 1600      Drainage Amount Scant 05/19/21 1600   Treatments Site care 05/19/21 1600   Wound Cleansing Not Applicable 05/19/21 1600   Periwound Protectant Not Applicable 05/19/21 1600   Dressing Cleansing/Solutions Not Applicable 05/19/21 1600   Dressing Options Mepitel One 05/19/21 1600   Dressing Changed New 05/19/21 1600   Dressing Status Clean;Dry;Intact 05/19/21 1600   Dressing Change/Treatment Frequency As Needed 05/19/21 1600   Non-staged Wound Description Full thickness 05/19/21 1600   Wound Length (cm) 2.5 cm 05/19/21 1600   Wound Width (cm) 2.5 cm 05/19/21 1600   Wound Surface Area (cm^2) 6.25 cm^2 05/19/21 1600   Shape Irregular 05/19/21 1600   Wound Odor None 05/19/21 1600   Exposed Structures None 05/19/21 1600   WOUND NURSE ONLY - Time Spent with Patient (mins) 60 05/19/21 1600       Wound 05/19/21 Pressure Injury Sacrum;Coccyx (Active)   Wound Image    05/19/21 1600   Site Assessment Purple;Red 05/19/21 1600   Periwound Assessment Purple;Red 05/19/21 1600   Margins Undefined edges 05/19/21 1600   Closure Open to air 05/19/21 1600   Drainage Amount None 05/19/21 1600   Treatments Offloading 05/19/21 1600   Wound Cleansing Foam Cleanser/Washcloth 05/19/21 1600   Periwound Protectant Barrier Paste 05/19/21 1600   Dressing Cleansing/Solutions Not Applicable 05/19/21 1600   Dressing Options Mepilex 05/19/21 1600   Dressing Changed New 05/19/21 1600   Dressing Status Clean;Dry;Intact 05/19/21 1600   Dressing Change/Treatment Frequency Every 72 hrs, and As Needed 05/19/21 1600   NEXT Dressing Change/Treatment Date 05/22/21 05/19/21 1600   NEXT Weekly Photo (Inpatient Only) 05/26/21 05/19/21 1600   Pressure Injury Stage DTPI 05/19/21 1600   Wound Length (cm) 0.6 cm 05/19/21 1600   Wound Width (cm) 0.6 cm 05/19/21 1600   Wound Surface Area (cm^2) 0.36 cm^2 05/19/21 1600   Shape circular 05/19/21 1600   Wound Odor None 05/19/21 1600   Exposed Structures IDA 05/19/21 1600   WOUND NURSE ONLY - Time Spent with Patient  (mins) 60 21 1600     Moisture Associated Skin Damage 21 Buttock (Active)   Wound Image   21 1600   NEXT Weekly Photo (Inpatient Only) 21 1600   Periwound Assessment Denuded 21 1600   IAD Cleansing Foam Cleanser/Washcloth 21 1600   Periwound Protectant Barrier Paste 21 1600   IAD Containment Device None 21 1600   Length (cm) 7 21 1600   Width (cm) 7 21 1600   WOUND NURSE ONLY - Time Spent with Patient (mins) 60 21 1600          Vascular:    HAKEEM:   HAKEEM Results, Last 30 Days US-HAKEEM SINGLE LEVEL BILAT    Result Date: 2021  Narrative  Vascular Laboratory  Conclusions  Bilateral.  There is no evidence of major arterial disease demonstrated.  NADINE CABRERA  Age:    76    Gender:     M  MRN:    [id number]  :    1945      BSA:  Exam Date:     2021 10:46  Room #:     Inpatient  Priority:     Routine  Ht (in):             Wt (lb):  Ordering Physician:        ANN HOYT  Referring Physician:       [id number] Cruz  Sonographer:               Cami Alves [facility]                             RVT  Study Type:                Complete Bilateral  Technical Quality:         Adequate  Indications:     Ulceration of LE  CPT Codes:       28717  ICD Codes:       707.1  History:         Non-healing wound of left lower limb. Diabetes.  Limitations:                 RIGHT  Waveform            Systolic BPs (mmHg)                             125           Brachial  Triphasic                                Common Femoral  Triphasic                  276           Posterior Tibial  Triphasic                  267           Dorsalis Pedis                                           Peroneal                             2.12          HAKEEM                                           TBI                       LEFT  Waveform        Systolic BPs (mmHg)                             130           Brachial  Triphasic                                 Common Femoral  Triphasic                  240           Posterior Tibial  Triphasic                  226           Dorsalis Pedis                                           Peroneal                             1.85          HAKEEM                                           TBI  Findings  Bilateral.  Doppler waveforms of the common femoral arteries are of high amplitude and  triphasic.  Doppler waveforms at the ankle are brisk and triphasic.  Ankle pressures are not accurately measured due to calcification and  noncompressibility of the tibial vessels.  Toe-brachial indices are normal. Right: 0.81  Left: 0.85  Additional testing was not performed in accordance with lower extremity  arterial evaluation protocol.  Cristiano Mccormack  (Electronically Signed)  Final Date:      23 April 2021                   13:32      Lab Values:    Lab Results   Component Value Date/Time    WBC 8.2 05/19/2021 02:00 AM    RBC 3.15 (L) 05/19/2021 02:00 AM    HEMOGLOBIN 8.7 (L) 05/19/2021 02:00 AM    HEMATOCRIT 29.5 (L) 05/19/2021 02:00 AM    CREACTPROT 6.65 (H) 04/23/2021 04:00 AM    SEDRATEWES 26 (H) 11/19/2020 11:36 AM    HBA1C 4.9 04/22/2021 04:12 PM        Culture Results show:  Recent Results (from the past 720 hour(s))   CULTURE WOUND W/ GRAM STAIN    Collection Time: 04/23/21  7:04 PM    Specimen: Left Foot; Wound   Result Value Ref Range    Significant Indicator POS (POS)     Source WND     Site LEFT FOOT     Culture Result - (A)     Gram Stain Result Few WBCs.  Rare Gram positive cocci.       Culture Result (A)      Methicillin Resistant Staphylococcus aureus  Moderate growth  This isolate is presumed to be clindamycin resistant based on  detection of inducible resistance.  Clindamycin may still  be effective in some patients.      Culture Result Proteus mirabilis  Rare growth   (A)        Susceptibility    Methicillin resistant staphylococcus aureus - NADEGE     Azithromycin >4 Resistant mcg/mL     Clindamycin 0.5 Resistant  mcg/mL     Cefazolin <=8 Resistant mcg/mL     Cefepime 16 Resistant mcg/mL     Ceftaroline <=0.5 Sensitive mcg/mL     Daptomycin <=0.5 Sensitive mcg/mL     Erythromycin >4 Resistant mcg/mL     Ampicillin/sulbactam <=8/4 Resistant mcg/mL     Oxacillin >2 Resistant mcg/mL     Trimeth/Sulfa <=0.5/9.5 Sensitive mcg/mL     Tetracycline >8 Resistant mcg/mL     Vancomycin 1 Sensitive mcg/mL    Proteus mirabilis - NADEGE     Ampicillin <=8 Sensitive mcg/mL     Ceftriaxone <=1 Sensitive mcg/mL     Cefazolin <=2 Sensitive mcg/mL     Ciprofloxacin >2 Resistant mcg/mL     Cefepime <=2 Sensitive mcg/mL     Gentamicin <=2 Sensitive mcg/mL     Tobramycin <=2 Sensitive mcg/mL     Trimeth/Sulfa <=0.5/9.5 Sensitive mcg/mL     Ceftazidime <=1 Sensitive mcg/mL     Cefuroxime <=4 Sensitive mcg/mL     Ertapenem <=0.5 Sensitive mcg/mL     Moxifloxacin >4 Resistant mcg/mL     Pip/Tazobactam <=8 Sensitive mcg/mL       Pain Level/Medicated:  0/10    INTERVENTIONS BY WOUND TEAM:  Chart and images reviewed. Discussed with bedside RN. All areas of concern (based on picture review, LDA review and discussion with bedside RN) have been thoroughly assessed. Documentation of areas based on significant findings. This RN in to assess patient. Performed standard wound care which includes appropriate positioning, dressing removal and non-selective debridement. Pictures and measurements obtained weekly if/when required.  RIGHT HAND AND RIGHT ELBOW  Preparation for Dressing removal:   Cleansed with:  NS and gauze.  Sharp debridement: NA  Nataly wound: Cleansed with NS  Primary Dressing: mepitel one    RIGHT FOOT  Preparation for Dressing removal: mepilex heel removed  Cleansed with:  NS and gauze.  Sharp debridement: NA  Nataly wound: Cleansed with NS and gauze  Primary Dressing: mepilex heel    LEFT FOOT  Preparation for Dressing removal: mepilex heel removed  Cleansed with:  NS and gauze.  Sharp debridement: NA  Nataly wound: Cleansed with NS and gauze  Primary  Dressing: betadine to the toes and mepilex to the heel and ankle    SACRUM  Preparation for Dressing removal: No dressing  Cleansed with:  washcloth and foam cleanser  Sharp debridement: NA  Nataly wound: Cleansed with washcloth and foam cleanser  Primary Dressing: barrier paste   Secondary (Outer) Dressing: Mepilex    Interdisciplinary consultation: Patient, Bedside RN Calixto), Wound care RN Jackie    EVALUATION / RATIONALE FOR TREATMENT:  Most Recent Date:  05/19/2021:  Deep tissue injuries to the left, right foot and sacrum.  Patient had offloading boot that caused pressure but the patient has severe bilateral neuropathy to his feet was not able to feel.  Patient also has deep tissue injury to his sacrum with surrounding moisture associated skin damage.  Barrier paste to sacrum and mepilex over to help with moisture control.     Goals: Steady decrease in wound area and depth weekly.    WOUND TEAM PLAN OF CARE ([X] for frequency of wound follow up,):   Nursing to follow orders written for wound care. Contact wound team if area fails to progress, deteriorates or with any questions/concerns  Dressing changes by wound team:                   Follow up 3 times weekly:                NPWT change 3 times weekly:     Follow up 1-2 times weekly:    x  Follow up Bi-Monthly:                   Follow up as needed:     Other (explain):     NURSING PLAN OF CARE ORDERS (X):  Dressing changes: See Dressing Care orders: x  Skin care: See Skin Care orders: x  RN Prevention Protocol: x  Rectal tube care: See Rectal Tube Care orders:   Other orders:    RSKIN:   CURRENTLY IN PLACE (X), APPLIED THIS VISIT (A), ORDERED (O):   Q shift Bradley:  X  Q shift pressure point assessments:  X    Surface/Positioning   Pressure redistribution mattress     x       Low Airloss          Bariatric foam      Bariatric ERIKA     Waffle cushion        Waffle Overlay      x    Reposition q 2 hours  x    TAPs Turning system     Z Rick Pillow      Offloading/Redistribution   Sacral Mepilex (Silicone dressing)   A  Heel Mepilex (Silicone dressing)         Heel float boots (Prevalon boot)     x        Float Heels off Bed with Pillows   x        Respiratory NA  Silicone O2 tubing         Gray Foam Ear protectors     Cannula fixation Device (Tender )          High flow offloading Clip    Elastic head band offloading device      Anchorfast                                                         Trach with Optifoam split foam             Containment/Moisture Prevention     Rectal tube or BMS    Purwick/Condom Cath        Wu Catheter    Barrier wipes           Barrier paste   x    Antifungal tx      Interdry        Mobilization NA      Up to chair        Ambulate      PT/OT      Nutrition       Dietician        Diabetes Education      PO   x  TF     TPN     NPO   # days     Other        Anticipated discharge plans:   LTACH:        SNF/Rehab:   X, patient at [facility]               Home Health Care:           Outpatient Wound Center:            Self/Family Care:        Other:

## 2021-05-19 NOTE — ASSESSMENT & PLAN NOTE
Patient last admitted to [facility] with osteomyelitis of left foot  S/p amputation of left 5th toe, amputation well healing  ID was consulted last admission and plan for Daptomycin and Ancef until 6/3  Per ID, ABx till 6/3 , switched to Bactrim on last dose June 4

## 2021-05-19 NOTE — H&P
Hospital Medicine History & Physical Note    Date of Service  5/18/2021    Primary Care Physician  Brody Zamorano M.D.    Consultants  None    Code Status  Full Code    Chief Complaint  Chief Complaint   Patient presents with   • Vascular Access Problem     Left upper arm PICC line won't flush per staff at [facility]. PICC line accessed and it does flush with NS, however, it is very slow and hard to flush. No discomfort, redness, or swelling noted.       History of Presenting Illness  76 y.o. male who presented 5/18/2021 with PMHx of Hx stroke, CAD s/p stents 2013, HTN, LLE DVT on Eliquis, DM2 with neuropathy and retinopathy, Left foot osteomyelitis and foot wound s/p left 5th toe amputation. Patient was sent by nursing home for worsening SOB and left upper arm PICC occlusion.    Patient has been at nursing home receiving 6 week planned course of Abx per ID recommendations last admission receiving Daptomycin and Ancef with stop date planned 6/3. Nursing staff noted that PICC line was unable to be flushed at facility. It was flushed in ED and according to ED staff is functional at this time.    ED spoke to nursing home physician who was also concerned about worsening SOB. Patient was previously on 3L oxygen at night, but is now requiring oxygen through the day. Patient denies any cough or sputum production. He denies any fevers, chills, nausea, or vomiting. Patient denies any prior diagnosis of heart failure, does have history of CAD with MI in 2013 s/p stent placement.    In ED he was noted to have large left pleural effusion and possible right pleural effusion as well. Looking at imaging from prior admission was noted to have effusion on that admission but it appeared to be treated conservatively at that time. Patient denies any symptoms of pneumonia and has some minor b/l LE edema concerning for possible CHF. Will be admitted for pleural effusion workup and will obtain ECHO. Of note, patient is on Eliquis for DVT,  last dose AM 5/18, will hold in case of thoracentesis.    Review of Systems  Review of Systems   Constitutional: Negative for chills and fever.   HENT: Negative for nosebleeds and sore throat.    Eyes: Negative for pain, discharge and redness.   Respiratory: Positive for shortness of breath. Negative for cough, sputum production and wheezing.    Cardiovascular: Positive for leg swelling. Negative for chest pain and palpitations.   Gastrointestinal: Negative for abdominal pain, nausea and vomiting.   Genitourinary: Negative for dysuria, frequency and urgency.   Musculoskeletal: Negative for myalgias and neck pain.   Skin: Negative for rash.   Neurological: Negative for sensory change, loss of consciousness and headaches.   Endo/Heme/Allergies: Bruises/bleeds easily (on anticoagulation).   Psychiatric/Behavioral: Negative for hallucinations, substance abuse and suicidal ideas.       Past Medical History   has a past medical history of Arthritis, CAD (coronary artery disease) (October 2013), Cataract, Diabetes, High cholesterol, Hyperlipidemia, Hypertension, Pain, Pneumonia (1968), Stroke ([facility]) (2010), and Syncope.    Surgical History   has a past surgical history that includes carotid endarterectomy (7/13/2010); stent placement (2013); tonsillectomy (as a child); cataract extraction with iol (Bilateral); tendon lenghtening (Right, 8/24/2020); ankle fusion (Right, 8/24/2020); tendon transfer (Right, 8/24/2020); orthopedic osteotomy (Right, 8/24/2020); hammertoe correction (Right, 8/24/2020); irrigation & debridement ortho (Right, 12/10/2020); and toe amputation (Left, 4/23/2021).     Family History  family history includes Other in his mother.     Social History   reports that he has never smoked. He has never used smokeless tobacco. He reports previous alcohol use. He reports that he does not use drugs.    Allergies  Allergies   Allergen Reactions   • Fish Hives     shellfish   • Pcn [Penicillins] Hives     Positive  penicillin skin test as a child.  Significant hives to amoxicillin as an adult.  Tolerates cephalosporins   • Amoxicillin Hives   • Food Swelling      Eggs,Melons, avocados-puffy mouth       Medications  Prior to Admission Medications   Prescriptions Last Dose Informant Patient Reported? Taking?   Ascorbic Acid (VITAMIN C) 1000 MG Tab 5/18/2021 at 0800 MAR from Other Facility Yes No   Sig: Take 1,000 mg by mouth every day.   Cholecalciferol (VITAMIN D-3) 5000 UNITS TABS 5/18/2021 at 0800 MAR from Other Facility Yes No   Sig: Take 1 Tab by mouth every 48 hours.   Empagliflozin-metFORMIN HCl (SYNJARDY) 5-1000 MG Tab 5/18/2021 at 0800 MAR from Other Facility No No   Sig: Take 1 Tab by mouth 2 Times a Day.   Multiple Vitamin (MULTI VITAMIN PO) 5/18/2021 at 0800 MAR from Other Facility Yes No   Sig: Take 1 tablet by mouth every day.   albuterol 108 (90 Base) MCG/ACT Aero Soln inhalation aerosol 5/16/2021 at Unknown time  Yes Yes   Sig: Inhale 2 Puffs every 6 hours as needed for Shortness of Breath.   apixaban (ELIQUIS) 5mg Tab 5/18/2021 at 0800 MAR from Other Facility No No   Sig: Take 1 Tab by mouth 2 Times a Day.   atorvastatin (LIPITOR) 80 MG tablet Not Taking at Unknown time MAR from Other Facility No No   Sig: TAKE ONE TABLET BY MOUTH DAILY IN THE EVENING   Patient not taking: Reported on 5/18/2021   ceFAZolin in dextrose (ANCEF) 2-4 GM/100ML-% IVPB 5/18/2021 at 0800  No No   Sig: Infuse 100 mL into a venous catheter every 8 hours for 37 days.   Patient taking differently: Infuse 2 g into a venous catheter every 8 hours. Start date: 4/28/21   For 37 days   cefTRIAXone (ROCEPHIN) 2 GM Recon Soln 5/17/2021 at 1200  Yes Yes   Sig: Inject 2 g into the shoulder, thigh, or buttocks every day. Start date: 5/17/21  For 5 days   daptomycin (CUBICIN) 500 MG Recon Soln 5/18/2021 at 0800  No No   Sig: Infuse 700 mg into a venous catheter every day for 37 days.   Patient taking differently: Infuse 8 mg/kg into a venous catheter  every day. Start date: 4/30/21  For 35 days   ferrous sulfate 325 (65 Fe) MG tablet 5/18/2021 at 0800  Yes Yes   Sig: Take 325 mg by mouth 2 times a day.   gabapentin (NEURONTIN) 100 MG Cap 5/18/2021 at 0800 MAR from Other Facility Yes No   Sig: Take 100 mg by mouth 2 times a day.   magnesium oxide (MAG-OX) 400 MG Tab tablet 5/18/2021 at 0800 MAR from Other Facility No No   Sig: Take 1 Tab by mouth 2 Times a Day.   montelukast (SINGULAIR) 10 MG Tab 5/17/2021 at 2000 MAR from Other Facility No No   Sig: Take 1 tablet by mouth every day.   nitroglycerin (NITROSTAT) 0.4 MG SL Tab Not Taking at Unknown time MAR from Other Facility No No   Sig: Place 1 Tab under tongue as needed for Chest Pain.   Patient not taking: Reported on 5/18/2021   pioglitazone (ACTOS) 15 MG Tab 5/18/2021 at 0800 MAR from Other Facility Yes No   Sig: Take 15 mg by mouth every morning.   predniSONE (DELTASONE) 10 MG Tab 5/17/2021 at 1600  Yes Yes   Sig: Take 10-40 mg by mouth every day. 40 mg daily x2 days  30 mg daily x2 days  20 mg daily x2 days  10 mg daily x2 days   tamsulosin (FLOMAX) 0.4 MG capsule 5/17/2021 at 2000 MAR from Other Facility Yes No   Sig: Take 0.4 mg by mouth at bedtime.      Facility-Administered Medications: None       Physical Exam  Temp:  [36.6 °C (97.9 °F)-37.1 °C (98.8 °F)] 37.1 °C (98.8 °F)  Pulse:  [67-93] 93  Resp:  [16-21] 16  BP: ()/(55-70) 99/70  SpO2:  [94 %-98 %] 94 %    Physical Exam  Constitutional:       General: He is not in acute distress.  HENT:      Head: Normocephalic and atraumatic.      Nose: Nose normal.      Mouth/Throat:      Mouth: Mucous membranes are moist.      Pharynx: Oropharynx is clear.   Eyes:      Extraocular Movements: Extraocular movements intact.      Pupils: Pupils are equal, round, and reactive to light.   Cardiovascular:      Rate and Rhythm: Normal rate and regular rhythm.   Pulmonary:      Comments: Absent breath sounds left lower and middle lung fields  Abdominal:       General: There is no distension.      Palpations: Abdomen is soft.      Tenderness: There is no abdominal tenderness.   Musculoskeletal:      Cervical back: Normal range of motion.      Right lower leg: Edema present.      Left lower leg: Edema present.   Skin:     Comments: Bruising on extremities, reports normal since starting Eliquis   Neurological:      Mental Status: He is alert and oriented to person, place, and time. Mental status is at baseline.   Psychiatric:         Mood and Affect: Mood normal.         Behavior: Behavior normal.         Laboratory:  Recent Labs     05/18/21  1521   WBC 11.3*   RBC 3.42*   HEMOGLOBIN 9.5*   HEMATOCRIT 32.3*   MCV 94.4   MCH 27.8   MCHC 29.4*   RDW 57.1*   PLATELETCT 399   MPV 9.4     Recent Labs     05/18/21  1521   SODIUM 142   POTASSIUM 5.1   CHLORIDE 104   CO2 28   GLUCOSE 123*   BUN 29*   CREATININE 0.95   CALCIUM 8.9     Recent Labs     05/18/21  1521   ALTSGPT 6   ASTSGOT 24   ALKPHOSPHAT 286*   TBILIRUBIN 0.3   GLUCOSE 123*         No results for input(s): NTPROBNP in the last 72 hours.      No results for input(s): TROPONINT in the last 72 hours.    Imaging:  DX-CHEST-PORTABLE (1 VIEW)   Final Result      Stable large left pleural effusion with associated compressive atelectasis. Correlate clinically for infection.      Possible small right pleural effusion with mildly increased opacity in the right lung.      CT-CHEST (THORAX) W/O    (Results Pending)   EC-ECHOCARDIOGRAM COMPLETE W/O CONT    (Results Pending)         Assessment/Plan:  I anticipate this patient is appropriate for observation status at this time.    * Pleural effusion  Assessment & Plan  Unknown etiology  Was seen on last admission and was asymptomatic. Now with worsening SOB and worsening effusion  CT chest ordered  Will obtain ECHO  Will need to consult IR in AM for thoracentesis, however is on Eliquis last taken this morning  Hold Eliquis for possible IR intervention tomorrow    Occluded PICC  line, initial encounter (HCC)  Assessment & Plan  Patient sent by nursing home for occluded PICC line  Was flushed by ED staff and reportedly working currently  Consider new PICC line placement if continues to be nonfunctional as will need to continue Abx until 6/3    Osteomyelitis of left foot (HCC)- (present on admission)  Assessment & Plan  Patient last admitted to [facility] with osteomyelitis of left foot  S/p amputation of left 5th toe, amputation well healing  ID was consulted last admission and plan for Daptomycin and Ancef until 6/3, will continue Abx per their prior recommendation      Hypoxia  Assessment & Plan  Secondary to large pleural effusion  See above for plan    Deep vein thrombosis (DVT) of lower extremity (HCC)- (present on admission)  Assessment & Plan  History of DVT on Eliquis, last taken this morning  Will hold overnight until thoracentesis can be discussed with IR    Type 2 diabetes mellitus with peripheral neuropathy (HCC)- (present on admission)  Assessment & Plan  Patient with long history of DM with doccumented neuropathy and retinopathy  Hold home oral medications (ACTOS and Synjardy)  Insulin sliding scale for now  Diabetic diet  Last A1C 4.9 month ago    CAD (coronary artery disease)- (present on admission)  Assessment & Plan  Patient not currently taking statin, can be resumed as outpatient  Reported ACS in 2013 s/p stents  No current chest pain  ECHO ordered  Will continue to monitor    HTN (hypertension)- (present on admission)  Assessment & Plan  Does not appear to be on antihypertensives at nursing home currently  Will continue to monitor    History of stroke- (present on admission)  Assessment & Plan  Prior history of stroke with some aphasia  Some deconditioning  PT/OT

## 2021-05-19 NOTE — ASSESSMENT & PLAN NOTE
Patient sent by nursing home for occluded PICC line  Was flushed by ED staff and reportedly working currently

## 2021-05-19 NOTE — ASSESSMENT & PLAN NOTE
Patient with long history of DM with doccumented neuropathy and retinopathy  Hold home oral medications (ACTOS and Synjardy)  Insulin sliding scale for now  Diabetic diet  Last A1C 4.9 month ago

## 2021-05-19 NOTE — PROGRESS NOTES
2 RN skin check complete with STEVEN Valle.   Devices in place: PICC in upper left arm, NC, glasses, heel float boots  Skin assessed under devices: yes, clean/dry/intact  Confirmed pressure ulcers found on: n/a  New potential pressure ulcers noted on: right heel, sacrum, right buttock, right lateral knee  Wound consult placed: yes    The following interventions in place: mepilex to bilat elbows, mepilex to bilat heels, heel float boots, barrier cream, waffle overlay, pillows for support/offloading, draw sheet for repositioning, frequent incontinence checks and linen changes, 2 RN skin check, grey foam on NC, NC offloading tabs    Skin assessment:  Ears: pink, red, blanching bilaterally; grey foam on NC  Bridge of nose: red, blanching; encourage pt to remove glasses while sleeping  R UE: red, blanching, scattered bruising; hand has skin tear and scab, dressing in place  L UE: red, blanching, scattered bruising. Scab near elbow.  Elbows: intact, red, blanching; mepilex in place  Trunk: intact, pink, blanching  Groin: intact, pink, blanching  Sacrum: purple/red area non-blanching; red around, blanching; Left lower buttock has open red area.   R LE: lateral side of knee has two red spots, blanching;  ankle is swollen, with multiple red spots, red spot on top of foot; heel has three dark spots non-blanching, rest of heel is red, boggy, blanching.   L LE: lateral side of foot has incision, 5th toe has been amputated; great toe has dark purple spot; lateral malleolus is red, blanching; heel is red, boggy, blanching.

## 2021-05-20 NOTE — CARE PLAN
The patient is Stable - Low risk of patient condition declining or worsening    Shift Goals  Clinical Goals: Pt will have q2 turns and skin preventative meassures in place this shift    Progress made toward(s) clinical / shift goals:  Wound consulted today and placed preventative measures.    Patient is not progressing towards the following goals: N/A

## 2021-05-20 NOTE — CARE PLAN
The patient is Stable - Low risk of patient condition declining or worsening    Shift Goals  Clinical Goals: pt will mobilze to edge of bed this shift    Progress made toward(s) clinical / shift goals:  Pt sat at edge of bed this shift.     Patient is not progressing towards the following goals:

## 2021-05-20 NOTE — PROGRESS NOTES
[facility] Medicine Daily Progress Note    Date of Service  5/20/2021    Chief Complaint  76 y.o. male admitted 5/18/2021 with PICC line dysfunction and new onset pleural effusion.    Interval Problem Update  Patient seen and examined at bedside this morning.  No acute events overnight.     S/p thoracentesis yesterday with about 1.5 L of fluids removed from the left pleural cavity. Fluid analysis slightly consistent with exudative effusions.  He states overall he feels much better and his shortness of breath has significantly improved.  Repeat chest x-ray this morning with findings of stable left hydropneumothorax and multifocal pneumonia. We will get pulmonology consulted this morning to help with management.    Consultants/Specialty  Pulm    Code Status  Full Code    Disposition  NH    Review of Systems  Review of Systems   Constitutional: Negative.    Respiratory: Positive for shortness of breath.    Cardiovascular: Negative.    Gastrointestinal: Negative.    Genitourinary: Negative.    Musculoskeletal: Negative.    All other systems reviewed and are negative.       Physical Exam  Temp:  [36.2 °C (97.2 °F)-36.7 °C (98 °F)] 36.4 °C (97.6 °F)  Pulse:  [] 93  Resp:  [16-19] 16  BP: ()/() 105/63  SpO2:  [93 %-100 %] 95 %    Physical Exam  Constitutional:       Appearance: Normal appearance.   HENT:      Head: Normocephalic and atraumatic.   Eyes:      Conjunctiva/sclera: Conjunctivae normal.   Cardiovascular:      Rate and Rhythm: Normal rate.      Pulses: Normal pulses.   Pulmonary:      Effort: Pulmonary effort is normal.      Breath sounds: Rales present.   Abdominal:      General: Bowel sounds are normal.      Palpations: Abdomen is soft.   Musculoskeletal:         General: No swelling or tenderness.   Skin:     General: Skin is warm.   Neurological:      General: No focal deficit present.      Mental Status: He is alert. Mental status is at baseline.   Psychiatric:         Mood and Affect: Mood  normal.         Behavior: Behavior normal.         Fluids    Intake/Output Summary (Last 24 hours) at 5/20/2021 1021  Last data filed at 5/20/2021 0550  Gross per 24 hour   Intake 340 ml   Output 500 ml   Net -160 ml       Laboratory  Recent Labs     05/18/21  1521 05/19/21  0200 05/20/21  0839   WBC 11.3* 8.2 9.9   RBC 3.42* 3.15* 3.11*   HEMOGLOBIN 9.5* 8.7* 8.8*   HEMATOCRIT 32.3* 29.5* 29.5*   MCV 94.4 93.7 94.9   MCH 27.8 27.6 28.3   MCHC 29.4* 29.5* 29.8*   RDW 57.1* 56.0* 57.7*   PLATELETCT 399 339 287   MPV 9.4 9.8 9.4     Recent Labs     05/18/21  1521 05/19/21  0200 05/20/21  0839   SODIUM 142 141 138   POTASSIUM 5.1 4.3 4.8   CHLORIDE 104 104 103   CO2 28 30 29   GLUCOSE 123* 107* 121*   BUN 29* 27* 21   CREATININE 0.95 0.79 0.69   CALCIUM 8.9 8.4* 8.2*     Recent Labs     05/19/21  1205   APTT 31.1   INR 1.12               Imaging  DX-CHEST-LIMITED (1 VIEW)   Final Result      1.  Stable LEFT hydropneumothorax.   2.  Multifocal pneumonia again seen with slight worsening in RIGHT midlung region.            DX-CHEST-LIMITED (1 VIEW)   Final Result         Unchanged left lateral pneumothorax.      US-THORACENTESIS PUNCTURE LEFT   Final Result      1. Ultrasound guided left sided diagnostic and therapeutic thoracentesis.      2. 1550 mL of fluid withdrawn.      DX-CHEST-PORTABLE (1 VIEW)   Final Result      Interval decrease in size of the left pleural effusion which is now small.      Small small loculated left pneumothorax.      Small loculated right pleural effusion.      Left perihilar and basilar opacity may represent atelectasis/consolidation.      Increased ill-defined airspace opacities on the right may represent a combination of atelectasis, edema and pneumonia.      Atherosclerotic plaque.      Findings discussed with the covering clinician.         CT-CHEST (THORAX) W/O   Final Result         1.  Large left and moderate pleural effusions.   2.  Peripheral reticular pulmonary opacities, appearance  compatible with Covid infiltrates.   3.  Linear consolidations in the bilateral lung bases, likely atelectasis.   4.  Hepatomegaly and irregular hepatic contour, appearance most compatible changes of cirrhosis.   5.  Diverticulosis      DX-CHEST-PORTABLE (1 VIEW)   Final Result      Stable large left pleural effusion with associated compressive atelectasis. Correlate clinically for infection.      Possible small right pleural effusion with mildly increased opacity in the right lung.      EC-ECHOCARDIOGRAM COMPLETE W/O CONT    (Results Pending)        Assessment/Plan  * Pleural effusion, bilateral  Assessment & Plan  Unknown etiology  Was seen on last admission and was asymptomatic. Now with worsening SOB and worsening effusion  CT chest -findings of large left-sided pleural effusion and moderate right pleural effusion.    2D echo pending.  S/p IR for thoracentesis. 1.5 L of fluid removed.  Fluid studies slightly consistent with exudative effusion.  Cytology pending.  Pulmonology consulted to help with management.    Osteomyelitis of left foot (HCC)- (present on admission)  Assessment & Plan  Patient last admitted to [facility] with osteomyelitis of left foot  S/p amputation of left 5th toe, amputation well healing  ID was consulted last admission and plan for Daptomycin and Ancef until 6/3, will continue Abx per their prior recommendation.     Postprocedural pneumothorax  Assessment & Plan  Post thoracentesis, chest x-ray with finding of a small left loculated pneumothorax.   We will repeat serial chest x-ray and closely monitor vital signs.    Acute respiratory failure with hypoxia (HCC)  Assessment & Plan  Secondary to large pleural effusion  See above for plan    Occluded PICC line, initial encounter ([facility])  Assessment & Plan  Patient sent by nursing home for occluded PICC line  Was flushed by ED staff and reportedly working currently  Consider new PICC line placement if continues to be nonfunctional as will need to  continue Abx until 6/3    Deep vein thrombosis (DVT) of lower extremity (HCC)- (present on admission)  Assessment & Plan  History of DVT on Eliquis.  Continue medication.    Type 2 diabetes mellitus with peripheral neuropathy (HCC)- (present on admission)  Assessment & Plan  Patient with long history of DM with doccumented neuropathy and retinopathy  Hold home oral medications (ACTOS and Synjardy)  Insulin sliding scale for now  Diabetic diet  Last A1C 4.9 month ago    CAD (coronary artery disease)- (present on admission)  Assessment & Plan  Patient not currently taking statin, can be resumed as outpatient  Reported ACS in 2013 s/p stents  No current chest pain  ECHO ordered  Will continue to monitor    HTN (hypertension)- (present on admission)  Assessment & Plan  Does not appear to be on antihypertensives at nursing home currently  Will continue to monitor    History of stroke- (present on admission)  Assessment & Plan  Prior history of stroke with some aphasia  Some deconditioning  PT/OT       VTE prophylaxis: Eliquis

## 2021-05-20 NOTE — CONSULTS
Pulmonary Consultation    Date of consult: 5/20/2021    Referring Physician  Omer Daugherty D.O.    Reason for Consultation  Bilateral pleural effusion of unknown etiology.    History of Presenting Illness  76 y.o. male who presented 5/18/2021 presenting to hospital due to clogging of his PICC line. Patient came from [facility]). He is currently receiving several antibiotics for a bad toe infection with removal of the toe. States that he has been doing well without significant issues prior to the issue with his picc line clogging up. Though, he does state that he has been on 4 units (liters) of oxygen that he used to not have to use, but is unsure of exactly how long he has been on this oxygen. States that prior to toe removal he was active. Reports that he has lost ~30 lbs since toe was removed. Also, reports that every couple weeks he has really bad sweats at night where his sheets are soaked. Thinks that he gets his meds given to him as SNF. Does not remember missing any doses of meds.    Denies any significant shortness of breath, recent cough, fevers, trauma, sick contacts, hx of cancer, fmhx of cancer, prior hx of smoking, hx of homelessness, incarceration or exposure to TB. Reports that his only travel outside the country was to [place] where he states he was exposed to agent orange.     Code Status  Full Code    Review of Systems  ROS    See HPI otherwise 12 point ROS negative.    Past Medical History   has a past medical history of Arthritis, CAD (coronary artery disease) (October 2013), Cataract, Diabetes, High cholesterol, Hyperlipidemia, Hypertension, Pain, Pneumonia (1968), Stroke (HCC) (2010), and Syncope.    Surgical History   has a past surgical history that includes carotid endarterectomy (7/13/2010); stent placement (2013); tonsillectomy (as a child); cataract extraction with iol (Bilateral); tendon lenghtening (Right, 8/24/2020); ankle fusion (Right, 8/24/2020); tendon transfer (Right,  8/24/2020); orthopedic osteotomy (Right, 8/24/2020); hammertoe correction (Right, 8/24/2020); irrigation & debridement ortho (Right, 12/10/2020); and toe amputation (Left, 4/23/2021).    Family History  family history includes Other in his mother.    Social History   reports that he has never smoked. He has never used smokeless tobacco. He reports previous alcohol use. He reports that he does not use drugs.    Medications  Home Medications     Reviewed by Allie Power R.N. (Registered Nurse) on 05/18/21 at 2353  Med List Status: Complete   Medication Last Dose Status   acetaminophen (TYLENOL) 325 MG Tab 5/11/2021 Active   albuterol 108 (90 Base) MCG/ACT Aero Soln inhalation aerosol 5/16/2021 Active   apixaban (ELIQUIS) 5mg Tab 5/18/2021 Active   Ascorbic Acid (VITAMIN C) 1000 MG Tab 5/18/2021 Active   atorvastatin (LIPITOR) 80 MG tablet Not Taking Active   ceFAZolin in dextrose (ANCEF) 2-4 GM/100ML-% IVPB 5/18/2021 Active   cefTRIAXone (ROCEPHIN) 2 GM Recon Soln 5/17/2021 Active   Cholecalciferol (VITAMIN D-3) 5000 UNITS TABS 5/18/2021 Active   daptomycin (CUBICIN) 500 MG Recon Soln 5/18/2021 Active   Empagliflozin-metFORMIN HCl (SYNJARDY) 5-1000 MG Tab 5/18/2021 Active   ferrous sulfate 325 (65 Fe) MG tablet 5/18/2021 Active   gabapentin (NEURONTIN) 100 MG Cap 5/18/2021 Active   magnesium hydroxide (MILK OF MAGNESIA) 400 MG/5ML Suspension 5/4/2021 Active   magnesium oxide (MAG-OX) 400 MG Tab tablet 5/18/2021 Active   montelukast (SINGULAIR) 10 MG Tab 5/17/2021 Active   Multiple Vitamin (MULTI VITAMIN PO) 5/18/2021 Active   nitroglycerin (NITROSTAT) 0.4 MG SL Tab Not Taking Active   pioglitazone (ACTOS) 15 MG Tab 5/18/2021 Active   predniSONE (DELTASONE) 10 MG Tab 5/17/2021 Active   tamsulosin (FLOMAX) 0.4 MG capsule 5/17/2021 Active              Current Facility-Administered Medications   Medication Dose Route Frequency Provider Last Rate Last Admin   • albuterol inhaler 2 Puff  2 Puff Inhalation Q6HRS PRN  Julio César Mckee M.D.       • ferrous sulfate tablet 325 mg  325 mg Oral QDAY with Breakfast Julio César Mckee M.D.   325 mg at 05/20/21 0837   • gabapentin (NEURONTIN) capsule 100 mg  100 mg Oral BID Julio César Mckee M.D.   100 mg at 05/20/21 0526   • magnesium oxide (MAG-OX) tablet 400 mg  400 mg Oral BID Julio César Mckee M.D.   400 mg at 05/20/21 0526   • montelukast (SINGULAIR) tablet 10 mg  10 mg Oral DAILY Julio César Mckee M.D.   10 mg at 05/20/21 0526   • tamsulosin (FLOMAX) capsule 0.4 mg  0.4 mg Oral QHS Julio César Mckee M.D.   0.4 mg at 05/19/21 2040   • senna-docusate (PERICOLACE or SENOKOT S) 8.6-50 MG per tablet 2 tablet  2 tablet Oral BID Julio César Mckee M.D.   2 tablet at 05/20/21 0526    And   • polyethylene glycol/lytes (MIRALAX) PACKET 1 Packet  1 Packet Oral QDAY PRN Julio César Mckee M.D.        And   • magnesium hydroxide (MILK OF MAGNESIA) suspension 30 mL  30 mL Oral QDAY PRN Julio César Mckee M.D.        And   • bisacodyl (DULCOLAX) suppository 10 mg  10 mg Rectal QDAY PRN Julio César Mckee M.D.       • Pharmacy Consult Request ...Pain Management Review 1 Each  1 Each Other PHARMACY TO DOSE Julio César Mckee M.D.       • acetaminophen (Tylenol) tablet 650 mg  650 mg Oral Q6HRS PRN Julio César Mckee M.D.   650 mg at 05/19/21 1256   • oxyCODONE immediate-release (ROXICODONE) tablet 2.5 mg  2.5 mg Oral Q3HRS PRN Julio César Mckee M.D.        Or   • oxyCODONE immediate-release (ROXICODONE) tablet 5 mg  5 mg Oral Q3HRS PRN Julio César Mckee M.D.        Or   • HYDROmorphone (Dilaudid) injection 0.25 mg  0.25 mg Intravenous Q3HRS PRN Julio César Mckee M.D.       • ondansetron (ZOFRAN) syringe/vial injection 4 mg  4 mg Intravenous Q4HRS PRN Julio César Mckee M.D.       • ondansetron (ZOFRAN ODT) dispertab 4 mg  4 mg Oral Q4HRS PRN Julio César Mckee M.D.       • insulin regular (HumuLIN R,NovoLIN R) injection  2-9 Units Subcutaneous 4X/DAY SHEREEN Mckee M.D.   2 Units at 05/20/21 1204     And   • glucose 4 g chewable tablet 16 g  16 g Oral Q15 MIN PRN Julio César Mckee M.D.        And   • dextrose 50% (D50W) injection 50 mL  50 mL Intravenous Q15 MIN PRN Julio César Mckee M.D.       • ceFAZolin in dextrose (ANCEF) IVPB premix 2 g  2 g Intravenous Q8HRS Julio César Mckee M.D.   Stopped at 05/20/21 0952   • DAPTOmycin 690 mg in NS 50 mL IVPB  8 mg/kg Intravenous Q24HRS Julio César Mckee M.D. 100 mL/hr at 05/20/21 0837 690 mg at 05/20/21 0837       Allergies  Allergies   Allergen Reactions   • Fish Hives     shellfish   • Pcn [Penicillins] Hives     Positive penicillin skin test as a child.  Significant hives to amoxicillin as an adult.  Tolerates cephalosporins   • Amoxicillin Hives   • Food Swelling      Eggs,Melons, avocados-puffy mouth       Vital Signs last 24 hours  Temp:  [36.2 °C (97.2 °F)-36.7 °C (98.1 °F)] 36.7 °C (98.1 °F)  Pulse:  [76-93] 76  Resp:  [16-19] 18  BP: (100-122)/(53-68) 105/68  SpO2:  [93 %-96 %] 95 %    Physical Exam  Physical Exam  Constitutional:       General: He is not in acute distress.     Appearance: He is not ill-appearing or diaphoretic.   HENT:      Head: Normocephalic and atraumatic.      Comments: Some mild temporal wasting and gaunt face.     Right Ear: External ear normal.      Left Ear: External ear normal.      Nose: Nose normal. No rhinorrhea.      Mouth/Throat:      Mouth: Mucous membranes are moist.      Pharynx: Oropharynx is clear. No oropharyngeal exudate or posterior oropharyngeal erythema.   Eyes:      General: No scleral icterus.        Right eye: No discharge.         Left eye: No discharge.      Extraocular Movements: Extraocular movements intact.   Cardiovascular:      Rate and Rhythm: Normal rate and regular rhythm.      Pulses: Normal pulses.      Comments: Borderline tachycardia. Unable to appreciate m,r,g.  Pulmonary:      Comments: Exam limited by patients ability to move and expose posterior thorax. Left lung sounds significantly diminished  in all lung fields. Right lungs sounds diminished in lower lung fields.  Abdominal:      General: Bowel sounds are normal.      Palpations: Abdomen is soft.   Musculoskeletal:      Cervical back: Normal range of motion.      Right lower leg: Edema present.      Left lower leg: Edema present.      Comments: +1 Pitting edema of bilateral posterior legs up to mid calf.  Decreased muscle mass of hands and legs.   Skin:     General: Skin is warm.      Findings: Bruising present.      Comments: Multiple diffuse ecchymoses of bilateral forearms.   Neurological:      Mental Status: He is alert.      Cranial Nerves: No cranial nerve deficit.      Comments: Generalized weakness.   Psychiatric:         Mood and Affect: Mood normal.         Behavior: Behavior normal.         Thought Content: Thought content normal.         Judgment: Judgment normal.         Fluids    Intake/Output Summary (Last 24 hours) at 5/20/2021 1637  Last data filed at 5/20/2021 0550  Gross per 24 hour   Intake 340 ml   Output 500 ml   Net -160 ml       Laboratory  Recent Results (from the past 48 hour(s))   COV-2, FLU A/B, AND RSV BY PCR (2-4 HOURS CEPHEID): Collect [facility] swab in [facility]    Collection Time: 05/18/21  5:30 PM    Specimen: Respirate   Result Value Ref Range    Influenza virus A RNA Negative Negative    Influenza virus B, PCR Negative Negative    RSV, PCR Negative Negative    SARS-CoV-2 by PCR NotDetected     SARS-CoV-2 Source NP Swab    POCT glucose device results    Collection Time: 05/18/21 10:02 PM   Result Value Ref Range    Glucose - Accu-Ck 109 (H) 65 - 99 mg/dL   CBC with Differential    Collection Time: 05/19/21  2:00 AM   Result Value Ref Range    WBC 8.2 4.8 - 10.8 K/uL    RBC 3.15 (L) 4.70 - 6.10 M/uL    Hemoglobin 8.7 (L) 14.0 - 18.0 g/dL    Hematocrit 29.5 (L) 42.0 - 52.0 %    MCV 93.7 81.4 - 97.8 fL    MCH 27.6 27.0 - 33.0 pg    MCHC 29.5 (L) 33.7 - 35.3 g/dL    RDW 56.0 (H) 35.9 - 50.0 fL    Platelet Count 339 164 - 446 K/uL    MPV  9.8 9.0 - 12.9 fL    Neutrophils-Polys 76.50 (H) 44.00 - 72.00 %    Lymphocytes 9.40 (L) 22.00 - 41.00 %    Monocytes 8.80 0.00 - 13.40 %    Eosinophils 4.20 0.00 - 6.90 %    Basophils 0.50 0.00 - 1.80 %    Immature Granulocytes 0.60 0.00 - 0.90 %    Nucleated RBC 0.00 /100 WBC    Neutrophils (Absolute) 6.27 1.82 - 7.42 K/uL    Lymphs (Absolute) 0.77 (L) 1.00 - 4.80 K/uL    Monos (Absolute) 0.72 0.00 - 0.85 K/uL    Eos (Absolute) 0.34 0.00 - 0.51 K/uL    Baso (Absolute) 0.04 0.00 - 0.12 K/uL    Immature Granulocytes (abs) 0.05 0.00 - 0.11 K/uL    NRBC (Absolute) 0.00 K/uL   Comp Metabolic Panel (CMP)    Collection Time: 05/19/21  2:00 AM   Result Value Ref Range    Sodium 141 135 - 145 mmol/L    Potassium 4.3 3.6 - 5.5 mmol/L    Chloride 104 96 - 112 mmol/L    Co2 30 20 - 33 mmol/L    Anion Gap 7.0 7.0 - 16.0    Glucose 107 (H) 65 - 99 mg/dL    Bun 27 (H) 8 - 22 mg/dL    Creatinine 0.79 0.50 - 1.40 mg/dL    Calcium 8.4 (L) 8.5 - 10.5 mg/dL    AST(SGOT) 15 12 - 45 U/L    ALT(SGPT) 5 2 - 50 U/L    Alkaline Phosphatase 236 (H) 30 - 99 U/L    Total Bilirubin 0.3 0.1 - 1.5 mg/dL    Albumin 2.3 (L) 3.2 - 4.9 g/dL    Total Protein 5.8 (L) 6.0 - 8.2 g/dL    Globulin 3.5 1.9 - 3.5 g/dL    A-G Ratio 0.7 g/dL   Magnesium    Collection Time: 05/19/21  2:00 AM   Result Value Ref Range    Magnesium 1.7 1.5 - 2.5 mg/dL   CREATINE KINASE    Collection Time: 05/19/21  2:00 AM   Result Value Ref Range    CPK Total 38 0 - 154 U/L   ESTIMATED GFR    Collection Time: 05/19/21  2:00 AM   Result Value Ref Range    GFR If African American >60 >60 mL/min/1.73 m 2    GFR If Non African American >60 >60 mL/min/1.73 m 2   POCT glucose device results    Collection Time: 05/19/21  8:33 AM   Result Value Ref Range    Glucose - Accu-Ck 86 65 - 99 mg/dL   FLUID TOTAL PROTEIN    Collection Time: 05/19/21  9:35 AM   Result Value Ref Range    Fluid Type Thoracentesis     Total Protein Fluid 2.9 g/dL   APTT    Collection Time: 05/19/21 12:05 PM    Result Value Ref Range    APTT 31.1 24.7 - 36.0 sec   LDH    Collection Time: 05/19/21 12:05 PM   Result Value Ref Range    LDH Total 214 107 - 266 U/L   Prothrombin Time    Collection Time: 05/19/21 12:05 PM   Result Value Ref Range    PT 14.8 (H) 12.0 - 14.6 sec    INR 1.12 0.87 - 1.13   FLUID PH    Collection Time: 05/19/21  2:00 PM   Result Value Ref Range    Fluid Type Thoracentesis     Ph Misc 8.00    FLUID LDH    Collection Time: 05/19/21  2:00 PM   Result Value Ref Range    Body Fluid  U/L   FLUID GLUCOSE    Collection Time: 05/19/21  2:00 PM   Result Value Ref Range    Glucose, Fluid 87 mg/dL   FLUID CELL COUNT    Collection Time: 05/19/21  2:00 PM   Result Value Ref Range    Fluid Type Thoracentesis     Color-Body Fluid YellowishBrown     Character-Body Fluid Cloudy     Total RBC Count 14840 cells/uL    Total  cells/uL    Lymphs 84 %    Mononuclear Cells - Fluid 13 %    Fluid Histiocyte 1 %    Unidentified Cells - Fluid 2 %    Comments See Comment    FLUID AMYLASE    Collection Time: 05/19/21  2:00 PM   Result Value Ref Range    Body Fluid Amylase 12 U/L   FLUID CULTURE W/GRAM STAIN    Collection Time: 05/19/21  2:00 PM    Specimen: Body Fluid   Result Value Ref Range    Significant Indicator NEG     Source BF     Site Thoracentesis Fluid     Culture Result No growth at 24 hours.     Gram Stain Result Rare WBCs.  Rare Gram positive cocci.      GRAM STAIN    Collection Time: 05/19/21  2:00 PM    Specimen: Body Fluid   Result Value Ref Range    Significant Indicator .     Source BF     Site Thoracentesis Fluid     Gram Stain Result Rare WBCs.  Rare Gram positive cocci.      POCT glucose device results    Collection Time: 05/19/21  2:36 PM   Result Value Ref Range    Glucose - Accu-Ck 99 65 - 99 mg/dL   Cytology    Collection Time: 05/19/21  3:45 PM   Result Value Ref Range    Cytology Reg See Path Report    POCT glucose device results    Collection Time: 05/19/21  6:35 PM   Result Value Ref  Range    Glucose - Accu-Ck 113 (H) 65 - 99 mg/dL   POCT glucose device results    Collection Time: 05/19/21  8:37 PM   Result Value Ref Range    Glucose - Accu-Ck 178 (H) 65 - 99 mg/dL   POCT glucose device results    Collection Time: 05/20/21  8:35 AM   Result Value Ref Range    Glucose - Accu-Ck 119 (H) 65 - 99 mg/dL   Basic Metabolic Panel    Collection Time: 05/20/21  8:39 AM   Result Value Ref Range    Sodium 138 135 - 145 mmol/L    Potassium 4.8 3.6 - 5.5 mmol/L    Chloride 103 96 - 112 mmol/L    Co2 29 20 - 33 mmol/L    Glucose 121 (H) 65 - 99 mg/dL    Bun 21 8 - 22 mg/dL    Creatinine 0.69 0.50 - 1.40 mg/dL    Calcium 8.2 (L) 8.5 - 10.5 mg/dL    Anion Gap 6.0 (L) 7.0 - 16.0   MAGNESIUM    Collection Time: 05/20/21  8:39 AM   Result Value Ref Range    Magnesium 1.7 1.5 - 2.5 mg/dL   CBC WITHOUT DIFFERENTIAL    Collection Time: 05/20/21  8:39 AM   Result Value Ref Range    WBC 9.9 4.8 - 10.8 K/uL    RBC 3.11 (L) 4.70 - 6.10 M/uL    Hemoglobin 8.8 (L) 14.0 - 18.0 g/dL    Hematocrit 29.5 (L) 42.0 - 52.0 %    MCV 94.9 81.4 - 97.8 fL    MCH 28.3 27.0 - 33.0 pg    MCHC 29.8 (L) 33.7 - 35.3 g/dL    RDW 57.7 (H) 35.9 - 50.0 fL    Platelet Count 287 164 - 446 K/uL    MPV 9.4 9.0 - 12.9 fL   ESTIMATED GFR    Collection Time: 05/20/21  8:39 AM   Result Value Ref Range    GFR If African American >60 >60 mL/min/1.73 m 2    GFR If Non African American >60 >60 mL/min/1.73 m 2   POCT glucose device results    Collection Time: 05/20/21 11:54 AM   Result Value Ref Range    Glucose - Accu-Ck 167 (H) 65 - 99 mg/dL     Imaging    ECHO Aug 2020 EF 55%.    CT-PE and CXR from Nov 2020 without pleural effusion. CT did show 9 mm R paratracheal lymph node.    CT-PE 24th April 2021 with large left pleural effusion and small right pleural effusion.     CXR 26th April 2021 w/large left pleural effusion.    CT-Thorax 18th May 2021 again with large left pleural effusion without much change to size compared to prior. Again, right pleural  effusion present and increased in size from prior. Previously noted R paratracheal lymph node increased to ~12 mm in size.    Assessment/Plan  * Pleural effusion, bilateral  Assessment & Plan  Left lung is exudative. Unknown status of R lung effusion at this time. Unclear etiology of effusion. Pleural fluid studies are not particularly indicative of etiology; however, there are noted rare gram positive cocci in the pleural fluid (which should be sterile). Given his hx of recent osteomyelitis of toe in April 2021 w/MRSA and DVT of LLE in Nov 2020 it could be possible that patient has septic emboli seeding lungs. Cancer is certainly possible in the geriatric patient with reported hx of agent orange exposure. CHF in combination with cirrhotic liver disease would seem less likely at this time with the exudative fluid findings.    Recommendations:     -IR for chest tube of LEFT lung for concern of empyema.  -Hold apixaban.  -Repeat US of LLE for re-assessment of DVT.  -ID consult for evaluation/recommendations for possible empyema.  -F/U cytology results (in process) and pleural fluid gram stain.  -Blood cultures x2.    -Pulmonology will perform diagnostic thoracentesis of RIGHT lung on 21st May.        Discussed patient condition and risk of morbidity and/or mortality with Hospitalist and Patient.      This note was generated using voice recognition software which has a chance of producing errors of grammar and content. I have made every reasonable attempt to find and correct any errors, but it should be expected that some may not be found prior to finalization of this note.  __________  Atul Curran MD  Pulmonary and Critical Care Medicine  Formerly [facility], [facility]

## 2021-05-20 NOTE — PROGRESS NOTES
Assessment/description of ears? Pink, blanching  Which preventative measures are in place for the ears? Grey ear foam, cheek pads    Assessment/description of elbows? Pink, blanching  Which preventative measures are in place for the elbows? mepilex    Assessment/description of sacrum? Redness, excoriation, open area on L buttock crease  Which preventative measures are in place for the sacrum? Mepilex, waffle cushion    Assessment/description of heels? R heel- purple/pink   L- redness  Which preventative measures are in place for the heels? mepilex and heel float boots in place    Which devices are in place? PICC, NC  Description of skin under devices: intact, blanching  Which preventative measures are in place under devices? Grey foam and cheek pads    Other:

## 2021-05-20 NOTE — THERAPY
Physical Therapy   Initial Evaluation     Patient Name: Agapito Stone  Age:  76 y.o., Sex:  male  Medical Record #: [id number]  Today's Date: 5/20/2021     Precautions: Fall Risk, Heel Weight Bearing Left Lower Extremity noted from recent admit; currently no formal orders? (pt reports supposed to have offloading shoes as well)    Assessment  Pt presents to PT with impaired functional ROM, balance, motor control, gait, balance and general locomotion associated with recent and pre-existing medical co-morbidities as well as gross deconditioning as well. He was able to come to EOB with use of bed features as well as intermittent min A for LE mobility. Noted pt with recent admit with LLE heel WB precautions and per pt he is to deb his B offloading shoes (which are currently at SNF) prior to attempt to stand, which appears he has not performed for some time as well. He will benefit from continued acute PT while here with details/recs below. Note that until offloading shoes are obtained he will be quite limited with re: mobility given current objective findings. See below.    Plan    Recommend Physical Therapy 3 times per week until therapy goals are met     DC Equipment Recommendations: Unable to determine at this time  Discharge Recommendations: Recommend post-acute placement for additional physical therapy services prior to discharge home        05/20/21 0925   Prior Living Situation   Housing / Facility Skilled Nursing Facility   Lives with - Patient's Self Care Capacity   ([facility] prior)   Comments pt was at [facility] for rehab prior to admit; unclear if long term as pt rpeorts eventual plan was to dc to home with wife assist?   Prior Level of Functional Mobility   Bed Mobility Independent   Transfer Status Required Assist   Ambulation Required Assist   Assistive Devices Used Front-Wheel Walker   Stairs Unable To Determine At This Time   Comments pt reporst was transferring via slidboard or alisha PTA when at SNF; reports  had stood once in // bars (though unclear timeline)   History of Falls   History of Falls Yes   Cognition    Cognition / Consciousness X   Speech/ Communication Delayed Responses;Expressive Aphasia   Level of Consciousness Alert   Comments somewhat difficulty communication 2' to baseline aphasia/cog/speech deficits from prior CVA; however appears likely this is baseilne withr r: communication and cog   Passive ROM Lower Body   Passive ROM Lower Body X   Comments no noted ankle ROM at RLE from prior fusion; LLE ankle inverted and toe amps; appars to have chronic musculoskeltal changes at distal BLE feet/ankles   Active ROM Lower Body    Active ROM Lower Body  X   Comments groslly WFL except at RLE ankle/toes 2' to old fusion though effortful at RLE as compared to LLE in general as well   Strength Lower Body   Lower Body Strength  X   Comments grossly 3<>4-/5 at RLE as available (0/5 at RLE ankle); LLE appears grossly 4/5    Sensation Lower Body   Lower Extremity Sensation   X   Comments diminished sensation at RLE as compared with LLE (though note appears mostly at distal RLE from old injjuries)   Neurological Concerns   Neurological Concerns Yes   Comments given baseline prior CVA and residual deficits   Balance Assessment   Sitting Balance (Static) Fair   Sitting Balance (Dynamic) Fair -   Weight Shift Sitting Fair   Comments sitting EOB with no ashley LOB; pt declines standing and noted given heel WB status at LLE and musculosketal changes at BLEs would recommend obtaining his offloading shoes prior to attempt to stand; if agreeable would atetmpt transfer at RLE as pt agreeable/willing   Gait Analysis   Gait Level Of Assist Unable to Participate   Weight Bearing Status heel WB LLE; per pt to have offloading shoes at [facility] with OOB activity as well   Bed Mobility    Supine to Sit Modified Independent   Sit to Supine Minimal Assist   Scooting Maximal Assist  (to scoot up in bed with cueing for compensatory mechanics)    Comments HOB elevated + rails; min A with LE to erturn to supine;    Functional Mobility   Sit to Stand Unable to Participate  (declines; see above as well)   Mobility bed mobility, EOB   How much difficulty does the patient currently have...   Turning over in bed (including adjusting bedclothes, sheets and blankets)? 2   Sitting down on and standing up from a chair with arms (e.g., wheelchair, bedside commode, etc.) 1   Moving from lying on back to sitting on the side of the bed? 2   How much help from another person does the patient currently need...   Moving to and from a bed to a chair (including a wheelchair)? 2   Need to walk in a hospital room? 2   Climbing 3-5 steps with a railing? 1   6 clicks Mobility Score 10   Activity Tolerance   Sitting in Chair NT   Sitting Edge of Bed at least 10 mins   Standing NT   Comments no overt/acute fatigue; general fatigue with sitting activities though appears deconditioned response   Edema / Skin Assessment   Edema / Skin  X   Comments healing wound at distal LLE; chronic changes at distal RLE; gneral frail/atrophied appearance   Patient / Family Goals    Patient / Family Goal #1 to return to PLOF to eventually dc lino ome   Short Term Goals    Short Term Goal # 1 Pt will be able to perform sit<>stand/transfers with FWW with Spv with offloading shoes donned   Short Term Goal # 2 Pt will be able to ambulate 50ft with FWW with min A in 6tx to promote fnx progression towards I    Education Group   Education Provided Role of Physical Therapist   Role of Physical Therapist Patient Response Patient;Acceptance;Explanation;Verbal Demonstration   Additional Comments education re: concerns with need for offloading shoes prior to attempt to stand/pre-gait activities given musculoskeletal changes and recent precuations; pt in agreement as well as need for increaed EOB activity with nsg as able to prevent deconditioning as well; education re: possible txf at RLE with pt declining  curently though may be able to quickly progress if unavailble to obtain offloading shoes during admit

## 2021-05-20 NOTE — PROGRESS NOTES
Pt is alert and oriented x4, on oxygen 3 lpm via NC. PICC line in place, dressing CDI. Medications taken whole. Blood glucose monitoring with insulin coverage. Wound dressings in place. L ankle pain 1/10, declined any pain medication. Fall precautions in place. Will continue to monitor. Assessment/description of ears?  Which preventative measures are in place for the ears?    Assessment/description of elbows? Intact and blanching; Right upper arm skin tear, hand bruising  Which preventative measures are in place for the elbows? mepilex on elbow and righ upper arm, mepitel    Assessment/description of sacrum?R heel :redness, purple. L ankle lateral redness. Toes scabbing  Which preventative measures are in place for the sacrum? Offloading, mepilex, q2 turns, incontinence check, barrier paste    Assessment/description of heels? R heel purple, red  Which preventative measures are in place for the heels? Mepilex, offloading and moon boots    Which devices are in place? NC  Description of skin under devices: intact, blanching  Which preventative measures are in place under devices? Gray foam and cheek pads for oxygen tubing

## 2021-05-20 NOTE — CARE PLAN
The patient is Stable - Low risk of patient condition declining or worsening    Shift Goals  Clinical Goals: Pt will have no sob this shift    Progress made toward(s) clinical / shift goals:  Pt on oxygen 3 lpm via NC O2 sat WNL. Incentive Spirometer use and breathing exercises. No shortness of breath noted.

## 2021-05-20 NOTE — DIETARY
Nutrition Note: Follow-Up for MST Interview    See RD assessment 5/19 - RD followed up with patient to obtain weight/PO history. Spoke with patient at bedside. Complete nutrition focused physical exam not performed as patient meets other criteria. No overt fat or muscle wasting appreciated per visualization of patient's upper body.     Patient reports UBW of ~220-230 lb - consistent with chart review weight of 102 kg via bed scale last September (9/20/20). Patient noted with severe 25.5% weight loss over the past 8 months. Patient reports poor PO intake while at [facility] - attributes poor intake to not liking the foods available. Patient reports at most he was able to consume ~50% of portions of their meals. Patient states he was at [facility] for approximately 6 months, suggesting PO meeting <75% estimated energy needs x 6 months which is clinically severe.     Patient reports good appetite currently, was agreeable to small portions at mealtimes (states big portions overwhelming) and snacks between all meals to bolster caloric provision from diet.     Malnutrition risk: Patient with severe chronic malnutrition RT inadequate oral intake AEB severe 25.5% weight loss x 8 months and PO meeting <75% estimated needs x 6 months prior to admission.     Recommendations/Plan:  1. Start small portions with meals.  2. Add snacks 3x/day (TID) between meals to bolster caloric provision from diet.   3. Encourage intake of meals and snacks  4. Document intake of all meals and snacks as % taken in ADL's to provide interdisciplinary communication across all shifts.   5. Monitor weight.  6. Nutrition rep will continue to see patient for ongoing meal and snack preferences.     RD following.

## 2021-05-20 NOTE — PROGRESS NOTES
Received report from night shift RN and assumed care of patient (pt). Pt is A&O x 4. Pt reports no pain during assessment, but later in the day complained of lateral left ankle pain. Pt given Tylenol. Pt's wife called several times throughout the day. Thoracentesis completed and 1.5L removed according to report. Pt weight measured via alisha lift per request from Guillermina Ritchie RD. No other signs of distress at this time. Bed is in lowest, locked position, call light and belongings are within reach. Pt calls for assistance and bed alarm is on.  All other needs met.

## 2021-05-20 NOTE — THERAPY
Missed Therapy     Patient Name: Agapito Stone  Age:  76 y.o., Sex:  male  Medical Record #: [id number]  Today's Date: 5/20/2021 05/20/21 1612   Interdisciplinary Plan of Care Collaboration   IDT Collaboration with  Nursing   Collaboration Comments OT eval attempted however pt was with medical team upon attempt. Will round back as able.

## 2021-05-20 NOTE — ASSESSMENT & PLAN NOTE
Left lung is exudative. Right lung also with exudative effusion. Etiology remains unclear. Cytology was negative. ID on board and recommended lab to re-analyze the rare gram positive cocci in pleural fluid.     Recommendations:     -Chest tube to suction. Wall suction needs to be set to FULL. Chest tube suction box to 20.  -Repeat US still with DVT.  -Continue to monitor pleural fluid culture.  -Cytology of LEFT lung was negative for evidence of malignancy.  -Blood cultures x2 with NGTD.  -Repeat CT thorax w/contrast.  -May need thoracic surgery consult in the future.

## 2021-05-20 NOTE — PROGRESS NOTES
Bedside report received at change of shift. Pt is A&Ox4, reports no pain at this time. Pt is currently sitting up in bed eating breakfast. Safety precautions in place. All pt needs met at this time.

## 2021-05-21 PROBLEM — E43 PROTEIN-CALORIE MALNUTRITION, SEVERE (HCC): Status: ACTIVE | Noted: 2021-01-01

## 2021-05-21 NOTE — PROGRESS NOTES
Bedside report received at change of shift. Pt updated on plan of care, verbalizes understanding. Pt is A&Ox4, reports no pain at this time. Safety precautions in place. Call light within reach. All pt needs met at this time.

## 2021-05-21 NOTE — CARE PLAN
The patient is Watcher - Medium risk of patient condition declining or worsening    Shift Goals  Clinical Goals: skin preventative measures will remain in place.    Progress made toward(s) clinical / shift goals:  Pt has waffle mattress and heel float boots in place. Educated on importance of turning regularly.    Patient is not progressing towards the following goals:

## 2021-05-21 NOTE — OR SURGEON
Immediate Post- Operative Note        PostOp Diagnosis: Left Pleural Effusion    Procedure(s): CT guided Left Chest tube placement    Estimated Blood Loss: Less than 5 ml    Complications: None    5/21/2021     9:35 AM     Jacek Cortez M.D.

## 2021-05-21 NOTE — PROGRESS NOTES
"Pharmacy Vancomycin Kinetics Note for 5/21/2021     76 y.o. male on Vancomycin day # 1     Vancomycin Indication (AUC Dosing): Osteomyelitis      Provider specified end date:  (To be determined) per ID    Active Antibiotics (From admission, onward)    Ordered     Ordering Provider       Fri May 21, 2021  1:14 PM    05/21/21 1314  vancomycin (VANCOCIN) 1,000 mg in  mL IVPB  (vancomycin (VANCOCIN) IV (LD + Maintenance))  EVERY 12 HOURS      Gomez Reveles M.D.       Fri May 21, 2021  1:07 PM    05/21/21 1307  vancomycin (VANCOCIN) 2,000 mg in  mL IVPB  (vancomycin (VANCOCIN) IV (LD + Maintenance))  ONCE      Gomez Reveles M.D.       Fri May 21, 2021 12:47 PM    05/21/21 1247  MD Alert...Vancomycin per Pharmacy  PHARMACY TO DOSE     Question:  Indication(s) for vancomycin?  Answer:  Osteomyelitis    Gomez eRveles M.D.       e May 18, 2021  7:52 PM    05/18/21 1952  ceFAZolin in dextrose (ANCEF) IVPB premix 2 g  EVERY 8 HOURS     Note to Pharmacy: Per prior ID note    Julio César Mckee M.D.          Dosing Weight: 76 kg (167 lb 8.8 oz)      Admission History: Admitted on 5/18/2021 for Pleural effusion [J90]  Pertinent history: Presented with vascular line problem. 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 PMH: Stroke, CAD, HTN, DM, DVT    Allergies:     Fish, Pcn [penicillins], Amoxicillin, and Food     Pertinent cultures to date:     Results     Procedure Component Value Units Date/Time    BLOOD CULTURE [[id number]] Collected: 05/21/21 1212    Order Status: Completed Specimen: Blood from Peripheral Updated: 05/21/21 1254    Narrative:      Per Hospital Policy: Only change Specimen Src: to \"Line\" if  specified by physician order.    FLUID CULTURE W/GRAM STAIN [[id number]] Collected: 05/21/21 1048    Order Status: Completed Specimen: Body Fluid from Thoracentesis Fluid Updated: 05/21/21 1213    Narrative:      Collected " "By:[id number] KAPIL RIOS    FUNGAL CULTURE [[id number]] Collected: 05/21/21 1048    Order Status: Completed Specimen: Body Fluid from Thoracentesis Fluid Updated: 05/21/21 1213    Narrative:      Collected By:[id number] KAPIL RIOS    BLOOD CULTURE [[id number]]     Order Status: Sent Specimen: Blood from Peripheral     FLUID CULTURE W/GRAM STAIN [[id number]] Collected: 05/21/21 1048    Order Status: Sent Specimen: Body Fluid from Thoracentesis Fluid     AFB CULTURE [[id number]] Collected: 05/21/21 1048    Order Status: Canceled Specimen: Body Fluid from Thoracentesis Fluid     AFB Culture [[id number]] Collected: 05/21/21 1048    Order Status: No result Specimen: Other     FLUID CULTURE W/GRAM STAIN [[id number]] Collected: 05/19/21 1400    Order Status: Completed Specimen: Body Fluid Updated: 05/21/21 0944     Significant Indicator NEG     Source BF     Site Thoracentesis Fluid     Culture Result No growth at 48 hours.     Gram Stain Result Rare WBCs.  Rare Gram positive cocci.      Narrative:      Thoracentesis.    BLOOD CULTURE [[id number]] Collected: 05/20/21 1557    Order Status: Completed Specimen: Blood from Peripheral Updated: 05/21/21 0821     Significant Indicator NEG     Source BLD     Site PERIPHERAL     Culture Result No Growth  Note: Blood cultures are incubated for 5 days and  are monitored continuously.Positive blood cultures  are called to the RN and reported as soon as  they are identified.      Narrative:      Per Hospital Policy: Only change Specimen Src: to \"Line\" if  specified by physician order.  Right Hand    BLOOD CULTURE [[id number]] Collected: 05/20/21 1557    Order Status: Completed Specimen: Blood from Peripheral Updated: 05/21/21 0821     Significant Indicator NEG     Source BLD     Site PERIPHERAL     Culture Result No Growth  Note: Blood cultures are incubated for 5 days and  are monitored continuously.Positive blood cultures  are called to the RN and reported as soon as  they are " "identified.      Narrative:      Per Hospital Policy: Only change Specimen Src: to \"Line\" if  specified by physician order.  Left AC    GRAM STAIN [[id number]] Collected: 05/19/21 1400    Order Status: Completed Specimen: Body Fluid Updated: 05/20/21 1202     Significant Indicator .     Source BF     Site Thoracentesis Fluid     Gram Stain Result Rare WBCs.  Rare Gram positive cocci.      Narrative:      Thoracentesis.    COV-2, FLU A/B, AND RSV BY PCR (2-4 HOURS [facility]): Collect NP swab in VTM [[id number]] Collected: 05/18/21 1730    Order Status: Completed Specimen: Respirate Updated: 05/18/21 1836     Influenza virus A RNA Negative     Influenza virus B, PCR Negative     RSV, PCR Negative     SARS-CoV-2 by PCR NotDetected     Comment: PATIENTS: Important information regarding your results and instructions can  be found at [email or web address]   \"After your  Covid-19 Test\"    [facility] providers: PLEASE REFER TO DE-ESCALATION AND RETESTING PROTOCOL  on [email or web address]    **The [employer] GeneXpert Xpress SARS-CoV-2 RT-PCR Test has been made  available for use under the Emergency Use Authorization (EUA) only.          SARS-CoV-2 Source NP Swab    Narrative:      Have you been in close contact with a person who is suspected  or known to be positive for COVID-19 within the last 30 days  (e.g. last seen that person < 30 days ago)->No          Labs:     Estimated Creatinine Clearance: 92.9 mL/min (by C-G formula based on SCr of 0.75 mg/dL).  Recent Labs     05/18/21  1521 05/19/21  0200 05/20/21  0839 05/21/21  0205   WBC 11.3* 8.2 9.9 9.3   NEUTSPOLYS 91.10* 76.50*  --   --      Recent Labs     05/18/21  1521 05/19/21  0200 05/20/21  0839 05/21/21  0205   BUN 29* 27* 21 21   CREATININE 0.95 0.79 0.69 0.75   ALBUMIN 2.6* 2.3*  --   --        Intake/Output Summary (Last 24 hours) at 5/21/2021 1318  Last data filed at 5/21/2021 0900  Gross per 24 hour   Intake 0 ml   Output 400 ml   Net -400 ml    " "  /66   Pulse 70   Temp 36.4 °C (97.5 °F) (Temporal)   Resp 18   Ht 1.88 m (6' 2\")   Wt 76 kg (167 lb 8.8 oz)   SpO2 99%  Temp (24hrs), Av.6 °C (97.8 °F), Min:36.3 °C (97.3 °F), Max:36.7 °C (98.1 °F)      List concerns for Vancomycin clearance:     Age;BUN/Scr ratio greater than 20:1;Malnutrition/Low albumin    Pharmacokinetics:     AUC kinetics:   Ke (hr ^-1): 0.0815 hr^-1  Half life: 8.5 hr  Clearance: 4.026  Estimated TDD:   Estimated Dose: 881  Estimated interval: 10.5    A/P:     -  Vancomycin dose: New start vancomycin    -  Next vancomycin level(s):    -21  @ 1700   -21 Vt @ 0130     -  Predicted vancomycin AUC from initial AUC test calculator: 497 mg·hr/L    -  Comments: ID Consulted. Discontinue daptomycin and start vancomycin.     Kathleen Norwood V, PharmD  "

## 2021-05-21 NOTE — PROGRESS NOTES
Pt AOx 4, up to edge of bed with assist, and on 4L O2 NC.  PICC line in place on left upper arm.  Pt reporting denies any pain and VSS. Pt refused Q2 turns. Reviewed plan of care including pt to go NPO at midnight. Belongings/call light in reach, bed locked/lowest position, bed alarm on.

## 2021-05-21 NOTE — PROGRESS NOTES
Pt returned to floor from procedure. Chest tube in place on left side. VSS. Chest tube connected to suction at 25 cm per MD order. Thoracentesis of right side to be done at bedside. Safety precautions in place. All pt needs met at this time.

## 2021-05-21 NOTE — PROGRESS NOTES
[facility] Medicine Daily Progress Note    Date of Service  5/21/2021    Chief Complaint  76 y.o. male admitted 5/18/2021 with PICC line dysfunction and new onset pleural effusion.    Interval Problem Update  Patient seen and examined at bedside this morning.  No acute events overnight.     Pulmonology was consulted yesterday and actively following.  Left pleural fluid analysis consistent with exudative effusion and rare gram-positive cocci noted in fluid culture.  Findings are concerning for empyema. IR consulted for pigtail placement on the left side for adequate drainage.  We will continue IV antibiotics. ID also consulted to help guide management.  Pulmonology also recommending bilateral lower extremity Dopplers to rule out septic emboli to the lungs.  S/p thoracentesis today on the right pleural cavity.    Consultants/Specialty  Pulm  ID    Code Status  Full Code    Disposition  NH    Review of Systems  Review of Systems   Constitutional: Negative.    Respiratory: Positive for shortness of breath.    Cardiovascular: Negative.    Gastrointestinal: Negative.    Genitourinary: Negative.    Musculoskeletal: Negative.    All other systems reviewed and are negative.       Physical Exam  Temp:  [36.3 °C (97.3 °F)-36.7 °C (98.1 °F)] 36.7 °C (98.1 °F)  Pulse:  [] 62  Resp:  [16-28] 16  BP: ()/(53-80) 108/65  SpO2:  [94 %-100 %] 99 %    Physical Exam  Constitutional:       Appearance: Normal appearance.   HENT:      Head: Normocephalic and atraumatic.   Eyes:      Conjunctiva/sclera: Conjunctivae normal.   Cardiovascular:      Rate and Rhythm: Normal rate.      Pulses: Normal pulses.   Pulmonary:      Effort: Pulmonary effort is normal.      Breath sounds: Rales present.   Abdominal:      General: Bowel sounds are normal.      Palpations: Abdomen is soft.   Musculoskeletal:         General: No swelling or tenderness.   Skin:     General: Skin is warm.   Neurological:      General: No focal deficit present.       Mental Status: He is alert. Mental status is at baseline.   Psychiatric:         Mood and Affect: Mood normal.         Behavior: Behavior normal.         Fluids    Intake/Output Summary (Last 24 hours) at 5/21/2021 1252  Last data filed at 5/21/2021 0900  Gross per 24 hour   Intake 360 ml   Output 600 ml   Net -240 ml       Laboratory  Recent Labs     05/19/21  0200 05/20/21  0839 05/21/21  0205   WBC 8.2 9.9 9.3   RBC 3.15* 3.11* 3.08*   HEMOGLOBIN 8.7* 8.8* 8.5*   HEMATOCRIT 29.5* 29.5* 29.1*   MCV 93.7 94.9 94.5   MCH 27.6 28.3 27.6   MCHC 29.5* 29.8* 29.2*   RDW 56.0* 57.7* 56.0*   PLATELETCT 339 287 283   MPV 9.8 9.4 9.7     Recent Labs     05/19/21  0200 05/20/21  0839 05/21/21  0205   SODIUM 141 138 136   POTASSIUM 4.3 4.8 4.4   CHLORIDE 104 103 102   CO2 30 29 31   GLUCOSE 107* 121* 90   BUN 27* 21 21   CREATININE 0.79 0.69 0.75   CALCIUM 8.4* 8.2* 7.9*     Recent Labs     05/19/21  1205   APTT 31.1   INR 1.12               Imaging  DX-CHEST-PORTABLE (1 VIEW)   Final Result      1.  Interval placement of small-caliber LEFT chest tube with improvement of pleural fluid and compensatory enlargement of LEFT pneumothorax, likely due to noncompliant underlying lung.   2.  Improving RIGHT lung infiltrates.      These findings were discussed with SUNDAR SUNG on 5/21/2021 11:18 AM.         CT-CHEST TUBE-EMPYEMA LEFT   Final Result      1. LEFT CHEST EMPYEMA Drainage with CT guidance.      DX-CHEST-LIMITED (1 VIEW)   Final Result      1.  Stable LEFT hydropneumothorax.   2.  Multifocal pneumonia again seen with slight worsening in RIGHT midlung region.            DX-CHEST-LIMITED (1 VIEW)   Final Result         Unchanged left lateral pneumothorax.      US-THORACENTESIS PUNCTURE LEFT   Final Result      1. Ultrasound guided left sided diagnostic and therapeutic thoracentesis.      2. 1550 mL of fluid withdrawn.      DX-CHEST-PORTABLE (1 VIEW)   Final Result      Interval decrease in size of the left pleural  effusion which is now small.      Small small loculated left pneumothorax.      Small loculated right pleural effusion.      Left perihilar and basilar opacity may represent atelectasis/consolidation.      Increased ill-defined airspace opacities on the right may represent a combination of atelectasis, edema and pneumonia.      Atherosclerotic plaque.      Findings discussed with the covering clinician.         CT-CHEST (THORAX) W/O   Final Result         1.  Large left and moderate pleural effusions.   2.  Peripheral reticular pulmonary opacities, appearance compatible with Covid infiltrates.   3.  Linear consolidations in the bilateral lung bases, likely atelectasis.   4.  Hepatomegaly and irregular hepatic contour, appearance most compatible changes of cirrhosis.   5.  Diverticulosis      DX-CHEST-PORTABLE (1 VIEW)   Final Result      Stable large left pleural effusion with associated compressive atelectasis. Correlate clinically for infection.      Possible small right pleural effusion with mildly increased opacity in the right lung.      EC-ECHOCARDIOGRAM COMPLETE W/O CONT    (Results Pending)   US-EXTREMITY VENOUS LOWER BILAT    (Results Pending)        Assessment/Plan  * Pleural effusion, bilateral  Assessment & Plan  Unknown etiology  Was seen on last admission and was asymptomatic. Now with worsening SOB and worsening effusion  CT chest -findings of large left-sided pleural effusion and moderate right pleural effusion.    2D echo pending.  S/p thoracentesis of left pleural cavity .1.5 L of fluid removed.  S/p thoracentesis of right pleural cavity about 100 cc fluid removed, bloody.  Fluid studies slightly consistent with exudative effusion.  Cytology pending.  Pulmonology following.    Rare gram-positive cocci growing in left pleural cavity concerning for empyema.  IR reconsulted for pigtail placement of the left pleural cavity for adequate drainage of infection.  ID to be re-consulted to help with  antibiotics management.  We will follow up on final cultures from the left pleural cavity and also monitor cultures from the right pleural cavity as well.    Osteomyelitis of left foot (HCC)- (present on admission)  Assessment & Plan  Patient last admitted to [facility] [place] [facility] with osteomyelitis of left foot  S/p amputation of left 5th toe, amputation well healing  ID was consulted last admission and plan for Daptomycin and Ancef until 6/3, will continue Abx per their prior recommendation.     With concerns for new lung infection, we have ID reconsulted to help adjust antibiotics-daptomycin discontinued.  Vancomycin started. Ancef also continued.  We will closely monitor vancomycin levels to prevent toxicity.  We will get ultrasound of the bilateral lower extremities to rule out emboli.     Postprocedural pneumothorax  Assessment & Plan  Post thoracentesis, chest x-ray with finding of a small left loculated pneumothorax.   We will repeat serial chest x-ray and closely monitor vital signs.    Repeat chest x-ray with findings of persistent left-sided hydropneumothorax concerning for trapped lung.  IR for pigtail placement into left pleural cavity.     Acute respiratory failure with hypoxia (HCC)  Assessment & Plan  Secondary to large pleural effusion  See above for plan    Protein-calorie malnutrition, severe (HCC)  Assessment & Plan  RD following.  Follow-up on recommendations.    Occluded PICC line, initial encounter (HCC)  Assessment & Plan  Patient sent by nursing home for occluded PICC line  Was flushed by ED staff and reportedly working currently  Consider new PICC line placement if continues to be nonfunctional as will need to continue Abx until 6/3    Deep vein thrombosis (DVT) of lower extremity (HCC)- (present on admission)  Assessment & Plan  History of DVT on Eliquis.  Continue medication.    Type 2 diabetes mellitus with peripheral neuropathy (HCC)- (present on admission)  Assessment & Plan  Patient with long history  of DM with doccumented neuropathy and retinopathy  Hold home oral medications (ACTOS and Synjardy)  Insulin sliding scale for now  Diabetic diet  Last A1C 4.9 month ago    CAD (coronary artery disease)- (present on admission)  Assessment & Plan  Patient not currently taking statin, can be resumed as outpatient  Reported ACS in 2013 s/p stents  No current chest pain  ECHO ordered  Will continue to monitor    HTN (hypertension)- (present on admission)  Assessment & Plan  Does not appear to be on antihypertensives at nursing home currently  Will continue to monitor    History of stroke- (present on admission)  Assessment & Plan  Prior history of stroke with some aphasia  Some deconditioning  PT/OT       VTE prophylaxis: Eliquis on hold, scds.

## 2021-05-21 NOTE — PROGRESS NOTES
Assessment/description of ears? Pink, blanching  Which preventative measures are in place for the ears? Grey ear foam    Assessment/description of elbows? Pink, blanching  Which preventative measures are in place for the elbows? Mepilex, waffle mattress    Assessment/description of sacrum? Redness, excoriation, open area in L buttock crease  Which preventative measures are in place for the sacrum? Mepilex, waffle mattress- pt has been refusing turns- educated on importance    Assessment/description of heels? R- pink/purple L redness  Which preventative measures are in place for the heels? Mepilex, heel float boots    Which devices are in place? PICC, chest tube- left side, oxygen tubing  Description of skin under devices: dressing C/D/I for chest tube, intact and blanching under other  Which preventative measures are in place under devices? Grey foam    Other:

## 2021-05-21 NOTE — PROGRESS NOTES
"Pulmonary Progress Note    Patient ID:   Name:             Agapito Stone   YOB: 1945  Age:                 76 y.o.  male   MRN:               [id number]      Chief Complaint:                            Shortness of breath.                        Subjective:      Patient reports that he is doing well without any significant complaints.  Patient does feel like he is breathing better.  Denies fevers, chills, chest pain.    Interval Changes:     Chest tube placed in left thorax this a.m. with IR.  Thoracentesis of right lung diagnostic/therapeutic performed this a.m.  At bedside.  Right pleural fluid studies indicative of exudative effusion.  Left lung pleural fluid cytology came back WITHOUT atypia, or malignant cells identified.  Pending ultrasound of lower extremities for reassessment of DVT.    Objective:   Vitals/ General Appearance:   Weight/BMI: Body mass index is 21.51 kg/m².  BP (!) 96/56   Pulse 96   Temp 36.7 °C (98.1 °F) (Temporal)   Resp 16   Ht 1.88 m (6' 2\")   Wt 76 kg (167 lb 8.8 oz)   SpO2 96%   Vitals:    05/21/21 1100 05/21/21 1200 05/21/21 1300 05/21/21 1600   BP: (!) 94/53 108/65 102/66 (!) 96/56   Pulse: 71 62 70 96   Resp: 16 16 18 16   Temp: 36.3 °C (97.4 °F) 36.7 °C (98.1 °F) 36.4 °C (97.5 °F) 36.7 °C (98.1 °F)   TempSrc: Temporal Temporal Temporal Temporal   SpO2: 99% 99% 99% 96%   Weight:       Height:         Oxygen Therapy:  Pulse Oximetry: 96 %, O2 (LPM): 4, O2 Delivery Device: Nasal Cannula    Physical Exam  Constitutional:       General: He is not in acute distress.     Appearance: He is not ill-appearing or diaphoretic.   HENT:      Head: Normocephalic and atraumatic.      Comments: Some mild temporal wasting and gaunt face.     Right Ear: External ear normal.      Left Ear: External ear normal.      Nose: Nose normal. No rhinorrhea.      Mouth/Throat:      Mouth: Mucous membranes are moist.      Pharynx: Oropharynx is clear. No oropharyngeal exudate or " posterior oropharyngeal erythema.   Eyes:      General: No scleral icterus.        Right eye: No discharge.         Left eye: No discharge.      Extraocular Movements: Extraocular movements intact.   Cardiovascular:      Rate and Rhythm: Normal rate and regular rhythm.      Pulses: Normal pulses.      Comments: Borderline tachycardia. Unable to appreciate m,r,g.  Pulmonary:     Chest tube in place anterolateral left thorax without kinking.  Draining cherry red fluid.      Bilateral lung sounds significantly diminished in all lung fields.  Limited inspiratory/expiratory movement.    Abdominal:      General: Bowel sounds are normal.      Palpations: Abdomen is soft.   Musculoskeletal:      Cervical back: Normal range of motion.      Right lower leg: Edema present.      Left lower leg: Edema present.      Comments: +1 Pitting edema of bilateral posterior legs up to mid calf.  Decreased muscle mass of hands and legs.   Skin:     General: Skin is warm.      Findings: Bruising present.      Comments: Multiple diffuse ecchymoses of bilateral forearms.   Neurological:      Mental Status: He is alert.      Cranial Nerves: No cranial nerve deficit.      Comments: Generalized weakness.   Psychiatric:         Mood and Affect: Mood normal.         Behavior: Behavior normal.         Thought Content: Thought content normal.         Judgment: Judgment normal.     Labs:  Recent Labs     05/19/21  0200 05/20/21  0839 05/21/21  0205   WBC 8.2 9.9 9.3   RBC 3.15* 3.11* 3.08*   HEMOGLOBIN 8.7* 8.8* 8.5*   HEMATOCRIT 29.5* 29.5* 29.1*   MCV 93.7 94.9 94.5   MCH 27.6 28.3 27.6   MCHC 29.5* 29.8* 29.2*   RDW 56.0* 57.7* 56.0*   PLATELETCT 339 287 283   MPV 9.8 9.4 9.7     Recent Labs     05/19/21  1205   APTT 31.1   INR 1.12         Recent Labs     05/19/21  0200   CPKTOTAL 38     Recent Labs     05/19/21  0200 05/20/21  0839 05/21/21  0205   SODIUM 141 138 136   POTASSIUM 4.3 4.8 4.4   CHLORIDE 104 103 102   CO2 30 29 31   GLUCOSE 107*  121* 90   BUN 27* 21 21   CPKTOTAL 38  --   --      Recent Labs     05/19/21  0200 05/20/21  0839 05/21/21  0205   SODIUM 141 138 136   POTASSIUM 4.3 4.8 4.4   CHLORIDE 104 103 102   CO2 30 29 31   BUN 27* 21 21   CREATININE 0.79 0.69 0.75   MAGNESIUM 1.7 1.7  --    CALCIUM 8.4* 8.2* 7.9*     Results for orders placed or performed during the hospital encounter of 01/28/12   ECHOCARDIOGRAM COMP W/O CONT   Result Value Ref Range    Eject.Frac. MOD BP 55.03     Eject.Frac. MOD 4C 56.62     Eject.Frac. MOD 2C 62.97          Imaging:   US-EXTREMITY VENOUS LOWER BILAT   Final Result      DX-CHEST-PORTABLE (1 VIEW)   Final Result      1.  Interval placement of small-caliber LEFT chest tube with improvement of pleural fluid and compensatory enlargement of LEFT pneumothorax, likely due to noncompliant underlying lung.   2.  Improving RIGHT lung infiltrates.      These findings were discussed with SUNDAR SUNG on 5/21/2021 11:18 AM.         CT-CHEST TUBE-EMPYEMA LEFT   Final Result      1. LEFT CHEST EMPYEMA Drainage with CT guidance.      DX-CHEST-LIMITED (1 VIEW)   Final Result      1.  Stable LEFT hydropneumothorax.   2.  Multifocal pneumonia again seen with slight worsening in RIGHT midlung region.            DX-CHEST-LIMITED (1 VIEW)   Final Result         Unchanged left lateral pneumothorax.      US-THORACENTESIS PUNCTURE LEFT   Final Result      1. Ultrasound guided left sided diagnostic and therapeutic thoracentesis.      2. 1550 mL of fluid withdrawn.      DX-CHEST-PORTABLE (1 VIEW)   Final Result      Interval decrease in size of the left pleural effusion which is now small.      Small small loculated left pneumothorax.      Small loculated right pleural effusion.      Left perihilar and basilar opacity may represent atelectasis/consolidation.      Increased ill-defined airspace opacities on the right may represent a combination of atelectasis, edema and pneumonia.      Atherosclerotic plaque.      Findings  discussed with the covering clinician.         CT-CHEST (THORAX) W/O   Final Result         1.  Large left and moderate pleural effusions.   2.  Peripheral reticular pulmonary opacities, appearance compatible with Covid infiltrates.   3.  Linear consolidations in the bilateral lung bases, likely atelectasis.   4.  Hepatomegaly and irregular hepatic contour, appearance most compatible changes of cirrhosis.   5.  Diverticulosis      DX-CHEST-PORTABLE (1 VIEW)   Final Result      Stable large left pleural effusion with associated compressive atelectasis. Correlate clinically for infection.      Possible small right pleural effusion with mildly increased opacity in the right lung.      EC-ECHOCARDIOGRAM COMPLETE W/O CONT    (Results Pending)   CT-CHEST (THORAX) WITH    (Results Pending)       Hospital Medications:    Current Facility-Administered Medications:   •  oxyCODONE immediate-release (ROXICODONE) tablet 5 mg, 5 mg, Oral, Q3HRS PRN, Jacek Cortez M.D.  •  oxyCODONE immediate release (ROXICODONE) tablet 10 mg, 10 mg, Oral, Q3HRS PRN, Jacek Cortez M.D.  •  MD Alert...Vancomycin per Pharmacy, , Other, PHARMACY TO DOSE, Gomez Reveles M.D.  •  [START ON 5/22/2021] vancomycin (VANCOCIN) 1,000 mg in  mL IVPB, 1,000 mg, Intravenous, Q12HR, Gomez Reveles M.D.  •  albuterol inhaler 2 Puff, 2 Puff, Inhalation, Q6HRS PRN, Julio César Mckee M.D.  •  ferrous sulfate tablet 325 mg, 325 mg, Oral, QDAY with Breakfast, Julio César Mckee M.D., 325 mg at 05/21/21 1139  •  gabapentin (NEURONTIN) capsule 100 mg, 100 mg, Oral, BID, Julio César Mckee M.D., 100 mg at 05/21/21 1818  •  magnesium oxide (MAG-OX) tablet 400 mg, 400 mg, Oral, BID, Julio César Mckee M.D., 400 mg at 05/21/21 1818  •  montelukast (SINGULAIR) tablet 10 mg, 10 mg, Oral, DAILY, Julio César Mckee M.D., 10 mg at 05/21/21 0536  •  tamsulosin (FLOMAX) capsule 0.4 mg, 0.4 mg, Oral, QHS, Julio César Mckee M.D., 0.4 mg at 05/20/21 2212  •   senna-docusate (PERICOLACE or SENOKOT S) 8.6-50 MG per tablet 2 tablet, 2 tablet, Oral, BID, 2 tablet at 05/21/21 1818 **AND** polyethylene glycol/lytes (MIRALAX) PACKET 1 Packet, 1 Packet, Oral, QDAY PRN **AND** magnesium hydroxide (MILK OF MAGNESIA) suspension 30 mL, 30 mL, Oral, QDAY PRN **AND** bisacodyl (DULCOLAX) suppository 10 mg, 10 mg, Rectal, QDAY PRN, Julio César Mckee M.D.  •  Pharmacy Consult Request ...Pain Management Review 1 Each, 1 Each, Other, PHARMACY TO DOSE, Julio César Mckee M.D.  •  acetaminophen (Tylenol) tablet 650 mg, 650 mg, Oral, Q6HRS PRN, Julio César Mckee M.D., 650 mg at 05/19/21 1256  •  [DISCONTINUED] oxyCODONE immediate-release (ROXICODONE) tablet 2.5 mg, 2.5 mg, Oral, Q3HRS PRN **OR** [DISCONTINUED] oxyCODONE immediate-release (ROXICODONE) tablet 5 mg, 5 mg, Oral, Q3HRS PRN **OR** HYDROmorphone (Dilaudid) injection 0.25 mg, 0.25 mg, Intravenous, Q3HRS PRN, Julio César Mckee M.D.  •  ondansetron (ZOFRAN) syringe/vial injection 4 mg, 4 mg, Intravenous, Q4HRS PRN, Julio César Mckee M.D.  •  ondansetron (ZOFRAN ODT) dispertab 4 mg, 4 mg, Oral, Q4HRS PRN, Julio César Mckee M.D.  •  insulin regular (HumuLIN R,NovoLIN R) injection, 2-9 Units, Subcutaneous, 4X/DAY ACHS, 2 Units at 05/20/21 2212 **AND** POC blood glucose manual result, , , Q AC AND BEDTIME(S) **AND** NOTIFY MD and PharmD, , , Once **AND** glucose 4 g chewable tablet 16 g, 16 g, Oral, Q15 MIN PRN **AND** dextrose 50% (D50W) injection 50 mL, 50 mL, Intravenous, Q15 MIN PRN, Julio César Mckee M.D.  •  ceFAZolin in dextrose (ANCEF) IVPB premix 2 g, 2 g, Intravenous, Q8HRS, Julio César Mckee M.D., Last Rate: 200 mL/hr at 05/21/21 1818, 2 g at 05/21/21 1818    Current Outpatient Medications:  Medications Prior to Admission   Medication Sig Dispense Refill Last Dose   • cefTRIAXone (ROCEPHIN) 2 GM Recon Soln Inject 2 g into the shoulder, thigh, or buttocks every day. Start date: 5/17/21  For 5 days   5/17/2021 at 1200   • ferrous  sulfate 325 (65 Fe) MG tablet Take 325 mg by mouth 2 times a day.   5/18/2021 at 0800   • albuterol 108 (90 Base) MCG/ACT Aero Soln inhalation aerosol Inhale 2 Puffs every 6 hours as needed for Shortness of Breath.   5/16/2021 at Unknown time   • predniSONE (DELTASONE) 10 MG Tab Take 10-40 mg by mouth every day. 40 mg daily x2 days  30 mg daily x2 days  20 mg daily x2 days  10 mg daily x2 days   5/17/2021 at 1600   • acetaminophen (TYLENOL) 325 MG Tab Take 650 mg by mouth every four hours as needed.   5/11/2021   • magnesium hydroxide (MILK OF MAGNESIA) 400 MG/5ML Suspension Take 30 mL by mouth 1 time a day as needed.   5/4/2021   • ceFAZolin in dextrose (ANCEF) 2-4 GM/100ML-% IVPB Infuse 100 mL into a venous catheter every 8 hours for 37 days. (Patient taking differently: Infuse 2 g into a venous catheter every 8 hours. Start date: 4/28/21   For 37 days) 3000 mL 0 5/18/2021 at 0800   • daptomycin (CUBICIN) 500 MG Recon Soln Infuse 700 mg into a venous catheter every day for 37 days. (Patient taking differently: Infuse 8 mg/kg into a venous catheter every day. Start date: 4/30/21  For 35 days) 92525 mg 0 5/18/2021 at 0800   • tamsulosin (FLOMAX) 0.4 MG capsule Take 0.4 mg by mouth at bedtime.   5/17/2021 at 2000   • gabapentin (NEURONTIN) 100 MG Cap Take 100 mg by mouth 2 times a day.   5/18/2021 at 0800   • Multiple Vitamin (MULTI VITAMIN PO) Take 1 tablet by mouth every day.   5/18/2021 at 0800   • Ascorbic Acid (VITAMIN C) 1000 MG Tab Take 1,000 mg by mouth every day.   5/18/2021 at 0800   • montelukast (SINGULAIR) 10 MG Tab Take 1 tablet by mouth every day. 90 tablet 0 5/17/2021 at 2000   • apixaban (ELIQUIS) 5mg Tab Take 1 Tab by mouth 2 Times a Day. 60 Tab  5/18/2021 at 0800   • magnesium oxide (MAG-OX) 400 MG Tab tablet Take 1 Tab by mouth 2 Times a Day. 30 Tab 0 5/18/2021 at 0800   • pioglitazone (ACTOS) 15 MG Tab Take 15 mg by mouth every morning.   5/18/2021 at 0800   • atorvastatin (LIPITOR) 80 MG tablet  TAKE ONE TABLET BY MOUTH DAILY IN THE EVENING (Patient not taking: Reported on 5/18/2021) 100 Tab 3 Not Taking at Unknown time   • Empagliflozin-metFORMIN HCl (SYNJARDY) 5-1000 MG Tab Take 1 Tab by mouth 2 Times a Day. 180 Tab 3 5/18/2021 at 0800   • nitroglycerin (NITROSTAT) 0.4 MG SL Tab Place 1 Tab under tongue as needed for Chest Pain. (Patient not taking: Reported on 5/18/2021) 25 Tab 1 Not Taking at Unknown time   • Cholecalciferol (VITAMIN D-3) 5000 UNITS TABS Take 1 Tab by mouth every 48 hours.   5/18/2021 at 0800       Medication Allergy:  Allergies   Allergen Reactions   • Fish Hives     shellfish   • Pcn [Penicillins] Hives     Positive penicillin skin test as a child.  Significant hives to amoxicillin as an adult.  Tolerates cephalosporins   • Amoxicillin Hives   • Food Swelling      Eggs,Melons, avocados-puffy mouth       Assessment and Plan:    * Pleural effusion, bilateral  Assessment & Plan  Left lung is exudative. Right lung also with exudative effusion. Etiology remains unclear. Cytology was negative. ID on board and recommended lab to re-analyze the rare gram positive cocci in pleural fluid.     Recommendations:     -Chest tube to suction. Wall suction needs to be set to FULL. Chest tube suction box to 20.  -Repeat US still with DVT.  -Continue to monitor pleural fluid culture.  -Cytology of LEFT lung was negative for evidence of malignancy.  -Blood cultures x2 with NGTD.  -Repeat CT thorax w/contrast.  -May need thoracic surgery consult in the future.

## 2021-05-21 NOTE — PROGRESS NOTES
IR Nursing Note:    Procedure Confirmed with MD, patient and RN pre procedure. Consent obtained by MD with all questions answered, placed in Patient's chart. Patient assisted to the table in the supine position with all bony prominences padded and draped in sterile fashion.    Left Chest Tube Placement by MD Cortez assisted by RT Cox, left lateral access site sutured in place and CDI with gauze and medipore tape.     End Tidal CO2 range 16-21 during procedure.     Patient tolerated procedure, hemodynamically stable; pt drowsy but easily aroused post procedure; report given to STEVEN Solano; patient transported to [place] [id number] via IR RN monitored then transferred care to report RN.    HeadMix Flexima APDL Locking Pigtail Drainage Catheter System 10F x 25cm REF# [id number] LOT# [id number]   EXP. 04/12/2024

## 2021-05-21 NOTE — CONSULTS
[facility] INFECTIOUS DISEASES INPATIENT CONSULT NOTE     Date of Service: 5/21/2021    Consult Requested By: Omer Daugherty D.O.    Reason for Consultation: Pleural effusion    Chief Complaint: Shortness of breath    History of Present Illness:     Agapito Stone is a 76 y.o. male who was originally admitted 4/22/2021, now readmitted 5/20/2021.  Patient with known history of CAD, type 2 diabetes, hypertension, CVA, left lower extremity DVT on Eliquis, COVID-19 pneumonia in November 2020, presented to the ER recently on 4/22/2021 with osteomyelitis of the fifth digit and metatarsal.  Patient history of prior surgeries including right foot I&D in August 2020, GSR. LPS was consulted for left lateral fifth metatarsal head wound. He resides at [facility]. Prior wound culture from December 2020 grew MRSA and diphtheroids but this was from the right heel. On 4/23, patient was taken to the OR and underwent left fifth ray amputation.  No pathology obtained.    Cultures grew doxycycline resistant MRSA and ciprofloxacin resistant Proteus mirabilis.  He was discharged to a SNF to complete 6 weeks of IV daptomycin and IV Ancef with a planned stop date of 6/3/2021.    Patient was brought to the ER from the facility on 5/20 due to concern for shortness of breath as well as PICC line occlusion.  The PICC line was noted to be functioning appropriately in the ER here.  CT scan showed markedly increased left-sided pleural effusion as well as right-sided effusion worse compared to previous imaging.  Also noted a new peripheral possible early cavitating lesions right upper lobe. IR performed a left-sided thoracentesis which drained about 1500 cc, bloody.  84% lymphocytes were noted but rare GPCs noted on Gram stain.  Pulmonary consulted and plan to perform a right-sided diagnostic thoracentesis as well.  ID consulted for recommendations.    Review of Systems:  All other systems reviewed and are negative  expect as noted in HPI    Past Medical History:   Diagnosis Date   • Arthritis     hands, wrists, ankles   • CAD (coronary artery disease) October 2013    Dr. Ovalle; ACS. PCI/LETA x 2 of the mid circumflex (Xience 3.25 x 23mm) and proximal circumflex (Xience 3.6x 12mm)   • Cataract     sugery   • Diabetes     oral meds   • High cholesterol    • Hyperlipidemia    • Hypertension    • Pain     ankles   • Pneumonia 1968   • Stroke (HCC) 2010    no residual effects   • Syncope        Past Surgical History:   Procedure Laterality Date   • TOE AMPUTATION Left 4/23/2021    Procedure: AMPUTATION, TOE - 5TH TOE;  Surgeon: Remi Scott M.D.;  Location: [facility];  Service: Orthopedics   • IRRIGATION & DEBRIDEMENT ORTHO Right 12/10/2020    Procedure: IRRIGATION AND DEBRIDEMENT, WOUND - HEEL;  Surgeon: Royal Bolivar M.D.;  Location: [facility];  Service: Orthopedics   • TENDON LENGHTENING Right 8/24/2020    Procedure: LENGTHENING, TENDON- , GASTROC RECESSION;  Surgeon: Royal Bolivar M.D.;  Location: [facility];  Service: Orthopedics   • ANKLE FUSION Right 8/24/2020    Procedure: FUSION, JOINT, ANKLE- ANKLE SUBTALAR FUSION , BONE GRAFT, MEDIAL RELEASE INCLUDING TENDONS, PATIAL EXCISION OF FIBULA;  Surgeon: Royal Bolivar M.D.;  Location: [facility];  Service: Orthopedics   • TENDON TRANSFER Right 8/24/2020    Procedure: TRANSFER, TENDON- PLANTAR FASCIA RELEASE;  Surgeon: Royal Bolivar M.D.;  Location: [facility];  Service: Orthopedics   • ORTHOPEDIC OSTEOTOMY Right 8/24/2020    Procedure: OSTEOTOMY- 1ST METATARSAL, CROSS PROCEDURE, 2-5 METATARSAL OSTEOTOMY, 2-5 PHALANGEL JOINT RECONSTRUCTION;  Surgeon: Royal Bolivar M.D.;  Location: [facility];  Service: Orthopedics   • HAMMERTOE CORRECTION Right 8/24/2020    Procedure: CORRECTION, HAMMER TOE 2-5;  Surgeon: Royal Bolivar M.D.;  Location: [facility];  Service: Orthopedics    • STENT PLACEMENT  2013    cardiac   • CAROTID ENDARTERECTOMY  7/13/2010    left; Performed by CHIQUITA CORRIGAN at SURGERY [facility]   • CATARACT EXTRACTION WITH IOL Bilateral    • TONSILLECTOMY  as a child       Family History   Problem Relation Age of Onset   • Other Mother         Rheumatic fever       Social History     Socioeconomic History   • Marital status:      Spouse name: Not on file   • Number of children: Not on file   • Years of education: Not on file   • Highest education level: Not on file   Occupational History   • Not on file   Tobacco Use   • Smoking status: Never Smoker   • Smokeless tobacco: Never Used   Substance and Sexual Activity   • Alcohol use: Not Currently     Alcohol/week: 0.0 oz     Comment: once a year   • Drug use: No   • Sexual activity: Not on file   Other Topics Concern   • Not on file   Social History Narrative   • Not on file     Social Determinants of Health     Financial Resource Strain: Low Risk    • Difficulty of Paying Living Expenses: Not hard at all   Food Insecurity: No Food Insecurity   • Worried About Running Out of Food in the Last Year: Never true   • Ran Out of Food in the Last Year: Never true   Transportation Needs: No Transportation Needs   • Lack of Transportation (Medical): No   • Lack of Transportation (Non-Medical): No   Physical Activity:    • Days of Exercise per Week:    • Minutes of Exercise per Session:    Stress:    • Feeling of Stress :    Social Connections:    • Frequency of Communication with Friends and Family:    • Frequency of Social Gatherings with Friends and Family:    • Attends [employer] Services:    • Active Member of Clubs or Organizations:    • Attends Club or Organization Meetings:    • Marital Status:    Intimate Partner Violence:    • Fear of Current or Ex-Partner:    • Emotionally Abused:    • Physically Abused:    • Sexually Abused:        Allergies   Allergen Reactions   • Fish Hives     shellfish   • Pcn [Penicillins]  Hives     Positive penicillin skin test as a child.  Significant hives to amoxicillin as an adult.  Tolerates cephalosporins   • Amoxicillin Hives   • Food Swelling      Eggs,Melons, avocados-puffy mouth       Medications:    Current Facility-Administered Medications:   •  NS (BOLUS) infusion 500 mL, 500 mL, Intravenous, Once PRN, Jacek Cortez M.D.  •  ondansetron (ZOFRAN) syringe/vial injection 4 mg, 4 mg, Intravenous, PRN, Jacek Cortez M.D.  •  fentaNYL (SUBLIMAZE) injection 12.5-50 mcg, 12.5-50 mcg, Intravenous, PRN, Jacek Cortez M.D., 25 mcg at 05/21/21 0922  •  midazolam (VERSED) injection 0.5-2 mg, 0.5-2 mg, Intravenous, PRN, Jacek Cortez M.D., 1 mg at 05/21/21 0922  •  oxyCODONE immediate-release (ROXICODONE) tablet 5 mg, 5 mg, Oral, Q3HRS PRN, Jacek Cortez M.D.  •  oxyCODONE immediate release (ROXICODONE) tablet 10 mg, 10 mg, Oral, Q3HRS PRN, Jacek Cortez M.D.  •  albuterol inhaler 2 Puff, 2 Puff, Inhalation, Q6HRS PRN, Julio César Mckee M.D.  •  ferrous sulfate tablet 325 mg, 325 mg, Oral, QDAY with Breakfast, Julio César Mckee M.D., 325 mg at 05/21/21 1139  •  gabapentin (NEURONTIN) capsule 100 mg, 100 mg, Oral, BID, Julio César Mckee M.D., 100 mg at 05/21/21 0536  •  magnesium oxide (MAG-OX) tablet 400 mg, 400 mg, Oral, BID, Julio César Mckee M.D., 400 mg at 05/21/21 0536  •  montelukast (SINGULAIR) tablet 10 mg, 10 mg, Oral, DAILY, Julio César Mckee M.D., 10 mg at 05/21/21 0536  •  tamsulosin (FLOMAX) capsule 0.4 mg, 0.4 mg, Oral, QHS, Julio César Mckee M.D., 0.4 mg at 05/20/21 7262  •  senna-docusate (PERICOLACE or SENOKOT S) 8.6-50 MG per tablet 2 tablet, 2 tablet, Oral, BID, 2 tablet at 05/21/21 0537 **AND** polyethylene glycol/lytes (MIRALAX) PACKET 1 Packet, 1 Packet, Oral, QDAY PRN **AND** magnesium hydroxide (MILK OF MAGNESIA) suspension 30 mL, 30 mL, Oral, QDAY PRN **AND** bisacodyl (DULCOLAX) suppository 10 mg, 10 mg, Rectal, QDAY PRN, Julio César Mckee M.D.  •  Pharmacy  "Consult Request ...Pain Management Review 1 Each, 1 Each, Other, PHARMACY TO DOSE, Julio César Mckee M.D.  •  acetaminophen (Tylenol) tablet 650 mg, 650 mg, Oral, Q6HRS PRN, Julio César Mckee M.D., 650 mg at 21 1256  •  [DISCONTINUED] oxyCODONE immediate-release (ROXICODONE) tablet 2.5 mg, 2.5 mg, Oral, Q3HRS PRN **OR** [DISCONTINUED] oxyCODONE immediate-release (ROXICODONE) tablet 5 mg, 5 mg, Oral, Q3HRS PRN **OR** HYDROmorphone (Dilaudid) injection 0.25 mg, 0.25 mg, Intravenous, Q3HRS PRN, Julio César Mckee M.D.  •  ondansetron (ZOFRAN) syringe/vial injection 4 mg, 4 mg, Intravenous, Q4HRS PRN, Julio César Mckee M.D.  •  ondansetron (ZOFRAN ODT) dispertab 4 mg, 4 mg, Oral, Q4HRS PRN, Julio César Mckee M.D.  •  insulin regular (HumuLIN R,NovoLIN R) injection, 2-9 Units, Subcutaneous, 4X/DAY ACHS, 2 Units at 21 2212 **AND** POC blood glucose manual result, , , Q AC AND BEDTIME(S) **AND** NOTIFY MD and PharmD, , , Once **AND** glucose 4 g chewable tablet 16 g, 16 g, Oral, Q15 MIN PRN **AND** dextrose 50% (D50W) injection 50 mL, 50 mL, Intravenous, Q15 MIN PRN, Julio César Mckee M.D.  •  ceFAZolin in dextrose (ANCEF) IVPB premix 2 g, 2 g, Intravenous, Q8HRS, Julio César Mckee M.D., Stopped at 21 0230  •  DAPTOmycin 690 mg in NS 50 mL IVPB, 8 mg/kg, Intravenous, Q24HRS, Julio César Mckee M.D., Last Rate: 100 mL/hr at 21 1103, 690 mg at 21 1103    Physical Exam:   Vital Signs: BP (!) 94/53   Pulse 71   Temp 36.3 °C (97.4 °F) (Temporal)   Resp 16   Ht 1.88 m (6' 2\")   Wt 76 kg (167 lb 8.8 oz)   SpO2 99%   BMI 21.51 kg/m²   Temp  Av.7 °C (98.1 °F)  Min: 36.1 °C (97 °F)  Max: 37.7 °C (99.9 °F)  Vital signs reviewed  Constitutional: Patient is oriented to person, place, and time.  Resting comfortably, no acute distress  Head: Atraumatic, normocephalic  Eyes: Conjunctivae normal, EOM intact.   Mouth/Throat: Lips without lesions, good dentition, oropharynx is clear and moist.  Nasal " cannula oxygen  Neck: Neck supple. No masses/lymphadenopathy  Cardiovascular: Normal rate, regular rhythm, normal S1S2 and intact distal pulses. No murmur, gallop, or friction rub. No pedal edema.  Pulmonary/Chest: No respiratory distress. Unlabored respiratory effort, decreased breath sounds bilaterally, left-sided chest tube in place  Abdominal: Soft, non tender. BS + x 4. No masses or hepatosplenomegaly.   Musculoskeletal: No joint tenderness, swelling, erythema, or restriction of motion noted. Well-healed incision dorsal right great toe, surgical site left fifth ray well-healed, mild erythema, no active discharge, no swelling, no fluctuance.  Left fourth toe with small scab on dorsal surface  Neurological: Alert and oriented to person, place, and time. No gross cranial nerve deficit  Skin: Skin is warm and dry. No rashes or embolic phenomena noted on exposed skin  Psychiatric: Normal mood and affect. Behavior is normal.     LABS:  Recent Labs     05/19/21  0200 05/20/21  0839 05/21/21  0205   WBC 8.2 9.9 9.3      Recent Labs     05/18/21  1521 05/18/21  1521 05/19/21  0200 05/20/21  0839 05/21/21  0205   HEMOGLOBIN 9.5*   < > 8.7* 8.8* 8.5*   HEMATOCRIT 32.3*   < > 29.5* 29.5* 29.1*   MCV 94.4   < > 93.7 94.9 94.5   MCH 27.8   < > 27.6 28.3 27.6   MACROCYTOSIS 1+  --   --   --   --    ANISOCYTOSIS 1+  --   --   --   --    PLATELETCT 399   < > 339 287 283    < > = values in this interval not displayed.       Recent Labs     05/19/21  0200 05/20/21  0839 05/21/21  0205   SODIUM 141 138 136   POTASSIUM 4.3 4.8 4.4   CHLORIDE 104 103 102   CO2 30 29 31   CREATININE 0.79 0.69 0.75        Recent Labs     05/18/21  1521 05/19/21  0200   ALBUMIN 2.6* 2.3*        MICRO:  No results found for: BLOODCULTU, BLDCULT, BCHOLD     Latest pertinent labs were reviewed    IMAGING STUDIES:  As above    Hospital Course/Assessment:   Agapito Stone is a pleasant 76 y.o. male SNF resident with known history of CAD, type 2  diabetes, hypertension, CVA, left lower extremity DVT on Eliquis, recently admitted with worsening left metatarsal heel wound with underlying osteomyelitis on imaging.  Patient underwent left fifth ray amputation on 4/23, cultures grew doxycycline resistant MRSA, ciprofloxacin resistant Proteus mirabilis. No pathology obtained.  Patient was discharged to SNF with plan to complete 6 weeks of IV daptomycin + IV Ancef.  Patient was brought to the ER on 5/19 with worsening shortness of breath.  CT scan showed worsening bilateral pleural effusions L>R compared to prior imaging. Left-sided thoracentesis performed which appeared bloody, lymphocyte predominant white cells, exudate by criteria, also with Gram stain reportedly positive for GPCs.    Unusual clinical course for empyema with worsening bilateral effusions.  However, the Gram stain from the left-sided effusion is positive.  Also noted new peripheral opacities right upper lobe with possible early cavitation that may perhaps indicate systemic seeding of infection which would be unusual without a right-sided endocarditis.    Pertinent Diagnoses:  Left-sided empyema  Bilateral pleural effusions  Left foot osteomyelitis  Polymicrobial infection  Type 2 diabetes  Penicillin allergy    Plan:   -Continue IV Ancef 2 g every 8 hours  -Change daptomycin to vancomycin while inpatient to continue coverage of the MRSA and also better pulmonary coverage  -Requested microbiology lab review the slides, and confirmed that there are indeed gram-positive cocci in the Gram stain from the left-sided effusion  -No growth till date.  Will follow cultures  -Agree with thoracentesis on the right side, will follow labs  -Agree with TTE.  Will follow  -Will obtain blood cultures x2    Plan was discussed with the primary team, Dr. Daugherty    ID will follow. Please feel free to call with questions.    Gomez Reveles M.D.    Please note that this dictation was created using voice  recognition software. I have worked with technical experts from [facility] to optimize the interface.  I have made every reasonable attempt to correct obvious errors, but there may be errors of grammar and possibly content that I did not discover before finalizing the note.

## 2021-05-21 NOTE — PROGRESS NOTES
Assessment/description of ears? Pink, blanching  Which preventative measures are in place for the ears? Gray ear foam, cheek pads     Assessment/description of elbows? Pink, blanching  Which preventative measures are in place for the elbows? mepilex     Assessment/description of sacrum? Redness, excoriation, open area on left buttock crease  Which preventative measures are in place for the sacrum? Mepilex, waffle cushion     Assessment/description of heels? Right heel- purple/pink, boggy, blanching. Left redness, blanching.  Which preventative measures are in place for the heels? mepilex and heel float boots      Which devices are in place? PICC, NC  Description of skin under devices: intact, blanching  Which preventative measures are in place under devices? Gray foam and cheek pads, PICC dressing CDI     Other:

## 2021-05-21 NOTE — PROCEDURES
Thoracentesis    Date/Time: 5/21/2021 11:37 AM  Performed by: Miguel Powell M.D.  Authorized by: Miguel Powell M.D.     Consent:     Consent obtained:  Written    Consent given by:  Patient    Risks discussed:  Bleeding, infection, pain, pneumothorax, nerve damage and incomplete drainage    Alternatives discussed:  No treatment, delayed treatment and alternative treatment  Anesthesia (see MAR for exact dosages):     Anesthesia method:  Local infiltration    Local anesthetic:  Lidocaine 1% w/o epi  Procedure details:     Patient position:  Sitting    Location:  R posterior    Intercostal space:  6th    Puncture method:  Over-the-needle catheter    Ultrasound guidance: yes      Indwelling catheter placed: yes      Needle gauge:  18    Catheter size:  6 Fr    Number of attempts:  1    Drainage characteristics:  Bloody  Post-procedure details:     Chest x-ray performed: yes      Chest x-ray findings:  Pleural effusion improved    Patient tolerance of procedure:  Tolerated well, no immediate complications

## 2021-05-21 NOTE — CARE PLAN
The patient is Stable - Low risk of patient condition declining or worsening    Shift Goals  Clinical Goals: pt goes NPO at midnight    Progress made toward(s) clinical / shift goals:  pt cooperative and understands need for NPO.     Patient is not progressing towards the following goals:

## 2021-05-22 PROBLEM — J86.9 EMPYEMA, LEFT (HCC): Status: ACTIVE | Noted: 2021-01-01

## 2021-05-22 NOTE — PROGRESS NOTES
Assessment/description of ears? Pink, intact, blanching  Which preventative measures are in place for the ears? Grey foam, cheek stickers    Assessment/description of elbows? Pink, intact, blanching  Which preventative measures are in place for the elbows? Mepilex, waffle mattress    Assessment/description of sacrum? Redness, excoriation, blanching  Which preventative measures are in place for the sacrum? Sacral mepilex, waffle mattress, pt allowed a couple turns, but refused the rest- educated on importance    Assessment/description of heels?   L- pink, boggy blanching; lateral malleolus- red, blanching   R- redness, small dark spot; medial malleolus- purple, redness; scar on top of anterior ankle  Which preventative measures are in place for the heels? Heel float boots and mepilex in place    Which devices are in place? Chest tube L side, PICC, oxygen tubing  Description of skin under devices: Dressings C/D/I, skin intact and blanching  Which preventative measures are in place under devices? Grey foam and cheek pads    Other:

## 2021-05-22 NOTE — PROGRESS NOTES
Infectious Disease Progress Note    Author: Gomez Reveles M.D. Date & Time of service: 2021  9:15 AM    Chief Complaint:  Follow-up for empyema    Interval History:   patient remains afebrile, white count 6.7, tolerating antibiotics with no new issues.  Right-sided thoracentesis performed.  See below    Labs Reviewed and Medications Reviewed.    Review of Systems:  Review of Systems   Constitutional: Negative for chills and fever.   Respiratory: Positive for shortness of breath.    Gastrointestinal: Negative for abdominal pain, diarrhea, nausea and vomiting.   Skin: Negative for itching and rash.   Neurological: Positive for sensory change.   All other systems reviewed and are negative.      Hemodynamics:  Temp (24hrs), Av.6 °C (97.8 °F), Min:36.2 °C (97.2 °F), Max:36.7 °C (98.1 °F)  Temperature: 36.2 °C (97.2 °F)  Pulse  Av  Min: 44  Max: 117   Blood Pressure : 117/70       Physical Exam:  Physical Exam  Vitals and nursing note reviewed.   Constitutional:       General: He is not in acute distress.     Appearance: He is not toxic-appearing.   HENT:      Head: Normocephalic and atraumatic.   Eyes:      General:         Right eye: No discharge.         Left eye: No discharge.      Conjunctiva/sclera: Conjunctivae normal.   Cardiovascular:      Rate and Rhythm: Normal rate.      Heart sounds: No murmur heard.     Pulmonary:      Effort: Pulmonary effort is normal. No respiratory distress.      Breath sounds: No stridor.      Comments: Decreased to sounds bilaterally  Left-sided chest tube in place  Abdominal:      General: Abdomen is flat. There is no distension.   Musculoskeletal:      Comments: Well-healed incision dorsal right great toe, surgical site left fifth ray well-healed, mild erythema, no active discharge, no swelling, no fluctuance.  Left fourth toe with small scab on dorsal surface   Skin:     Findings: No erythema or rash.   Neurological:      General: No focal deficit present.       Mental Status: He is alert and oriented to person, place, and time.   Psychiatric:         Mood and Affect: Mood normal.         Behavior: Behavior normal.         Meds:    Current Facility-Administered Medications:   •  oxyCODONE immediate-release  •  oxyCODONE immediate release  •  MD Alert...Vancomycin per Pharmacy  •  vancomycin  •  albuterol  •  ferrous sulfate  •  gabapentin  •  magnesium oxide  •  montelukast  •  tamsulosin  •  senna-docusate **AND** polyethylene glycol/lytes **AND** magnesium hydroxide **AND** bisacodyl  •  Pharmacy Consult Request  •  acetaminophen  •  [DISCONTINUED] oxyCODONE immediate-release **OR** [DISCONTINUED] oxyCODONE immediate-release **OR** HYDROmorphone  •  ondansetron  •  ondansetron  •  insulin regular **AND** POC blood glucose manual result **AND** NOTIFY MD and PharmD **AND** glucose **AND** dextrose 50%  •  ceFAZolin    Labs:  Recent Labs     05/20/21 0839 05/21/21 0205 05/21/21  1048 05/22/21  0700   WBC 9.9 9.3  --  6.7   RBC 3.11* 3.08*  --  3.88*   HEMOGLOBIN 8.8* 8.5*  --  11.0*   HEMATOCRIT 29.5* 29.1*  --  36.8*   MCV 94.9 94.5  --  93.8   MCH 28.3 27.6  --  27.6   RDW 57.7* 56.0*  --  56.1*   PLATELETCT 287 283  --  212   MPV 9.4 9.7  --  9.3   EOSINOPHILS  --   --  1  --      Recent Labs     05/20/21  0839 05/21/21 0205 05/22/21  0700   SODIUM 138 136 141   POTASSIUM 4.8 4.4 4.4   CHLORIDE 103 102 105   CO2 29 31 30   GLUCOSE 121* 90 127*   BUN 21 21 18     Recent Labs     05/20/21 0839 05/21/21 0205 05/22/21  0700   ALBUMIN  --   --  2.0*   TBILIRUBIN  --   --  0.2   ALKPHOSPHAT  --   --  282*   TOTPROTEIN  --   --  5.0*   ALTSGPT  --   --  5   ASTSGOT  --   --  26   CREATININE 0.69 0.75 0.63       Imaging:  CT-CHEST (THORAX) W/O    Result Date: 5/19/2021 5/18/2021 10:24 PM HISTORY/REASON FOR EXAM: Pleural effusion TECHNIQUE/EXAM DESCRIPTION:  Transaxial MDCT scan of chest without contrast. Low dose optimization technique was utilized for this CT exam  including automated exposure control and adjustment of the mA and/or kV according to patient size. COMPARISON: April 25, 2021 FINDINGS The visualized portion of the thyroid appears within normal limits. The trachea and main stem airways are normal in caliber. There are no pathologically enlarged mediastinal lymph nodes. The heart and pericardium appear within normal limits. The aorta and its main branch vessels are normal in caliber and configuration. Scattered patchy reticular pulmonary infiltrates are seen. Linear consolidations in the bilateral lung bases are seen. Large left and moderate right pleural effusions are seen. Limited views of the abdomen demonstrates hepatomegaly and irregular hepatic contour. Scattered colonic diverticula are seen. The bony structures are age appropriate.     1.  Large left and moderate pleural effusions. 2.  Peripheral reticular pulmonary opacities, appearance compatible with Covid infiltrates. 3.  Linear consolidations in the bilateral lung bases, likely atelectasis. 4.  Hepatomegaly and irregular hepatic contour, appearance most compatible changes of cirrhosis. 5.  Diverticulosis    CT-CHEST (THORAX) WITH    Result Date: 5/22/2021 5/21/2021 10:17 PM HISTORY/REASON FOR EXAM:  Pneumonia. TECHNIQUE/EXAM DESCRIPTION: CT scan of the chest with contrast. Thin-section helical images were obtained from the lung apices through the adrenal glands following the bolus administration of contrast. 80 mL of Omnipaque 350 nonionic contrast was utilized. Low dose optimization technique was utilized for this CT exam including automated exposure control and adjustment of the mA and/or kV according to patient size. COMPARISON:  May 18, 2021 FINDINGS: Moderate left pneumothorax is seen. Linear reticular consolidations in the bilateral lung bases are seen with peripheral reticular opacities. Small caliber pigtail catheter is seen at the left costophrenic angle. There is no mediastinal, hilar, or  axillary adenopathy. The cardiac chambers, great vessels, and aorta are normal in CT appearance. Small bilateral pleural effusions are seen. The extrathoracic soft tissues and thoracic cage are normal in appearance. The adrenal glands are normal.  The visualized portions of the spleen, pancreas, and kidneys are normal. The liver is irregular in contour. Trace perihepatic ascites is seen. There is no upper abdominal adenopathy. There is no abnormal contrast enhancement.     1.  Moderate size left pneumothorax with small caliber pigtail thoracostomy tube in place, appears similar to prior chest x-ray yesterday at 1054. 2.  Layering bilateral pleural effusions. 3.  Peripheral reticular consolidations, appearance compatible with Covid infiltrates. There are areas of denser consolidation suggesting superimposed secondary infectious process. 4.  Irregular hepatic contour, compatible with changes of cirrhosis. 5.  Trace perihepatic ascites    CT-CHEST TUBE-EMPYEMA LEFT    Result Date: 5/21/2021 5/21/2021 9:05 AM HISTORY/REASON FOR EXAM:  pt with new left sided effusion, fluid study consistent with empyema, needs a pig tail for drainage. TECHNIQUE/EXAM DESCRIPTION AND NUMBER OF VIEWS:  CT guided left chest empyema drainage. Low dose optimization technique was utilized for this CT exam including automated exposure control and adjustment of the mA and/or kV according to patient size. PROCEDURE:  Informed consent was obtained. Localizing CT images were obtained with the patient in supine position. The skin was prepped with chlorhexidine and draped in a sterile fashion. SEDATION: Moderate sedation was provided. Pulse oximetry and continuous cardiac monitoring by the nurse was performed throughout the exam. Intraservice time was 30 minutes. Following local anesthesia with 1% Lidocaine, a 17 G guiding needle was placed and needle path confirmed with CT. An Amplatz guidewire was placed and following serial tract dilatation, a 10  Indian pigtail locking catheter was placed. The catheter was secured to the skin and connected to Pleur-evac suction. Final CT images were obtained documenting catheter position. The patient tolerated the procedure well with no evidence of complication. COMPARISON:Recent CT scan of thorax FINDINGS:CT images demonstrate the locking loop pigtail catheter in the inferior aspect of the large left-sided pleural effusion.     1. LEFT CHEST EMPYEMA Drainage with CT guidance.    DX-CHEST-FOR LINE PLACEMENT Perform procedure in: Patient's Room    Result Date: 4/27/2021 4/27/2021 5:17 PM HISTORY/REASON FOR EXAM: PICC tip location confirmation - STAT pt pending discharge transport. TECHNIQUE/EXAM DESCRIPTION AND NUMBER OF VIEWS: Single portable view of the chest. COMPARISON: Yesterday. FINDINGS: Left PICC line tip overlies the SVC Cardiomediastinal contours are stable, moderately large Stable to slight enlargement of large left pleural effusion with some lateral loculation possible. Aeration is only seen in the upper lung zone as before. Right lung shows no consolidation or effusion. No pneumothorax.     Left PICC line tip overlies the SVC Similar large left pleural effusion and multisegmental atelectasis. Consolidation is possible    DX-CHEST-LIMITED (1 VIEW)    Result Date: 5/20/2021 5/20/2021 8:18 AM HISTORY/REASON FOR EXAM:  re-eval small pneumothorax TECHNIQUE/EXAM DESCRIPTION AND NUMBER OF VIEWS: Single portable view of the chest. COMPARISON: 5/19/2021 FINDINGS: Cardiac contours grossly stable although difficult to evaluate. Patchy opacities again seen in both lungs, most confluent in the LEFT base, showing slight worsening in the RIGHT midlung. Bilateral pleural fluid. Small LEFT pneumothorax again seen laterally. LEFT arm PICC line is in similar position.     1.  Stable LEFT hydropneumothorax. 2.  Multifocal pneumonia again seen with slight worsening in RIGHT midlung region.     DX-CHEST-LIMITED (1 VIEW)    Result  Date: 5/19/2021 5/19/2021 5:33 PM HISTORY/REASON FOR EXAM:  re-eval small left pneumo TECHNIQUE/EXAM DESCRIPTION AND NUMBER OF VIEWS: Single portable view of the chest. COMPARISON: 5/19/2021 FINDINGS: Airspace opacity in the left lower lobe. Patchy hazy opacity in the right lateral lung. Small bilateral pleural effusions. Unchanged left lateral pneumothorax. Stable cardiopericardial silhouette.     Unchanged left lateral pneumothorax.    DX-CHEST-PORTABLE (1 VIEW)    Result Date: 5/22/2021 5/22/2021 5:50 AM HISTORY/REASON FOR EXAM: Pleural Effusion; pigtail monitoring TECHNIQUE/EXAM DESCRIPTION:  Single AP view of the chest. COMPARISON: Yesterday FINDINGS: Position of medical devices appears stable. Cardiomegaly is observed. Atherosclerotic calcification of the aorta is noted.  The central  pulmonary vasculature appears prominent and indistinct. The lungs appear well expanded bilaterally.  Diffuse scattered hazy pulmonary parenchymal opacities are seen, increased on the right. Left pneumothorax is seen, decreased since prior. New layering right pleural effusion is seen. The bony structures appear age-appropriate.     1.  Pulmonary infiltrates, increased on the right compared to prior study. 2.  New layering right pleural effusion. 3.  Left pneumothorax, decreased since prior study. 4.  Cardiomegaly 5.  Atherosclerosis    DX-CHEST-PORTABLE (1 VIEW)    Result Date: 5/21/2021 5/21/2021 10:40 AM HISTORY/REASON FOR EXAM:  Pleural Effusion. TECHNIQUE/EXAM DESCRIPTION AND NUMBER OF VIEWS: Single portable view of the chest. COMPARISON: 5/20/2021 FINDINGS: Cardiac mediastinal contour is unchanged. Interval placement of pigtail catheter at the LEFT lower chest. LEFT arm PICC line in similar position. Interval improvement of LEFT pleural effusion. Moderate LEFT pneumothorax, apparently increased from prior exam.  Contour deformity of the consolidated lung suggests the presence of nodules. Patchy RIGHT mid and lower lung  infiltrates appear improved.     1.  Interval placement of small-caliber LEFT chest tube with improvement of pleural fluid and compensatory enlargement of LEFT pneumothorax, likely due to noncompliant underlying lung. 2.  Improving RIGHT lung infiltrates. These findings were discussed with SUNDAR SUNG on 5/21/2021 11:18 AM.     DX-CHEST-PORTABLE (1 VIEW)    Result Date: 5/19/2021 5/19/2021 1:47 PM HISTORY/REASON FOR EXAM: Post thoracentesis. TECHNIQUE/EXAM DESCRIPTION AND NUMBER OF VIEWS: Single portable view of the chest. COMPARISON: 5/18/2021 FINDINGS: There has been interval decrease in size of the left pleural effusion. There is a small residual left pleural effusion. There is a small left loculated pneumothorax. There is likely pleural thickening. There is a small loculated right pleural effusion. Bilateral airspace opacities are increased on the right compared to prior. There is left perihilar and bibasilar atelectasis/consolidation. The cardiomediastinal silhouette is stable. Atherosclerotic calcification is seen. Left PICC projects over the SVC. There are surgical clips in the left neck.     Interval decrease in size of the left pleural effusion which is now small. Small small loculated left pneumothorax. Small loculated right pleural effusion. Left perihilar and basilar opacity may represent atelectasis/consolidation. Increased ill-defined airspace opacities on the right may represent a combination of atelectasis, edema and pneumonia. Atherosclerotic plaque. Findings discussed with the covering clinician.     DX-CHEST-PORTABLE (1 VIEW)    Result Date: 5/18/2021 5/18/2021 3:23 PM HISTORY/REASON FOR EXAM:  Shortness of Breath. TECHNIQUE/EXAM DESCRIPTION AND NUMBER OF VIEWS: Single portable view of the chest. COMPARISON: 4/26/2021 FINDINGS: Left PICC line tip projects near the cavoatrial junction. Cardiomediastinal silhouette is stable. Aortic calcified atherosclerotic plaque. Large left pleural effusion  is stable with associated compressive atelectasis. Correlate clinically for infection. Mildly increased opacity in the right mid and lower lung which could be related to atelectasis, edema and/or infection. Possible small right pleural effusion. All No acute osseous abnormality.     Stable large left pleural effusion with associated compressive atelectasis. Correlate clinically for infection. Possible small right pleural effusion with mildly increased opacity in the right lung.    DX-CHEST-PORTABLE (1 VIEW)    Result Date: 4/26/2021 4/26/2021 8:52 AM HISTORY/REASON FOR EXAM:  Shortness of Breath; pleural effusion on CTA. please reevalaute. TECHNIQUE/EXAM DESCRIPTION AND NUMBER OF VIEWS: Single portable view of the chest. COMPARISON: Chest radiograph 11/18/2020. CTA chest 4/25/2021 FINDINGS: Cardiomediastinal silhouette is stable. Large left pleural effusion with associated compressive atelectasis of the left lung. The small pleural effusion on the earlier CT study is not well visualized radiographically. Mild right basilar atelectasis. No pneumothorax. No acute osseous abnormality.     Large left pleural effusion with associated compressive atelectasis. Correlate clinically for infection.    DX-FOOT-COMPLETE 3+ LEFT    Result Date: 4/22/2021 4/22/2021 3:42 PM HISTORY/REASON FOR EXAM:  Pain/Deformity Following Trauma (Wound TECHNIQUE/EXAM DESCRIPTION AND NUMBER OF VIEWS: 3 views of the LEFT foot. COMPARISON:  None. FINDINGS: There is soft tissue swelling along the lateral aspect of the foot. There is bony destruction seen throughout the fifth proximal phalanx, the mid/distal phalangeal base and involving the fifth metacarpal head/neck consistent with osteomyelitis. There may  be septic arthritis involving the interposed joints as well. There is generalized osteopenia. No evidence of osteomyelitis elsewhere within the foot. Mild degenerative changes the great toe MTP joint.     Findings in keeping with osteomyelitis  involving the fifth distal metatarsal, proximal and middle phalanges.    US-HAKEEM SINGLE LEVEL BILAT    Result Date: 2021   Vascular Laboratory  Conclusions  Bilateral.  There is no evidence of major arterial disease demonstrated.  NADINE CABRERA  Age:    76    Gender:     M  MRN:    [id number]  :    1945      BSA:  Exam Date:     2021 10:46  Room #:     Inpatient  Priority:     Routine  Ht (in):             Wt (lb):  Ordering Physician:        ANN HOYT  Referring Physician:       [id number] Cruz  Sonographer:               Cami Alves [facility]                             RVT  Study Type:                Complete Bilateral  Technical Quality:         Adequate  Indications:     Ulceration of LE  CPT Codes:       23494  ICD Codes:       707.1  History:         Non-healing wound of left lower limb. Diabetes.  Limitations:                 RIGHT  Waveform            Systolic BPs (mmHg)                             125           Brachial  Triphasic                                Common Femoral  Triphasic                  276           Posterior Tibial  Triphasic                  267           Dorsalis Pedis                                           Peroneal                             2.12          HAKEEM                                           TBI                       LEFT  Waveform        Systolic BPs (mmHg)                             130           Brachial  Triphasic                                Common Femoral  Triphasic                  240           Posterior Tibial  Triphasic                  226           Dorsalis Pedis                                           Peroneal                             1.85          HAKEEM                                           TBI  Findings  Bilateral.  Doppler waveforms of the common femoral arteries are of high amplitude and  triphasic.  Doppler waveforms at the ankle are brisk and triphasic.  Ankle pressures are not accurately  measured due to calcification and  noncompressibility of the tibial vessels.  Toe-brachial indices are normal. Right: 0.81  Left: 0.85  Additional testing was not performed in accordance with lower extremity  arterial evaluation protocol.  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  Otherwise negative LLE examination.  No RLE DVT identified.  NADINE CABRERA  Exam Date:     2021 15:37  Room #:     Inpatient  Priority:     Routine  Ht (in):             Wt (lb):  Ordering Physician:        ROGER ALONZO  Referring Physician:       [id number] Manzo  Sonographer:               Bijal Christian  Study Type:                Complete Bilateral  Technical Quality:         Fair  Age:    76    Gender:     M  MRN:    [id number]  :    1945      BSA:  Indications:     Personal history of venous thrombosis and embolism  CPT Codes:       90458  ICD Codes:       Z86.71  History:         BLE pain, pressure, and edema. History of left lower                   extremity deep venous thrombosis. Prior exam 20.  Limitations:     Patient unable to bend either knee.  PROCEDURES:  Bilateral lower extremity venous duplex imaging.  The following venous structures were evaluated: common femoral, profunda  femoral, proximal greater saphenous, femoral, popliteal , peroneal and  posterior tibial veins.  Serial compression, augmentation maneuvers,  color and spectral Doppler  flow evaluations were performed.  FINDINGS:  Right lower extremity -  The peroneal and posterior tibial veins are difficult to assess but appear  to be noncompressible, and no flow response to augmentation is  demonstrated.  Complete color filling and compressibility with normal venous flow dynamics  including spontaneous  flow, response to augmentation maneuvers, and  respiratory phasicity in all other remaining visualized vessels.  Left lower extremity -  Chronic deep venous thrombosis is seen in the popliteal vein.  The peroneal and posterior tibial veins are difficult to assess for  compressibility, but flow response to augmentation is demonstrated.  Complete color filling and compressibility with normal venous flow dynamics  including spontaneous flow, response to augmentation maneuvers, and  respiratory phasicity in all other remaining visualized vessels.  Kade Louis MD  (Electronically Signed)  Final Date:      21 May 2021 17:01    US-THORACENTESIS PUNCTURE LEFT    Result Date: 5/19/2021 5/19/2021 12:53 PM HISTORY/REASON FOR EXAM:  Shortness of breath; Large left sided pleural effusion, unknown etiology TECHNIQUE/EXAM DESCRIPTION: Ultrasound-guided thoracentesis. Indication:  LEFT pleural fluid collection. COMPARISON:  5/18/2021 PROCEDURE:     Informed consent was obtained. A timeout was taken. A left pleural effusion was localized with real-time ultrasound guidance. The left posterior chest wall was prepped and draped in a sterile manner. Following local anesthesia with 1% lidocaine, a 5 Portuguese Yueh pigtail catheter was advanced into the pleural space with trocar technique and pleural fluid was drained. The patient tolerated the procedure well without evidence of complication. A post thoracentesis chest radiograph is forthcoming. FINDINGS: Fluid was sent to the laboratory. Fluid character: Brown     1. Ultrasound guided left sided diagnostic and therapeutic thoracentesis. 2. 1550 mL of fluid withdrawn.    CT-CTA CHEST PULMONARY ARTERY W/ RECONS    Result Date: 4/25/2021 4/25/2021 4:30 PM HISTORY/REASON FOR EXAM:  PE suspected, high pretest prob; pleuritic pain, hypoxia Hypoxia. Deep venous thrombosis. Pelvis therapy. Covid in December. TECHNIQUE/EXAM DESCRIPTION: CT angiogram scan for pulmonary embolism with contrast,  with reconstructions. 1.25 mm helical sections were obtained from the lung apices through the lung bases following the rapid bolus administration of 62 mL of Omnipaque 350 nonionic contrast. Thin-section overlapping reconstruction interval was utilized. Coronal reconstructions were generated from the axial data. MIP post processing was performed and utilized for the interpretation. Low dose optimization technique was utilized for this CT exam including automated exposure control and adjustment of the mA and/or kV according to patient size. COMPARISON: 11/18/2020 CT FINDINGS: New large left pleural effusion with multisegmental compressive atelectasis. The left lower lobe is completely collapsed. There is new small-medium volume right pleural fluid with less atelectasis Calcified right middle lobe nodule Pulmonary Embolism: No. Main Pulmonary Arteries: No. Segmental branches: No. Additional Comments: Coronary artery disease. Lungs: No suspicious nodules or air space process. Pleura: No pleural effusion. Nodes: There is a 9 mm short axis right paraesophageal node. No enlarged nodes are seen     New large left, small to medium right pleural effusions No pulmonary embolism Moderate coronary artery disease Remote granulomatous disease and/or calcified hamartoma     IR-PICC LINE PLACEMENT W/ GUIDANCE > AGE 5    Result Date: 4/27/2021  HISTORY/REASON FOR EXAM:   PICC placement. TECHNIQUE/EXAM DESCRIPTION AND NUMBER OF VIEWS:   PICC line insertion with ultrasound guidance.  The procedure was performed using maximal sterile barrier technique including sterile gown, mask, cap, and donning of sterile gloves following appropriate hand hygiene and/or sterile scrub. Patient skin site was prepped with 2% Chlorhexidine solution. FINDINGS:  PICC line insertion with Ultrasound Guidance was performed by qualified nursing staff without the assistance of a Radiologist. PICC positioning appropriateness confirmed by 3CG technology; chest  "xray only needed in the instance 3CG unable to confirm placement.              Ultrasound-guided PICC placement performed by qualified nursing staff as above.       Micro:  Results     Procedure Component Value Units Date/Time    FLUID CULTURE W/GRAM STAIN [[id number]] Collected: 05/19/21 1400    Order Status: Completed Specimen: Body Fluid Updated: 05/22/21 0816     Significant Indicator NEG     Source BF     Site Thoracentesis Fluid     Culture Result No growth at 72 hours.     Gram Stain Result Rare WBCs.  Rare Gram positive cocci.      Narrative:      Thoracentesis.    MRSA By PCR (Amp) [[id number]]     Order Status: No result Specimen: Respirate from Nares     BLOOD CULTURE [[id number]] Collected: 05/21/21 1212    Order Status: Completed Specimen: Blood from Peripheral Updated: 05/22/21 0736     Significant Indicator NEG     Source BLD     Site PERIPHERAL     Culture Result No Growth  Note: Blood cultures are incubated for 5 days and  are monitored continuously.Positive blood cultures  are called to the RN and reported as soon as  they are identified.      Narrative:      Per Hospital Policy: Only change Specimen Src: to \"Line\" if  specified by physician order.  Right Hand    BLOOD CULTURE [[id number]] Collected: 05/21/21 1437    Order Status: Completed Specimen: Blood from Peripheral Updated: 05/22/21 0736     Significant Indicator NEG     Source BLD     Site PERIPHERAL     Culture Result No Growth  Note: Blood cultures are incubated for 5 days and  are monitored continuously.Positive blood cultures  are called to the RN and reported as soon as  they are identified.      Narrative:      Per Hospital Policy: Only change Specimen Src: to \"Line\" if  specified by physician order.  Right Hand    GRAM STAIN [[id number]] Collected: 05/21/21 1048    Order Status: Completed Specimen: Body Fluid Updated: 05/21/21 1457     Significant Indicator .     Source BF     Site THORACENTESIS FLUID     Gram Stain Result Rare WBCs.  No " "organisms seen.      Narrative:      Collected By:[id number] KAPIL RIOS  Collected By:[id number] KAPIL RIOS    FLUID CULTURE W/GRAM STAIN [[id number]] Collected: 05/21/21 1048    Order Status: Completed Specimen: Body Fluid from Thoracentesis Fluid Updated: 05/21/21 1457     Significant Indicator NEG     Source BF     Site THORACENTESIS FLUID     Culture Result -     Gram Stain Result Rare WBCs.  No organisms seen.      Narrative:      Collected By:[id number] KAPIL RIOS  Collected By:[id number] KAPIL RIOS    FUNGAL CULTURE [[id number]] Collected: 05/21/21 1048    Order Status: Completed Specimen: Body Fluid from Thoracentesis Fluid Updated: 05/21/21 1457     Significant Indicator NEG     Source BF     Site THORACENTESIS FLUID     Culture Result -    Narrative:      Collected By:[id number] KAPIL RIOS  Collected By:[id number] KAPIL RIOS    FLUID CULTURE W/GRAM STAIN [[id number]] Collected: 05/21/21 1048    Order Status: Sent Specimen: Body Fluid from Thoracentesis Fluid     AFB CULTURE [[id number]] Collected: 05/21/21 1048    Order Status: Canceled Specimen: Body Fluid from Thoracentesis Fluid     AFB Culture [[id number]] Collected: 05/21/21 1048    Order Status: No result Specimen: Other     BLOOD CULTURE [[id number]] Collected: 05/20/21 1557    Order Status: Completed Specimen: Blood from Peripheral Updated: 05/21/21 0821     Significant Indicator NEG     Source BLD     Site PERIPHERAL     Culture Result No Growth  Note: Blood cultures are incubated for 5 days and  are monitored continuously.Positive blood cultures  are called to the RN and reported as soon as  they are identified.      Narrative:      Per Hospital Policy: Only change Specimen Src: to \"Line\" if  specified by physician order.  Right Hand    BLOOD CULTURE [[id number]] Collected: 05/20/21 1557    Order Status: Completed Specimen: Blood from Peripheral Updated: 05/21/21 0821     Significant Indicator NEG     Source BLD     Site PERIPHERAL     Culture " "Result No Growth  Note: Blood cultures are incubated for 5 days and  are monitored continuously.Positive blood cultures  are called to the RN and reported as soon as  they are identified.      Narrative:      Per Hospital Policy: Only change Specimen Src: to \"Line\" if  specified by physician order.  Left AC    GRAM STAIN [[id number]] Collected: 05/19/21 1400    Order Status: Completed Specimen: Body Fluid Updated: 05/20/21 1202     Significant Indicator .     Source BF     Site Thoracentesis Fluid     Gram Stain Result Rare WBCs.  Rare Gram positive cocci.      Narrative:      Thoracentesis.    COV-2, FLU A/B, AND RSV BY PCR (2-4 HOURS CEPHEID): Collect NP swab in VTM [[id number]] Collected: 05/18/21 1730    Order Status: Completed Specimen: Respirate Updated: 05/18/21 1836     Influenza virus A RNA Negative     Influenza virus B, PCR Negative     RSV, PCR Negative     SARS-CoV-2 by PCR NotDetected     Comment: PATIENTS: Important information regarding your results and instructions can  be found at [email or web address]   \"After your  Covid-19 Test\"    [facility] providers: PLEASE REFER TO DE-ESCALATION AND RETESTING PROTOCOL  on inside[email or web address]    **The "[employer] GeneXpert Xpress SARS-CoV-2 RT-PCR Test has been made  available for use under the Emergency Use Authorization (EUA) only.          SARS-CoV-2 Source NP Swab    Narrative:      Have you been in close contact with a person who is suspected  or known to be positive for COVID-19 within the last 30 days  (e.g. last seen that person < 30 days ago)->No          Assessment:  Agapito Stone is a pleasant 76 y.o. male SNF resident with known history of CAD, type 2 diabetes, hypertension, CVA, left lower extremity DVT on Eliquis, recently admitted with worsening left metatarsal heel wound with underlying osteomyelitis on imaging.  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 No pathology obtained.  Patient was discharged to SNF with plan to complete 6 weeks of IV daptomycin + IV Ancef.  Patient was brought to the ER on 5/19 with worsening shortness of breath.  CT scan showed worsening bilateral pleural effusions L>R compared to prior imaging. Left-sided thoracentesis performed which appeared bloody, lymphocyte predominant white cells, exudate by criteria, also with Gram stain reportedly positive for GPCs.     Unusual clinical course for empyema with worsening bilateral effusions that are primarily bloody, low white count and lymphocyte predominant.  However, the Gram stain from the left-sided effusion is positive, confirmed on review by microbiology lab. Also noted new peripheral opacities right upper lobe with possible early cavitation that may perhaps indicate systemic seeding of infection which would be unusual without a right-sided endocarditis.     Pertinent Diagnoses:  Left-sided empyema  Bilateral pleural effusions  Left foot osteomyelitis  Polymicrobial infection  Type 2 diabetes  Penicillin allergy    Plan:  -Continue IV Ancef 2 g every 8 hours  -Changed daptomycin to vancomycin while inpatient to continue coverage of the MRSA and also better pulmonary coverage.  Monitor vancomycin levels and renal function  -No growth till date from the left effusion.  Will follow cultures  -Right-sided thoracentesis performed 5/21, also bloody, also lymphocyte predominant.  Follow cultures  -TTE pending  -Blood cultures x2 from 5/21 no growth to date  -Due to persistent pneumothorax on the left, current plan is for repeat CT.  Pulmonary on board     Plan was discussed with the primary team, Dr. Curran     ID will follow. Please feel free to call with questions.

## 2021-05-22 NOTE — CARE PLAN
The patient is Stable - Low risk of patient condition declining or worsening    Shift Goals  Clinical Goals: Patient's O2 saturation will remain above 92    Progress made toward(s) clinical / shift goals:  patient tolerating chest tube, O2 saturation above 92, no complain of pain or discomfort.    Patient is not progressing towards the following goals:

## 2021-05-22 NOTE — PROGRESS NOTES
Pulmonary Progress Note    Date of admission  5/18/2021    Chief Complaint  Shortness of breath    Hospital Course  76-year-old male with medical history of stroke, CAD s/p stents in 2013, hypertension, left lower extremity DVT on Eliquis, diabetes mellitus type 2, left foot osteomyelitis s/p fifth toe amputation and on cefazolin and daptomycin is admitted with acute hypoxic respiratory failure secondary to bilateral exudative pleural effusion s/p thoracentesis and pigtail chest tube on the left side secondary to possible empyema.     Interval Problem Update  No acute events overnight  Patient underwent chest tube placement on left side and diagnostic thoracentesis on right side yesterday.  Pleural fluid analysis showing exudative effusion and cytology negative for any malignant cells.   -Patient is stable on 4 L of oxygen, no acute concerns  -Chest tube connected to water suction, draining around 1600 cc so far  -Repeat CT scan after thoracentesis showed pneumothorax on the left and layering of pleural effusion on the right again and patchy opacities on the right lung.     Review of Systems  Review of Systems   Constitutional: Negative for chills, fever and weight loss.   HENT: Negative for ear pain, hearing loss and tinnitus.    Eyes: Negative for blurred vision, double vision and photophobia.   Respiratory: Negative for cough, hemoptysis and sputum production.    Cardiovascular: Negative for chest pain, palpitations and orthopnea.   Gastrointestinal: Negative for abdominal pain, nausea and vomiting.   Genitourinary: Negative for dysuria, frequency and urgency.   Musculoskeletal: Negative for back pain, myalgias and neck pain.   Skin: Negative for itching and rash.   Neurological: Negative for dizziness, tingling and headaches.   Psychiatric/Behavioral: Negative for depression and suicidal ideas.        Vital Signs for last 24 hours   Temp:  [36.2 °C (97.2 °F)-36.7 °C (98.1 °F)] 36.2 °C (97.2 °F)  Pulse:  []  100  Resp:  [16-28] 18  BP: ()/(53-80) 117/70  SpO2:  [95 %-100 %] 99 %    Hemodynamic parameters for last 24 hours       Respiratory Information for the last 24 hours       Physical Exam   Physical Exam  Constitutional:       Appearance: Normal appearance.   HENT:      Head: Normocephalic.      Nose: Nose normal.      Mouth/Throat:      Mouth: Mucous membranes are moist.   Eyes:      Extraocular Movements: Extraocular movements intact.      Pupils: Pupils are equal, round, and reactive to light.   Cardiovascular:      Rate and Rhythm: Normal rate and regular rhythm.      Pulses: Normal pulses.      Heart sounds: Normal heart sounds.   Pulmonary:      Comments: Decreased breath sounds bilaterally from mid lung zone to lower base  Abdominal:      General: Bowel sounds are normal. There is no distension.      Palpations: Abdomen is soft.      Tenderness: There is no abdominal tenderness.   Musculoskeletal:         General: Normal range of motion.      Cervical back: Normal range of motion.   Skin:     General: Skin is warm.   Neurological:      General: No focal deficit present.      Mental Status: He is alert and oriented to person, place, and time.   Psychiatric:         Mood and Affect: Mood normal.         Behavior: Behavior normal.         Medications  Current Facility-Administered Medications   Medication Dose Route Frequency Provider Last Rate Last Admin   • oxyCODONE immediate-release (ROXICODONE) tablet 5 mg  5 mg Oral Q3HRS PRN Jacek Cortez M.D.       • oxyCODONE immediate release (ROXICODONE) tablet 10 mg  10 mg Oral Q3HRS PRN Jacek Cortez M.D.       • MD Alert...Vancomycin per Pharmacy   Other PHARMACY TO DOSE Gomez Reveles M.D.       • vancomycin (VANCOCIN) 1,000 mg in  mL IVPB  1,000 mg Intravenous Q12HR Gomez Reveles M.D.   Stopped at 05/22/21 0541   • albuterol inhaler 2 Puff  2 Puff Inhalation Q6HRS PRN Julio César Mckee M.D.       • ferrous sulfate tablet 325 mg  325 mg Oral  QDAY with Breakfast Julio César Mckee M.D.   325 mg at 05/22/21 0851   • gabapentin (NEURONTIN) capsule 100 mg  100 mg Oral BID Julio César Mckee M.D.   100 mg at 05/22/21 0547   • magnesium oxide (MAG-OX) tablet 400 mg  400 mg Oral BID Julio César Mckee M.D.   400 mg at 05/22/21 0547   • montelukast (SINGULAIR) tablet 10 mg  10 mg Oral DAILY Julio Cséar Mckee M.D.   10 mg at 05/22/21 0547   • tamsulosin (FLOMAX) capsule 0.4 mg  0.4 mg Oral QHS Julio César Mckee M.D.   0.4 mg at 05/21/21 2059   • senna-docusate (PERICOLACE or SENOKOT S) 8.6-50 MG per tablet 2 tablet  2 tablet Oral BID Julio César Mckee M.D.   2 tablet at 05/22/21 0547    And   • polyethylene glycol/lytes (MIRALAX) PACKET 1 Packet  1 Packet Oral QDAY PRN Julio César Mckee M.D.        And   • magnesium hydroxide (MILK OF MAGNESIA) suspension 30 mL  30 mL Oral QDAY PRN Julio César Mckee M.D.        And   • bisacodyl (DULCOLAX) suppository 10 mg  10 mg Rectal QDAY PRN Julio César Mckee M.D.       • Pharmacy Consult Request ...Pain Management Review 1 Each  1 Each Other PHARMACY TO DOSE Julio César Mckee M.D.       • acetaminophen (Tylenol) tablet 650 mg  650 mg Oral Q6HRS PRN Julio César Mckee M.D.   650 mg at 05/19/21 1256   • HYDROmorphone (Dilaudid) injection 0.25 mg  0.25 mg Intravenous Q3HRS PRN Julio César Mckee M.D.       • ondansetron (ZOFRAN) syringe/vial injection 4 mg  4 mg Intravenous Q4HRS PRN Julio César Mckee M.D.       • ondansetron (ZOFRAN ODT) dispertab 4 mg  4 mg Oral Q4HRS PRN Julio César Mckee M.D.       • insulin regular (HumuLIN R,NovoLIN R) injection  2-9 Units Subcutaneous 4X/DAY ACHS Julio César Mckee M.D.   2 Units at 05/21/21 2056    And   • glucose 4 g chewable tablet 16 g  16 g Oral Q15 MIN PRN Julio César Mckee M.D.        And   • dextrose 50% (D50W) injection 50 mL  50 mL Intravenous Q15 MIN PRN Julio César Mckee M.D.       • ceFAZolin in dextrose (ANCEF) IVPB premix 2 g  2 g Intravenous Q8HRS Julio César Mckee M.D.    Stopped at 05/22/21 0249       Fluids    Intake/Output Summary (Last 24 hours) at 5/22/2021 0904  Last data filed at 5/22/2021 0600  Gross per 24 hour   Intake 600 ml   Output 2450 ml   Net -1850 ml       Laboratory          Recent Labs     05/20/21  0839 05/21/21  0205 05/22/21  0700   SODIUM 138 136 141   POTASSIUM 4.8 4.4 4.4   CHLORIDE 103 102 105   CO2 29 31 30   BUN 21 21 18   CREATININE 0.69 0.75 0.63   MAGNESIUM 1.7  --  1.8   CALCIUM 8.2* 7.9* 7.8*     Recent Labs     05/20/21  0839 05/21/21  0205 05/22/21  0700   ALTSGPT  --   --  5   ASTSGOT  --   --  26   ALKPHOSPHAT  --   --  282*   TBILIRUBIN  --   --  0.2   GLUCOSE 121* 90 127*     Recent Labs     05/20/21  0839 05/21/21  0205 05/21/21  1048 05/22/21  0700   WBC 9.9 9.3  --  6.7   EOSINOPHILS  --   --  1  --    ASTSGOT  --   --   --  26   ALTSGPT  --   --   --  5   ALKPHOSPHAT  --   --   --  282*   TBILIRUBIN  --   --   --  0.2     Recent Labs     05/19/21  1205 05/20/21  0839 05/21/21  0205 05/22/21  0700   RBC  --  3.11* 3.08* 3.88*   HEMOGLOBIN  --  8.8* 8.5* 11.0*   HEMATOCRIT  --  29.5* 29.1* 36.8*   PLATELETCT  --  287 283 212   PROTHROMBTM 14.8*  --   --   --    APTT 31.1  --   --   --    INR 1.12  --   --   --        Imaging  CT scan:  Moderate size left pneumothorax with small caliber pigtail thoracostomy tube in place, appears similar to prior chest x-ray yesterday  Layering bilateral pleural effusions. Peripheral reticular consolidations, appearance compatible with Covid infiltrates. There are areas of denser consolidation suggesting superimposed secondary infectious process    X ray: Pulmonary infiltrates, increased on the right compared to prior study.  2.  New layering right pleural effusion.  3.  Left pneumothorax, decreased since prior study.    Assessment/Plan  76-year-old male admitted with bilateral exudative pleural effusion loculated empyema on left side s/p chest tube placement on 5/21.  Possible etiology could be septic emboli  from his osteomyelitis.     Impression:  #Bilateral exudative pleural effusion  #Left-sided loculated empyema  #Acute hypoxic respiratory failure  #Left leg DVT  #Osteomyelitis of left foot    Plan:  -Cytology from right side pleural fluid analysis is pending.  Repeat CT scan after thoracentesis and chest tube placement showed left-sided pneumothorax and layering of pleural effusion again on the right side.   -Continue chest tube to water suction, reduced amount of fluid draining out today  -Continue cefazolin and vancomycin as per ID.  Blood cultures final result pending  -Plan for TTE today to look for infective endocarditis  -Repeat ultrasound showed existing DVT.  Can resume apixaban  -May need thoracic surgery /VATS in the future.   -  I have performed a physical exam and reviewed and updated ROS and Plan today (5/22/2021). In review of yesterday's note (5/21/2021), there are no changes except as documented above.     Discussed patient condition and risk of morbidity and/or mortality with Hospitalist, RN and Patient    This note was generated using voice recognition software which has a chance of producing errors of grammar and content.  I have made every reasonable attempt to find and correct any errors, but it should be expected that some may not be found prior to finalization of this note.  __________  Atul Curran MD  Pulmonary and Critical Care Medicine  [facility]

## 2021-05-22 NOTE — PROGRESS NOTES
Assessment/description of ears? Pink, blanching  Which preventative measures are in place for the ears? Grey ear foam     Assessment/description of elbows? Pink, blanching  Which preventative measures are in place for the elbows? Mepilex, waffle mattress     Assessment/description of sacrum? pressure injuries to sacrum/coccyx,Redness, excoriations  Which preventative measures are in place for the sacrum? Mepilex, waffle mattress- pt. Agreed to turn once twice, during cares.     Assessment/description of heels? R- pink/purple L redness  Which preventative measures are in place for the heels? Mepilex, heel float boots     Which devices are in place? PICC, chest tube- left side, oxygen tubing, heel float boots  Description of skin under devices: dressing C/D/I for chest tube site, intact and blanching under other  Which preventative measures are in place under devices? Grey foam, Mepilex under chest tube tubing prevent contact with skin     Other:

## 2021-05-22 NOTE — PROGRESS NOTES
Bedside report received at change of shift. Pt is A&Ox4, reports no pain at this time. Chest tube is connected to suction at -20cm per MD order. Safety precautions are in place. Call light is within reach. All pt needs met at this time.

## 2021-05-22 NOTE — CARE PLAN
The patient is Watcher - Medium risk of patient condition declining or worsening    Shift Goals  Clinical Goals: Pt's respiratory status will continue to improve    Progress made toward(s) clinical / shift goals:  Pt reports decreased work of breathing since thoracentesis and chest tube placement.     Patient is not progressing towards the following goals:

## 2021-05-22 NOTE — ASSESSMENT & PLAN NOTE
S/p thoracentesis of left pleural cavity .1.5 L of fluid removed.  S/p thoracentesis of right pleural cavity about 100 cc fluid removed, bloody.  Fluid studies slightly consistent with exudative effusion.  Cytology negative  Pulmonology following.    Rare gram-positive cocci growing in left pleural cavity concerning for empyema.  S/p pigtail placement of the left pleural cavity for adequate drainage of infection.  We will follow up on final cultures from the left pleural cavity and also monitor cultures from the right pleural cavity as well.   ID following, on vancomycin and Ancef initially and currently on Bactrim

## 2021-05-22 NOTE — PROGRESS NOTES
[facility] Medicine Daily Progress Note    Date of Service  5/22/2021    Chief Complaint  76 y.o. male admitted 5/18/2021 with PICC line dysfunction and new onset pleural effusion.    Interval Problem Update  Patient seen and examined at bedside this morning.  No acute events overnight.     Left pigtail chest tube with greater than 1000cc bloody output since placement.  Cytology of left pleural fluid is negative for malignancy.  CT chest with contrast did show persistent moderate left pneumothorax.  DVT ultrasound with persistent left lower extremity DVT.  2D echo still pending.  ID consulted on antibiotics switched to Ancef and vancomycin. We will closely monitor Vanco levels.  Also closely monitoring pleural fluids final cultures.  We will keep off Eliquis for now due to concern for future surgical interventions.    Consultants/Specialty  Pulm  ID    Code Status  Full Code    Disposition  NH    Review of Systems  Review of Systems   Constitutional: Negative.    Respiratory: Positive for shortness of breath.    Cardiovascular: Negative.    Gastrointestinal: Negative.    Genitourinary: Negative.    Musculoskeletal: Negative.    All other systems reviewed and are negative.       Physical Exam  Temp:  [36.2 °C (97.2 °F)-36.7 °C (98.1 °F)] 36.2 °C (97.2 °F)  Pulse:  [] 100  Resp:  [16-28] 18  BP: ()/(53-73) 117/70  SpO2:  [95 %-100 %] 99 %    Physical Exam  Constitutional:       Appearance: Normal appearance.   HENT:      Head: Normocephalic and atraumatic.   Eyes:      Conjunctiva/sclera: Conjunctivae normal.   Cardiovascular:      Rate and Rhythm: Normal rate.      Pulses: Normal pulses.   Pulmonary:      Effort: Pulmonary effort is normal.      Breath sounds: Rales present.   Abdominal:      General: Bowel sounds are normal.      Palpations: Abdomen is soft.   Musculoskeletal:         General: No swelling or tenderness.      Comments: Left-sided pigtail chest tube in place.   Skin:     General: Skin is  warm.   Neurological:      General: No focal deficit present.      Mental Status: He is alert. Mental status is at baseline.   Psychiatric:         Mood and Affect: Mood normal.         Behavior: Behavior normal.         Fluids    Intake/Output Summary (Last 24 hours) at 5/22/2021 0934  Last data filed at 5/22/2021 0600  Gross per 24 hour   Intake 600 ml   Output 2450 ml   Net -1850 ml       Laboratory  Recent Labs     05/20/21  0839 05/21/21  0205 05/22/21  0700   WBC 9.9 9.3 6.7   RBC 3.11* 3.08* 3.88*   HEMOGLOBIN 8.8* 8.5* 11.0*   HEMATOCRIT 29.5* 29.1* 36.8*   MCV 94.9 94.5 93.8   MCH 28.3 27.6 27.6   MCHC 29.8* 29.2* 29.4*   RDW 57.7* 56.0* 56.1*   PLATELETCT 287 283 212   MPV 9.4 9.7 9.3     Recent Labs     05/20/21  0839 05/21/21  0205 05/22/21  0700   SODIUM 138 136 141   POTASSIUM 4.8 4.4 4.4   CHLORIDE 103 102 105   CO2 29 31 30   GLUCOSE 121* 90 127*   BUN 21 21 18   CREATININE 0.69 0.75 0.63   CALCIUM 8.2* 7.9* 7.8*     Recent Labs     05/19/21  1205   APTT 31.1   INR 1.12               Imaging  DX-CHEST-PORTABLE (1 VIEW)   Final Result         1.  Pulmonary infiltrates, increased on the right compared to prior study.   2.  New layering right pleural effusion.   3.  Left pneumothorax, decreased since prior study.   4.  Cardiomegaly   5.  Atherosclerosis      CT-CHEST (THORAX) WITH   Final Result         1.  Moderate size left pneumothorax with small caliber pigtail thoracostomy tube in place, appears similar to prior chest x-ray yesterday at 1054.   2.  Layering bilateral pleural effusions.   3.  Peripheral reticular consolidations, appearance compatible with Covid infiltrates. There are areas of denser consolidation suggesting superimposed secondary infectious process.   4.  Irregular hepatic contour, compatible with changes of cirrhosis.   5.  Trace perihepatic ascites      US-EXTREMITY VENOUS LOWER BILAT   Final Result      DX-CHEST-PORTABLE (1 VIEW)   Final Result      1.  Interval placement of  small-caliber LEFT chest tube with improvement of pleural fluid and compensatory enlargement of LEFT pneumothorax, likely due to noncompliant underlying lung.   2.  Improving RIGHT lung infiltrates.      These findings were discussed with SUNDAR SUNG on 5/21/2021 11:18 AM.         CT-CHEST TUBE-EMPYEMA LEFT   Final Result      1. LEFT CHEST EMPYEMA Drainage with CT guidance.      DX-CHEST-LIMITED (1 VIEW)   Final Result      1.  Stable LEFT hydropneumothorax.   2.  Multifocal pneumonia again seen with slight worsening in RIGHT midlung region.            DX-CHEST-LIMITED (1 VIEW)   Final Result         Unchanged left lateral pneumothorax.      US-THORACENTESIS PUNCTURE LEFT   Final Result      1. Ultrasound guided left sided diagnostic and therapeutic thoracentesis.      2. 1550 mL of fluid withdrawn.      DX-CHEST-PORTABLE (1 VIEW)   Final Result      Interval decrease in size of the left pleural effusion which is now small.      Small small loculated left pneumothorax.      Small loculated right pleural effusion.      Left perihilar and basilar opacity may represent atelectasis/consolidation.      Increased ill-defined airspace opacities on the right may represent a combination of atelectasis, edema and pneumonia.      Atherosclerotic plaque.      Findings discussed with the covering clinician.         CT-CHEST (THORAX) W/O   Final Result         1.  Large left and moderate pleural effusions.   2.  Peripheral reticular pulmonary opacities, appearance compatible with Covid infiltrates.   3.  Linear consolidations in the bilateral lung bases, likely atelectasis.   4.  Hepatomegaly and irregular hepatic contour, appearance most compatible changes of cirrhosis.   5.  Diverticulosis      DX-CHEST-PORTABLE (1 VIEW)   Final Result      Stable large left pleural effusion with associated compressive atelectasis. Correlate clinically for infection.      Possible small right pleural effusion with mildly increased opacity  in the right lung.      EC-ECHOCARDIOGRAM COMPLETE W/O CONT    (Results Pending)        Assessment/Plan  * Pleural effusion, bilateral  Assessment & Plan  Unknown etiology  Was seen on last admission and was asymptomatic. Now with worsening SOB and worsening effusion  CT chest -findings of large left-sided pleural effusion and moderate right pleural effusion.    2D echo pending.  S/p thoracentesis of left pleural cavity .1.5 L of fluid removed.  S/p thoracentesis of right pleural cavity about 100 cc fluid removed, bloody.  Fluid studies slightly consistent with exudative effusion.  Cytology negative  Pulmonology following.    Rare gram-positive cocci growing in left pleural cavity concerning for empyema.  S/p pigtail placement of the left pleural cavity for adequate drainage of infection.  We will follow up on final cultures from the left pleural cavity and also monitor cultures from the right pleural cavity as well.    Empyema, left (HCC)  Assessment & Plan  S/p thoracentesis of left pleural cavity .1.5 L of fluid removed.  S/p thoracentesis of right pleural cavity about 100 cc fluid removed, bloody.  Fluid studies slightly consistent with exudative effusion.  Cytology negative  Pulmonology following.    Rare gram-positive cocci growing in left pleural cavity concerning for empyema.  S/p pigtail placement of the left pleural cavity for adequate drainage of infection.  We will follow up on final cultures from the left pleural cavity and also monitor cultures from the right pleural cavity as well.   ID following, on vancomycin and Ancef.    Postprocedural pneumothorax  Assessment & Plan  Post thoracentesis, chest x-ray with finding of a small left loculated pneumothorax.   We will repeat serial chest x-ray and closely monitor vital signs.    Repeat chest x-ray with findings of persistent left-sided hydropneumothorax concerning for trapped lung.  S/p IR for pigtail placement into left pleural cavity.     CT chest 5/21  with findings of persistent moderate-sized left pneumothorax.    Acute respiratory failure with hypoxia (HCC)  Assessment & Plan  Secondary to large pleural effusion  See above for plan    Osteomyelitis of left foot (HCC)- (present on admission)  Assessment & Plan  Patient last admitted to [facility] with osteomyelitis of left foot  S/p amputation of left 5th toe, amputation well healing  ID was consulted last admission and plan for Daptomycin and Ancef until 6/3, will continue Abx per their prior recommendation.     With concerns for new lung infection, we have ID reconsulted to help adjust antibiotics- daptomycin discontinued. Vancomycin started. Ancef also continued.  We will closely monitor vancomycin levels to prevent toxicity.     Protein-calorie malnutrition, severe (HCC)  Assessment & Plan  RD following.  Follow-up on recommendations.    Occluded PICC line, initial encounter ([facility])  Assessment & Plan  Patient sent by nursing home for occluded PICC line  Was flushed by ED staff and reportedly working currently  Consider new PICC line placement if continues to be nonfunctional as will need to continue Abx until 6/3    Deep vein thrombosis (DVT) of lower extremity ([facility])- (present on admission)  Assessment & Plan  History of DVT on Eliquis.  Repeat ultrasound Dopplers with findings of persistent nonocclusive left lower extremity DVT.    We will off Eliquis for now due to concern for future surgical intervention this hospitalization.    Type 2 diabetes mellitus with peripheral neuropathy (HCC)- (present on admission)  Assessment & Plan  Patient with long history of DM with doccumented neuropathy and retinopathy  Hold home oral medications (ACTOS and Synjardy)  Insulin sliding scale for now  Diabetic diet  Last A1C 4.9 month ago    CAD (coronary artery disease)- (present on admission)  Assessment & Plan  Patient not currently taking statin, can be resumed as outpatient  Reported ACS in 2013 s/p stents  No current chest  pain  ECHO ordered  Will continue to monitor    HTN (hypertension)- (present on admission)  Assessment & Plan  Does not appear to be on antihypertensives at nursing home currently  Will continue to monitor    History of stroke- (present on admission)  Assessment & Plan  Prior history of stroke with some aphasia  Some deconditioning  PT/OT       VTE prophylaxis: Eliquis on hold, scds.

## 2021-05-23 NOTE — PROGRESS NOTES
Received bedside report from night shift RN.   Assumed care of patient at change of shift.   Assessment complete and POC discussed.   A&Ox4, VSS, on 3L of oxygen via NC.   Chest tube connected to suction at -40 cm per order.  Patient denies pain, no apparent signs of distress or discomfort.   Patient is sitting up in bed for breakfast.   Bed is in lowest/locked position.   Call light and belongings are within reach.   No further needs at this time.    1130: New orders for sputum culture. Gave patient specimen cup.    1600: Chest tube cannister replaced.        Assessment/description of ears? Pink, intact, blanching  Which preventative measures are in place for the ears? Grey foam, cheek stickers     Assessment/description of elbows? Pink, intact, blanching  Which preventative measures are in place for the elbows? Mepilex, ERIKA mattress     Assessment/description of sacrum? Redness, excoriation, blanching  Which preventative measures are in place for the sacrum? Sacral mepilex, waffle mattress, pt allowed a couple turns, but refused the rest- educated on importance     Assessment/description of heels?  Left: pink, boggy blanching; lateral malleolus - red, blanching   Right: redness, small dark spot; medial malleolus - purple, redness  Which preventative measures are in place for the heels? Heel float boots and mepilex in place, ERIKA mattress     Which devices are in place? Chest tube L side, PICC, oxygen tubing  Description of skin under devices: Dressings C/D/I, skin intact and blanching  Which preventative measures are in place under devices? Grey foam and cheek pads     Air loss bed in place

## 2021-05-23 NOTE — DISCHARGE PLANNING
"Medical Social Work    Anticipated Discharge Disposition: [facility] (SNF)    Action: Per chart notes, pt is from [facility] where pt was completing a 6 week abx course w/ stop date of 6/3/2021.  [facility] Transitional Care Navigator, Giovana, confirmed w/ RN Heather Kimble following this pt at [facility] who reported that \"Mr. Stone is a long-term care resident at [facility] who has been under his skilled benefit for long term IV ABX.  His SNF benefit exhausts in a couple of weeks, but he will continue w/ skilled care until that time.\"  It is also noted that pt was being treated w/ IV abx for osteomyelitis after a L 5th ray amputation.    Barriers to Discharge: Medical clearance    Plan: LSW will continue to follow for medical clearance and transfer back to [facility].     "

## 2021-05-23 NOTE — PROGRESS NOTES
Assessment/description of ears? Pink, intact, blanching  Which preventative measures are in place for the ears? Grey foam, cheek stickers     Assessment/description of elbows? Pink, intact, blanching  Which preventative measures are in place for the elbows? Mepilex, waffle mattress     Assessment/description of sacrum? Redness, excoriation, blanching  Which preventative measures are in place for the sacrum? Sacral mepilex, waffle mattress, pt allowed a couple turns, but refused the rest- educated on importance     Assessment/description of heels?   L- pink, boggy blanching; lateral malleolus- red, blanching   R- redness, small dark spot; medial malleolus- purple, redness; scar on top of anterior ankle  Which preventative measures are in place for the heels? Heel float boots and mepilex in place     Which devices are in place? Chest tube L side, PICC, oxygen tubing  Description of skin under devices: Dressings C/D/I, skin intact and blanching  Which preventative measures are in place under devices? Grey foam and cheek pads    Air loss bed in place    Patient is alert and oriented x 4. Denies pain. Call light and belongings are within reach. Bed is in locked and low position. Will continue to monitor.

## 2021-05-23 NOTE — PROGRESS NOTES
Pulmonary Progress Note    Date of admission  5/18/2021    Chief Complaint  Shortness of breath    Hospital Course  Very pleasant 76-year-old male never smoker admitted for shortness of breath from [facility].  He has a complicated past medical history starting 11/2020 when he was admitted for weakness and Covid pneumonia.  CT chest at that time showed left lower lobe consolidation and scattered peripheral predominant groundglass opacities.  No pleural fluid.  He was hospitalized again in 4/2021 for osteomyelitis of the toe, cultures at that time grew MRSA and Proteus that was treated with daptomycin and Ancef.  CT chest at that time showed left greater than right pleural effusions that were managed conservatively.  He was readmitted again this hospitalization for PICC occlusion, where CT chest showed markedly increased left effusion and right effusion as well.  Is on Eliquis for left lower extremity DVT.  He underwent a left-sided thoracentesis by interventional radiology 1500 cc drained, with post procedural CXR showing hydropneumothorax.  Pleural fluid was mostly blood, 84% lymphocytes, and rare GPC's.  For these findings pulmonary was consulted.     This hospitalization he has been afebrile, WBC initially mildly elevated with PMN predominance but has since normalized.  Flu/RSV/SARS-CoV-2 swab negative.     On personal review of his CT this hospitalization, the airways appear patent, there is extrinsic compression of the left lung by a large pleural effusion.  There appears to be no pleural-based nodularities.  Solitary lymph node high in the trachea has been slowly enlarging since 11/2020 but is still < 2cm.  In the right hemithorax, there are scattered groundglass opacities peripherally predominant as well as a modest right pleural effusion as well.    Interval Problem Update  No acute events overnight  CXR showing pleural fluid reaccumulation left hemithorax, 450cc output last 24 hours; remains -20cm  H2O suction. Unchanged peripheral predominant infiltrates in the right chest  Eliquis restarted. Chest tube fluid orange, no gross blood  TTE negative for endocarditis. Blood cultures no growth to date and right pleural cultures no growth to date.  Vancomycin/Ancef, ID following.  Afebrile, WBC WNL.  MRSA nares negative.  L pleural fluid still GPCs, no further growth/speciation, ? Contaminated sample     Review of Systems  Review of Systems   Constitutional: Negative for chills, fever and weight loss.   HENT: Negative for ear pain, hearing loss and tinnitus.    Eyes: Negative for blurred vision, double vision and photophobia.   Respiratory: Negative for cough, hemoptysis and sputum production.    Cardiovascular: Negative for chest pain, palpitations and orthopnea.   Gastrointestinal: Negative for abdominal pain, nausea and vomiting.   Genitourinary: Negative for dysuria, frequency and urgency.   Musculoskeletal: Negative for back pain, myalgias and neck pain.   Skin: Negative for itching and rash.   Neurological: Negative for dizziness, tingling and headaches.   Psychiatric/Behavioral: Negative for depression and suicidal ideas.      Vital Signs for last 24 hours   Temp:  [36.3 °C (97.3 °F)-36.7 °C (98.1 °F)] 36.3 °C (97.3 °F)  Pulse:  [] 100  Resp:  [16-20] 18  BP: ()/(48-68) 104/64  SpO2:  [95 %-100 %] 97 %    Physical Exam   Physical Exam  Vitals and nursing note reviewed.   Constitutional:       General: He is not in acute distress.     Appearance: Normal appearance. He is ill-appearing.      Comments: Thin  Chronically ill appearing   HENT:      Head: Normocephalic.      Nose: Nose normal.      Mouth/Throat:      Mouth: Mucous membranes are moist.   Eyes:      Extraocular Movements: Extraocular movements intact.      Pupils: Pupils are equal, round, and reactive to light.   Cardiovascular:      Rate and Rhythm: Normal rate and regular rhythm.      Pulses: Normal pulses.      Heart sounds: Normal  heart sounds.   Pulmonary:      Comments: Decreased breath sounds bilaterally from mid lung zone to lower base    L pigtail chest tube draining orange slightly turbid pleural fluid, -20cm H20 suction, no air leak  Abdominal:      General: Bowel sounds are normal. There is no distension.      Palpations: Abdomen is soft.      Tenderness: There is no abdominal tenderness.   Musculoskeletal:         General: Normal range of motion.      Cervical back: Normal range of motion.   Skin:     General: Skin is warm.      Findings: Bruising present.   Neurological:      General: No focal deficit present.      Mental Status: He is alert and oriented to person, place, and time.   Psychiatric:         Mood and Affect: Mood normal.         Behavior: Behavior normal.       Medications  Current Facility-Administered Medications   Medication Dose Route Frequency Provider Last Rate Last Admin   • apixaban (ELIQUIS) tablet 5 mg  5 mg Oral BID Omer Daugherty D.O.   5 mg at 05/23/21 0751   • MD Alert...Vancomycin per Pharmacy   Other PHARMACY TO DOSE Gomez Reveles M.D.       • vancomycin (VANCOCIN) 1,000 mg in  mL IVPB  1,000 mg Intravenous Q12HR Gomez Reveles M.D.   Stopped at 05/23/21 0449   • albuterol inhaler 2 Puff  2 Puff Inhalation Q6HRS PRN Julio César Mckee M.D.       • ferrous sulfate tablet 325 mg  325 mg Oral QDAY with Breakfast Julio César Mckee M.D.   325 mg at 05/23/21 0751   • gabapentin (NEURONTIN) capsule 100 mg  100 mg Oral BID Julio César Mckee M.D.   100 mg at 05/23/21 0518   • magnesium oxide (MAG-OX) tablet 400 mg  400 mg Oral BID Julio César Mckee M.D.   400 mg at 05/23/21 0518   • montelukast (SINGULAIR) tablet 10 mg  10 mg Oral DAILY Julio César Mckee M.D.   10 mg at 05/23/21 0518   • tamsulosin (FLOMAX) capsule 0.4 mg  0.4 mg Oral QHS Julio César Mckee M.D.   0.4 mg at 05/22/21 2028   • senna-docusate (PERICOLACE or SENOKOT S) 8.6-50 MG per tablet 2 tablet  2 tablet Oral BID Julio César Mckee,  M.D.   2 tablet at 05/23/21 0518    And   • polyethylene glycol/lytes (MIRALAX) PACKET 1 Packet  1 Packet Oral QDAY PRN Julio César Mckee M.D.        And   • magnesium hydroxide (MILK OF MAGNESIA) suspension 30 mL  30 mL Oral QDAY PRN Julio César Mckee M.D.        And   • bisacodyl (DULCOLAX) suppository 10 mg  10 mg Rectal QDAY PRN Julio César Mckee M.D.       • Pharmacy Consult Request ...Pain Management Review 1 Each  1 Each Other PHARMACY TO DOSE Julio César Mckee M.D.       • acetaminophen (Tylenol) tablet 650 mg  650 mg Oral Q6HRS PRN Julio César Mckee M.D.   650 mg at 05/19/21 1256   • HYDROmorphone (Dilaudid) injection 0.25 mg  0.25 mg Intravenous Q3HRS PRN Julio César Mckee M.D.       • ondansetron (ZOFRAN) syringe/vial injection 4 mg  4 mg Intravenous Q4HRS PRLEEROY Mckee M.D.       • ondansetron (ZOFRAN ODT) dispertab 4 mg  4 mg Oral Q4HRS PRN Julio César Mckee M.D.       • insulin regular (HumuLIN R,NovoLIN R) injection  2-9 Units Subcutaneous 4X/DAY ACHS Julio César Mckee M.D.   2 Units at 05/22/21 1238    And   • glucose 4 g chewable tablet 16 g  16 g Oral Q15 MIN PRN Julio César Mckee M.D.        And   • dextrose 50% (D50W) injection 50 mL  50 mL Intravenous Q15 MIN PRN Julio César Mckee M.D.       • ceFAZolin in dextrose (ANCEF) IVPB premix 2 g  2 g Intravenous Q8HRS Julio César Mckee M.D.   Stopped at 05/23/21 1033     Fluids    Intake/Output Summary (Last 24 hours) at 5/23/2021 1052  Last data filed at 5/23/2021 0641  Gross per 24 hour   Intake 1080 ml   Output 120 ml   Net 960 ml     Laboratory          Recent Labs     05/21/21  0205 05/22/21  0700 05/23/21  0120   SODIUM 136 141 143   POTASSIUM 4.4 4.4 4.4   CHLORIDE 102 105 106   CO2 31 30 31   BUN 21 18 14   CREATININE 0.75 0.63 0.57   MAGNESIUM  --  1.8  --    CALCIUM 7.9* 7.8* 8.0*     Recent Labs     05/21/21  0205 05/22/21  0700 05/23/21  0120   ALTSGPT  --  5 6   ASTSGOT  --  26 25   ALKPHOSPHAT  --  282* 298*   TBILIRUBIN  --  0.2  0.2   GLUCOSE 90 127* 125*     Recent Labs     05/21/21  0205 05/21/21  1048 05/22/21  0700 05/23/21  0120 05/23/21  0618   WBC 9.3  --  6.7  --  8.9   EOSINOPHILS  --  1  --   --   --    ASTSGOT  --   --  26 25  --    ALTSGPT  --   --  5 6  --    ALKPHOSPHAT  --   --  282* 298*  --    TBILIRUBIN  --   --  0.2 0.2  --      Recent Labs     05/21/21  0205 05/22/21  0700 05/23/21  0618   RBC 3.08* 3.88* 3.02*   HEMOGLOBIN 8.5* 11.0* 8.6*   HEMATOCRIT 29.1* 36.8* 28.5*   PLATELETCT 283 212 266     Imaging  CT scan:  Moderate size left pneumothorax with small caliber pigtail thoracostomy tube in place, appears similar to prior chest x-ray yesterday  Layering bilateral pleural effusions. Peripheral reticular consolidations, appearance compatible with Covid infiltrates. There are areas of denser consolidation suggesting superimposed secondary infectious process    X ray: Pulmonary infiltrates, increased on the right compared to prior study.  2.  New layering right pleural effusion.  3.  Left pneumothorax, decreased since prior study.    Assessment/Plan  76-year-old male admitted with bilateral exudative pleural effusion loculated empyema on left side s/p chest tube placement on 5/21.  Possible etiology could be septic emboli from his osteomyelitis.     Impression:  #Right exudative bloody lymphocytic pleural effusion  #Left-sided hydropneumothorax  #Acute hypoxic respiratory failure  #Bilateral pulmonary infiltrates concerning for hospital-acquired pneumonia (present prior to admission)  #Left leg DVT, subacute  #Osteomyelitis of left foot    Plan:  1. L hydropneumothorax: -450cc output/24'; CXR suggesting fluid reaccumulation. reposition chest tube to ensure no obstruction externally. Increased to -40 suction. Reposition pt to improve chest tube drainage. Get out of bed with assistance.  2. R bloody lymphocytic pleural effusion: CXR stable, likely reaccumulation, gram stain neg this far, cytology negative  3. Pulmonary  infiltrates: cont vanco/ancef, discussed with and appreciate ID input, no evidence of vegetations on TTE, blood cultures NGTD x 2, sputum cultures. Suspect left pleural gram stain represent contamination  4. Closely monitor H/H on apixiban, if drops will need IVC filter placement  5. Titrate FiO2 to maintain saturations ~92%  6. Thoracic surgery consultation pending clinical trajectory.    I have performed a physical exam and reviewed and updated ROS and Plan today (5/23/2021). In review of yesterday's note (5/22/2021), there are no changes except as documented above.     Discussed patient condition and risk of morbidity and/or mortality with Hospitalist, Patient and infectious disease    This note was generated using voice recognition software which has a chance of producing errors of grammar and content.  I have made every reasonable attempt to find and correct any errors, but it should be expected that some may not be found prior to finalization of this note.  __________  Atul Curran MD  Pulmonary and Critical Care Medicine  [facility]

## 2021-05-23 NOTE — PROGRESS NOTES
[facility] Medicine Daily Progress Note    Date of Service  5/23/2021    Chief Complaint  76 y.o. male admitted 5/18/2021 with PICC line dysfunction and new onset pleural effusion.    Interval Problem Update  Patient seen and examined at bedside this morning.  No acute events overnight.     Left pigtail chest tube with greater than 1500cc bloody output since placement.  Eliquis restarted.  2D echo negative for endocarditis.  Remains on Ancef and vancomycin. We will closely monitor Vanco levels.  Also closely monitoring pleural fluids final cultures.    Consultants/Specialty  Pulm  ID    Code Status  Full Code    Disposition  NH    Review of Systems  Review of Systems   Constitutional: Negative.    Respiratory: Positive for shortness of breath.    Cardiovascular: Negative.    Gastrointestinal: Negative.    Genitourinary: Negative.    Musculoskeletal: Negative.    All other systems reviewed and are negative.       Physical Exam  Temp:  [36.3 °C (97.3 °F)-36.7 °C (98.1 °F)] 36.3 °C (97.3 °F)  Pulse:  [] 100  Resp:  [16-20] 18  BP: ()/(48-68) 104/64  SpO2:  [95 %-100 %] 97 %    Physical Exam  Constitutional:       Appearance: Normal appearance.   HENT:      Head: Normocephalic and atraumatic.   Eyes:      Conjunctiva/sclera: Conjunctivae normal.   Cardiovascular:      Rate and Rhythm: Normal rate.      Pulses: Normal pulses.   Pulmonary:      Effort: Pulmonary effort is normal.      Breath sounds: Rales present.   Abdominal:      General: Bowel sounds are normal.      Palpations: Abdomen is soft.   Musculoskeletal:         General: No swelling or tenderness.      Comments: Left-sided pigtail chest tube in place.   Skin:     General: Skin is warm.   Neurological:      General: No focal deficit present.      Mental Status: He is alert. Mental status is at baseline.   Psychiatric:         Mood and Affect: Mood normal.         Behavior: Behavior normal.         Fluids    Intake/Output Summary (Last 24 hours) at  5/23/2021 0904  Last data filed at 5/23/2021 0641  Gross per 24 hour   Intake 1080 ml   Output 120 ml   Net 960 ml       Laboratory  Recent Labs     05/21/21  0205 05/22/21  0700 05/23/21  0618   WBC 9.3 6.7 8.9   RBC 3.08* 3.88* 3.02*   HEMOGLOBIN 8.5* 11.0* 8.6*   HEMATOCRIT 29.1* 36.8* 28.5*   MCV 94.5 93.8 94.4   MCH 27.6 27.6 28.5   MCHC 29.2* 29.4* 30.2*   RDW 56.0* 56.1* 55.2*   PLATELETCT 283 212 266   MPV 9.7 9.3 9.4     Recent Labs     05/21/21  0205 05/22/21  0700 05/23/21  0120   SODIUM 136 141 143   POTASSIUM 4.4 4.4 4.4   CHLORIDE 102 105 106   CO2 31 30 31   GLUCOSE 90 127* 125*   BUN 21 18 14   CREATININE 0.75 0.63 0.57   CALCIUM 7.9* 7.8* 8.0*                   Imaging  EC-ECHOCARDIOGRAM COMPLETE W/O CONT   Final Result      DX-CHEST-PORTABLE (1 VIEW)   Final Result         1.  Pulmonary infiltrates, increased on the right compared to prior study.   2.  New layering right pleural effusion.   3.  Left pneumothorax, decreased since prior study.   4.  Cardiomegaly   5.  Atherosclerosis      CT-CHEST (THORAX) WITH   Final Result         1.  Moderate size left pneumothorax with small caliber pigtail thoracostomy tube in place, appears similar to prior chest x-ray yesterday at 1054.   2.  Layering bilateral pleural effusions.   3.  Peripheral reticular consolidations, appearance compatible with Covid infiltrates. There are areas of denser consolidation suggesting superimposed secondary infectious process.   4.  Irregular hepatic contour, compatible with changes of cirrhosis.   5.  Trace perihepatic ascites      US-EXTREMITY VENOUS LOWER BILAT   Final Result      DX-CHEST-PORTABLE (1 VIEW)   Final Result      1.  Interval placement of small-caliber LEFT chest tube with improvement of pleural fluid and compensatory enlargement of LEFT pneumothorax, likely due to noncompliant underlying lung.   2.  Improving RIGHT lung infiltrates.      These findings were discussed with SUNDAR SUNG on 5/21/2021 11:18  AM.         CT-CHEST TUBE-EMPYEMA LEFT   Final Result      1. LEFT CHEST EMPYEMA Drainage with CT guidance.      DX-CHEST-LIMITED (1 VIEW)   Final Result      1.  Stable LEFT hydropneumothorax.   2.  Multifocal pneumonia again seen with slight worsening in RIGHT midlung region.            DX-CHEST-LIMITED (1 VIEW)   Final Result         Unchanged left lateral pneumothorax.      US-THORACENTESIS PUNCTURE LEFT   Final Result      1. Ultrasound guided left sided diagnostic and therapeutic thoracentesis.      2. 1550 mL of fluid withdrawn.      DX-CHEST-PORTABLE (1 VIEW)   Final Result      Interval decrease in size of the left pleural effusion which is now small.      Small small loculated left pneumothorax.      Small loculated right pleural effusion.      Left perihilar and basilar opacity may represent atelectasis/consolidation.      Increased ill-defined airspace opacities on the right may represent a combination of atelectasis, edema and pneumonia.      Atherosclerotic plaque.      Findings discussed with the covering clinician.         CT-CHEST (THORAX) W/O   Final Result         1.  Large left and moderate pleural effusions.   2.  Peripheral reticular pulmonary opacities, appearance compatible with Covid infiltrates.   3.  Linear consolidations in the bilateral lung bases, likely atelectasis.   4.  Hepatomegaly and irregular hepatic contour, appearance most compatible changes of cirrhosis.   5.  Diverticulosis      DX-CHEST-PORTABLE (1 VIEW)   Final Result      Stable large left pleural effusion with associated compressive atelectasis. Correlate clinically for infection.      Possible small right pleural effusion with mildly increased opacity in the right lung.      DX-CHEST-PORTABLE (1 VIEW)    (Results Pending)        Assessment/Plan  * Pleural effusion, bilateral  Assessment & Plan  Unknown etiology  Was seen on last admission and was asymptomatic. Now with worsening SOB and worsening effusion  CT chest  -findings of large left-sided pleural effusion and moderate right pleural effusion.    2D echo pending.  S/p thoracentesis of left pleural cavity .1.5 L of fluid removed.  S/p thoracentesis of right pleural cavity about 100 cc fluid removed, bloody.  Fluid studies slightly consistent with exudative effusion.  Cytology negative  Pulmonology following.    Rare gram-positive cocci growing in left pleural cavity concerning for empyema.  S/p pigtail placement of the left pleural cavity for adequate drainage of infection.  We will follow up on final cultures from the left pleural cavity and also monitor cultures from the right pleural cavity as well.    Empyema, left (HCC)  Assessment & Plan  S/p thoracentesis of left pleural cavity .1.5 L of fluid removed.  S/p thoracentesis of right pleural cavity about 100 cc fluid removed, bloody.  Fluid studies slightly consistent with exudative effusion.  Cytology negative  Pulmonology following.    Rare gram-positive cocci growing in left pleural cavity concerning for empyema.  S/p pigtail placement of the left pleural cavity for adequate drainage of infection.  We will follow up on final cultures from the left pleural cavity and also monitor cultures from the right pleural cavity as well.   ID following, on vancomycin and Ancef.    Postprocedural pneumothorax  Assessment & Plan  Post thoracentesis, chest x-ray with finding of a small left loculated pneumothorax.   We will repeat serial chest x-ray and closely monitor vital signs.    Repeat chest x-ray with findings of persistent left-sided hydropneumothorax concerning for trapped lung.  S/p IR for pigtail placement into left pleural cavity.     CT chest 5/21 with findings of persistent moderate-sized left pneumothorax.    Acute respiratory failure with hypoxia (HCC)  Assessment & Plan  Secondary to large pleural effusion  See above for plan    Osteomyelitis of left foot (HCC)- (present on admission)  Assessment & Plan  Patient  last admitted to [facility] with [place]omyelitis of left foot  S/p amputation of left 5th toe, amputation well healing  ID was consulted last admission and plan for Daptomycin and Ancef until 6/3, will continue Abx per their prior recommendation.     With concerns for new lung infection, we have ID reconsulted to help adjust antibiotics- daptomycin discontinued. Vancomycin started. Ancef also continued.  We will closely monitor vancomycin levels to prevent toxicity.     Protein-calorie malnutrition, severe (HCC)  Assessment & Plan  RD following.  Follow-up on recommendations.    Occluded PICC line, initial encounter (HCC)  Assessment & Plan  Patient sent by nursing home for occluded PICC line  Was flushed by ED staff and reportedly working currently  Consider new PICC line placement if continues to be nonfunctional as will need to continue Abx until 6/3    Deep vein thrombosis (DVT) of lower extremity (HCC)- (present on admission)  Assessment & Plan  History of DVT on Eliquis.  Repeat ultrasound Dopplers with findings of persistent nonocclusive left lower extremity DVT.    We will off Eliquis for now due to concern for future surgical intervention this hospitalization.    Type 2 diabetes mellitus with peripheral neuropathy (HCC)- (present on admission)  Assessment & Plan  Patient with long history of DM with doccumented neuropathy and retinopathy  Hold home oral medications (ACTOS and Synjardy)  Insulin sliding scale for now  Diabetic diet  Last A1C 4.9 month ago    CAD (coronary artery disease)- (present on admission)  Assessment & Plan  Patient not currently taking statin, can be resumed as outpatient  Reported ACS in 2013 s/p stents  No current chest pain  ECHO ordered  Will continue to monitor    HTN (hypertension)- (present on admission)  Assessment & Plan  Does not appear to be on antihypertensives at nursing home currently  Will continue to monitor    History of stroke- (present on admission)  Assessment & Plan  Prior  history of stroke with some aphasia  Some deconditioning  PT/OT       VTE prophylaxis: Eliquis

## 2021-05-23 NOTE — PROGRESS NOTES
Infectious Disease Progress Note    Author: Gomez Reveles M.D. Date & Time of service: 2021  11:28 AM    Chief Complaint:  Follow-up for empyema    Interval History:   patient remains afebrile, white count 6.7, tolerating antibiotics with no new issues.  Right-sided thoracentesis performed.  See below   patient remains afebrile, white count 8.9, tolerating antibiotics with no new issues thus far.  Cultures pending.    Labs Reviewed and Medications Reviewed.    Review of Systems:  Review of Systems   Constitutional: Negative for chills and fever.   Respiratory: Positive for shortness of breath.    Gastrointestinal: Negative for abdominal pain, diarrhea, nausea and vomiting.   Skin: Negative for itching and rash.   Neurological: Positive for sensory change.   All other systems reviewed and are negative.      Hemodynamics:  Temp (24hrs), Av.6 °C (97.8 °F), Min:36.3 °C (97.3 °F), Max:36.7 °C (98.1 °F)  Temperature: 36.3 °C (97.3 °F)  Pulse  Av.6  Min: 44  Max: 117   Blood Pressure : 104/64       Physical Exam:  Physical Exam  Vitals and nursing note reviewed.   Constitutional:       General: He is not in acute distress.     Appearance: He is not toxic-appearing.   HENT:      Head: Normocephalic and atraumatic.   Eyes:      General:         Right eye: No discharge.         Left eye: No discharge.      Conjunctiva/sclera: Conjunctivae normal.   Cardiovascular:      Rate and Rhythm: Normal rate.      Heart sounds: No murmur heard.     Pulmonary:      Effort: Pulmonary effort is normal. No respiratory distress.      Breath sounds: No stridor.      Comments: Decreased to sounds bilaterally  Left-sided chest tube in place  Abdominal:      General: Abdomen is flat. There is no distension.   Musculoskeletal:      Comments: Well-healed incision dorsal right great toe, surgical site left fifth ray well-healed, mild erythema, no active discharge, no swelling, no fluctuance.  Left fourth toe with small scab  on dorsal surface   Skin:     Findings: No erythema or rash.   Neurological:      General: No focal deficit present.      Mental Status: He is alert and oriented to person, place, and time.   Psychiatric:         Mood and Affect: Mood normal.         Behavior: Behavior normal.         Meds:    Current Facility-Administered Medications:   •  apixaban  •  MD Alert...Vancomycin per Pharmacy  •  vancomycin  •  albuterol  •  ferrous sulfate  •  gabapentin  •  magnesium oxide  •  montelukast  •  tamsulosin  •  senna-docusate **AND** polyethylene glycol/lytes **AND** magnesium hydroxide **AND** bisacodyl  •  Pharmacy Consult Request  •  acetaminophen  •  [DISCONTINUED] oxyCODONE immediate-release **OR** [DISCONTINUED] oxyCODONE immediate-release **OR** HYDROmorphone  •  ondansetron  •  ondansetron  •  insulin regular **AND** POC blood glucose manual result **AND** NOTIFY MD and PharmD **AND** glucose **AND** dextrose 50%  •  ceFAZolin    Labs:  Recent Labs     05/21/21 0205 05/21/21  1048 05/22/21  0700 05/23/21  0618   WBC 9.3  --  6.7 8.9   RBC 3.08*  --  3.88* 3.02*   HEMOGLOBIN 8.5*  --  11.0* 8.6*   HEMATOCRIT 29.1*  --  36.8* 28.5*   MCV 94.5  --  93.8 94.4   MCH 27.6  --  27.6 28.5   RDW 56.0*  --  56.1* 55.2*   PLATELETCT 283  --  212 266   MPV 9.7  --  9.3 9.4   EOSINOPHILS  --  1  --   --      Recent Labs     05/21/21 0205 05/22/21  0700 05/23/21  0120   SODIUM 136 141 143   POTASSIUM 4.4 4.4 4.4   CHLORIDE 102 105 106   CO2 31 30 31   GLUCOSE 90 127* 125*   BUN 21 18 14     Recent Labs     05/21/21 0205 05/22/21  0700 05/23/21  0120   ALBUMIN  --  2.0* 2.0*   TBILIRUBIN  --  0.2 0.2   ALKPHOSPHAT  --  282* 298*   TOTPROTEIN  --  5.0* 5.2*   ALTSGPT  --  5 6   ASTSGOT  --  26 25   CREATININE 0.75 0.63 0.57       Imaging:  CT-CHEST (THORAX) W/O    Result Date: 5/19/2021 5/18/2021 10:24 PM HISTORY/REASON FOR EXAM: Pleural effusion TECHNIQUE/EXAM DESCRIPTION:  Transaxial MDCT scan of chest without contrast.  Low dose optimization technique was utilized for this CT exam including automated exposure control and adjustment of the mA and/or kV according to patient size. COMPARISON: April 25, 2021 FINDINGS The visualized portion of the thyroid appears within normal limits. The trachea and main stem airways are normal in caliber. There are no pathologically enlarged mediastinal lymph nodes. The heart and pericardium appear within normal limits. The aorta and its main branch vessels are normal in caliber and configuration. Scattered patchy reticular pulmonary infiltrates are seen. Linear consolidations in the bilateral lung bases are seen. Large left and moderate right pleural effusions are seen. Limited views of the abdomen demonstrates hepatomegaly and irregular hepatic contour. Scattered colonic diverticula are seen. The bony structures are age appropriate.     1.  Large left and moderate pleural effusions. 2.  Peripheral reticular pulmonary opacities, appearance compatible with Covid infiltrates. 3.  Linear consolidations in the bilateral lung bases, likely atelectasis. 4.  Hepatomegaly and irregular hepatic contour, appearance most compatible changes of cirrhosis. 5.  Diverticulosis    CT-CHEST (THORAX) WITH    Result Date: 5/22/2021 5/21/2021 10:17 PM HISTORY/REASON FOR EXAM:  Pneumonia. TECHNIQUE/EXAM DESCRIPTION: CT scan of the chest with contrast. Thin-section helical images were obtained from the lung apices through the adrenal glands following the bolus administration of contrast. 80 mL of Omnipaque 350 nonionic contrast was utilized. Low dose optimization technique was utilized for this CT exam including automated exposure control and adjustment of the mA and/or kV according to patient size. COMPARISON:  May 18, 2021 FINDINGS: Moderate left pneumothorax is seen. Linear reticular consolidations in the bilateral lung bases are seen with peripheral reticular opacities. Small caliber pigtail catheter is seen at the  left costophrenic angle. There is no mediastinal, hilar, or axillary adenopathy. The cardiac chambers, great vessels, and aorta are normal in CT appearance. Small bilateral pleural effusions are seen. The extrathoracic soft tissues and thoracic cage are normal in appearance. The adrenal glands are normal.  The visualized portions of the spleen, pancreas, and kidneys are normal. The liver is irregular in contour. Trace perihepatic ascites is seen. There is no upper abdominal adenopathy. There is no abnormal contrast enhancement.     1.  Moderate size left pneumothorax with small caliber pigtail thoracostomy tube in place, appears similar to prior chest x-ray yesterday at 1054. 2.  Layering bilateral pleural effusions. 3.  Peripheral reticular consolidations, appearance compatible with Covid infiltrates. There are areas of denser consolidation suggesting superimposed secondary infectious process. 4.  Irregular hepatic contour, compatible with changes of cirrhosis. 5.  Trace perihepatic ascites    CT-CHEST TUBE-EMPYEMA LEFT    Result Date: 5/21/2021 5/21/2021 9:05 AM HISTORY/REASON FOR EXAM:  pt with new left sided effusion, fluid study consistent with empyema, needs a pig tail for drainage. TECHNIQUE/EXAM DESCRIPTION AND NUMBER OF VIEWS:  CT guided left chest empyema drainage. Low dose optimization technique was utilized for this CT exam including automated exposure control and adjustment of the mA and/or kV according to patient size. PROCEDURE:  Informed consent was obtained. Localizing CT images were obtained with the patient in supine position. The skin was prepped with chlorhexidine and draped in a sterile fashion. SEDATION: Moderate sedation was provided. Pulse oximetry and continuous cardiac monitoring by the nurse was performed throughout the exam. Intraservice time was 30 minutes. Following local anesthesia with 1% Lidocaine, a 17 G guiding needle was placed and needle path confirmed with CT. An Amplatz  guidewire was placed and following serial tract dilatation, a 10 Indonesian pigtail locking catheter was placed. The catheter was secured to the skin and connected to Pleur-evac suction. Final CT images were obtained documenting catheter position. The patient tolerated the procedure well with no evidence of complication. COMPARISON:Recent CT scan of thorax FINDINGS:CT images demonstrate the locking loop pigtail catheter in the inferior aspect of the large left-sided pleural effusion.     1. LEFT CHEST EMPYEMA Drainage with CT guidance.    DX-CHEST-FOR LINE PLACEMENT Perform procedure in: Patient's Room    Result Date: 4/27/2021 4/27/2021 5:17 PM HISTORY/REASON FOR EXAM: PICC tip location confirmation - STAT pt pending discharge transport. TECHNIQUE/EXAM DESCRIPTION AND NUMBER OF VIEWS: Single portable view of the chest. COMPARISON: Yesterday. FINDINGS: Left PICC line tip overlies the SVC Cardiomediastinal contours are stable, moderately large Stable to slight enlargement of large left pleural effusion with some lateral loculation possible. Aeration is only seen in the upper lung zone as before. Right lung shows no consolidation or effusion. No pneumothorax.     Left PICC line tip overlies the SVC Similar large left pleural effusion and multisegmental atelectasis. Consolidation is possible    DX-CHEST-LIMITED (1 VIEW)    Result Date: 5/20/2021 5/20/2021 8:18 AM HISTORY/REASON FOR EXAM:  re-eval small pneumothorax TECHNIQUE/EXAM DESCRIPTION AND NUMBER OF VIEWS: Single portable view of the chest. COMPARISON: 5/19/2021 FINDINGS: Cardiac contours grossly stable although difficult to evaluate. Patchy opacities again seen in both lungs, most confluent in the LEFT base, showing slight worsening in the RIGHT midlung. Bilateral pleural fluid. Small LEFT pneumothorax again seen laterally. LEFT arm PICC line is in similar position.     1.  Stable LEFT hydropneumothorax. 2.  Multifocal pneumonia again seen with slight worsening in  RIGHT midlung region.     DX-CHEST-LIMITED (1 VIEW)    Result Date: 5/19/2021 5/19/2021 5:33 PM HISTORY/REASON FOR EXAM:  re-eval small left pneumo TECHNIQUE/EXAM DESCRIPTION AND NUMBER OF VIEWS: Single portable view of the chest. COMPARISON: 5/19/2021 FINDINGS: Airspace opacity in the left lower lobe. Patchy hazy opacity in the right lateral lung. Small bilateral pleural effusions. Unchanged left lateral pneumothorax. Stable cardiopericardial silhouette.     Unchanged left lateral pneumothorax.    DX-CHEST-PORTABLE (1 VIEW)    Result Date: 5/22/2021 5/22/2021 5:50 AM HISTORY/REASON FOR EXAM: Pleural Effusion; pigtail monitoring TECHNIQUE/EXAM DESCRIPTION:  Single AP view of the chest. COMPARISON: Yesterday FINDINGS: Position of medical devices appears stable. Cardiomegaly is observed. Atherosclerotic calcification of the aorta is noted.  The central  pulmonary vasculature appears prominent and indistinct. The lungs appear well expanded bilaterally.  Diffuse scattered hazy pulmonary parenchymal opacities are seen, increased on the right. Left pneumothorax is seen, decreased since prior. New layering right pleural effusion is seen. The bony structures appear age-appropriate.     1.  Pulmonary infiltrates, increased on the right compared to prior study. 2.  New layering right pleural effusion. 3.  Left pneumothorax, decreased since prior study. 4.  Cardiomegaly 5.  Atherosclerosis    DX-CHEST-PORTABLE (1 VIEW)    Result Date: 5/21/2021 5/21/2021 10:40 AM HISTORY/REASON FOR EXAM:  Pleural Effusion. TECHNIQUE/EXAM DESCRIPTION AND NUMBER OF VIEWS: Single portable view of the chest. COMPARISON: 5/20/2021 FINDINGS: Cardiac mediastinal contour is unchanged. Interval placement of pigtail catheter at the LEFT lower chest. LEFT arm PICC line in similar position. Interval improvement of LEFT pleural effusion. Moderate LEFT pneumothorax, apparently increased from prior exam.  Contour deformity of the consolidated lung  suggests the presence of nodules. Patchy RIGHT mid and lower lung infiltrates appear improved.     1.  Interval placement of small-caliber LEFT chest tube with improvement of pleural fluid and compensatory enlargement of LEFT pneumothorax, likely due to noncompliant underlying lung. 2.  Improving RIGHT lung infiltrates. These findings were discussed with SUNDAR SUNG on 5/21/2021 11:18 AM.     DX-CHEST-PORTABLE (1 VIEW)    Result Date: 5/19/2021 5/19/2021 1:47 PM HISTORY/REASON FOR EXAM: Post thoracentesis. TECHNIQUE/EXAM DESCRIPTION AND NUMBER OF VIEWS: Single portable view of the chest. COMPARISON: 5/18/2021 FINDINGS: There has been interval decrease in size of the left pleural effusion. There is a small residual left pleural effusion. There is a small left loculated pneumothorax. There is likely pleural thickening. There is a small loculated right pleural effusion. Bilateral airspace opacities are increased on the right compared to prior. There is left perihilar and bibasilar atelectasis/consolidation. The cardiomediastinal silhouette is stable. Atherosclerotic calcification is seen. Left PICC projects over the SVC. There are surgical clips in the left neck.     Interval decrease in size of the left pleural effusion which is now small. Small small loculated left pneumothorax. Small loculated right pleural effusion. Left perihilar and basilar opacity may represent atelectasis/consolidation. Increased ill-defined airspace opacities on the right may represent a combination of atelectasis, edema and pneumonia. Atherosclerotic plaque. Findings discussed with the covering clinician.     DX-CHEST-PORTABLE (1 VIEW)    Result Date: 5/18/2021 5/18/2021 3:23 PM HISTORY/REASON FOR EXAM:  Shortness of Breath. TECHNIQUE/EXAM DESCRIPTION AND NUMBER OF VIEWS: Single portable view of the chest. COMPARISON: 4/26/2021 FINDINGS: Left PICC line tip projects near the cavoatrial junction. Cardiomediastinal silhouette is stable.  Aortic calcified atherosclerotic plaque. Large left pleural effusion is stable with associated compressive atelectasis. Correlate clinically for infection. Mildly increased opacity in the right mid and lower lung which could be related to atelectasis, edema and/or infection. Possible small right pleural effusion. All No acute osseous abnormality.     Stable large left pleural effusion with associated compressive atelectasis. Correlate clinically for infection. Possible small right pleural effusion with mildly increased opacity in the right lung.    DX-CHEST-PORTABLE (1 VIEW)    Result Date: 4/26/2021 4/26/2021 8:52 AM HISTORY/REASON FOR EXAM:  Shortness of Breath; pleural effusion on CTA. please reevalaute. TECHNIQUE/EXAM DESCRIPTION AND NUMBER OF VIEWS: Single portable view of the chest. COMPARISON: Chest radiograph 11/18/2020. CTA chest 4/25/2021 FINDINGS: Cardiomediastinal silhouette is stable. Large left pleural effusion with associated compressive atelectasis of the left lung. The small pleural effusion on the earlier CT study is not well visualized radiographically. Mild right basilar atelectasis. No pneumothorax. No acute osseous abnormality.     Large left pleural effusion with associated compressive atelectasis. Correlate clinically for infection.    DX-FOOT-COMPLETE 3+ LEFT    Result Date: 4/22/2021 4/22/2021 3:42 PM HISTORY/REASON FOR EXAM:  Pain/Deformity Following Trauma (Wound TECHNIQUE/EXAM DESCRIPTION AND NUMBER OF VIEWS: 3 views of the LEFT foot. COMPARISON:  None. FINDINGS: There is soft tissue swelling along the lateral aspect of the foot. There is bony destruction seen throughout the fifth proximal phalanx, the mid/distal phalangeal base and involving the fifth metacarpal head/neck consistent with osteomyelitis. There may  be septic arthritis involving the interposed joints as well. There is generalized osteopenia. No evidence of osteomyelitis elsewhere within the foot. Mild degenerative  changes the great toe MTP joint.     Findings in keeping with osteomyelitis involving the fifth distal metatarsal, proximal and middle phalanges.    US-HAKEEM SINGLE LEVEL BILAT    Result Date: 2021   Vascular Laboratory  Conclusions  Bilateral.  There is no evidence of major arterial disease demonstrated.  NADINE CABRERA  Age:    76    Gender:     M  MRN:    [id number]  :    1945      BSA:  Exam Date:     2021 10:46  Room #:     Inpatient  Priority:     Routine  Ht (in):             Wt (lb):  Ordering Physician:        ANN HOYT  Referring Physician:       [id number] Cruz  Sonographer:               Cami Alves RDMS                             RVT  Study Type:                Complete Bilateral  Technical Quality:         Adequate  Indications:     Ulceration of LE  CPT Codes:       74088  ICD Codes:       707.1  History:         Non-healing wound of left lower limb. Diabetes.  Limitations:                 RIGHT  Waveform            Systolic BPs (mmHg)                             125           Brachial  Triphasic                                Common Femoral  Triphasic                  276           Posterior Tibial  Triphasic                  267           Dorsalis Pedis                                           Peroneal                             2.12          HAKEEM                                           TBI                       LEFT  Waveform        Systolic BPs (mmHg)                             130           Brachial  Triphasic                                Common Femoral  Triphasic                  240           Posterior Tibial  Triphasic                  226           Dorsalis Pedis                                           Peroneal                             1.85          HAKEEM                                           TBI  Findings  Bilateral.  Doppler waveforms of the common femoral arteries are of high amplitude and  triphasic.  Doppler waveforms  at the ankle are brisk and triphasic.  Ankle pressures are not accurately measured due to calcification and  noncompressibility of the tibial vessels.  Toe-brachial indices are normal. Right: 0.81  Left: 0.85  Additional testing was not performed in accordance with lower extremity  arterial evaluation protocol.  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  Otherwise negative LLE examination.  No RLE DVT identified.  NADINE CABRERA  Exam Date:     2021 15:37  Room #:     Inpatient  Priority:     Routine  Ht (in):             Wt (lb):  Ordering Physician:        ROGER ALONZO  Referring Physician:       [id number] Manzo  Sonographer:               Bijal Christian  Study Type:                Complete Bilateral  Technical Quality:         Fair  Age:    76    Gender:     M  MRN:    [id number]  :    1945      BSA:  Indications:     Personal history of venous thrombosis and embolism  CPT Codes:       13162  ICD Codes:       Z86.71  History:         BLE pain, pressure, and edema. History of left lower                   extremity deep venous thrombosis. Prior exam 20.  Limitations:     Patient unable to bend either knee.  PROCEDURES:  Bilateral lower extremity venous duplex imaging.  The following venous structures were evaluated: common femoral, profunda  femoral, proximal greater saphenous, femoral, popliteal , peroneal and  posterior tibial veins.  Serial compression, augmentation maneuvers,  color and spectral Doppler  flow evaluations were performed.  FINDINGS:  Right lower extremity -  The peroneal and posterior tibial veins are difficult to assess but appear  to be noncompressible, and no flow response to augmentation is  demonstrated.  Complete color filling  and compressibility with normal venous flow dynamics  including spontaneous flow, response to augmentation maneuvers, and  respiratory phasicity in all other remaining visualized vessels.  Left lower extremity -  Chronic deep venous thrombosis is seen in the popliteal vein.  The peroneal and posterior tibial veins are difficult to assess for  compressibility, but flow response to augmentation is demonstrated.  Complete color filling and compressibility with normal venous flow dynamics  including spontaneous flow, response to augmentation maneuvers, and  respiratory phasicity in all other remaining visualized vessels.  Kade Louis MD  (Electronically Signed)  Final Date:      21 May 2021 17:01    US-THORACENTESIS PUNCTURE LEFT    Result Date: 5/19/2021 5/19/2021 12:53 PM HISTORY/REASON FOR EXAM:  Shortness of breath; Large left sided pleural effusion, unknown etiology TECHNIQUE/EXAM DESCRIPTION: Ultrasound-guided thoracentesis. Indication:  LEFT pleural fluid collection. COMPARISON:  5/18/2021 PROCEDURE:     Informed consent was obtained. A timeout was taken. A left pleural effusion was localized with real-time ultrasound guidance. The left posterior chest wall was prepped and draped in a sterile manner. Following local anesthesia with 1% lidocaine, a 5 [place] Y[place]h pigtail catheter was advanced into the pleural space with trocar technique and pleural fluid was drained. The patient tolerated the procedure well without evidence of complication. A post thoracentesis chest radiograph is forthcoming. FINDINGS: Fluid was sent to the laboratory. Fluid character: Brown     1. Ultrasound guided left sided diagnostic and therapeutic thoracentesis. 2. 1550 mL of fluid withdrawn.    CT-CTA CHEST PULMONARY ARTERY W/ RECONS    Result Date: 4/25/2021 4/25/2021 4:30 PM HISTORY/REASON FOR EXAM:  PE suspected, high pretest prob; pleuritic pain, hypoxia Hypoxia. Deep venous thrombosis. Pelvis therapy. Covid in December.  TECHNIQUE/EXAM DESCRIPTION: CT angiogram scan for pulmonary embolism with contrast, with reconstructions. 1.25 mm helical sections were obtained from the lung apices through the lung bases following the rapid bolus administration of 62 mL of Omnipaque 350 nonionic contrast. Thin-section overlapping reconstruction interval was utilized. Coronal reconstructions were generated from the axial data. MIP post processing was performed and utilized for the interpretation. Low dose optimization technique was utilized for this CT exam including automated exposure control and adjustment of the mA and/or kV according to patient size. COMPARISON: 11/18/2020 CT FINDINGS: New large left pleural effusion with multisegmental compressive atelectasis. The left lower lobe is completely collapsed. There is new small-medium volume right pleural fluid with less atelectasis Calcified right middle lobe nodule Pulmonary Embolism: No. Main Pulmonary Arteries: No. Segmental branches: No. Additional Comments: Coronary artery disease. Lungs: No suspicious nodules or air space process. Pleura: No pleural effusion. Nodes: There is a 9 mm short axis right paraesophageal node. No enlarged nodes are seen     New large left, small to medium right pleural effusions No pulmonary embolism Moderate coronary artery disease Remote granulomatous disease and/or calcified hamartoma     IR-PICC LINE PLACEMENT W/ GUIDANCE > AGE 5    Result Date: 4/27/2021  HISTORY/REASON FOR EXAM:   PICC placement. TECHNIQUE/EXAM DESCRIPTION AND NUMBER OF VIEWS:   PICC line insertion with ultrasound guidance.  The procedure was performed using maximal sterile barrier technique including sterile gown, mask, cap, and donning of sterile gloves following appropriate hand hygiene and/or sterile scrub. Patient skin site was prepped with 2% Chlorhexidine solution. FINDINGS:  PICC line insertion with Ultrasound Guidance was performed by qualified nursing staff without the assistance of  a Radiologist. PICC positioning appropriateness confirmed by 3CG technology; chest xray only needed in the instance 3CG unable to confirm placement.              Ultrasound-guided PICC placement performed by qualified nursing staff as above.       Micro:  Results     Procedure Component Value Units Date/Time    CULTURE RESPIRATORY W/ GRM STN [[id number]]     Order Status: No result Specimen: Respirate from Sputum     FLUID CULTURE W/GRAM STAIN [[id number]] Collected: 05/21/21 1048    Order Status: Completed Specimen: Body Fluid from Thoracentesis Fluid Updated: 05/23/21 0807     Significant Indicator NEG     Source BF     Site THORACENTESIS FLUID     Culture Result No growth at 48 hours.     Gram Stain Result Rare WBCs.  No organisms seen.      Narrative:      Collected By:[id number] KAPIL RIOS  Collected By:[id number] KAPIL RIOS    FUNGAL CULTURE [[id number]] Collected: 05/21/21 1048    Order Status: Completed Specimen: Body Fluid from Thoracentesis Fluid Updated: 05/23/21 0807     Significant Indicator NEG     Source BF     Site THORACENTESIS FLUID     Culture Result Culture in progress.    Narrative:      Collected By:[id number] KAPIL RIOS  Collected By:[id number] KAPIL RIOS    FLUID CULTURE W/GRAM STAIN [[id number]] Collected: 05/19/21 1400    Order Status: Completed Specimen: Body Fluid Updated: 05/23/21 0641     Significant Indicator NEG     Source BF     Site Thoracentesis Fluid     Culture Result No growth at 4 days.     Gram Stain Result Rare WBCs.  Rare Gram positive cocci.      Narrative:      Thoracentesis.    GRAM STAIN [[id number]] Collected: 05/21/21 1048    Order Status: Completed Specimen: Body Fluid Updated: 05/22/21 1653     Significant Indicator .     Source BF     Site THORACENTESIS FLUID     Gram Stain Result Rare WBCs.  No organisms seen.      Narrative:      Collected By:[id number] KAPIL RIOS  Collected By:[id number] KAPIL RIOS    Acid Fast Stain [[id number]] Collected: 05/21/21 1048    Order  "Status: Completed Specimen: Body Fluid Updated: 05/22/21 1653     Significant Indicator NEG     Source BF     Site THORACENTESIS FLUID     AFB Smear Results No acid fast bacilli seen.    Narrative:      Collected By:[id number] KAPIL RIOS  Collected By:[id number] KAPIL RIOS    MRSA By PCR (Amp) [[id number]] Collected: 05/22/21 0943    Order Status: Completed Specimen: Respirate from Nares Updated: 05/22/21 1329     Significant Indicator NEG     Source RESP     Site NARES     MRSA PCR Negative for MRSA by PCR.    Narrative:      Collected By:[id number] KAPIL RIOS  Collected By:[id number] KAPIL RIOS    BLOOD CULTURE [[id number]] Collected: 05/21/21 1212    Order Status: Completed Specimen: Blood from Peripheral Updated: 05/22/21 0736     Significant Indicator NEG     Source BLD     Site PERIPHERAL     Culture Result No Growth  Note: Blood cultures are incubated for 5 days and  are monitored continuously.Positive blood cultures  are called to the RN and reported as soon as  they are identified.      Narrative:      Per Hospital Policy: Only change Specimen Src: to \"Line\" if  specified by physician order.  Right Hand    BLOOD CULTURE [[id number]] Collected: 05/21/21 1437    Order Status: Completed Specimen: Blood from Peripheral Updated: 05/22/21 0736     Significant Indicator NEG     Source BLD     Site PERIPHERAL     Culture Result No Growth  Note: Blood cultures are incubated for 5 days and  are monitored continuously.Positive blood cultures  are called to the RN and reported as soon as  they are identified.      Narrative:      Per Hospital Policy: Only change Specimen Src: to \"Line\" if  specified by physician order.  Right Hand    FLUID CULTURE W/GRAM STAIN [[id number]] Collected: 05/21/21 1048    Order Status: Sent Specimen: Body Fluid from Thoracentesis Fluid     AFB CULTURE [[id number]] Collected: 05/21/21 1048    Order Status: Canceled Specimen: Body Fluid from Thoracentesis Fluid     AFB Culture [[id number]] " "Collected: 05/21/21 1048    Order Status: No result Specimen: Other     BLOOD CULTURE [[id number]] Collected: 05/20/21 1557    Order Status: Completed Specimen: Blood from Peripheral Updated: 05/21/21 0821     Significant Indicator NEG     Source BLD     Site PERIPHERAL     Culture Result No Growth  Note: Blood cultures are incubated for 5 days and  are monitored continuously.Positive blood cultures  are called to the RN and reported as soon as  they are identified.      Narrative:      Per Hospital Policy: Only change Specimen Src: to \"Line\" if  specified by physician order.  Right Hand    BLOOD CULTURE [[id number]] Collected: 05/20/21 1557    Order Status: Completed Specimen: Blood from Peripheral Updated: 05/21/21 0821     Significant Indicator NEG     Source BLD     Site PERIPHERAL     Culture Result No Growth  Note: Blood cultures are incubated for 5 days and  are monitored continuously.Positive blood cultures  are called to the RN and reported as soon as  they are identified.      Narrative:      Per Hospital Policy: Only change Specimen Src: to \"Line\" if  specified by physician order.  Left AC    GRAM STAIN [[id number]] Collected: 05/19/21 1400    Order Status: Completed Specimen: Body Fluid Updated: 05/20/21 1202     Significant Indicator .     Source BF     Site Thoracentesis Fluid     Gram Stain Result Rare WBCs.  Rare Gram positive cocci.      Narrative:      Thoracentesis.    COV-2, FLU A/B, AND RSV BY PCR (2-4 HOURS CEPHEID): Collect NP swab in VTM [[id number]] Collected: 05/18/21 1730    Order Status: Completed Specimen: Respirate Updated: 05/18/21 1836     Influenza virus A RNA Negative     Influenza virus B, PCR Negative     RSV, PCR Negative     SARS-CoV-2 by PCR NotDetected     Comment: PATIENTS: Important information regarding your results and instructions can  be found at [email or web address]   \"After your  Covid-19 Test\"    [facility] providers: PLEASE REFER TO DE-ESCALATION " AND RETESTING PROTOCOL  on [email or web address]    **The [employer] GeneXpert Xpress SARS-CoV-2 RT-PCR Test has been made  available for use under the Emergency Use Authorization (EUA) only.          SARS-CoV-2 Source NP Swab    Narrative:      Have you been in close contact with a person who is suspected  or known to be positive for COVID-19 within the last 30 days  (e.g. last seen that person < 30 days ago)->No          Assessment:  Agapito Stone is a pleasant 76 y.o. male SNF resident with known history of CAD, type 2 diabetes, hypertension, CVA[facility] recently admitted with worsening left metatarsal heel wound with underlying osteomyelitis on imaging.  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 No pathology obtained.  Patient was discharged to SNF with plan to complete 6 weeks of IV daptomycin + IV Ancef through 6/3/2021.  Patient was brought to the ER on 5/19 with worsening shortness of breath.  CT scan showed worsening bilateral pleural effusions L>R compared to prior imaging. Left-sided thoracentesis performed which appeared bloody, lymphocyte predominant white cells, exudate by criteria, also with Gram stain reportedly positive for GPCs.     Unusual clinical course for empyema with worsening bilateral effusions that are primarily bloody, low white count and lymphocyte predominant.  However, the Gram stain from the left-sided effusion is positive, confirmed on review by microbiology lab. Also noted new peripheral opacities right upper lobe with possible early cavitation that may perhaps indicate systemic seeding of infection which would be unusual without a right-sided endocarditis.     Pertinent Diagnoses:  Left-sided empyema  Bilateral pleural effusions  Left foot osteomyelitis  Polymicrobial infection  Type 2 diabetes  Penicillin allergy    Plan:  -Continue IV Ancef 2 g every 8  hours  -Changed daptomycin to vancomycin while inpatient to continue coverage of the MRSA and also better pulmonary coverage.  Monitor vancomycin levels and renal function  -The above antibiotic course will continue to cover his left foot osteomyelitis through the original stop date of 6/3/2021  -No growth till date from the left effusion - ?contaminant (see above).  Will follow cultures  -Right-sided thoracentesis performed 5/21, also primarily bloody with relatively low white cell count which is lymphocyte predominant.  Follow cultures  -TTE with no obvious valvular vegetations  -Blood cultures x2 from 5/21 no growth to date  -Due to persistent pneumothorax on the left, repeat CT obtained with some improvement in the left-sided pneumothorax, pigtail chest tube in place, layering bilateral pleural effusions, persistent right-sided opacities     Plan was discussed with the primary team, Dr. Curran     ID will follow. Please feel free to call with questions.

## 2021-05-23 NOTE — CARE PLAN
The patient is Stable - Low risk of patient condition declining or worsening    Shift Goals  Clinical Goals: Pt's respiratory status will continue to improve    Progress made toward(s) clinical / shift goals:  patient denies pain.    Patient is not progressing towards the following goals:

## 2021-05-24 NOTE — PROGRESS NOTES
Assumed pt care at shift change.  Pt alert/oriented 4, resting in bed.  Pt is on 3 L oxygen via NC, with no signs of distress or discomfort.  Discussed POC with pt; answered questions.   Safety precautions in place, bed locked and in lowest position, call light and personal belongings within reach.  No further needs at this time.        Assessment/description of ears? Bilateral pink, intact and blanching  Which preventative measures are in place for the ears? Grey foam on silicon oxygen tubing, tender  stickers; remove glasses while sleeping    Assessment/description of elbows? Bilateral pink, intact and blanching  Which preventative measures are in place for the elbows?  Mepilex, ERIKA mattress, Q2hr turns, pillows for support/positioning    Assessment/description of sacrum?  Red/pink, blanching, excoriation  Which preventative measures are in place for the sacrum? ERIKA mattress, Q2hr turns, pillows for support/positioning, sacral mepilex    Assessment/description of heels? Bilateral red/pink, blanching; Right heel, small dark spot; R medial malleolus  Is red and blanching  L heel is boggy, pink and blanching; L medial malleolus is red and blanching  Which preventative measures are in place for the heels?  Mepilex, ERIKA mattress, Q2hr turns, pillows for support/positioning, heel float boots    Which devices are in place?  PICC left upper arm; chest tube L posterior side; NC; heel float boots  Description of skin under devices:  CDI, skin in tact and blanching; IDA to assess under dressing posterior chest  Which preventative measures are in place under devices? NC--grey foam, silicone oxygen tubing, tender , remove glasses while sleeping.    Other:

## 2021-05-24 NOTE — THERAPY
Physical Therapy Contact Note    Patient Name: Agapito Stone  Age:  76 y.o., Sex:  male  Medical Record #: [id number]  Today's Date: 5/24/2021      Discussed with  about getting patient's offloading shoes from [employer].  Physical therapy progression is limited to EOB activity until patient has his offloading shoes.  Will follow up tomorrow.    Kierra Krause, PT

## 2021-05-24 NOTE — DISCHARGE PLANNING
Medical Social Work    Anticipated Discharge Disposition: [facility]    Action: Pt discussed in IDT rounds today.  Per MD, pt is medically complex and is not ready for discharge at this time.  LSW spoke w/ Physical Therapist who states that the pt uses off loading shoes and will need these in order to move forward w/ therapy tasks.     LSW attempted to call pt's spouse,  Simran ([phone]), but there was no answer and unable to leave a message as voicemail box was full.     LSW contacted [facility] GUEVAR[facility], requesting assistance w/ retrieving pt's off loading shoes from their facility to be brought to [facility] if at all possible.     Barriers to Discharge: medical clearance    Plan: LSW will continue to monitor for discharge planning needs.

## 2021-05-24 NOTE — PROGRESS NOTES
"Pulmonary Progress Note    Date of admission  5/18/2021    Chief Complaint  Shortness of breath    Hospital Course  \"Very pleasant 76-year-old male never smoker admitted for shortness of breath from [facility].  He has a complicated past medical history starting 11/2020 when he was admitted for weakness and Covid pneumonia.  CT chest at that time showed left lower lobe consolidation and scattered peripheral predominant groundglass opacities.  No pleural fluid.  He was hospitalized again in 4/2021 for osteomyelitis of the toe, cultures at that time grew MRSA and Proteus that was treated with daptomycin and Ancef.  CT chest at that time showed left greater than right pleural effusions that were managed conservatively.  He was readmitted again this hospitalization for PICC occlusion, where CT chest showed markedly increased left effusion and right effusion as well.  Is on Eliquis for left lower extremity DVT.  He underwent a left-sided thoracentesis by interventional radiology 1500 cc drained, with post procedural CXR showing hydropneumothorax.  Pleural fluid was mostly blood, 84% lymphocytes, and rare GPC's.  For these findings pulmonary was consulted.     This hospitalization he has been afebrile, WBC initially mildly elevated with PMN predominance but has since normalized.  Flu/RSV/SARS-CoV-2 swab negative.     On personal review of his CT this hospitalization, the airways appear patent, there is extrinsic compression of the left lung by a large pleural effusion.  There appears to be no pleural-based nodularities.  Solitary lymph node high in the trachea has been slowly enlarging since 11/2020 but is still < 2cm.  In the right hemithorax, there are scattered groundglass opacities peripherally predominant as well as a modest right pleural effusion as well.  TTE negative for endocarditis. Blood cultures no growth to date and right pleural cultures no growth to date.  Vancomycin/Ancef, ID following.  Afebrile, " "WBC WNL.  MRSA nares negative.  L pleural fluid still GPCs, no further growth/speciation, ? contaminated sample \"    Interval Problem Update  5/24: No acute events overnight  Denies any symptoms this morning. Vital signs stable, on 3L supplemental oxygen. Left side pigtail catheter in place, draining orange fluid around 200 cc since this AM.  CXR showing stable moderate sized left pneumothorax, stable cardiomegaly, stable bilateral infiltrates      Review of Systems  Review of Systems   Constitutional: Negative for chills, fever and weight loss.   HENT: Negative for ear pain, hearing loss and tinnitus.    Eyes: Negative for blurred vision, double vision and photophobia.   Respiratory: Negative for cough, hemoptysis and sputum production.    Cardiovascular: Negative for chest pain, palpitations and orthopnea.   Gastrointestinal: Negative for abdominal pain, nausea and vomiting.   Genitourinary: Negative for dysuria, frequency and urgency.   Musculoskeletal: Negative for back pain, myalgias and neck pain.   Skin: Negative for itching and rash.   Neurological: Negative for dizziness, tingling and headaches.   Psychiatric/Behavioral: Negative for depression and suicidal ideas.      Vital Signs for last 24 hours   Temp:  [36.6 °C (97.8 °F)-37.3 °C (99.1 °F)] 37 °C (98.6 °F)  Pulse:  [63-99] 63  Resp:  [16-20] 16  BP: (103-128)/(63-72) 127/63  SpO2:  [96 %-99 %] 96 %    Physical Exam   Physical Exam  Vitals and nursing note reviewed.   Constitutional:       General: He is not in acute distress.     Appearance: Normal appearance.      Comments: Thin  Chronically ill appearing   HENT:      Head: Normocephalic.      Nose: Nose normal.      Mouth/Throat:      Mouth: Mucous membranes are moist.   Eyes:      Extraocular Movements: Extraocular movements intact.      Pupils: Pupils are equal, round, and reactive to light.   Cardiovascular:      Rate and Rhythm: Normal rate and regular rhythm.      Pulses: Normal pulses.      " Heart sounds: Normal heart sounds.   Pulmonary:      Comments: Decreased breath sounds bilaterally   L pigtail chest tube draining orange slightly turbid pleural fluid  Abdominal:      General: Bowel sounds are normal. There is no distension.      Palpations: Abdomen is soft.      Tenderness: There is no abdominal tenderness.   Musculoskeletal:         General: Normal range of motion.      Cervical back: Normal range of motion.   Skin:     General: Skin is warm.      Findings: Bruising present.   Neurological:      General: No focal deficit present.      Mental Status: He is alert and oriented to person, place, and time.   Psychiatric:         Mood and Affect: Mood normal.         Behavior: Behavior normal.       Medications  Current Facility-Administered Medications   Medication Dose Route Frequency Provider Last Rate Last Admin   • vancomycin (VANCOCIN) 750 mg in  mL IVPB  750 mg Intravenous Q12HR Fortflor Bernsteing-Aleta, D.O. 125 mL/hr at 05/24/21 1406 750 mg at 05/24/21 1406   • apixaban (ELIQUIS) tablet 5 mg  5 mg Oral BID Fortflor Sanders-Aleta, D.O.   5 mg at 05/24/21 0445   • MD Alert...Vancomycin per Pharmacy   Other PHARMACY TO DOSE Gomez Reveles M.D.       • albuterol inhaler 2 Puff  2 Puff Inhalation Q6HRS PRN Julio César Mckee M.D.       • ferrous sulfate tablet 325 mg  325 mg Oral QDAY with Breakfast Julio César Mckee M.D.   325 mg at 05/24/21 0839   • gabapentin (NEURONTIN) capsule 100 mg  100 mg Oral BID Julio César Mckee M.D.   100 mg at 05/24/21 0453   • magnesium oxide (MAG-OX) tablet 400 mg  400 mg Oral BID Julio César Mckee M.D.   400 mg at 05/24/21 0446   • montelukast (SINGULAIR) tablet 10 mg  10 mg Oral DAILY Julio César Mckee M.D.   10 mg at 05/24/21 0445   • tamsulosin (FLOMAX) capsule 0.4 mg  0.4 mg Oral QHS Julio César Mckee M.D.   0.4 mg at 05/23/21 2049   • senna-docusate (PERICOLACE or SENOKOT S) 8.6-50 MG per tablet 2 tablet  2 tablet Oral BID Julio César Mckee M.D.   2 tablet at  05/24/21 0445    And   • polyethylene glycol/lytes (MIRALAX) PACKET 1 Packet  1 Packet Oral QDAY PRLEEROY Mckee M.D.   1 Packet at 05/23/21 1759    And   • magnesium hydroxide (MILK OF MAGNESIA) suspension 30 mL  30 mL Oral QDAY PRN Julio César Mckee M.D.        And   • bisacodyl (DULCOLAX) suppository 10 mg  10 mg Rectal QDAY PRN Julio César Mckee M.D.       • Pharmacy Consult Request ...Pain Management Review 1 Each  1 Each Other PHARMACY TO DOSE Julio César Mckee M.D.       • acetaminophen (Tylenol) tablet 650 mg  650 mg Oral Q6HRS PRLEEROY Mckee M.D.   650 mg at 05/19/21 1256   • HYDROmorphone (Dilaudid) injection 0.25 mg  0.25 mg Intravenous Q3HRS PRLEEROY Mckee M.D.       • ondansetron (ZOFRAN) syringe/vial injection 4 mg  4 mg Intravenous Q4HRS PRLEEROY Mckee M.D.       • ondansetron (ZOFRAN ODT) dispertab 4 mg  4 mg Oral Q4HRS PRN Julio César Mckee M.D.       • insulin regular (HumuLIN R,NovoLIN R) injection  2-9 Units Subcutaneous 4X/DAY ACHVARGHESE Mckee M.D.   2 Units at 05/23/21 1218    And   • glucose 4 g chewable tablet 16 g  16 g Oral Q15 MIN PRN Julio César Mckee M.D.        And   • dextrose 50% (D50W) injection 50 mL  50 mL Intravenous Q15 MIN PRLEEROY Mckee M.D.       • ceFAZolin in dextrose (ANCEF) IVPB premix 2 g  2 g Intravenous Q8HRS Julio César Mckee M.D.   Stopped at 05/24/21 1054     Fluids    Intake/Output Summary (Last 24 hours) at 5/24/2021 1432  Last data filed at 5/24/2021 1230  Gross per 24 hour   Intake 1478 ml   Output 1570 ml   Net -92 ml     Laboratory          Recent Labs     05/22/21  0700 05/23/21  0120 05/24/21  0140   SODIUM 141 143 140   POTASSIUM 4.4 4.4 4.5   CHLORIDE 105 106 105   CO2 30 31 29   BUN 18 14 13   CREATININE 0.63 0.57 0.68   MAGNESIUM 1.8  --   --    CALCIUM 7.8* 8.0* 7.8*     Recent Labs     05/22/21  0700 05/23/21  0120 05/24/21  0140   ALTSGPT 5 6 6   ASTSGOT 26 25 25   ALKPHOSPHAT 282* 298* 359*   TBILIRUBIN 0.2 0.2  0.3   GLUCOSE 127* 125* 121*     Recent Labs     05/22/21  0700 05/23/21  0120 05/23/21  0618 05/24/21  0140   WBC 6.7  --  8.9 8.6   ASTSGOT 26 25  --  25   ALTSGPT 5 6  --  6   ALKPHOSPHAT 282* 298*  --  359*   TBILIRUBIN 0.2 0.2  --  0.3     Recent Labs     05/22/21  0700 05/23/21  0618 05/24/21  0140   RBC 3.88* 3.02* 3.08*   HEMOGLOBIN 11.0* 8.6* 8.6*   HEMATOCRIT 36.8* 28.5* 28.6*   PLATELETCT 212 266 285     Imaging  CXR showing stable moderate sized left pneumothorax, stable cardiomegaly, stable bilateral infiltrates     Assessment/Plan  76-year-old male admitted with bilateral exudative pleural effusion loculated empyema on left side s/p chest tube placement on 5/21.  Possible etiology could be septic emboli from his osteomyelitis.     Impression:  #Right exudative bloody lymphocytic pleural effusion  #Left-sided hydropneumothorax  #Acute hypoxic respiratory failure  #Bilateral pulmonary infiltrates concerning for hospital-acquired pneumonia (present prior to admission)  #Left leg DVT, subacute  #Osteomyelitis of left foot    Plan:  1. L hydropneumothorax: -200cc output this AM'; Repeat CXR in AM. IR: bloody lymphocytic pleural effusion: CXR stable, likely reaccumulation, gram stain neg this far, cytology negative       Get out of bed with assistance. If  Fluid collection <100cc in 24 hours, will consider chest tube removal  2. Pulmonary infiltrates: cont vanco/ancef, discussed with and appreciate ID input, no evidence of vegetations on TTE, blood cultures NGTD x 2, sputum cultures. Suspect left pleural gram stain represent contamination  3. Closely monitor H/H on apixaban  4. Titrate FiO2 to maintain saturations ~92%  5. Thoracic surgery consultation pending clinical trajectory.

## 2021-05-24 NOTE — PROGRESS NOTES
Community Health Worker Intake    • Social determinates of health intake completed.   • Identified barriers to: unsure of who PCP is.  • Contact information provided to Agapito Stone   • Accepted Meds-To-Beds.   • Inpatient assessment completed.    DEYANIRA Michael spoke with pt at bedside to introduce [facility]CM & [facility] services. Pt accepted [facility] services and referral has been sent. Pt mentioned not seeing Dr. Zamorano anymore due to him no longer being at the clinic and said that he has been established with a new doctor, Claire, but does not remember the last name. Pt requested that CHW contact his wife, Simran, to inform her of [facility] referral and ask about his new doctor. However, Simran did not answer and CHW was unable to leave a VM due to mailbox full. CHW will try again at a later time. Pt did not express financial or transportation barriers. He lives at home with his wife, who is a great support to him. Pt will likely discharge back to [facility]. No other needs at this time.     Plan: warm hand-off to [facility].     Update: CHW spoke with pt's wife Simran to explain [facility]. She mentioned seeing [facility] last week for an appt for herself and really enjoyed their services. Smiran would appreciate their support for her  after d/c. She also mentioned that pt was assigned a new PCP, Claire Barker. Chart has been updated.

## 2021-05-24 NOTE — PROGRESS NOTES
Infectious Disease Progress Note    Author: Fela Jade M.D. Date & Time of service: 2021  2:05 PM    Chief Complaint:  Follow-up for empyema    Interval History:  76 y.o. male SNF resident with known history of CAD, type 2 diabetes, hypertension, CVA, left lower extremity DVT on Eliquis, recently admitted with worsening left metatarsal heel wound with underlying osteomyelitis on imaging.  Patient underwent left fifth ray amputation on , cultures grew doxycycline resistant MRSA, ciprofloxacin resistant Proteus mirabilis. No pathology obtained.  Patient was discharged to SNF with plan to complete 6 weeks of IV daptomycin + IV Ancef through 6/3/2021.  Patient was brought to the ER on  with worsening shortness of breath.  CT scan showed worsening bilateral pleural effusions L>R compared to prior imaging. Left-sided thoracentesis performed which appeared bloody, lymphocyte predominant white cells, exudate by criteria, also with Gram stain reportedly positive for GPCs.   patient remains afebrile, white count 6.7, tolerating antibiotics with no new issues.  Right-sided thoracentesis performed.  See below   patient remains afebrile, white count 8.9, tolerating antibiotics with no new issues thus far.  Cultures pending.   AF WBC 8.5 no new complaints Cxs still pending    Labs Reviewed and Medications Reviewed.    Review of Systems:  Review of Systems   Constitutional: Negative for chills and fever.   Gastrointestinal: Negative for abdominal pain, diarrhea, nausea and vomiting.   Skin: Negative for itching and rash.   Neurological: Positive for sensory change.   All other systems reviewed and are negative.      Hemodynamics:  Temp (24hrs), Av °C (98.6 °F), Min:36.6 °C (97.8 °F), Max:37.3 °C (99.1 °F)  Temperature: 37 °C (98.6 °F)  Pulse  Av.3  Min: 44  Max: 117   Blood Pressure : 127/63       Physical Exam:  Physical Exam  Vitals and nursing note reviewed.   Constitutional:        General: He is not in acute distress.     Appearance: He is not toxic-appearing or diaphoretic.   HENT:      Nose: No congestion or rhinorrhea.   Eyes:      General:         Right eye: No discharge.         Left eye: No discharge.      Extraocular Movements: Extraocular movements intact.      Pupils: Pupils are equal, round, and reactive to light.   Cardiovascular:      Rate and Rhythm: Normal rate.      Heart sounds: No murmur heard.     Pulmonary:      Effort: Pulmonary effort is normal. No respiratory distress.      Breath sounds: No stridor.      Comments: Decreased to sounds bilaterally  Left-sided chest tube in place  Abdominal:      General: Abdomen is flat. There is no distension.      Tenderness: There is no abdominal tenderness.   Musculoskeletal:      Cervical back: Neck supple. No rigidity.      Comments: Well-healed incision dorsal right great toe, surgical site left fifth ray well-healed, mild erythema, no active discharge, no swelling, no fluctuance.  Left fourth toe with small scab on dorsal surface   Skin:     Coloration: Skin is not jaundiced.      Findings: No erythema or rash.   Neurological:      General: No focal deficit present.      Mental Status: He is alert and oriented to person, place, and time.   Psychiatric:         Mood and Affect: Mood normal.         Behavior: Behavior normal.         Meds:    Current Facility-Administered Medications:   •  vancomycin  •  apixaban  •  MD Alert...Vancomycin per Pharmacy  •  albuterol  •  ferrous sulfate  •  gabapentin  •  magnesium oxide  •  montelukast  •  tamsulosin  •  senna-docusate **AND** polyethylene glycol/lytes **AND** magnesium hydroxide **AND** bisacodyl  •  Pharmacy Consult Request  •  acetaminophen  •  [DISCONTINUED] oxyCODONE immediate-release **OR** [DISCONTINUED] oxyCODONE immediate-release **OR** HYDROmorphone  •  ondansetron  •  ondansetron  •  insulin regular **AND** POC blood glucose manual result **AND** NOTIFY MD and PharmD  **AND** glucose **AND** dextrose 50%  •  ceFAZolin    Labs:  Recent Labs     05/22/21  0700 05/23/21  0618 05/24/21  0140   WBC 6.7 8.9 8.6   RBC 3.88* 3.02* 3.08*   HEMOGLOBIN 11.0* 8.6* 8.6*   HEMATOCRIT 36.8* 28.5* 28.6*   MCV 93.8 94.4 92.9   MCH 27.6 28.5 27.9   RDW 56.1* 55.2* 54.4*   PLATELETCT 212 266 285   MPV 9.3 9.4 9.6     Recent Labs     05/22/21  0700 05/23/21  0120 05/24/21  0140   SODIUM 141 143 140   POTASSIUM 4.4 4.4 4.5   CHLORIDE 105 106 105   CO2 30 31 29   GLUCOSE 127* 125* 121*   BUN 18 14 13     Recent Labs     05/22/21  0700 05/23/21  0120 05/24/21  0140   ALBUMIN 2.0* 2.0* 1.8*   TBILIRUBIN 0.2 0.2 0.3   ALKPHOSPHAT 282* 298* 359*   TOTPROTEIN 5.0* 5.2* 5.3*   ALTSGPT 5 6 6   ASTSGOT 26 25 25   CREATININE 0.63 0.57 0.68       Imaging:  CT-CHEST (THORAX) W/O    Result Date: 5/19/2021 5/18/2021 10:24 PM HISTORY/REASON FOR EXAM: Pleural effusion TECHNIQUE/EXAM DESCRIPTION:  Transaxial MDCT scan of chest without contrast. Low dose optimization technique was utilized for this CT exam including automated exposure control and adjustment of the mA and/or kV according to patient size. COMPARISON: April 25, 2021 FINDINGS The visualized portion of the thyroid appears within normal limits. The trachea and main stem airways are normal in caliber. There are no pathologically enlarged mediastinal lymph nodes. The heart and pericardium appear within normal limits. The aorta and its main branch vessels are normal in caliber and configuration. Scattered patchy reticular pulmonary infiltrates are seen. Linear consolidations in the bilateral lung bases are seen. Large left and moderate right pleural effusions are seen. Limited views of the abdomen demonstrates hepatomegaly and irregular hepatic contour. Scattered colonic diverticula are seen. The bony structures are age appropriate.     1.  Large left and moderate pleural effusions. 2.  Peripheral reticular pulmonary opacities, appearance compatible with  Covid infiltrates. 3.  Linear consolidations in the bilateral lung bases, likely atelectasis. 4.  Hepatomegaly and irregular hepatic contour, appearance most compatible changes of cirrhosis. 5.  Diverticulosis    CT-CHEST (THORAX) WITH    Result Date: 5/22/2021 5/21/2021 10:17 PM HISTORY/REASON FOR EXAM:  Pneumonia. TECHNIQUE/EXAM DESCRIPTION: CT scan of the chest with contrast. Thin-section helical images were obtained from the lung apices through the adrenal glands following the bolus administration of contrast. 80 mL of Omnipaque 350 nonionic contrast was utilized. Low dose optimization technique was utilized for this CT exam including automated exposure control and adjustment of the mA and/or kV according to patient size. COMPARISON:  May 18, 2021 FINDINGS: Moderate left pneumothorax is seen. Linear reticular consolidations in the bilateral lung bases are seen with peripheral reticular opacities. Small caliber pigtail catheter is seen at the left costophrenic angle. There is no mediastinal, hilar, or axillary adenopathy. The cardiac chambers, great vessels, and aorta are normal in CT appearance. Small bilateral pleural effusions are seen. The extrathoracic soft tissues and thoracic cage are normal in appearance. The adrenal glands are normal.  The visualized portions of the spleen, pancreas, and kidneys are normal. The liver is irregular in contour. Trace perihepatic ascites is seen. There is no upper abdominal adenopathy. There is no abnormal contrast enhancement.     1.  Moderate size left pneumothorax with small caliber pigtail thoracostomy tube in place, appears similar to prior chest x-ray yesterday at 1054. 2.  Layering bilateral pleural effusions. 3.  Peripheral reticular consolidations, appearance compatible with Covid infiltrates. There are areas of denser consolidation suggesting superimposed secondary infectious process. 4.  Irregular hepatic contour, compatible with changes of cirrhosis. 5.  Trace  perihepatic ascites    CT-CHEST TUBE-EMPYEMA LEFT    Result Date: 5/21/2021 5/21/2021 9:05 AM HISTORY/REASON FOR EXAM:  pt with new left sided effusion, fluid study consistent with empyema, needs a pig tail for drainage. TECHNIQUE/EXAM DESCRIPTION AND NUMBER OF VIEWS:  CT guided left chest empyema drainage. Low dose optimization technique was utilized for this CT exam including automated exposure control and adjustment of the mA and/or kV according to patient size. PROCEDURE:  Informed consent was obtained. Localizing CT images were obtained with the patient in supine position. The skin was prepped with chlorhexidine and draped in a sterile fashion. SEDATION: Moderate sedation was provided. Pulse oximetry and continuous cardiac monitoring by the nurse was performed throughout the exam. Intraservice time was 30 minutes. Following local anesthesia with 1% Lidocaine, a 17 G guiding needle was placed and needle path confirmed with CT. An Amplatz guidewire was placed and following serial tract dilatation, a 10 Maltese pigtail locking catheter was placed. The catheter was secured to the skin and connected to Pleur-evac suction. Final CT images were obtained documenting catheter position. The patient tolerated the procedure well with no evidence of complication. COMPARISON:Recent CT scan of thorax FINDINGS:CT images demonstrate the locking loop pigtail catheter in the inferior aspect of the large left-sided pleural effusion.     1. LEFT CHEST EMPYEMA Drainage with CT guidance.    DX-CHEST-FOR LINE PLACEMENT Perform procedure in: Patient's Room    Result Date: 4/27/2021 4/27/2021 5:17 PM HISTORY/REASON FOR EXAM: PICC tip location confirmation - STAT pt pending discharge transport. TECHNIQUE/EXAM DESCRIPTION AND NUMBER OF VIEWS: Single portable view of the chest. COMPARISON: Yesterday. FINDINGS: Left PICC line tip overlies the SVC Cardiomediastinal contours are stable, moderately large Stable to slight enlargement of large  left pleural effusion with some lateral loculation possible. Aeration is only seen in the upper lung zone as before. Right lung shows no consolidation or effusion. No pneumothorax.     Left PICC line tip overlies the SVC Similar large left pleural effusion and multisegmental atelectasis. Consolidation is possible    DX-CHEST-LIMITED (1 VIEW)    Result Date: 5/20/2021 5/20/2021 8:18 AM HISTORY/REASON FOR EXAM:  re-eval small pneumothorax TECHNIQUE/EXAM DESCRIPTION AND NUMBER OF VIEWS: Single portable view of the chest. COMPARISON: 5/19/2021 FINDINGS: Cardiac contours grossly stable although difficult to evaluate. Patchy opacities again seen in both lungs, most confluent in the LEFT base, showing slight worsening in the RIGHT midlung. Bilateral pleural fluid. Small LEFT pneumothorax again seen laterally. LEFT arm PICC line is in similar position.     1.  Stable LEFT hydropneumothorax. 2.  Multifocal pneumonia again seen with slight worsening in RIGHT midlung region.     DX-CHEST-LIMITED (1 VIEW)    Result Date: 5/19/2021 5/19/2021 5:33 PM HISTORY/REASON FOR EXAM:  re-eval small left pneumo TECHNIQUE/EXAM DESCRIPTION AND NUMBER OF VIEWS: Single portable view of the chest. COMPARISON: 5/19/2021 FINDINGS: Airspace opacity in the left lower lobe. Patchy hazy opacity in the right lateral lung. Small bilateral pleural effusions. Unchanged left lateral pneumothorax. Stable cardiopericardial silhouette.     Unchanged left lateral pneumothorax.    DX-CHEST-PORTABLE (1 VIEW)    Result Date: 5/22/2021 5/22/2021 5:50 AM HISTORY/REASON FOR EXAM: Pleural Effusion; pigtail monitoring TECHNIQUE/EXAM DESCRIPTION:  Single AP view of the chest. COMPARISON: Yesterday FINDINGS: Position of medical devices appears stable. Cardiomegaly is observed. Atherosclerotic calcification of the aorta is noted.  The central  pulmonary vasculature appears prominent and indistinct. The lungs appear well expanded bilaterally.  Diffuse scattered hazy  pulmonary parenchymal opacities are seen, increased on the right. Left pneumothorax is seen, decreased since prior. New layering right pleural effusion is seen. The bony structures appear age-appropriate.     1.  Pulmonary infiltrates, increased on the right compared to prior study. 2.  New layering right pleural effusion. 3.  Left pneumothorax, decreased since prior study. 4.  Cardiomegaly 5.  Atherosclerosis    DX-CHEST-PORTABLE (1 VIEW)    Result Date: 5/21/2021 5/21/2021 10:40 AM HISTORY/REASON FOR EXAM:  Pleural Effusion. TECHNIQUE/EXAM DESCRIPTION AND NUMBER OF VIEWS: Single portable view of the chest. COMPARISON: 5/20/2021 FINDINGS: Cardiac mediastinal contour is unchanged. Interval placement of pigtail catheter at the LEFT lower chest. LEFT arm PICC line in similar position. Interval improvement of LEFT pleural effusion. Moderate LEFT pneumothorax, apparently increased from prior exam.  Contour deformity of the consolidated lung suggests the presence of nodules. Patchy RIGHT mid and lower lung infiltrates appear improved.     1.  Interval placement of small-caliber LEFT chest tube with improvement of pleural fluid and compensatory enlargement of LEFT pneumothorax, likely due to noncompliant underlying lung. 2.  Improving RIGHT lung infiltrates. These findings were discussed with SUNDAR SUNG on 5/21/2021 11:18 AM.     DX-CHEST-PORTABLE (1 VIEW)    Result Date: 5/19/2021 5/19/2021 1:47 PM HISTORY/REASON FOR EXAM: Post thoracentesis. TECHNIQUE/EXAM DESCRIPTION AND NUMBER OF VIEWS: Single portable view of the chest. COMPARISON: 5/18/2021 FINDINGS: There has been interval decrease in size of the left pleural effusion. There is a small residual left pleural effusion. There is a small left loculated pneumothorax. There is likely pleural thickening. There is a small loculated right pleural effusion. Bilateral airspace opacities are increased on the right compared to prior. There is left perihilar and bibasilar  atelectasis/consolidation. The cardiomediastinal silhouette is stable. Atherosclerotic calcification is seen. Left PICC projects over the SVC. There are surgical clips in the left neck.     Interval decrease in size of the left pleural effusion which is now small. Small small loculated left pneumothorax. Small loculated right pleural effusion. Left perihilar and basilar opacity may represent atelectasis/consolidation. Increased ill-defined airspace opacities on the right may represent a combination of atelectasis, edema and pneumonia. Atherosclerotic plaque. Findings discussed with the covering clinician.     DX-CHEST-PORTABLE (1 VIEW)    Result Date: 5/18/2021 5/18/2021 3:23 PM HISTORY/REASON FOR EXAM:  Shortness of Breath. TECHNIQUE/EXAM DESCRIPTION AND NUMBER OF VIEWS: Single portable view of the chest. COMPARISON: 4/26/2021 FINDINGS: Left PICC line tip projects near the cavoatrial junction. Cardiomediastinal silhouette is stable. Aortic calcified atherosclerotic plaque. Large left pleural effusion is stable with associated compressive atelectasis. Correlate clinically for infection. Mildly increased opacity in the right mid and lower lung which could be related to atelectasis, edema and/or infection. Possible small right pleural effusion. All No acute osseous abnormality.     Stable large left pleural effusion with associated compressive atelectasis. Correlate clinically for infection. Possible small right pleural effusion with mildly increased opacity in the right lung.    DX-CHEST-PORTABLE (1 VIEW)    Result Date: 4/26/2021 4/26/2021 8:52 AM HISTORY/REASON FOR EXAM:  Shortness of Breath; pleural effusion on CTA. please reevalaute. TECHNIQUE/EXAM DESCRIPTION AND NUMBER OF VIEWS: Single portable view of the chest. COMPARISON: Chest radiograph 11/18/2020. CTA chest 4/25/2021 FINDINGS: Cardiomediastinal silhouette is stable. Large left pleural effusion with associated compressive atelectasis of the left lung.  The small pleural effusion on the earlier CT study is not well visualized radiographically. Mild right basilar atelectasis. No pneumothorax. No acute osseous abnormality.     Large left pleural effusion with associated compressive atelectasis. Correlate clinically for infection.    DX-FOOT-COMPLETE 3+ LEFT    Result Date: 2021 3:42 PM HISTORY/REASON FOR EXAM:  Pain/Deformity Following Trauma (Wound TECHNIQUE/EXAM DESCRIPTION AND NUMBER OF VIEWS: 3 views of the LEFT foot. COMPARISON:  None. FINDINGS: There is soft tissue swelling along the lateral aspect of the foot. There is bony destruction seen throughout the fifth proximal phalanx, the mid/distal phalangeal base and involving the fifth metacarpal head/neck consistent with osteomyelitis. There may  be septic arthritis involving the interposed joints as well. There is generalized osteopenia. No evidence of osteomyelitis elsewhere within the foot. Mild degenerative changes the great toe MTP joint.     Findings in keeping with osteomyelitis involving the fifth distal metatarsal, proximal and middle phalanges.    US-HAKEEM SINGLE LEVEL BILAT    Result Date: 2021   Vascular Laboratory  Conclusions  Bilateral.  There is no evidence of major arterial disease demonstrated.  NADINE CABRERA  Age:    76    Gender:     M  MRN:    [id number]  :    1945      BSA:  Exam Date:     2021 10:46  Room #:     Inpatient  Priority:     Routine  Ht (in):             Wt (lb):  Ordering Physician:        ANN HOYT  Referring Physician:       [id number] Curz  Sonographer:               Cami Alves RDMS                             RVT  Study Type:                Complete Bilateral  Technical Quality:         Adequate  Indications:     Ulceration of LE  CPT Codes:       40141  ICD Codes:       707.1  History:         Non-healing wound of left lower limb. Diabetes.  Limitations:                 RIGHT  Waveform             Systolic BPs (mmHg)                             125           Brachial  Triphasic                                Common Femoral  Triphasic                  276           Posterior Tibial  Triphasic                  267           Dorsalis Pedis                                           Peroneal                             2.12          HAKEEM                                           TBI                       LEFT  Waveform        Systolic BPs (mmHg)                             130           Brachial  Triphasic                                Common Femoral  Triphasic                  240           Posterior Tibial  Triphasic                  226           Dorsalis Pedis                                           Peroneal                             1.85          HAKEEM                                           TBI  Findings  Bilateral.  Doppler waveforms of the common femoral arteries are of high amplitude and  triphasic.  Doppler waveforms at the ankle are brisk and triphasic.  Ankle pressures are not accurately measured due to calcification and  noncompressibility of the tibial vessels.  Toe-brachial indices are normal. Right: 0.81  Left: 0.85  Additional testing was not performed in accordance with lower extremity  arterial evaluation protocol.  Cristiano Mccormack  (Electronically Signed)  Final Date:      23 April 2021                   13:32    US-EXTREMITY VENOUS LOWER BILAT    Result Date: 5/21/2021   Vascular Laboratory  CONCLUSIONS  Chronic nonocclusive thrombosis of the left Popliteal vein.  Otherwise negative LLE examination.  No RLE DVT identified.  NADINE CABRERA  Exam Date:     05/21/2021 15:37  Room #:     Inpatient  Priority:     Routine  Ht (in):             Wt (lb):  Ordering Physician:        ROGER ALONZO  Referring Physician:       [id number]  Sonographer:               Bijal Christian  Study Type:                Complete Bilateral  Technical Quality:         Fair   Age:    76    Gender:     M  MRN:    [id number]  :    1945      BSA:  Indications:     Personal history of venous thrombosis and embolism  CPT Codes:       50905  ICD Codes:       Z86.71  History:         BLE pain, pressure, and edema. History of left lower                   extremity deep venous thrombosis. Prior exam 20.  Limitations:     Patient unable to bend either knee.  PROCEDURES:  Bilateral lower extremity venous duplex imaging.  The following venous structures were evaluated: common femoral, profunda  femoral, proximal greater saphenous, femoral, popliteal , peroneal and  posterior tibial veins.  Serial compression, augmentation maneuvers,  color and spectral Doppler  flow evaluations were performed.  FINDINGS:  Right lower extremity -  The peroneal and posterior tibial veins are difficult to assess but appear  to be noncompressible, and no flow response to augmentation is  demonstrated.  Complete color filling and compressibility with normal venous flow dynamics  including spontaneous flow, response to augmentation maneuvers, and  respiratory phasicity in all other remaining visualized vessels.  Left lower extremity -  Chronic deep venous thrombosis is seen in the popliteal vein.  The peroneal and posterior tibial veins are difficult to assess for  compressibility, but flow response to augmentation is demonstrated.  Complete color filling and compressibility with normal venous flow dynamics  including spontaneous flow, response to augmentation maneuvers, and  respiratory phasicity in all other remaining visualized vessels.  Kade Louis MD  (Electronically Signed)  Final Date:      21 May 2021 17:01    US-THORACENTESIS PUNCTURE LEFT    Result Date: 2021 12:53 PM HISTORY/REASON FOR EXAM:  Shortness of breath; Large left sided pleural effusion, unknown etiology TECHNIQUE/EXAM DESCRIPTION: Ultrasound-guided thoracentesis. Indication:  LEFT pleural fluid collection. COMPARISON:   5/18/2021 PROCEDURE:     Informed consent was obtained. A timeout was taken. A left pleural effusion was localized with real-time ultrasound guidance. The left posterior chest wall was prepped and draped in a sterile manner. Following local anesthesia with 1% lidocaine, a 5 Malagasy Yueh pigtail catheter was advanced into the pleural space with trocar technique and pleural fluid was drained. The patient tolerated the procedure well without evidence of complication. A post thoracentesis chest radiograph is forthcoming. FINDINGS: Fluid was sent to the laboratory. Fluid character: Brown     1. Ultrasound guided left sided diagnostic and therapeutic thoracentesis. 2. 1550 mL of fluid withdrawn.    CT-CTA CHEST PULMONARY ARTERY W/ RECONS    Result Date: 4/25/2021 4/25/2021 4:30 PM HISTORY/REASON FOR EXAM:  PE suspected, high pretest prob; pleuritic pain, hypoxia Hypoxia. Deep venous thrombosis. Pelvis therapy. Covid in December. TECHNIQUE/EXAM DESCRIPTION: CT angiogram scan for pulmonary embolism with contrast, with reconstructions. 1.25 mm helical sections were obtained from the lung apices through the lung bases following the rapid bolus administration of 62 mL of Omnipaque 350 nonionic contrast. Thin-section overlapping reconstruction interval was utilized. Coronal reconstructions were generated from the axial data. MIP post processing was performed and utilized for the interpretation. Low dose optimization technique was utilized for this CT exam including automated exposure control and adjustment of the mA and/or kV according to patient size. COMPARISON: 11/18/2020 CT FINDINGS: New large left pleural effusion with multisegmental compressive atelectasis. The left lower lobe is completely collapsed. There is new small-medium volume right pleural fluid with less atelectasis Calcified right middle lobe nodule Pulmonary Embolism: No. Main Pulmonary Arteries: No. Segmental branches: No. Additional Comments: Coronary artery  disease. Lungs: No suspicious nodules or air space process. Pleura: No pleural effusion. Nodes: There is a 9 mm short axis right paraesophageal node. No enlarged nodes are seen     New large left, small to medium right pleural effusions No pulmonary embolism Moderate coronary artery disease Remote granulomatous disease and/or calcified hamartoma     IR-PICC LINE PLACEMENT W/ GUIDANCE > AGE 5    Result Date: 4/27/2021  HISTORY/REASON FOR EXAM:   PICC placement. TECHNIQUE/EXAM DESCRIPTION AND NUMBER OF VIEWS:   PICC line insertion with ultrasound guidance.  The procedure was performed using maximal sterile barrier technique including sterile gown, mask, cap, and donning of sterile gloves following appropriate hand hygiene and/or sterile scrub. Patient skin site was prepped with 2% Chlorhexidine solution. FINDINGS:  PICC line insertion with Ultrasound Guidance was performed by qualified nursing staff without the assistance of a Radiologist. PICC positioning appropriateness confirmed by 3CG technology; chest xray only needed in the instance 3CG unable to confirm placement.              Ultrasound-guided PICC placement performed by qualified nursing staff as above.       Micro:  Results     Procedure Component Value Units Date/Time    FUNGAL CULTURE [[id number]] Collected: 05/21/21 1048    Order Status: Completed Specimen: Body Fluid from Thoracentesis Fluid Updated: 05/24/21 0810     Significant Indicator NEG     Source BF     Site THORACENTESIS FLUID     Culture Result Culture in progress.    Narrative:      Collected By:[id number] KAPIL RIOS  Collected By:[id number] KAPIL RIOS    FLUID CULTURE W/GRAM STAIN [[id number]] Collected: 05/21/21 1048    Order Status: Completed Specimen: Body Fluid from Thoracentesis Fluid Updated: 05/24/21 0810     Significant Indicator NEG     Source BF     Site THORACENTESIS FLUID     Culture Result No growth at 72 hours.     Gram Stain Result Rare WBCs.  No organisms seen.      Narrative:       Collected By:[id number] KAPIL RIOS  Collected By:[id number] KAPIL RIOS    FLUID CULTURE W/GRAM STAIN [[id number]]  (Abnormal) Collected: 05/19/21 1400    Order Status: Completed Specimen: Body Fluid Updated: 05/24/21 0705     Significant Indicator NEG     Source BF     Site Thoracentesis Fluid     Culture Result No growth at 5 days.     Gram Stain Result Rare WBCs.  Rare Gram positive cocci.      Narrative:      Thoracentesis.    GRAM STAIN [[id number]] Collected: 05/23/21 1846    Order Status: Completed Specimen: Respirate Updated: 05/23/21 2139     Significant Indicator .     Source RESP     Site SPUTUM     Gram Stain Result Specimen Quality Score: 2+  Rare epithelial cells.  Many WBCs.  Many Gram positive cocci.      Narrative:      Collected By:[id number] JENNIFER ZHAO  Collected By:[id number] JENNIFER ZHAO    CULTURE RESPIRATORY W/ GRM STN [[id number]] Collected: 05/23/21 1846    Order Status: Completed Specimen: Respirate from Sputum Updated: 05/23/21 2139     Significant Indicator NEG     Source RESP     Site SPUTUM     Culture Result -     Gram Stain Result Specimen Quality Score: 2+  Rare epithelial cells.  Many WBCs.  Many Gram positive cocci.      Narrative:      Collected By:[id number] JENNIFER ZHAO  Collected By:[id number] JENNIFER ZHAO    GRAM STAIN [[id number]] Collected: 05/21/21 1048    Order Status: Completed Specimen: Body Fluid Updated: 05/22/21 1653     Significant Indicator .     Source BF     Site THORACENTESIS FLUID     Gram Stain Result Rare WBCs.  No organisms seen.      Narrative:      Collected By:[id number] KAPIL RIOS  Collected By:[id number] KAPIL RIOS    Acid Fast Stain [[id number]] Collected: 05/21/21 1048    Order Status: Completed Specimen: Body Fluid Updated: 05/22/21 1653     Significant Indicator NEG     Source BF     Site THORACENTESIS FLUID     AFB Smear Results No acid fast bacilli seen.    Narrative:      Collected By:[id number] KAPIL RIOS  Collected  "By:[id number] KAPIL RIOS    MRSA By PCR (Amp) [[id number]] Collected: 05/22/21 0943    Order Status: Completed Specimen: Respirate from Nares Updated: 05/22/21 1329     Significant Indicator NEG     Source RESP     Site NARES     MRSA PCR Negative for MRSA by PCR.    Narrative:      Collected By:[id number] KAPIL RIOS  Collected By:[id number] KAPIL RIOS    BLOOD CULTURE [[id number]] Collected: 05/21/21 1212    Order Status: Completed Specimen: Blood from Peripheral Updated: 05/22/21 0736     Significant Indicator NEG     Source BLD     Site PERIPHERAL     Culture Result No Growth  Note: Blood cultures are incubated for 5 days and  are monitored continuously.Positive blood cultures  are called to the RN and reported as soon as  they are identified.      Narrative:      Per Hospital Policy: Only change Specimen Src: to \"Line\" if  specified by physician order.  Right Hand    BLOOD CULTURE [[id number]] Collected: 05/21/21 1437    Order Status: Completed Specimen: Blood from Peripheral Updated: 05/22/21 0736     Significant Indicator NEG     Source BLD     Site PERIPHERAL     Culture Result No Growth  Note: Blood cultures are incubated for 5 days and  are monitored continuously.Positive blood cultures  are called to the RN and reported as soon as  they are identified.      Narrative:      Per Hospital Policy: Only change Specimen Src: to \"Line\" if  specified by physician order.  Right Hand    FLUID CULTURE W/GRAM STAIN [[id number]] Collected: 05/21/21 1048    Order Status: Sent Specimen: Body Fluid from Thoracentesis Fluid     AFB CULTURE [[id number]] Collected: 05/21/21 1048    Order Status: Canceled Specimen: Body Fluid from Thoracentesis Fluid     AFB Culture [[id number]] Collected: 05/21/21 1048    Order Status: No result Specimen: Other     BLOOD CULTURE [[id number]] Collected: 05/20/21 1557    Order Status: Completed Specimen: Blood from Peripheral Updated: 05/21/21 0821     Significant Indicator NEG     Source BLD     " "Site PERIPHERAL     Culture Result No Growth  Note: Blood cultures are incubated for 5 days and  are monitored continuously.Positive blood cultures  are called to the RN and reported as soon as  they are identified.      Narrative:      Per Hospital Policy: Only change Specimen Src: to \"Line\" if  specified by physician order.  Right Hand    BLOOD CULTURE [[id number]] Collected: 05/20/21 1557    Order Status: Completed Specimen: Blood from Peripheral Updated: 05/21/21 0821     Significant Indicator NEG     Source BLD     Site PERIPHERAL     Culture Result No Growth  Note: Blood cultures are incubated for 5 days and  are monitored continuously.Positive blood cultures  are called to the RN and reported as soon as  they are identified.      Narrative:      Per Hospital Policy: Only change Specimen Src: to \"Line\" if  specified by physician order.  Left AC    GRAM STAIN [[id number]] Collected: 05/19/21 1400    Order Status: Completed Specimen: Body Fluid Updated: 05/20/21 1202     Significant Indicator .     Source BF     Site Thoracentesis Fluid     Gram Stain Result Rare WBCs.  Rare Gram positive cocci.      Narrative:      Thoracentesis.    COV-2, FLU A/B, AND RSV BY PCR (2-4 HOURS CEPHEID): Collect NP swab in VTM [[id number]] Collected: 05/18/21 1730    Order Status: Completed Specimen: Respirate Updated: 05/18/21 1836     Influenza virus A RNA Negative     Influenza virus B, PCR Negative     RSV, PCR Negative     SARS-CoV-2 by PCR NotDetected     Comment: PATIENTS: Important information regarding your results and instructions can  be found at [email or web address]   \"After your  Covid-19 Test\"    [facility] providers: PLEASE REFER TO DE-ESCALATION AND RETESTING PROTOCOL  on [email or web address]    **The [employer] GeneXpert Xpress SARS-CoV-2 RT-PCR Test has been made  available for use under the Emergency Use Authorization (EUA) only.          SARS-CoV-2 Source NP Swab    Narrative:      Have you been in " close contact with a person who is suspected  or known to be positive for COVID-19 within the last 30 days  (e.g. last seen that person < 30 days ago)->No          Assessment:  Agapito Stone is a pleasant 76 y.o. male SNF resident with known history of CAD, type 2 diabetes, hypertension, CVA, left lower extremity DVT on Eliquis, recently admitted with worsening left metatarsal heel wound with underlying osteomyelitis on imaging.  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 No pathology obtained.  Patient was discharged to SNF with plan to complete 6 weeks of IV daptomycin + IV Ancef through 6/3/2021.  Patient was brought to the ER on 5/19 with worsening shortness of breath.  CT scan showed worsening bilateral pleural effusions L>R compared to prior imaging. Left-sided thoracentesis performed which appeared bloody, lymphocyte predominant white cells, exudate by criteria, also with Gram stain reportedly positive for GPCs.     Unusual clinical course for empyema with worsening bilateral effusions that are primarily bloody, low white count and lymphocyte predominant.  However, the Gram stain from the left-sided effusion is positive, confirmed on review by microbiology lab. Also noted new peripheral opacities right upper lobe with possible early cavitation that may perhaps indicate systemic seeding of infection which would be unusual without a right-sided endocarditis.     Pertinent Diagnoses:  Left-sided empyema  Bilateral pleural effusions  Left foot osteomyelitis, MRSA and Proteus  Polymicrobial infection  Type 2 diabetes  Penicillin allergy    Plan:  -Continue IV Ancef 2 g every 8 hours  -Continue vancomycin while inpatient to continue coverage of the MRSA and for pulmonary coverage.  Monitor vancomycin levels and renal function  -The above antibiotic course will continue to cover his left foot osteomyelitis through  the original stop date of 6/3/2021  -No growth till date from the left effusion - ?contaminant (see above).  Will follow cultures  -Right-sided thoracentesis performed 5/21, also primarily bloody with relatively low white cell count which is lymphocyte predominant.  Follow cultures  -TTE with no obvious valvular vegetations  -Blood cultures x2 from 5/21 no growth to date  -Due to persistent pneumothorax on the left, repeat CT obtained with some improvement in the left-sided pneumothorax, pigtail chest tube in place, layering bilateral pleural effusions, persistent right-sided opacities

## 2021-05-24 NOTE — CARE PLAN
The patient is Watcher - Medium risk of patient condition declining or worsening    Shift Goals  Clinical Goals: Pt respirations will continue to improve   Patient Goals: Pt will report no pain from chest tube site    Progress made toward(s) clinical / shift goals: Pt reported pain only during repositioning, and only allowed a few turns during shift.     Patient is not progressing towards the following goals:

## 2021-05-24 NOTE — THERAPY
Occupational Therapy   Initial Evaluation     Patient Name: Agapito Stone  Age:  76 y.o., Sex:  male  Medical Record #: [id number]  Today's Date: 5/24/2021     Precautions: (P) Fall Risk, Heel Weight Bearing Left Lower Extremity  Comments: (P)  (PICC line; chest tube)    Assessment  Patient is 76 y.o. male admitted with L pneumothorax, acute hypoxic respiratory failure, L leg DVT, osteomyelitis of L foot, with complicated medical hx of stroke, CAD s/p stents 2013, HTN, LLE DVT on Eliquis, DM2 with neuropathy and retinopathy, and foot wound s/p left 5th toe amputation. Pt was previously residing at [facility] and had progressed his function (able to complete slide board txfs to WC and increased independence with ADLs). Pt presented below baseline level of function today for all self cares and mobility (fear with movement today and only able to complete bed level activities). Recommend post acute placement for continued progression with self cares (unclear if family is considering long term placement). Will follow for OT while in house.     Plan    Recommend Occupational Therapy 2 times per week until therapy goals are met for the following treatments:  Adaptive Equipment, Self Care/Activities of Daily Living, Therapeutic Activities and Therapeutic Exercises.    DC Equipment Recommendations: (P) Unable to determine at this time  Discharge Recommendations: (P) Recommend post-acute placement for additional occupational therapy services prior to discharge home        05/24/21 1130   Initial Contact Note    Initial Contact Note Order Received and Verified, Occupational Therapy Evaluation in Progress with Full Report to Follow.   Prior Living Situation   Prior Services Continuous (24 Hour) Care Giving Per Service   Housing / Facility Skilled Nursing Facility   Comments previously at [place] and was recieving assist for most ADLs; reported used a alisha for txfs, and had progressed to slideboard txfs and improved  independence with self cares   Prior Level of ADL Function   Self Feeding Independent   Grooming / Hygiene Requires Assist   Bathing Requires Assist   Dressing Requires Assist   Toileting Requires Assist   Prior Level of IADL Function   Medication Management Requires Assist   Laundry Requires Assist   Kitchen Mobility Requires Assist   Home Management Requires Assist   Shopping Requires Assist   Prior Level Of Mobility Uses Wheel Chair for in Home Mobility   Precautions   Precautions Fall Risk;Heel Weight Bearing Left Lower Extremity   Comments   (PICC line; chest tube)   Pain   Intervention Declines   Cognition    Cognition / Consciousness X   Speech/ Communication Delayed Responses;Expressive Aphasia   Comments aggitated throughout regarding mobility; reported that he was fearful when attempting EOB; when asked why pt responded that it was because of medical concerns and said he would like to try EOB another day; provided extensive education in risks of limited OOB activity; unclear baseline cognitive level   Active ROM Upper Body   Active ROM Upper Body  X   Comments L shoulder old dislocation; pt able to range a functional level (up to 90 degrees flexion in shoulders)   Strength Upper Body   Upper Body Strength  WDL   Balance Assessment   Comments Pt declined sitting EOB but demonstrated rolling side to side and off weighting back side   Bed Mobility    Scooting Maximal Assist   Rolling Minimal Assist to Rt.;Minimum Assist to Lt.   Comments provided supine LB and UB exercises to complete including hip bridges and arm ext/flexion with shoulders at 90 degrees, demonstrated good teach back of techniques with postioning cues; with flat bed able to reach bilateral side rails to assist with rolling; required assist with LB rolling   ADL Assessment   Grooming Minimal Assist   Upper Body Dressing Moderate Assist   Lower Body Dressing Maximal Assist   Comments requires increased assist with ADLs at this time compared to  baseline; reports he often sponge baths at baseline and can complete most tasks with set up   How much help from another person does the patient currently need...   Putting on and taking off regular lower body clothing? 2   Bathing (including washing, rinsing, and drying)? 2   Toileting, which includes using a toilet, bedpan, or urinal? 2   Putting on and taking off regular upper body clothing? 2   Taking care of personal grooming such as brushing teeth? 2   Eating meals? 3   6 Clicks Daily Activity Score 13   Functional Mobility   Comments unable to participate in sit<>stand   Activity Tolerance   Comments only able to tolerate bed level activities today d/t fatigue and fear with mobility   Patient / Family Goals   Patient / Family Goal #1 return to [facility]   Short Term Goals   Short Term Goal # 1 Pt will demo G/H tasks seated EOB with min A   Short Term Goal # 2 Pt will demo BSC txfs with slide board with mod A   Short Term Goal # 3 Pt will demo UB dressing with min A   Education Group   Education Provided Upper Extremity Strengthening;Pathology of bedrest   Role of Occupational Therapist Patient Response Patient;Acceptance;Explanation   UE Strengthening Patient Response Patient;Acceptance;Explanation;Action Demonstration   Pathology of Bedrest Patient Response Patient;Acceptance;Explanation;Reinforcement Needed   Problem List   Problem List Decreased Active Daily Living Skills;Decreased Functional Mobility;Decreased Activity Tolerance;Impaired Postural Control / Balance   Anticipated Discharge Equipment and Recommendations   DC Equipment Recommendations Unable to determine at this time   Discharge Recommendations Recommend post-acute placement for additional occupational therapy services prior to discharge home   Interdisciplinary Plan of Care Collaboration   IDT Collaboration with  Nursing   Patient Position at End of Therapy In Bed;Phone within Reach;Tray Table within Reach;Call Light within Reach   Collaboration  Comments OT findings and recs   Session Information   Date / Session Number  5/24, 1 (1/3, 5/30),    Priority 2

## 2021-05-24 NOTE — CARE PLAN
Problem: Nutritional:  Goal: Achieve adequate nutritional intake  Description: Patient will consume >50% of meals  Outcome: Progressing     Per ADL's, PO average > 50%.     RD continues to follow for adequate PO intake.

## 2021-05-24 NOTE — PROGRESS NOTES
Assessment/description of ears? Pink, intact, blanching  Which preventative measures are in place for the ears? Grey foam, cheek stickers     Assessment/description of elbows? Pink, intact, blanching  Which preventative measures are in place for the elbows? Mepilex, ERIKA mattress     Assessment/description of sacrum? Redness, excoriation, blanching  Which preventative measures are in place for the sacrum? Sacral mepilex, pt allowed a couple turns, but refused the rest- educated on importance. ERIKA mattress.      Assessment/description of heels?  Left: pink, boggy blanching; lateral malleolus - red, blanching   Right: redness, small dark spot; medial malleolus - purple, redness  Which preventative measures are in place for the heels? Heel float boots and mepilex in place, ERIKA mattress.      Which devices are in place? Chest tube L side, PICC, oxygen tubing  Description of skin under devices: Dressings C/D/I, skin intact and blanching  Which preventative measures are in place under devices? Grey foam and cheek pads

## 2021-05-24 NOTE — PROGRESS NOTES
Report received by lyn RN. Assumed care of pt. Assessment complete. Pt A&Ox4, VSS and on 3L NC. Pt in no apparent signs of distress, denies pain. Chest tube site cdi, and connected to suction at -40cm per order. Evening HX=042, no coverage needed. Plan of care discussed. Call light within reach, bed in lowest position, and pt has no further questions at this time.

## 2021-05-24 NOTE — PROGRESS NOTES
[facility] Medicine Daily Progress Note    Date of Service  5/24/2021    Chief Complaint  76 y.o. male admitted 5/18/2021 with PICC line dysfunction and new onset pleural effusion.    Interval Problem Update  Patient seen and examined at bedside this morning.  No acute events overnight.     Chest x-ray this morning with moderate-sized stable pneumothorax.  On IV antibiotics.  Pulmonology and ID actively following.    Consultants/Specialty  Pulm  ID    Code Status  Full Code    Disposition  NH    Review of Systems  Review of Systems   Constitutional: Negative.    Respiratory: Positive for shortness of breath.    Cardiovascular: Negative.    Gastrointestinal: Negative.    Genitourinary: Negative.    Musculoskeletal: Negative.    All other systems reviewed and are negative.       Physical Exam  Temp:  [36.6 °C (97.8 °F)-37.3 °C (99.1 °F)] 37 °C (98.6 °F)  Pulse:  [63-99] 63  Resp:  [16-20] 16  BP: (103-128)/(63-72) 127/63  SpO2:  [96 %-99 %] 96 %    Physical Exam  Constitutional:       Appearance: Normal appearance.   HENT:      Head: Normocephalic and atraumatic.   Eyes:      Conjunctiva/sclera: Conjunctivae normal.   Cardiovascular:      Rate and Rhythm: Normal rate.      Pulses: Normal pulses.   Pulmonary:      Effort: Pulmonary effort is normal.      Breath sounds: Rales present.   Abdominal:      General: Bowel sounds are normal.      Palpations: Abdomen is soft.   Musculoskeletal:         General: No swelling or tenderness.      Comments: Left-sided pigtail chest tube in place.   Skin:     General: Skin is warm.   Neurological:      General: No focal deficit present.      Mental Status: He is alert. Mental status is at baseline.   Psychiatric:         Mood and Affect: Mood normal.         Behavior: Behavior normal.         Fluids    Intake/Output Summary (Last 24 hours) at 5/24/2021 1208  Last data filed at 5/24/2021 0900  Gross per 24 hour   Intake 1378 ml   Output 1470 ml   Net -92 ml       Laboratory  Recent  Labs     05/22/21  0700 05/23/21  0618 05/24/21  0140   WBC 6.7 8.9 8.6   RBC 3.88* 3.02* 3.08*   HEMOGLOBIN 11.0* 8.6* 8.6*   HEMATOCRIT 36.8* 28.5* 28.6*   MCV 93.8 94.4 92.9   MCH 27.6 28.5 27.9   MCHC 29.4* 30.2* 30.1*   RDW 56.1* 55.2* 54.4*   PLATELETCT 212 266 285   MPV 9.3 9.4 9.6     Recent Labs     05/22/21  0700 05/23/21  0120 05/24/21  0140   SODIUM 141 143 140   POTASSIUM 4.4 4.4 4.5   CHLORIDE 105 106 105   CO2 30 31 29   GLUCOSE 127* 125* 121*   BUN 18 14 13   CREATININE 0.63 0.57 0.68   CALCIUM 7.8* 8.0* 7.8*                   Imaging  DX-CHEST-PORTABLE (1 VIEW)   Final Result      1.  Stable moderate sized left pneumothorax.      2.  Stable bilateral infiltrates and support devices.      3.  Stable cardiomegaly.      DX-CHEST-PORTABLE (1 VIEW)   Final Result      Stable to slightly more prominent left pneumothorax and with increased left pleural fluid.      No other significant interval change.      EC-ECHOCARDIOGRAM COMPLETE W/O CONT   Final Result      DX-CHEST-PORTABLE (1 VIEW)   Final Result         1.  Pulmonary infiltrates, increased on the right compared to prior study.   2.  New layering right pleural effusion.   3.  Left pneumothorax, decreased since prior study.   4.  Cardiomegaly   5.  Atherosclerosis      CT-CHEST (THORAX) WITH   Final Result         1.  Moderate size left pneumothorax with small caliber pigtail thoracostomy tube in place, appears similar to prior chest x-ray yesterday at 1054.   2.  Layering bilateral pleural effusions.   3.  Peripheral reticular consolidations, appearance compatible with Covid infiltrates. There are areas of denser consolidation suggesting superimposed secondary infectious process.   4.  Irregular hepatic contour, compatible with changes of cirrhosis.   5.  Trace perihepatic ascites      US-EXTREMITY VENOUS LOWER BILAT   Final Result      DX-CHEST-PORTABLE (1 VIEW)   Final Result      1.  Interval placement of small-caliber LEFT chest tube with  improvement of pleural fluid and compensatory enlargement of LEFT pneumothorax, likely due to noncompliant underlying lung.   2.  Improving RIGHT lung infiltrates.      These findings were discussed with SUNDAR SUNG on 5/21/2021 11:18 AM.         CT-CHEST TUBE-EMPYEMA LEFT   Final Result      1. LEFT CHEST EMPYEMA Drainage with CT guidance.      DX-CHEST-LIMITED (1 VIEW)   Final Result      1.  Stable LEFT hydropneumothorax.   2.  Multifocal pneumonia again seen with slight worsening in RIGHT midlung region.            DX-CHEST-LIMITED (1 VIEW)   Final Result         Unchanged left lateral pneumothorax.      US-THORACENTESIS PUNCTURE LEFT   Final Result      1. Ultrasound guided left sided diagnostic and therapeutic thoracentesis.      2. 1550 mL of fluid withdrawn.      DX-CHEST-PORTABLE (1 VIEW)   Final Result      Interval decrease in size of the left pleural effusion which is now small.      Small small loculated left pneumothorax.      Small loculated right pleural effusion.      Left perihilar and basilar opacity may represent atelectasis/consolidation.      Increased ill-defined airspace opacities on the right may represent a combination of atelectasis, edema and pneumonia.      Atherosclerotic plaque.      Findings discussed with the covering clinician.         CT-CHEST (THORAX) W/O   Final Result         1.  Large left and moderate pleural effusions.   2.  Peripheral reticular pulmonary opacities, appearance compatible with Covid infiltrates.   3.  Linear consolidations in the bilateral lung bases, likely atelectasis.   4.  Hepatomegaly and irregular hepatic contour, appearance most compatible changes of cirrhosis.   5.  Diverticulosis      DX-CHEST-PORTABLE (1 VIEW)   Final Result      Stable large left pleural effusion with associated compressive atelectasis. Correlate clinically for infection.      Possible small right pleural effusion with mildly increased opacity in the right lung.            Assessment/Plan  * Pleural effusion, bilateral  Assessment & Plan  Unknown etiology  Was seen on last admission and was asymptomatic. Now with worsening SOB and worsening effusion  CT chest -findings of large left-sided pleural effusion and moderate right pleural effusion.    2D echo pending.  S/p thoracentesis of left pleural cavity .1.5 L of fluid removed.  S/p thoracentesis of right pleural cavity about 100 cc fluid removed, bloody.  Fluid studies slightly consistent with exudative effusion.  Cytology negative  Pulmonology following.    Rare gram-positive cocci growing in left pleural cavity concerning for empyema.  S/p pigtail placement of the left pleural cavity for adequate drainage of infection.  We will follow up on final cultures from the left pleural cavity and also monitor cultures from the right pleural cavity as well.    Empyema, left (HCC)  Assessment & Plan  S/p thoracentesis of left pleural cavity .1.5 L of fluid removed.  S/p thoracentesis of right pleural cavity about 100 cc fluid removed, bloody.  Fluid studies slightly consistent with exudative effusion.  Cytology negative  Pulmonology following.    Rare gram-positive cocci growing in left pleural cavity concerning for empyema.  S/p pigtail placement of the left pleural cavity for adequate drainage of infection.  We will follow up on final cultures from the left pleural cavity and also monitor cultures from the right pleural cavity as well.   ID following, on vancomycin and Ancef.    Postprocedural pneumothorax  Assessment & Plan  Post thoracentesis, chest x-ray with finding of a small left loculated pneumothorax.   We will repeat serial chest x-ray and closely monitor vital signs.    Repeat chest x-ray with findings of persistent left-sided hydropneumothorax concerning for trapped lung.  S/p IR for pigtail placement into left pleural cavity.     CT chest 5/21 with findings of persistent moderate-sized left pneumothorax.    Acute respiratory  failure with hypoxia (HCC)  Assessment & Plan  Secondary to large pleural effusion  See above for plan    Osteomyelitis of left foot (HCC)- (present on admission)  Assessment & Plan  Patient last admitted to [facility] with osteomyelitis of left foot  S/p amputation of left 5th toe, amputation well healing  ID was consulted last admission and plan for Daptomycin and Ancef until 6/3, will continue Abx per their prior recommendation.     With concerns for new lung infection, we have ID reconsulted to help adjust antibiotics- daptomycin discontinued. Vancomycin started. Ancef also continued.  We will closely monitor vancomycin levels to prevent toxicity.     Protein-calorie malnutrition, severe (HCC)  Assessment & Plan  RD following.  Follow-up on recommendations.    Occluded PICC line, initial encounter ([facility])  Assessment & Plan  Patient sent by nursing home for occluded PICC line  Was flushed by ED staff and reportedly working currently  Consider new PICC line placement if continues to be nonfunctional as will need to continue Abx until 6/3    Deep vein thrombosis (DVT) of lower extremity (HCC)- (present on admission)  Assessment & Plan  History of DVT on Eliquis.  Repeat ultrasound Dopplers with findings of persistent nonocclusive left lower extremity DVT.    We will off Eliquis for now due to concern for future surgical intervention this hospitalization.    Type 2 diabetes mellitus with peripheral neuropathy (HCC)- (present on admission)  Assessment & Plan  Patient with long history of DM with doccumented neuropathy and retinopathy  Hold home oral medications (ACTOS and Synjardy)  Insulin sliding scale for now  Diabetic diet  Last A1C 4.9 month ago    CAD (coronary artery disease)- (present on admission)  Assessment & Plan  Patient not currently taking statin, can be resumed as outpatient  Reported ACS in 2013 s/p stents  No current chest pain  ECHO ordered  Will continue to monitor    HTN (hypertension)- (present on  admission)  Assessment & Plan  Does not appear to be on antihypertensives at nursing home currently  Will continue to monitor    History of stroke- (present on admission)  Assessment & Plan  Prior history of stroke with some aphasia  Some deconditioning  PT/OT       VTE prophylaxis: Eliquis

## 2021-05-24 NOTE — PROGRESS NOTES
Pharmacy Vancomycin Kinetics Note for 5/24/2021     76 y.o. male on Vancomycin day # 4     Vancomycin Indication (Two level/Trough based Dosing): S. aureus pneumonia (goal trough 15-20)    Provider specified end date:  (TBD)    Active Antibiotics (From admission, onward)    Ordered     Ordering Provider       Mon May 24, 2021  8:27 AM    05/24/21 0827  vancomycin (VANCOCIN) 750 mg in  mL IVPB  (vancomycin (VANCOCIN) IV (LD + Maintenance))  EVERY 12 HOURS     Note to Pharmacy: Per P&T Kinetics Protocol    Omer Daugherty D.O.       Fri May 21, 2021 12:47 PM    05/21/21 1247  MD Alert...Vancomycin per Pharmacy  PHARMACY TO DOSE     Question:  Indication(s) for vancomycin?  Answer:  Osteomyelitis    Gomez Reveles M.D.       Tue May 18, 2021  7:52 PM    05/18/21 1952  ceFAZolin in dextrose (ANCEF) IVPB premix 2 g  EVERY 8 HOURS     Note to Pharmacy: Per prior ID note    Julio César Mckee M.D.        Dosing Weight: 88.4 kg (194 lb 14.2 oz)    Admission History: Admitted on 5/18/2021 for Pleural effusion [J90]  Pertinent history: Vascular access issue s/p PICC exchange. Concerns for ongoing LLE OM (current 6 week course) and left empyema. ID consulted.    Allergies:  Fish, Pcn [penicillins], Amoxicillin, and Food     Pertinent cultures to date:   Results     Procedure Component Value Units Date/Time    FUNGAL CULTURE [[id number]] Collected: 05/21/21 1048    Order Status: Completed Specimen: Body Fluid from Thoracentesis Fluid Updated: 05/24/21 0810     Significant Indicator NEG     Source BF     Site THORACENTESIS FLUID     Culture Result Culture in progress.    Narrative:      Collected By:[id number] KAPIL RIOS  Collected By:[id number] KAPIL RIOS    FLUID CULTURE W/GRAM STAIN [[id number]] Collected: 05/21/21 1048    Order Status: Completed Specimen: Body Fluid from Thoracentesis Fluid Updated: 05/24/21 0810     Significant Indicator NEG     Source BF     Site THORACENTESIS FLUID     Culture Result No growth  at 72 hours.     Gram Stain Result Rare WBCs.  No organisms seen.      Narrative:      Collected By:[id number] KAPIL RIOS  Collected By:[id number] KAPIL RIOS    FLUID CULTURE W/GRAM STAIN [[id number]]  (Abnormal) Collected: 05/19/21 1400    Order Status: Completed Specimen: Body Fluid Updated: 05/24/21 0705     Significant Indicator NEG     Source BF     Site Thoracentesis Fluid     Culture Result No growth at 5 days.     Gram Stain Result Rare WBCs.  Rare Gram positive cocci.      Narrative:      Thoracentesis.    GRAM STAIN [[id number]] Collected: 05/23/21 1846    Order Status: Completed Specimen: Respirate Updated: 05/23/21 2139     Significant Indicator .     Source RESP     Site SPUTUM     Gram Stain Result Specimen Quality Score: 2+  Rare epithelial cells.  Many WBCs.  Many Gram positive cocci.      Narrative:      Collected By:[id number] JENNIFER ZHAO  Collected By:[id number] JENNIFER ZHAO    CULTURE RESPIRATORY W/ GRM STN [[id number]] Collected: 05/23/21 1846    Order Status: Completed Specimen: Respirate from Sputum Updated: 05/23/21 2139     Significant Indicator NEG     Source RESP     Site SPUTUM     Culture Result -     Gram Stain Result Specimen Quality Score: 2+  Rare epithelial cells.  Many WBCs.  Many Gram positive cocci.      Narrative:      Collected By:[id number] JENNIFER ZHAO  Collected By:[id number] JENNIFER ZAHO    GRAM STAIN [[id number]] Collected: 05/21/21 1048    Order Status: Completed Specimen: Body Fluid Updated: 05/22/21 1653     Significant Indicator .     Source BF     Site THORACENTESIS FLUID     Gram Stain Result Rare WBCs.  No organisms seen.      Narrative:      Collected By:[id number] KAPIL RIOS  Collected By:[id number] KAPIL RIOS    Acid Fast Stain [[id number]] Collected: 05/21/21 1048    Order Status: Completed Specimen: Body Fluid Updated: 05/22/21 1653     Significant Indicator NEG     Source BF     Site THORACENTESIS FLUID     AFB Smear Results No acid fast  "bacilli seen.    Narrative:      Collected By:[id number] KAPIL RIOS  Collected By:[id number] KAPIL RIOS    MRSA By PCR (Amp) [[id number]] Collected: 05/22/21 0943    Order Status: Completed Specimen: Respirate from Nares Updated: 05/22/21 1329     Significant Indicator NEG     Source RESP     Site NARES     MRSA PCR Negative for MRSA by PCR.    Narrative:      Collected By:[id number] KAPIL RIOS  Collected By:[id number] KAPIL RIOS    BLOOD CULTURE [[id number]] Collected: 05/21/21 1212    Order Status: Completed Specimen: Blood from Peripheral Updated: 05/22/21 0736     Significant Indicator NEG     Source BLD     Site PERIPHERAL     Culture Result No Growth  Note: Blood cultures are incubated for 5 days and  are monitored continuously.Positive blood cultures  are called to the RN and reported as soon as  they are identified.      Narrative:      Per Hospital Policy: Only change Specimen Src: to \"Line\" if  specified by physician order.  Right Hand    BLOOD CULTURE [[id number]] Collected: 05/21/21 1437    Order Status: Completed Specimen: Blood from Peripheral Updated: 05/22/21 0736     Significant Indicator NEG     Source BLD     Site PERIPHERAL     Culture Result No Growth  Note: Blood cultures are incubated for 5 days and  are monitored continuously.Positive blood cultures  are called to the RN and reported as soon as  they are identified.      Narrative:      Per Hospital Policy: Only change Specimen Src: to \"Line\" if  specified by physician order.  Right Hand    FLUID CULTURE W/GRAM STAIN [[id number]] Collected: 05/21/21 1048    Order Status: Sent Specimen: Body Fluid from Thoracentesis Fluid     AFB CULTURE [[id number]] Collected: 05/21/21 1048    Order Status: Canceled Specimen: Body Fluid from Thoracentesis Fluid     AFB Culture [[id number]] Collected: 05/21/21 1048    Order Status: No result Specimen: Other     BLOOD CULTURE [[id number]] Collected: 05/20/21 1557    Order Status: Completed Specimen: Blood from " "Peripheral Updated: 05/21/21 0821     Significant Indicator NEG     Source BLD     Site PERIPHERAL     Culture Result No Growth  Note: Blood cultures are incubated for 5 days and  are monitored continuously.Positive blood cultures  are called to the RN and reported as soon as  they are identified.      Narrative:      Per Hospital Policy: Only change Specimen Src: to \"Line\" if  specified by physician order.  Right Hand    BLOOD CULTURE [[id number]] Collected: 05/20/21 1557    Order Status: Completed Specimen: Blood from Peripheral Updated: 05/21/21 0821     Significant Indicator NEG     Source BLD     Site PERIPHERAL     Culture Result No Growth  Note: Blood cultures are incubated for 5 days and  are monitored continuously.Positive blood cultures  are called to the RN and reported as soon as  they are identified.      Narrative:      Per Hospital Policy: Only change Specimen Src: to \"Line\" if  specified by physician order.  Left AC    GRAM STAIN [[id number]] Collected: 05/19/21 1400    Order Status: Completed Specimen: Body Fluid Updated: 05/20/21 1202     Significant Indicator .     Source BF     Site Thoracentesis Fluid     Gram Stain Result Rare WBCs.  Rare Gram positive cocci.      Narrative:      Thoracentesis.    COV-2, FLU A/B, AND RSV BY PCR (2-4 HOURS CEPHEID): Collect NP swab in VTM [[id number]] Collected: 05/18/21 1730    Order Status: Completed Specimen: Respirate Updated: 05/18/21 1836     Influenza virus A RNA Negative     Influenza virus B, PCR Negative     RSV, PCR Negative     SARS-CoV-2 by PCR NotDetected     Comment: PATIENTS: Important information regarding your results and instructions can  be found at [email or web address]   \"After your  Covid-19 Test\"    [facility] providers: PLEASE REFER TO DE-ESCALATION AND RETESTING PROTOCOL  on [email or web address]    **The [employer] GeneXpert Xpress SARS-CoV-2 RT-PCR Test has been made  available for use under the Emergency Use Authorization " "(EUA) only.          SARS-CoV-2 Source NP Swab    Narrative:      Have you been in close contact with a person who is suspected  or known to be positive for COVID-19 within the last 30 days  (e.g. last seen that person < 30 days ago)->No        Labs:   Estimated Creatinine Clearance: 110.8 mL/min (by C-G formula based on SCr of 0.68 mg/dL).     Recent Labs     21  0700 21  0618 21  0140   WBC 6.7 8.9 8.6     Recent Labs     21  0700 21  0120 21  0140   BUN 18 14 13   CREATININE 0.63 0.57 0.68   ALBUMIN 2.0* 2.0* 1.8*     Intake/Output Summary (Last 24 hours) at 2021 0827  Last data filed at 2021 0525  Gross per 24 hour   Intake 1260 ml   Output 1670 ml   Net -410 ml      /63   Pulse 63   Temp 37 °C (98.6 °F) (Temporal)   Resp 16   Ht 1.88 m (6' 2\")   Wt 88.4 kg (194 lb 14.2 oz)   SpO2 96%  Temp (24hrs), Av °C (98.6 °F), Min:36.6 °C (97.8 °F), Max:37.3 °C (99.1 °F)    List concerns for Vancomycin clearance:  Age;BUN/Scr ratio greater than 20:1;Malnutrition/Low albumin    Pharmacokinetics:   Two level kinetics:   Ke (hr ^-1): 0.031  Half life: 22.4  Vd: Steady state Vd : 96.012  Calculated AUC: 672 mg·hr/L    Recent Labs     21  1813 21  0140   VANCOTROUGH  --  24.6*   VANCOPEAK 31.0  --       A/P:   -  Vancomycin dose: 750 q12h    -  Next vancomycin level(s):    -  @ 1630   - Vt @ 0130     -  Predicted vancomycin AUC from two level test calculator: 504 mg·hr/L    -  Comments: Dose adjusted to target AUC ~500 mg·hr/L based on two level kinetics. Updated vancomycin levels in place. Pharmacy will continue to follow.    Jorden Cornelius, PharmD  "

## 2021-05-25 NOTE — CARE PLAN
The patient is Stable - Low risk of patient condition declining or worsening    Shift Goals  Clinical Goals: pts respirations will continue to improve  Patient Goals: Pt will report no pain from chest tube site    Progress made toward(s) clinical / shift goals:  Patient denies pain. Skin integrity processes in place to reduce skin break down.  Problem: Skin Integrity  Goal: Skin integrity is maintained or improved  Outcome: Progressing  Note: Patient has mepilex in place, a ERIKA mattress and is to be turned every two hours as he will allow.     Problem: Pain - Standard  Goal: Alleviation of pain or a reduction in pain to the patient’s comfort goal  Outcome: Progressing  Note: Patient denies pain.       Patient is not progressing towards the following goals:

## 2021-05-25 NOTE — PROGRESS NOTES
Infectious Disease Progress Note    Author: Fela Jade M.D. Date & Time of service: 2021  1:39 PM    Chief Complaint:  Follow-up for empyema    Interval History:  76 y.o. male SNF resident with known history of CAD, type 2 diabetes, hypertension, CVA, left lower extremity DVT on Eliquis, recently admitted with worsening left metatarsal heel wound with underlying osteomyelitis on imaging.  Patient underwent left fifth ray amputation on , cultures grew doxycycline resistant MRSA, ciprofloxacin resistant Proteus mirabilis. No pathology obtained.  Patient was discharged to SNF with plan to complete 6 weeks of IV daptomycin + IV Ancef through 6/3/2021.  Patient was brought to the ER on  with worsening shortness of breath.  CT scan showed worsening bilateral pleural effusions L>R compared to prior imaging. Left-sided thoracentesis performed which appeared bloody, lymphocyte predominant white cells, exudate by criteria, also with Gram stain reportedly positive for GPCs.   patient remains afebrile, white count 6.7, tolerating antibiotics with no new issues.  Right-sided thoracentesis performed.  See below   patient remains afebrile, white count 8.9, tolerating antibiotics with no new issues thus far.  Cultures pending.   AF WBC 8.5 no new complaints Cxs still pending   AF WBC 6.6 sleeping soundly at time of rounds    Labs Reviewed and Medications Reviewed.    Review of Systems:  Review of Systems   Constitutional: Negative for fever.       Hemodynamics:  Temp (24hrs), Av.6 °C (97.8 °F), Min:36.2 °C (97.2 °F), Max:36.7 °C (98.1 °F)  Temperature: 36.5 °C (97.7 °F)  Pulse  Av.7  Min: 44  Max: 117   Blood Pressure : 127/75       Physical Exam:  Physical Exam  Vitals and nursing note reviewed.   Constitutional:       General: He is not in acute distress.     Appearance: He is not ill-appearing, toxic-appearing or diaphoretic.   HENT:      Nose: No rhinorrhea.   Eyes:      General:          Right eye: No discharge.         Left eye: No discharge.   Cardiovascular:      Rate and Rhythm: Normal rate.      Heart sounds: No murmur heard.     Pulmonary:      Effort: Pulmonary effort is normal. No respiratory distress.      Breath sounds: No stridor. No wheezing.      Comments: Decreased to sounds bilaterally  Left-sided chest tube in place  Abdominal:      General: There is no distension.   Musculoskeletal:      Cervical back: Neck supple. No rigidity.      Comments: Well-healed incision dorsal right great toe, surgical site left fifth ray well-healed, mild erythema, no active discharge, no swelling, no fluctuance.  Left fourth toe with small scab on dorsal surface   Skin:     Coloration: Skin is not jaundiced.      Findings: Bruising present. No erythema or rash.         Meds:    Current Facility-Administered Medications:   •  fluticasone  •  vancomycin  •  apixaban  •  MD Alert...Vancomycin per Pharmacy  •  albuterol  •  ferrous sulfate  •  gabapentin  •  magnesium oxide  •  montelukast  •  tamsulosin  •  senna-docusate **AND** polyethylene glycol/lytes **AND** magnesium hydroxide **AND** bisacodyl  •  Pharmacy Consult Request  •  acetaminophen  •  [DISCONTINUED] oxyCODONE immediate-release **OR** [DISCONTINUED] oxyCODONE immediate-release **OR** HYDROmorphone  •  ondansetron  •  ondansetron  •  insulin regular **AND** POC blood glucose manual result **AND** NOTIFY MD and PharmD **AND** glucose **AND** dextrose 50%  •  ceFAZolin    Labs:  Recent Labs     05/23/21  0618 05/24/21  0140 05/25/21  0309   WBC 8.9 8.6 6.6   RBC 3.02* 3.08* 3.12*   HEMOGLOBIN 8.6* 8.6* 8.8*   HEMATOCRIT 28.5* 28.6* 28.7*   MCV 94.4 92.9 92.0   MCH 28.5 27.9 28.2   RDW 55.2* 54.4* 54.3*   PLATELETCT 266 285 294   MPV 9.4 9.6 9.4     Recent Labs     05/23/21  0120 05/24/21  0140 05/25/21  0309   SODIUM 143 140 140   POTASSIUM 4.4 4.5 4.2   CHLORIDE 106 105 105   CO2 31 29 29   GLUCOSE 125* 121* 130*   BUN 14 13 14     Recent  Labs     05/23/21  0120 05/24/21  0140 05/25/21  0309   ALBUMIN 2.0* 1.8*  --    TBILIRUBIN 0.2 0.3  --    ALKPHOSPHAT 298* 359*  --    TOTPROTEIN 5.2* 5.3*  --    ALTSGPT 6 6  --    ASTSGOT 25 25  --    CREATININE 0.57 0.68 0.65       Imaging:  CT-CHEST (THORAX) W/O    Result Date: 5/19/2021 5/18/2021 10:24 PM HISTORY/REASON FOR EXAM: Pleural effusion TECHNIQUE/EXAM DESCRIPTION:  Transaxial MDCT scan of chest without contrast. Low dose optimization technique was utilized for this CT exam including automated exposure control and adjustment of the mA and/or kV according to patient size. COMPARISON: April 25, 2021 FINDINGS The visualized portion of the thyroid appears within normal limits. The trachea and main stem airways are normal in caliber. There are no pathologically enlarged mediastinal lymph nodes. The heart and pericardium appear within normal limits. The aorta and its main branch vessels are normal in caliber and configuration. Scattered patchy reticular pulmonary infiltrates are seen. Linear consolidations in the bilateral lung bases are seen. Large left and moderate right pleural effusions are seen. Limited views of the abdomen demonstrates hepatomegaly and irregular hepatic contour. Scattered colonic diverticula are seen. The bony structures are age appropriate.     1.  Large left and moderate pleural effusions. 2.  Peripheral reticular pulmonary opacities, appearance compatible with Covid infiltrates. 3.  Linear consolidations in the bilateral lung bases, likely atelectasis. 4.  Hepatomegaly and irregular hepatic contour, appearance most compatible changes of cirrhosis. 5.  Diverticulosis    CT-CHEST (THORAX) WITH    Result Date: 5/22/2021 5/21/2021 10:17 PM HISTORY/REASON FOR EXAM:  Pneumonia. TECHNIQUE/EXAM DESCRIPTION: CT scan of the chest with contrast. Thin-section helical images were obtained from the lung apices through the adrenal glands following the bolus administration of contrast. 80 mL  of Omnipaque 350 nonionic contrast was utilized. Low dose optimization technique was utilized for this CT exam including automated exposure control and adjustment of the mA and/or kV according to patient size. COMPARISON:  May 18, 2021 FINDINGS: Moderate left pneumothorax is seen. Linear reticular consolidations in the bilateral lung bases are seen with peripheral reticular opacities. Small caliber pigtail catheter is seen at the left costophrenic angle. There is no mediastinal, hilar, or axillary adenopathy. The cardiac chambers, great vessels, and aorta are normal in CT appearance. Small bilateral pleural effusions are seen. The extrathoracic soft tissues and thoracic cage are normal in appearance. The adrenal glands are normal.  The visualized portions of the spleen, pancreas, and kidneys are normal. The liver is irregular in contour. Trace perihepatic ascites is seen. There is no upper abdominal adenopathy. There is no abnormal contrast enhancement.     1.  Moderate size left pneumothorax with small caliber pigtail thoracostomy tube in place, appears similar to prior chest x-ray yesterday at 1054. 2.  Layering bilateral pleural effusions. 3.  Peripheral reticular consolidations, appearance compatible with Covid infiltrates. There are areas of denser consolidation suggesting superimposed secondary infectious process. 4.  Irregular hepatic contour, compatible with changes of cirrhosis. 5.  Trace perihepatic ascites    CT-CHEST TUBE-EMPYEMA LEFT    Result Date: 5/21/2021 5/21/2021 9:05 AM HISTORY/REASON FOR EXAM:  pt with new left sided effusion, fluid study consistent with empyema, needs a pig tail for drainage. TECHNIQUE/EXAM DESCRIPTION AND NUMBER OF VIEWS:  CT guided left chest empyema drainage. Low dose optimization technique was utilized for this CT exam including automated exposure control and adjustment of the mA and/or kV according to patient size. PROCEDURE:  Informed consent was obtained. Localizing CT  images were obtained with the patient in supine position. The skin was prepped with chlorhexidine and draped in a sterile fashion. SEDATION: Moderate sedation was provided. Pulse oximetry and continuous cardiac monitoring by the nurse was performed throughout the exam. Intraservice time was 30 minutes. Following local anesthesia with 1% Lidocaine, a 17 G guiding needle was placed and needle path confirmed with CT. An Amplatz guidewire was placed and following serial tract dilatation, a 10 Irish pigtail locking catheter was placed. The catheter was secured to the skin and connected to Pleur-evac suction. Final CT images were obtained documenting catheter position. The patient tolerated the procedure well with no evidence of complication. COMPARISON:Recent CT scan of thorax FINDINGS:CT images demonstrate the locking loop pigtail catheter in the inferior aspect of the large left-sided pleural effusion.     1. LEFT CHEST EMPYEMA Drainage with CT guidance.    DX-CHEST-FOR LINE PLACEMENT Perform procedure in: Patient's Room    Result Date: 4/27/2021 4/27/2021 5:17 PM HISTORY/REASON FOR EXAM: PICC tip location confirmation - STAT pt pending discharge transport. TECHNIQUE/EXAM DESCRIPTION AND NUMBER OF VIEWS: Single portable view of the chest. COMPARISON: Yesterday. FINDINGS: Left PICC line tip overlies the SVC Cardiomediastinal contours are stable, moderately large Stable to slight enlargement of large left pleural effusion with some lateral loculation possible. Aeration is only seen in the upper lung zone as before. Right lung shows no consolidation or effusion. No pneumothorax.     Left PICC line tip overlies the SVC Similar large left pleural effusion and multisegmental atelectasis. Consolidation is possible    DX-CHEST-LIMITED (1 VIEW)    Result Date: 5/20/2021 5/20/2021 8:18 AM HISTORY/REASON FOR EXAM:  re-eval small pneumothorax TECHNIQUE/EXAM DESCRIPTION AND NUMBER OF VIEWS: Single portable view of the chest.  COMPARISON: 5/19/2021 FINDINGS: Cardiac contours grossly stable although difficult to evaluate. Patchy opacities again seen in both lungs, most confluent in the LEFT base, showing slight worsening in the RIGHT midlung. Bilateral pleural fluid. Small LEFT pneumothorax again seen laterally. LEFT arm PICC line is in similar position.     1.  Stable LEFT hydropneumothorax. 2.  Multifocal pneumonia again seen with slight worsening in RIGHT midlung region.     DX-CHEST-LIMITED (1 VIEW)    Result Date: 5/19/2021 5/19/2021 5:33 PM HISTORY/REASON FOR EXAM:  re-eval small left pneumo TECHNIQUE/EXAM DESCRIPTION AND NUMBER OF VIEWS: Single portable view of the chest. COMPARISON: 5/19/2021 FINDINGS: Airspace opacity in the left lower lobe. Patchy hazy opacity in the right lateral lung. Small bilateral pleural effusions. Unchanged left lateral pneumothorax. Stable cardiopericardial silhouette.     Unchanged left lateral pneumothorax.    DX-CHEST-PORTABLE (1 VIEW)    Result Date: 5/22/2021 5/22/2021 5:50 AM HISTORY/REASON FOR EXAM: Pleural Effusion; pigtail monitoring TECHNIQUE/EXAM DESCRIPTION:  Single AP view of the chest. COMPARISON: Yesterday FINDINGS: Position of medical devices appears stable. Cardiomegaly is observed. Atherosclerotic calcification of the aorta is noted.  The central  pulmonary vasculature appears prominent and indistinct. The lungs appear well expanded bilaterally.  Diffuse scattered hazy pulmonary parenchymal opacities are seen, increased on the right. Left pneumothorax is seen, decreased since prior. New layering right pleural effusion is seen. The bony structures appear age-appropriate.     1.  Pulmonary infiltrates, increased on the right compared to prior study. 2.  New layering right pleural effusion. 3.  Left pneumothorax, decreased since prior study. 4.  Cardiomegaly 5.  Atherosclerosis    DX-CHEST-PORTABLE (1 VIEW)    Result Date: 5/21/2021 5/21/2021 10:40 AM HISTORY/REASON FOR EXAM:  Pleural  Effusion. TECHNIQUE/EXAM DESCRIPTION AND NUMBER OF VIEWS: Single portable view of the chest. COMPARISON: 5/20/2021 FINDINGS: Cardiac mediastinal contour is unchanged. Interval placement of pigtail catheter at the LEFT lower chest. LEFT arm PICC line in similar position. Interval improvement of LEFT pleural effusion. Moderate LEFT pneumothorax, apparently increased from prior exam.  Contour deformity of the consolidated lung suggests the presence of nodules. Patchy RIGHT mid and lower lung infiltrates appear improved.     1.  Interval placement of small-caliber LEFT chest tube with improvement of pleural fluid and compensatory enlargement of LEFT pneumothorax, likely due to noncompliant underlying lung. 2.  Improving RIGHT lung infiltrates. These findings were discussed with SUNDAR SNUG on 5/21/2021 11:18 AM.     DX-CHEST-PORTABLE (1 VIEW)    Result Date: 5/19/2021 5/19/2021 1:47 PM HISTORY/REASON FOR EXAM: Post thoracentesis. TECHNIQUE/EXAM DESCRIPTION AND NUMBER OF VIEWS: Single portable view of the chest. COMPARISON: 5/18/2021 FINDINGS: There has been interval decrease in size of the left pleural effusion. There is a small residual left pleural effusion. There is a small left loculated pneumothorax. There is likely pleural thickening. There is a small loculated right pleural effusion. Bilateral airspace opacities are increased on the right compared to prior. There is left perihilar and bibasilar atelectasis/consolidation. The cardiomediastinal silhouette is stable. Atherosclerotic calcification is seen. Left PICC projects over the SVC. There are surgical clips in the left neck.     Interval decrease in size of the left pleural effusion which is now small. Small small loculated left pneumothorax. Small loculated right pleural effusion. Left perihilar and basilar opacity may represent atelectasis/consolidation. Increased ill-defined airspace opacities on the right may represent a combination of atelectasis,  edema and pneumonia. Atherosclerotic plaque. Findings discussed with the covering clinician.     DX-CHEST-PORTABLE (1 VIEW)    Result Date: 5/18/2021 5/18/2021 3:23 PM HISTORY/REASON FOR EXAM:  Shortness of Breath. TECHNIQUE/EXAM DESCRIPTION AND NUMBER OF VIEWS: Single portable view of the chest. COMPARISON: 4/26/2021 FINDINGS: Left PICC line tip projects near the cavoatrial junction. Cardiomediastinal silhouette is stable. Aortic calcified atherosclerotic plaque. Large left pleural effusion is stable with associated compressive atelectasis. Correlate clinically for infection. Mildly increased opacity in the right mid and lower lung which could be related to atelectasis, edema and/or infection. Possible small right pleural effusion. All No acute osseous abnormality.     Stable large left pleural effusion with associated compressive atelectasis. Correlate clinically for infection. Possible small right pleural effusion with mildly increased opacity in the right lung.    DX-CHEST-PORTABLE (1 VIEW)    Result Date: 4/26/2021 4/26/2021 8:52 AM HISTORY/REASON FOR EXAM:  Shortness of Breath; pleural effusion on CTA. please reevalaute. TECHNIQUE/EXAM DESCRIPTION AND NUMBER OF VIEWS: Single portable view of the chest. COMPARISON: Chest radiograph 11/18/2020. CTA chest 4/25/2021 FINDINGS: Cardiomediastinal silhouette is stable. Large left pleural effusion with associated compressive atelectasis of the left lung. The small pleural effusion on the earlier CT study is not well visualized radiographically. Mild right basilar atelectasis. No pneumothorax. No acute osseous abnormality.     Large left pleural effusion with associated compressive atelectasis. Correlate clinically for infection.    DX-FOOT-COMPLETE 3+ LEFT    Result Date: 4/22/2021 4/22/2021 3:42 PM HISTORY/REASON FOR EXAM:  Pain/Deformity Following Trauma (Wound TECHNIQUE/EXAM DESCRIPTION AND NUMBER OF VIEWS: 3 views of the LEFT foot. COMPARISON:  None. FINDINGS:  There is soft tissue swelling along the lateral aspect of the foot. There is bony destruction seen throughout the fifth proximal phalanx, the mid/distal phalangeal base and involving the fifth metacarpal head/neck consistent with osteomyelitis. There may  be septic arthritis involving the interposed joints as well. There is generalized osteopenia. No evidence of osteomyelitis elsewhere within the foot. Mild degenerative changes the great toe MTP joint.     Findings in keeping with osteomyelitis involving the fifth distal metatarsal, proximal and middle phalanges.    US-HAKEEM SINGLE LEVEL BILAT    Result Date: 2021   Vascular Laboratory  Conclusions  Bilateral.  There is no evidence of major arterial disease demonstrated.  NADINE CABRERA  Age:    76    Gender:     M  MRN:    [id number]  :    1945      BSA:  Exam Date:     2021 10:46  Room #:     Inpatient  Priority:     Routine  Ht (in):             Wt (lb):  Ordering Physician:        ANN HOYT  Referring Physician:       [id number] Cruz  Sonographer:               Cami Alves [facility]                             RVT  Study Type:                Complete Bilateral  Technical Quality:         Adequate  Indications:     Ulceration of LE  CPT Codes:       71482  ICD Codes:       707.1  History:         Non-healing wound of left lower limb. Diabetes.  Limitations:                 RIGHT  Waveform            Systolic BPs (mmHg)                             125           Brachial  Triphasic                                Common Femoral  Triphasic                  276           Posterior Tibial  Triphasic                  267           Dorsalis Pedis                                           Peroneal                             2.12          HAKEEM                                           TBI                       LEFT  Waveform        Systolic BPs (mmHg)                             130           Brachial  Triphasic                                 Common Femoral  Triphasic                  240           Posterior Tibial  Triphasic                  226           Dorsalis Pedis                                           Peroneal                             1.85          HAKEEM                                           TBI  Findings  Bilateral.  Doppler waveforms of the common femoral arteries are of high amplitude and  triphasic.  Doppler waveforms at the ankle are brisk and triphasic.  Ankle pressures are not accurately measured due to calcification and  noncompressibility of the tibial vessels.  Toe-brachial indices are normal. Right: 0.81  Left: 0.85  Additional testing was not performed in accordance with lower extremity  arterial evaluation protocol.  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  Otherwise negative LLE examination.  No RLE DVT identified.  TRICIADAJALAKSHMINADINE  Exam Date:     2021 15:37  Room #:     Inpatient  Priority:     Routine  Ht (in):             Wt (lb):  Ordering Physician:        ROGER ALONZO  Referring Physician:       [id number] Manzo  Sonographer:               Bijal Christian  Study Type:                Complete Bilateral  Technical Quality:         Fair  Age:    76    Gender:     M  MRN:    [id number]  :    1945      BSA:  Indications:     Personal history of venous thrombosis and embolism  CPT Codes:       38138  ICD Codes:       Z86.71  History:         BLE pain, pressure, and edema. History of left lower                   extremity deep venous thrombosis. Prior exam 20.  Limitations:     Patient unable to bend either knee.  PROCEDURES:  Bilateral lower extremity venous duplex imaging.  The following venous structures were evaluated: common femoral,  profunda  femoral, proximal greater saphenous, femoral, popliteal , peroneal and  posterior tibial veins.  Serial compression, augmentation maneuvers,  color and spectral Doppler  flow evaluations were performed.  FINDINGS:  Right lower extremity -  The peroneal and posterior tibial veins are difficult to assess but appear  to be noncompressible, and no flow response to augmentation is  demonstrated.  Complete color filling and compressibility with normal venous flow dynamics  including spontaneous flow, response to augmentation maneuvers, and  respiratory phasicity in all other remaining visualized vessels.  Left lower extremity -  Chronic deep venous thrombosis is seen in the popliteal vein.  The peroneal and posterior tibial veins are difficult to assess for  compressibility, but flow response to augmentation is demonstrated.  Complete color filling and compressibility with normal venous flow dynamics  including spontaneous flow, response to augmentation maneuvers, and  respiratory phasicity in all other remaining visualized vessels.  Kade Louis MD  (Electronically Signed)  Final Date:      21 May 2021 17:01    US-THORACENTESIS PUNCTURE LEFT    Result Date: 5/19/2021 5/19/2021 12:53 PM HISTORY/REASON FOR EXAM:  Shortness of breath; Large left sided pleural effusion, unknown etiology TECHNIQUE/EXAM DESCRIPTION: Ultrasound-guided thoracentesis. Indication:  LEFT pleural fluid collection. COMPARISON:  5/18/2021 PROCEDURE:     Informed consent was obtained. A timeout was taken. A left pleural effusion was localized with real-time ultrasound guidance. The left posterior chest wall was prepped and draped in a sterile manner. Following local anesthesia with 1% lidocaine, a 5 Icelandic Yueh pigtail catheter was advanced into the pleural space with trocar technique and pleural fluid was drained. The patient tolerated the procedure well without evidence of complication. A post thoracentesis chest radiograph is  forthcoming. FINDINGS: Fluid was sent to the laboratory. Fluid character: Brown     1. Ultrasound guided left sided diagnostic and therapeutic thoracentesis. 2. 1550 mL of fluid withdrawn.    CT-CTA CHEST PULMONARY ARTERY W/ RECONS    Result Date: 4/25/2021 4/25/2021 4:30 PM HISTORY/REASON FOR EXAM:  PE suspected, high pretest prob; pleuritic pain, hypoxia Hypoxia. Deep venous thrombosis. Pelvis therapy. Covid in December. TECHNIQUE/EXAM DESCRIPTION: CT angiogram scan for pulmonary embolism with contrast, with reconstructions. 1.25 mm helical sections were obtained from the lung apices through the lung bases following the rapid bolus administration of 62 mL of Omnipaque 350 nonionic contrast. Thin-section overlapping reconstruction interval was utilized. Coronal reconstructions were generated from the axial data. MIP post processing was performed and utilized for the interpretation. Low dose optimization technique was utilized for this CT exam including automated exposure control and adjustment of the mA and/or kV according to patient size. COMPARISON: 11/18/2020 CT FINDINGS: New large left pleural effusion with multisegmental compressive atelectasis. The left lower lobe is completely collapsed. There is new small-medium volume right pleural fluid with less atelectasis Calcified right middle lobe nodule Pulmonary Embolism: No. Main Pulmonary Arteries: No. Segmental branches: No. Additional Comments: Coronary artery disease. Lungs: No suspicious nodules or air space process. Pleura: No pleural effusion. Nodes: There is a 9 mm short axis right paraesophageal node. No enlarged nodes are seen     New large left, small to medium right pleural effusions No pulmonary embolism Moderate coronary artery disease Remote granulomatous disease and/or calcified hamartoma     IR-PICC LINE PLACEMENT W/ GUIDANCE > AGE 5    Result Date: 4/27/2021  HISTORY/REASON FOR EXAM:   PICC placement. TECHNIQUE/EXAM DESCRIPTION AND NUMBER OF  VIEWS:   PICC line insertion with ultrasound guidance.  The procedure was performed using maximal sterile barrier technique including sterile gown, mask, cap, and donning of sterile gloves following appropriate hand hygiene and/or sterile scrub. Patient skin site was prepped with 2% Chlorhexidine solution. FINDINGS:  PICC line insertion with Ultrasound Guidance was performed by qualified nursing staff without the assistance of a Radiologist. PICC positioning appropriateness confirmed by 3CG technology; chest xray only needed in the instance 3CG unable to confirm placement.              Ultrasound-guided PICC placement performed by qualified nursing staff as above.       Micro:  Results     Procedure Component Value Units Date/Time    CULTURE RESPIRATORY W/ [facility] STN [[id number]] Collected: 05/23/21 1846    Order Status: Completed Specimen: Respirate from Sputum Updated: 05/25/21 0911     Significant Indicator NEG     Source RESP     Site SPUTUM     Culture Result Moderate growth usual upper respiratory stella  No clinically significant Staphylococcus aureus, Methicillin  Resistant Staphylococcus aureus, or Pseudomonas species  isolated.       Gram Stain Result Specimen Quality Score: 2+  Rare epithelial cells.  Many WBCs.  Many Gram positive cocci.      Narrative:      Collected By:[id number] JENNIFER ZHAO  Collected By:[id number] JENNIFER ZHAO    FUNGAL CULTURE [[id number]] Collected: 05/21/21 1048    Order Status: Completed Specimen: Body Fluid from Thoracentesis Fluid Updated: 05/24/21 0810     Significant Indicator NEG     Source BF     Site THORACENTESIS FLUID     Culture Result Culture in progress.    Narrative:      Collected By:[id number] KAPIL RIOS  Collected By:[id number] KAPIL RIOS    FLUID CULTURE W/GRAM STAIN [[id number]] Collected: 05/21/21 1048    Order Status: Completed Specimen: Body Fluid from Thoracentesis Fluid Updated: 05/24/21 0810     Significant Indicator NEG     Source BF     Site  THORACENTESIS FLUID     Culture Result No growth at 72 hours.     Gram Stain Result Rare WBCs.  No organisms seen.      Narrative:      Collected By:[id number] KAPIL RIOS  Collected By:[id number] KAPIL RIOS    FLUID CULTURE W/GRAM STAIN [[id number]]  (Abnormal) Collected: 05/19/21 1400    Order Status: Completed Specimen: Body Fluid Updated: 05/24/21 0705     Significant Indicator NEG     Source BF     Site Thoracentesis Fluid     Culture Result No growth at 5 days.     Gram Stain Result Rare WBCs.  Rare Gram positive cocci.      Narrative:      Thoracentesis.    GRAM STAIN [[id number]] Collected: 05/23/21 1846    Order Status: Completed Specimen: Respirate Updated: 05/23/21 2139     Significant Indicator .     Source RESP     Site SPUTUM     Gram Stain Result Specimen Quality Score: 2+  Rare epithelial cells.  Many WBCs.  Many Gram positive cocci.      Narrative:      Collected By:[id number] JENNIFER ZHAO  Collected By:[id number] JENNIFER ZHAO    GRAM STAIN [[id number]] Collected: 05/21/21 1048    Order Status: Completed Specimen: Body Fluid Updated: 05/22/21 1653     Significant Indicator .     Source BF     Site THORACENTESIS FLUID     Gram Stain Result Rare WBCs.  No organisms seen.      Narrative:      Collected By:[id number] KAPIL RIOS  Collected By:[id number] KAPIL RIOS    Acid Fast Stain [[id number]] Collected: 05/21/21 1048    Order Status: Completed Specimen: Body Fluid Updated: 05/22/21 1653     Significant Indicator NEG     Source BF     Site THORACENTESIS FLUID     AFB Smear Results No acid fast bacilli seen.    Narrative:      Collected By:[id number] KAPIL RIOS  Collected By:[id number] KAPIL RIOS    MRSA By PCR (Amp) [[id number]] Collected: 05/22/21 0943    Order Status: Completed Specimen: Respirate from Nares Updated: 05/22/21 1329     Significant Indicator NEG     Source RESP     Site NARES     MRSA PCR Negative for MRSA by PCR.    Narrative:      Collected By:[id number] KAPIL ENRIQUEZ  "M  Collected By:[id number] KAPIL RIOS    BLOOD CULTURE [[id number]] Collected: 05/21/21 1212    Order Status: Completed Specimen: Blood from Peripheral Updated: 05/22/21 0736     Significant Indicator NEG     Source BLD     Site PERIPHERAL     Culture Result No Growth  Note: Blood cultures are incubated for 5 days and  are monitored continuously.Positive blood cultures  are called to the RN and reported as soon as  they are identified.      Narrative:      Per Hospital Policy: Only change Specimen Src: to \"Line\" if  specified by physician order.  Right Hand    BLOOD CULTURE [[id number]] Collected: 05/21/21 1437    Order Status: Completed Specimen: Blood from Peripheral Updated: 05/22/21 0736     Significant Indicator NEG     Source BLD     Site PERIPHERAL     Culture Result No Growth  Note: Blood cultures are incubated for 5 days and  are monitored continuously.Positive blood cultures  are called to the RN and reported as soon as  they are identified.      Narrative:      Per Hospital Policy: Only change Specimen Src: to \"Line\" if  specified by physician order.  Right Hand    FLUID CULTURE W/GRAM STAIN [[id number]] Collected: 05/21/21 1048    Order Status: Sent Specimen: Body Fluid from Thoracentesis Fluid     AFB CULTURE [[id number]] Collected: 05/21/21 1048    Order Status: Canceled Specimen: Body Fluid from Thoracentesis Fluid     AFB Culture [[id number]] Collected: 05/21/21 1048    Order Status: No result Specimen: Other     BLOOD CULTURE [[id number]] Collected: 05/20/21 1557    Order Status: Completed Specimen: Blood from Peripheral Updated: 05/21/21 0821     Significant Indicator NEG     Source BLD     Site PERIPHERAL     Culture Result No Growth  Note: Blood cultures are incubated for 5 days and  are monitored continuously.Positive blood cultures  are called to the RN and reported as soon as  they are identified.      Narrative:      Per Hospital Policy: Only change Specimen Src: to \"Line\" if  specified by " "physician order.  Right Hand    BLOOD CULTURE [[id number]] Collected: 05/20/21 1557    Order Status: Completed Specimen: Blood from Peripheral Updated: 05/21/21 0821     Significant Indicator NEG     Source BLD     Site PERIPHERAL     Culture Result No Growth  Note: Blood cultures are incubated for 5 days and  are monitored continuously.Positive blood cultures  are called to the RN and reported as soon as  they are identified.      Narrative:      Per Hospital Policy: Only change Specimen Src: to \"Line\" if  specified by physician order.  Left AC    GRAM STAIN [[id number]] Collected: 05/19/21 1400    Order Status: Completed Specimen: Body Fluid Updated: 05/20/21 1202     Significant Indicator .     Source BF     Site Thoracentesis Fluid     Gram Stain Result Rare WBCs.  Rare Gram positive cocci.      Narrative:      Thoracentesis.    COV-2, FLU A/B, AND RSV BY PCR (2-4 HOURS CEPHEID): Collect NP swab in VTM [[id number]] Collected: 05/18/21 1730    Order Status: Completed Specimen: Respirate Updated: 05/18/21 1836     Influenza virus A RNA Negative     Influenza virus B, PCR Negative     RSV, PCR Negative     SARS-CoV-2 by PCR NotDetected     Comment: PATIENTS: Important information regarding your results and instructions can  be found at [email or web address]   \"After your  Covid-19 Test\"    [facility] providers: PLEASE REFER TO DE-ESCALATION AND RETESTING PROTOCOL  on [email or web address]    **The "[employer]" GeneXpert Xpress SARS-CoV-2 RT-PCR Test has been made  available for use under the Emergency Use Authorization (EUA) only.          SARS-CoV-2 Source NP Swab    Narrative:      Have you been in close contact with a person who is suspected  or known to be positive for COVID-19 within the last 30 days  (e.g. last seen that person < 30 days ago)->No          Assessment:  Agapito Marco Raeganmariobhupendra is a pleasant 76 y.o. male SNF resident with known history of CAD, type 2 diabetes, hypertension, CVA, left " lower extremity DVT on Eliquis, recently admitted with worsening left metatarsal heel wound with underlying osteomyelitis on imaging.  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 No pathology obtained.  Patient was discharged to SNF with plan to complete 6 weeks of IV daptomycin + IV Ancef through 6/3/2021.  Patient was brought to the ER on 5/19 with worsening shortness of breath.  CT scan showed worsening bilateral pleural effusions L>R compared to prior imaging. Left-sided thoracentesis performed which appeared bloody, lymphocyte predominant white cells, exudate by criteria, also with Gram stain reportedly positive for GPCs.     Unusual clinical course for empyema with worsening bilateral effusions that are primarily bloody, low white count and lymphocyte predominant.  However, the Gram stain from the left-sided effusion is positive, confirmed on review by microbiology lab. Also noted new peripheral opacities right upper lobe with possible early cavitation that may perhaps indicate systemic seeding of infection which would be unusual without a right-sided endocarditis.     Pertinent Diagnoses:  Left-sided empyema  Bilateral pleural effusions  Left foot osteomyelitis, MRSA and Proteus  Polymicrobial infection  Type 2 diabetes  Penicillin allergy    Plan:  -Continue IV Ancef 2 g every 8 hours  -Continue vancomycin while inpatient to continue coverage of the MRSA and for pulmonary coverage.  Monitor vancomycin levels and renal function  -The above antibiotic course will continue to cover his left foot osteomyelitis through the original stop date of 6/3/2021  -No growth till date from the left effusion   -Right-sided thoracentesis performed 5/21, culture neg  -TTE with no obvious valvular vegetations  -Blood cultures x2 from 5/21 no growth to date  -Due to persistent pneumothorax on the left, repeat CT obtained with some  improvement in the left-sided pneumothorax, pigtail chest tube in place, layering bilateral pleural effusions, persistent right-sided opacities  Possible CTS eval and intervention

## 2021-05-25 NOTE — PROGRESS NOTES
Received bedside report and accepted care of patient. Patient currently resting in bed in no visible or stated signs of distress. Bed alarm in place, controls on and bed in locked and lowest position. Call light and personal possessions within reach. Patient educated about use of call light and verbalized understanding.       Assessment/description of ears?  Bilateral, pink, intact and blanching  Which preventative measures are in place for the ears?  Grey foam on silicon oxygen tubing, tender  stickers; remove glasses while sleeping    Assessment/description of elbows?  Bilateral, pink, intact and blanching  Which preventative measures are in place for the elbows?  Mepilex, ERIKA mattress, Q2hr turns, pillows for support/positioning    Assessment/description of sacrum? Red/pink, blanching, excoriation  Which preventative measures are in place for the sacrum? Mepilex, ERIKA mattress, Q2hr turns, pillows for support/positioning, barrier cream     Assessment/description of heels?  Bilateral pink/red, blanching; R medial malleolus is red and blanching; Right heel has a small darkened spot; Left heel is boggy, pink, and blanching; L medial malleolus is red/pink and blanching  Which preventative measures are in place for the heels? Mepilex, ERIKA mattress, Q2hr turns, pillows for support/positioning, heel float boots     Which devices are in place?  PICC upper left arm, chest tube on Left posterior trunk; nasal cannula; heel float boots  Description of skin under devices: IDA to assess chest tube dressing which is CDI; otherwise skin under devices is intact, pink and blanching  Which preventative measures are in place under devices?  NC--grey foam, silicone oxygen tubing, tender , remove glasses while sleeping, mepilex    Other:

## 2021-05-25 NOTE — PROGRESS NOTES
Assessment/description of ears? Pink and blanching  Which preventative measures are in place for the ears? Ear foam for nasal cannula and remove glasses while sleeping.    Assessment/description of elbows? Pt declined assessment  Which preventative measures are in place for the elbows? mepilex in place    Assessment/description of sacrum? Pt declined assessment    Which preventative measures are in place for the sacrum? mepilex in place, ERIKA, turning Q2 hours when not declined.    Assessment/description of heels? Pt declined assessment    Which preventative measures are in place for the heels? Mepilex, heel float boots.    Which devices are in place? NC; heel float boots  Description of skin under devices: CDI, skin in tact and blanching; IDA to assess under dressing posterior chest  Which preventative measures are in place under devices? C--grey foam, silicone oxygen tubing, tender , remove glasses while sleeping.    Patient is alert and oriented x 4. Patient denies pain, except when this staff is attempting to remove mepilex to heels and elbows. Patient declines this writer to remove mepilex to assess skin underneath. This writer notes that the skin is very fragile. Patient will turn occasionally. He declines to turn at times. Patient is educated on the risks of not turning and patient verbalizes understanding. Call light is within reach with patient's belongings. Bed is in locked and low position. Chest tube is in place, dressing is intact and unable to assess skin under the dressing. Patient is on 3 L oxygen and this is his baseline. Will continue to monitor patient.

## 2021-05-25 NOTE — DISCHARGE PLANNING
Ongoing:    LSW received call from MedfordFigueroa Freeman, who states that they were able to locate one boot and one shoe in the pt's room.  ISMAEL Marti states that he will coordinate a drop off tomorrow (5/26) so pt can have the boot/shoe he needs to participate in physical therapy. PT updated.

## 2021-05-25 NOTE — PROGRESS NOTES
"Pulmonary Progress Note    Date of admission  5/18/2021    Chief Complaint  Shortness of breath    Hospital Course  \"Very pleasant 76-year-old male never smoker admitted for shortness of breath from [facility].  He has a complicated past medical history starting 11/2020 when he was admitted for weakness and Covid pneumonia.  CT chest at that time showed left lower lobe consolidation and scattered peripheral predominant groundglass opacities.  No pleural fluid.  He was hospitalized again in 4/2021 for osteomyelitis of the toe, cultures at that time grew MRSA and Proteus that was treated with daptomycin and Ancef.  CT chest at that time showed left greater than right pleural effusions that were managed conservatively.  He was readmitted again this hospitalization for PICC occlusion, where CT chest showed markedly increased left effusion and right effusion as well.  Is on Eliquis for left lower extremity DVT.  He underwent a left-sided thoracentesis by interventional radiology 1500 cc drained, with post procedural CXR showing hydropneumothorax.  Pleural fluid was mostly blood, 84% lymphocytes, and rare GPC's.  For these findings pulmonary was consulted.     This hospitalization he has been afebrile, WBC initially mildly elevated with PMN predominance but has since normalized.  Flu/RSV/SARS-CoV-2 swab negative.     On personal review of his CT this hospitalization, the airways appear patent, there is extrinsic compression of the left lung by a large pleural effusion.  There appears to be no pleural-based nodularities.  Solitary lymph node high in the trachea has been slowly enlarging since 11/2020 but is still < 2cm.  In the right hemithorax, there are scattered groundglass opacities peripherally predominant as well as a modest right pleural effusion as well.  TTE negative for endocarditis. Blood cultures no growth to date and right pleural cultures no growth to date.  Vancomycin/Ancef, ID following.  Afebrile, " "WBC WNL.  MRSA nares negative.  L pleural fluid still GPCs, no further growth/speciation, ? contaminated sample \"    5/24: No acute events overnight  Denies any symptoms this morning. Vital signs stable, on 3L supplemental oxygen. Left side pigtail catheter in place, draining orange fluid around 200 cc since this AM.  CXR showing stable moderate sized left pneumothorax, stable cardiomegaly, stable bilateral infiltrates     Interval Problem Update  5/25: No acute events overnight. Per nursing staff, patient desaturating to 80s on 3L sometimes, unsure if he is breathing through nose.  -Patient reports having some cough, denies any other symptoms  -Vital signs stable, on 3L supplemental oxygen. Left sided pigtail catheter in place, draining orange fluid around 250 cc since this AM and 400 cc in last 24 hours  -CXR showing \"minimal decrease in left pneumothorax with left pigtail chest tube in place, persistent atelectasis or pneumonia in the left lower lobe and lingula, unchanged small right pleural effusion and atelectasis or pneumonia in the right midlung and right lower lobe.\"    Review of Systems  Review of Systems   Constitutional: Negative for chills, fever and weight loss.   HENT: Negative for ear pain, hearing loss and tinnitus.    Eyes: Negative for blurred vision, double vision and photophobia.   Respiratory: Positive for cough. Negative for hemoptysis and sputum production.    Cardiovascular: Negative for chest pain, palpitations and orthopnea.   Gastrointestinal: Negative for abdominal pain, nausea and vomiting.   Genitourinary: Negative for dysuria, frequency and urgency.   Musculoskeletal: Negative for back pain, myalgias and neck pain.   Skin: Negative for itching and rash.   Neurological: Negative for dizziness, tingling and headaches.   Psychiatric/Behavioral: Negative for depression and suicidal ideas.      Vital Signs for last 24 hours   Temp:  [36.2 °C (97.2 °F)-36.7 °C (98.1 °F)] 36.5 °C (97.7 " °F)  Pulse:  [] 70  Resp:  [16-20] 16  BP: ()/(62-79) 127/75  SpO2:  [95 %-97 %] 95 %    Physical Exam   Physical Exam  Vitals and nursing note reviewed.   Constitutional:       General: He is not in acute distress.     Appearance: Normal appearance.      Comments: Thin  Chronically ill appearing   HENT:      Head: Normocephalic.      Nose: Nose normal.      Mouth/Throat:      Mouth: Mucous membranes are moist.   Eyes:      Extraocular Movements: Extraocular movements intact.      Pupils: Pupils are equal, round, and reactive to light.   Cardiovascular:      Rate and Rhythm: Normal rate and regular rhythm.      Pulses: Normal pulses.      Heart sounds: Normal heart sounds.   Pulmonary:      Comments: Decreased breath sounds bilaterally   L pigtail chest tube draining orange slightly turbid pleural fluid  Abdominal:      General: Bowel sounds are normal. There is no distension.      Palpations: Abdomen is soft.      Tenderness: There is no abdominal tenderness.   Musculoskeletal:         General: Normal range of motion.      Cervical back: Normal range of motion.   Skin:     General: Skin is warm.      Findings: Bruising present.   Neurological:      General: No focal deficit present.      Mental Status: He is alert and oriented to person, place, and time.   Psychiatric:         Mood and Affect: Mood normal.         Behavior: Behavior normal.       Medications  Current Facility-Administered Medications   Medication Dose Route Frequency Provider Last Rate Last Admin   • fluticasone (FLONASE) nasal spray 100 mcg  2 Spray Nasal DAILY Ken Lane M.D.   100 mcg at 05/25/21 1032   • vancomycin (VANCOCIN) 750 mg in  mL IVPB  750 mg Intravenous Q12HR Omer Daugherty D.O.   Stopped at 05/25/21 2488   • apixaban (ELIQUIS) tablet 5 mg  5 mg Oral BID Fortflro Daugherty D.O.   5 mg at 05/25/21 0435   • MD Alert...Vancomycin per Pharmacy   Other PHARMACY TO DOSE Gomez Reveles M.D.       • albuterol  inhaler 2 Puff  2 Puff Inhalation Q6HRS PRN Julio César Mckee M.D.       • ferrous sulfate tablet 325 mg  325 mg Oral QDAY with Breakfast Julio César Mckee M.D.   325 mg at 05/25/21 0843   • gabapentin (NEURONTIN) capsule 100 mg  100 mg Oral BID Julio César Mckee M.D.   100 mg at 05/25/21 0435   • magnesium oxide (MAG-OX) tablet 400 mg  400 mg Oral BID Julio César Mckee M.D.   400 mg at 05/25/21 0435   • montelukast (SINGULAIR) tablet 10 mg  10 mg Oral DAILY Julio César Mckee M.D.   10 mg at 05/25/21 0435   • tamsulosin (FLOMAX) capsule 0.4 mg  0.4 mg Oral QHS Julio César Mckee M.D.   0.4 mg at 05/24/21 2008   • senna-docusate (PERICOLACE or SENOKOT S) 8.6-50 MG per tablet 2 tablet  2 tablet Oral BID Julio César Mckee M.D.   2 tablet at 05/25/21 0435    And   • polyethylene glycol/lytes (MIRALAX) PACKET 1 Packet  1 Packet Oral QDAY PRN Julio César Mckee M.D.   1 Packet at 05/23/21 1759    And   • magnesium hydroxide (MILK OF MAGNESIA) suspension 30 mL  30 mL Oral QDAY PRN Julio César Mckee M.D.   30 mL at 05/25/21 1036    And   • bisacodyl (DULCOLAX) suppository 10 mg  10 mg Rectal QDAY PRN Julio César Mckee M.D.       • Pharmacy Consult Request ...Pain Management Review 1 Each  1 Each Other PHARMACY TO DOSE Julio César Mckee M.D.       • acetaminophen (Tylenol) tablet 650 mg  650 mg Oral Q6HRS PRN Julio César Mckee M.D.   650 mg at 05/19/21 1256   • HYDROmorphone (Dilaudid) injection 0.25 mg  0.25 mg Intravenous Q3HRS PRN Julio César Mckee M.D.       • ondansetron (ZOFRAN) syringe/vial injection 4 mg  4 mg Intravenous Q4HRS PRN Julio César Mckee M.D.       • ondansetron (ZOFRAN ODT) dispertab 4 mg  4 mg Oral Q4HRS PRN Julio César Mckee M.D.       • insulin regular (HumuLIN R,NovoLIN R) injection  2-9 Units Subcutaneous 4X/DAY SHEREEN Mckee M.D.   2 Units at 05/23/21 1218    And   • glucose 4 g chewable tablet 16 g  16 g Oral Q15 MIN PRN Julio César Mckee M.D.        And   • dextrose 50% (D50W) injection 50  "mL  50 mL Intravenous Q15 MIN PRN Julio César Mckee M.D.       • ceFAZolin in dextrose (ANCEF) IVPB premix 2 g  2 g Intravenous Q8HRS Julio César Mckee M.D. 200 mL/hr at 05/25/21 1027 2 g at 05/25/21 1027     Fluids    Intake/Output Summary (Last 24 hours) at 5/25/2021 1055  Last data filed at 5/25/2021 0950  Gross per 24 hour   Intake 1175 ml   Output 850 ml   Net 325 ml     Laboratory          Recent Labs     05/23/21  0120 05/24/21  0140 05/25/21  0309   SODIUM 143 140 140   POTASSIUM 4.4 4.5 4.2   CHLORIDE 106 105 105   CO2 31 29 29   BUN 14 13 14   CREATININE 0.57 0.68 0.65   CALCIUM 8.0* 7.8* 7.7*     Recent Labs     05/23/21  0120 05/24/21  0140 05/25/21  0309   ALTSGPT 6 6  --    ASTSGOT 25 25  --    ALKPHOSPHAT 298* 359*  --    TBILIRUBIN 0.2 0.3  --    GLUCOSE 125* 121* 130*     Recent Labs     05/23/21  0120 05/23/21  0618 05/24/21  0140 05/25/21  0309   WBC  --  8.9 8.6 6.6   ASTSGOT 25  --  25  --    ALTSGPT 6  --  6  --    ALKPHOSPHAT 298*  --  359*  --    TBILIRUBIN 0.2  --  0.3  --      Recent Labs     05/23/21  0618 05/24/21  0140 05/25/21  0309   RBC 3.02* 3.08* 3.12*   HEMOGLOBIN 8.6* 8.6* 8.8*   HEMATOCRIT 28.5* 28.6* 28.7*   PLATELETCT 266 285 294     Imaging  CXR showing \"minimal decrease in left pneumothorax with left pigtail chest tube in place, persistent atelectasis or pneumonia in the left lower lobe and lingula, unchanged small right pleural effusion and atelectasis or pneumonia in the right midlung and right lower lobe.\"    Assessment/Plan  76-year-old male admitted with bilateral exudative pleural effusion loculated empyema on left side s/p chest tube placement on 5/21.  Possible etiology could be septic emboli from his osteomyelitis.     Impression:  #Right exudative bloody lymphocytic pleural effusion  #Left-sided hydropneumothorax  #Acute hypoxic respiratory failure  #Bilateral pulmonary infiltrates concerning for hospital-acquired pneumonia (present prior to admission)  #Left leg " DVT, subacute  #Osteomyelitis of left foot    Plan:  1. L hydropneumothorax: -250cc output this AM'; Repeat CXR in AM. Get out of bed with assistance. If  Fluid collection <100cc in 24 hours, will consider chest tube removal  2. Pulmonary infiltrates: cont vanco/ancef, appreciate ID input, no evidence of vegetations on TTE, blood cultures NGTD x 2, sputum cultures. Suspect left pleural gram stain represent contamination  3. H&H stable, on apixaban  4. Titrate FiO2 to maintain saturations above 92%  5. Thoracic surgery consultation pending clinical trajectory.

## 2021-05-25 NOTE — DISCHARGE PLANNING
Medical Social Work    LSW received message from ISMAEL Marti but the call was missed as LSW was in rounds.      LSW attempted to call ISMAEL Marti back but there was no answer.  LSW left another message w/ Figueroa Anderson.      LSW called [facility] and spoke w/ an RN in the 400 unit to see if there was anyone who could assist but RN stated she was unable to assist.  RN was able to speak w/ ISMAEL Marti who states that they wouldn't be able to drop off the pt's boot.  RN further reports that the pt only has one off loading boot in his room.  LSW inquired about coordinating the boot to be brought to the hospital through their facility transport.      ISMAEL Marti to call this LSW.

## 2021-05-25 NOTE — DISCHARGE PLANNING
Medical Social Work    LSW left another message w/ Figueroa Anderson, to discuss options on getting pt's off loading shoes sent from [facility] to [facility] so pt can participate in Physical Therapy.     LSW awaiting call back.

## 2021-05-25 NOTE — CARE PLAN
The patient is Stable - Low risk of patient condition declining or worsening    Shift Goals  Clinical Goals: pts respirations will continue to improve  Patient Goals: Pt will report no pain from chest tube site    Progress made toward(s) clinical / shift goals:  pt'srespirations were within normal limits    Patient is not progressing towards the following goals:

## 2021-05-25 NOTE — PROGRESS NOTES
[facility] Medicine Daily Progress Note    Date of Service  5/25/2021    Chief Complaint  76 y.o. male admitted 5/18/2021 with PICC line dysfunction and new onset pleural effusion.    Interval Problem Update  Patient was seen and examined at bedside. No acute events overnight.   Episodes of Hypoxia in 80s with 3L O2.     Chest x-ray this morning -minimal decrease in left pneumothorax, and changes small right-sided pleural effusion  On IV Ancef and vancomycin. Per ID, ABx till 6/3 to cover for left foot osteomyelitis   Pulmonary team and ID actively following.   Pulmonary is considering thoracic surgery consult for ?helmlich valve.     Consultants/Specialty  Pulm  ID    Code Status  Full Code    Disposition  TBD     Review of Systems  Review of Systems   Constitutional: Positive for malaise/fatigue. Negative for chills and fever.   HENT: Negative for congestion, ear discharge, ear pain, sinus pain and sore throat.    Eyes: Negative for blurred vision and double vision.   Respiratory: Positive for shortness of breath. Negative for cough, sputum production and wheezing.    Cardiovascular: Negative.  Negative for chest pain, palpitations and leg swelling.   Gastrointestinal: Negative.  Negative for abdominal pain, constipation, diarrhea, nausea and vomiting.   Genitourinary: Negative.  Negative for dysuria, frequency and urgency.   Musculoskeletal: Positive for myalgias.   Neurological: Negative for dizziness, focal weakness and headaches.   Psychiatric/Behavioral: The patient is not nervous/anxious.    All other systems reviewed and are negative.       Physical Exam  Temp:  [36.2 °C (97.2 °F)-36.7 °C (98.1 °F)] 36.5 °C (97.7 °F)  Pulse:  [] 70  Resp:  [16-20] 16  BP: ()/(62-79) 127/75  SpO2:  [95 %-97 %] 95 %    Physical Exam  Constitutional:       Appearance: Normal appearance.   HENT:      Head: Normocephalic and atraumatic.   Eyes:      Conjunctiva/sclera: Conjunctivae normal.   Cardiovascular:      Rate  and Rhythm: Normal rate.      Pulses: Normal pulses.   Pulmonary:      Effort: Pulmonary effort is normal.      Breath sounds: Rales present.   Abdominal:      General: Bowel sounds are normal.      Palpations: Abdomen is soft.   Musculoskeletal:         General: No swelling or tenderness.      Comments: Left-sided pigtail chest tube in place.   Skin:     General: Skin is warm.   Neurological:      General: No focal deficit present.      Mental Status: He is alert. Mental status is at baseline.   Psychiatric:         Mood and Affect: Mood normal.         Behavior: Behavior normal.         Fluids    Intake/Output Summary (Last 24 hours) at 5/25/2021 1248  Last data filed at 5/25/2021 1234  Gross per 24 hour   Intake 1075 ml   Output 890 ml   Net 185 ml       Laboratory  Recent Labs     05/23/21  0618 05/24/21  0140 05/25/21  0309   WBC 8.9 8.6 6.6   RBC 3.02* 3.08* 3.12*   HEMOGLOBIN 8.6* 8.6* 8.8*   HEMATOCRIT 28.5* 28.6* 28.7*   MCV 94.4 92.9 92.0   MCH 28.5 27.9 28.2   MCHC 30.2* 30.1* 30.7*   RDW 55.2* 54.4* 54.3*   PLATELETCT 266 285 294   MPV 9.4 9.6 9.4     Recent Labs     05/23/21  0120 05/24/21  0140 05/25/21  0309   SODIUM 143 140 140   POTASSIUM 4.4 4.5 4.2   CHLORIDE 106 105 105   CO2 31 29 29   GLUCOSE 125* 121* 130*   BUN 14 13 14   CREATININE 0.57 0.68 0.65   CALCIUM 8.0* 7.8* 7.7*                   Imaging  DX-CHEST-PORTABLE (1 VIEW)   Final Result      1.  Minimal decrease in left pneumothorax with left pigtail chest tube in place.      2.  Persistent atelectasis or pneumonia in the left lower lobe and lingula.      3.  Unchanged small right pleural effusion and atelectasis or pneumonia in the right midlung and right lower lobe.      DX-CHEST-PORTABLE (1 VIEW)   Final Result      1.  Stable moderate sized left pneumothorax.      2.  Stable bilateral infiltrates and support devices.      3.  Stable cardiomegaly.      DX-CHEST-PORTABLE (1 VIEW)   Final Result      Stable to slightly more prominent left  pneumothorax and with increased left pleural fluid.      No other significant interval change.      EC-ECHOCARDIOGRAM COMPLETE W/O CONT   Final Result      DX-CHEST-PORTABLE (1 VIEW)   Final Result         1.  Pulmonary infiltrates, increased on the right compared to prior study.   2.  New layering right pleural effusion.   3.  Left pneumothorax, decreased since prior study.   4.  Cardiomegaly   5.  Atherosclerosis      CT-CHEST (THORAX) WITH   Final Result         1.  Moderate size left pneumothorax with small caliber pigtail thoracostomy tube in place, appears similar to prior chest x-ray yesterday at 1054.   2.  Layering bilateral pleural effusions.   3.  Peripheral reticular consolidations, appearance compatible with Covid infiltrates. There are areas of denser consolidation suggesting superimposed secondary infectious process.   4.  Irregular hepatic contour, compatible with changes of cirrhosis.   5.  Trace perihepatic ascites      US-EXTREMITY VENOUS LOWER BILAT   Final Result      DX-CHEST-PORTABLE (1 VIEW)   Final Result      1.  Interval placement of small-caliber LEFT chest tube with improvement of pleural fluid and compensatory enlargement of LEFT pneumothorax, likely due to noncompliant underlying lung.   2.  Improving RIGHT lung infiltrates.      These findings were discussed with SUNDAR SUNG on 5/21/2021 11:18 AM.         CT-CHEST TUBE-EMPYEMA LEFT   Final Result      1. LEFT CHEST EMPYEMA Drainage with CT guidance.      DX-CHEST-LIMITED (1 VIEW)   Final Result      1.  Stable LEFT hydropneumothorax.   2.  Multifocal pneumonia again seen with slight worsening in RIGHT midlung region.            DX-CHEST-LIMITED (1 VIEW)   Final Result         Unchanged left lateral pneumothorax.      US-THORACENTESIS PUNCTURE LEFT   Final Result      1. Ultrasound guided left sided diagnostic and therapeutic thoracentesis.      2. 1550 mL of fluid withdrawn.      DX-CHEST-PORTABLE (1 VIEW)   Final Result       Interval decrease in size of the left pleural effusion which is now small.      Small small loculated left pneumothorax.      Small loculated right pleural effusion.      Left perihilar and basilar opacity may represent atelectasis/consolidation.      Increased ill-defined airspace opacities on the right may represent a combination of atelectasis, edema and pneumonia.      Atherosclerotic plaque.      Findings discussed with the covering clinician.         CT-CHEST (THORAX) W/O   Final Result         1.  Large left and moderate pleural effusions.   2.  Peripheral reticular pulmonary opacities, appearance compatible with Covid infiltrates.   3.  Linear consolidations in the bilateral lung bases, likely atelectasis.   4.  Hepatomegaly and irregular hepatic contour, appearance most compatible changes of cirrhosis.   5.  Diverticulosis      DX-CHEST-PORTABLE (1 VIEW)   Final Result      Stable large left pleural effusion with associated compressive atelectasis. Correlate clinically for infection.      Possible small right pleural effusion with mildly increased opacity in the right lung.           Assessment/Plan  * Pleural effusion, bilateral  Assessment & Plan  Unknown etiology  Was seen on last admission and was asymptomatic. Now with worsening SOB and worsening effusion  CT chest -findings of large left-sided pleural effusion and moderate right pleural effusion.    2D echo with preserved EF and no valvular abnormalities.  S/p thoracentesis of left pleural cavity .1.5 L of fluid removed.  S/p thoracentesis of right pleural cavity about 100 cc fluid removed, bloody.  Fluid studies slightly consistent with exudative effusion.  Cytology negative  Pulmonology following.    Rare gram-positive cocci growing in left pleural cavity concerning for empyema.  S/p pigtail placement of the left pleural cavity for adequate drainage of infection.  Will follow up on final cultures from the left pleural cavity and also monitor  cultures from the right pleural cavity as well.  On IV Ancef & Vanco     Empyema, left (HCC)  Assessment & Plan  S/p thoracentesis of left pleural cavity .1.5 L of fluid removed.  S/p thoracentesis of right pleural cavity about 100 cc fluid removed, bloody.  Fluid studies slightly consistent with exudative effusion.  Cytology negative  Pulmonology following.    Rare gram-positive cocci growing in left pleural cavity concerning for empyema.  S/p pigtail placement of the left pleural cavity for adequate drainage of infection.  We will follow up on final cultures from the left pleural cavity and also monitor cultures from the right pleural cavity as well.   ID following, on vancomycin and Ancef.    Postprocedural pneumothorax  Assessment & Plan  Post thoracentesis, chest x-ray with finding of a small left loculated pneumothorax.   We will repeat serial chest x-ray and closely monitor vital signs.    Repeat chest x-ray with findings of persistent left-sided hydropneumothorax concerning for trapped lung.  S/p IR for pigtail placement into left pleural cavity.     CT chest 5/21 with findings of persistent moderate-sized left pneumothorax.    Acute respiratory failure with hypoxia (HCC)  Assessment & Plan  Secondary to large pleural effusion  See above for plan    Osteomyelitis of left foot (HCC)- (present on admission)  Assessment & Plan  Patient last admitted to [facility] with osteomyelitis of left foot  S/p amputation of left 5th toe, amputation well healing  ID was consulted last admission and plan for Daptomycin and Ancef until 6/3, will continue Abx per their prior recommendation.     With concerns for new lung infection, we have ID reconsulted to help adjust antibiotics- daptomycin discontinued. Vancomycin started. Ancef also continued.  We will closely monitor vancomycin levels to prevent toxicity.     Protein-calorie malnutrition, severe (HCC)  Assessment & Plan  RD following.  Follow-up on recommendations.    Occluded  PICC line, initial encounter (HCC)  Assessment & Plan  Patient sent by nursing home for occluded PICC line  Was flushed by ED staff and reportedly working currently  Consider new PICC line placement if continues to be nonfunctional as will need to continue Abx until 6/3    Deep vein thrombosis (DVT) of lower extremity (HCC)- (present on admission)  Assessment & Plan  History of DVT on Eliquis.  Repeat ultrasound Dopplers with findings of persistent nonocclusive left lower extremity DVT.  On Eliquis     Type 2 diabetes mellitus with peripheral neuropathy (HCC)- (present on admission)  Assessment & Plan  Patient with long history of DM with doccumented neuropathy and retinopathy  Hold home oral medications (ACTOS and Synjardy)  Insulin sliding scale for now  Diabetic diet  Last A1C 4.9 month ago    CAD (coronary artery disease)- (present on admission)  Assessment & Plan  Patient not currently taking statin, can be resumed as outpatient  Reported ACS in 2013 s/p stents  No current chest pain  ECHO nil acute   Will continue to monitor    HTN (hypertension)- (present on admission)  Assessment & Plan  Does not appear to be on antihypertensives at nursing home currently  Will continue to monitor    History of stroke- (present on admission)  Assessment & Plan  Prior history of stroke with some aphasia  Some deconditioning  PT/OT       VTE prophylaxis: Eliquis

## 2021-05-25 NOTE — THERAPY
Physical Therapy Contact Note    Patient Name: Agapito Stone  Age:  76 y.o., Sex:  male  Medical Record #: [id number]  Today's Date: 5/25/2021    Attempted PT treatment, patient declined stating he does not want to participate in PT without offloading boots.  Spoke with patient and  regarding POC.  The patient states the boot in the room does not belong to him and is not the correct type of boot.  The patient states the boots are/are similar to walking boots and were both in his room at [facility].        Kierra Krause, PT

## 2021-05-26 NOTE — PROGRESS NOTES
Assumed pt care at shift change.  Pt alert/oriented 4, resting in bed.  Pt is on 3L oxygen via NC, chest tube on L posterior side, with no signs of distress or discomfort.  Discussed POC with pt; answered questions.   Safety precautions in place, bed locked and in lowest position, call light and personal belongings within reach.  No further needs at this time.        Assessment/description of ears?  Bilateral, pink, intact and blanching  Which preventative measures are in place for the ears?  Grey foam on silicon oxygen tubing, tender  stickers; remove glasses while sleeping     Assessment/description of elbows?  Bilateral, pink, intact and blanching  Which preventative measures are in place for the elbows?  Mepilex, ERIKA mattress, Q2hr turns, pillows for support/positioning     Assessment/description of sacrum? Red/pink, blanching, excoriation  Which preventative measures are in place for the sacrum? Mepilex, ERIKA mattress, Q2hr turns, pillows for support/positioning, barrier cream      Assessment/description of heels?  Bilateral pink/red, blanching; R medial malleolus is red and blanching; Right heel has a small darkened spot; Left heel is boggy, pink, and blanching; L medial malleolus is red/pink and blanching  Which preventative measures are in place for the heels? Mepilex, ERIKA mattress, Q2hr turns, pillows for support/positioning, heel float boots      Which devices are in place?  PICC upper left arm, chest tube on Left posterior trunk; nasal cannula; heel float boots  Description of skin under devices: IDA to assess chest tube dressing which is CDI; otherwise skin under devices is intact, pink and blanching  Which preventative measures are in place under devices?  NC--grey foam, silicone oxygen tubing, tender , remove glasses while sleeping, mepilex

## 2021-05-26 NOTE — WOUND TEAM
[facility] Wound & Ostomy Care  Inpatient Services  Wound and Skin Care Progress Note    Admission Date: 5/18/2021     Last order of IP CONSULT TO WOUND CARE was found on 5/19/2021 from Hospital Encounter on 5/18/2021     HPI, PMH, SH: Reviewed    Past Surgical History:   Procedure Laterality Date   • TOE AMPUTATION Left 4/23/2021    Procedure: AMPUTATION, TOE - 5TH TOE;  Surgeon: Remi Scott M.D.;  Location: [facility];  Service: Orthopedics   • IRRIGATION & DEBRIDEMENT ORTHO Right 12/10/2020    Procedure: IRRIGATION AND DEBRIDEMENT, WOUND - HEEL;  Surgeon: Royal Bolivar M.D.;  Location: [facility];  Service: Orthopedics   • TENDON LENGHTENING Right 8/24/2020    Procedure: LENGTHENING, TENDON- , GASTROC RECESSION;  Surgeon: Royal Bolivar M.D.;  Location: [facility];  Service: Orthopedics   • ANKLE FUSION Right 8/24/2020    Procedure: FUSION, JOINT, ANKLE- ANKLE SUBTALAR FUSION , BONE GRAFT, MEDIAL RELEASE INCLUDING TENDONS, PATIAL EXCISION OF FIBULA;  Surgeon: Royal Bolivar M.D.;  Location: [facility];  Service: Orthopedics   • TENDON TRANSFER Right 8/24/2020    Procedure: TRANSFER, TENDON- PLANTAR FASCIA RELEASE;  Surgeon: Royal Bolivar M.D.;  Location: [facility];  Service: Orthopedics   • ORTHOPEDIC OSTEOTOMY Right 8/24/2020    Procedure: OSTEOTOMY- 1ST METATARSAL, CROSS PROCEDURE, 2-5 METATARSAL OSTEOTOMY, 2-5 PHALANGEL JOINT RECONSTRUCTION;  Surgeon: Royal Bolivar M.D.;  Location: [facility];  Service: Orthopedics   • HAMMERTOE CORRECTION Right 8/24/2020    Procedure: CORRECTION, HAMMER TOE 2-5;  Surgeon: Royal Bolivar M.D.;  Location: [facility];  Service: Orthopedics   • STENT PLACEMENT  2013    cardiac   • CAROTID ENDARTERECTOMY  7/13/2010    left; Performed by CHIQUITA CORRIGAN at [facility]   • CATARACT EXTRACTION WITH IOL Bilateral    • TONSILLECTOMY  as a child     Social History      "    Tobacco Use   • Smoking status: Never Smoker   • Smokeless tobacco: Never Used   Substance Use Topics   • Alcohol use: Not Currently     Alcohol/week: 0.0 oz     Comment: once a year     Chief Complaint   Patient presents with   • Vascular Access Problem     Left upper arm PICC line won't flush per staff at [facility]. PICC line accessed and it does flush with NS, however, it is very slow and hard to flush. No discomfort, redness, or swelling noted.     Diagnosis: Pleural effusion [J90]    Unit where seen by Wound Team: S626/01     WOUND CONSULT/FOLLOW UP RELATED TO:  Sacrum, right and left foot    WOUND HISTORY:  Patient had a L 5th ray amputation performed by Dr. Scott on 4/23 and was D/C'd back to [facility] for abx course via PICC.  Pt readmitted 5/18 for pleural effusion.  Per patient he also has an extensive history of R foot surgeries, \"my entire R foot has been rebuilt.\"      WOUND ASSESSMENT/LDA    ound 05/19/21 Pressure Injury Foot;Heel;Ankle Right POA (Active)   Wound Image    05/26/21 1600   Site Assessment Purple;Red 05/26/21 1600   Periwound Assessment Pale;Red 05/26/21 1600   Margins Undefined edges;Attached edges 05/26/21 1600   Closure Open to air 05/26/21 1600   Drainage Amount None 05/26/21 1600   Treatments Offloading 05/26/21 1600   Wound Cleansing Not Applicable 05/26/21 1600   Periwound Protectant Not Applicable 05/26/21 1600   Dressing Cleansing/Solutions Not Applicable 05/26/21 1600   Dressing Options Mepilex Heel 05/26/21 1600   Dressing Changed Observed 05/26/21 1600   Dressing Status Clean;Dry;Intact 05/26/21 1600   Dressing Change/Treatment Frequency Every 72 hrs, and As Needed 05/26/21 1600   NEXT Dressing Change/Treatment Date 05/29/21 05/26/21 1600   NEXT Weekly Photo (Inpatient Only) 06/02/21 05/26/21 1600   Pressure Injury Stage DTPI 05/26/21 1600   Wound Length (cm) 0.6 cm 05/19/21 1600   Wound Width (cm) 0.6 cm 05/19/21 1600   Wound Surface Area (cm^2) 0.36 cm^2 05/19/21 1600 "   Shape round 05/26/21 1600   Wound Odor None 05/26/21 1600   Exposed Structures IDA 05/26/21 1600   WOUND NURSE ONLY - Time Spent with Patient (mins) 60 05/26/21 1600       Wound 05/19/21 Pressure Injury Foot;Ankle;Toe, Hallux;Toe, 4th Left POA (Active)   Wound Image    05/26/21 1600   Site Assessment Red;Eschar 05/26/21 1600   Periwound Assessment [place] 05/26/21 1600   Margins Attached edges 05/26/21 1600   Closure Open to air 05/26/21 1600   Drainage Amount None 05/26/21 1600   Treatments Offloading 05/26/21 1600   Wound Cleansing Foam Cleanser/Washcloth 05/26/21 1600   Periwound Protectant Not Applicable 05/26/21 1600   Dressing Cleansing/Solutions 3% Betadine 05/26/21 1600   Dressing Options Mepilex Heel 05/26/21 1600   Dressing Changed Changed 05/26/21 1600   Dressing Status Clean;Dry;Intact 05/26/21 1600   Dressing Change/Treatment Frequency Daily, and As Needed 05/26/21 1600   NEXT Dressing Change/Treatment Date 05/27/21 05/26/21 1600   NEXT Weekly Photo (Inpatient Only) 06/02/21 05/26/21 1600   Pressure Injury Stage DTPI 05/26/21 1600   Wound Length (cm) 0.8 cm 05/26/21 1600   Wound Width (cm) 0.6 cm 05/26/21 1600   Wound Depth (cm) 0 cm 05/26/21 1600   Wound Surface Area (cm^2) 0.48 cm^2 05/26/21 1600   Wound Volume (cm^3) 0 cm^3 05/26/21 1600   Shape round 05/26/21 1600   Wound Odor None 05/26/21 1600   Exposed Structures IDA 05/26/21 1600   WOUND NURSE ONLY - Time Spent with Patient (mins) 60 05/19/21 1600   Wound 05/19/21 Pressure Injury Sacrum;Coccyx (Active)   Wound Image   05/26/21 1600   Site Assessment Pink;Red 05/26/21 1600   Periwound Assessment Blanchable erythema 05/26/21 1600   Margins Undefined edges 05/26/21 1600   Closure Open to air 05/26/21 1600   Drainage Amount None 05/26/21 1600   Treatments Offloading 05/26/21 1600   Wound Cleansing Foam Cleanser/Washcloth 05/26/21 1600   Periwound Protectant Barrier Paste 05/26/21 1600   Dressing Cleansing/Solutions Not Applicable 05/26/21 1600    Dressing Options Mepilex 21 1600   Dressing Changed Changed 21 1600   Dressing Status Clean;Dry;Intact 21 1600   Dressing Change/Treatment Frequency Every 72 hrs, and As Needed 21 1600   NEXT Dressing Change/Treatment Date 21 1600   NEXT Weekly Photo (Inpatient Only) 21 1600   Pressure Injury Stage DTPI 21 1600   Wound Length (cm) 0.6 cm 21 1600   Wound Width (cm) 0.6 cm 21 1600   Wound Surface Area (cm^2) 0.36 cm^2 21 1600   Shape round 21 1600   Wound Odor None 21 1600   Exposed Structures IDA 21 1600   WOUND NURSE ONLY - Time Spent with Patient (mins) 60 21 1600     Moisture Associated Skin Damage 21 Buttock (Active)   Wound Image   21 1600   NEXT Weekly Photo (Inpatient Only) 21 0825   Drainage Amount None 21 0846   Drainage Description IDA 21   Periwound Assessment Pink 21 0846   IAD Cleansing Soap and Water 21 0846   Periwound Protectant Barrier Paste 21 0825   IAD Containment Device None 21 0846   Length (cm) 7 21 1600   Width (cm) 7 21 1600   WOUND NURSE ONLY - Time Spent with Patient (mins) 60 21 1600       Vascular:    HAKEEM:   HAKEEM Results, Last 30 Days US-HAKEEM SINGLE LEVEL BILAT    Result Date: 2021  Narrative  Vascular Laboratory  Conclusions  Bilateral.  There is no evidence of major arterial disease demonstrated.  NADINE CABRERA  Age:    76    Gender:     M  MRN:    [id number]  :    1945      BSA:  Exam Date:     2021 10:46  Room #:     Inpatient  Priority:     Routine  Ht (in):             Wt (lb):  Ordering Physician:        ANN HOYT  Referring Physician:       [id number]  Sonographer:               [facility]  Study Type:                Complete Bilateral  Technical Quality:         Adequate  Indications:      Ulceration of LE  CPT Codes:       98929  ICD Codes:       707.1  History:         Non-healing wound of left lower limb. Diabetes.  Limitations:                 RIGHT  Waveform            Systolic BPs (mmHg)                             125           Brachial  Triphasic                                Common Femoral  Triphasic                  276           Posterior Tibial  Triphasic                  267           Dorsalis Pedis                                           Peroneal                             2.12          HAKEEM                                           TBI                       LEFT  Waveform        Systolic BPs (mmHg)                             130           Brachial  Triphasic                                Common Femoral  Triphasic                  240           Posterior Tibial  Triphasic                  226           Dorsalis Pedis                                           Peroneal                             1.85          HAKEEM                                           TBI  Findings  Bilateral.  Doppler waveforms of the common femoral arteries are of high amplitude and  triphasic.  Doppler waveforms at the ankle are brisk and triphasic.  Ankle pressures are not accurately measured due to calcification and  noncompressibility of the tibial vessels.  Toe-brachial indices are normal. Right: 0.81  Left: 0.85  Additional testing was not performed in accordance with lower extremity  arterial evaluation protocol.  Cristiano Mccormack  (Electronically Signed)  Final Date:      23 April 2021                   13:32      Lab Values:    Lab Results   Component Value Date/Time    WBC 6.9 05/26/2021 06:19 AM    RBC 3.02 (L) 05/26/2021 06:19 AM    HEMOGLOBIN 8.5 (L) 05/26/2021 06:19 AM    HEMATOCRIT 28.4 (L) 05/26/2021 06:19 AM    CREACTPROT 6.65 (H) 04/23/2021 04:00 AM    SEDRATEWES 26 (H) 11/19/2020 11:36 AM    HBA1C 4.9 04/22/2021 04:12 PM        Culture Results show:  No results found for this or any previous  visit (from the past 720 hour(s)).    Pain Level/Medicated:  0/10    INTERVENTIONS BY WOUND TEAM:  Chart and images reviewed. Discussed with bedside RN. All areas of concern (based on picture review, LDA review and discussion with bedside RN) have been thoroughly assessed. Documentation of areas based on significant findings. This RN in to assess patient. Performed standard wound care which includes appropriate positioning, dressing removal and non-selective debridement. Pictures and measurements obtained weekly if/when required.    Bilateral feet/heels: Wounds on feet and heels left open to air.  Mepilex applied to bilat heels.  Nursing to continue painting wounds on L toes with betadine.     Sacrum: cleansed with foam cleanser and washcloth.  Sacral mepilex placed    Interdisciplinary consultation: Patient, Bedside RN    EVALUATION / RATIONALE FOR TREATMENT:  Most Recent Date:    5/26/2021:  DTI over sacrum now faded to sluggishly blanching redness, sacral wounds mostly resolved with one patch of open skin on L buttox remaining.  MASD over site also improving.  DTI on R heel unchanged but R dorsal foot now appears free of wounds and R medial heel wound has decreased in size.  L lateral ankle still has evolving DTI with surrounding redness.  Scab on L 4th toe stable, wound to L hallux almost fully resolved.  Wounds are improving, continue current plan of care.       05/19/2021:  Deep tissue injuries to the left, right foot and sacrum.  Patient had offloading boot that caused pressure but the patient has severe bilateral neuropathy to his feet was not able to feel.  Patient also has deep tissue injury to his sacrum with surrounding moisture associated skin damage.  Barrier paste to sacrum and mepilex over to help with moisture control.     Goals: Steady decrease in wound area and depth weekly.    WOUND TEAM PLAN OF CARE ([X] for frequency of wound follow up,):   Nursing to follow orders written for wound care.  Contact wound team if area fails to progress, deteriorates or with any questions/concerns  Dressing changes by wound team:                   Follow up 3 times weekly:                NPWT change 3 times weekly:     Follow up 1-2 times weekly:    x  Follow up Bi-Monthly:                   Follow up as needed:     Other (explain):     NURSING PLAN OF CARE ORDERS (X):  Dressing changes: See Dressing Care orders: x  Skin care: See Skin Care orders: x  RN Prevention Protocol: x  Rectal tube care: See Rectal Tube Care orders:   Other orders:    RSKIN:   CURRENTLY IN PLACE (X), APPLIED THIS VISIT (A), ORDERED (O):   Q shift Bradley:  X  Q shift pressure point assessments:  X    Surface/Positioning   Pressure redistribution mattress     x       Low Airloss          Bariatric foam      Bariatric ERIKA     Waffle cushion        Waffle Overlay      x    Reposition q 2 hours  x    TAPs Turning system     Z Rick Pillow     Offloading/Redistribution   Sacral Mepilex (Silicone dressing)   A  Heel Mepilex (Silicone dressing)         Heel float boots (Prevalon boot)     x        Float Heels off Bed with Pillows   x        Respiratory NA  Silicone O2 tubing         Gray Foam Ear protectors     Cannula fixation Device (Tender )          High flow offloading Clip    Elastic head band offloading device      Anchorfast                                                         Trach with Optifoam split foam             Containment/Moisture Prevention     Rectal tube or BMS    Purwick/Condom Cath        Wu Catheter    Barrier wipes           Barrier paste   x    Antifungal tx      Interdry        Mobilization NA      Up to chair        Ambulate      PT/OT      Nutrition       Dietician        Diabetes Education      PO   x  TF     TPN     NPO   # days     Other        Anticipated discharge plans:   LTACH:        SNF/Rehab:   X, patient at [place]               Home Health Care:           Outpatient Wound Center:            Self/Family Care:         Other:

## 2021-05-26 NOTE — PROGRESS NOTES
Assessment/description of ears?  Bilateral, pink, intact and blanching  Which preventative measures are in place for the ears?  Grey foam on silicon oxygen tubing, tender  stickers; remove glasses while sleeping     Assessment/description of elbows?  Pt declines assessment.  Which preventative measures are in place for the elbows?  Mepilex, ERIKA mattress, Q2hr turns, pillows for support/positioning     Assessment/description of sacrum?Pt declines assessment.  Which preventative measures are in place for the sacrum? Mepilex, ERIKA mattress, Q2hr turns, pillows for support/positioning, barrier cream      Assessment/description of heels? Pt declines assessment.  Which preventative measures are in place for the heels? Mepilex, ERIKA mattress, Q2hr turns, pillows for support/positioning, heel float boots      Which devices are in place?  PICC upper left arm, chest tube on Left posterior trunk; nasal cannula; heel float boots  Description of skin under devices: IDA to assess chest tube dressing which is CDI; otherwise skin under devices is intact, pink and blanching  Which preventative measures are in place under devices?  NC--grey foam, silicone oxygen tubing, tender , remove glasses while sleeping, mepilex    Patient is alert and oriented x 4. Suppository offered and patient declined, stating that he would take it in the AM. Patient declines to turn at times. When patient does accept turning, pillows used for positioning. Bed is locked and low. Call light within reach. Will continue to monitor.

## 2021-05-26 NOTE — PROGRESS NOTES
"Pulmonary Progress Note    Date of admission  5/18/2021    Chief Complaint  Shortness of breath    Hospital Course  \"Very pleasant 76-year-old male never smoker admitted for shortness of breath from [facility].  He has a complicated past medical history starting 11/2020 when he was admitted for weakness and Covid pneumonia.  CT chest at that time showed left lower lobe consolidation and scattered peripheral predominant groundglass opacities.  No pleural fluid.  He was hospitalized again in 4/2021 for osteomyelitis of the toe, cultures at that time grew MRSA and Proteus that was treated with daptomycin and Ancef.  CT chest at that time showed left greater than right pleural effusions that were managed conservatively.  He was readmitted again this hospitalization for PICC occlusion, where CT chest showed markedly increased left effusion and right effusion as well.  Is on Eliquis for left lower extremity DVT.  He underwent a left-sided thoracentesis by interventional radiology 1500 cc drained, with post procedural CXR showing hydropneumothorax.  Pleural fluid was mostly blood, 84% lymphocytes, and rare GPC's.  For these findings pulmonary was consulted.     This hospitalization he has been afebrile, WBC initially mildly elevated with PMN predominance but has since normalized.  Flu/RSV/SARS-CoV-2 swab negative.     On personal review of his CT this hospitalization, the airways appear patent, there is extrinsic compression of the left lung by a large pleural effusion.  There appears to be no pleural-based nodularities.  Solitary lymph node high in the trachea has been slowly enlarging since 11/2020 but is still < 2cm.  In the right hemithorax, there are scattered groundglass opacities peripherally predominant as well as a modest right pleural effusion as well.  TTE negative for endocarditis. Blood cultures no growth to date and right pleural cultures no growth to date.  Vancomycin/Ancef, ID following.  Afebrile, " "WBC WNL.  MRSA nares negative.  L pleural fluid still GPCs, no further growth/speciation, ? contaminated sample \"    5/24: No acute events overnight  Denies any symptoms this morning. Vital signs stable, on 3L supplemental oxygen. Left side pigtail catheter in place, draining orange fluid around 200 cc since this AM.  CXR showing stable moderate sized left pneumothorax, stable cardiomegaly, stable bilateral infiltrates     5/25: No acute events overnight. Per nursing staff, patient desaturating to 80s on 3L sometimes, unsure if he is breathing through nose.  -Patient reports having some cough, denies any other symptoms  -Vital signs stable, on 3L supplemental oxygen. Left sided pigtail catheter in place, draining orange fluid around 250 cc since this AM and 400 cc in last 24 hours  -CXR showing \"minimal decrease in left pneumothorax with left pigtail chest tube in place, persistent atelectasis or pneumonia in the left lower lobe and lingula, unchanged small right pleural effusion and atelectasis or pneumonia in the right midlung and right lower lobe.\"    Interval Problem Update  5/25: No acute events overnight.   -Patient reports that dry cough is getting better, denies any other symptoms.  -Vital signs stable, on 3L supplemental oxygen. Left sided pigtail catheter in place, draining orange fluid around 150 cc since this AM  -CXR showing small loculated eft pneumothorax with left pigtail chest tube in place, small right pleural effusion and stable cardiomegaly    Review of Systems  Review of Systems   Constitutional: Negative for chills, fever and weight loss.   HENT: Negative for ear pain, hearing loss and tinnitus.    Eyes: Negative for blurred vision, double vision and photophobia.   Respiratory: Positive for cough. Negative for hemoptysis and sputum production.    Cardiovascular: Negative for chest pain, palpitations and orthopnea.   Gastrointestinal: Negative for abdominal pain, nausea and vomiting. "   Genitourinary: Negative for dysuria, frequency and urgency.   Musculoskeletal: Negative for back pain, myalgias and neck pain.   Skin: Negative for itching and rash.   Neurological: Negative for dizziness, tingling and headaches.   Psychiatric/Behavioral: Negative for depression and suicidal ideas.      Vital Signs for last 24 hours   Temp:  [36.6 °C (97.8 °F)-37 °C (98.6 °F)] 36.6 °C (97.9 °F)  Pulse:  [75-95] 76  Resp:  [16-18] 16  BP: ()/(46-70) 99/60  SpO2:  [94 %-97 %] 94 %    Physical Exam   Physical Exam  Vitals and nursing note reviewed.   Constitutional:       General: He is not in acute distress.     Appearance: Normal appearance.      Comments: Thin  Chronically ill appearing   HENT:      Head: Normocephalic.      Nose: Nose normal.      Mouth/Throat:      Mouth: Mucous membranes are moist.   Eyes:      Extraocular Movements: Extraocular movements intact.      Pupils: Pupils are equal, round, and reactive to light.   Cardiovascular:      Rate and Rhythm: Normal rate and regular rhythm.      Pulses: Normal pulses.      Heart sounds: Normal heart sounds.   Pulmonary:      Comments: Decreased breath sounds bilaterally   L pigtail chest tube draining orange slightly turbid pleural fluid  Abdominal:      General: Bowel sounds are normal. There is no distension.      Palpations: Abdomen is soft.      Tenderness: There is no abdominal tenderness.   Musculoskeletal:         General: Normal range of motion.      Cervical back: Normal range of motion.   Skin:     General: Skin is warm.      Findings: Bruising present.   Neurological:      General: No focal deficit present.      Mental Status: He is alert and oriented to person, place, and time.   Psychiatric:         Mood and Affect: Mood normal.         Behavior: Behavior normal.       Medications  Current Facility-Administered Medications   Medication Dose Route Frequency Provider Last Rate Last Admin   • calcium carbonate (TUMS) chewable tab 500 mg  500  mg Oral BID Ken Lane M.D.       • fluticasone (FLONASE) nasal spray 100 mcg  2 Spray Nasal DAILY Ken Lane M.D.   100 mcg at 05/26/21 0625   • vancomycin (VANCOCIN) 750 mg in  mL IVPB  750 mg Intravenous Q12HR Omer Daugherty, D.O. 125 mL/hr at 05/26/21 1432 750 mg at 05/26/21 1432   • apixaban (ELIQUIS) tablet 5 mg  5 mg Oral BID Omer Daugherty, D.O.   5 mg at 05/26/21 0620   • MD Alert...Vancomycin per Pharmacy   Other PHARMACY TO DOSE Gomez Reveles M.D.       • albuterol inhaler 2 Puff  2 Puff Inhalation Q6HRS PRN Julio César Mckee M.D.       • ferrous sulfate tablet 325 mg  325 mg Oral QDAY with Breakfast Julio César Mckee M.D.   325 mg at 05/26/21 0835   • gabapentin (NEURONTIN) capsule 100 mg  100 mg Oral BID Julio César Mckee M.D.   100 mg at 05/26/21 0620   • magnesium oxide (MAG-OX) tablet 400 mg  400 mg Oral BID Julio César Mckee M.D.   400 mg at 05/26/21 0620   • montelukast (SINGULAIR) tablet 10 mg  10 mg Oral DAILY Julio César Mckee M.D.   10 mg at 05/26/21 0620   • tamsulosin (FLOMAX) capsule 0.4 mg  0.4 mg Oral QHS Julio César Mckee M.D.   0.4 mg at 05/25/21 2128   • senna-docusate (PERICOLACE or SENOKOT S) 8.6-50 MG per tablet 2 tablet  2 tablet Oral BID Julio César Mckee M.D.   2 tablet at 05/26/21 0620    And   • polyethylene glycol/lytes (MIRALAX) PACKET 1 Packet  1 Packet Oral QDAY PRN Julio César Mckee M.D.   1 Packet at 05/23/21 1759    And   • magnesium hydroxide (MILK OF MAGNESIA) suspension 30 mL  30 mL Oral QDAY PRN BETTY KaminskiD.   30 mL at 05/25/21 1036    And   • bisacodyl (DULCOLAX) suppository 10 mg  10 mg Rectal QDAY PRN Julio César Mckee M.D.   10 mg at 05/26/21 0620   • Pharmacy Consult Request ...Pain Management Review 1 Each  1 Each Other PHARMACY TO DOSE Julio César Mckee M.D.       • acetaminophen (Tylenol) tablet 650 mg  650 mg Oral Q6HRS PRN Julio César Mckee M.D.   650 mg at 05/19/21 1256   • HYDROmorphone (Dilaudid) injection 0.25 mg  0.25 mg  "Intravenous Q3HRS PRN Julio César Mckee M.D.       • ondansetron (ZOFRAN) syringe/vial injection 4 mg  4 mg Intravenous Q4HRS PRLEEROY Mckee M.D.       • ondansetron (ZOFRAN ODT) dispertab 4 mg  4 mg Oral Q4HRS PRLEEROY Mckee M.D.       • insulin regular (HumuLIN R,NovoLIN R) injection  2-9 Units Subcutaneous 4X/DAY SHEREEN Mckee M.D.   2 Units at 05/25/21 2140    And   • glucose 4 g chewable tablet 16 g  16 g Oral Q15 MIN PRLEEROY Mckee M.D.        And   • dextrose 50% (D50W) injection 50 mL  50 mL Intravenous Q15 MIN KENISHA Mckee M.D.       • ceFAZolin in dextrose (ANCEF) IVPB premix 2 g  2 g Intravenous Q8HRS Julio César Mckee M.D.   Stopped at 05/26/21 0151     Fluids    Intake/Output Summary (Last 24 hours) at 5/26/2021 1608  Last data filed at 5/26/2021 1450  Gross per 24 hour   Intake 1130 ml   Output 1920 ml   Net -790 ml     Laboratory          Recent Labs     05/24/21 0140 05/25/21 0309 05/26/21  0619   SODIUM 140 140 143   POTASSIUM 4.5 4.2 4.1   CHLORIDE 105 105 108   CO2 29 29 29   BUN 13 14 14   CREATININE 0.68 0.65 0.58   CALCIUM 7.8* 7.7* 7.6*     Recent Labs     05/24/21 0140 05/25/21  0309 05/26/21  0619   ALTSGPT 6  --   --    ASTSGOT 25  --   --    ALKPHOSPHAT 359*  --   --    TBILIRUBIN 0.3  --   --    GLUCOSE 121* 130* 111*     Recent Labs     05/24/21 0140 05/25/21 0309 05/26/21  0619   WBC 8.6 6.6 6.9   NEUTSPOLYS  --   --  74.30*   LYMPHOCYTES  --   --  10.80*   MONOCYTES  --   --  8.70   EOSINOPHILS  --   --  5.10   BASOPHILS  --   --  0.70   ASTSGOT 25  --   --    ALTSGPT 6  --   --    ALKPHOSPHAT 359*  --   --    TBILIRUBIN 0.3  --   --      Recent Labs     05/24/21  0140 05/25/21  0309 05/26/21  0619   RBC 3.08* 3.12* 3.02*   HEMOGLOBIN 8.6* 8.8* 8.5*   HEMATOCRIT 28.6* 28.7* 28.4*   PLATELETCT 285 294 284     Imaging  CXR showing \"minimal decrease in left pneumothorax with left pigtail chest tube in place, persistent atelectasis or pneumonia " "in the left lower lobe and lingula, unchanged small right pleural effusion and atelectasis or pneumonia in the right midlung and right lower lobe.\"    Assessment/Plan  76-year-old male admitted with bilateral exudative pleural effusion loculated empyema on left side s/p chest tube placement on 5/21.  Possible etiology could be septic emboli from his osteomyelitis.     Impression:  #Right exudative bloody lymphocytic pleural effusion  #Left-sided hydropneumothorax  #Acute hypoxic respiratory failure  #Bilateral pulmonary infiltrates concerning for hospital-acquired pneumonia (present prior to admission)  #Left leg DVT, subacute  #Osteomyelitis of left foot    Plan:  1. L hydropneumothorax: -150cc output this AM'; Repeat CXR in AM. Get out of bed with assistance. If  Fluid collection <100cc in 24 hours, will consider chest tube removal  2. Pulmonary infiltrates: cont vanco/ancef, appreciate ID input, no evidence of vegetations on TTE, blood cultures NGTD x 2, sputum cultures. Suspect left pleural gram stain represent contamination  3. H&H stable, on apixaban  4. Titrate FiO2 to maintain saturations above 92%    "

## 2021-05-26 NOTE — PROGRESS NOTES
"Hospital Medicine Daily Progress Note    Date of Service  5/26/2021    Chief Complaint  76 y.o. male admitted 5/18/2021 with PICC line dysfunction and new onset pleural effusion.    Interval Problem Update  Patient was seen and examined at bedside. No acute events overnight.   Felling better this AM.   Chest x-ray this morning - \"Small loculated left pneumothorax is unchanged.Small loculated right pleural effusion is stable.\"  On IV Ancef and vancomycin. Per ID, ABx till 6/3 to cover for left foot osteomyelitis   Pulmonary team and ID actively following.   Pulmonary is considering thoracic surgery consult for ?helmlich valve.     Consultants/Specialty  Pulm  ID    Code Status  Full Code    Disposition  TBD     Review of Systems  Review of Systems   Constitutional: Positive for malaise/fatigue. Negative for chills and fever.   HENT: Negative for congestion, ear discharge, ear pain, sinus pain and sore throat.    Eyes: Negative for blurred vision and double vision.   Respiratory: Positive for shortness of breath. Negative for cough, sputum production and wheezing.    Cardiovascular: Negative.  Negative for chest pain, palpitations and leg swelling.   Gastrointestinal: Negative.  Negative for abdominal pain, constipation, diarrhea, nausea and vomiting.   Genitourinary: Negative.  Negative for dysuria, frequency and urgency.   Musculoskeletal: Positive for myalgias.   Neurological: Negative for dizziness, focal weakness and headaches.   Psychiatric/Behavioral: The patient is not nervous/anxious.    All other systems reviewed and are negative.       Physical Exam  Temp:  [36.6 °C (97.8 °F)-37 °C (98.6 °F)] 36.6 °C (97.8 °F)  Pulse:  [75-95] 81  Resp:  [16-18] 16  BP: (103-122)/(46-70) 118/46  SpO2:  [95 %-98 %] 95 %    Physical Exam  Constitutional:       Appearance: Normal appearance.   HENT:      Head: Normocephalic and atraumatic.   Eyes:      Conjunctiva/sclera: Conjunctivae normal.   Cardiovascular:      Rate and " Rhythm: Normal rate.      Pulses: Normal pulses.   Pulmonary:      Effort: Pulmonary effort is normal.      Breath sounds: Rales present.   Abdominal:      General: Bowel sounds are normal.      Palpations: Abdomen is soft.   Musculoskeletal:         General: No swelling or tenderness.      Comments: Left-sided pigtail chest tube in place.   Skin:     General: Skin is warm.   Neurological:      General: No focal deficit present.      Mental Status: He is alert. Mental status is at baseline.   Psychiatric:         Mood and Affect: Mood normal.         Behavior: Behavior normal.         Fluids    Intake/Output Summary (Last 24 hours) at 5/26/2021 1140  Last data filed at 5/26/2021 0943  Gross per 24 hour   Intake 1490 ml   Output 2060 ml   Net -570 ml       Laboratory  Recent Labs     05/24/21  0140 05/25/21  0309 05/26/21  0619   WBC 8.6 6.6 6.9   RBC 3.08* 3.12* 3.02*   HEMOGLOBIN 8.6* 8.8* 8.5*   HEMATOCRIT 28.6* 28.7* 28.4*   MCV 92.9 92.0 94.0   MCH 27.9 28.2 28.1   MCHC 30.1* 30.7* 29.9*   RDW 54.4* 54.3* 55.7*   PLATELETCT 285 294 284   MPV 9.6 9.4 9.4     Recent Labs     05/24/21  0140 05/25/21  0309 05/26/21  0619   SODIUM 140 140 143   POTASSIUM 4.5 4.2 4.1   CHLORIDE 105 105 108   CO2 29 29 29   GLUCOSE 121* 130* 111*   BUN 13 14 14   CREATININE 0.68 0.65 0.58   CALCIUM 7.8* 7.7* 7.6*                   Imaging  DX-CHEST-PORTABLE (1 VIEW)   Final Result      1.  Small loculated left pneumothorax is unchanged.   2.  Small loculated right pleural effusion is stable.   3.  Bilateral airspace opacities are unchanged compared to prior.   4.  Stable cardiomegaly   5.  Atherosclerotic plaque.      DX-CHEST-PORTABLE (1 VIEW)   Final Result      1.  Minimal decrease in left pneumothorax with left pigtail chest tube in place.      2.  Persistent atelectasis or pneumonia in the left lower lobe and lingula.      3.  Unchanged small right pleural effusion and atelectasis or pneumonia in the right midlung and right lower  lobe.      DX-CHEST-PORTABLE (1 VIEW)   Final Result      1.  Stable moderate sized left pneumothorax.      2.  Stable bilateral infiltrates and support devices.      3.  Stable cardiomegaly.      DX-CHEST-PORTABLE (1 VIEW)   Final Result      Stable to slightly more prominent left pneumothorax and with increased left pleural fluid.      No other significant interval change.      EC-ECHOCARDIOGRAM COMPLETE W/O CONT   Final Result      DX-CHEST-PORTABLE (1 VIEW)   Final Result         1.  Pulmonary infiltrates, increased on the right compared to prior study.   2.  New layering right pleural effusion.   3.  Left pneumothorax, decreased since prior study.   4.  Cardiomegaly   5.  Atherosclerosis      CT-CHEST (THORAX) WITH   Final Result         1.  Moderate size left pneumothorax with small caliber pigtail thoracostomy tube in place, appears similar to prior chest x-ray yesterday at 1054.   2.  Layering bilateral pleural effusions.   3.  Peripheral reticular consolidations, appearance compatible with Covid infiltrates. There are areas of denser consolidation suggesting superimposed secondary infectious process.   4.  Irregular hepatic contour, compatible with changes of cirrhosis.   5.  Trace perihepatic ascites      US-EXTREMITY VENOUS LOWER BILAT   Final Result      DX-CHEST-PORTABLE (1 VIEW)   Final Result      1.  Interval placement of small-caliber LEFT chest tube with improvement of pleural fluid and compensatory enlargement of LEFT pneumothorax, likely due to noncompliant underlying lung.   2.  Improving RIGHT lung infiltrates.      These findings were discussed with SUNDAR SUNG on 5/21/2021 11:18 AM.         CT-CHEST TUBE-EMPYEMA LEFT   Final Result      1. LEFT CHEST EMPYEMA Drainage with CT guidance.      DX-CHEST-LIMITED (1 VIEW)   Final Result      1.  Stable LEFT hydropneumothorax.   2.  Multifocal pneumonia again seen with slight worsening in RIGHT midlung region.            DX-CHEST-LIMITED (1 VIEW)    Final Result         Unchanged left lateral pneumothorax.      US-THORACENTESIS PUNCTURE LEFT   Final Result      1. Ultrasound guided left sided diagnostic and therapeutic thoracentesis.      2. 1550 mL of fluid withdrawn.      DX-CHEST-PORTABLE (1 VIEW)   Final Result      Interval decrease in size of the left pleural effusion which is now small.      Small small loculated left pneumothorax.      Small loculated right pleural effusion.      Left perihilar and basilar opacity may represent atelectasis/consolidation.      Increased ill-defined airspace opacities on the right may represent a combination of atelectasis, edema and pneumonia.      Atherosclerotic plaque.      Findings discussed with the covering clinician.         CT-CHEST (THORAX) W/O   Final Result         1.  Large left and moderate pleural effusions.   2.  Peripheral reticular pulmonary opacities, appearance compatible with Covid infiltrates.   3.  Linear consolidations in the bilateral lung bases, likely atelectasis.   4.  Hepatomegaly and irregular hepatic contour, appearance most compatible changes of cirrhosis.   5.  Diverticulosis      DX-CHEST-PORTABLE (1 VIEW)   Final Result      Stable large left pleural effusion with associated compressive atelectasis. Correlate clinically for infection.      Possible small right pleural effusion with mildly increased opacity in the right lung.           Assessment/Plan  * Pleural effusion, bilateral  Assessment & Plan  Unknown etiology  Was seen on last admission and was asymptomatic. Now with worsening SOB and worsening effusion  CT chest -findings of large left-sided pleural effusion and moderate right pleural effusion.    2D echo with preserved EF and no valvular abnormalities.  S/p thoracentesis of left pleural cavity .1.5 L of fluid removed.  S/p thoracentesis of right pleural cavity about 100 cc fluid removed, bloody.  Fluid studies slightly consistent with exudative effusion.  Cytology  "negative  Pulmonology following.    Rare gram-positive cocci growing in left pleural cavity concerning for empyema.  S/p pigtail placement of the left pleural cavity for adequate drainage of infection.    Chest x-ray this morning - \"Small loculated left pneumothorax is unchanged.Small loculated right pleural effusion is stable.\"  On IV Ancef and vancomycin.  ID on board   Pulmonary is considering thoracic surgery consult for ?helmlich valve.         Osteomyelitis of left foot (HCC)- (present on admission)  Assessment & Plan  Patient last admitted to [facility] with osteomyelitis of left foot  S/p amputation of left 5th toe, amputation well healing  ID was consulted last admission and plan for Daptomycin and Ancef until 6/3, will continue Abx per their prior recommendation.     On IV Ancef and vancomycin. Per ID, ABx till 6/3 to cover for left foot osteomyelitis   PICC in placed     Empyema, left (HCC)  Assessment & Plan  S/p thoracentesis of left pleural cavity .1.5 L of fluid removed.  S/p thoracentesis of right pleural cavity about 100 cc fluid removed, bloody.  Fluid studies slightly consistent with exudative effusion.  Cytology negative  Pulmonology following.    Rare gram-positive cocci growing in left pleural cavity concerning for empyema.  S/p pigtail placement of the left pleural cavity for adequate drainage of infection.  We will follow up on final cultures from the left pleural cavity and also monitor cultures from the right pleural cavity as well.   ID following, on vancomycin and Ancef.    Protein-calorie malnutrition, severe (HCC)  Assessment & Plan  RD following.  Follow-up on recommendations.    Postprocedural pneumothorax  Assessment & Plan  Post thoracentesis, chest x-ray with finding of a small left loculated pneumothorax.   We will repeat serial chest x-ray and closely monitor vital signs.    Repeat chest x-ray with findings of persistent left-sided hydropneumothorax concerning for trapped lung.  S/p IR " for pigtail placement into left pleural cavity.     CT chest 5/21 with findings of persistent moderate-sized left pneumothorax.    Acute respiratory failure with hypoxia (HCC)  Assessment & Plan  Secondary to large pleural effusion  See above for plan    Occluded PICC line, initial encounter ([facility])  Assessment & Plan  Patient sent by nursing home for occluded PICC line  Was flushed by ED staff and reportedly working currently  Consider new PICC line placement if continues to be nonfunctional as will need to continue Abx until 6/3    Deep vein thrombosis (DVT) of lower extremity (HCC)- (present on admission)  Assessment & Plan  History of DVT on Eliquis.  Repeat ultrasound Dopplers with findings of persistent nonocclusive left lower extremity DVT.  On Eliquis     Type 2 diabetes mellitus with peripheral neuropathy (HCC)- (present on admission)  Assessment & Plan  Patient with long history of DM with doccumented neuropathy and retinopathy  Hold home oral medications (ACTOS and Synjardy)  Insulin sliding scale for now  Diabetic diet  Last A1C 4.9 month ago    CAD (coronary artery disease)- (present on admission)  Assessment & Plan  Patient not currently taking statin, can be resumed as outpatient  Reported ACS in 2013 s/p stents  No current chest pain  ECHO nil acute   Will continue to monitor    HTN (hypertension)- (present on admission)  Assessment & Plan  Does not appear to be on antihypertensives at nursing home currently  Will continue to monitor    History of stroke- (present on admission)  Assessment & Plan  Prior history of stroke with some aphasia  Some deconditioning  PT/OT       VTE prophylaxis: Eliquis

## 2021-05-26 NOTE — CARE PLAN
The patient is Stable - Low risk of patient condition declining or worsening    Shift Goals  Clinical Goals: Pts O2 saturation will remain above 90%  Patient Goals: Pt will report no pain from chest tube site    Progress made toward(s) clinical / shift goals:  Patients oxygen saturation remained above 90%.    Patient is not progressing towards the following goals:

## 2021-05-26 NOTE — CARE PLAN
Problem: Skin Integrity  Goal: Skin integrity is maintained or improved  Outcome: Not Progressing  Note: Patient declining turning and assessment under mepilex on elbows, heels and sacrum for this writer.     Problem: Pain - Standard  Goal: Alleviation of pain or a reduction in pain to the patient’s comfort goal  Outcome: Progressing  Note: Patient denies pain.   The patient is Stable - Low risk of patient condition declining or worsening    Shift Goals  Clinical Goals: Pts O2 saturation will remain above 90%  Patient Goals: Pt will report no pain from chest tube site    Progress made toward(s) clinical / shift goals:  Patient denies pain    Patient is not progressing towards the following goals: Patient doesn't allow skin assessment and declines to turn. Education provided.      Problem: Skin Integrity  Goal: Skin integrity is maintained or improved  Outcome: Not Progressing  Note: Patient declining turning and assessment under mepilex on elbows, heels and sacrum for this writer.

## 2021-05-26 NOTE — THERAPY
Contact Note    Patient Name: Agapito Stone  Age:  76 y.o., Sex:  male  Medical Record #: [id number]  Today's Date: 5/26/2021    as prior, pt declines until he can obtain B offloading shoes/boots; noted plans for dc back to SNF; acute PT will be limited until pt has B offloading shoes/able to participate with OOB; pt declines bed mobility and EOB activity

## 2021-05-26 NOTE — DOCUMENTATION QUERY
[facility]                                                                       Query Response Note      PATIENT:               NADINE CABRERA  ACCT #:                  [id number]  MRN:                     [id number]  :                      1945  ADMIT DATE:       2021 2:34 PM  DISCH DATE:          RESPONDING  PROVIDER #:        [id number]           QUERY TEXT:    The following pressure injuries are noted in the  Wound Team Eval:    1) Deep tissue pressure injury right foot; heel; ankle POA  2) Deep tissue pressure injury left foot; ankle; 4th toe POA   3) Deep tissue pressure injury sacrum POA    Please specify if you:    NOTE:  If an appropriate response is not listed below, please respond with a new note.      The patient's Clinical Indicators include:  Per  Wound Note: deep tissue injuries to the left, right foot and sacrum  Risk Factors: neuropathy, moisture, DM, htn   Treatment: barrier paste, mepilex dressing, pressure redistribution mattress, waffle overlay, reposition q 2 h, float heels    Thank You,  James Shell RN, BSN  Clinical    Connect via [employer]  Options provided:   -- Agree with Wound Care RN assessment   -- Disagree with Wound Care RN assessment   -- Unable to determine      Query created by: James Shell on 2021 8:16 AM    RESPONSE TEXT:    Agree with Wound Care RN assessment          Electronically signed by:  JOEY LOPEZ MD 2021 11:38 AM

## 2021-05-27 NOTE — CARE PLAN
The patient is Stable - Low risk of patient condition declining or worsening    Shift Goals  Clinical Goals: pt will have a bowel movement today  Patient Goals: Pt will report no pain from chest tube site    Progress made toward(s) clinical / shift goals:  Pt passed two bowel movements today    Patient is not progressing towards the following goals:

## 2021-05-27 NOTE — PROGRESS NOTES
Assessment/description of ears? Intact and blanching  Which preventative measures are in place for the ears?   Soft nasal cannula tubing, gray foam ear protectors and offloading stickers      Assessment/description of elbows? Refused assessment of  elbows, Mepilex in place.   Which preventative measures are in place for the elbows? Bilateral elbow Mepilex and ERIKA mattress. Offloading with pillows.      Assessment/description of sacrum? Red, small non-blanching area, excoriation  Which preventative measures are in place for the sacrum? ERIKA mattress, turned and repositioned every 2 hours, barrier paste.     Assessment/description of heels? Bilateral boggy, red and blanching.   Which preventative measures are in place for the heels? Bilateral heel mepilex, ERIKA mattress, heel float boots.      Which devices are in place? PICC line Left arm, Left chest tube, nasal cannula,  heel float boots     Description of skin under devices: Chest tube dressing is CDI, PICC line dressing is CDI, bilateral ears are intact and blanching  Which preventative measures are in place under devices? Gray foam for nasal cannula and offloading stickers,      Other: Skin is fragile with generalized bruising. Groin is red and excoriated. Wound care following.

## 2021-05-27 NOTE — PROGRESS NOTES
Infectious Disease Progress Note    Author: Fela Jade M.D. Date & Time of service: 2021  2:15 PM    Chief Complaint:  Follow-up for empyema    Interval History:  76 y.o. male SNF resident with known history of CAD, type 2 diabetes, hypertension, CVA, left lower extremity DVT on Eliquis, recently admitted with worsening left metatarsal heel wound with underlying osteomyelitis on imaging.  Patient underwent left fifth ray amputation on , cultures grew doxycycline resistant MRSA, ciprofloxacin resistant Proteus mirabilis. No pathology obtained.  Patient was discharged to SNF with plan to complete 6 weeks of IV daptomycin + IV Ancef through 6/3/2021.  Patient was brought to the ER on  with worsening shortness of breath.  CT scan showed worsening bilateral pleural effusions L>R compared to prior imaging. Left-sided thoracentesis performed which appeared bloody, lymphocyte predominant white cells, exudate by criteria, also with Gram stain reportedly positive for GPCs.   patient remains afebrile, white count 6.7, tolerating antibiotics with no new issues.  Right-sided thoracentesis performed.  See below   patient remains afebrile, white count 8.9, tolerating antibiotics with no new issues thus far.  Cultures pending.   AF WBC 8.5 no new complaints Cxs still pending   AF WBC 6.6 sleeping soundly at time of rounds   AF states feels like breathing is better. Continued CT drainage    Labs Reviewed and Medications Reviewed.    Review of Systems:  Review of Systems   Constitutional: Negative for fever.   Respiratory: Negative for cough and shortness of breath.    Cardiovascular: Negative for chest pain.   Gastrointestinal: Negative for nausea and vomiting.       Hemodynamics:  Temp (24hrs), Av.3 °C (97.4 °F), Min:36.1 °C (97 °F), Max:36.6 °C (97.9 °F)  Temperature: 36.2 °C (97.2 °F)  Pulse  Av.3  Min: 44  Max: 117   Blood Pressure : 103/63       Physical Exam:  Physical  Exam  Vitals and nursing note reviewed.   Constitutional:       General: He is not in acute distress.     Appearance: He is not ill-appearing, toxic-appearing or diaphoretic.   HENT:      Nose: No rhinorrhea.   Eyes:      General:         Right eye: No discharge.         Left eye: No discharge.   Cardiovascular:      Rate and Rhythm: Normal rate.      Heart sounds: No murmur heard.     Pulmonary:      Effort: Pulmonary effort is normal. No respiratory distress.      Breath sounds: No stridor. No wheezing.      Comments: Decreased to sounds bilaterally  Left-sided chest tube in place-serosanguinous  Abdominal:      General: There is no distension.      Tenderness: There is no abdominal tenderness.   Musculoskeletal:         General: Signs of injury present.      Cervical back: Neck supple. No rigidity.      Comments: Well-healed incision dorsal right great toe, surgical site left fifth ray well-healed, mild erythema, no active discharge, no swelling, no fluctuance.  Left fourth toe with small scab on dorsal surface   Skin:     Coloration: Skin is not jaundiced.      Findings: Bruising present. No erythema or rash.   Neurological:      General: No focal deficit present.      Mental Status: He is alert and oriented to person, place, and time.         Meds:    Current Facility-Administered Medications:   •  sulfamethoxazole-trimethoprim  •  calcium carbonate  •  fluticasone  •  apixaban  •  albuterol  •  ferrous sulfate  •  gabapentin  •  magnesium oxide  •  montelukast  •  tamsulosin  •  senna-docusate **AND** polyethylene glycol/lytes **AND** magnesium hydroxide **AND** bisacodyl  •  Pharmacy Consult Request  •  acetaminophen  •  [DISCONTINUED] oxyCODONE immediate-release **OR** [DISCONTINUED] oxyCODONE immediate-release **OR** HYDROmorphone  •  ondansetron  •  ondansetron  •  insulin regular **AND** POC blood glucose manual result **AND** NOTIFY MD and PharmD **AND** glucose **AND** dextrose 50%    Labs:  Recent Labs      05/25/21  0309 05/26/21  0619   WBC 6.6 6.9   RBC 3.12* 3.02*   HEMOGLOBIN 8.8* 8.5*   HEMATOCRIT 28.7* 28.4*   MCV 92.0 94.0   MCH 28.2 28.1   RDW 54.3* 55.7*   PLATELETCT 294 284   MPV 9.4 9.4   NEUTSPOLYS  --  74.30*   LYMPHOCYTES  --  10.80*   MONOCYTES  --  8.70   EOSINOPHILS  --  5.10   BASOPHILS  --  0.70   RBCMORPHOLO  --  Present     Recent Labs     05/25/21 0309 05/26/21 0619   SODIUM 140 143   POTASSIUM 4.2 4.1   CHLORIDE 105 108   CO2 29 29   GLUCOSE 130* 111*   BUN 14 14     Recent Labs     05/25/21 0309 05/26/21 0619   CREATININE 0.65 0.58       Imaging:  CT-CHEST (THORAX) W/O    Result Date: 5/19/2021 5/18/2021 10:24 PM HISTORY/REASON FOR EXAM: Pleural effusion TECHNIQUE/EXAM DESCRIPTION:  Transaxial MDCT scan of chest without contrast. Low dose optimization technique was utilized for this CT exam including automated exposure control and adjustment of the mA and/or kV according to patient size. COMPARISON: April 25, 2021 FINDINGS The visualized portion of the thyroid appears within normal limits. The trachea and main stem airways are normal in caliber. There are no pathologically enlarged mediastinal lymph nodes. The heart and pericardium appear within normal limits. The aorta and its main branch vessels are normal in caliber and configuration. Scattered patchy reticular pulmonary infiltrates are seen. Linear consolidations in the bilateral lung bases are seen. Large left and moderate right pleural effusions are seen. Limited views of the abdomen demonstrates hepatomegaly and irregular hepatic contour. Scattered colonic diverticula are seen. The bony structures are age appropriate.     1.  Large left and moderate pleural effusions. 2.  Peripheral reticular pulmonary opacities, appearance compatible with Covid infiltrates. 3.  Linear consolidations in the bilateral lung bases, likely atelectasis. 4.  Hepatomegaly and irregular hepatic contour, appearance most compatible changes of cirrhosis.  5.  Diverticulosis    CT-CHEST (THORAX) WITH    Result Date: 5/22/2021 5/21/2021 10:17 PM HISTORY/REASON FOR EXAM:  Pneumonia. TECHNIQUE/EXAM DESCRIPTION: CT scan of the chest with contrast. Thin-section helical images were obtained from the lung apices through the adrenal glands following the bolus administration of contrast. 80 mL of Omnipaque 350 nonionic contrast was utilized. Low dose optimization technique was utilized for this CT exam including automated exposure control and adjustment of the mA and/or kV according to patient size. COMPARISON:  May 18, 2021 FINDINGS: Moderate left pneumothorax is seen. Linear reticular consolidations in the bilateral lung bases are seen with peripheral reticular opacities. Small caliber pigtail catheter is seen at the left costophrenic angle. There is no mediastinal, hilar, or axillary adenopathy. The cardiac chambers, great vessels, and aorta are normal in CT appearance. Small bilateral pleural effusions are seen. The extrathoracic soft tissues and thoracic cage are normal in appearance. The adrenal glands are normal.  The visualized portions of the spleen, pancreas, and kidneys are normal. The liver is irregular in contour. Trace perihepatic ascites is seen. There is no upper abdominal adenopathy. There is no abnormal contrast enhancement.     1.  Moderate size left pneumothorax with small caliber pigtail thoracostomy tube in place, appears similar to prior chest x-ray yesterday at 1054. 2.  Layering bilateral pleural effusions. 3.  Peripheral reticular consolidations, appearance compatible with Covid infiltrates. There are areas of denser consolidation suggesting superimposed secondary infectious process. 4.  Irregular hepatic contour, compatible with changes of cirrhosis. 5.  Trace perihepatic ascites    CT-CHEST TUBE-EMPYEMA LEFT    Result Date: 5/21/2021 5/21/2021 9:05 AM HISTORY/REASON FOR EXAM:  pt with new left sided effusion, fluid study consistent with empyema,  needs a pig tail for drainage. TECHNIQUE/EXAM DESCRIPTION AND NUMBER OF VIEWS:  CT guided left chest empyema drainage. Low dose optimization technique was utilized for this CT exam including automated exposure control and adjustment of the mA and/or kV according to patient size. PROCEDURE:  Informed consent was obtained. Localizing CT images were obtained with the patient in supine position. The skin was prepped with chlorhexidine and draped in a sterile fashion. SEDATION: Moderate sedation was provided. Pulse oximetry and continuous cardiac monitoring by the nurse was performed throughout the exam. Intraservice time was 30 minutes. Following local anesthesia with 1% Lidocaine, a 17 G guiding needle was placed and needle path confirmed with CT. An Amplatz guidewire was placed and following serial tract dilatation, a 10 [place]oan pigtail locking catheter was placed. The catheter was secured to the skin and connected to Pleur-evac suction. Final CT images were obtained documenting catheter position. The patient tolerated the procedure well with no evidence of complication. COMPARISON:Recent CT scan of thorax FINDINGS:CT images demonstrate the locking loop pigtail catheter in the inferior aspect of the large left-sided pleural effusion.     1. LEFT CHEST EMPYEMA Drainage with CT guidance.    DX-CHEST-FOR LINE PLACEMENT Perform procedure in: Patient's Room    Result Date: 4/27/2021 4/27/2021 5:17 PM HISTORY/REASON FOR EXAM: PICC tip location confirmation - STAT pt pending discharge transport. TECHNIQUE/EXAM DESCRIPTION AND NUMBER OF VIEWS: Single portable view of the chest. COMPARISON: Yesterday. FINDINGS: Left PICC line tip overlies the SVC Cardiomediastinal contours are stable, moderately large Stable to slight enlargement of large left pleural effusion with some lateral loculation possible. Aeration is only seen in the upper lung zone as before. Right lung shows no consolidation or effusion. No pneumothorax.     Left  PICC line tip overlies the SVC Similar large left pleural effusion and multisegmental atelectasis. Consolidation is possible    DX-CHEST-LIMITED (1 VIEW)    Result Date: 5/20/2021 5/20/2021 8:18 AM HISTORY/REASON FOR EXAM:  re-eval small pneumothorax TECHNIQUE/EXAM DESCRIPTION AND NUMBER OF VIEWS: Single portable view of the chest. COMPARISON: 5/19/2021 FINDINGS: Cardiac contours grossly stable although difficult to evaluate. Patchy opacities again seen in both lungs, most confluent in the LEFT base, showing slight worsening in the RIGHT midlung. Bilateral pleural fluid. Small LEFT pneumothorax again seen laterally. LEFT arm PICC line is in similar position.     1.  Stable LEFT hydropneumothorax. 2.  Multifocal pneumonia again seen with slight worsening in RIGHT midlung region.     DX-CHEST-LIMITED (1 VIEW)    Result Date: 5/19/2021 5/19/2021 5:33 PM HISTORY/REASON FOR EXAM:  re-eval small left pneumo TECHNIQUE/EXAM DESCRIPTION AND NUMBER OF VIEWS: Single portable view of the chest. COMPARISON: 5/19/2021 FINDINGS: Airspace opacity in the left lower lobe. Patchy hazy opacity in the right lateral lung. Small bilateral pleural effusions. Unchanged left lateral pneumothorax. Stable cardiopericardial silhouette.     Unchanged left lateral pneumothorax.    DX-CHEST-PORTABLE (1 VIEW)    Result Date: 5/22/2021 5/22/2021 5:50 AM HISTORY/REASON FOR EXAM: Pleural Effusion; pigtail monitoring TECHNIQUE/EXAM DESCRIPTION:  Single AP view of the chest. COMPARISON: Yesterday FINDINGS: Position of medical devices appears stable. Cardiomegaly is observed. Atherosclerotic calcification of the aorta is noted.  The central  pulmonary vasculature appears prominent and indistinct. The lungs appear well expanded bilaterally.  Diffuse scattered hazy pulmonary parenchymal opacities are seen, increased on the right. Left pneumothorax is seen, decreased since prior. New layering right pleural effusion is seen. The bony structures appear  age-appropriate.     1.  Pulmonary infiltrates, increased on the right compared to prior study. 2.  New layering right pleural effusion. 3.  Left pneumothorax, decreased since prior study. 4.  Cardiomegaly 5.  Atherosclerosis    DX-CHEST-PORTABLE (1 VIEW)    Result Date: 5/21/2021 5/21/2021 10:40 AM HISTORY/REASON FOR EXAM:  Pleural Effusion. TECHNIQUE/EXAM DESCRIPTION AND NUMBER OF VIEWS: Single portable view of the chest. COMPARISON: 5/20/2021 FINDINGS: Cardiac mediastinal contour is unchanged. Interval placement of pigtail catheter at the LEFT lower chest. LEFT arm PICC line in similar position. Interval improvement of LEFT pleural effusion. Moderate LEFT pneumothorax, apparently increased from prior exam.  Contour deformity of the consolidated lung suggests the presence of nodules. Patchy RIGHT mid and lower lung infiltrates appear improved.     1.  Interval placement of small-caliber LEFT chest tube with improvement of pleural fluid and compensatory enlargement of LEFT pneumothorax, likely due to noncompliant underlying lung. 2.  Improving RIGHT lung infiltrates. These findings were discussed with SUNDAR SUNG on 5/21/2021 11:18 AM.     DX-CHEST-PORTABLE (1 VIEW)    Result Date: 5/19/2021 5/19/2021 1:47 PM HISTORY/REASON FOR EXAM: Post thoracentesis. TECHNIQUE/EXAM DESCRIPTION AND NUMBER OF VIEWS: Single portable view of the chest. COMPARISON: 5/18/2021 FINDINGS: There has been interval decrease in size of the left pleural effusion. There is a small residual left pleural effusion. There is a small left loculated pneumothorax. There is likely pleural thickening. There is a small loculated right pleural effusion. Bilateral airspace opacities are increased on the right compared to prior. There is left perihilar and bibasilar atelectasis/consolidation. The cardiomediastinal silhouette is stable. Atherosclerotic calcification is seen. Left PICC projects over the SVC. There are surgical clips in the left neck.        Interval decrease in size of the left pleural effusion which is now small. Small small loculated left pneumothorax. Small loculated right pleural effusion. Left perihilar and basilar opacity may represent atelectasis/consolidation. Increased ill-defined airspace opacities on the right may represent a combination of atelectasis, edema and pneumonia. Atherosclerotic plaque. Findings discussed with the covering clinician.     DX-CHEST-PORTABLE (1 VIEW)    Result Date: 5/18/2021 5/18/2021 3:23 PM HISTORY/REASON FOR EXAM:  Shortness of Breath. TECHNIQUE/EXAM DESCRIPTION AND NUMBER OF VIEWS: Single portable view of the chest. COMPARISON: 4/26/2021 FINDINGS: Left PICC line tip projects near the cavoatrial junction. Cardiomediastinal silhouette is stable. Aortic calcified atherosclerotic plaque. Large left pleural effusion is stable with associated compressive atelectasis. Correlate clinically for infection. Mildly increased opacity in the right mid and lower lung which could be related to atelectasis, edema and/or infection. Possible small right pleural effusion. All No acute osseous abnormality.     Stable large left pleural effusion with associated compressive atelectasis. Correlate clinically for infection. Possible small right pleural effusion with mildly increased opacity in the right lung.    DX-CHEST-PORTABLE (1 VIEW)    Result Date: 4/26/2021 4/26/2021 8:52 AM HISTORY/REASON FOR EXAM:  Shortness of Breath; pleural effusion on CTA. please reevalaute. TECHNIQUE/EXAM DESCRIPTION AND NUMBER OF VIEWS: Single portable view of the chest. COMPARISON: Chest radiograph 11/18/2020. CTA chest 4/25/2021 FINDINGS: Cardiomediastinal silhouette is stable. Large left pleural effusion with associated compressive atelectasis of the left lung. The small pleural effusion on the earlier CT study is not well visualized radiographically. Mild right basilar atelectasis. No pneumothorax. No acute osseous abnormality.     Large left  pleural effusion with associated compressive atelectasis. Correlate clinically for infection.    DX-FOOT-COMPLETE 3+ LEFT    Result Date: 2021 3:42 PM HISTORY/REASON FOR EXAM:  Pain/Deformity Following Trauma (Wound TECHNIQUE/EXAM DESCRIPTION AND NUMBER OF VIEWS: 3 views of the LEFT foot. COMPARISON:  None. FINDINGS: There is soft tissue swelling along the lateral aspect of the foot. There is bony destruction seen throughout the fifth proximal phalanx, the mid/distal phalangeal base and involving the fifth metacarpal head/neck consistent with osteomyelitis. There may  be septic arthritis involving the interposed joints as well. There is generalized osteopenia. No evidence of osteomyelitis elsewhere within the foot. Mild degenerative changes the great toe MTP joint.     Findings in keeping with osteomyelitis involving the fifth distal metatarsal, proximal and middle phalanges.    US-HAKEEM SINGLE LEVEL BILAT    Result Date: 2021   Vascular Laboratory  Conclusions  Bilateral.  There is no evidence of major arterial disease demonstrated.  NADINE CABRERA  Age:    76    Gender:     M  MRN:    [id number]  :    1945      BSA:  Exam Date:     2021 10:46  Room #:     Inpatient  Priority:     Routine  Ht (in):             Wt (lb):  Ordering Physician:        ANN HOYT  Referring Physician:       [id number] Cruz  Sonographer:               Cami Alves RDMS                             T  Study Type:                Complete Bilateral  Technical Quality:         Adequate  Indications:     Ulceration of LE  CPT Codes:       58247  ICD Codes:       707.1  History:         Non-healing wound of left lower limb. Diabetes.  Limitations:                 RIGHT  Waveform            Systolic BPs (mmHg)                             125           Brachial  Triphasic                                Common Femoral  Triphasic                  276           Posterior Tibial   Triphasic                  267           Dorsalis Pedis                                           Peroneal                             2.12          HAKEEM                                           TBI                       LEFT  Waveform        Systolic BPs (mmHg)                             130           Brachial  Triphasic                                Common Femoral  Triphasic                  240           Posterior Tibial  Triphasic                  226           Dorsalis Pedis                                           Peroneal                             1.85          HAKEEM                                           TBI  Findings  Bilateral.  Doppler waveforms of the common femoral arteries are of high amplitude and  triphasic.  Doppler waveforms at the ankle are brisk and triphasic.  Ankle pressures are not accurately measured due to calcification and  noncompressibility of the tibial vessels.  Toe-brachial indices are normal. Right: 0.81  Left: 0.85  Additional testing was not performed in accordance with lower extremity  arterial evaluation protocol.  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  Otherwise negative LLE examination.  No RLE DVT identified.  TRICIADAJALAKSHMINADINE  Exam Date:     2021 15:37  Room #:     Inpatient  Priority:     Routine  Ht (in):             Wt (lb):  Ordering Physician:        ROGER ALONZO  Referring Physician:       [id number] Culver  Sonographer:               Bijal Christian  Study Type:                Complete Bilateral  Technical Quality:         Fair  Age:    76    Gender:     M  MRN:    [id number]  :    1945      BSA:  Indications:     Personal history of venous thrombosis and embolism  CPT Codes:       27137  ICD Codes:        Z86.71  History:         BLE pain, pressure, and edema. History of left lower                   extremity deep venous thrombosis. Prior exam 11/19/20.  Limitations:     Patient unable to bend either knee.  PROCEDURES:  Bilateral lower extremity venous duplex imaging.  The following venous structures were evaluated: common femoral, profunda  femoral, proximal greater saphenous, femoral, popliteal , peroneal and  posterior tibial veins.  Serial compression, augmentation maneuvers,  color and spectral Doppler  flow evaluations were performed.  FINDINGS:  Right lower extremity -  The peroneal and posterior tibial veins are difficult to assess but appear  to be noncompressible, and no flow response to augmentation is  demonstrated.  Complete color filling and compressibility with normal venous flow dynamics  including spontaneous flow, response to augmentation maneuvers, and  respiratory phasicity in all other remaining visualized vessels.  Left lower extremity -  Chronic deep venous thrombosis is seen in the popliteal vein.  The peroneal and posterior tibial veins are difficult to assess for  compressibility, but flow response to augmentation is demonstrated.  Complete color filling and compressibility with normal venous flow dynamics  including spontaneous flow, response to augmentation maneuvers, and  respiratory phasicity in all other remaining visualized vessels.  Kade Louis MD  (Electronically Signed)  Final Date:      21 May 2021 17:01    US-THORACENTESIS PUNCTURE LEFT    Result Date: 5/19/2021 5/19/2021 12:53 PM HISTORY/REASON FOR EXAM:  Shortness of breath; Large left sided pleural effusion, unknown etiology TECHNIQUE/EXAM DESCRIPTION: Ultrasound-guided thoracentesis. Indication:  LEFT pleural fluid collection. COMPARISON:  5/18/2021 PROCEDURE:     Informed consent was obtained. A timeout was taken. A left pleural effusion was localized with real-time ultrasound guidance. The left posterior chest wall was  prepped and draped in a sterile manner. Following local anesthesia with 1% lidocaine, a 5 [place] Yueh pigtail catheter was advanced into the pleural space with trocar technique and pleural fluid was drained. The patient tolerated the procedure well without evidence of complication. A post thoracentesis chest radiograph is forthcoming. FINDINGS: Fluid was sent to the laboratory. Fluid character: Brown     1. Ultrasound guided left sided diagnostic and therapeutic thoracentesis. 2. 1550 mL of fluid withdrawn.    CT-CTA CHEST PULMONARY ARTERY W/ RECONS    Result Date: 4/25/2021 4/25/2021 4:30 PM HISTORY/REASON FOR EXAM:  PE suspected, high pretest prob; pleuritic pain, hypoxia Hypoxia. Deep venous thrombosis. Pelvis therapy. Covid in December. TECHNIQUE/EXAM DESCRIPTION: CT angiogram scan for pulmonary embolism with contrast, with reconstructions. 1.25 mm helical sections were obtained from the lung apices through the lung bases following the rapid bolus administration of 62 mL of Omnipaque 350 nonionic contrast. Thin-section overlapping reconstruction interval was utilized. Coronal reconstructions were generated from the axial data. MIP post processing was performed and utilized for the interpretation. Low dose optimization technique was utilized for this CT exam including automated exposure control and adjustment of the mA and/or kV according to patient size. COMPARISON: 11/18/2020 CT FINDINGS: New large left pleural effusion with multisegmental compressive atelectasis. The left lower lobe is completely collapsed. There is new small-medium volume right pleural fluid with less atelectasis Calcified right middle lobe nodule Pulmonary Embolism: No. Main Pulmonary Arteries: No. Segmental branches: No. Additional Comments: Coronary artery disease. Lungs: No suspicious nodules or air space process. Pleura: No pleural effusion. Nodes: There is a 9 mm short axis right paraesophageal node. No enlarged nodes are seen     New  "large left, small to medium right pleural effusions No pulmonary embolism Moderate coronary artery disease Remote granulomatous disease and/or calcified hamartoma     IR-PICC LINE PLACEMENT W/ GUIDANCE > AGE 5    Result Date: 4/27/2021  HISTORY/REASON FOR EXAM:   PICC placement. TECHNIQUE/EXAM DESCRIPTION AND NUMBER OF VIEWS:   PICC line insertion with ultrasound guidance.  The procedure was performed using maximal sterile barrier technique including sterile gown, mask, cap, and donning of sterile gloves following appropriate hand hygiene and/or sterile scrub. Patient skin site was prepped with 2% Chlorhexidine solution. FINDINGS:  PICC line insertion with Ultrasound Guidance was performed by qualified nursing staff without the assistance of a Radiologist. PICC positioning appropriateness confirmed by 3CG technology; chest xray only needed in the instance 3CG unable to confirm placement.              Ultrasound-guided PICC placement performed by qualified nursing staff as above.       Micro:  Results     Procedure Component Value Units Date/Time    BLOOD CULTURE [[id number]] Collected: 05/21/21 1437    Order Status: Completed Specimen: Blood from Peripheral Updated: 05/26/21 1700     Significant Indicator NEG     Source BLD     Site PERIPHERAL     Culture Result No growth after 5 days of incubation.    Narrative:      Per Hospital Policy: Only change Specimen Src: to \"Line\" if  specified by physician order.  Right Hand    BLOOD CULTURE [[id number]] Collected: 05/21/21 1212    Order Status: Completed Specimen: Blood from Peripheral Updated: 05/26/21 1300     Significant Indicator NEG     Source BLD     Site PERIPHERAL     Culture Result No growth after 5 days of incubation.    Narrative:      Per Hospital Policy: Only change Specimen Src: to \"Line\" if  specified by physician order.  Right Hand    FLUID CULTURE W/GRAM STAIN [[id number]] Collected: 05/21/21 1048    Order Status: Completed Specimen: Body Fluid from " "Thoracentesis Fluid Updated: 05/26/21 1109     Significant Indicator NEG     Source BF     Site THORACENTESIS FLUID     Culture Result No growth at 72 hours.     Gram Stain Result Rare WBCs.  No organisms seen.      Narrative:      Collected By:[id number] KAPIL RIOS  Collected By:[id number] KAPIL RIOS    FUNGAL CULTURE [[id number]] Collected: 05/21/21 1048    Order Status: Completed Specimen: Body Fluid from Thoracentesis Fluid Updated: 05/26/21 1109     Significant Indicator NEG     Source BF     Site THORACENTESIS FLUID     Culture Result No fungal growth to date.    Narrative:      Collected By:[id number] KAPIL RIOS  Collected By:[id number] KAPIL RIOS    BLOOD CULTURE [[id number]] Collected: 05/20/21 1557    Order Status: Completed Specimen: Blood from Peripheral Updated: 05/25/21 1900     Significant Indicator NEG     Source BLD     Site PERIPHERAL     Culture Result No growth after 5 days of incubation.    Narrative:      Per Hospital Policy: Only change Specimen Src: to \"Line\" if  specified by physician order.  Left AC    BLOOD CULTURE [[id number]] Collected: 05/20/21 1557    Order Status: Completed Specimen: Blood from Peripheral Updated: 05/25/21 1900     Significant Indicator NEG     Source BLD     Site PERIPHERAL     Culture Result No growth after 5 days of incubation.    Narrative:      Per Hospital Policy: Only change Specimen Src: to \"Line\" if  specified by physician order.  Right Hand    CULTURE RESPIRATORY W/ GRM STN [[id number]] Collected: 05/23/21 1846    Order Status: Completed Specimen: Respirate from Sputum Updated: 05/25/21 0911     Significant Indicator NEG     Source RESP     Site SPUTUM     Culture Result Moderate growth usual upper respiratory stella  No clinically significant Staphylococcus aureus, Methicillin  Resistant Staphylococcus aureus, or Pseudomonas species  isolated.       Gram Stain Result Specimen Quality Score: 2+  Rare epithelial cells.  Many WBCs.  Many Gram positive cocci.    "    Narrative:      Collected By:[id number] JENNIFER ZHAO  Collected By:[id number] JENNIFER ZHAO    FLUID CULTURE W/GRAM STAIN [[id number]]  (Abnormal) Collected: 05/19/21 1400    Order Status: Completed Specimen: Body Fluid Updated: 05/24/21 0705     Significant Indicator NEG     Source BF     Site Thoracentesis Fluid     Culture Result No growth at 5 days.     Gram Stain Result Rare WBCs.  Rare Gram positive cocci.      Narrative:      Thoracentesis.    GRAM STAIN [[id number]] Collected: 05/23/21 1846    Order Status: Completed Specimen: Respirate Updated: 05/23/21 2139     Significant Indicator .     Source RESP     Site SPUTUM     Gram Stain Result Specimen Quality Score: 2+  Rare epithelial cells.  Many WBCs.  Many Gram positive cocci.      Narrative:      Collected By:[id number] JENNIFER ZHAO  Collected By:[id number] JENNIFER ZHAO    GRAM STAIN [[id number]] Collected: 05/21/21 1048    Order Status: Completed Specimen: Body Fluid Updated: 05/22/21 1653     Significant Indicator .     Source BF     Site THORACENTESIS FLUID     Gram Stain Result Rare WBCs.  No organisms seen.      Narrative:      Collected By:[id number] KAPIL RIOS  Collected By:[id number] KAPIL RIOS    Acid Fast Stain [[id number]] Collected: 05/21/21 1048    Order Status: Completed Specimen: Body Fluid Updated: 05/22/21 1653     Significant Indicator NEG     Source BF     Site THORACENTESIS FLUID     AFB Smear Results No acid fast bacilli seen.    Narrative:      Collected By:[id number] KAPIL RIOS  Collected By:[id number] KAPIL RIOS    MRSA By PCR (Amp) [[id number]] Collected: 05/22/21 0943    Order Status: Completed Specimen: Respirate from Nares Updated: 05/22/21 1329     Significant Indicator NEG     Source RESP     Site NARES     MRSA PCR Negative for MRSA by PCR.    Narrative:      Collected By:[id number] KAPIL RIOS  Collected By:[id number] KAPIL RIOS    FLUID CULTURE W/GRAM STAIN [[id number]] Collected: 05/21/21 1048    Order  Status: Canceled Specimen: Other from Thoracentesis Fluid     AFB CULTURE [[id number]] Collected: 05/21/21 1048    Order Status: Canceled Specimen: Body Fluid from Thoracentesis Fluid     AFB Culture [[id number]] Collected: 05/21/21 1048    Order Status: No result Specimen: Other           Assessment:  Agapito Stone is a pleasant 76 y.o. male SNF resident with known history of CAD, type 2 diabetes, hypertension, CVA, left lower extremity DVT on Eliquis, recently admitted with worsening left metatarsal heel wound with underlying osteomyelitis on imaging.  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 No pathology obtained.  Patient was discharged to SNF with plan to complete 6 weeks of IV daptomycin + IV Ancef through 6/3/2021.  Patient was brought to the ER on 5/19 with worsening shortness of breath.  CT scan showed worsening bilateral pleural effusions L>R compared to prior imaging. Left-sided thoracentesis performed which appeared bloody, lymphocyte predominant white cells, exudate by criteria, also with Gram stain reportedly positive for GPCs.     Unusual clinical course for empyema with worsening bilateral effusions that are primarily bloody, low white count and lymphocyte predominant.  However, the Gram stain from the left-sided effusion is positive, confirmed on review by microbiology lab. Also noted new peripheral opacities right upper lobe with possible early cavitation that may perhaps indicate systemic seeding of infection which would be unusual without a right-sided endocarditis.     Pertinent Diagnoses:  Left-sided empyema  Bilateral pleural effusions  Left foot osteomyelitis, MRSA and Proteus  Polymicrobial infection  Type 2 diabetes  Penicillin allergy    Plan:  DC Vanco and cefazolin  Switched to Bactrim to complete treatment for known MRSA and Proteus OM- stop date of 6/3/2021  -No growth till date from  the left effusion   -Right-sided thoracentesis performed 5/21, culture neg  -TTE with no obvious valvular vegetations  -Blood cultures x2 from 5/21 no growth to date  -Due to persistent pneumothorax on the left, repeat CT obtained with some improvement in the left-sided pneumothorax, pigtail chest tube in place, layering bilateral pleural effusions, persistent right-sided opacities  Possible CTS eval and intervention     [facility]  Will sign off-please reconsult if needed

## 2021-05-27 NOTE — PROGRESS NOTES
"Pulmonary Progress Note    Date of admission  5/18/2021    Chief Complaint  Shortness of breath    Hospital Course  \"Very pleasant 76-year-old male never smoker admitted for shortness of breath from [facility].  He has a complicated past medical history starting 11/2020 when he was admitted for weakness and Covid pneumonia.  CT chest at that time showed left lower lobe consolidation and scattered peripheral predominant groundglass opacities.  No pleural fluid.  He was hospitalized again in 4/2021 for osteomyelitis of the toe, cultures at that time grew MRSA and Proteus that was treated with daptomycin and Ancef.  CT chest at that time showed left greater than right pleural effusions that were managed conservatively.  He was readmitted again this hospitalization for PICC occlusion, where CT chest showed markedly increased left effusion and right effusion as well.  Is on Eliquis for left lower extremity DVT.  He underwent a left-sided thoracentesis by interventional radiology 1500 cc drained, with post procedural CXR showing hydropneumothorax.  Pleural fluid was mostly blood, 84% lymphocytes, and rare GPC's.  For these findings pulmonary was consulted.     This hospitalization he has been afebrile, WBC initially mildly elevated with PMN predominance but has since normalized.  Flu/RSV/SARS-CoV-2 swab negative.     On personal review of his CT this hospitalization, the airways appear patent, there is extrinsic compression of the left lung by a large pleural effusion.  There appears to be no pleural-based nodularities.  Solitary lymph node high in the trachea has been slowly enlarging since 11/2020 but is still < 2cm.  In the right hemithorax, there are scattered groundglass opacities peripherally predominant as well as a modest right pleural effusion as well.  TTE negative for endocarditis. Blood cultures no growth to date and right pleural cultures no growth to date.  Vancomycin/Ancef, ID following.  Afebrile, " "WBC WNL.  MRSA nares negative.  L pleural fluid still GPCs, no further growth/speciation, ? contaminated sample \"    5/24: No acute events overnight  Denies any symptoms this morning. Vital signs stable, on 3L supplemental oxygen. Left side pigtail catheter in place, draining orange fluid around 200 cc since this AM.  CXR showing stable moderate sized left pneumothorax, stable cardiomegaly, stable bilateral infiltrates     5/25: No acute events overnight. Per nursing staff, patient desaturating to 80s on 3L sometimes, unsure if he is breathing through nose.  -Patient reports having some cough, denies any other symptoms  -Vital signs stable, on 3L supplemental oxygen. Left sided pigtail catheter in place, draining orange fluid around 250 cc since this AM and 400 cc in last 24 hours  -CXR showing \"minimal decrease in left pneumothorax with left pigtail chest tube in place, persistent atelectasis or pneumonia in the left lower lobe and lingula, unchanged small right pleural effusion and atelectasis or pneumonia in the right midlung and right lower lobe.\"    5/26: No acute events overnight.   -Patient reports that dry cough is getting better, denies any other symptoms.  -Vital signs stable, on 3L supplemental oxygen. Left sided pigtail catheter in place, draining orange fluid around 150 cc since this AM  -CXR showing small loculated eft pneumothorax with left pigtail chest tube in place, small right pleural effusion and stable cardiomegaly    Interval Problem Update  5/27: No acute events overnight.   -Patient reports that dry cough is minimal, denies any other symptoms.  -Vital signs stable, on 3L supplemental oxygen. Left sided pigtail catheter in place, draining orange fluid around 60 cc since this AM, 280 cc in 24 hours  -CXR showing slightly increased left pneumothorax    Review of Systems  Review of Systems   Constitutional: Negative for chills, fever and weight loss.   HENT: Negative for ear pain, hearing loss " and tinnitus.    Eyes: Negative for blurred vision, double vision and photophobia.   Respiratory: Positive for cough. Negative for hemoptysis and sputum production.    Cardiovascular: Negative for chest pain, palpitations and orthopnea.   Gastrointestinal: Negative for abdominal pain, nausea and vomiting.   Genitourinary: Negative for dysuria, frequency and urgency.   Musculoskeletal: Negative for back pain, myalgias and neck pain.   Skin: Negative for itching and rash.   Neurological: Negative for dizziness, tingling and headaches.   Psychiatric/Behavioral: Negative for depression and suicidal ideas.      Vital Signs for last 24 hours   Temp:  [36.1 °C (97 °F)-36.6 °C (97.9 °F)] 36.2 °C (97.2 °F)  Pulse:  [67-98] 96  Resp:  [16-18] 18  BP: ()/(60-67) 103/63  SpO2:  [94 %-99 %] 99 %    Physical Exam   Physical Exam  Vitals and nursing note reviewed.   Constitutional:       General: He is not in acute distress.     Appearance: Normal appearance.      Comments: Thin  Chronically ill appearing   HENT:      Head: Normocephalic.      Nose: Nose normal.      Mouth/Throat:      Mouth: Mucous membranes are moist.   Eyes:      Extraocular Movements: Extraocular movements intact.      Pupils: Pupils are equal, round, and reactive to light.   Cardiovascular:      Rate and Rhythm: Normal rate and regular rhythm.      Pulses: Normal pulses.      Heart sounds: Normal heart sounds.   Pulmonary:      Comments: Decreased breath sounds bilaterally   L pigtail chest tube draining orange slightly turbid pleural fluid  Abdominal:      General: Bowel sounds are normal. There is no distension.      Palpations: Abdomen is soft.      Tenderness: There is no abdominal tenderness.   Musculoskeletal:         General: Normal range of motion.      Cervical back: Normal range of motion.   Skin:     General: Skin is warm.      Findings: Bruising present.   Neurological:      General: No focal deficit present.      Mental Status: He is alert  and oriented to person, place, and time.   Psychiatric:         Mood and Affect: Mood normal.         Behavior: Behavior normal.       Medications  Current Facility-Administered Medications   Medication Dose Route Frequency Provider Last Rate Last Admin   • sulfamethoxazole-trimethoprim (BACTRIM DS) 800-160 MG tablet 2 tablet  2 tablet Oral Q12HRS Fela Jade M.D.   2 tablet at 05/27/21 1037   • calcium carbonate (TUMS) chewable tab 500 mg  500 mg Oral BID Ken Lane M.D.   500 mg at 05/27/21 0617   • fluticasone (FLONASE) nasal spray 100 mcg  2 Spray Nasal DAILY Ken Lane M.D.   100 mcg at 05/27/21 0617   • apixaban (ELIQUIS) tablet 5 mg  5 mg Oral BID Omer Daugherty DJuan MOJuan M   5 mg at 05/27/21 0619   • albuterol inhaler 2 Puff  2 Puff Inhalation Q6HRS PRN Julio César Mckee M.D.       • ferrous sulfate tablet 325 mg  325 mg Oral QDAY with Breakfast Julio César Mckee M.D.   325 mg at 05/27/21 0947   • gabapentin (NEURONTIN) capsule 100 mg  100 mg Oral BID Julio César Mckee M.D.   100 mg at 05/27/21 0617   • magnesium oxide (MAG-OX) tablet 400 mg  400 mg Oral BID Julio César Mckee M.D.   400 mg at 05/27/21 0617   • montelukast (SINGULAIR) tablet 10 mg  10 mg Oral DAILY Julio César Mckee M.D.   10 mg at 05/27/21 0617   • tamsulosin (FLOMAX) capsule 0.4 mg  0.4 mg Oral QHS Julio César Mckee M.D.   0.4 mg at 05/26/21 2014   • senna-docusate (PERICOLACE or SENOKOT S) 8.6-50 MG per tablet 2 tablet  2 tablet Oral BID Julio César Mckee M.D.   2 tablet at 05/27/21 0617    And   • polyethylene glycol/lytes (MIRALAX) PACKET 1 Packet  1 Packet Oral QDAY PRN Julio César Mckee M.D.   1 Packet at 05/23/21 1759    And   • magnesium hydroxide (MILK OF MAGNESIA) suspension 30 mL  30 mL Oral QDAY PRN Julio César Mckee M.D.   30 mL at 05/25/21 1036    And   • bisacodyl (DULCOLAX) suppository 10 mg  10 mg Rectal QDAY PRN Julio César Mckee M.D.   10 mg at 05/26/21 0620   • Pharmacy Consult Request ...Pain Management Review  1 Each  1 Each Other PHARMACY TO DOSE Julio César Mckee M.D.       • acetaminophen (Tylenol) tablet 650 mg  650 mg Oral Q6HRS PRN Julio César Mckee M.D.   650 mg at 05/19/21 1256   • HYDROmorphone (Dilaudid) injection 0.25 mg  0.25 mg Intravenous Q3HRS PRN Julio César Mckee M.D.       • ondansetron (ZOFRAN) syringe/vial injection 4 mg  4 mg Intravenous Q4HRS PRN Julio César Mckee M.D.       • ondansetron (ZOFRAN ODT) dispertab 4 mg  4 mg Oral Q4HRS PRN Julio César Mckee M.D.       • insulin regular (HumuLIN R,NovoLIN R) injection  2-9 Units Subcutaneous 4X/DAY ACHS Julio César Mckee M.D.   2 Units at 05/25/21 2140    And   • glucose 4 g chewable tablet 16 g  16 g Oral Q15 MIN PRN Julio César Mckee M.D.        And   • dextrose 50% (D50W) injection 50 mL  50 mL Intravenous Q15 MIN PRN Julio César Mckee M.D.         Fluids    Intake/Output Summary (Last 24 hours) at 5/27/2021 1359  Last data filed at 5/27/2021 1000  Gross per 24 hour   Intake 940 ml   Output 1330 ml   Net -390 ml     Laboratory          Recent Labs     05/25/21  0309 05/26/21  0619   SODIUM 140 143   POTASSIUM 4.2 4.1   CHLORIDE 105 108   CO2 29 29   BUN 14 14   CREATININE 0.65 0.58   CALCIUM 7.7* 7.6*     Recent Labs     05/25/21  0309 05/26/21  0619   GLUCOSE 130* 111*     Recent Labs     05/25/21  0309 05/26/21  0619   WBC 6.6 6.9   NEUTSPOLYS  --  74.30*   LYMPHOCYTES  --  10.80*   MONOCYTES  --  8.70   EOSINOPHILS  --  5.10   BASOPHILS  --  0.70     Recent Labs     05/25/21  0309 05/26/21  0619   RBC 3.12* 3.02*   HEMOGLOBIN 8.8* 8.5*   HEMATOCRIT 28.7* 28.4*   PLATELETCT 294 284     Imaging  CXR showing slight increase in left pneumothorax     Assessment/Plan  76-year-old male admitted with bilateral exudative pleural effusion loculated empyema on left side s/p chest tube placement on 5/21.  Possible etiology could be septic emboli from his osteomyelitis.     Impression:  #Right exudative bloody lymphocytic pleural effusion  #Left-sided  hydropneumothorax  #Acute hypoxic respiratory failure  #Bilateral pulmonary infiltrates concerning for hospital-acquired pneumonia (present prior to admission)  #Left leg DVT, subacute  #Osteomyelitis of left foot    Plan:  1. L hydropneumothorax: -60cc output this AM'; Repeat CXR in AM. Get out of bed with assistance. If  Fluid collection <100cc in 24 hours, will consider chest tube removal.   2. Pulmonary infiltrates: cont vanco/ancef, appreciate ID input, no evidence of vegetations on TTE, blood cultures NGTD x 2, sputum cultures. Suspect left pleural gram stain represent contamination  3. H&H stable, on apixaban  4. Titrate FiO2 to maintain saturations above 92%    He can be discharged to LTAC with pigtail catheter in for atleast 2 weeks. Discussed with Dr. Lane

## 2021-05-27 NOTE — THERAPY
"Contact Note    Patient Name: Agapito Stone  Age:  76 y.o., Sex:  male  Medical Record #: [id number]  Today's Date: 5/27/2021    attempted to see pt for PT; noted pt now has 1 walking boot and 1 offloading shoe present; however pt reports the offloading shoe is incorrect and refused to participate with OOB activity until he gets the \"correct\" shoes (which he states are 2 walking boots, though note pt may be incorrect); spoke with nursing regarding refusal and it is unclear as to what offloading shoes/boots are needed per chart review; pt likely needs wound care/limb pres to see pt and recommend appropriate offloading shoes with pt in agreement (as he will likely continue to refuse until appropriate footwear obtained); will continue to monitor and attempt once pt has \"correct\" shoes and is willing to participate  "

## 2021-05-27 NOTE — CARE PLAN
Problem: Skin Integrity  Goal: Skin integrity is maintained or improved  Outcome: Progressing  Note: ERIKA mattress, skin checks q shift, incontinence checks      Problem: Respiratory  Goal: Patient will achieve/maintain optimum respiratory ventilation and gas exchange  Outcome: Progressing  Note: On 3L NC,  monitoring    The patient is Stable - Low risk of patient condition declining or worsening    Shift Goals  Clinical Goals:  (pt will rest comfortably)  Patient Goals: Pt will report no pain from chest tube site    Progress made toward(s) clinical / shift goals:  pt slept comfortably throughout the night without complaints of pian.

## 2021-05-27 NOTE — PROGRESS NOTES
Assessment/description of ears? Intact and blanching  Which preventative measures are in place for the ears? Grey goam for NC and remove glasses while sleeping    Assessment/description of elbows? Refused assessment of right elbow (would not allow me to peel back mepilex dressing) left elbow intact and blanching.   Which preventative measures are in place for the elbows? Bilateral elbow mepilex and ERIKA mattress and Q 2 turns    Assessment/description of sacrum? Red, small non-blanching area, excoriation  Which preventative measures are in place for the sacrum? Barrier cream, ERIKA mattress, Q2 turns, removed mepilex due to incontinence.     Assessment/description of heels? Bilateral boggy, red and blanching. Right heel has small red spot.    Which preventative measures are in place for the heels? Bilateral heel mepilex, ERIKA mattress, N6rzycc, heel float boots.     Which devices are in place? PICC upper left arm, chest tube on left posterior trunk, NC, heel float boots    Description of skin under devices: chest tube dressing is CDI, PICC line dressing is CDI, bilateral ears are intact and blanching  Which preventative measures are in place under devices? Grey foam for NC, remove glasses while sleeping, Q2 turns, mepilex.     Other: skin overall has generalized bruising and fragile.   Groin: red an excoriated, non-blanching in between skin fold of groin. Wound consult placed barrier cream applied.

## 2021-05-27 NOTE — WOUND TEAM
New consult placed for area of redness in pt's R groin inguinal fold.  Pt was seen by wound team yesterday for weekly follow up.  Redness in groin is due to moisture, damage is not due to pressure, groin added to moisture associated skin damage flowsheet.  Keep area clean and dry and protect with barrier paste.  Wound team will continue to see pt on weekly basis.

## 2021-05-27 NOTE — CARE PLAN
Problem: Nutritional:  Goal: Achieve adequate nutritional intake  Description: Patient will consume >50% of meals  Outcome: Progressing   See RD note; continues to follow.

## 2021-05-27 NOTE — PROGRESS NOTES
[facility] Medicine Daily Progress Note    Date of Service  5/27/2021    Chief Complaint  76 y.o. male admitted 5/18/2021 with PICC line dysfunction and new onset pleural effusion.    Interval Problem Update  Patient was seen and examined at bedside. No acute events overnight.   Felling better this AM.   Daily Chest x-ray.   On IV Ancef and vancomycin. Per ID, ABx till 6/3 to cover for left foot osteomyelitis   Pulmonary team and ID actively following.   Pt came from [facility], plan to d/c back to [place] once medically stable.   PT/OT    Consultants/Specialty  Pulm  ID    Code Status  Full Code    Disposition  TBD   Pt came from [facility], plan to d/c back to [place] once medically stable.         Review of Systems  Review of Systems   Constitutional: Positive for malaise/fatigue. Negative for chills and fever.   HENT: Negative for congestion, ear discharge, ear pain, sinus pain and sore throat.    Eyes: Negative for blurred vision and double vision.   Respiratory: Positive for shortness of breath. Negative for cough, sputum production and wheezing.    Cardiovascular: Negative.  Negative for chest pain, palpitations and leg swelling.   Gastrointestinal: Negative.  Negative for abdominal pain, constipation, diarrhea, nausea and vomiting.   Genitourinary: Negative.  Negative for dysuria, frequency and urgency.   Musculoskeletal: Positive for myalgias.   Neurological: Negative for dizziness, focal weakness and headaches.   Psychiatric/Behavioral: The patient is not nervous/anxious.    All other systems reviewed and are negative.       Physical Exam  Temp:  [36.1 °C (97 °F)-36.6 °C (97.9 °F)] 36.2 °C (97.2 °F)  Pulse:  [67-98] 98  Resp:  [16-18] 18  BP: ()/(60-67) 103/63  SpO2:  [94 %-99 %] 99 %    Physical Exam  Constitutional:       Appearance: Normal appearance.   HENT:      Head: Normocephalic and atraumatic.   Eyes:      Conjunctiva/sclera: Conjunctivae normal.   Cardiovascular:      Rate and Rhythm: Normal rate.       Pulses: Normal pulses.   Pulmonary:      Effort: Pulmonary effort is normal.      Breath sounds: Rales present.   Abdominal:      General: Bowel sounds are normal.      Palpations: Abdomen is soft.   Musculoskeletal:         General: No swelling or tenderness.      Comments: Left-sided pigtail chest tube in place.   Skin:     General: Skin is warm.   Neurological:      General: No focal deficit present.      Mental Status: He is alert. Mental status is at baseline.   Psychiatric:         Mood and Affect: Mood normal.         Behavior: Behavior normal.         Fluids    Intake/Output Summary (Last 24 hours) at 5/27/2021 1142  Last data filed at 5/27/2021 1000  Gross per 24 hour   Intake 940 ml   Output 1330 ml   Net -390 ml       Laboratory  Recent Labs     05/25/21  0309 05/26/21  0619   WBC 6.6 6.9   RBC 3.12* 3.02*   HEMOGLOBIN 8.8* 8.5*   HEMATOCRIT 28.7* 28.4*   MCV 92.0 94.0   MCH 28.2 28.1   MCHC 30.7* 29.9*   RDW 54.3* 55.7*   PLATELETCT 294 284   MPV 9.4 9.4     Recent Labs     05/25/21  0309 05/26/21  0619   SODIUM 140 143   POTASSIUM 4.2 4.1   CHLORIDE 105 108   CO2 29 29   GLUCOSE 130* 111*   BUN 14 14   CREATININE 0.65 0.58   CALCIUM 7.7* 7.6*                   Imaging  DX-CHEST-PORTABLE (1 VIEW)   Final Result      Interval removal of left pigtail chest tube. Slight increase in loculated left pneumothorax.      No other significant change.      DX-CHEST-PORTABLE (1 VIEW)   Final Result      1.  Small loculated left pneumothorax is unchanged.   2.  Small loculated right pleural effusion is stable.   3.  Bilateral airspace opacities are unchanged compared to prior.   4.  Stable cardiomegaly   5.  Atherosclerotic plaque.      DX-CHEST-PORTABLE (1 VIEW)   Final Result      1.  Minimal decrease in left pneumothorax with left pigtail chest tube in place.      2.  Persistent atelectasis or pneumonia in the left lower lobe and lingula.      3.  Unchanged small right pleural effusion and atelectasis or pneumonia  in the right midlung and right lower lobe.      DX-CHEST-PORTABLE (1 VIEW)   Final Result      1.  Stable moderate sized left pneumothorax.      2.  Stable bilateral infiltrates and support devices.      3.  Stable cardiomegaly.      DX-CHEST-PORTABLE (1 VIEW)   Final Result      Stable to slightly more prominent left pneumothorax and with increased left pleural fluid.      No other significant interval change.      EC-ECHOCARDIOGRAM COMPLETE W/O CONT   Final Result      DX-CHEST-PORTABLE (1 VIEW)   Final Result         1.  Pulmonary infiltrates, increased on the right compared to prior study.   2.  New layering right pleural effusion.   3.  Left pneumothorax, decreased since prior study.   4.  Cardiomegaly   5.  Atherosclerosis      CT-CHEST (THORAX) WITH   Final Result         1.  Moderate size left pneumothorax with small caliber pigtail thoracostomy tube in place, appears similar to prior chest x-ray yesterday at 1054.   2.  Layering bilateral pleural effusions.   3.  Peripheral reticular consolidations, appearance compatible with Covid infiltrates. There are areas of denser consolidation suggesting superimposed secondary infectious process.   4.  Irregular hepatic contour, compatible with changes of cirrhosis.   5.  Trace perihepatic ascites      US-EXTREMITY VENOUS LOWER BILAT   Final Result      DX-CHEST-PORTABLE (1 VIEW)   Final Result      1.  Interval placement of small-caliber LEFT chest tube with improvement of pleural fluid and compensatory enlargement of LEFT pneumothorax, likely due to noncompliant underlying lung.   2.  Improving RIGHT lung infiltrates.      These findings were discussed with SUNDAR SUNG on 5/21/2021 11:18 AM.         CT-CHEST TUBE-EMPYEMA LEFT   Final Result      1. LEFT CHEST EMPYEMA Drainage with CT guidance.      DX-CHEST-LIMITED (1 VIEW)   Final Result      1.  Stable LEFT hydropneumothorax.   2.  Multifocal pneumonia again seen with slight worsening in RIGHT midlung region.             DX-CHEST-LIMITED (1 VIEW)   Final Result         Unchanged left lateral pneumothorax.      US-THORACENTESIS PUNCTURE LEFT   Final Result      1. Ultrasound guided left sided diagnostic and therapeutic thoracentesis.      2. 1550 mL of fluid withdrawn.      DX-CHEST-PORTABLE (1 VIEW)   Final Result      Interval decrease in size of the left pleural effusion which is now small.      Small small loculated left pneumothorax.      Small loculated right pleural effusion.      Left perihilar and basilar opacity may represent atelectasis/consolidation.      Increased ill-defined airspace opacities on the right may represent a combination of atelectasis, edema and pneumonia.      Atherosclerotic plaque.      Findings discussed with the covering clinician.         CT-CHEST (THORAX) W/O   Final Result         1.  Large left and moderate pleural effusions.   2.  Peripheral reticular pulmonary opacities, appearance compatible with Covid infiltrates.   3.  Linear consolidations in the bilateral lung bases, likely atelectasis.   4.  Hepatomegaly and irregular hepatic contour, appearance most compatible changes of cirrhosis.   5.  Diverticulosis      DX-CHEST-PORTABLE (1 VIEW)   Final Result      Stable large left pleural effusion with associated compressive atelectasis. Correlate clinically for infection.      Possible small right pleural effusion with mildly increased opacity in the right lung.           Assessment/Plan  * Pleural effusion, bilateral  Assessment & Plan  Unknown etiology  Was seen on last admission and was asymptomatic. Now with worsening SOB and worsening effusion  CT chest -findings of large left-sided pleural effusion and moderate right pleural effusion.    2D echo with preserved EF and no valvular abnormalities.  S/p thoracentesis of left pleural cavity .1.5 L of fluid removed.  S/p thoracentesis of right pleural cavity about 100 cc fluid removed, bloody.  Fluid studies slightly consistent with  "exudative effusion.  Cytology negative  Pulmonology following.    Rare gram-positive cocci growing in left pleural cavity concerning for empyema.  S/p pigtail placement of the left pleural cavity for adequate drainage of infection.    Chest x-ray this morning - \"Small loculated left pneumothorax is unchanged.Small loculated right pleural effusion is stable.\"  On IV Ancef and vancomycin.  ID on board   Pulmonary is considering thoracic surgery consult for ?helmlich valve.         Osteomyelitis of left foot (HCC)- (present on admission)  Assessment & Plan  Patient last admitted to [facility] with osteomyelitis of left foot  S/p amputation of left 5th toe, amputation well healing  ID was consulted last admission and plan for Daptomycin and Ancef until 6/3, will continue Abx per their prior recommendation.     On IV Ancef and vancomycin. Per ID, ABx till 6/3 to cover for left foot osteomyelitis   PICC in placed     Empyema, left (HCC)  Assessment & Plan  S/p thoracentesis of left pleural cavity .1.5 L of fluid removed.  S/p thoracentesis of right pleural cavity about 100 cc fluid removed, bloody.  Fluid studies slightly consistent with exudative effusion.  Cytology negative  Pulmonology following.    Rare gram-positive cocci growing in left pleural cavity concerning for empyema.  S/p pigtail placement of the left pleural cavity for adequate drainage of infection.  We will follow up on final cultures from the left pleural cavity and also monitor cultures from the right pleural cavity as well.   ID following, on vancomycin and Ancef.    Protein-calorie malnutrition, severe (HCC)  Assessment & Plan  RD following.  Follow-up on recommendations.    Postprocedural pneumothorax  Assessment & Plan  Post thoracentesis, chest x-ray with finding of a small left loculated pneumothorax.   We will repeat serial chest x-ray and closely monitor vital signs.    Repeat chest x-ray with findings of persistent left-sided hydropneumothorax " concerning for trapped lung.  S/p IR for pigtail placement into left pleural cavity.     CT chest 5/21 with findings of persistent moderate-sized left pneumothorax.    Acute respiratory failure with hypoxia (HCC)  Assessment & Plan  Secondary to large pleural effusion  See above for plan    Occluded PICC line, initial encounter ([facility])  Assessment & Plan  Patient sent by nursing home for occluded PICC line  Was flushed by ED staff and reportedly working currently  Consider new PICC line placement if continues to be nonfunctional as will need to continue Abx until 6/3    Deep vein thrombosis (DVT) of lower extremity (HCC)- (present on admission)  Assessment & Plan  History of DVT on Eliquis.  Repeat ultrasound Dopplers with findings of persistent nonocclusive left lower extremity DVT.  On Eliquis     Type 2 diabetes mellitus with peripheral neuropathy (HCC)- (present on admission)  Assessment & Plan  Patient with long history of DM with doccumented neuropathy and retinopathy  Hold home oral medications (ACTOS and Synjardy)  Insulin sliding scale for now  Diabetic diet  Last A1C 4.9 month ago    CAD (coronary artery disease)- (present on admission)  Assessment & Plan  Patient not currently taking statin, can be resumed as outpatient  Reported ACS in 2013 s/p stents  No current chest pain  ECHO nil acute   Will continue to monitor    HTN (hypertension)- (present on admission)  Assessment & Plan  Does not appear to be on antihypertensives at nursing home currently  Will continue to monitor    History of stroke- (present on admission)  Assessment & Plan  Prior history of stroke with some aphasia  Some deconditioning  PT/OT       VTE prophylaxis: Eliquis

## 2021-05-27 NOTE — PROGRESS NOTES
Patient has shoe and walking boot at bedside. Informed by patient that these shoes are his but that they are incorrect. Patient states that he has two walking boots at [employer] that he uses. Patient refused to work with therapy until both boots are available.

## 2021-05-27 NOTE — DIETARY
"Nutrition Services: Update   Day 8 of admit.  Agapito Stone is a 76 y.o. male with admitting DX of pleural effusion.    Pt is currently on carbohydrate consistent, diabetic diet. PO generally 25-50% small portion meals documented since last RD follow-up. TID snacks in place.    Patient also noted with DTPIs to foot, heel, ankle - per flowsheet 5/19 at 1621, not documented during RD initial assessment.     Spoke with patient at bedside. Patient reports appetite is \"fair\", states portions still feel bigger than he is accustomed to eating at home. Discussed patient's increased protein/micronutrient needs with him, pt verbalized understanding and was agreeable to consume BID (x2/d) Boost Glucose control supplements to bolster nutritional provision of diet in setting of increased metabolic demand for wound healing.      Wt 90.2 kg via bed scale - 5/26: suspect elevation from admission wt is fluid-related or bed scale error (ie items on bed).     Malnutrition Risk: Severe - see RD note 5/20.     Recommendations/Plan:  1. Start BID Boost Glucose control supplements with breakfast and dinner meals.   2. Current meals + snacks providing ~5825-6893 kcal/d; pt will need PO intake at least 50-75% meals + 100% supplements to meet minimum estimated energy requirements (MSJ x 1.2 = 1885 kcal/d).   3. Encourage intake of meals and supplements.   4. Document intake of all meals as % taken in ADL's to provide interdisciplinary communication across all shifts.   5. Monitor weight.  6. Nutrition rep will continue to see patient for ongoing meal and snack preferences.    RD following    "

## 2021-05-27 NOTE — PROGRESS NOTES
Notified by Pulmonary Resident that X-ray showed removal of pigtail chest tube. Assessed patient and chest tube appears to be intact, covered with dressing, dressing is clean, dry and  intact,  there does not appear to be any leaks and there is output from tube. Notified Resident of these findings.

## 2021-05-28 NOTE — PROGRESS NOTES
Assessment/description of ears? Pink and blanching, intact  Which preventative measures are in place for the ears? Grey foam applied to O2 tubing, O2 holding stickers in place to BL cheeks, patient removes glasses prn when not in use     Assessment/description of elbows?   Right: skin tear, dressing in place  Left: generalized bruising, red and blanching, intact  Which preventative measures are in place for the elbows? mepielx in place to BL elbows, elbows positioned on pillows as tolerated     Assessment/description of sacrum? Red, slow to angelica, intact, previously documented on wound team aware  Which preventative measures are in place for the sacrum? ERIKA mattress, barrier cream applied prn, q2 turns, pillows used for support and positioning.     Assessment/description of heels?   Right: small purple area to heel, small purple area to inner ankle, not blanching, previously documented on wound team aware  Left: inner ankle with red non-blanching area, toe amputation with red non-blanching area, previously documented on wound team aware  Which preventative measures are in place for the heels? Heel float boots, mepilex in place, pillows in use for support and positioning     Which devices are in place? PICC  Description of skin under devices: n/a, intact  Which preventative measures are in place under devices? n/a    Other: generalized skin is fragile with bruising and redness, blanching. Groin with excoriation. Wound team aware of skin condition and is following patient

## 2021-05-28 NOTE — PROGRESS NOTES
Patient received at start of shift. Patient AxOx4, no complaints of pain. Patient slightly hypotensive, MD aware and following, no interventions at this time. Patient with chest tube in place, draining to suction per MD order. Results of am chest x-ray discussed with provider, no interventions necessary. Patient declining any respiratory symptoms, no s/s of distress or discomfort. Fall and safety precautions in place, patient compliant with call light use. Patient on low air loss mattress, skin preventative measures in place. Will continue to monitor

## 2021-05-28 NOTE — THERAPY
Physical Therapy   Daily Treatment     Patient Name: Agapito Stone  Age:  76 y.o., Sex:  male  Medical Record #: [id number]  Today's Date: 5/28/2021     Precautions: Fall Risk    Assessment    Patient seen for PT treatment session today, patient grossly agreeable to session.  Ortho assistant assisted with fitting and donning new ortho boot.  Patient requiring min assist to roll, mod assist to push to sit.  Patient requiring max assist to scoot to EOB.  Patient requiring min-mod assist to maintain sitting balance.  Patient with difficulty maintaining midline sitting balance, requiring frequent cues to weight shift to midline.  Patient able to maintain sitting balance approx. 5 sec with B UE support on bed rails.  Required mod assist to return to supine, able to push through LEs to slightly boost up in bed.  Will continue to follow.    Plan    Continue current treatment plan.    DC Equipment Recommendations: Unable to determine at this time  Discharge Recommendations: Recommend post-acute placement for additional physical therapy services prior to discharge home       Objective     05/28/21 1607   Precautions   Precautions Fall Risk   Comments No formal WB orders, B walking boots, chest tube   Vitals   O2 (LPM) 3   O2 Delivery Device Nasal Cannula   Cognition    Sequencing Impaired   Initiation Impaired   Neuro-Muscular Treatments   Neuro-Muscular Treatments Anterior weight shift;Weight Shift Right;Weight Shift Left;Postural Facilitation   Neurological Concerns   Neurological Concerns Yes   Sitting Posture During ADL's Posterior Lean   Equilibrium Reaction Impaired   Comments Difficulty maintaining midline, frequent cues to wt shift to midline, requires B UE support to maintain sitting balance   Balance   Sitting Balance (Static) Poor +   Sitting Balance (Dynamic) Poor   Weight Shift Sitting Fair   Skilled Intervention Postural Facilitation;Verbal Cuing;Tactile Cuing   Gait Analysis   Gait Level Of Assist Unable  to Participate   Weight Bearing Status heel WB LLE; per pt to have offloading shoes at [facility] with OOB activity as well   Comments Pt reports he has not ambulated since last summer, has been performing slide board transfers at [place]   Bed Mobility    Supine to Sit Moderate Assist   Sit to Supine Minimal Assist   Scooting Maximal Assist   Rolling Minimum Assist to Lt.   Skilled Intervention Verbal Cuing;Postural Facilitation   Functional Mobility   Sit to Stand Unable to Participate   Bed, Chair, Wheelchair Transfer Unable to Participate   How much help from another person does the patient currently need...   6 clicks Mobility Score 8   Activity Tolerance   Sitting Edge of Bed >10 min   Patient / Family Goals    Patient / Family Goal #1 to return to PLOF to eventually dc lino ome   Goal #1 Outcome Goal not met   Short Term Goals    Short Term Goal # 1 Pt will be able to perform sit<>stand/transfers with FWW with Spv with offloading shoes donned   Goal Outcome # 1 goal not met   Short Term Goal # 2 Pt will be able to ambulate 50ft with FWW with min A in 6tx to promote fnx progression towards I    Goal Outcome # 2 Goal not met   Short Term Goal # 3 Pt able to maintain seated balance with SPV for 5 min within 6 visits in order to progress functional mobility

## 2021-05-28 NOTE — PROGRESS NOTES
[facility] Daily Progress Note    Date of Service  5/28/2021    Chief Complaint  76 y.o. male admitted 5/18/2021 with PICC line dysfunction and new onset pleural effusion.    Interval Problem Update  Patient was seen and examined at bedside. No acute events overnight.   Felling better this AM.   Daily Chest x-ray.   Started on IV Ancef and vancomycin on admit. Per ID,  switched to Bactrim on 5/27. ABx End Date 6/3.    Pulmonary team and ID actively following.   Pt came from [facility], plan to d/c back to [facility] once medically stable.   PT/OT  Walking Boot ordered.     Consultants/Specialty  Pulmonary   ID    Code Status  Full Code    Disposition  TBD   Pt came from [facility], plan to d/c back to [facility] once medically stable.         Review of Systems  Review of Systems   Constitutional: Positive for malaise/fatigue. Negative for chills and fever.   HENT: Negative for congestion, ear discharge, ear pain, sinus pain and sore throat.    Eyes: Negative for blurred vision and double vision.   Respiratory: Positive for shortness of breath. Negative for cough, sputum production and wheezing.    Cardiovascular: Negative.  Negative for chest pain, palpitations and leg swelling.   Gastrointestinal: Negative.  Negative for abdominal pain, constipation, diarrhea, nausea and vomiting.   Genitourinary: Negative.  Negative for dysuria, frequency and urgency.   Musculoskeletal: Positive for myalgias.   Neurological: Negative for dizziness, focal weakness and headaches.   Psychiatric/Behavioral: The patient is not nervous/anxious.    All other systems reviewed and are negative.       Physical Exam  Temp:  [36.1 °C (97 °F)-36.6 °C (97.8 °F)] 36.6 °C (97.8 °F)  Pulse:  [89-96] 89  Resp:  [16-18] 16  BP: ()/(55-59) 97/58  SpO2:  [91 %-99 %] 91 %    Physical Exam  Constitutional:       Appearance: Normal appearance.   HENT:      Head: Normocephalic and atraumatic.   Eyes:      Conjunctiva/sclera: Conjunctivae normal.    Cardiovascular:      Rate and Rhythm: Normal rate.      Pulses: Normal pulses.   Pulmonary:      Effort: Pulmonary effort is normal.      Breath sounds: Rales present.   Abdominal:      General: Bowel sounds are normal.      Palpations: Abdomen is soft.   Musculoskeletal:         General: No swelling or tenderness.      Comments: Left-sided pigtail chest tube in place.   Skin:     General: Skin is warm.   Neurological:      General: No focal deficit present.      Mental Status: He is alert. Mental status is at baseline.   Psychiatric:         Mood and Affect: Mood normal.         Behavior: Behavior normal.         Fluids    Intake/Output Summary (Last 24 hours) at 5/28/2021 1201  Last data filed at 5/28/2021 1000  Gross per 24 hour   Intake 960 ml   Output 886 ml   Net 74 ml       Laboratory  Recent Labs     05/26/21  0619 05/27/21  1511   WBC 6.9 7.8   RBC 3.02* 3.03*   HEMOGLOBIN 8.5* 8.5*   HEMATOCRIT 28.4* 28.4*   MCV 94.0 93.7   MCH 28.1 28.1   MCHC 29.9* 29.9*   RDW 55.7* 55.1*   PLATELETCT 284 321   MPV 9.4 9.6     Recent Labs     05/26/21  0619 05/27/21  1511   SODIUM 143 142   POTASSIUM 4.1 4.4   CHLORIDE 108 104   CO2 29 31   GLUCOSE 111* 131*   BUN 14 14   CREATININE 0.58 0.78   CALCIUM 7.6* 7.8*                   Imaging  DX-CHEST-PORTABLE (1 VIEW)   Final Result      Left-sided pigtail catheter is partially visualized. Loculated left pneumothorax appears mildly increased.      No other significant change.         DX-CHEST-PORTABLE (1 VIEW)   Final Result      Interval removal of left pigtail chest tube. Slight increase in loculated left pneumothorax.      No other significant change.      DX-CHEST-PORTABLE (1 VIEW)   Final Result      1.  Small loculated left pneumothorax is unchanged.   2.  Small loculated right pleural effusion is stable.   3.  Bilateral airspace opacities are unchanged compared to prior.   4.  Stable cardiomegaly   5.  Atherosclerotic plaque.      DX-CHEST-PORTABLE (1 VIEW)   Final  Result      1.  Minimal decrease in left pneumothorax with left pigtail chest tube in place.      2.  Persistent atelectasis or pneumonia in the left lower lobe and lingula.      3.  Unchanged small right pleural effusion and atelectasis or pneumonia in the right midlung and right lower lobe.      DX-CHEST-PORTABLE (1 VIEW)   Final Result      1.  Stable moderate sized left pneumothorax.      2.  Stable bilateral infiltrates and support devices.      3.  Stable cardiomegaly.      DX-CHEST-PORTABLE (1 VIEW)   Final Result      Stable to slightly more prominent left pneumothorax and with increased left pleural fluid.      No other significant interval change.      EC-ECHOCARDIOGRAM COMPLETE W/O CONT   Final Result      DX-CHEST-PORTABLE (1 VIEW)   Final Result         1.  Pulmonary infiltrates, increased on the right compared to prior study.   2.  New layering right pleural effusion.   3.  Left pneumothorax, decreased since prior study.   4.  Cardiomegaly   5.  Atherosclerosis      CT-CHEST (THORAX) WITH   Final Result         1.  Moderate size left pneumothorax with small caliber pigtail thoracostomy tube in place, appears similar to prior chest x-ray yesterday at 1054.   2.  Layering bilateral pleural effusions.   3.  Peripheral reticular consolidations, appearance compatible with Covid infiltrates. There are areas of denser consolidation suggesting superimposed secondary infectious process.   4.  Irregular hepatic contour, compatible with changes of cirrhosis.   5.  Trace perihepatic ascites      US-EXTREMITY VENOUS LOWER BILAT   Final Result      DX-CHEST-PORTABLE (1 VIEW)   Final Result      1.  Interval placement of small-caliber LEFT chest tube with improvement of pleural fluid and compensatory enlargement of LEFT pneumothorax, likely due to noncompliant underlying lung.   2.  Improving RIGHT lung infiltrates.      These findings were discussed with SUNDAR SUNG on 5/21/2021 11:18 AM.         CT-CHEST  TUBE-EMPYEMA LEFT   Final Result      1. LEFT CHEST EMPYEMA Drainage with CT guidance.      DX-CHEST-LIMITED (1 VIEW)   Final Result      1.  Stable LEFT hydropneumothorax.   2.  Multifocal pneumonia again seen with slight worsening in RIGHT midlung region.            DX-CHEST-LIMITED (1 VIEW)   Final Result         Unchanged left lateral pneumothorax.      US-THORACENTESIS PUNCTURE LEFT   Final Result      1. Ultrasound guided left sided diagnostic and therapeutic thoracentesis.      2. 1550 mL of fluid withdrawn.      DX-CHEST-PORTABLE (1 VIEW)   Final Result      Interval decrease in size of the left pleural effusion which is now small.      Small small loculated left pneumothorax.      Small loculated right pleural effusion.      Left perihilar and basilar opacity may represent atelectasis/consolidation.      Increased ill-defined airspace opacities on the right may represent a combination of atelectasis, edema and pneumonia.      Atherosclerotic plaque.      Findings discussed with the covering clinician.         CT-CHEST (THORAX) W/O   Final Result         1.  Large left and moderate pleural effusions.   2.  Peripheral reticular pulmonary opacities, appearance compatible with Covid infiltrates.   3.  Linear consolidations in the bilateral lung bases, likely atelectasis.   4.  Hepatomegaly and irregular hepatic contour, appearance most compatible changes of cirrhosis.   5.  Diverticulosis      DX-CHEST-PORTABLE (1 VIEW)   Final Result      Stable large left pleural effusion with associated compressive atelectasis. Correlate clinically for infection.      Possible small right pleural effusion with mildly increased opacity in the right lung.           Assessment/Plan  * Pleural effusion, bilateral  Assessment & Plan  Unknown etiology  Was seen on last admission and was asymptomatic. Now with worsening SOB and worsening effusion  CT chest -findings of large left-sided pleural effusion and moderate right pleural  effusion.    2D echo with preserved EF and no valvular abnormalities.  S/p thoracentesis of left pleural cavity .1.5 L of fluid removed.  S/p thoracentesis of right pleural cavity about 100 cc fluid removed, bloody.  Fluid studies slightly consistent with exudative effusion.  Cytology negative  Pulmonology following.    Rare gram-positive cocci growing in left pleural cavity concerning for empyema.  S/p pigtail placement of the left pleural cavity for adequate drainage of infection.    Daily CXR   Pulmonary following   Continue Pigtail       Osteomyelitis of left foot (HCC)- (present on admission)  Assessment & Plan  Patient last admitted to [facility] with [place]omyelitis of left foot  S/p amputation of left 5th toe, amputation well healing  ID was consulted last admission and plan for Daptomycin and Ancef until 6/3, will continue Abx per their prior recommendation.     Started on IV Ancef and vancomycin on admit. Per ID, ABx till 6/3 , switched to Bactrim on 5/27.   ID on board     Empyema, left (HCC)  Assessment & Plan  S/p thoracentesis of left pleural cavity .1.5 L of fluid removed.  S/p thoracentesis of right pleural cavity about 100 cc fluid removed, bloody.  Fluid studies slightly consistent with exudative effusion.  Cytology negative  Pulmonology following.    Rare gram-positive cocci growing in left pleural cavity concerning for empyema.  S/p pigtail placement of the left pleural cavity for adequate drainage of infection.  We will follow up on final cultures from the left pleural cavity and also monitor cultures from the right pleural cavity as well.   ID following, on vancomycin and Ancef.    Protein-calorie malnutrition, severe (HCC)  Assessment & Plan  RD following.  Follow-up on recommendations.    Postprocedural pneumothorax  Assessment & Plan  Post thoracentesis, chest x-ray with finding of a small left loculated pneumothorax.   We will repeat serial chest x-ray and closely monitor vital signs.    Repeat  chest x-ray with findings of persistent left-sided hydropneumothorax concerning for trapped lung.  S/p IR for pigtail placement into left pleural cavity.     CT chest 5/21 with findings of persistent moderate-sized left pneumothorax.    Acute respiratory failure with hypoxia (HCC)  Assessment & Plan  Secondary to large pleural effusion  See above for plan    Occluded PICC line, initial encounter ([facility])  Assessment & Plan  Patient sent by nursing home for occluded PICC line  Was flushed by ED staff and reportedly working currently  Consider new PICC line placement if continues to be nonfunctional as will need to continue Abx until 6/3    Deep vein thrombosis (DVT) of lower extremity (HCC)- (present on admission)  Assessment & Plan  History of DVT on Eliquis.  Repeat ultrasound Dopplers with findings of persistent nonocclusive left lower extremity DVT.  On Eliquis     Type 2 diabetes mellitus with peripheral neuropathy (HCC)- (present on admission)  Assessment & Plan  Patient with long history of DM with doccumented neuropathy and retinopathy  Hold home oral medications (ACTOS and Synjardy)  Insulin sliding scale for now  Diabetic diet  Last A1C 4.9 month ago    CAD (coronary artery disease)- (present on admission)  Assessment & Plan  Patient not currently taking statin, can be resumed as outpatient  Reported ACS in 2013 s/p stents  No current chest pain  ECHO nil acute   Will continue to monitor    HTN (hypertension)- (present on admission)  Assessment & Plan  Does not appear to be on antihypertensives at nursing home currently  Will continue to monitor    History of stroke- (present on admission)  Assessment & Plan  Prior history of stroke with some aphasia  Some deconditioning  PT/OT       VTE prophylaxis: Eliquis

## 2021-05-28 NOTE — CARE PLAN
The patient is Stable - Low risk of patient condition declining or worsening    Shift Goals  Clinical Goals: patient will have adequate pain management, no s/s of chest tube compliacations   Patient Goals: Pt will report no pain from chest tube site    Progress made toward(s) clinical / shift goals: patient declining pain, stable and resting comfortably in room. No s/s of discomfort or distress     Patient is not progressing towards the following goals:

## 2021-05-28 NOTE — FACE TO FACE
"Face to Face Note  -  Durable Medical Equipment    Ken Lane M.D. - NPI: [id number]  I certify that this patient is under my care and that they had a durable medical equipment(DME)face to face encounter by myself that meets the physician DME face-to-face encounter requirements with this patient on:    Date of encounter:   Patient:                    MRN:                       YOB: 2019  Agapito Stone  [id number]  1945     The encounter with the patient was in whole, or in part, for the following medical condition, which is the primary reason for durable medical equipment:  Other - Debilty   \"Pt has only 1 walking boot, he said he has twoe. Has been refusing therapies until he has 2 boots\"    I certify that, based on my findings, the following durable medical equipment is medically necessary:  Other DME Equipment - Walking boot .    HOME O2 Saturation Measurements:(Values must be present for Home Oxygen orders)         ,     ,         My Clinical findings support the need for the above equipment due to:  Abnormal Gait      "

## 2021-05-28 NOTE — CARE PLAN
The patient is Stable - Low risk of patient condition declining or worsening    Shift Goals  Clinical Goals: skin integrity will be maintained; pt will sleep comfortably through night   Patient Goals: Pt will report no pain from chest tube site    Problem: Knowledge Deficit - Standard  Goal: Patient and family/care givers will demonstrate understanding of plan of care, disease process/condition, diagnostic tests and medications  Outcome: Progressing     Problem: Skin Integrity  Goal: Skin integrity is maintained or improved  Outcome: Progressing     Problem: Fall Risk  Goal: Patient will remain free from falls  Outcome: Progressing       Progress made toward(s) clinical / shift goals: discussed plan of care, pt has no new concerns at this time. Pt on ERIKA mattress, q2 turns. New mepilex to left elbow and right heel

## 2021-05-28 NOTE — PROGRESS NOTES
Pt is A&Ox4. Pt is resting in bed, no signs of labored breathing or pain. Pt on 3 L NC. Chest tube in place. Call light & personal belongings within reach, bed in lowest position & locked, and bed frame alarm is on. Fall precautions in place and education provided on how to use call light. Pt updated on plan of care for the shift. Pt declines any additional needs at this time.

## 2021-05-28 NOTE — PROGRESS NOTES
"Pulmonary Progress Note    Date of admission  5/18/2021    Chief Complaint  Shortness of breath    Hospital Course  \"Very pleasant 76-year-old male never smoker admitted for shortness of breath from [facility].  He has a complicated past medical history starting 11/2020 when he was admitted for weakness and Covid pneumonia.  CT chest at that time showed left lower lobe consolidation and scattered peripheral predominant groundglass opacities.  No pleural fluid.  He was hospitalized again in 4/2021 for osteomyelitis of the toe, cultures at that time grew MRSA and Proteus that was treated with daptomycin and Ancef.  CT chest at that time showed left greater than right pleural effusions that were managed conservatively.  He was readmitted again this hospitalization for PICC occlusion, where CT chest showed markedly increased left effusion and right effusion as well.  Is on Eliquis for left lower extremity DVT.  He underwent a left-sided thoracentesis by interventional radiology 1500 cc drained, with post procedural CXR showing hydropneumothorax.  Pleural fluid was mostly blood, 84% lymphocytes, and rare GPC's.  For these findings pulmonary was consulted.     This hospitalization he has been afebrile, WBC initially mildly elevated with PMN predominance but has since normalized.  Flu/RSV/SARS-CoV-2 swab negative.     On personal review of his CT this hospitalization, the airways appear patent, there is extrinsic compression of the left lung by a large pleural effusion.  There appears to be no pleural-based nodularities.  Solitary lymph node high in the trachea has been slowly enlarging since 11/2020 but is still < 2cm.  In the right hemithorax, there are scattered groundglass opacities peripherally predominant as well as a modest right pleural effusion as well.  TTE negative for endocarditis. Blood cultures no growth to date and right pleural cultures no growth to date.  Vancomycin/Ancef, ID following.  Afebrile, " "WBC WNL.  MRSA nares negative.  L pleural fluid still GPCs, no further growth/speciation, ? contaminated sample \"    5/24: No acute events overnight  Denies any symptoms this morning. Vital signs stable, on 3L supplemental oxygen. Left side pigtail catheter in place, draining orange fluid around 200 cc since this AM.  CXR showing stable moderate sized left pneumothorax, stable cardiomegaly, stable bilateral infiltrates     5/25: No acute events overnight. Per nursing staff, patient desaturating to 80s on 3L sometimes, unsure if he is breathing through nose.  -Patient reports having some cough, denies any other symptoms  -Vital signs stable, on 3L supplemental oxygen. Left sided pigtail catheter in place, draining orange fluid around 250 cc since this AM and 400 cc in last 24 hours  -CXR showing \"minimal decrease in left pneumothorax with left pigtail chest tube in place, persistent atelectasis or pneumonia in the left lower lobe and lingula, unchanged small right pleural effusion and atelectasis or pneumonia in the right midlung and right lower lobe.\"    5/26: No acute events overnight.   -Patient reports that dry cough is getting better, denies any other symptoms.  -Vital signs stable, on 3L supplemental oxygen. Left sided pigtail catheter in place, draining orange fluid around 150 cc since this AM  -CXR showing small loculated eft pneumothorax with left pigtail chest tube in place, small right pleural effusion and stable cardiomegaly    5/27: No acute events overnight.   -Patient reports that dry cough is minimal, denies any other symptoms.  -Vital signs stable, on 3L supplemental oxygen. Left sided pigtail catheter in place, draining orange fluid around 60 cc since this AM, 280 cc in 24 hours  -CXR showing slightly increased left pneumothorax    Interval Problem Update  5/28: No acute events overnight.   -Reports minimal symptoms  -Vital signs stable, on 3L supplemental oxygen. Left sided pigtail catheter in " place, draining orange fluid around 180 cc since this AM, 385 cc in 24 hours  -CXR showing slightly increased left pneumothorax    Review of Systems  Review of Systems   Constitutional: Negative for chills, fever and weight loss.   HENT: Negative for ear pain, hearing loss and tinnitus.    Eyes: Negative for blurred vision, double vision and photophobia.   Respiratory: Positive for cough. Negative for hemoptysis and sputum production.    Cardiovascular: Negative for chest pain, palpitations and orthopnea.   Gastrointestinal: Negative for abdominal pain, nausea and vomiting.   Genitourinary: Negative for dysuria, frequency and urgency.   Musculoskeletal: Negative for back pain, myalgias and neck pain.   Skin: Negative for itching and rash.   Neurological: Negative for dizziness, tingling and headaches.   Psychiatric/Behavioral: Negative for depression and suicidal ideas.      Vital Signs for last 24 hours   Temp:  [36.1 °C (97 °F)-36.6 °C (97.8 °F)] 36.6 °C (97.8 °F)  Pulse:  [89-94] 89  Resp:  [16-18] 16  BP: ()/(55-59) 97/58  SpO2:  [91 %-97 %] 91 %    Physical Exam   Physical Exam  Vitals and nursing note reviewed.   Constitutional:       General: He is not in acute distress.     Appearance: Normal appearance.      Comments: Thin  Chronically ill appearing   HENT:      Head: Normocephalic.      Nose: Nose normal.      Mouth/Throat:      Mouth: Mucous membranes are moist.   Eyes:      Extraocular Movements: Extraocular movements intact.      Pupils: Pupils are equal, round, and reactive to light.   Cardiovascular:      Rate and Rhythm: Normal rate and regular rhythm.      Pulses: Normal pulses.      Heart sounds: Normal heart sounds.   Pulmonary:      Comments: Decreased breath sounds bilaterally   L pigtail chest tube draining orange slightly turbid pleural fluid  Abdominal:      General: Bowel sounds are normal. There is no distension.      Palpations: Abdomen is soft.      Tenderness: There is no abdominal  tenderness.   Musculoskeletal:         General: Normal range of motion.      Cervical back: Normal range of motion.   Skin:     General: Skin is warm.      Findings: Bruising present.   Neurological:      General: No focal deficit present.      Mental Status: He is alert and oriented to person, place, and time.   Psychiatric:         Mood and Affect: Mood normal.         Behavior: Behavior normal.       Medications  Current Facility-Administered Medications   Medication Dose Route Frequency Provider Last Rate Last Admin   • sulfamethoxazole-trimethoprim (BACTRIM DS) 800-160 MG tablet 2 tablet  2 tablet Oral Q12HRS Fela Jade M.D.   2 tablet at 05/28/21 0431   • calcium carbonate (TUMS) chewable tab 500 mg  500 mg Oral BID Ken Lane M.D.   500 mg at 05/28/21 0431   • fluticasone (FLONASE) nasal spray 100 mcg  2 Spray Nasal DAILY Ken Lane M.D.   100 mcg at 05/28/21 0432   • apixaban (ELIQUIS) tablet 5 mg  5 mg Oral BID Omer Daugherty DJuan MOJuan M   5 mg at 05/28/21 0431   • albuterol inhaler 2 Puff  2 Puff Inhalation Q6HRS PRN Julio César Mckee M.D.       • ferrous sulfate tablet 325 mg  325 mg Oral QDAY with Breakfast Julio César Mckee M.D.   325 mg at 05/28/21 0825   • gabapentin (NEURONTIN) capsule 100 mg  100 mg Oral BID Julio César Mckee M.D.   100 mg at 05/28/21 0431   • magnesium oxide (MAG-OX) tablet 400 mg  400 mg Oral BID Julio César Mckee M.D.   400 mg at 05/28/21 0431   • montelukast (SINGULAIR) tablet 10 mg  10 mg Oral DAILY Julio César Mckee M.D.   10 mg at 05/28/21 0431   • tamsulosin (FLOMAX) capsule 0.4 mg  0.4 mg Oral QHS Julio César Mckee M.D.   0.4 mg at 05/27/21 2208   • senna-docusate (PERICOLACE or SENOKOT S) 8.6-50 MG per tablet 2 tablet  2 tablet Oral BID Julio César Mckee M.D.   2 tablet at 05/28/21 0432    And   • polyethylene glycol/lytes (MIRALAX) PACKET 1 Packet  1 Packet Oral QDAY PRN Julio César Mckee M.D.   1 Packet at 05/23/21 5879    And   • magnesium hydroxide (MILK OF  MAGNESIA) suspension 30 mL  30 mL Oral QDAY PRN Julio César Mckee M.D.   30 mL at 05/25/21 1036    And   • bisacodyl (DULCOLAX) suppository 10 mg  10 mg Rectal QDAY PRN Julio César Mckee M.D.   10 mg at 05/26/21 0620   • Pharmacy Consult Request ...Pain Management Review 1 Each  1 Each Other PHARMACY TO DOSE Julio César Mckee M.D.       • acetaminophen (Tylenol) tablet 650 mg  650 mg Oral Q6HRS PRLEEROY Mckee M.D.   650 mg at 05/19/21 1256   • HYDROmorphone (Dilaudid) injection 0.25 mg  0.25 mg Intravenous Q3HRS PRLEEROY Mckee M.D.       • ondansetron (ZOFRAN) syringe/vial injection 4 mg  4 mg Intravenous Q4HRS PRLEEROY Mckee M.D.       • ondansetron (ZOFRAN ODT) dispertab 4 mg  4 mg Oral Q4HRS PRLEEROY Mckee M.D.       • insulin regular (HumuLIN R,NovoLIN R) injection  2-9 Units Subcutaneous 4X/DAY ACHVARGHESE Mckee M.D.   2 Units at 05/25/21 2140    And   • glucose 4 g chewable tablet 16 g  16 g Oral Q15 MIN PRLEEROY Mckee M.D.        And   • dextrose 50% (D50W) injection 50 mL  50 mL Intravenous Q15 MIN PRLEEROY Mckee M.D.         Fluids    Intake/Output Summary (Last 24 hours) at 5/28/2021 1458  Last data filed at 5/28/2021 1000  Gross per 24 hour   Intake 480 ml   Output 886 ml   Net -406 ml     Laboratory          Recent Labs     05/26/21  0619 05/27/21  1511   SODIUM 143 142   POTASSIUM 4.1 4.4   CHLORIDE 108 104   CO2 29 31   BUN 14 14   CREATININE 0.58 0.78   CALCIUM 7.6* 7.8*     Recent Labs     05/26/21  0619 05/27/21  1511   GLUCOSE 111* 131*     Recent Labs     05/26/21  0619 05/27/21  1511   WBC 6.9 7.8   NEUTSPOLYS 74.30*  --    LYMPHOCYTES 10.80*  --    MONOCYTES 8.70  --    EOSINOPHILS 5.10  --    BASOPHILS 0.70  --      Recent Labs     05/26/21  0619 05/27/21  1511   RBC 3.02* 3.03*   HEMOGLOBIN 8.5* 8.5*   HEMATOCRIT 28.4* 28.4*   PLATELETCT 284 321     Imaging  CXR showing slight increase in left pneumothorax     Assessment/Plan  76-year-old male  admitted with bilateral exudative pleural effusion loculated empyema on left side s/p chest tube placement on 5/21.  Possible etiology could be septic emboli from his osteomyelitis.     Impression:  #Left-sided hydropneumothorax  #Right exudative bloody lymphocytic pleural effusion  #Acute hypoxic respiratory failure  #Bilateral pulmonary infiltrates concerning for hospital-acquired pneumonia (present prior to admission)  #Left leg DVT, subacute  #Osteomyelitis of left foot    Plan:  1. L hydropneumothorax: Repeat CXR in AM. Get out of bed with assistance. If  Fluid collection <200cc in 24 hours, will consider chest tube removal. Concerning for lung trap  2. Pulmonary infiltrates: ID on board, changes antibiotics to Bactrim DS on 5/27/21, end date 6/3/21  3. H&H stable, on apixaban  4. Titrate FiO2 to maintain saturations above 92%    Will continue to follow

## 2021-05-28 NOTE — THERAPY
Occupational Therapy Contact Note    Pt still does not have 2 walking boots, only has one. Has been refusing therapy until he has those. Notified RN and she will place an order. Will attempt later as able.    Nandini Barillas, OTR/L

## 2021-05-28 NOTE — PROGRESS NOTES
Assessment/description of ears? Intact, pink, blanching  Which preventative measures are in place for the ears?  -grey foam on oxygen tubing, tendergrips in place, pt removes glasses while sleeping    Assessment/description of elbows?   R) pt refuses assessment  L) red, blanching; scattered bruising and redness in area  Which preventative measures are in place for the elbows?  -mepilex to bilat elbows  -Pillows in use to offload    Assessment/description of sacrum? Pt refused sacral assessment   Which preventative measures are in place for the sacrum?  -ERIKA mattress, q2 turns    Assessment/description of heels?  R) hard, 2 dark spots, red, blanching, boggy   L) boggy, blanching, red  Which preventative measures are in place for the heels?  -mepilex to bilat heels, heel float boots, pillows to offload    Which devices are in place? PICC line, chest tube, NC, heel float boots, glasses  Description of skin under devices: PICC and chest tube are clean/dry/intact, bilat ear intact; nares intact   Which preventative measures are in place under devices?  -grey foam on oxygen tubing, tendergrips    Other:  R UE: scattered bruising and redness; right hand has 3 skin tears that are scabbed.   L UE: scattered bruising, redness  R LE: knee has two round red spots, blanching;   L foot: lateral ankle has red spot, non-blanching; fifth toe amputation,   Groin: red, excoriated.    Wound team is following.

## 2021-05-29 NOTE — PROGRESS NOTES
Took most of morning medications except magnesium.  Pt started to c/o nausea.  Dry heaves.  No vomit.  Zofran given.

## 2021-05-29 NOTE — PROGRESS NOTES
Assessment/description of ears? Intact, pink, and blanching   Which preventative measures are in place for the ears?   Qshift assessment, gray foam for NC, and ecouraged pt to remove glasses when sleeping and to use as needed only     Assessment/description of elbows? Intact, pink, and blanching  Which preventative measures are in place for the elbows?   Mepilex, ERIKA mattress, [id number], pillows available for positioning and support     Assessment/description of sacrum? Red, excoriated, and slow to angelica   Which preventative measures are in place for the sacrum?  ERIKA mattress, [id number], and incontinence care with barrier paste.       Assessment/description of heels? Right heel pressure injury. Right and left inner ankles have dark spot. Left great toe and 4th toe have black area. 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  Which preventative measures are in place for the heels?   Mepilex, ERIKA mattress, [id number], betadine painted on left 1st and 4 th toes, and heel float boots.       Which devices are in place? PICC line, NC, glasses, heel float boots   Description of skin under devices: Right heel has DTI.  Rest pink and blanching.  Which preventative measures are in place under devices?   Qshift assessment, gray foam for NC, mepilex heel and heel float boots      Other:    BUE fragile withy bruising and redness. Right arm has skin tears with mepitel. Sacrum and groins are red and excoriated.

## 2021-05-29 NOTE — PROGRESS NOTES
MD Lane notified that patient is hypotensive, denying any symptoms. MD to follow, no interventions at this time, will continue to monitor

## 2021-05-29 NOTE — PROGRESS NOTES
[facility] Medicine Daily Progress Note    Date of Service  5/29/2021    Chief Complaint  76 y.o. male admitted 5/18/2021 with PICC line dysfunction and new onset pleural effusion.    Interval Problem Update  Patient was seen and examined at bedside. No acute events overnight.   Felling better this AM.   Daily Chest x-ray.   Started on IV Ancef and vancomycin on admit. Per ID,  switched to Bactrim on 5/27. ABx End Date 6/3.    Pulmonary team and ID actively following.   Pt came from [facility], plan to d/c back to [place] once medically stable.   PT/OT  Walking Boot ordered.     5/29: No acute issue overnight.  Still requiring 3 L oxygen.  Blood pressure is a little bit low.  According to ID, they recommend the patient be evaluated by cardiothoracic surgery.  Will discuss with pulmonary and see what their opinion is.    Consultants/Specialty  Pulmonary   ID    Code Status  Full Code    Disposition  TBD   Pt came from [facility], plan to d/c back to [place] once medically stable.         Review of Systems  Review of Systems   Constitutional: Positive for malaise/fatigue. Negative for chills and fever.   HENT: Negative for congestion, ear discharge, ear pain and sinus pain.    Eyes: Negative for double vision.   Respiratory: Positive for shortness of breath. Negative for cough, sputum production and wheezing.    Cardiovascular: Negative for palpitations and leg swelling.   Gastrointestinal: Negative.  Negative for abdominal pain, constipation, diarrhea, nausea and vomiting.   Genitourinary: Negative for frequency and urgency.   Musculoskeletal: Positive for myalgias.   Neurological: Negative for dizziness, focal weakness and headaches.   All other systems reviewed and are negative.       Physical Exam  Temp:  [36.2 °C (97.1 °F)-36.8 °C (98.2 °F)] 36.2 °C (97.2 °F)  Pulse:  [85-93] 85  Resp:  [17-20] 17  BP: ()/(45-64) 102/64  SpO2:  [90 %-97 %] 97 %    Physical Exam  Constitutional:       Appearance: Normal appearance.    HENT:      Head: Normocephalic and atraumatic.   Eyes:      Extraocular Movements: Extraocular movements intact.      Conjunctiva/sclera: Conjunctivae normal.      Pupils: Pupils are equal, round, and reactive to light.   Cardiovascular:      Rate and Rhythm: Normal rate.      Pulses: Normal pulses.   Pulmonary:      Effort: Pulmonary effort is normal.      Breath sounds: Rales present.   Abdominal:      General: Abdomen is flat. Bowel sounds are normal.   Musculoskeletal:         General: No swelling or tenderness.      Comments: Left-sided pigtail chest tube in place.   Skin:     General: Skin is warm.   Neurological:      General: No focal deficit present.      Mental Status: He is alert.         Fluids    Intake/Output Summary (Last 24 hours) at 5/29/2021 0749  Last data filed at 5/29/2021 0557  Gross per 24 hour   Intake 770 ml   Output 970 ml   Net -200 ml       Laboratory  Recent Labs     05/27/21  1511   WBC 7.8   RBC 3.03*   HEMOGLOBIN 8.5*   HEMATOCRIT 28.4*   MCV 93.7   MCH 28.1   MCHC 29.9*   RDW 55.1*   PLATELETCT 321   MPV 9.6     Recent Labs     05/27/21  1511   SODIUM 142   POTASSIUM 4.4   CHLORIDE 104   CO2 31   GLUCOSE 131*   BUN 14   CREATININE 0.78   CALCIUM 7.8*                   Imaging  DX-CHEST-PORTABLE (1 VIEW)   Final Result      Stable chest with loculated left lateral pneumothorax and multifocal consolidation      DX-CHEST-PORTABLE (1 VIEW)   Final Result      Left-sided pigtail catheter is partially visualized. Loculated left pneumothorax appears mildly increased.      No other significant change.         DX-CHEST-PORTABLE (1 VIEW)   Final Result      Interval removal of left pigtail chest tube. Slight increase in loculated left pneumothorax.      No other significant change.      DX-CHEST-PORTABLE (1 VIEW)   Final Result      1.  Small loculated left pneumothorax is unchanged.   2.  Small loculated right pleural effusion is stable.   3.  Bilateral airspace opacities are unchanged  compared to prior.   4.  Stable cardiomegaly   5.  Atherosclerotic plaque.      DX-CHEST-PORTABLE (1 VIEW)   Final Result      1.  Minimal decrease in left pneumothorax with left pigtail chest tube in place.      2.  Persistent atelectasis or pneumonia in the left lower lobe and lingula.      3.  Unchanged small right pleural effusion and atelectasis or pneumonia in the right midlung and right lower lobe.      DX-CHEST-PORTABLE (1 VIEW)   Final Result      1.  Stable moderate sized left pneumothorax.      2.  Stable bilateral infiltrates and support devices.      3.  Stable cardiomegaly.      DX-CHEST-PORTABLE (1 VIEW)   Final Result      Stable to slightly more prominent left pneumothorax and with increased left pleural fluid.      No other significant interval change.      EC-ECHOCARDIOGRAM COMPLETE W/O CONT   Final Result      DX-CHEST-PORTABLE (1 VIEW)   Final Result         1.  Pulmonary infiltrates, increased on the right compared to prior study.   2.  New layering right pleural effusion.   3.  Left pneumothorax, decreased since prior study.   4.  Cardiomegaly   5.  Atherosclerosis      CT-CHEST (THORAX) WITH   Final Result         1.  Moderate size left pneumothorax with small caliber pigtail thoracostomy tube in place, appears similar to prior chest x-ray yesterday at 1054.   2.  Layering bilateral pleural effusions.   3.  Peripheral reticular consolidations, appearance compatible with Covid infiltrates. There are areas of denser consolidation suggesting superimposed secondary infectious process.   4.  Irregular hepatic contour, compatible with changes of cirrhosis.   5.  Trace perihepatic ascites      US-EXTREMITY VENOUS LOWER BILAT   Final Result      DX-CHEST-PORTABLE (1 VIEW)   Final Result      1.  Interval placement of small-caliber LEFT chest tube with improvement of pleural fluid and compensatory enlargement of LEFT pneumothorax, likely due to noncompliant underlying lung.   2.  Improving RIGHT lung  infiltrates.      These findings were discussed with SUNDAR SUNG on 5/21/2021 11:18 AM.         CT-CHEST TUBE-EMPYEMA LEFT   Final Result      1. LEFT CHEST EMPYEMA Drainage with CT guidance.      DX-CHEST-LIMITED (1 VIEW)   Final Result      1.  Stable LEFT hydropneumothorax.   2.  Multifocal pneumonia again seen with slight worsening in RIGHT midlung region.            DX-CHEST-LIMITED (1 VIEW)   Final Result         Unchanged left lateral pneumothorax.      US-THORACENTESIS PUNCTURE LEFT   Final Result      1. Ultrasound guided left sided diagnostic and therapeutic thoracentesis.      2. 1550 mL of fluid withdrawn.      DX-CHEST-PORTABLE (1 VIEW)   Final Result      Interval decrease in size of the left pleural effusion which is now small.      Small small loculated left pneumothorax.      Small loculated right pleural effusion.      Left perihilar and basilar opacity may represent atelectasis/consolidation.      Increased ill-defined airspace opacities on the right may represent a combination of atelectasis, edema and pneumonia.      Atherosclerotic plaque.      Findings discussed with the covering clinician.         CT-CHEST (THORAX) W/O   Final Result         1.  Large left and moderate pleural effusions.   2.  Peripheral reticular pulmonary opacities, appearance compatible with Covid infiltrates.   3.  Linear consolidations in the bilateral lung bases, likely atelectasis.   4.  Hepatomegaly and irregular hepatic contour, appearance most compatible changes of cirrhosis.   5.  Diverticulosis      DX-CHEST-PORTABLE (1 VIEW)   Final Result      Stable large left pleural effusion with associated compressive atelectasis. Correlate clinically for infection.      Possible small right pleural effusion with mildly increased opacity in the right lung.           Assessment/Plan  * Pleural effusion, bilateral  Assessment & Plan  Unknown etiology  Was seen on last admission and was asymptomatic. Now with worsening SOB  and worsening effusion  CT chest -findings of large left-sided pleural effusion and moderate right pleural effusion.    2D echo with preserved EF and no valvular abnormalities.  S/p thoracentesis of left pleural cavity .1.5 L of fluid removed.  S/p thoracentesis of right pleural cavity about 100 cc fluid removed, bloody.  Fluid studies slightly consistent with exudative effusion.  Cytology negative  Pulmonology following.    Rare gram-positive cocci growing in left pleural cavity concerning for empyema.  S/p pigtail placement of the left pleural cavity for adequate drainage of infection.    Daily CXR   Pulmonary following   Continue Pigtail     According to ID they recommended to have cardiothoracic surgery consult, I will discuss with pulmonary about that      Empyema, left (HCC)  Assessment & Plan  S/p thoracentesis of left pleural cavity .1.5 L of fluid removed.  S/p thoracentesis of right pleural cavity about 100 cc fluid removed, bloody.  Fluid studies slightly consistent with exudative effusion.  Cytology negative  Pulmonology following.    Rare gram-positive cocci growing in left pleural cavity concerning for empyema.  S/p pigtail placement of the left pleural cavity for adequate drainage of infection.  We will follow up on final cultures from the left pleural cavity and also monitor cultures from the right pleural cavity as well.   ID following, on vancomycin and Ancef initially and currently on Bactrim    Protein-calorie malnutrition, severe (HCC)  Assessment & Plan  RD following.  Follow-up on recommendations.    Postprocedural pneumothorax  Assessment & Plan  Post thoracentesis, chest x-ray with finding of a small left loculated pneumothorax.   We will repeat serial chest x-ray and closely monitor vital signs.    Repeat chest x-ray with findings of persistent left-sided hydropneumothorax concerning for trapped lung.  S/p IR for pigtail placement into left pleural cavity.     CT chest 5/21 with findings of  persistent moderate-sized left pneumothorax.    Acute respiratory failure with hypoxia (HCC)  Assessment & Plan  Secondary to large pleural effusion  See above for plan    Occluded PICC line, initial encounter ([facility])  Assessment & Plan  Patient sent by nursing home for occluded PICC line  Was flushed by ED staff and reportedly working currently  Consider new PICC line placement if continues to be nonfunctional as will need to continue Abx until 6/3    Osteomyelitis of left foot (HCC)- (present on admission)  Assessment & Plan  [place] [facility] [place] foot  S/p amputation of left 5th toe, amputation well healing  ID was consulted last admission and plan for Daptomycin and Ancef until 6/3, will continue Abx per their prior recommendation.     Started on IV Ancef and vancomycin on admit. Per ID, ABx till 6/3 , switched to Bactrim on 5/27.   ID on board     Deep vein thrombosis (DVT) of lower extremity ([facility])- (present on admission)  Assessment & Plan  History of DVT on Eliquis.  Repeat ultrasound Dopplers with findings of persistent nonocclusive left lower extremity DVT.  On Eliquis     Type 2 diabetes mellitus with peripheral neuropathy ([facility])- (present on admission)  Assessment & Plan  Patient with long history of DM with doccumented neuropathy and retinopathy  Hold home oral medications (ACTOS and Synjardy)  Insulin sliding scale for now  Diabetic diet  Last A1C 4.9 month ago    CAD (coronary artery disease)- (present on admission)  Assessment & Plan  Patient not currently taking statin, can be resumed as outpatient  Reported ACS in 2013 s/p stents  No current chest pain  ECHO nil acute   Will continue to monitor    Essential hypertension- (present on admission)  Assessment & Plan  Does not appear to be on antihypertensives at nursing home currently  Will continue to monitor    History of stroke- (present on admission)  Assessment & Plan  Prior history of stroke with some aphasia  Some  deconditioning  PT/OT       VTE prophylaxis: Eliquis

## 2021-05-29 NOTE — PROGRESS NOTES
CNA informed that pt's BP 91/45.  Previous BP was 95/60.  Pt denied any dizziness.  Denied any pain.   showed 91% with 3 L O2 via NC.  Left side chest tube dressing intact.  Had small BM.  Cleaned and barrier paste applied.           Assessment/description of ears? Pink, intact  Which preventative measures are in place for the ears? Gray foam for NC.  Encouraged pt to remove glasses when he goes to sleep.      Assessment/description of elbows? Refused right elbow assessment d/t tenderness.  Right arm has skin tears, bruises, and redness.  Left elbow is pink and intact.  Left arm also has bruises and redness  Which preventative measures are in place for the elbows? Mepilex    Assessment/description of sacrum? Red, slow to angelica, and excoriated  Which preventative measures are in place for the sacrum? ERIKA mattress, Q2 turns, and incontinence care with barrier paste.      Assessment/description of heels? Right heel has dark spot.  Right and left inner ankles has dark spot.  Left great toe and 4th toe have black area.      Which preventative measures are in place for the heels? Betadine painted on left 1st and 4 th toes.  Mepilex heels on for both heels and heel float boots.      Which devices are in place? NC, glasses, heel float boots.    Description of skin under devices: Behind ears are pink and blanching.  Right heel has DTI.    Which preventative measures are in place under devices? Gray foam for NC.  Mepilex heel and heel float boots.      Other:  Both arms are bruises, red and very fragile.  Right arm has skin tears with mepitel.  Sacrum and groins are red and excoriated.

## 2021-05-29 NOTE — PROGRESS NOTES
"Pulmonary follow up    Summary  \"\"Very pleasant 76-year-old male never smoker admitted for shortness of breath from [facility].  He has a complicated past medical history starting 11/2020 when he was admitted for weakness and Covid pneumonia.  CT chest at that time showed left lower lobe consolidation and scattered peripheral predominant groundglass opacities.  No pleural fluid.  He was hospitalized again in 4/2021 for osteomyelitis of the toe, cultures at that time grew MRSA and Proteus that was treated with daptomycin and Ancef.  CT chest at that time showed left greater than right pleural effusions that were managed conservatively.  He was readmitted again this hospitalization for PICC occlusion, where CT chest showed markedly increased left effusion and right effusion as well.  Is on Eliquis for left lower extremity DVT.  He underwent a left-sided thoracentesis by interventional radiology 1500 cc drained, with post procedural CXR showing hydropneumothorax.  Pleural fluid was mostly blood, 84% lymphocytes, and rare GPC's.  For these findings pulmonary was consulted.     This hospitalization he has been afebrile, WBC initially mildly elevated with PMN predominance but has since normalized.  Flu/RSV/SARS-CoV-2 swab negative.     On personal review of his CT this hospitalization, the airways appear patent, there is extrinsic compression of the left lung by a large pleural effusion.  There appears to be no pleural-based nodularities.  Solitary lymph node high in the trachea has been slowly enlarging since 11/2020 but is still < 2cm.  In the right hemithorax, there are scattered groundglass opacities peripherally predominant as well as a modest right pleural effusion as well.  TTE negative for endocarditis. Blood cultures no growth to date and right pleural cultures no growth to date.  Chest tube output q 24 hrs\" from Dr. Curran's note             5/28-29    Overnight events    Review of Systems   "   Constitutional: Positive for malaise/fatigue.   HENT: Negative.    Eyes: Negative.    Respiratory: Negative.         Discomfort at chest tube site   Cardiovascular: Negative.    Gastrointestinal: Negative.    Genitourinary: Negative.    Musculoskeletal: Negative.    Skin: Negative.    Neurological: Positive for weakness.   Endo/Heme/Allergies: Negative.    Psychiatric/Behavioral: Negative.        Vitals:    05/29/21 0810   BP: (!) 96/60   Pulse: 89   Resp: 17   Temp: 36.5 °C (97.7 °F)   SpO2: 93%       Physical Exam  Constitutional:       Appearance: He is obese. He is ill-appearing.   HENT:      Head: Normocephalic and atraumatic.      Mouth/Throat:      Mouth: Mucous membranes are dry.   Eyes:      Pupils: Pupils are equal, round, and reactive to light.   Cardiovascular:      Rate and Rhythm: Normal rate.   Pulmonary:      Effort: Pulmonary effort is normal.      Comments: diminisihed at left post base  Musculoskeletal:      Cervical back: Normal range of motion.   Skin:     General: Skin is warm and dry.   Neurological:      Mental Status: He is oriented to person, place, and time.   Psychiatric:         Mood and Affect: Mood normal.         Labs:  Recent Labs     05/27/21  1511   WBC 7.8   RBC 3.03*   HEMOGLOBIN 8.5*   HEMATOCRIT 28.4*   MCV 93.7   MCH 28.1   MCHC 29.9*   RDW 55.1*   PLATELETCT 321   MPV 9.6                 Recent Labs     05/27/21  1511   SODIUM 142   POTASSIUM 4.4   CHLORIDE 104   CO2 31   GLUCOSE 131*   BUN 14     Recent Labs     05/27/21  1511   SODIUM 142   POTASSIUM 4.4   CHLORIDE 104   CO2 31   BUN 14   CREATININE 0.78   CALCIUM 7.8*     Results for orders placed or performed during the hospital encounter of 01/28/12   ECHOCARDIOGRAM COMP W/O CONT   Result Value Ref Range    Eject.Frac. MOD BP 55.03     Eject.Frac. MOD 4C 56.62     Eject.Frac. MOD 2C 62.97          Imaging:   DX-CHEST-PORTABLE (1 VIEW)   Final Result      Stable chest with loculated left lateral pneumothorax and  multifocal consolidation      DX-CHEST-PORTABLE (1 VIEW)   Final Result      Left-sided pigtail catheter is partially visualized. Loculated left pneumothorax appears mildly increased.      No other significant change.         DX-CHEST-PORTABLE (1 VIEW)   Final Result      Interval removal of left pigtail chest tube. Slight increase in loculated left pneumothorax.      No other significant change.      DX-CHEST-PORTABLE (1 VIEW)   Final Result      1.  Small loculated left pneumothorax is unchanged.   2.  Small loculated right pleural effusion is stable.   3.  Bilateral airspace opacities are unchanged compared to prior.   4.  Stable cardiomegaly   5.  Atherosclerotic plaque.      DX-CHEST-PORTABLE (1 VIEW)   Final Result      1.  Minimal decrease in left pneumothorax with left pigtail chest tube in place.      2.  Persistent atelectasis or pneumonia in the left lower lobe and lingula.      3.  Unchanged small right pleural effusion and atelectasis or pneumonia in the right midlung and right lower lobe.      DX-CHEST-PORTABLE (1 VIEW)   Final Result      1.  Stable moderate sized left pneumothorax.      2.  Stable bilateral infiltrates and support devices.      3.  Stable cardiomegaly.      DX-CHEST-PORTABLE (1 VIEW)   Final Result      Stable to slightly more prominent left pneumothorax and with increased left pleural fluid.      No other significant interval change.      EC-ECHOCARDIOGRAM COMPLETE W/O CONT   Final Result      DX-CHEST-PORTABLE (1 VIEW)   Final Result         1.  Pulmonary infiltrates, increased on the right compared to prior study.   2.  New layering right pleural effusion.   3.  Left pneumothorax, decreased since prior study.   4.  Cardiomegaly   5.  Atherosclerosis      CT-CHEST (THORAX) WITH   Final Result         1.  Moderate size left pneumothorax with small caliber pigtail thoracostomy tube in place, appears similar to prior chest x-ray yesterday at 1054.   2.  Layering bilateral pleural  effusions.   3.  Peripheral reticular consolidations, appearance compatible with Covid infiltrates. There are areas of denser consolidation suggesting superimposed secondary infectious process.   4.  Irregular hepatic contour, compatible with changes of cirrhosis.   5.  Trace perihepatic ascites      US-EXTREMITY VENOUS LOWER BILAT   Final Result      DX-CHEST-PORTABLE (1 VIEW)   Final Result      1.  Interval placement of small-caliber LEFT chest tube with improvement of pleural fluid and compensatory enlargement of LEFT pneumothorax, likely due to noncompliant underlying lung.   2.  Improving RIGHT lung infiltrates.      These findings were discussed with SUNDAR SUNG on 5/21/2021 11:18 AM.         CT-CHEST TUBE-EMPYEMA LEFT   Final Result      1. LEFT CHEST EMPYEMA Drainage with CT guidance.      DX-CHEST-LIMITED (1 VIEW)   Final Result      1.  Stable LEFT hydropneumothorax.   2.  Multifocal pneumonia again seen with slight worsening in RIGHT midlung region.            DX-CHEST-LIMITED (1 VIEW)   Final Result         Unchanged left lateral pneumothorax.      US-THORACENTESIS PUNCTURE LEFT   Final Result      1. Ultrasound guided left sided diagnostic and therapeutic thoracentesis.      2. 1550 mL of fluid withdrawn.      DX-CHEST-PORTABLE (1 VIEW)   Final Result      Interval decrease in size of the left pleural effusion which is now small.      Small small loculated left pneumothorax.      Small loculated right pleural effusion.      Left perihilar and basilar opacity may represent atelectasis/consolidation.      Increased ill-defined airspace opacities on the right may represent a combination of atelectasis, edema and pneumonia.      Atherosclerotic plaque.      Findings discussed with the covering clinician.         CT-CHEST (THORAX) W/O   Final Result         1.  Large left and moderate pleural effusions.   2.  Peripheral reticular pulmonary opacities, appearance compatible with Covid infiltrates.   3.   Linear consolidations in the bilateral lung bases, likely atelectasis.   4.  Hepatomegaly and irregular hepatic contour, appearance most compatible changes of cirrhosis.   5.  Diverticulosis      DX-CHEST-PORTABLE (1 VIEW)   Final Result      Stable large left pleural effusion with associated compressive atelectasis. Correlate clinically for infection.      Possible small right pleural effusion with mildly increased opacity in the right lung.      CXR today personally viewed which shows loculated pneumothorax on th eleft unchanged.  He does have b/l opacities    Assessment and plan    75 yo male with  Hx of COVID pna and infiltrates on the left late last year and then OM of left foot growing proteus and MRSA  Left pleural effusion with + gram stain(rare gram positive cocci) and  negative cxs.  Pigtail inserted with non resolving loculated pneumothorax and continues output up to 300 cc /24 hrs  At this time this would suggest the left lung is trapped and unlikely to expand  Surgery to expand may be helpful if pt is ambulatory and current status affecting quality fo life if lung is trapped which is not the case  Fio2 requirement is 3 l and has not changed in several days  Today will trial chest tube off suction and assess  1. Output  2. Pneumothorax size

## 2021-05-29 NOTE — CARE PLAN
The patient is Stable - Low risk of patient condition declining or worsening    Shift Goals  Clinical Goals: no sign of respiratory distress  Patient Goals: Pt will report no pain from chest tube site    Progress made toward(s) clinical / shift goals:  Every time, RN went into check on pt, O2 sat was > 90%.    Patient is not progressing towards the following goals:

## 2021-05-29 NOTE — PROGRESS NOTES
Received bedside report and assumed pt care. Pt is A&Ox4 and denies any pain at this time. Morning bp 96/60; pt has been hypotensive with SBP in 90s the past days. Pt asymptomatic. Rest of vital signs stable on on 3 L of oxygen via NC.  in use. Left side chest tube dressing is intact. Pt was nauseous on night shift and continues to complain of mild nausea. Pt provided emesis bags; PRN nausea medication was given earlier by night shift and is not due at this time. Morning bg 88 no insulin coverage required. Assessment complete. Pt is bedbound and incontinent of bowel. Frequent incontinent checks and [id number] in place. Urinal avaiable at bedside table. POC for today discussed with pt. Call light within reach. All needs met at this time. Hourly rounding in place.

## 2021-05-29 NOTE — CARE PLAN
The patient is Watcher - Medium risk of patient condition declining or worsening    Shift Goals  Clinical Goals: Pt will remain free from signs of respiratory distress  Patient Goals:     Progress made toward(s) clinical / shift goals: Pt O2 stats remain >/= 90% on 3L of oxygen via NC.  in place. Pt able to let needs known.     Patient is not progressing towards the following goals:

## 2021-05-30 NOTE — CARE PLAN
Problem: Knowledge Deficit - Standard  Goal: Patient and family/care givers will demonstrate understanding of plan of care, disease process/condition, diagnostic tests and medications  Outcome: Progressing  Note: Pt educated regarding plan of care and medications. All questions answered.       Problem: Skin Integrity  Goal: Skin integrity is maintained or improved  Outcome: Progressing  Note: Pt turned and positioned every two hours. Incontinence care provided and barrier paste applied as needed.       The patient is Watcher - Medium risk of patient condition declining or worsening    Shift Goals  Clinical Goals: Pt will be turned q2 hr, good night sleep, comfortable positioning   Patient Goals: Pt will report no pain from chest tube site    Progress made toward(s) clinical / shift goals: q2 turns in place, comfortable positioning achieved, minimal disruptions to sleep.     Patient is not progressing towards the following goals:

## 2021-05-30 NOTE — DISCHARGE PLANNING
Anticipated Discharge Disposition: Return to [facility]    Action: This RNCM obtained SNF choice, patient wants to return to [place].     RNCM faxed SNF choice to [facility] at [phone]    Patient has received tall walking boot, it is on the chair next to the bed. In case shoe size is needed in the future, patient states he wears a size 11.    Chest tube in place.      Barriers to Discharge: Medical clearance, pt/ot eval    Plan: HCM will f/u with discharge planning needs.

## 2021-05-30 NOTE — PROGRESS NOTES
"Pulmonary follow up    Summary  \"\"Very pleasant 76-year-old male never smoker admitted for shortness of breath from [facility].  He has a complicated past medical history starting 11/2020 when he was admitted for weakness and Covid pneumonia.  CT chest at that time showed left lower lobe consolidation and scattered peripheral predominant groundglass opacities.  No pleural fluid.  He was hospitalized again in 4/2021 for osteomyelitis of the toe, cultures at that time grew MRSA and Proteus that was treated with daptomycin and Ancef.  CT chest at that time showed left greater than right pleural effusions that were managed conservatively.  He was readmitted again this hospitalization for PICC occlusion, where CT chest showed markedly increased left effusion and right effusion as well.  Is on Eliquis for left lower extremity DVT.  He underwent a left-sided thoracentesis by interventional radiology 1500 cc drained, with post procedural CXR showing hydropneumothorax.  Pleural fluid was mostly blood, 84% lymphocytes, and rare GPC's.  For these findings pulmonary was consulted.     This hospitalization he has been afebrile, WBC initially mildly elevated with PMN predominance but has since normalized.  Flu/RSV/SARS-CoV-2 swab negative.     On personal review of his CT this hospitalization, the airways appear patent, there is extrinsic compression of the left lung by a large pleural effusion.  There appears to be no pleural-based nodularities.  Solitary lymph node high in the trachea has been slowly enlarging since 11/2020 but is still < 2cm.  In the right hemithorax, there are scattered groundglass opacities peripherally predominant as well as a modest right pleural effusion as well.  TTE negative for endocarditis. Blood cultures no growth to date and right pleural cultures no growth to date.  Chest tube output q 24 hrs\" from Dr. Curran's note    Overnight events  Placed on water seal 5/29 5/29-5/30      CXR " personally viewed and pneumothorax is smaller    Review of Systems   Constitutional: Positive for malaise/fatigue.   HENT: Negative.    Eyes: Negative.    Respiratory: Negative.         Discomfort at chest tube site   Cardiovascular: Negative.    Gastrointestinal: Negative.    Genitourinary: Negative.    Musculoskeletal: Negative.    Skin: Negative.    Neurological: Positive for weakness.   Endo/Heme/Allergies: Negative.    Psychiatric/Behavioral: Negative.        Vitals:    05/30/21 0817   BP: (!) 98/62   Pulse: 89   Resp: 17   Temp: 36.7 °C (98.1 °F)   SpO2: 93%       Physical Exam  Constitutional:       Appearance: He is obese. He is ill-appearing.   HENT:      Head: Normocephalic and atraumatic.      Mouth/Throat:      Mouth: Mucous membranes are dry.   Eyes:      Pupils: Pupils are equal, round, and reactive to light.   Cardiovascular:      Rate and Rhythm: Normal rate.   Pulmonary:      Effort: Pulmonary effort is normal.      Comments: diminisihed at left post base  Musculoskeletal:      Cervical back: Normal range of motion.   Skin:     General: Skin is warm and dry.   Neurological:      Mental Status: He is oriented to person, place, and time.   Psychiatric:         Mood and Affect: Mood normal.         Labs:  Recent Labs     05/27/21  1511   WBC 7.8   RBC 3.03*   HEMOGLOBIN 8.5*   HEMATOCRIT 28.4*   MCV 93.7   MCH 28.1   MCHC 29.9*   RDW 55.1*   PLATELETCT 321   MPV 9.6                 Recent Labs     05/27/21  1511   SODIUM 142   POTASSIUM 4.4   CHLORIDE 104   CO2 31   GLUCOSE 131*   BUN 14     Recent Labs     05/27/21  1511   SODIUM 142   POTASSIUM 4.4   CHLORIDE 104   CO2 31   BUN 14   CREATININE 0.78   CALCIUM 7.8*     Results for orders placed or performed during the hospital encounter of 01/28/12   ECHOCARDIOGRAM COMP W/O CONT   Result Value Ref Range    Eject.Frac. MOD BP 55.03     Eject.Frac. MOD 4C 56.62     Eject.Frac. MOD 2C 62.97          Imaging:   DX-CHEST-PORTABLE (1 VIEW)   Final Result        Mild further decrease in size of loculated left lateral pneumothorax      Stable moderate multifocal consolidation      DX-CHEST-PORTABLE (1 VIEW)   Final Result      Stable chest with loculated left lateral pneumothorax and multifocal consolidation      DX-CHEST-PORTABLE (1 VIEW)   Final Result      Left-sided pigtail catheter is partially visualized. Loculated left pneumothorax appears mildly increased.      No other significant change.         DX-CHEST-PORTABLE (1 VIEW)   Final Result      Interval removal of left pigtail chest tube. Slight increase in loculated left pneumothorax.      No other significant change.      DX-CHEST-PORTABLE (1 VIEW)   Final Result      1.  Small loculated left pneumothorax is unchanged.   2.  Small loculated right pleural effusion is stable.   3.  Bilateral airspace opacities are unchanged compared to prior.   4.  Stable cardiomegaly   5.  Atherosclerotic plaque.      DX-CHEST-PORTABLE (1 VIEW)   Final Result      1.  Minimal decrease in left pneumothorax with left pigtail chest tube in place.      2.  Persistent atelectasis or pneumonia in the left lower lobe and lingula.      3.  Unchanged small right pleural effusion and atelectasis or pneumonia in the right midlung and right lower lobe.      DX-CHEST-PORTABLE (1 VIEW)   Final Result      1.  Stable moderate sized left pneumothorax.      2.  Stable bilateral infiltrates and support devices.      3.  Stable cardiomegaly.      DX-CHEST-PORTABLE (1 VIEW)   Final Result      Stable to slightly more prominent left pneumothorax and with increased left pleural fluid.      No other significant interval change.      EC-ECHOCARDIOGRAM COMPLETE W/O CONT   Final Result      DX-CHEST-PORTABLE (1 VIEW)   Final Result         1.  Pulmonary infiltrates, increased on the right compared to prior study.   2.  New layering right pleural effusion.   3.  Left pneumothorax, decreased since prior study.   4.  Cardiomegaly   5.  Atherosclerosis       CT-CHEST (THORAX) WITH   Final Result         1.  Moderate size left pneumothorax with small caliber pigtail thoracostomy tube in place, appears similar to prior chest x-ray yesterday at 1054.   2.  Layering bilateral pleural effusions.   3.  Peripheral reticular consolidations, appearance compatible with Covid infiltrates. There are areas of denser consolidation suggesting superimposed secondary infectious process.   4.  Irregular hepatic contour, compatible with changes of cirrhosis.   5.  Trace perihepatic ascites      US-EXTREMITY VENOUS LOWER BILAT   Final Result      DX-CHEST-PORTABLE (1 VIEW)   Final Result      1.  Interval placement of small-caliber LEFT chest tube with improvement of pleural fluid and compensatory enlargement of LEFT pneumothorax, likely due to noncompliant underlying lung.   2.  Improving RIGHT lung infiltrates.      These findings were discussed with SUNDAR SUNG on 5/21/2021 11:18 AM.         CT-CHEST TUBE-EMPYEMA LEFT   Final Result      1. LEFT CHEST EMPYEMA Drainage with CT guidance.      DX-CHEST-LIMITED (1 VIEW)   Final Result      1.  Stable LEFT hydropneumothorax.   2.  Multifocal pneumonia again seen with slight worsening in RIGHT midlung region.            DX-CHEST-LIMITED (1 VIEW)   Final Result         Unchanged left lateral pneumothorax.      US-THORACENTESIS PUNCTURE LEFT   Final Result      1. Ultrasound guided left sided diagnostic and therapeutic thoracentesis.      2. 1550 mL of fluid withdrawn.      DX-CHEST-PORTABLE (1 VIEW)   Final Result      Interval decrease in size of the left pleural effusion which is now small.      Small small loculated left pneumothorax.      Small loculated right pleural effusion.      Left perihilar and basilar opacity may represent atelectasis/consolidation.      Increased ill-defined airspace opacities on the right may represent a combination of atelectasis, edema and pneumonia.      Atherosclerotic plaque.      Findings discussed  with the covering clinician.         CT-CHEST (THORAX) W/O   Final Result         1.  Large left and moderate pleural effusions.   2.  Peripheral reticular pulmonary opacities, appearance compatible with Covid infiltrates.   3.  Linear consolidations in the bilateral lung bases, likely atelectasis.   4.  Hepatomegaly and irregular hepatic contour, appearance most compatible changes of cirrhosis.   5.  Diverticulosis      DX-CHEST-PORTABLE (1 VIEW)   Final Result      Stable large left pleural effusion with associated compressive atelectasis. Correlate clinically for infection.      Possible small right pleural effusion with mildly increased opacity in the right lung.         Assessment and plan    75 yo male with  Hx of COVID pna and infiltrates on the left late last year and then OM of left foot growing proteus and MRSA  Left pleural effusion with + gram stain(rare gram positive cocci) and  negative cxs.  Pigtail inserted with non resolving loculated pneumothorax and continues output up to 300 cc /24 hrs  At this time this would suggest the left lung is trapped and unlikely to expand  Surgery to expand may be helpful if pt is ambulatory and current status affecting quality fo life if lung is trapped which is not the case  Fio2 requirement is 3 l and has not changed in several days  Pleural fluid ouput large 690 cc while on water seal  Pneumothorax size is smaller  Reassess another 24 hrs and will make a decision if tunnelled catheter is the route the pursue.  To note when inflammation improves trapped lungs can inflate.  His case has been a conundrum due to the high output

## 2021-05-30 NOTE — PROGRESS NOTES
Received bedside report at change of shift from day shift RN. Assumed care of pt at this time.Pt is A&Ox3, disoriented to situation. Pt is resting in bed, no signs of labored breathing or pain. Pt on 3L O2 nasal cannula. Call light & personal belongings within reach, bed in lowest position & locked, and bedframe alarm is on. Pt uses call light appropriately. Chest tube and dressing is intact, water seal. Pt declines any additional needs at this time.

## 2021-05-30 NOTE — PROGRESS NOTES
[facility] Medicine Daily Progress Note    Date of Service  5/30/2021    Chief Complaint  76 y.o. male admitted 5/18/2021 with PICC line dysfunction and new onset pleural effusion.    Interval Problem Update  Patient was seen and examined at bedside. No acute events overnight.   Felling better this AM.   Daily Chest x-ray.   Started on IV Ancef and vancomycin on admit. Per ID,  switched to Bactrim on 5/27. ABx End Date 6/3.    Pulmonary team and ID actively following.   Pt came from [facility], plan to d/c back to [place] once medically stable.   PT/OT  Walking Boot ordered.     5/29: No acute issue overnight.  Still requiring 3 L oxygen.  Blood pressure is a little bit low.  According to ID, they recommend the patient be evaluated by cardiothoracic surgery.  Will discuss with pulmonary and see what their opinion is.    5/30: On 3 L oxygen.  No acute issue overnight.  Trial chest tube off suction by pulmonary.  Consultants/Specialty  Pulmonary   ID    Code Status  Full Code    Disposition  TBD   Pt came from [facility], plan to d/c back to [place] once medically stable.         Review of Systems  Review of Systems   Constitutional: Positive for malaise/fatigue. Negative for chills and fever.   HENT: Negative for ear discharge, ear pain and sinus pain.    Eyes: Negative for double vision.   Respiratory: Positive for shortness of breath. Negative for cough, sputum production and wheezing.    Cardiovascular: Negative for leg swelling.   Gastrointestinal: Negative for abdominal pain, nausea and vomiting.   Genitourinary: Negative for frequency and urgency.   Musculoskeletal: Positive for myalgias.   Neurological: Negative for focal weakness and headaches.   All other systems reviewed and are negative.       Physical Exam  Temp:  [36.2 °C (97.2 °F)-36.6 °C (97.8 °F)] 36.3 °C (97.3 °F)  Pulse:  [81-91] 87  Resp:  [16-18] 18  BP: ()/(54-62) 124/62  SpO2:  [93 %-98 %] 95 %    Physical Exam  Constitutional:       Appearance:  Normal appearance.   HENT:      Head: Normocephalic and atraumatic.   Eyes:      Extraocular Movements: Extraocular movements intact.      Pupils: Pupils are equal, round, and reactive to light.   Cardiovascular:      Rate and Rhythm: Normal rate.      Pulses: Normal pulses.   Pulmonary:      Effort: Pulmonary effort is normal.      Breath sounds: Rales present.   Abdominal:      General: Abdomen is flat.   Musculoskeletal:         General: No swelling.      Comments: Left-sided pigtail chest tube in place.   Skin:     General: Skin is warm and dry.   Neurological:      General: No focal deficit present.      Mental Status: He is alert.         Fluids    Intake/Output Summary (Last 24 hours) at 5/30/2021 0713  Last data filed at 5/30/2021 0634  Gross per 24 hour   Intake 360 ml   Output 1040 ml   Net -680 ml       Laboratory  Recent Labs     05/27/21  1511   WBC 7.8   RBC 3.03*   HEMOGLOBIN 8.5*   HEMATOCRIT 28.4*   MCV 93.7   MCH 28.1   MCHC 29.9*   RDW 55.1*   PLATELETCT 321   MPV 9.6     Recent Labs     05/27/21  1511   SODIUM 142   POTASSIUM 4.4   CHLORIDE 104   CO2 31   GLUCOSE 131*   BUN 14   CREATININE 0.78   CALCIUM 7.8*                   Imaging  DX-CHEST-PORTABLE (1 VIEW)   Final Result      Stable chest with loculated left lateral pneumothorax and multifocal consolidation      DX-CHEST-PORTABLE (1 VIEW)   Final Result      Left-sided pigtail catheter is partially visualized. Loculated left pneumothorax appears mildly increased.      No other significant change.         DX-CHEST-PORTABLE (1 VIEW)   Final Result      Interval removal of left pigtail chest tube. Slight increase in loculated left pneumothorax.      No other significant change.      DX-CHEST-PORTABLE (1 VIEW)   Final Result      1.  Small loculated left pneumothorax is unchanged.   2.  Small loculated right pleural effusion is stable.   3.  Bilateral airspace opacities are unchanged compared to prior.   4.  Stable cardiomegaly   5.   Atherosclerotic plaque.      DX-CHEST-PORTABLE (1 VIEW)   Final Result      1.  Minimal decrease in left pneumothorax with left pigtail chest tube in place.      2.  Persistent atelectasis or pneumonia in the left lower lobe and lingula.      3.  Unchanged small right pleural effusion and atelectasis or pneumonia in the right midlung and right lower lobe.      DX-CHEST-PORTABLE (1 VIEW)   Final Result      1.  Stable moderate sized left pneumothorax.      2.  Stable bilateral infiltrates and support devices.      3.  Stable cardiomegaly.      DX-CHEST-PORTABLE (1 VIEW)   Final Result      Stable to slightly more prominent left pneumothorax and with increased left pleural fluid.      No other significant interval change.      EC-ECHOCARDIOGRAM COMPLETE W/O CONT   Final Result      DX-CHEST-PORTABLE (1 VIEW)   Final Result         1.  Pulmonary infiltrates, increased on the right compared to prior study.   2.  New layering right pleural effusion.   3.  Left pneumothorax, decreased since prior study.   4.  Cardiomegaly   5.  Atherosclerosis      CT-CHEST (THORAX) WITH   Final Result         1.  Moderate size left pneumothorax with small caliber pigtail thoracostomy tube in place, appears similar to prior chest x-ray yesterday at 1054.   2.  Layering bilateral pleural effusions.   3.  Peripheral reticular consolidations, appearance compatible with Covid infiltrates. There are areas of denser consolidation suggesting superimposed secondary infectious process.   4.  Irregular hepatic contour, compatible with changes of cirrhosis.   5.  Trace perihepatic ascites      US-EXTREMITY VENOUS LOWER BILAT   Final Result      DX-CHEST-PORTABLE (1 VIEW)   Final Result      1.  Interval placement of small-caliber LEFT chest tube with improvement of pleural fluid and compensatory enlargement of LEFT pneumothorax, likely due to noncompliant underlying lung.   2.  Improving RIGHT lung infiltrates.      These findings were discussed  with SUNDAR SUNG on 5/21/2021 11:18 AM.         CT-CHEST TUBE-EMPYEMA LEFT   Final Result      1. LEFT CHEST EMPYEMA Drainage with CT guidance.      DX-CHEST-LIMITED (1 VIEW)   Final Result      1.  Stable LEFT hydropneumothorax.   2.  Multifocal pneumonia again seen with slight worsening in RIGHT midlung region.            DX-CHEST-LIMITED (1 VIEW)   Final Result         Unchanged left lateral pneumothorax.      US-THORACENTESIS PUNCTURE LEFT   Final Result      1. Ultrasound guided left sided diagnostic and therapeutic thoracentesis.      2. 1550 mL of fluid withdrawn.      DX-CHEST-PORTABLE (1 VIEW)   Final Result      Interval decrease in size of the left pleural effusion which is now small.      Small small loculated left pneumothorax.      Small loculated right pleural effusion.      Left perihilar and basilar opacity may represent atelectasis/consolidation.      Increased ill-defined airspace opacities on the right may represent a combination of atelectasis, edema and pneumonia.      Atherosclerotic plaque.      Findings discussed with the covering clinician.         CT-CHEST (THORAX) W/O   Final Result         1.  Large left and moderate pleural effusions.   2.  Peripheral reticular pulmonary opacities, appearance compatible with Covid infiltrates.   3.  Linear consolidations in the bilateral lung bases, likely atelectasis.   4.  Hepatomegaly and irregular hepatic contour, appearance most compatible changes of cirrhosis.   5.  Diverticulosis      DX-CHEST-PORTABLE (1 VIEW)   Final Result      Stable large left pleural effusion with associated compressive atelectasis. Correlate clinically for infection.      Possible small right pleural effusion with mildly increased opacity in the right lung.      DX-CHEST-PORTABLE (1 VIEW)    (Results Pending)        Assessment/Plan  * Pleural effusion, bilateral  Assessment & Plan  Unknown etiology  Was seen on last admission and was asymptomatic. Now with worsening  SOB and worsening effusion  CT chest -findings of large left-sided pleural effusion and moderate right pleural effusion.    2D echo with preserved EF and no valvular abnormalities.  S/p thoracentesis of left pleural cavity .1.5 L of fluid removed.  S/p thoracentesis of right pleural cavity about 100 cc fluid removed, bloody.  Fluid studies slightly consistent with exudative effusion.  Cytology negative  Pulmonology following.    Rare gram-positive cocci growing in left pleural cavity concerning for empyema.  S/p pigtail placement of the left pleural cavity for adequate drainage of infection.    Daily CXR   Pulmonary following   Trials chest tube off suctioning  Continue Pigtail           Empyema, left (HCC)  Assessment & Plan  S/p thoracentesis of left pleural cavity .1.5 L of fluid removed.  S/p thoracentesis of right pleural cavity about 100 cc fluid removed, bloody.  Fluid studies slightly consistent with exudative effusion.  Cytology negative  Pulmonology following.    Rare gram-positive cocci growing in left pleural cavity concerning for empyema.  S/p pigtail placement of the left pleural cavity for adequate drainage of infection.  We will follow up on final cultures from the left pleural cavity and also monitor cultures from the right pleural cavity as well.   ID following, on vancomycin and Ancef initially and currently on Bactrim    Protein-calorie malnutrition, severe (HCC)  Assessment & Plan  RD following.  Follow-up on recommendations.    Postprocedural pneumothorax  Assessment & Plan  Post thoracentesis, chest x-ray with finding of a small left loculated pneumothorax.   We will repeat serial chest x-ray and closely monitor vital signs.    Repeat chest x-ray with findings of persistent left-sided hydropneumothorax concerning for trapped lung.  S/p IR for pigtail placement into left pleural cavity.     CT chest 5/21 with findings of persistent moderate-sized left pneumothorax.    Acute respiratory failure  with hypoxia (HCC)  Assessment & Plan  Secondary to large pleural effusion  See above for plan    Occluded PICC line, initial encounter ([facility])  Assessment & Plan  Patient sent by nursing home for occluded PICC line  Was flushed by ED staff and reportedly working currently  Consider new PICC line placement if continues to be nonfunctional as will need to continue Abx until 6/3    Osteomyelitis of left foot (HCC)- (present on admission)  Assessment & Plan  Patient last admitted to [facility] with osteomyelitis of left foot  S/p amputation of left 5th toe, amputation well healing  ID was consulted last admission and plan for Daptomycin and Ancef until 6/3, will continue Abx per their prior recommendation.     Started on IV Ancef and vancomycin on admit. Per ID, ABx till 6/3 , switched to Bactrim on 5/27.   ID on board     Deep vein thrombosis (DVT) of lower extremity ([facility])- (present on admission)  Assessment & Plan  History of DVT on Eliquis.  Repeat ultrasound Dopplers with findings of persistent nonocclusive left lower extremity DVT.  On Eliquis     Type 2 diabetes mellitus with peripheral neuropathy (HCC)- (present on admission)  Assessment & Plan  Patient with long history of DM with doccumented neuropathy and retinopathy  Hold home oral medications (ACTOS and Synjardy)  Insulin sliding scale for now  Diabetic diet  Last A1C 4.9 month ago    CAD (coronary artery disease)- (present on admission)  Assessment & Plan  Patient not currently taking statin, can be resumed as outpatient  Reported ACS in 2013 s/p stents  No current chest pain  ECHO nil acute   Will continue to monitor    Essential hypertension- (present on admission)  Assessment & Plan  Does not appear to be on antihypertensives at nursing home currently  Will continue to monitor    History of stroke- (present on admission)  Assessment & Plan  Prior history of stroke with some aphasia  Some deconditioning  PT/OT       VTE prophylaxis: Eliquis

## 2021-05-30 NOTE — PROGRESS NOTES
Assessment/description of ears? Red, blanching, intact  Which preventative measures are in place for the ears?  Grey foam, removal of glassess when sleeping  Assessment/description of elbows? Pink, intact, blanching, arms have scattered bruising and skin tears  Which preventative measures are in place for the elbows?  Mepilex, low air loss mattress, pillows for support, q2 turns  Assessment/description of sacrum? Red, peeling, irritated, slow to angelica  Which preventative measures are in place for the sacrum?  q2 turns, barrier cream, Low air loss mattress.   Assessment/description of heels? Heelsi boggy, pink, blanching   5th toe wound on R foot, and lateral edge of foot wound both open to air  L 5th toe amputation,   Which preventative measures are in place for the heels?  Float boots, mepilex, low air loss mattress , betadine for toes  Which devices are in place? PICC line, float boots, oxygen- NC, chest tube, glasses  Description of skin under devices: pink, dry, clean  Which preventative measures are in place under devices?  Dressing changes, grey foam, removal of glasses, mepilex, chest tube dressing assessment, q2 turns, low air loss mattress, float boots  Other:

## 2021-05-31 NOTE — PROGRESS NOTES
Assessment/description of ears? Bilateral pink, intact and blanching  Which preventative measures are in place for the ears?  Silicone oxygen tubing with grey foam, remove glasses while sleeping    Assessment/description of elbows?  Bilateral pink, intact, and blanching  Which preventative measures are in place for the elbows?  Mepilex, ERIKA mattress, pillows for support/positioning, q2hr turns    Assessment/description of sacrum? Red, pink, peeling, slow to angelica  Which preventative measures are in place for the sacrum?  ERIKA mattress, pillows for support/positioning, q2hr turns; no mepilex due to incontinent, barrier paste      Assessment/description of heels?  Pink, boggy, intact, and blanching; R & L inner ankles dark spots, slow to angelica; l great toe and 4th toe black area; L ft 5th toe amputated; right heel DTI  Which preventative measures are in place for the heels?  Mepilex, ERIKA mattress, pillows for support/positioning, q2hr turns, heel float boots in use, betadine 1st and 4th toes left foot      Which devices are in place? PICC line, NC, glasses, heel float boots  Description of skin under devices:  Which preventative measures are in place under devices?  Q shift assessment, grey foam on silicone oxygen tubing, mepilex, heel float boots    Other:  Skin is fragile with bruising and redness throughout.  Right arm has skin tears with mepitel and silicone dressing in place.

## 2021-05-31 NOTE — PROGRESS NOTES
Assessment/description of ears? Pink, intact  Which preventative measures are in place for the ears? Gray foam for NC.  Encouraged pt to remove glasses when he goes to sleep.       Assessment/description of elbows? Refused right elbow assessment d/t tenderness.  Right arm has skin tears, bruises, and redness.  Left elbow is pink and intact.  Left arm also has bruises and redness  Which preventative measures are in place for the elbows? Mepilex     Assessment/description of sacrum? Red, slow to angelica, and excoriated  Which preventative measures are in place for the sacrum? ERIKA mattress, Q2 turns, and incontinence care with barrier paste.       Assessment/description of heels? Right heel has dark spot.  Right and left inner ankles has dark spot.  Left great toe and 4th toe have black area.  Top of left foot is more red than previous night shift.    Which preventative measures are in place for the heels? Betadine painted on left 1st and 4 th toes.  Mepilex heels on for both heels and heel float boots.  Mepilex applied on top of left foot.         Which devices are in place? NC, glasses, chest tube, heel float boots.    Description of skin under devices: Behind ears are pink and blanching.  Right heel has DTI.    Which preventative measures are in place under devices? Gray foam for NC.  Mepilex heel and heel float boots.  Chest tube dressing changed.  Mepilex on left flank area so chest tube will not cause skin breakdown.         Other:  Both arms are bruises, red and very fragile.  Right arm has skin tears with mepitel.  Sacrum and groins are red and excoriated.

## 2021-05-31 NOTE — PROGRESS NOTES
Bedside report received. POC discussed with pt; all questions answered at this time.         Assessment/description of ears? Bilateral pink, intact and blanching  Which preventative measures are in place for the ears?  Silicone oxygen tubing with grey foam, remove glasses while sleeping     Assessment/description of elbows?  Bilateral pink, intact, and blanching  Which preventative measures are in place for the elbows?  Mepilex, ERIKA mattress, pillows for support/positioning, q2hr turns     Assessment/description of sacrum? Red, pink, peeling, slow to angelica  Which preventative measures are in place for the sacrum?  ERIKA mattress, pillows for support/positioning, q2hr turns; no mepilex due to incontinent, barrier paste        Assessment/description of heels?  Pink, boggy, intact, and blanching; R & L inner ankles dark spots, slow to angelica; L great toe and 4th toe black area top; L ft 5th toe amputated; right heel DTI; 3rd toe inner aspect area of dark purple, red, pink, non blanching  Which preventative measures are in place for the heels?  Mepilex, ERIKA mattress, pillows for support/positioning, q2hr turns, heel float boots in use, betadine 1st and 4th toes left foot; offloading between toes        Which devices are in place? PICC line, NC, glasses, heel float boots  Description of skin under devices:  Which preventative measures are in place under devices?  Q shift assessment, grey foam on silicone oxygen tubing, mepilex, heel float boots     Other:  Skin is fragile with bruising and redness throughout.  Right arm has skin tears with mepitel and silicone dressing in place;    Wound consult, photo uploaded, and LDA opened for 3rd toe inner aspect.

## 2021-05-31 NOTE — PROGRESS NOTES
[facility] Medicine Daily Progress Note    Date of Service  5/31/2021    Chief Complaint  76 y.o. male admitted 5/18/2021 with PICC line dysfunction and new onset pleural effusion.    Interval Problem Update  Patient was seen and examined at bedside. No acute events overnight.   Felling better this AM.   Daily Chest x-ray.   Started on IV Ancef and vancomycin on admit. Per ID,  switched to Bactrim on 5/27. ABx End Date 6/3.    Pulmonary team and ID actively following.   Pt came from [facility], plan to d/c back to [place] once medically stable.   PT/OT  Walking Boot ordered.     5/29: No acute issue overnight.  Still requiring 3 L oxygen.  Blood pressure is a little bit low.  According to ID, they recommend the patient be evaluated by cardiothoracic surgery.  Will discuss with pulmonary and see what their opinion is.    5/30: On 3 L oxygen.  No acute issue overnight.  Trial chest tube off suction by pulmonary.    5/31: Still on 3 to 4 L oxygen.  Continue to monitor chest tube output.  Pulmonary following.  Consultants/Specialty  Pulmonary   ID    Code Status  Full Code    Disposition  TBD   Pt came from [facility], plan to d/c back to [place] once medically stable.         Review of Systems  Review of Systems   Constitutional: Positive for malaise/fatigue. Negative for chills.   HENT: Negative for ear discharge and ear pain.    Eyes: Negative for double vision.   Respiratory: Positive for shortness of breath. Negative for sputum production and wheezing.    Cardiovascular: Negative for leg swelling.   Gastrointestinal: Negative for abdominal pain and vomiting.   Genitourinary: Negative for frequency and urgency.   Musculoskeletal: Positive for myalgias.   Neurological: Negative for focal weakness and headaches.   All other systems reviewed and are negative.       Physical Exam  Temp:  [36.3 °C (97.3 °F)-36.7 °C (98.1 °F)] 36.3 °C (97.3 °F)  Pulse:  [69-89] 69  Resp:  [17-20] 20  BP: ()/(54-65) 114/65  SpO2:  [93 %-98 %] 98  %    Physical Exam  Constitutional:       Appearance: Normal appearance.   HENT:      Head: Normocephalic and atraumatic.   Eyes:      Extraocular Movements: Extraocular movements intact.      Pupils: Pupils are equal, round, and reactive to light.   Cardiovascular:      Rate and Rhythm: Normal rate and regular rhythm.      Pulses: Normal pulses.      Heart sounds: Normal heart sounds.   Pulmonary:      Effort: Pulmonary effort is normal.      Breath sounds: Rales present.   Abdominal:      General: Abdomen is flat.   Musculoskeletal:         General: No swelling.      Comments: Left-sided pigtail chest tube in place.   Skin:     General: Skin is warm and dry.   Neurological:      General: No focal deficit present.      Mental Status: He is alert.         Fluids    Intake/Output Summary (Last 24 hours) at 5/31/2021 0727  Last data filed at 5/31/2021 0600  Gross per 24 hour   Intake 890 ml   Output 1570 ml   Net -680 ml       Laboratory                        Imaging  DX-CHEST-PORTABLE (1 VIEW)   Final Result      Mild further decrease in size of loculated left lateral pneumothorax      Stable moderate multifocal consolidation      DX-CHEST-PORTABLE (1 VIEW)   Final Result      Stable chest with loculated left lateral pneumothorax and multifocal consolidation      DX-CHEST-PORTABLE (1 VIEW)   Final Result      Left-sided pigtail catheter is partially visualized. Loculated left pneumothorax appears mildly increased.      No other significant change.         DX-CHEST-PORTABLE (1 VIEW)   Final Result      Interval removal of left pigtail chest tube. Slight increase in loculated left pneumothorax.      No other significant change.      DX-CHEST-PORTABLE (1 VIEW)   Final Result      1.  Small loculated left pneumothorax is unchanged.   2.  Small loculated right pleural effusion is stable.   3.  Bilateral airspace opacities are unchanged compared to prior.   4.  Stable cardiomegaly   5.  Atherosclerotic plaque.       DX-CHEST-PORTABLE (1 VIEW)   Final Result      1.  Minimal decrease in left pneumothorax with left pigtail chest tube in place.      2.  Persistent atelectasis or pneumonia in the left lower lobe and lingula.      3.  Unchanged small right pleural effusion and atelectasis or pneumonia in the right midlung and right lower lobe.      DX-CHEST-PORTABLE (1 VIEW)   Final Result      1.  Stable moderate sized left pneumothorax.      2.  Stable bilateral infiltrates and support devices.      3.  Stable cardiomegaly.      DX-CHEST-PORTABLE (1 VIEW)   Final Result      Stable to slightly more prominent left pneumothorax and with increased left pleural fluid.      No other significant interval change.      EC-ECHOCARDIOGRAM COMPLETE W/O CONT   Final Result      DX-CHEST-PORTABLE (1 VIEW)   Final Result         1.  Pulmonary infiltrates, increased on the right compared to prior study.   2.  New layering right pleural effusion.   3.  Left pneumothorax, decreased since prior study.   4.  Cardiomegaly   5.  Atherosclerosis      CT-CHEST (THORAX) WITH   Final Result         1.  Moderate size left pneumothorax with small caliber pigtail thoracostomy tube in place, appears similar to prior chest x-ray yesterday at 1054.   2.  Layering bilateral pleural effusions.   3.  Peripheral reticular consolidations, appearance compatible with Covid infiltrates. There are areas of denser consolidation suggesting superimposed secondary infectious process.   4.  Irregular hepatic contour, compatible with changes of cirrhosis.   5.  Trace perihepatic ascites      US-EXTREMITY VENOUS LOWER BILAT   Final Result      DX-CHEST-PORTABLE (1 VIEW)   Final Result      1.  Interval placement of small-caliber LEFT chest tube with improvement of pleural fluid and compensatory enlargement of LEFT pneumothorax, likely due to noncompliant underlying lung.   2.  Improving RIGHT lung infiltrates.      These findings were discussed with SUNDAR SUNG on  5/21/2021 11:18 AM.         CT-CHEST TUBE-EMPYEMA LEFT   Final Result      1. LEFT CHEST EMPYEMA Drainage with CT guidance.      DX-CHEST-LIMITED (1 VIEW)   Final Result      1.  Stable LEFT hydropneumothorax.   2.  Multifocal pneumonia again seen with slight worsening in RIGHT midlung region.            DX-CHEST-LIMITED (1 VIEW)   Final Result         Unchanged left lateral pneumothorax.      US-THORACENTESIS PUNCTURE LEFT   Final Result      1. Ultrasound guided left sided diagnostic and therapeutic thoracentesis.      2. 1550 mL of fluid withdrawn.      DX-CHEST-PORTABLE (1 VIEW)   Final Result      Interval decrease in size of the left pleural effusion which is now small.      Small small loculated left pneumothorax.      Small loculated right pleural effusion.      Left perihilar and basilar opacity may represent atelectasis/consolidation.      Increased ill-defined airspace opacities on the right may represent a combination of atelectasis, edema and pneumonia.      Atherosclerotic plaque.      Findings discussed with the covering clinician.         CT-CHEST (THORAX) W/O   Final Result         1.  Large left and moderate pleural effusions.   2.  Peripheral reticular pulmonary opacities, appearance compatible with Covid infiltrates.   3.  Linear consolidations in the bilateral lung bases, likely atelectasis.   4.  Hepatomegaly and irregular hepatic contour, appearance most compatible changes of cirrhosis.   5.  Diverticulosis      DX-CHEST-PORTABLE (1 VIEW)   Final Result      Stable large left pleural effusion with associated compressive atelectasis. Correlate clinically for infection.      Possible small right pleural effusion with mildly increased opacity in the right lung.      DX-CHEST-PORTABLE (1 VIEW)    (Results Pending)        Assessment/Plan  * Pleural effusion, bilateral  Assessment & Plan  Unknown etiology  Was seen on last admission and was asymptomatic. Now with worsening SOB and worsening  effusion  CT chest -findings of large left-sided pleural effusion and moderate right pleural effusion.    2D echo with preserved EF and no valvular abnormalities.  S/p thoracentesis of left pleural cavity .1.5 L of fluid removed.  S/p thoracentesis of right pleural cavity about 100 cc fluid removed, bloody.  Fluid studies slightly consistent with exudative effusion.  Cytology negative  Pulmonology following.    Rare gram-positive cocci growing in left pleural cavity concerning for empyema.  S/p pigtail placement of the left pleural cavity for adequate drainage of infection.    Daily CXR   Pulmonary following   Monitor chest tube output          Empyema, left (HCC)  Assessment & Plan  S/p thoracentesis of left pleural cavity .1.5 L of fluid removed.  S/p thoracentesis of right pleural cavity about 100 cc fluid removed, bloody.  Fluid studies slightly consistent with exudative effusion.  Cytology negative  Pulmonology following.    Rare gram-positive cocci growing in left pleural cavity concerning for empyema.  S/p pigtail placement of the left pleural cavity for adequate drainage of infection.  We will follow up on final cultures from the left pleural cavity and also monitor cultures from the right pleural cavity as well.   ID following, on vancomycin and Ancef initially and currently on Bactrim    Protein-calorie malnutrition, severe (HCC)  Assessment & Plan  RD following.  Follow-up on recommendations.    Postprocedural pneumothorax  Assessment & Plan  Post thoracentesis, chest x-ray with finding of a small left loculated pneumothorax.   We will repeat serial chest x-ray and closely monitor vital signs.    Repeat chest x-ray with findings of persistent left-sided hydropneumothorax concerning for trapped lung.  S/p IR for pigtail placement into left pleural cavity.     CT chest 5/21 with findings of persistent moderate-sized left pneumothorax.    Acute respiratory failure with hypoxia (HCC)  Assessment &  Plan  Secondary to large pleural effusion  See above for plan    Occluded PICC line, initial encounter (HCC)  Assessment & Plan  Patient sent by nursing home for occluded PICC line  Was flushed by ED staff and reportedly working currently  Consider new PICC line placement if continues to be nonfunctional as will need to continue Abx until 6/3    Osteomyelitis of left foot (HCC)- (present on admission)  Assessment & Plan  Patient last admitted to [facility] with osteomyelitis of left foot  S/p amputation of left 5th toe, amputation well healing  ID was consulted last admission and plan for Daptomycin and Ancef until 6/3, will continue Abx per their prior recommendation.     Started on IV Ancef and vancomycin on admit. Per ID, ABx till 6/3 , switched to Bactrim on last dose Flory 3  ID on board and signed off    Deep vein thrombosis (DVT) of lower extremity (HCC)- (present on admission)  Assessment & Plan  History of DVT on Eliquis.  Repeat ultrasound Dopplers with findings of persistent nonocclusive left lower extremity DVT.  On Eliquis     Type 2 diabetes mellitus with peripheral neuropathy (HCC)- (present on admission)  Assessment & Plan  Patient with long history of DM with doccumented neuropathy and retinopathy  Hold home oral medications (ACTOS and Synjardy)  Insulin sliding scale for now  Diabetic diet  Last A1C 4.9 month ago    CAD (coronary artery disease)- (present on admission)  Assessment & Plan  Patient not currently taking statin, can be resumed as outpatient  Reported ACS in 2013 s/p stents  No current chest pain  ECHO nil acute   Will continue to monitor    Essential hypertension- (present on admission)  Assessment & Plan  Does not appear to be on antihypertensives at nursing home currently  Will continue to monitor    History of stroke- (present on admission)  Assessment & Plan  Prior history of stroke with some aphasia  Some deconditioning  PT/OT       VTE prophylaxis: Eliquis

## 2021-05-31 NOTE — CARE PLAN
The patient is Stable - Low risk of patient condition declining or worsening    Shift Goals  Clinical Goals: O2 sat >90%  Patient Goals: Pt will report no pain from chest tube site    Progress made toward(s) clinical / shift goals:  3L O2 via NC.  Encouraged IS    Patient is not progressing towards the following goals:

## 2021-05-31 NOTE — CARE PLAN
The patient is Stable - Low risk of patient condition declining or worsening    Shift Goals  Clinical Goals: oxygen saturation will remain greater than 90%  Patient Goals: Pt will report no pain from chest tube site    Progress made toward(s) clinical / shift goals:  Pt's oxygen saturation has remained above 90%    Patient is not progressing towards the following goals:

## 2021-05-31 NOTE — PROGRESS NOTES
On Friday, positive blood return noted from PICC.  Last night, no blood return but able to flush.  Tonight, it is little hard to flush.  Dr. Andres notified.  He ordered alteplase.

## 2021-05-31 NOTE — CARE PLAN
Problem: Nutritional:  Goal: Achieve adequate nutritional intake  Description: Patient will consume >50% of meals  Outcome: Progressing     PO % x last 3 meals recorded. Pt states his appetite has improved in the last few days and he is drinking the Boost. Encouraged pt to continue to drink boost to optimize kcal/protein for wound healing.     RD continues to follow.

## 2021-05-31 NOTE — CARE PLAN
The patient is Stable - Low risk of patient condition declining or worsening    Shift Goals  Clinical Goals: pts oxygen saturations will remain above 90%  Patient Goals: Pt will report no pain from chest tube site    Progress made toward(s) clinical / shift goals:  Pts oxygen saturations remained above 90% throughout shift.    Patient is not progressing towards the following goals:

## 2021-05-31 NOTE — PROGRESS NOTES
"Pulmonary follow up    Summary  \"\"Very pleasant 76-year-old male never smoker admitted for shortness of breath from [facility].  He has a complicated past medical history starting 11/2020 when he was admitted for weakness and Covid pneumonia.  CT chest at that time showed left lower lobe consolidation and scattered peripheral predominant groundglass opacities.  No pleural fluid.  He was hospitalized again in 4/2021 for osteomyelitis of the toe, cultures at that time grew MRSA and Proteus that was treated with daptomycin and Ancef.  CT chest at that time showed left greater than right pleural effusions that were managed conservatively.  He was readmitted again this hospitalization for PICC occlusion, where CT chest showed markedly increased left effusion and right effusion as well.  Is on Eliquis for left lower extremity DVT.  He underwent a left-sided thoracentesis by interventional radiology 1500 cc drained, with post procedural CXR showing hydropneumothorax.  Pleural fluid was mostly blood, 84% lymphocytes, and rare GPC's.  For these findings pulmonary was consulted.     This hospitalization he has been afebrile, WBC initially mildly elevated with PMN predominance but has since normalized.  Flu/RSV/SARS-CoV-2 swab negative.     On personal review of his CT this hospitalization, the airways appear patent, there is extrinsic compression of the left lung by a large pleural effusion.  There appears to be no pleural-based nodularities.  Solitary lymph node high in the trachea has been slowly enlarging since 11/2020 but is still < 2cm.  In the right hemithorax, there are scattered groundglass opacities peripherally predominant as well as a modest right pleural effusion as well.  TTE negative for endocarditis. Blood cultures no growth to date and right pleural cultures no growth to date.  Chest tube output q 24 hrs\" from Dr. Curran's note    Overnight events  Placed on water seal 5/29 5/29-5/30 " 5/30-5/31  100    CXR personally viewed and pneumothorax is smaller slightly , l offical read unchanged    Review of Systems   Constitutional: Positive for malaise/fatigue.   HENT: Negative.    Eyes: Negative.    Respiratory: Negative.         Discomfort at chest tube site   Cardiovascular: Negative.    Gastrointestinal: Negative.    Genitourinary: Negative.    Musculoskeletal: Negative.    Skin: Negative.    Neurological: Positive for weakness.   Endo/Heme/Allergies: Negative.    Psychiatric/Behavioral: Negative.        Vitals:    05/31/21 0800   BP: 111/88   Pulse: 84   Resp: 16   Temp: 36.3 °C (97.4 °F)   SpO2: 96%       Physical Exam  Constitutional:       Appearance: He is obese. He is ill-appearing.   HENT:      Head: Normocephalic and atraumatic.      Mouth/Throat:      Mouth: Mucous membranes are dry.   Eyes:      Pupils: Pupils are equal, round, and reactive to light.   Cardiovascular:      Rate and Rhythm: Normal rate.   Pulmonary:      Effort: Pulmonary effort is normal.      Comments: diminisihed at left post base  Musculoskeletal:      Cervical back: Normal range of motion.   Skin:     General: Skin is warm and dry.   Neurological:      Mental Status: He is oriented to person, place, and time.   Psychiatric:         Mood and Affect: Mood normal.         Labs:                  No results for input(s): SODIUM, POTASSIUM, CHLORIDE, CO2, GLUCOSE, BUN, CPKTOTAL in the last 72 hours.  No results for input(s): SODIUM, POTASSIUM, CHLORIDE, CO2, BUN, CREATININE, MAGNESIUM, PHOSPHORUS, CALCIUM in the last 72 hours.  Results for orders placed or performed during the hospital encounter of 01/28/12   ECHOCARDIOGRAM COMP W/O CONT   Result Value Ref Range    Eject.Frac. MOD BP 55.03     Eject.Frac. MOD 4C 56.62     Eject.Frac. MOD 2C 62.97          Imaging:   DX-CHEST-PORTABLE (1 VIEW)   Final Result      No significant interval change.      DX-CHEST-PORTABLE (1 VIEW)   Final Result      Mild further decrease in size  of loculated left lateral pneumothorax      Stable moderate multifocal consolidation      DX-CHEST-PORTABLE (1 VIEW)   Final Result      Stable chest with loculated left lateral pneumothorax and multifocal consolidation      DX-CHEST-PORTABLE (1 VIEW)   Final Result      Left-sided pigtail catheter is partially visualized. Loculated left pneumothorax appears mildly increased.      No other significant change.         DX-CHEST-PORTABLE (1 VIEW)   Final Result      Interval removal of left pigtail chest tube. Slight increase in loculated left pneumothorax.      No other significant change.      DX-CHEST-PORTABLE (1 VIEW)   Final Result      1.  Small loculated left pneumothorax is unchanged.   2.  Small loculated right pleural effusion is stable.   3.  Bilateral airspace opacities are unchanged compared to prior.   4.  Stable cardiomegaly   5.  Atherosclerotic plaque.      DX-CHEST-PORTABLE (1 VIEW)   Final Result      1.  Minimal decrease in left pneumothorax with left pigtail chest tube in place.      2.  Persistent atelectasis or pneumonia in the left lower lobe and lingula.      3.  Unchanged small right pleural effusion and atelectasis or pneumonia in the right midlung and right lower lobe.      DX-CHEST-PORTABLE (1 VIEW)   Final Result      1.  Stable moderate sized left pneumothorax.      2.  Stable bilateral infiltrates and support devices.      3.  Stable cardiomegaly.      DX-CHEST-PORTABLE (1 VIEW)   Final Result      Stable to slightly more prominent left pneumothorax and with increased left pleural fluid.      No other significant interval change.      EC-ECHOCARDIOGRAM COMPLETE W/O CONT   Final Result      DX-CHEST-PORTABLE (1 VIEW)   Final Result         1.  Pulmonary infiltrates, increased on the right compared to prior study.   2.  New layering right pleural effusion.   3.  Left pneumothorax, decreased since prior study.   4.  Cardiomegaly   5.  Atherosclerosis      CT-CHEST (THORAX) WITH   Final  Result         1.  Moderate size left pneumothorax with small caliber pigtail thoracostomy tube in place, appears similar to prior chest x-ray yesterday at 1054.   2.  Layering bilateral pleural effusions.   3.  Peripheral reticular consolidations, appearance compatible with Covid infiltrates. There are areas of denser consolidation suggesting superimposed secondary infectious process.   4.  Irregular hepatic contour, compatible with changes of cirrhosis.   5.  Trace perihepatic ascites      US-EXTREMITY VENOUS LOWER BILAT   Final Result      DX-CHEST-PORTABLE (1 VIEW)   Final Result      1.  Interval placement of small-caliber LEFT chest tube with improvement of pleural fluid and compensatory enlargement of LEFT pneumothorax, likely due to noncompliant underlying lung.   2.  Improving RIGHT lung infiltrates.      These findings were discussed with SUNDAR SUNG on 5/21/2021 11:18 AM.         CT-CHEST TUBE-EMPYEMA LEFT   Final Result      1. LEFT CHEST EMPYEMA Drainage with CT guidance.      DX-CHEST-LIMITED (1 VIEW)   Final Result      1.  Stable LEFT hydropneumothorax.   2.  Multifocal pneumonia again seen with slight worsening in RIGHT midlung region.            DX-CHEST-LIMITED (1 VIEW)   Final Result         Unchanged left lateral pneumothorax.      US-THORACENTESIS PUNCTURE LEFT   Final Result      1. Ultrasound guided left sided diagnostic and therapeutic thoracentesis.      2. 1550 mL of fluid withdrawn.      DX-CHEST-PORTABLE (1 VIEW)   Final Result      Interval decrease in size of the left pleural effusion which is now small.      Small small loculated left pneumothorax.      Small loculated right pleural effusion.      Left perihilar and basilar opacity may represent atelectasis/consolidation.      Increased ill-defined airspace opacities on the right may represent a combination of atelectasis, edema and pneumonia.      Atherosclerotic plaque.      Findings discussed with the covering clinician.          CT-CHEST (THORAX) W/O   Final Result         1.  Large left and moderate pleural effusions.   2.  Peripheral reticular pulmonary opacities, appearance compatible with Covid infiltrates.   3.  Linear consolidations in the bilateral lung bases, likely atelectasis.   4.  Hepatomegaly and irregular hepatic contour, appearance most compatible changes of cirrhosis.   5.  Diverticulosis      DX-CHEST-PORTABLE (1 VIEW)   Final Result      Stable large left pleural effusion with associated compressive atelectasis. Correlate clinically for infection.      Possible small right pleural effusion with mildly increased opacity in the right lung.         Assessment and plan    77 yo male with  Hx of COVID pna and infiltrates on the left late last year and then OM of left foot growing proteus and MRSA  Left pleural effusion with + gram stain(rare gram positive cocci) and  negative cxs.  Pigtail inserted with non resolving loculated pneumothorax and continues output up to 300 cc /24 hrs  At this time this would suggest the left lung is trapped and unlikely to expand  Surgery to expand may be helpful if pt is ambulatory and current status affecting quality fo life if lung is trapped which is not the case  Fio2 requirement is 3 l and has not changed in several days  Pleural fluid ouput l100 cc while on water seal over 24 hrs  Saturation highest I have seen at 97% on 3 l  Pneumothorax size is smaller by my evaluation  Marked decrease in output  If 100 cc or less in next 24 hrs to remove  As mention if  inflammation improves trapped lungs can inflate.

## 2021-06-01 NOTE — PROGRESS NOTES
Assessment/description of ears? intact and blanching  Which preventative measures are in place for the ears?  Grey foam tubing for NC, remove glasses while sleeping     Assessment/description of elbows?  intact and blanching  Which preventative measures are in place for the elbows?  Mepilex, ERIKA mattress, pillows for support/positioning, q2hr turns     Assessment/description of sacrum? Red, pink, peeling, slow to angelica   Which preventative measures are in place for the sacrum?  ERIKA mattress, pillows for support/positioning, q2hr turns; barrier paste,         Assessment/description of heels?  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  Mepilex, ERIKA mattress, q2hr turns, heel float boots in use, betadine 1st and 4th toes left foot; offloading between toes      Which devices are in place? PICC line, NC, glasses, heel float boots  Description of skin under devices:  Which preventative measures are in place under devices?  Q shift assessment, grey foam on silicone oxygen tubing, mepilex, heel float boots     Other:  skin is fragile and bruising throughout. Right arm skin tear with mepitel dressing and mepilex

## 2021-06-01 NOTE — PROGRESS NOTES
Assumed pt care at shift change.  Pt alert/oriented 4, resting in bed.  Pt is on 3 L oxygen via NC, with no signs of distress or discomfort.  Discussed POC with pt; answered questions.   Safety precautions in place, bed locked and in lowest position, call light and personal belongings within reach.  No further needs at this time.        Assessment/description of ears? Bilateral pink, intact, and blanching  Which preventative measures are in place for the ears? Silicone oxygen tubing with grey foam, remind pt to remove glasses while/when sleeping    Assessment/description of elbows?  Bilateral intact, pink, blanching  Which preventative measures are in place for the elbows?  Mepilex, ERIKA mattress, Q2hr turns, pillows for support/positioning, Q shift assessments    Assessment/description of sacrum?  Red/pink, slow to angelica, peeling skin  Which preventative measures are in place for the sacrum?  Barrier cream, Q2hr turns, pillows for support/positioning, ERIKA mattress    Assessment/description of heels?  Bilateral pink, boggy, blanching; right inner ankles darkened areas that are slow to angelica, left inner ankle darkened area non-blanching to slow to angelica; right heel DTI, left 5th toe amputated, Left foot top of great toe and top of 4th toe blackened area, left foot 3rd toe darkened area non-blanching inner aspect of toe next to 4th toe  Which preventative measures are in place for the heels?  Mepilex, heel float boots, ERIKA mattress, betadine application on great & 4th toe, offloading between toes with gauze    Which devices are in place?  PICC, NC, glasses, heel float boots, chest tubing (left posterior surface)  Description of skin under devices:  Clean, dry intact outside of above mentioned areas  Which preventative measures are in place under devices? Q shift assessment, silicone oxygen tubing with grey foam, mepilex    Other:  pts skin is fragile with scattered bruising and skin tears.  Skin tear are open to air  and right arm/hand tears have mepitel one and silicone dressing applied (right arm).

## 2021-06-01 NOTE — THERAPY
"Occupational Therapy  Daily Treatment     Patient Name: Agapito Stone  Age:  76 y.o., Sex:  male  Medical Record #: [id number]  Today's Date: 6/1/2021     Precautions  Precautions: Fall Risk  Comments: no formal WB orders, B walking boots, CT discharged 6/1    Assessment    Making progress, worked on improving sitting balance while performing seated ADLs EOB. Initially strong posterior push EOB but progressed to sitting w/ supv. Remains limited by weakness, fatigue, impaired balance, impaired safety awareness which impacts independence in self care and functional mobility.    Plan    Continue current treatment plan.    DC Equipment Recommendations: Unable to determine at this time  Discharge Recommendations: Recommend post-acute placement for additional occupational therapy services prior to discharge home    Subjective    \"This PB&J is beautiful\"     Objective       06/01/21 1340   Cognition    Cognition / Consciousness X   Level of Consciousness Alert   Safety Awareness Impaired   Comments pleasant and cooperative, needs frequent cueing to maintain sitting balance   Neuro-Muscular Treatments   Neuro-Muscular Treatments Anterior weight shift;Verbal Cuing;Tactile Cuing;Postural Facilitation   Comments posterior push initially, improved w/ cueing and facilitation   Balance   Sitting Balance (Static) Poor +   Sitting Balance (Dynamic) Poor   Weight Shift Sitting Fair   Skilled Intervention Verbal Cuing;Tactile Cuing;Sequencing;Postural Facilitation;Facilitation;Compensatory Strategies   Comments sitting balance improved throughout session to fair-   Bed Mobility    Supine to Sit Maximal Assist   Scooting Maximal Assist   Skilled Intervention Verbal Cuing;Tactile Cuing;Sequencing   Activities of Daily Living   Eating Minimal Assist  (seated EOB)   Lower Body Dressing Maximal Assist  (Boot, sock)   Skilled Intervention Verbal Cuing;Tactile Cuing;Sequencing;Postural Facilitation   Comments assist for sitting balance " EOB while eating meal, progressed to being able to sit w/ supv w/o hand support   How much help from another person does the patient currently need...   Putting on and taking off regular lower body clothing? 2   Bathing (including washing, rinsing, and drying)? 2   Toileting, which includes using a toilet, bedpan, or urinal? 2   Putting on and taking off regular upper body clothing? 2   Taking care of personal grooming such as brushing teeth? 3   Eating meals? 3   6 Clicks Daily Activity Score 14   Functional Mobility   Mobility EOB only   Patient / Family Goals   Patient / Family Goal #1 return to [facility]   Goal #1 Outcome Progressing as expected   Short Term Goals   Short Term Goal # 1 Pt will demo G/H tasks seated EOB with min A   Goal Outcome # 1 Progressing as expected   Short Term Goal # 2 Pt will demo BSC txfs with slide board with mod A   Goal Outcome # 2 Goal not met   Short Term Goal # 3 Pt will demo UB dressing with min A   Goal Outcome # 3 Goal not met

## 2021-06-01 NOTE — DISCHARGE PLANNING
Agency/Facility Name: [facility]  Spoke To: Artur  Outcome: Can take pt back once medically clear.

## 2021-06-01 NOTE — WOUND TEAM
[facility]  Inpatient Services  Wound and Skin Care Progress Note    Admission Date: 5/18/2021     Last order of IP CONSULT TO WOUND CARE was found on 5/19/2021 from Hospital Encounter on 5/18/2021     HPI, PMH, SH: Reviewed    Past Surgical History:   Procedure Laterality Date   • TOE AMPUTATION Left 4/23/2021    Procedure: AMPUTATION, TOE - 5TH TOE;  Surgeon: Remi Scott M.D.;  Location: [facility];  Service: Orthopedics   • IRRIGATION & DEBRIDEMENT ORTHO Right 12/10/2020    Procedure: IRRIGATION AND DEBRIDEMENT, WOUND - HEEL;  Surgeon: Royal Bolivar M.D.;  Location: [facility];  Service: Orthopedics   • TENDON LENGHTENING Right 8/24/2020    Procedure: LENGTHENING, TENDON- , GASTROC RECESSION;  Surgeon: Royal Bolivar M.D.;  Location: [facility];  Service: Orthopedics   • ANKLE FUSION Right 8/24/2020    Procedure: FUSION, JOINT, ANKLE- ANKLE SUBTALAR FUSION , BONE GRAFT, MEDIAL RELEASE INCLUDING TENDONS, PATIAL EXCISION OF FIBULA;  Surgeon: Royal Bolivar M.D.;  Location: [facility];  Service: Orthopedics   • TENDON TRANSFER Right 8/24/2020    Procedure: TRANSFER, TENDON- PLANTAR FASCIA RELEASE;  Surgeon: Royal Bolivar M.D.;  Location: [facility];  Service: Orthopedics   • ORTHOPEDIC OSTEOTOMY Right 8/24/2020    Procedure: OSTEOTOMY- 1ST METATARSAL, CROSS PROCEDURE, 2-5 METATARSAL OSTEOTOMY, 2-5 PHALANGEL JOINT RECONSTRUCTION;  Surgeon: Royal Bolivar M.D.;  Location: [facility];  Service: Orthopedics   • HAMMERTOE CORRECTION Right 8/24/2020    Procedure: CORRECTION, HAMMER TOE 2-5;  Surgeon: Royal Bolivar M.D.;  Location: [facility];  Service: Orthopedics   • STENT PLACEMENT  2013    cardiac   • CAROTID ENDARTERECTOMY  7/13/2010    left; Performed by CHIQUITA CORRIGAN at [facility]   • CATARACT EXTRACTION WITH IOL Bilateral    • TONSILLECTOMY  as a child     Social History      "    Tobacco Use   • Smoking status: Never Smoker   • Smokeless tobacco: Never Used   Substance Use Topics   • Alcohol use: Not Currently     Alcohol/week: 0.0 oz     Comment: once a year     Chief Complaint   Patient presents with   • Vascular Access Problem     Left upper arm PICC line won't flush per staff at [facility]. PICC line accessed and it does flush with NS, however, it is very slow and hard to flush. No discomfort, redness, or swelling noted.     Diagnosis: Pleural effusion [J90]    Unit where seen by Wound Team: S626/01     WOUND CONSULT/FOLLOW UP RELATED TO:  Sacrum, right and left foot    WOUND HISTORY:  Patient had a L 5th ray amputation performed by Dr. Scott on 4/23 and was D/C'd back to [facility] for abx course via PICC.  Pt readmitted 5/18 for pleural effusion.  Per patient he also has an extensive history of R foot surgeries, \"my entire R foot has been rebuilt.\"      WOUND ASSESSMENT/LDA        Wound 04/23/21 Incision Foot Left Xeroform, 4x4, soft roll, ace (Active)   Wound Image   05/19/21 0844   Site Assessment Clean;Dry;Intact 05/31/21 2000   Periwound Assessment Clean;Dry;Intact 05/31/21 2000   Margins Attached edges 05/22/21 2023   Closure None 05/22/21 2023   Drainage Amount None 05/31/21 2000   Drainage Description IDA 05/21/21 2056   Treatments Cleansed 05/22/21 2023   Wound Cleansing Approved Wound Cleanser 05/29/21 2124   Dressing Options Open to Air 05/31/21 2000   Dressing Changed Observed 05/21/21 2056   Dressing Status Clean;Dry;Intact 04/27/21 0800       Wound 05/19/21 Pressure Injury Foot;Heel;Ankle Right POA (Active)   Wound Image     06/01/21 1500   Site Assessment Light Purple;Pink;Red 06/01/21 1500   Periwound Assessment [place] 06/01/21 1500   Margins Defined edges 06/01/21 1500   Closure Open to air 06/01/21 1500   Drainage Amount None 06/01/21 1500   Treatments Offloading 06/01/21 1500   Wound Cleansing Not Applicable 06/01/21 1500   Periwound Protectant Not Applicable " 06/01/21 1500   Dressing Cleansing/Solutions Not Applicable 06/01/21 1500   Dressing Options Mepilex Heel 06/01/21 1500   Dressing Changed Reinforced 06/01/21 1500   Dressing Status Clean;Dry;Intact 06/01/21 0811   Dressing Change/Treatment Frequency Every 72 hrs, and As Needed 06/01/21 1500   NEXT Dressing Change/Treatment Date 06/03/21 06/01/21 0811   NEXT Weekly Photo (Inpatient Only) 06/08/21 06/01/21 1500   Pressure Injury Stage DTPI 05/26/21 1600   Wound Length (cm) 0.6 cm 05/19/21 1600   Wound Width (cm) 0.6 cm 05/19/21 1600   Wound Surface Area (cm^2) 0.36 cm^2 05/19/21 1600   Shape circular 06/01/21 1500   Wound Odor None 06/01/21 1500   Exposed Structures IDA 05/26/21 1600   WOUND NURSE ONLY - Time Spent with Patient (mins) 60 06/01/21 1500       Wound 05/19/21 Pressure Injury Foot;Ankle;Toe, Hallux;Toe, 4th;Toe, 3rd Left POA (Active)   Wound Image      06/01/21 1500   Site Assessment Eschar;Black 06/01/21 1500   Periwound Assessment [place] 06/01/21 1500   Margins Defined edges 06/01/21 1500   Closure Open to air 06/01/21 1500   Drainage Amount None 06/01/21 1500   Treatments Offloading 06/01/21 1500   Wound Cleansing Not Applicable 06/01/21 1500   Periwound Protectant Not Applicable 06/01/21 1500   Dressing Cleansing/Solutions 3% Betadine 06/01/21 1500   Dressing Options Open to Air 06/01/21 1500   Dressing Changed Reinforced 06/01/21 1500   Dressing Status Clean;Dry;Intact 05/31/21 1137   Dressing Change/Treatment Frequency Daily, and As Needed 06/01/21 0811   NEXT Dressing Change/Treatment Date 06/01/21 06/01/21 0811   NEXT Weekly Photo (Inpatient Only) 06/08/21 06/01/21 1500   Pressure Injury Stage DTPI 05/26/21 1600   Wound Length (cm) 0.8 cm 05/26/21 1600   Wound Width (cm) 0.6 cm 05/26/21 1600   Wound Depth (cm) 0 cm 05/26/21 1600   Wound Surface Area (cm^2) 0.48 cm^2 05/26/21 1600   Wound Volume (cm^3) 0 cm^3 05/26/21 1600   Shape round 05/26/21 1600   Wound Odor None 05/26/21 1600   Exposed  Structures IDA 05/26/21 1600   WOUND NURSE ONLY - Time Spent with Patient (mins) 60 05/19/21 1600   Wound 05/19/21 Pressure Injury Sacrum;Coccyx (Active)   Wound Image   06/01/21 1500   Site Assessment [place] 06/01/21 1500   Periwound Assessment Blanchable erythema 06/01/21 1500   Margins Undefined edges 06/01/21 1500   Closure Open to air 06/01/21 1500   Drainage Amount None 06/01/21 1500   Treatments Cleansed 06/01/21 1500   Wound Cleansing Foam Cleanser/Washcloth 06/01/21 1500   Periwound Protectant Barrier Paste 06/01/21 1500   Dressing Cleansing/Solutions Not Applicable 06/01/21 1500   Dressing Options Open to Air 06/01/21 1500   Dressing Changed Changed 05/26/21 1600   Dressing Status Clean;Dry;Intact 05/28/21 0800   Dressing Change/Treatment Frequency As Needed 06/01/21 1500   NEXT Dressing Change/Treatment Date 05/29/21 05/26/21 1600   NEXT Weekly Photo (Inpatient Only) 06/08/21 06/01/21 1500   Pressure Injury Stage DTPI 05/26/21 1600   Wound Length (cm) 0.6 cm 05/19/21 1600   Wound Width (cm) 0.6 cm 05/19/21 1600   Wound Surface Area (cm^2) 0.36 cm^2 05/19/21 1600   Shape round 05/26/21 1600   Wound Odor None 05/26/21 1600   Exposed Structures IDA 05/26/21 1600   WOUND NURSE ONLY - Time Spent with Patient (mins) 60 05/19/21 1600     Moisture Associated Skin Damage 05/19/21 Buttock;Groin (Active)   Wound Image   05/19/21 1600   NEXT Weekly Photo (Inpatient Only) 06/08/21 06/01/21 1500   Drainage Amount None 06/01/21 1500   Drainage Description IDA 05/25/21 2128   Periwound Assessment Pink 05/31/21 1137   IAD Cleansing Foam Cleanser/Washcloth 06/01/21 1500   Periwound Protectant Barrier Paste 06/01/21 1500   IAD Containment Device None 05/26/21 0846   Length (cm) 7 05/19/21 1600   Width (cm) 7 05/19/21 1600   WOUND NURSE ONLY - Time Spent with Patient (mins) 60 05/19/21 1600       Vascular:    HAKEEM:   HAKEEM Results, Last 30 Days US-HAKEEM SINGLE LEVEL BILAT    Result Date: 4/23/2021  [facility]  Vascular Laboratory   Conclusions  Bilateral.  There is no evidence of major arterial disease demonstrated.  NADINE CABRERA  Age:    76    Gender:     M  MRN:    [id number]  :    1945      BSA:  Exam Date:     2021 10:46  Room #:     Inpatient  Priority:     Routine  Ht (in):             Wt (lb):  Ordering Physician:        ANN HOYT  Referring Physician:       [id number] Cruz  Sonographer:               Cami Alves RDMS                             RVT  Study Type:                Complete Bilateral  Technical Quality:         Adequate  Indications:     Ulceration of LE  CPT Codes:       42672  ICD Codes:       707.1  History:         Non-healing wound of left lower limb. Diabetes.  Limitations:                 RIGHT  Waveform            Systolic BPs (mmHg)                             125           Brachial  Triphasic                                Common Femoral  Triphasic                  276           Posterior Tibial  Triphasic                  267           Dorsalis Pedis                                           Peroneal                             2.12          HAKEEM                                           TBI                       LEFT  Waveform        Systolic BPs (mmHg)                             130           Brachial  Triphasic                                Common Femoral  Triphasic                  240           Posterior Tibial  Triphasic                  226           Dorsalis Pedis                                           Peroneal                             1.85          HAKEEM                                           TBI  Findings  Bilateral.  Doppler waveforms of the common femoral arteries are of high amplitude and  triphasic.  Doppler waveforms at the ankle are brisk and triphasic.  Ankle pressures are not accurately measured due to calcification and  noncompressibility of the tibial vessels.  Toe-brachial indices are normal. Right: 0.81  Left: 0.85  Additional  testing was not performed in accordance with lower extremity  arterial evaluation protocol.  Cristiano SCHWARZ [place]  (Electronically Signed)  Final Date:      23 April 2021                   13:32      Lab Values:    Lab Results   Component Value Date/Time    WBC 6.8 06/01/2021 10:24 AM    RBC 3.10 (L) 06/01/2021 10:24 AM    HEMOGLOBIN 8.6 (L) 06/01/2021 10:24 AM    HEMATOCRIT 28.6 (L) 06/01/2021 10:24 AM    CREACTPROT 6.65 (H) 04/23/2021 04:00 AM    SEDRATEWES 26 (H) 11/19/2020 11:36 AM    HBA1C 4.9 04/22/2021 04:12 PM        Culture Results show:  No results found for this or any previous visit (from the past 720 hour(s)).    Pain Level/Medicated:  0/10    INTERVENTIONS BY WOUND TEAM:  Chart and images reviewed. Discussed with bedside RN. All areas of concern (based on picture review, LDA review and discussion with bedside RN) have been thoroughly assessed. Documentation of areas based on significant findings. This RN in to assess patient. Performed standard wound care which includes appropriate positioning, dressing removal and non-selective debridement. Pictures and measurements obtained weekly if/when required.    Bilateral feet/heels: Wounds on feet and heels left open to air.  Mepilex applied to bilat heels.  Nursing to continue painting wounds on L toes with betadine.     Sacrum: cleansed with foam cleanser and washcloth.  Patient still incontinent will leave off mepilex and use barrier paste and offload.    Interdisciplinary consultation: Patient, Bedside RN    EVALUATION / RATIONALE FOR TREATMENT:  Most Recent Date:    6/1/2021: Sacrum has nearly resolved MASD and DTI, will continue with turns and offloading with ERIKA.  Patient bilateral feet DTIs are slowly fading and are more reddish pink color.  Offloading with mepilexs and prevalon boots.   5/26/2021:  DTI over sacrum now faded to sluggishly blanching redness, sacral wounds mostly resolved with one patch of open skin on L buttox remaining.  MASD over site also  improving.  DTI on R heel unchanged but R dorsal foot now appears free of wounds and R medial heel wound has decreased in size.  L lateral ankle still has evolving DTI with surrounding redness.  Scab on L 4th toe stable, wound to L hallux almost fully resolved.  Wounds are improving, continue current plan of care.     05/19/2021:  Deep tissue injuries to the left, right foot and sacrum.  Patient had offloading boot that caused pressure but the patient has severe bilateral neuropathy to his feet was not able to feel.  Patient also has deep tissue injury to his sacrum with surrounding moisture associated skin damage.  Barrier paste to sacrum and mepilex over to help with moisture control.     Goals: Steady decrease in wound area and depth weekly.    WOUND TEAM PLAN OF CARE ([X] for frequency of wound follow up,):   Nursing to follow orders written for wound care. Contact wound team if area fails to progress, deteriorates or with any questions/concerns  Dressing changes by wound team:                   Follow up 3 times weekly:                NPWT change 3 times weekly:     Follow up 1-2 times weekly:      Follow up Bi-Monthly:                   Follow up as needed:   x  Other (explain):     NURSING PLAN OF CARE ORDERS (X):  Dressing changes: See Dressing Care orders: x  Skin care: See Skin Care orders: x  RN Prevention Protocol: x  Rectal tube care: See Rectal Tube Care orders:   Other orders:      Anticipated discharge plans:   LTACH:        SNF/Rehab:   X, patient will return to [facility]          Home Health Care:           Outpatient Wound Center:            Self/Family Care:        Other:

## 2021-06-01 NOTE — PROGRESS NOTES
Received bedside report from day shift RN, pt care assumed, VS stable, pt assessment complete. Pt AAOx4, 0/10  pain at this time. No signs of acute distress noted at this time. POC discussed with pt and verbalizes no questions. Pt denies any additional needs at this time. Bed in lowest position. Pt educated on fall risk and verbalized understanding, call light within reach, hourly rounding initiated.

## 2021-06-01 NOTE — CARE PLAN
The patient is Watcher - Medium risk of patient condition declining or worsening    Shift Goals  Clinical Goals: pt will verbalize understanding of plan of care  Patient Goals: Pt will report no pain from chest tube site    Progress made toward(s) clinical / shift goals:  Pt stated understanding of plan of care for the day.  Pt education provided regarding use of incentive spirometer and pt was able to demonstrate proper use as well as the number of times per hour he was to use.      Patient is not progressing towards the following goals:

## 2021-06-01 NOTE — HOSPITAL COURSE
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  Patient [facility] due to malfunctioning PICC line.  PICC line working fine now.    Patient noted to have left sided empyema, bilateral pleural effusions, pigtail insertion of left lung with adequate drainage, status post R thoracentesis 5/21, complicated by left-sided postprocedure hydropneumothorax, chest tube in place 5/21.     TTE negative for endocarditis. Blood cultures no growth to date and pleural cultures no growth to date.     Left-sided pneumothorax with no improvement over 1 week.  Pulmonology recommended to have chest tube removal trial, 06/01.   Developed afib c RVR started cardizem 60mg q6

## 2021-06-01 NOTE — THERAPY
Physical Therapy   Daily Treatment     Patient Name: Agapito Stone  Age:  76 y.o., Sex:  male  Medical Record #: [id number]  Today's Date: 6/1/2021     Precautions: Fall Risk    Assessment    Pt received sitting EOB finishing up with OT and having chest tube removed by MD. Pt noted to have improved static sitting balance and overall tolerance at EOB. Initiated seated LE exercises, limited by strength and ROM impairment. Pt declined to attempt seated slide board transfer to  this session and was unable to laterally scoot hips at EOB to reposition prior to return BTB. PT will continue to follow, goals updated to reflect current and prior functional status.    Plan    Continue current treatment plan.    DC Equipment Recommendations: Unable to determine at this time  Discharge Recommendations: Recommend post-acute placement for additional physical therapy services prior to discharge home         06/01/21 1348   Precautions   Precautions Fall Risk   Comments no formal WB orders, B walking boots, CT removed 6/1   Vitals   O2 Delivery Device Nasal Cannula   Pain 0 - 10 Group   Therapist Pain Assessment Nurse Notified  (slight pain with chest tube removal)   Cognition    Cognition / Consciousness X   Speech/ Communication Delayed Responses   Level of Consciousness Alert   Comments cooperative, received sitting EOB with OT support while chest tube was being removed by MD. only wanted R walking boot in place today   Active ROM Lower Body    Active ROM Lower Body  X   Comments grossly limited by weakness and prolonged WC use   Strength Lower Body   Lower Body Strength  X   Comments grossly assessed at 3/5 to 4-/5. R LE limited by walking boot weight   Sitting Lower Body Exercises   Sitting Lower Body Exercises Yes   Long Arc Quad 1 set of 10;Bilateral   Marching Reciprocal;1 set of 10   Comments R LE limited by walking boot weight. Attempted seated scooting with pt unable to perform due to posterior lean/LOB    Neuro-Muscular Treatments   Neuro-Muscular Treatments Anterior weight shift;Verbal Cuing;Tactile Cuing   Comments posterior lean/LOB with dynamic activity at EOB   Neurological Concerns   Sitting Posture During ADL's Posterior Lean   Balance   Sitting Balance (Static) Poor +   Sitting Balance (Dynamic) Poor -   Weight Shift Sitting Poor   Skilled Intervention Verbal Cuing;Tactile Cuing;Compensatory Strategies   Comments unable to laterally scoot at EOB   Gait Analysis   Gait Level Of Assist Unable to Participate   Bed Mobility    Supine to Sit   (seated EOB with OT upon PT arrival)   Sit to Supine Moderate Assist   Scooting Maximal Assist   Skilled Intervention Verbal Cuing;Sequencing   Functional Mobility   Sit to Stand Unable to Participate   Bed, Chair, Wheelchair Transfer Refused   Comments pt declined to attempt seated slide board transfer to  today.    Short Term Goals    Short Term Goal # 1 pt will perform supine <> sit with bed features and Min A in 6 visits   Short Term Goal # 2 pt will maintain seated balance at EOB x5 min with SPV to improve function during activity in 6 visits   Short Term Goal # 3 pt will perform seated slide board transfers between various surfaces with Min A to improve mobility independence in 6 visits   Short Term Goal # 4 pt will self propel WC x50 ft with Min A to improve activity tolerance in 6 visits   Education Group   Additional Comments Goals updated to reflect current and prior functional status   Anticipated Discharge Equipment and Recommendations   Discharge Recommendations Recommend post-acute placement for additional physical therapy services prior to discharge home

## 2021-06-01 NOTE — PROGRESS NOTES
Pulmonary Consultation       Patient ID:   Name:             Agapito Stone   YOB: 1945  Age:                 76 y.o.  male   MRN:               [id number]                                                  Reason of Consult:  Acute hypoxic respiratory failure    Subjective:  Patient stable, NAD, no acute events overnight, still on 3 LPM O2 support, incentive spirometer, chest tube output 200 cc past 24 hours, afebrile, clinically stable.    ROS:  Complete ROS was done and was negative except what mentioned in HPI     Past Medical History:  Past Medical History:   Diagnosis Date   • Arthritis     hands, wrists, ankles   • CAD (coronary artery disease) October 2013    Dr. Ovalle; ACS. PCI/LETA x 2 of the mid circumflex (Xience 3.25 x 23mm) and proximal circumflex (Xience 3.6x 12mm)   • Cataract     sugery   • Diabetes     oral meds   • High cholesterol    • Hyperlipidemia    • Hypertension    • Pain     ankles   • Pneumonia 1968   • Stroke (HCC) 2010    no residual effects   • Syncope        Past surgical history:  Past Surgical History:   Procedure Laterality Date   • TOE AMPUTATION Left 4/23/2021    Procedure: AMPUTATION, TOE - 5TH TOE;  Surgeon: Remi Scott M.D.;  Location: [facility];  Service: Orthopedics   • IRRIGATION & DEBRIDEMENT ORTHO Right 12/10/2020    Procedure: IRRIGATION AND DEBRIDEMENT, WOUND - HEEL;  Surgeon: Royal Bolivar M.D.;  Location: [facility];  Service: Orthopedics   • TENDON LENGHTENING Right 8/24/2020    Procedure: LENGTHENING, TENDON- , GASTROC RECESSION;  Surgeon: Royal Bolivar M.D.;  Location: [facility];  Service: Orthopedics   • ANKLE FUSION Right 8/24/2020    Procedure: FUSION, JOINT, ANKLE- ANKLE SUBTALAR FUSION , BONE GRAFT, MEDIAL RELEASE INCLUDING TENDONS, PATIAL EXCISION OF FIBULA;  Surgeon: Royal Bolivar M.D.;  Location: [facility];  Service: Orthopedics   • TENDON TRANSFER Right 8/24/2020     Procedure: TRANSFER, TENDON- PLANTAR FASCIA RELEASE;  Surgeon: Royal Bolivar M.D.;  Location: [facility];  Service: Orthopedics   • ORTHOPEDIC OSTEOTOMY Right 8/24/2020    Procedure: OSTEOTOMY- 1ST METATARSAL, CROSS PROCEDURE, 2-5 METATARSAL OSTEOTOMY, 2-5 PHALANGEL JOINT RECONSTRUCTION;  Surgeon: Royal Bolivar M.D.;  Location: [facility];  Service: Orthopedics   • HAMMERTOE CORRECTION Right 8/24/2020    Procedure: CORRECTION, HAMMER TOE 2-5;  Surgeon: Royal Bolivar M.D.;  Location: [facility];  Service: Orthopedics   • STENT PLACEMENT  2013    cardiac   • CAROTID ENDARTERECTOMY  7/13/2010    left; Performed by CHIQUITA CORRIGAN at [facility]   • CATARACT EXTRACTION WITH IOL Bilateral    • TONSILLECTOMY  as a child       Family History:  Family History   Problem Relation Age of Onset   • Other Mother         Rheumatic fever       Hospital Medications:    Current Facility-Administered Medications:   •  sulfamethoxazole-trimethoprim (BACTRIM DS) 800-160 MG tablet 2 tablet, 2 tablet, Oral, Q12HRS, Fela Jade M.D., 2 tablet at 06/01/21 0600  •  calcium carbonate (TUMS) chewable tab 500 mg, 500 mg, Oral, BID, Ken Lane M.D., 500 mg at 06/01/21 0559  •  fluticasone (FLONASE) nasal spray 100 mcg, 2 Spray, Nasal, DAILY, Ken Lane M.D., 100 mcg at 05/28/21 0432  •  apixaban (ELIQUIS) tablet 5 mg, 5 mg, Oral, BID, Fortflor Daugherty D.OJuan M, 5 mg at 06/01/21 0559  •  albuterol inhaler 2 Puff, 2 Puff, Inhalation, Q6HRS PRN, Julio César Mckee M.D.  •  ferrous sulfate tablet 325 mg, 325 mg, Oral, QDAY with Breakfast, Julio César Mckee M.D., 325 mg at 06/01/21 0831  •  gabapentin (NEURONTIN) capsule 100 mg, 100 mg, Oral, BID, Julio César Mckee M.D., 100 mg at 06/01/21 0600  •  magnesium oxide (MAG-OX) tablet 400 mg, 400 mg, Oral, BID, Julio César Mckee M.D., 400 mg at 06/01/21 0600  •  montelukast (SINGULAIR) tablet 10 mg, 10 mg, Oral, DAILY, Julio César ALVARADO  DESIREE Mckee, 10 mg at 06/01/21 0600  •  tamsulosin (FLOMAX) capsule 0.4 mg, 0.4 mg, Oral, QHS, Julio César Mckee M.D., 0.4 mg at 05/28/21 2020  •  senna-docusate (PERICOLACE or SENOKOT S) 8.6-50 MG per tablet 2 tablet, 2 tablet, Oral, BID, 2 tablet at 06/01/21 0559 **AND** polyethylene glycol/lytes (MIRALAX) PACKET 1 Packet, 1 Packet, Oral, QDAY PRN, 1 Packet at 05/31/21 1209 **AND** magnesium hydroxide (MILK OF MAGNESIA) suspension 30 mL, 30 mL, Oral, QDAY PRN, 30 mL at 05/25/21 1036 **AND** bisacodyl (DULCOLAX) suppository 10 mg, 10 mg, Rectal, QDAY PRN, Julio César Mckee M.D., 10 mg at 05/26/21 0620  •  Pharmacy Consult Request ...Pain Management Review 1 Each, 1 Each, Other, PHARMACY TO DOSE, Julio César Mckee M.D.  •  acetaminophen (Tylenol) tablet 650 mg, 650 mg, Oral, Q6HRS PRN, Julio César Mckee M.D., 650 mg at 05/19/21 1256  •  [DISCONTINUED] oxyCODONE immediate-release (ROXICODONE) tablet 2.5 mg, 2.5 mg, Oral, Q3HRS PRN **OR** [DISCONTINUED] oxyCODONE immediate-release (ROXICODONE) tablet 5 mg, 5 mg, Oral, Q3HRS PRN **OR** HYDROmorphone (Dilaudid) injection 0.25 mg, 0.25 mg, Intravenous, Q3HRS PRN, Julio César Mckee M.D.  •  ondansetron (ZOFRAN) syringe/vial injection 4 mg, 4 mg, Intravenous, Q4HRS PRN, Julio César Mckee M.D., 4 mg at 05/29/21 0456  •  ondansetron (ZOFRAN ODT) dispertab 4 mg, 4 mg, Oral, Q4HRS PRN, Julio César Mckee M.D.  •  [DISCONTINUED] insulin regular (HumuLIN R,NovoLIN R) injection, 2-9 Units, Subcutaneous, 4X/DAY ACHS, 2 Units at 05/25/21 2140 **AND** [CANCELED] POC blood glucose manual result, , , Q AC AND BEDTIME(S) **AND** NOTIFY MD and PharmD, , , Once **AND** glucose 4 g chewable tablet 16 g, 16 g, Oral, Q15 MIN PRN **AND** dextrose 50% (D50W) injection 50 mL, 50 mL, Intravenous, Q15 MIN PRN, Julio César Mckee M.D.    Current Outpatient Medications:  Medications Prior to Admission   Medication Sig Dispense Refill Last Dose   • cefTRIAXone (ROCEPHIN) 2 GM Recon Soln Inject 2 g  into the shoulder, thigh, or buttocks every day. Start date: 5/17/21  For 5 days   5/17/2021 at 1200   • ferrous sulfate 325 (65 Fe) MG tablet Take 325 mg by mouth 2 times a day.   5/18/2021 at 0800   • albuterol 108 (90 Base) MCG/ACT Aero Soln inhalation aerosol Inhale 2 Puffs every 6 hours as needed for Shortness of Breath.   5/16/2021 at Unknown time   • predniSONE (DELTASONE) 10 MG Tab Take 10-40 mg by mouth every day. 40 mg daily x2 days  30 mg daily x2 days  20 mg daily x2 days  10 mg daily x2 days   5/17/2021 at 1600   • acetaminophen (TYLENOL) 325 MG Tab Take 650 mg by mouth every four hours as needed.   5/11/2021   • magnesium hydroxide (MILK OF MAGNESIA) 400 MG/5ML Suspension Take 30 mL by mouth 1 time a day as needed.   5/4/2021   • ceFAZolin in dextrose (ANCEF) 2-4 GM/100ML-% IVPB Infuse 100 mL into a venous catheter every 8 hours for 37 days. (Patient taking differently: Infuse 2 g into a venous catheter every 8 hours. Start date: 4/28/21   For 37 days) 3000 mL 0 5/18/2021 at 0800   • daptomycin (CUBICIN) 500 MG Recon Soln Infuse 700 mg into a venous catheter every day for 37 days. (Patient taking differently: Infuse 8 mg/kg into a venous catheter every day. Start date: 4/30/21  For 35 days) 26185 mg 0 5/18/2021 at 0800   • tamsulosin (FLOMAX) 0.4 MG capsule Take 0.4 mg by mouth at bedtime.   5/17/2021 at 2000   • gabapentin (NEURONTIN) 100 MG Cap Take 100 mg by mouth 2 times a day.   5/18/2021 at 0800   • Multiple Vitamin (MULTI VITAMIN PO) Take 1 tablet by mouth every day.   5/18/2021 at 0800   • Ascorbic Acid (VITAMIN C) 1000 MG Tab Take 1,000 mg by mouth every day.   5/18/2021 at 0800   • montelukast (SINGULAIR) 10 MG Tab Take 1 tablet by mouth every day. 90 tablet 0 5/17/2021 at 2000   • apixaban (ELIQUIS) 5mg Tab Take 1 Tab by mouth 2 Times a Day. 60 Tab  5/18/2021 at 0800   • magnesium oxide (MAG-OX) 400 MG Tab tablet Take 1 Tab by mouth 2 Times a Day. 30 Tab 0 5/18/2021 at 0800   • pioglitazone  "(ACTOS) 15 MG Tab Take 15 mg by mouth every morning.   5/18/2021 at 0800   • atorvastatin (LIPITOR) 80 MG tablet TAKE ONE TABLET BY MOUTH DAILY IN THE EVENING (Patient not taking: Reported on 5/18/2021) 100 Tab 3 Not Taking at Unknown time   • Empagliflozin-metFORMIN HCl (SYNJARDY) 5-1000 MG Tab Take 1 Tab by mouth 2 Times a Day. 180 Tab 3 5/18/2021 at 0800   • nitroglycerin (NITROSTAT) 0.4 MG SL Tab Place 1 Tab under tongue as needed for Chest Pain. (Patient not taking: Reported on 5/18/2021) 25 Tab 1 Not Taking at Unknown time   • Cholecalciferol (VITAMIN D-3) 5000 UNITS TABS Take 1 Tab by mouth every 48 hours.   5/18/2021 at 0800       Medication Allergy:  Allergies   Allergen Reactions   • Fish Hives     shellfish   • Pcn [Penicillins] Hives     Positive penicillin skin test as a child.  Significant hives to amoxicillin as an adult.  Tolerates cephalosporins   • Amoxicillin Hives   • Food Swelling      Eggs,Melons, avocados-puffy mouth             Objective:   Vitals/ General Appearance:   Weight/BMI: Body mass index is 25.52 kg/m².  /63   Pulse 71   Temp 36.7 °C (98 °F) (Temporal)   Resp 16   Ht 1.88 m (6' 2.02\")   Wt 90.2 kg (198 lb 13.7 oz)   SpO2 96%   Vitals:    05/31/21 1600 05/31/21 1905 06/01/21 0314 06/01/21 0800   BP: 103/55 112/56 112/66 107/63   Pulse: 68 68 92 71   Resp: 16 17 20 16   Temp: 36.4 °C (97.5 °F) 36.7 °C (98 °F) 36.4 °C (97.6 °F) 36.7 °C (98 °F)   TempSrc: Temporal Temporal Temporal Temporal   SpO2: 96% 93% 96% 96%   Weight:       Height:         Oxygen Therapy:  Pulse Oximetry: 96 %, O2 (LPM): 3, O2 Delivery Device: Nasal Cannula    Constitutional:   thin build with muscle wasting, No acute distress  HENMT:  Normocephalic, Atraumatic, Oropharynx moist mucous membranes, No oral exudates, Nose normal.  No thyromegaly.  Eyes:  EOMI, Conjunctiva normal, No discharge.  Neck:  Normal range of motion, No cervical tenderness,  no JVD.  Cardiovascular:  Normal heart rate, Normal " rhythm, No murmurs, No rubs, No gallops.   Extremitites with intact distal pulses, no cyanosis, or edema.  Lungs:  Reduced breath sounds left lung base anteriorly and posteriorly, no added sounds/crackles/wheeze  Abdomen: Bowel sounds normal, Soft, No tenderness, No guarding, No rebound, No masses, No hepatosplenomegaly.  Skin: Warm, Dry, No erythema, No rash, no induration.  Neurologic: Alert & oriented x 3, No focal deficits noted, cranial nerves II through X are grossly intact.  Psychiatric: Affect normal, Judgment normal, Mood normal.    Labs:  Recent Labs     06/01/21  1024   WBC 6.8   RBC 3.10*   HEMOGLOBIN 8.6*   HEMATOCRIT 28.6*   MCV 92.3   MCH 27.7   MCHC 30.1*   RDW 54.3*   PLATELETCT 357   MPV 9.4                 No results for input(s): SODIUM, POTASSIUM, CHLORIDE, CO2, GLUCOSE, BUN, CPKTOTAL in the last 72 hours.  No results for input(s): SODIUM, POTASSIUM, CHLORIDE, CO2, BUN, CREATININE, MAGNESIUM, PHOSPHORUS, CALCIUM in the last 72 hours.  Results for orders placed or performed during the hospital encounter of 01/28/12   ECHOCARDIOGRAM COMP W/O CONT   Result Value Ref Range    Eject.Frac. MOD BP 55.03     Eject.Frac. MOD 4C 56.62     Eject.Frac. MOD 2C 62.97          Imaging:   DX-CHEST-PORTABLE (1 VIEW)   Final Result         Findings on chest radiograph appear stable since the prior radiograph.  No new abnormalities are identified.      DX-CHEST-PORTABLE (1 VIEW)   Final Result      No significant interval change.      DX-CHEST-PORTABLE (1 VIEW)   Final Result      Mild further decrease in size of loculated left lateral pneumothorax      Stable moderate multifocal consolidation      DX-CHEST-PORTABLE (1 VIEW)   Final Result      Stable chest with loculated left lateral pneumothorax and multifocal consolidation      DX-CHEST-PORTABLE (1 VIEW)   Final Result      Left-sided pigtail catheter is partially visualized. Loculated left pneumothorax appears mildly increased.      No other significant  change.         DX-CHEST-PORTABLE (1 VIEW)   Final Result      Interval removal of left pigtail chest tube. Slight increase in loculated left pneumothorax.      No other significant change.      DX-CHEST-PORTABLE (1 VIEW)   Final Result      1.  Small loculated left pneumothorax is unchanged.   2.  Small loculated right pleural effusion is stable.   3.  Bilateral airspace opacities are unchanged compared to prior.   4.  Stable cardiomegaly   5.  Atherosclerotic plaque.      DX-CHEST-PORTABLE (1 VIEW)   Final Result      1.  Minimal decrease in left pneumothorax with left pigtail chest tube in place.      2.  Persistent atelectasis or pneumonia in the left lower lobe and lingula.      3.  Unchanged small right pleural effusion and atelectasis or pneumonia in the right midlung and right lower lobe.      DX-CHEST-PORTABLE (1 VIEW)   Final Result      1.  Stable moderate sized left pneumothorax.      2.  Stable bilateral infiltrates and support devices.      3.  Stable cardiomegaly.      DX-CHEST-PORTABLE (1 VIEW)   Final Result      Stable to slightly more prominent left pneumothorax and with increased left pleural fluid.      No other significant interval change.      EC-ECHOCARDIOGRAM COMPLETE W/O CONT   Final Result      DX-CHEST-PORTABLE (1 VIEW)   Final Result         1.  Pulmonary infiltrates, increased on the right compared to prior study.   2.  New layering right pleural effusion.   3.  Left pneumothorax, decreased since prior study.   4.  Cardiomegaly   5.  Atherosclerosis      CT-CHEST (THORAX) WITH   Final Result         1.  Moderate size left pneumothorax with small caliber pigtail thoracostomy tube in place, appears similar to prior chest x-ray yesterday at 1054.   2.  Layering bilateral pleural effusions.   3.  Peripheral reticular consolidations, appearance compatible with Covid infiltrates. There are areas of denser consolidation suggesting superimposed secondary infectious process.   4.  Irregular  hepatic contour, compatible with changes of cirrhosis.   5.  Trace perihepatic ascites      US-EXTREMITY VENOUS LOWER BILAT   Final Result      DX-CHEST-PORTABLE (1 VIEW)   Final Result      1.  Interval placement of small-caliber LEFT chest tube with improvement of pleural fluid and compensatory enlargement of LEFT pneumothorax, likely due to noncompliant underlying lung.   2.  Improving RIGHT lung infiltrates.      These findings were discussed with SUNDAR SUNG on 5/21/2021 11:18 AM.         CT-CHEST TUBE-EMPYEMA LEFT   Final Result      1. LEFT CHEST EMPYEMA Drainage with CT guidance.      DX-CHEST-LIMITED (1 VIEW)   Final Result      1.  Stable LEFT hydropneumothorax.   2.  Multifocal pneumonia again seen with slight worsening in RIGHT midlung region.            DX-CHEST-LIMITED (1 VIEW)   Final Result         Unchanged left lateral pneumothorax.      US-THORACENTESIS PUNCTURE LEFT   Final Result      1. Ultrasound guided left sided diagnostic and therapeutic thoracentesis.      2. 1550 mL of fluid withdrawn.      DX-CHEST-PORTABLE (1 VIEW)   Final Result      Interval decrease in size of the left pleural effusion which is now small.      Small small loculated left pneumothorax.      Small loculated right pleural effusion.      Left perihilar and basilar opacity may represent atelectasis/consolidation.      Increased ill-defined airspace opacities on the right may represent a combination of atelectasis, edema and pneumonia.      Atherosclerotic plaque.      Findings discussed with the covering clinician.         CT-CHEST (THORAX) W/O   Final Result         1.  Large left and moderate pleural effusions.   2.  Peripheral reticular pulmonary opacities, appearance compatible with Covid infiltrates.   3.  Linear consolidations in the bilateral lung bases, likely atelectasis.   4.  Hepatomegaly and irregular hepatic contour, appearance most compatible changes of cirrhosis.   5.  Diverticulosis       DX-CHEST-PORTABLE (1 VIEW)   Final Result      Stable large left pleural effusion with associated compressive atelectasis. Correlate clinically for infection.      Possible small right pleural effusion with mildly increased opacity in the right lung.              Assessment and Plan:    76-year-old male with past medical history significant for Covid pneumonia 11/20 during which time GGO's were detected, currently on IV antibiotics for osteomyelitis of the left fifth toe, admitted for PICC line occlusion from his outside care facility, also found to be hypoxic with pleural effusions bilaterally left greater than right detected on previous hospitalization but on this hospitalization s/p thoracentesis bilaterally with removal of 1500 cc of bloody fluid from the left lung, complicated by iatrogenic hydropneumothorax, exudative, with GPC's, cultures negative, with subsequent pigtail catheter inserted with high-volume drainage, currently hypoxic to 3 LPM O2 support, being a never smoker, with stable oxygen requirement, with likely trapped left lung parenchyma.    - Chest tube removed, will trial this and watch for re-expansion of lung  -O2 support as needed, try to wean off of supplemental oxygen, now down to 1 LPM O2 support  -Continue using incentive spirometer  -We will continue to follow      Principal Problem:    Pleural effusion, bilateral POA: Unknown  Active Problems:    History of stroke POA: Yes      Overview: 2010 in June.    Essential hypertension POA: Yes    CAD (coronary artery disease) POA: Yes      Overview: October 2013: ACS. PCI/LETA x 2 in the mid circumflex (Xience 3.25 x 23mm)       and proximal circumflex (Xience 3.6 x 12mm).    Type 2 diabetes mellitus with peripheral neuropathy (HCC) POA: Yes    Deep vein thrombosis (DVT) of lower extremity (HCC) POA: Yes    Osteomyelitis of left foot (HCC) POA: Yes    Occluded PICC line, initial encounter ([facility]) POA: Unknown    Acute respiratory failure with  hypoxia (HCC) POA: Unknown    Postprocedural pneumothorax POA: Unknown    Protein-calorie malnutrition, severe (HCC) POA: Unknown    Empyema, left (HCC) POA: Unknown  Resolved Problems:    * No resolved hospital problems. *

## 2021-06-02 PROBLEM — I48.91 NEW ONSET ATRIAL FIBRILLATION (HCC): Status: ACTIVE | Noted: 2021-01-01

## 2021-06-02 NOTE — PROGRESS NOTES
Hospital Medicine Daily Progress Note    Date of Service  6/1/2021    Chief Complaint  76 y.o. male admitted 5/18/2021 with malfunctioning picc line and shortness of breath    Hospital Course  This is a 76-year-old male with past medical history of hypertension, type 2 diabetes with A1c of 4.9, Hx MI, CAD status post stents in 2013,'s history of CVA, left lower extremity DVT on Eliquis, Hx COVID (11/2020), and left foot osteomyelitis status post left fifth toe amputation on daptomycin and Ancef (switched to Bactrim) until 06/03/2021.  Patient was admitted on 05/18/2021 from [facility] due to malfunctioning PICC line.  PICC line working fine now.    Patient noted to have left sided empyema, bilateral pleural effusions, pigtail insertion of left lung with adequate drainage, status post R thoracentesis 5/21, complicated by left-sided postprocedure hydropneumothorax, chest tube in place 5/21.     TTE negative for endocarditis. Blood cultures no growth to date and pleural cultures no growth to date.     Left-sided pneumothorax with no improvement over 1 week.  Pulmonology recommended to have chest tube removal trial, 06/01.      Interval Problem Update  Patient was seen and examined at bedside.  Patient reports his breathing has improved since admission.  Chest to VAC was changed today patient had greater than 200 cc output.  Pulmonary recommended to remove the chest tube and evaluate patient.    Patient is currently on 1 to 2 L oxygen.  We will repeat chest x-ray tomorrow and evaluate patient clinically.    Consultants/Specialty  Pulmonary   ID (signed off)    Code Status  Full Code    Disposition  TBD, once medically stable will discharge back to [place]    Review of Systems  Review of Systems   All other systems reviewed and are negative.       Physical Exam  Temp:  [36.3 °C (97.4 °F)-36.7 °C (98 °F)] 36.3 °C (97.4 °F)  Pulse:  [64-92] 64  Resp:  [16-20] 17  BP: ()/(56-66) 90/61  SpO2:  [93 %-97 %] 97  %    Physical Exam  Vitals and nursing note reviewed.   Constitutional:       General: He is not in acute distress.     Appearance: Normal appearance.   HENT:      Head: Normocephalic and atraumatic.   Eyes:      Extraocular Movements: Extraocular movements intact.      Conjunctiva/sclera: Conjunctivae normal.      Pupils: Pupils are equal, round, and reactive to light.   Cardiovascular:      Rate and Rhythm: Normal rate and regular rhythm.      Pulses: Normal pulses.      Heart sounds: No murmur heard.   No friction rub. No gallop.    Pulmonary:      Effort: Pulmonary effort is normal. No respiratory distress.      Breath sounds: Rhonchi and rales present. No wheezing.   Abdominal:      General: Bowel sounds are normal. There is no distension.      Palpations: Abdomen is soft.      Tenderness: There is no abdominal tenderness.   Musculoskeletal:         General: No swelling or tenderness. Normal range of motion.      Cervical back: Normal range of motion and neck supple. No muscular tenderness.      Right lower leg: No edema.      Left lower leg: No edema.   Skin:     General: Skin is warm and dry.      Capillary Refill: Capillary refill takes less than 2 seconds.      Findings: No bruising, erythema or rash.   Neurological:      General: No focal deficit present.      Mental Status: He is alert and oriented to person, place, and time.         Fluids    Intake/Output Summary (Last 24 hours) at 6/1/2021 1742  Last data filed at 6/1/2021 1700  Gross per 24 hour   Intake 1130 ml   Output 2630 ml   Net -1500 ml       Laboratory  Recent Labs     06/01/21  1024   WBC 6.8   RBC 3.10*   HEMOGLOBIN 8.6*   HEMATOCRIT 28.6*   MCV 92.3   MCH 27.7   MCHC 30.1*   RDW 54.3*   PLATELETCT 357   MPV 9.4     Recent Labs     06/01/21  1024   SODIUM 137   POTASSIUM 5.4   CHLORIDE 101   CO2 30   GLUCOSE 102*   BUN 18   CREATININE 1.28   CALCIUM 8.3*                   Imaging  DX-CHEST-PORTABLE (1 VIEW)   Final Result         Findings on  chest radiograph appear stable since the prior radiograph.  No new abnormalities are identified.      DX-CHEST-PORTABLE (1 VIEW)   Final Result      No significant interval change.      DX-CHEST-PORTABLE (1 VIEW)   Final Result      Mild further decrease in size of loculated left lateral pneumothorax      Stable moderate multifocal consolidation      DX-CHEST-PORTABLE (1 VIEW)   Final Result      Stable chest with loculated left lateral pneumothorax and multifocal consolidation      DX-CHEST-PORTABLE (1 VIEW)   Final Result      Left-sided pigtail catheter is partially visualized. Loculated left pneumothorax appears mildly increased.      No other significant change.         DX-CHEST-PORTABLE (1 VIEW)   Final Result      Interval removal of left pigtail chest tube. Slight increase in loculated left pneumothorax.      No other significant change.      DX-CHEST-PORTABLE (1 VIEW)   Final Result      1.  Small loculated left pneumothorax is unchanged.   2.  Small loculated right pleural effusion is stable.   3.  Bilateral airspace opacities are unchanged compared to prior.   4.  Stable cardiomegaly   5.  Atherosclerotic plaque.      DX-CHEST-PORTABLE (1 VIEW)   Final Result      1.  Minimal decrease in left pneumothorax with left pigtail chest tube in place.      2.  Persistent atelectasis or pneumonia in the left lower lobe and lingula.      3.  Unchanged small right pleural effusion and atelectasis or pneumonia in the right midlung and right lower lobe.      DX-CHEST-PORTABLE (1 VIEW)   Final Result      1.  Stable moderate sized left pneumothorax.      2.  Stable bilateral infiltrates and support devices.      3.  Stable cardiomegaly.      DX-CHEST-PORTABLE (1 VIEW)   Final Result      Stable to slightly more prominent left pneumothorax and with increased left pleural fluid.      No other significant interval change.      EC-ECHOCARDIOGRAM COMPLETE W/O CONT   Final Result      DX-CHEST-PORTABLE (1 VIEW)   Final  Result         1.  Pulmonary infiltrates, increased on the right compared to prior study.   2.  New layering right pleural effusion.   3.  Left pneumothorax, decreased since prior study.   4.  Cardiomegaly   5.  Atherosclerosis      CT-CHEST (THORAX) WITH   Final Result         1.  Moderate size left pneumothorax with small caliber pigtail thoracostomy tube in place, appears similar to prior chest x-ray yesterday at 1054.   2.  Layering bilateral pleural effusions.   3.  Peripheral reticular consolidations, appearance compatible with Covid infiltrates. There are areas of denser consolidation suggesting superimposed secondary infectious process.   4.  Irregular hepatic contour, compatible with changes of cirrhosis.   5.  Trace perihepatic ascites      US-EXTREMITY VENOUS LOWER BILAT   Final Result      DX-CHEST-PORTABLE (1 VIEW)   Final Result      1.  Interval placement of small-caliber LEFT chest tube with improvement of pleural fluid and compensatory enlargement of LEFT pneumothorax, likely due to noncompliant underlying lung.   2.  Improving RIGHT lung infiltrates.      These findings were discussed with SUNDAR SUNG on 5/21/2021 11:18 AM.         CT-CHEST TUBE-EMPYEMA LEFT   Final Result      1. LEFT CHEST EMPYEMA Drainage with CT guidance.      DX-CHEST-LIMITED (1 VIEW)   Final Result      1.  Stable LEFT hydropneumothorax.   2.  Multifocal pneumonia again seen with slight worsening in RIGHT midlung region.            DX-CHEST-LIMITED (1 VIEW)   Final Result         Unchanged left lateral pneumothorax.      US-THORACENTESIS PUNCTURE LEFT   Final Result      1. Ultrasound guided left sided diagnostic and therapeutic thoracentesis.      2. 1550 mL of fluid withdrawn.      DX-CHEST-PORTABLE (1 VIEW)   Final Result      Interval decrease in size of the left pleural effusion which is now small.      Small small loculated left pneumothorax.      Small loculated right pleural effusion.      Left perihilar and  basilar opacity may represent atelectasis/consolidation.      Increased ill-defined airspace opacities on the right may represent a combination of atelectasis, edema and pneumonia.      Atherosclerotic plaque.      Findings discussed with the covering clinician.         CT-CHEST (THORAX) W/O   Final Result         1.  Large left and moderate pleural effusions.   2.  Peripheral reticular pulmonary opacities, appearance compatible with Covid infiltrates.   3.  Linear consolidations in the bilateral lung bases, likely atelectasis.   4.  Hepatomegaly and irregular hepatic contour, appearance most compatible changes of cirrhosis.   5.  Diverticulosis      DX-CHEST-PORTABLE (1 VIEW)   Final Result      Stable large left pleural effusion with associated compressive atelectasis. Correlate clinically for infection.      Possible small right pleural effusion with mildly increased opacity in the right lung.           Assessment/Plan  * Pleural effusion, bilateral  Assessment & Plan  Unknown etiology  Was seen on last admission and was asymptomatic. Now with worsening SOB and worsening effusion  CT chest -findings of large left-sided pleural effusion and moderate right pleural effusion.    2D echo with preserved EF and no valvular abnormalities.  S/p thoracentesis of left pleural cavity 1.5 L of fluid removed.  S/p thoracentesis of right pleural cavity about 100 cc fluid removed, bloody.  Complicated by left-sided postprocedure hydropneumothorax, chest tube in place 5/21.   Fluid studies slightly consistent with exudative effusion.  Cytology negative    No improvement of pneumothorax x1 week  Trial of chest tube removal and assessment, removed 6/1    Empyema, left (HCC)  Assessment & Plan  S/p thoracentesis of left pleural cavity .1.5 L of fluid removed.  S/p thoracentesis of right pleural cavity about 100 cc fluid removed, bloody.  Fluid studies slightly consistent with exudative effusion.  Cytology negative  Pulmonology  following.    Rare gram-positive cocci growing in left pleural cavity concerning for empyema.  S/p pigtail placement of the left pleural cavity for adequate drainage of infection.  We will follow up on final cultures from the left pleural cavity and also monitor cultures from the right pleural cavity as well.   ID following, on vancomycin and Ancef initially and currently on Bactrim    Protein-calorie malnutrition, severe (HCC)  Assessment & Plan  RD following.  Follow-up on recommendations.    Postprocedural pneumothorax  Assessment & Plan  Post thoracentesis, chest x-ray with finding of a small left loculated pneumothorax.   We will repeat serial chest x-ray and closely monitor vital signs.    Repeat chest x-ray with findings of persistent left-sided hydropneumothorax concerning for trapped lung.  CT chest 5/21 with findings of persistent moderate-sized left pneumothorax.  S/p IR for pigtail placement into left pleural cavity.     Chest tube removed on 06/1    Acute respiratory failure with hypoxia ([facility])  Assessment & Plan  Secondary to large pleural effusion  See above for plan    Occluded PICC line, initial encounter ([facility])  Assessment & Plan  Patient sent by nursing home for occluded PICC line  Was flushed by ED staff and reportedly working currently    Osteomyelitis of left foot ([facility])- (present on admission)  Assessment & Plan  Patient last admitted to [facility] with osteomyelitis of left foot  S/p amputation of left 5th toe, amputation well healing  ID was consulted last admission and plan for Daptomycin and Ancef until 6/3  Per ID, ABx till 6/3 , switched to Bactrim on last dose June 4    Deep vein thrombosis (DVT) of lower extremity ([facility])- (present on admission)  Assessment & Plan  History of DVT on Eliquis.  Repeat ultrasound Dopplers with findings of persistent nonocclusive left lower extremity DVT.  On Eliquis     Type 2 diabetes mellitus with peripheral neuropathy (HCC)- (present on admission)  Assessment &  Plan  Patient with long history of DM with doccumented neuropathy and retinopathy  Hold home oral medications (ACTOS and Synjardy)  Insulin sliding scale for now  Diabetic diet  Last A1C 4.9 month ago    CAD (coronary artery disease)- (present on admission)  Assessment & Plan  Patient not currently taking statin, can be resumed as outpatient  Reported ACS in 2013 s/p stents  No current chest pain  ECHO WNL LVEF 60%  Will continue to monitor    Essential hypertension- (present on admission)  Assessment & Plan  Does not appear to be on antihypertensives at nursing home currently  Will continue to monitor    History of stroke- (present on admission)  Assessment & Plan  Prior history of stroke with some aphasia  Some deconditioning  PT/OT       VTE prophylaxis: SCDs, Eliquis

## 2021-06-02 NOTE — CARE PLAN
The patient is Stable - Low risk of patient condition declining or worsening    Shift Goals  Clinical Goals: Pt will tolerate titrating oxygen down  Patient Goals: Pt will report no pain from chest tube site    Progress made toward(s) clinical / shift goals: Assess and monitor respiratory status. Titrate oxygen and assess pt's tolerance.     Patient is not progressing towards the following goals:

## 2021-06-02 NOTE — PROGRESS NOTES
Pulmonary Consultation       Patient ID:   Name:             Agapito Stone   YOB: 1945  Age:                 76 y.o.  male   MRN:               [id number]                                                  Reason of Consult:  Acute hypoxic respiratory failure bilateral exudative pleural effusion    Subjective:  Overnight patient had increasing dyspnea, chest pain, oxygen was increased to 10 LPM, EKG was revealing for A. fib with rate in the 110s, chest pain resolved, troponin was elevated but low concern for ACS. Chest tube was removed yesterday, x-ray from yesterday evening and this morning shows interval slight improvement in effusion, no lesion visible to suggest increasing effusion. Also gives fluctuating history of possible aspiration and has been having dry heaves.  Per SLP soft texture diet.  Currently back down to 3 LPM O2 support saturating well.    ROS:  Complete ROS was done and was negative except what mentioned in HPI     Past Medical History:  Past Medical History:   Diagnosis Date   • Arthritis     hands, wrists, ankles   • CAD (coronary artery disease) October 2013    Dr. Ovalle; ACS. PCI/LETA x 2 of the mid circumflex (Xience 3.25 x 23mm) and proximal circumflex (Xience 3.6x 12mm)   • Cataract     sugery   • Diabetes     oral meds   • High cholesterol    • Hyperlipidemia    • Hypertension    • Pain     ankles   • Pneumonia 1968   • Stroke (HCC) 2010    no residual effects   • Syncope        Past surgical history:  Past Surgical History:   Procedure Laterality Date   • TOE AMPUTATION Left 4/23/2021    Procedure: AMPUTATION, TOE - 5TH TOE;  Surgeon: Remi Scott M.D.;  Location: SURGERY [facility];  Service: Orthopedics   • IRRIGATION & DEBRIDEMENT ORTHO Right 12/10/2020    Procedure: IRRIGATION AND DEBRIDEMENT, WOUND - HEEL;  Surgeon: Royal Bolivar M.D.;  Location: SURGERY [facility];  Service: Orthopedics   • TENDON LENGHTENING Right 8/24/2020    Procedure:  LENGTHENING, TENDON- , GASTROC RECESSION;  Surgeon: Royal Bolivar M.D.;  Location: SURGERY [facility];  Service: Orthopedics   • ANKLE FUSION Right 8/24/2020    Procedure: FUSION, JOINT, ANKLE- ANKLE SUBTALAR FUSION , BONE GRAFT, MEDIAL RELEASE INCLUDING TENDONS, PATIAL EXCISION OF FIBULA;  Surgeon: Royal Bolivar M.D.;  Location: SURGERY [facility];  Service: Orthopedics   • TENDON TRANSFER Right 8/24/2020    Procedure: TRANSFER, TENDON- PLANTAR FASCIA RELEASE;  Surgeon: Royal Bolivar M.D.;  Location: [facility];  Service: Orthopedics   • ORTHOPEDIC OSTEOTOMY Right 8/24/2020    Procedure: OSTEOTOMY- 1ST METATARSAL, CROSS PROCEDURE, 2-5 METATARSAL OSTEOTOMY, 2-5 PHALANGEL JOINT RECONSTRUCTION;  Surgeon: Royal Bolivar M.D.;  Location: [facility];  Service: Orthopedics   • HAMMERTOE CORRECTION Right 8/24/2020    Procedure: CORRECTION, HAMMER TOE 2-5;  Surgeon: Royal Bolivar M.D.;  Location: [facility];  Service: Orthopedics   • STENT PLACEMENT  2013    cardiac   • CAROTID ENDARTERECTOMY  7/13/2010    left; Performed by CHIQUITA CORRIGAN at [facility]   • CATARACT EXTRACTION WITH IOL Bilateral    • TONSILLECTOMY  as a child       Family History:  Family History   Problem Relation Age of Onset   • Other Mother         Rheumatic fever       Hospital Medications:    Current Facility-Administered Medications:   •  DILTIAZem (CARDIZEM) tablet 60 mg, 60 mg, Oral, Q6HRS, Angel Davis M.D., 60 mg at 06/02/21 0300  •  sulfamethoxazole-trimethoprim (BACTRIM DS) 800-160 MG tablet 2 tablet, 2 tablet, Oral, Q12HRS, Fela Jade M.D., 2 tablet at 06/02/21 0545  •  calcium carbonate (TUMS) chewable tab 500 mg, 500 mg, Oral, BID, Ken Lane M.D., 500 mg at 06/02/21 0545  •  fluticasone (FLONASE) nasal spray 100 mcg, 2 Spray, Nasal, DAILY, Ken Lane M.D., 100 mcg at 05/28/21 0432  •  apixaban (ELIQUIS) tablet 5 mg, 5 mg, Oral, BID, Fortune  RODRICK Daugherty, 5 mg at 06/02/21 0545  •  albuterol inhaler 2 Puff, 2 Puff, Inhalation, Q6HRS PRN, Julio César Mckee M.D., 2 Puff at 06/02/21 0202  •  ferrous sulfate tablet 325 mg, 325 mg, Oral, QDAY with Breakfast, Julio César Mckee M.D., 325 mg at 06/01/21 0831  •  gabapentin (NEURONTIN) capsule 100 mg, 100 mg, Oral, BID, Julio César Mckee M.D., 100 mg at 06/02/21 0545  •  magnesium oxide (MAG-OX) tablet 400 mg, 400 mg, Oral, BID, Julio César Mckee M.D., 400 mg at 06/02/21 0545  •  montelukast (SINGULAIR) tablet 10 mg, 10 mg, Oral, DAILY, Julio César Mckee M.D., 10 mg at 06/02/21 0545  •  tamsulosin (FLOMAX) capsule 0.4 mg, 0.4 mg, Oral, QHS, Julio César Mckee M.D., 0.4 mg at 05/28/21 2020  •  senna-docusate (PERICOLACE or SENOKOT S) 8.6-50 MG per tablet 2 tablet, 2 tablet, Oral, BID, 2 tablet at 06/02/21 0545 **AND** polyethylene glycol/lytes (MIRALAX) PACKET 1 Packet, 1 Packet, Oral, QDAY PRN, 1 Packet at 05/31/21 1209 **AND** magnesium hydroxide (MILK OF MAGNESIA) suspension 30 mL, 30 mL, Oral, QDAY PRN, 30 mL at 05/25/21 1036 **AND** bisacodyl (DULCOLAX) suppository 10 mg, 10 mg, Rectal, QDAY PRN, Julio César Mckee M.D., 10 mg at 05/26/21 0620  •  Pharmacy Consult Request ...Pain Management Review 1 Each, 1 Each, Other, PHARMACY TO DOSE, Julio César Mkcee M.D.  •  acetaminophen (Tylenol) tablet 650 mg, 650 mg, Oral, Q6HRS PRN, Julio César Mckee M.D., 650 mg at 05/19/21 1256  •  [DISCONTINUED] oxyCODONE immediate-release (ROXICODONE) tablet 2.5 mg, 2.5 mg, Oral, Q3HRS PRN **OR** [DISCONTINUED] oxyCODONE immediate-release (ROXICODONE) tablet 5 mg, 5 mg, Oral, Q3HRS PRN **OR** HYDROmorphone (Dilaudid) injection 0.25 mg, 0.25 mg, Intravenous, Q3HRS PRN, Julio César Mckee M.D., 0.25 mg at 06/02/21 0246  •  ondansetron (ZOFRAN) syringe/vial injection 4 mg, 4 mg, Intravenous, Q4HRS PRLEEROY, Julio César Mckee M.D., 4 mg at 05/29/21 0456  •  ondansetron (ZOFRAN ODT) dispertab 4 mg, 4 mg, Oral, Q4HRS PRJulio César UMAÑA  DESIREE Mckee  •  [DISCONTINUED] insulin regular (HumuLIN R,NovoLIN R) injection, 2-9 Units, Subcutaneous, 4X/DAY ACHS, 2 Units at 05/25/21 2140 **AND** [CANCELED] POC blood glucose manual result, , , Q AC AND BEDTIME(S) **AND** NOTIFY MD and PharmD, , , Once **AND** glucose 4 g chewable tablet 16 g, 16 g, Oral, Q15 MIN PRN **AND** dextrose 50% (D50W) injection 50 mL, 50 mL, Intravenous, Q15 MIN PRN, Julio César Mckee M.D.    Current Outpatient Medications:  Medications Prior to Admission   Medication Sig Dispense Refill Last Dose   • cefTRIAXone (ROCEPHIN) 2 GM Recon Soln Inject 2 g into the shoulder, thigh, or buttocks every day. Start date: 5/17/21  For 5 days   5/17/2021 at 1200   • ferrous sulfate 325 (65 Fe) MG tablet Take 325 mg by mouth 2 times a day.   5/18/2021 at 0800   • albuterol 108 (90 Base) MCG/ACT Aero Soln inhalation aerosol Inhale 2 Puffs every 6 hours as needed for Shortness of Breath.   5/16/2021 at Unknown time   • predniSONE (DELTASONE) 10 MG Tab Take 10-40 mg by mouth every day. 40 mg daily x2 days  30 mg daily x2 days  20 mg daily x2 days  10 mg daily x2 days   5/17/2021 at 1600   • acetaminophen (TYLENOL) 325 MG Tab Take 650 mg by mouth every four hours as needed.   5/11/2021   • magnesium hydroxide (MILK OF MAGNESIA) 400 MG/5ML Suspension Take 30 mL by mouth 1 time a day as needed.   5/4/2021   • ceFAZolin in dextrose (ANCEF) 2-4 GM/100ML-% IVPB Infuse 100 mL into a venous catheter every 8 hours for 37 days. (Patient taking differently: Infuse 2 g into a venous catheter every 8 hours. Start date: 4/28/21   For 37 days) 3000 mL 0 5/18/2021 at 0800   • daptomycin (CUBICIN) 500 MG Recon Soln Infuse 700 mg into a venous catheter every day for 37 days. (Patient taking differently: Infuse 8 mg/kg into a venous catheter every day. Start date: 4/30/21  For 35 days) 22542 mg 0 5/18/2021 at 0800   • tamsulosin (FLOMAX) 0.4 MG capsule Take 0.4 mg by mouth at bedtime.   5/17/2021 at 2000   •  "gabapentin (NEURONTIN) 100 MG Cap Take 100 mg by mouth 2 times a day.   5/18/2021 at 0800   • Multiple Vitamin (MULTI VITAMIN PO) Take 1 tablet by mouth every day.   5/18/2021 at 0800   • Ascorbic Acid (VITAMIN C) 1000 MG Tab Take 1,000 mg by mouth every day.   5/18/2021 at 0800   • montelukast (SINGULAIR) 10 MG Tab Take 1 tablet by mouth every day. 90 tablet 0 5/17/2021 at 2000   • apixaban (ELIQUIS) 5mg Tab Take 1 Tab by mouth 2 Times a Day. 60 Tab  5/18/2021 at 0800   • magnesium oxide (MAG-OX) 400 MG Tab tablet Take 1 Tab by mouth 2 Times a Day. 30 Tab 0 5/18/2021 at 0800   • pioglitazone (ACTOS) 15 MG Tab Take 15 mg by mouth every morning.   5/18/2021 at 0800   • atorvastatin (LIPITOR) 80 MG tablet TAKE ONE TABLET BY MOUTH DAILY IN THE EVENING (Patient not taking: Reported on 5/18/2021) 100 Tab 3 Not Taking at Unknown time   • Empagliflozin-metFORMIN HCl (SYNJARDY) 5-1000 MG Tab Take 1 Tab by mouth 2 Times a Day. 180 Tab 3 5/18/2021 at 0800   • nitroglycerin (NITROSTAT) 0.4 MG SL Tab Place 1 Tab under tongue as needed for Chest Pain. (Patient not taking: Reported on 5/18/2021) 25 Tab 1 Not Taking at Unknown time   • Cholecalciferol (VITAMIN D-3) 5000 UNITS TABS Take 1 Tab by mouth every 48 hours.   5/18/2021 at 0800       Medication Allergy:  Allergies   Allergen Reactions   • Fish Hives     shellfish   • Pcn [Penicillins] Hives     Positive penicillin skin test as a child.  Significant hives to amoxicillin as an adult.  Tolerates cephalosporins   • Amoxicillin Hives   • Food Swelling      Eggs,Melons, avocados-puffy mouth             Objective:   Vitals/ General Appearance:   Weight/BMI: Body mass index is 25.52 kg/m².  /65   Pulse (!) 112   Temp 36.4 °C (97.5 °F) (Temporal)   Resp 19   Ht 1.88 m (6' 2.02\")   Wt 90.2 kg (198 lb 13.7 oz)   SpO2 98%   Vitals:    06/02/21 0216 06/02/21 0253 06/02/21 0345 06/02/21 0700   BP: 101/61 (!) 93/54 105/73 105/65   Pulse: 73 91 91 (!) 112   Resp: (!) 24 20 19 "   Temp:   36.5 °C (97.7 °F) 36.4 °C (97.5 °F)   TempSrc:   Temporal Temporal   SpO2: 93%  98% 98%   Weight:       Height:         Oxygen Therapy:  Pulse Oximetry: 98 %, O2 (LPM): 10, O2 Delivery Device: Silicone Nasal Cannula    Constitutional:   thin build with muscle wasting, No acute distress  HENMT:  Normocephalic, Atraumatic, Oropharynx moist mucous membranes, No oral exudates, Nose normal.  No thyromegaly.  Eyes:  EOMI, Conjunctiva normal, No discharge.  Neck:  Normal range of motion, No cervical tenderness,  no JVD.  Cardiovascular:  Normal heart rate, Normal rhythm, No murmurs, No rubs, No gallops.   Extremitites with intact distal pulses, no cyanosis, or edema.  Lungs:  Reduced breath sounds left lung base anteriorly and posteriorly, no added sounds/crackles/wheeze, stable  Abdomen: Bowel sounds normal, Soft, No tenderness, No guarding, No rebound, No masses, No hepatosplenomegaly.  Skin: Warm, Dry, No erythema, No rash, no induration.  Neurologic: Alert & oriented x 3, No focal deficits noted, cranial nerves II through X are grossly intact.  Psychiatric: Affect normal, Judgment normal, Mood normal.    Labs:  Recent Labs     06/01/21  1024   WBC 6.8   RBC 3.10*   HEMOGLOBIN 8.6*   HEMATOCRIT 28.6*   MCV 92.3   MCH 27.7   MCHC 30.1*   RDW 54.3*   PLATELETCT 357   MPV 9.4                 Recent Labs     06/01/21  1024   SODIUM 137   POTASSIUM 5.4   CHLORIDE 101   CO2 30   GLUCOSE 102*   BUN 18     Recent Labs     06/01/21  1024   SODIUM 137   POTASSIUM 5.4   CHLORIDE 101   CO2 30   BUN 18   CREATININE 1.28   CALCIUM 8.3*     Results for orders placed or performed during the hospital encounter of 01/28/12   ECHOCARDIOGRAM COMP W/O CONT   Result Value Ref Range    Eject.Frac. MOD BP 55.03     Eject.Frac. MOD 4C 56.62     Eject.Frac. MOD 2C 62.97          Imaging:   DX-CHEST-PORTABLE (1 VIEW)   Final Result      1.  Stable bilateral pulmonary opacities and associated bilateral pleural effusions.   2.  Stable  loculated left pneumothorax.         DX-CHEST-PORTABLE (1 VIEW)   Final Result      1.  Stable bilateral pulmonary opacities. Stable trace bilateral effusions.   2.  Stable loculated left pneumothorax.         DX-CHEST-PORTABLE (1 VIEW)   Final Result         Findings on chest radiograph appear stable since the prior radiograph.  No new abnormalities are identified.      DX-CHEST-PORTABLE (1 VIEW)   Final Result      No significant interval change.      DX-CHEST-PORTABLE (1 VIEW)   Final Result      Mild further decrease in size of loculated left lateral pneumothorax      Stable moderate multifocal consolidation      DX-CHEST-PORTABLE (1 VIEW)   Final Result      Stable chest with loculated left lateral pneumothorax and multifocal consolidation      DX-CHEST-PORTABLE (1 VIEW)   Final Result      Left-sided pigtail catheter is partially visualized. Loculated left pneumothorax appears mildly increased.      No other significant change.         DX-CHEST-PORTABLE (1 VIEW)   Final Result      Interval removal of left pigtail chest tube. Slight increase in loculated left pneumothorax.      No other significant change.      DX-CHEST-PORTABLE (1 VIEW)   Final Result      1.  Small loculated left pneumothorax is unchanged.   2.  Small loculated right pleural effusion is stable.   3.  Bilateral airspace opacities are unchanged compared to prior.   4.  Stable cardiomegaly   5.  Atherosclerotic plaque.      DX-CHEST-PORTABLE (1 VIEW)   Final Result      1.  Minimal decrease in left pneumothorax with left pigtail chest tube in place.      2.  Persistent atelectasis or pneumonia in the left lower lobe and lingula.      3.  Unchanged small right pleural effusion and atelectasis or pneumonia in the right midlung and right lower lobe.      DX-CHEST-PORTABLE (1 VIEW)   Final Result      1.  Stable moderate sized left pneumothorax.      2.  Stable bilateral infiltrates and support devices.      3.  Stable cardiomegaly.       DX-CHEST-PORTABLE (1 VIEW)   Final Result      Stable to slightly more prominent left pneumothorax and with increased left pleural fluid.      No other significant interval change.      EC-ECHOCARDIOGRAM COMPLETE W/O CONT   Final Result      DX-CHEST-PORTABLE (1 VIEW)   Final Result         1.  Pulmonary infiltrates, increased on the right compared to prior study.   2.  New layering right pleural effusion.   3.  Left pneumothorax, decreased since prior study.   4.  Cardiomegaly   5.  Atherosclerosis      CT-CHEST (THORAX) WITH   Final Result         1.  Moderate size left pneumothorax with small caliber pigtail thoracostomy tube in place, appears similar to prior chest x-ray yesterday at 1054.   2.  Layering bilateral pleural effusions.   3.  Peripheral reticular consolidations, appearance compatible with Covid infiltrates. There are areas of denser consolidation suggesting superimposed secondary infectious process.   4.  Irregular hepatic contour, compatible with changes of cirrhosis.   5.  Trace perihepatic ascites      US-EXTREMITY VENOUS LOWER BILAT   Final Result      DX-CHEST-PORTABLE (1 VIEW)   Final Result      1.  Interval placement of small-caliber LEFT chest tube with improvement of pleural fluid and compensatory enlargement of LEFT pneumothorax, likely due to noncompliant underlying lung.   2.  Improving RIGHT lung infiltrates.      These findings were discussed with SUNDAR SUNG on 5/21/2021 11:18 AM.         CT-CHEST TUBE-EMPYEMA LEFT   Final Result      1. LEFT CHEST EMPYEMA Drainage with CT guidance.      DX-CHEST-LIMITED (1 VIEW)   Final Result      1.  Stable LEFT hydropneumothorax.   2.  Multifocal pneumonia again seen with slight worsening in RIGHT midlung region.            DX-CHEST-LIMITED (1 VIEW)   Final Result         Unchanged left lateral pneumothorax.      US-THORACENTESIS PUNCTURE LEFT   Final Result      1. Ultrasound guided left sided diagnostic and therapeutic thoracentesis.       2. 1550 mL of fluid withdrawn.      DX-CHEST-PORTABLE (1 VIEW)   Final Result      Interval decrease in size of the left pleural effusion which is now small.      Small small loculated left pneumothorax.      Small loculated right pleural effusion.      Left perihilar and basilar opacity may represent atelectasis/consolidation.      Increased ill-defined airspace opacities on the right may represent a combination of atelectasis, edema and pneumonia.      Atherosclerotic plaque.      Findings discussed with the covering clinician.         CT-CHEST (THORAX) W/O   Final Result         1.  Large left and moderate pleural effusions.   2.  Peripheral reticular pulmonary opacities, appearance compatible with Covid infiltrates.   3.  Linear consolidations in the bilateral lung bases, likely atelectasis.   4.  Hepatomegaly and irregular hepatic contour, appearance most compatible changes of cirrhosis.   5.  Diverticulosis      DX-CHEST-PORTABLE (1 VIEW)   Final Result      Stable large left pleural effusion with associated compressive atelectasis. Correlate clinically for infection.      Possible small right pleural effusion with mildly increased opacity in the right lung.              Assessment and Plan:    76-year-old male with past medical history significant for Covid pneumonia 11/20 during which time GGO's were detected, currently on IV antibiotics for osteomyelitis of the left fifth toe, admitted for PICC line occlusion from his outside care facility, also found to be hypoxic with pleural effusions bilaterally left greater than right detected on previous hospitalization but on this hospitalization s/p thoracentesis bilaterally 5/21 with removal of 1500 cc of bloody fluid from the left lung, 100 cc from right lung, complicated by iatrogenic left-sided hydropneumothorax, exudative, with GPC's, cultures negative, with subsequent pigtail catheter inserted with high-volume continuous drainage, with likely trapped left  lung parenchyma, chest pigtail catheter removed yesterday, overnight with increasing dyspnea up to 10 LPM but now back down to 3 LPM and saturating in the 90s, some concern for aspiration and on soft texture diet per SLP who will follow.    - Chest tube removed  -O2 support as needed, try to wean off of supplemental oxygen, now down to 3 LPM O2 support  -Continue using incentive spirometer, elevate head of bed, aspiration precautions, SLP evaluation ongoing  -We will continue to follow      Principal Problem:    Pleural effusion, bilateral POA: Unknown  Active Problems:    History of stroke POA: Yes      Overview: 2010 in June.    Essential hypertension POA: Yes    CAD (coronary artery disease) POA: Yes      Overview: October 2013: ACS. PCI/LETA x 2 in the mid circumflex (Xience 3.25 x 23mm)       and proximal circumflex (Xience 3.6 x 12mm).    Type 2 diabetes mellitus with peripheral neuropathy (HCC) POA: Yes    Deep vein thrombosis (DVT) of lower extremity (HCC) POA: Yes    Osteomyelitis of left foot ([facility]) POA: Yes    Occluded PICC line, initial encounter ([facility]) POA: Unknown    Acute respiratory failure with hypoxia ([facility]) POA: Unknown    Postprocedural pneumothorax POA: Unknown    Protein-calorie malnutrition, severe ([facility]) POA: Unknown    Empyema, left ([facility]) POA: Unknown  Resolved Problems:    * No resolved hospital problems. *

## 2021-06-02 NOTE — PROGRESS NOTES
Hospital Medicine Daily Progress Note    Date of Service  6/2/2021    Chief Complaint  76 y.o. male admitted 5/18/2021 with malfunctioning picc line and shortness of breath    Hospital Course  This is a 76-year-old male with past medical history of hypertension, type 2 diabetes with A1c of 4.9, Hx MI, CAD status post stents in 2013,'s history of CVA, left lower extremity DVT on Eliquis, Hx COVID (11/2020), and left foot osteomyelitis status post left fifth toe amputation on daptomycin and Ancef (switched to Bactrim) until 06/03/2021.  Patient was admitted on 05/18/2021 from [facility] due to malfunctioning PICC line.  PICC line working fine now.    Patient noted to have left sided empyema, bilateral pleural effusions, pigtail insertion of left lung with adequate drainage, status post R thoracentesis 5/21, complicated by left-sided postprocedure hydropneumothorax, chest tube in place 5/21.     TTE negative for endocarditis. Blood cultures no growth to date and pleural cultures no growth to date.     Left-sided pneumothorax with no improvement over 1 week.  Pulmonology recommended to have chest tube removal trial, 06/01.   Developed afib c RVR started cardizem 60mg q6      Interval Problem Update  Patient was seen and examined at bedside.  Chest tube removal trial started on 06/01.  Overnight patient developed chest pain, EKG noted atrial fibrillation, troponin 94.  Patient reports burping and coughing.      Last night he was placed on 10 L Oxymask he is now on 8 L Oxymask, saturating above 94%.  Pending pulmonology recommendations.    We will repeat chest x-ray tomorrow and evaluate patient clinically.    I have asked case management to follow-up with facility to find out if the patient can have outpatient follow-up with chest x-rays or CAT scans if needed.    Consultants/Specialty  Pulmonary   ID (signed off)    Code Status  Full Code    Disposition  TBD, once medically stable will discharge back to  [place]    Review of Systems  Review of Systems   Respiratory: Positive for cough.    All other systems reviewed and are negative.       Physical Exam  Temp:  [36.2 °C (97.2 °F)-36.5 °C (97.7 °F)] 36.4 °C (97.5 °F)  Pulse:  [] 90  Resp:  [17-24] 18  BP: ()/(47-76) 105/65  SpO2:  [92 %-98 %] 97 %    Physical Exam  Vitals and nursing note reviewed.   Constitutional:       General: He is not in acute distress.     Appearance: Normal appearance.   HENT:      Head: Normocephalic and atraumatic.   Eyes:      Extraocular Movements: Extraocular movements intact.      Conjunctiva/sclera: Conjunctivae normal.      Pupils: Pupils are equal, round, and reactive to light.   Cardiovascular:      Rate and Rhythm: Normal rate and regular rhythm.      Pulses: Normal pulses.      Heart sounds: No murmur heard.   No friction rub. No gallop.    Pulmonary:      Effort: Pulmonary effort is normal. No respiratory distress.      Breath sounds: Rhonchi present. No wheezing or rales.   Abdominal:      General: Bowel sounds are normal. There is no distension.      Palpations: Abdomen is soft.      Tenderness: There is no abdominal tenderness.   Musculoskeletal:         General: No swelling or tenderness. Normal range of motion.      Cervical back: Normal range of motion and neck supple. No muscular tenderness.      Right lower leg: No edema.      Left lower leg: No edema.   Skin:     General: Skin is warm and dry.      Capillary Refill: Capillary refill takes less than 2 seconds.      Findings: No bruising, erythema or rash.   Neurological:      General: No focal deficit present.      Mental Status: He is alert and oriented to person, place, and time.         Fluids    Intake/Output Summary (Last 24 hours) at 6/2/2021 1011  Last data filed at 6/1/2021 2200  Gross per 24 hour   Intake 480 ml   Output 1010 ml   Net -530 ml       Laboratory  Recent Labs     06/01/21  1024   WBC 6.8   RBC 3.10*   HEMOGLOBIN 8.6*   HEMATOCRIT 28.6*   MCV  92.3   MCH 27.7   MCHC 30.1*   RDW 54.3*   PLATELETCT 357   MPV 9.4     Recent Labs     06/01/21  1024   SODIUM 137   POTASSIUM 5.4   CHLORIDE 101   CO2 30   GLUCOSE 102*   BUN 18   CREATININE 1.28   CALCIUM 8.3*                   Imaging  DX-CHEST-PORTABLE (1 VIEW)   Final Result      1.  Stable bilateral pulmonary opacities and associated bilateral pleural effusions.   2.  Stable loculated left pneumothorax.         DX-CHEST-PORTABLE (1 VIEW)   Final Result      1.  Stable bilateral pulmonary opacities. Stable trace bilateral effusions.   2.  Stable loculated left pneumothorax.         DX-CHEST-PORTABLE (1 VIEW)   Final Result         Findings on chest radiograph appear stable since the prior radiograph.  No new abnormalities are identified.      DX-CHEST-PORTABLE (1 VIEW)   Final Result      No significant interval change.      DX-CHEST-PORTABLE (1 VIEW)   Final Result      Mild further decrease in size of loculated left lateral pneumothorax      Stable moderate multifocal consolidation      DX-CHEST-PORTABLE (1 VIEW)   Final Result      Stable chest with loculated left lateral pneumothorax and multifocal consolidation      DX-CHEST-PORTABLE (1 VIEW)   Final Result      Left-sided pigtail catheter is partially visualized. Loculated left pneumothorax appears mildly increased.      No other significant change.         DX-CHEST-PORTABLE (1 VIEW)   Final Result      Interval removal of left pigtail chest tube. Slight increase in loculated left pneumothorax.      No other significant change.      DX-CHEST-PORTABLE (1 VIEW)   Final Result      1.  Small loculated left pneumothorax is unchanged.   2.  Small loculated right pleural effusion is stable.   3.  Bilateral airspace opacities are unchanged compared to prior.   4.  Stable cardiomegaly   5.  Atherosclerotic plaque.      DX-CHEST-PORTABLE (1 VIEW)   Final Result      1.  Minimal decrease in left pneumothorax with left pigtail chest tube in place.      2.   Persistent atelectasis or pneumonia in the left lower lobe and lingula.      3.  Unchanged small right pleural effusion and atelectasis or pneumonia in the right midlung and right lower lobe.      DX-CHEST-PORTABLE (1 VIEW)   Final Result      1.  Stable moderate sized left pneumothorax.      2.  Stable bilateral infiltrates and support devices.      3.  Stable cardiomegaly.      DX-CHEST-PORTABLE (1 VIEW)   Final Result      Stable to slightly more prominent left pneumothorax and with increased left pleural fluid.      No other significant interval change.      EC-ECHOCARDIOGRAM COMPLETE W/O CONT   Final Result      DX-CHEST-PORTABLE (1 VIEW)   Final Result         1.  Pulmonary infiltrates, increased on the right compared to prior study.   2.  New layering right pleural effusion.   3.  Left pneumothorax, decreased since prior study.   4.  Cardiomegaly   5.  Atherosclerosis      CT-CHEST (THORAX) WITH   Final Result         1.  Moderate size left pneumothorax with small caliber pigtail thoracostomy tube in place, appears similar to prior chest x-ray yesterday at 1054.   2.  Layering bilateral pleural effusions.   3.  Peripheral reticular consolidations, appearance compatible with Covid infiltrates. There are areas of denser consolidation suggesting superimposed secondary infectious process.   4.  Irregular hepatic contour, compatible with changes of cirrhosis.   5.  Trace perihepatic ascites      US-EXTREMITY VENOUS LOWER BILAT   Final Result      DX-CHEST-PORTABLE (1 VIEW)   Final Result      1.  Interval placement of small-caliber LEFT chest tube with improvement of pleural fluid and compensatory enlargement of LEFT pneumothorax, likely due to noncompliant underlying lung.   2.  Improving RIGHT lung infiltrates.      These findings were discussed with SUNDAR SUNG on 5/21/2021 11:18 AM.         CT-CHEST TUBE-EMPYEMA LEFT   Final Result      1. LEFT CHEST EMPYEMA Drainage with CT guidance.      DX-CHEST-LIMITED  (1 VIEW)   Final Result      1.  Stable LEFT hydropneumothorax.   2.  Multifocal pneumonia again seen with slight worsening in RIGHT midlung region.            DX-CHEST-LIMITED (1 VIEW)   Final Result         Unchanged left lateral pneumothorax.      US-THORACENTESIS PUNCTURE LEFT   Final Result      1. Ultrasound guided left sided diagnostic and therapeutic thoracentesis.      2. 1550 mL of fluid withdrawn.      DX-CHEST-PORTABLE (1 VIEW)   Final Result      Interval decrease in size of the left pleural effusion which is now small.      Small small loculated left pneumothorax.      Small loculated right pleural effusion.      Left perihilar and basilar opacity may represent atelectasis/consolidation.      Increased ill-defined airspace opacities on the right may represent a combination of atelectasis, edema and pneumonia.      Atherosclerotic plaque.      Findings discussed with the covering clinician.         CT-CHEST (THORAX) W/O   Final Result         1.  Large left and moderate pleural effusions.   2.  Peripheral reticular pulmonary opacities, appearance compatible with Covid infiltrates.   3.  Linear consolidations in the bilateral lung bases, likely atelectasis.   4.  Hepatomegaly and irregular hepatic contour, appearance most compatible changes of cirrhosis.   5.  Diverticulosis      DX-CHEST-PORTABLE (1 VIEW)   Final Result      Stable large left pleural effusion with associated compressive atelectasis. Correlate clinically for infection.      Possible small right pleural effusion with mildly increased opacity in the right lung.           Assessment/Plan  * Pleural effusion, bilateral  Assessment & Plan  Unknown etiology  Was seen on last admission and was asymptomatic. Now with worsening SOB and worsening effusion  CT chest -findings of large left-sided pleural effusion and moderate right pleural effusion.    2D echo with preserved EF and no valvular abnormalities.  S/p thoracentesis of left pleural cavity  1.5 L of fluid removed.  S/p thoracentesis of right pleural cavity about 100 cc fluid removed, bloody.  Complicated by left-sided postprocedure hydropneumothorax, chest tube in place 5/21.   Fluid studies slightly consistent with exudative effusion.  Cytology negative    No improvement of pneumothorax x1 week  Trial of chest tube removal and assessment, removed 6/1    Atrial fibrillation (HCC)  Assessment & Plan  Noted on EKG from 06/02/2021  Troponins low 90s  Patient is already on Eliquis for history of DVT.  Already on Cardizem    Unclear if this is paroxysmal or new onset as I reviewed previous charts and there is no prior history of A. Fib.    Empyema, left (HCC)  Assessment & Plan  S/p thoracentesis of left pleural cavity .1.5 L of fluid removed.  S/p thoracentesis of right pleural cavity about 100 cc fluid removed, bloody.  Fluid studies slightly consistent with exudative effusion.  Cytology negative  Pulmonology following.    Rare gram-positive cocci growing in left pleural cavity concerning for empyema.  S/p pigtail placement of the left pleural cavity for adequate drainage of infection.  We will follow up on final cultures from the left pleural cavity and also monitor cultures from the right pleural cavity as well.   ID following, on vancomycin and Ancef initially and currently on Bactrim    Protein-calorie malnutrition, severe (HCC)  Assessment & Plan  RD following.  Follow-up on recommendations.    Postprocedural pneumothorax  Assessment & Plan  Post thoracentesis, chest x-ray with finding of a small left loculated pneumothorax.   We will repeat serial chest x-ray and closely monitor vital signs.    Repeat chest x-ray with findings of persistent left-sided hydropneumothorax concerning for trapped lung.  CT chest 5/21 with findings of persistent moderate-sized left pneumothorax.  S/p IR for pigtail placement into left pleural cavity.     Chest tube removed on 06/1    Acute respiratory failure with hypoxia  (HCC)  Assessment & Plan  Secondary to large pleural effusion  See above for plan    Occluded PICC line, initial encounter (HCC)  Assessment & Plan  Patient sent by nursing home for occluded PICC line  Was flushed by ED staff and reportedly working currently    Osteomyelitis of left foot (HCC)- (present on admission)  Assessment & Plan  [place] [facility] [place] of [place]  S/p amputation of left 5th toe, amputation well healing  ID was consulted last admission and plan for Daptomycin and Ancef until 6/3  Per ID, ABx till 6/3 , switched to Bactrim on last dose June 4    Deep vein thrombosis (DVT) of lower extremity (HCC)- (present on admission)  Assessment & Plan  History of DVT on Eliquis.  Repeat ultrasound Dopplers with findings of persistent nonocclusive left lower extremity DVT.  On Eliquis     Type 2 diabetes mellitus with peripheral neuropathy (HCC)- (present on admission)  Assessment & Plan  Patient with long history of DM with doccumented neuropathy and retinopathy  Hold home oral medications (ACTOS and Synjardy)  Insulin sliding scale for now  Diabetic diet  Last A1C 4.9 month ago    CAD (coronary artery disease)- (present on admission)  Assessment & Plan  Patient not currently taking statin, can be resumed as outpatient  Reported ACS in 2013 s/p stents  No current chest pain  ECHO WNL LVEF 60%  Will continue to monitor    Essential hypertension- (present on admission)  Assessment & Plan  Does not appear to be on antihypertensives at nursing home currently  Will continue to monitor    History of stroke- (present on admission)  Assessment & Plan  Prior history of stroke with some aphasia  Some deconditioning  PT/OT       VTE prophylaxis: SCDs, Eliquis

## 2021-06-02 NOTE — PROGRESS NOTES
Received report from NOC RN and assumed care of pt. Pt is A&Ox4 resting in bed on 8L O2 via oxymask. Pt reports 7/10 pain, medicated per MAR. POC discussed with pt; all questions answered. No other needs at this time. Bed locked in lowest position, call light within reach, built in bed frame alarm, pt in low airloss mattress.

## 2021-06-02 NOTE — PROGRESS NOTES
Assessment/description of ears? Red, pink, blanching  Which preventative measures are in place for the ears? Foam padding on silicone oxygen tubing    Assessment/description of elbows? Pink, fragile, blanching  Which preventative measures are in place for the elbows? Bilateral mepilexes    Assessment/description of sacrum? Pink, excoriated, blanching  Which preventative measures are in place for the sacrum? Low airloss mattress, Q2turn with TAPs, barrier paste    Assessment/description of heels? Pink, boggy, blanching, R heel DTI noted, bilateral ankles red and slow to blanchin  Which preventative measures are in place for the heels? mepilexes    Which devices are in place? PICC, glasses, nasal cannula, heel float boots  Description of skin under devices: CDI  Which preventative measures are in place under devices? Foam padding, silicone oxygen tubing, [id number]    Other:

## 2021-06-02 NOTE — PROGRESS NOTES
Pt woke up from sleep around 0200 c/o SOB and chest pain. PRN albuterol inhaler. given. Rapid called and MD notified. Stat EKG and trop ordered. Pt is now on 10L O2 via oxymask. New order for cardizem. PRN dilaudid given for chest pain by rapid team RN. Pt is currently resting in bed, no signs of distress noted.

## 2021-06-02 NOTE — CARE PLAN
Problem: Nutritional:  Goal: Achieve adequate nutritional intake  Description: Patient will consume >50% of meals  Outcome: Met   PO % small portioned meals + Boost GC supplements documented since last RD follow up. Will keep oral nutrition supplement regimen in place as appropriate in setting of severe protein calorie malnutrition diagnosis.    RD available PRN; will transition to weekly monitoring at this time.

## 2021-06-02 NOTE — PROGRESS NOTES
Assessment/description of ears? Bilateral pink, intact, and blanching  Which preventative measures are in place for the ears? Silicone oxygen tubing with grey foam, remind pt to remove glasses while/when sleeping     Assessment/description of elbows? Pt refused removal of mepilex for assessment  Which preventative measures are in place for the elbows?  Mepilex, ERIKA mattress, Q2hr turns, pillows for support/positioning, Q shift assessments     Assessment/description of sacrum?  Red/pink, slow to angelica, peeling skin  Which preventative measures are in place for the sacrum?  Barrier paste, Q2hr turns, pillows for support/positioning, ERIKA mattress     Assessment/description of heels?  Bilateral pink, boggy, blanching; right inner ankles darkened areas that are slow to angelica, left inner ankle darkened area non-blanching to slow to angelica; right heel DTI, left 5th toe amputated, Left foot top of great toe and top of 4th toe blackened area, left foot 3rd toe darkened area non-blanching inner aspect of toe next to 4th toe  Which preventative measures are in place for the heels?  Mepilex, heel float boots, ERIKA mattress, betadine application on great & 4th toe     Which devices are in place?  PICC, NC, glasses, heel float boots,  Description of skin under devices:  Clean, dry intact outside of above mentioned areas  Which preventative measures are in place under devices? Q shift assessment, silicone oxygen tubing with grey foam, mepilex     Other:  pt's skin is fragile with scattered bruising and skin tears.  Skin tear to right arm/hand, tears have mepitel one and silicone dressing applied (right arm).  Left back with new dressing for post chest tube removal.

## 2021-06-02 NOTE — ASSESSMENT & PLAN NOTE
Noted on EKG from 06/02/2021  Troponins low 90s  Patient is already on Eliquis for history of DVT.  Already on Cardizem    Unclear if this is paroxysmal or new onset as I reviewed previous charts and there is no prior history of A. Fib.

## 2021-06-02 NOTE — THERAPY
Speech Language Pathology   Initial Assessment     Patient Name: Agapito Stone  AGE:  76 y.o., SEX:  male  Medical Record #: [id number]  Today's Date: 6/2/2021     Precautions: Fall Risk, Swallow Precautions ( See Comments)  Comments: no formal WB orders, B walking boots, CT removed 6/1    Assessment    Pt is a 75 y/o male admitted to hospital from [facility] due to malfunctioning PICC line.  Patient was noted to have left sided empyema, bilateral pleural effusions, is s/p pigtail insertion of left lung with adequate drainage, status post R thoracentesis 5/21, complicated by left-sided postprocedure hydropneumothorax, chest tube in place 5/21. Left side pneumo with no significant improvement over 1 week, and pulmonary recommended trial of Left chest tube removal 6/1.  TTE negative for endocarditis.  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    Patient seen for clinical swallow evaluation this date at request of Dr. Ro to ensure safe swallow status as patient with coughing episode last night.  Patient awake, alert and currently on 8L O2 via Oxy mask.  Pt denies any history of dysphagia, and per chart review, he has not been seen here by SLP department. Pt agreeable to swallow evaluation.  Oral mechanism evaluation was noted to be grossly intact with adequate strength and coordination of articulators.  Presentation of PO consisted of ice, mildly thick liquids, thin liquids, purees, liquidised, soft and bite sized and regular consistencies. Pt able to feed self.  Upon initiation of PO trials, pt removed Oxy mask and then within a matter of 2 minutes, had increased WOB and desaturations to the low 80s.  When questioned, he reports he typically removes the mask to eat and then replaces it.   "Education provided regarding how respiratory and swallow systems work together and the fact that having O2 when eating can assist with swallow function.  He verbalized understanding, but still needed cues to replace the mask.  He was able to consume all consistencies with wet voice x1 on thin liquids and x1 on purees.  With cues to trigger a second swallow, vocal quality was noted to be clear.  Patient did have increased work of breathing with mastication of regular consistencies which can also affect his overall swallow function.  At this time, would recommend diet modification to Regular/easy to chew with thin liquids (CHO)  EC7/TN0 with close monitoring for any changes in respiratory status, O2 needs and saturations and/or changes on chest x-ray.  Given his past and most recent medical history, there is high risk for aspiration and further respiratory compromise, so SLP will follow to ensure tolerance of current diet and make modifications as needed. Pt verbalized understanding of current diet recommendations and S/Sx of aspiration to be aware of.  Thank you for the consult.     Plan    1: Regular/easy to chew with thin liquids (CHO)  EC7/TN0     2. Small bite and sips    3.  Remove O2 mask to deliver bite/sip and then replace mask to finish swallowing.    4. STOP PO with any difficulties or S/Sx of aspiration.     Recommend Speech Therapy 3 times per week until therapy goals are met for the following treatments:  Dysphagia Training and Patient / Family / Caregiver Education.    Discharge Recommendations: Recommend post-acute placement for additional speech therapy services prior to discharge home     Objective     06/02/21 1154   Vitals   O2 (LPM) 8   O2 Delivery Device Oxymask   Prior Living Situation   Prior Services Continuous (24 Hour) Care Giving Per Service   Housing / Facility Skilled Nursing Facility   Lives with - Patient's Self Care Capacity   (was at [facility] prior)   Comments Per PT notes: \"previously at " "[facility] and was recieving assist for most ADLs; reported used a alisha for txfs, and had progressed to slideboard txfs and improved independence with self cares.\"  Pt reports living with wife prior to going to SNF   Prior Level Of Function   Communication Within Functional Limits   Swallow Within Functional Limits   Dentition Intact   Dentures None   Hearing Within Functional Limits for Evaluation   Hearing Aid None   Vision Wears Corrective Lenses   Patient's Primary Language English   Education Completed College   Education Years Completed 16   Occupation (Pre-Hospital Vocational) Retired Due To Age   Oral Motor Eval    Is Patient Able to Complete Oral Motor Eval Yes but Impaired   Labial Function   Labial Structure At Rest Within Functional Limits   Labial Vowel Production / I /, / U / Within Functional Limits   Labial Sequence / I /, / U / Within Functional Limits   Frown, Pucker Within Functional Limits   Lingual Function   Lingual Structure At Rest Within Functional Limits   Lingual Protrude Within Functional Limits   Lingual Retract Within Functional Limits   Elevate In Mouth Within Functional Limits   Elevate Outside Mouth Within Functional Limits   Lateralization No Impairment Right;No Impairment Left   Lick Lips (Circular) Within Functional Limits   Lick Palate No Impairment   / Pa / 5X's Within Functional Limits   / Ta / 5X's Within Functional Limits   / Ka / 5X's Within Functional Limits   / [place] / 5X's Within Functional Limits   Jaw   Jaw Structure At Rest Within Functional Limits   Bite ([place]) Within Functional Limits   Chew (Rotary) Minimal   Jaw Open / Resist Within Functional Limits   Jaw Close / Resist Within Functional Limits   Velar Function   Velar Structure At Rest Within Functional Limits   Laryngeal Function   Voice Quality Minimal   Volutional Cough Minimal   Excursion Upon Swallow Complete   Max Phonation Time (Seconds) 10 seconds   Oral Food Presentation   Ice Chips Within Functional " Limits   Single Swallow Mildly Thick (2) - (Nectar Thick)  Within Functional Limits   Serial Swallow Mildly Thick (2) - (Nectar Thick) Within Functional Limits   Single Swallow Thin (0) Within Functional Limits   Serial Swallow Thin (0)   (wet voice x1)   Liquidised (3) Within Functional Limits   Pureed (4) Within Functional Limits   Minced & Moist (5) - (Dysphagia II)   (not available to test)   Soft & Bite-Sized (6) - (Dysphagia III) Within Functional Limits   Regular (7) Minimal  (increased WOB with mastication)   Regular-Easy to Chew (7) Within Functional Limits   Self Feeding Independent  (needs cues to remember to replace Oxy mask)   Tracheostomy   Tracheostomy  No   Dysphagia Strategies / Recommendations   Strategies / Interventions Recommended (Yes / No) Yes   Compensatory Strategies Monitor During Meals;Multiple Swallows;Single Sips / Bites  (remove mask to deliver bite and sip and then replace mask)   Diet / Liquid Recommendation Regular - Easy to Chew (7);Thin (0)   Medication Administration  Whole with Liquid Wash   Therapy Interventions Dysphagia Therapy By Speech Language Pathologist;Pharyngeal / Laryngeal Exercises   Follow Up SLP Evaluation SLP to follow to ensure tolerance and endurance for full meal   Dysphagia Rating   Nutritional Liquid Intake Rating Scale Non thickened beverages   Nutritional Food Intake Rating Scale Total oral diet with multiple consistencies without special preparation but with specific food limitations   Short Term Goals   Short Term Goal # 1 Pt will be able to consume EC7/TNO diet with no overt S/Sx of aspiration or respiratory distress noted.   Short Term Goal # 2 Pt will be able to follow swallow strategies (small bites and sips, remove mask for bite/sip and replace immediately) given min cues and >90% accuracy.   Problem List   Problem List Dysphagia   Anticipated Discharge Needs   Discharge Recommendations Recommend post-acute placement for additional speech therapy  services prior to discharge home   Therapy Recommendations Upon DC Dysphagia Training;Patient / Family / Caregiver Education

## 2021-06-03 NOTE — PROGRESS NOTES
Received report from NOC RN and assumed care of pt. Pt is A&Ox4 resting in bed on 4L O2 via nasal cannula. Pt reports no pain, or discomfort. POC discussed with pt; all questions answered. No other needs at this time. Bed locked in lowest position, call light within reach, built in frame alarm on.

## 2021-06-03 NOTE — PROGRESS NOTES
Pulmonary Consultation       Patient ID:   Name:             Agapito Stone   YOB: 1945  Age:                 76 y.o.  male   MRN:               [id number]                                                  Reason of Consult:  Acute hypoxic respiratory failure bilateral exudative pleural effusion    Subjective:  No acute events overnight, patient is saturating comfortably on 3 LPM O2 support via nasal cannula, chest tube site nontender, nondischarging, he is not getting out of bed but is able to move in bed, no issues with eating or swallowing, no episodes of aspiration by his reckoning, no new complaints.    ROS:  Complete ROS was done and was negative except what mentioned in HPI     Past Medical History:  Past Medical History:   Diagnosis Date   • Arthritis     hands, wrists, ankles   • CAD (coronary artery disease) October 2013    Dr. Ovalle; ACS. PCI/LETA x 2 of the mid circumflex (Xience 3.25 x 23mm) and proximal circumflex (Xience 3.6x 12mm)   • Cataract     sugery   • Diabetes     oral meds   • High cholesterol    • Hyperlipidemia    • Hypertension    • Pain     ankles   • Pneumonia 1968   • Stroke (HCC) 2010    no residual effects   • Syncope        Past surgical history:  Past Surgical History:   Procedure Laterality Date   • TOE AMPUTATION Left 4/23/2021    Procedure: AMPUTATION, TOE - 5TH TOE;  Surgeon: Remi Scott M.D.;  Location: [facility];  Service: Orthopedics   • IRRIGATION & DEBRIDEMENT ORTHO Right 12/10/2020    Procedure: IRRIGATION AND DEBRIDEMENT, WOUND - HEEL;  Surgeon: Royal Bolivar M.D.;  Location: [facility];  Service: Orthopedics   • TENDON LENGHTENING Right 8/24/2020    Procedure: LENGTHENING, TENDON- , GASTROC RECESSION;  Surgeon: Royal Bolivar M.D.;  Location: [facility];  Service: Orthopedics   • ANKLE FUSION Right 8/24/2020    Procedure: FUSION, JOINT, ANKLE- ANKLE SUBTALAR FUSION , BONE GRAFT, MEDIAL RELEASE INCLUDING  TENDONS, PATIAL EXCISION OF FIBULA;  Surgeon: Royal Bolivar M.D.;  Location: [facility];  Service: Orthopedics   • TENDON TRANSFER Right 8/24/2020    Procedure: TRANSFER, TENDON- PLANTAR FASCIA RELEASE;  Surgeon: Royal Bolivar M.D.;  Location: [facility];  Service: Orthopedics   • ORTHOPEDIC OSTEOTOMY Right 8/24/2020    Procedure: OSTEOTOMY- 1ST METATARSAL, CROSS PROCEDURE, 2-5 METATARSAL OSTEOTOMY, 2-5 PHALANGEL JOINT RECONSTRUCTION;  Surgeon: Royal Bolivar M.D.;  Location: [facility];  Service: Orthopedics   • HAMMERTOE CORRECTION Right 8/24/2020    Procedure: CORRECTION, HAMMER TOE 2-5;  Surgeon: Royal Bolivar M.D.;  Location: [facility];  Service: Orthopedics   • STENT PLACEMENT  2013    cardiac   • CAROTID ENDARTERECTOMY  7/13/2010    left; Performed by CHIQUITA CORRIGAN at [facility]   • CATARACT EXTRACTION WITH IOL Bilateral    • TONSILLECTOMY  as a child       Family History:  Family History   Problem Relation Age of Onset   • Other Mother         Rheumatic fever       Hospital Medications:    Current Facility-Administered Medications:   •  Pharmacy Consult Request ...Pain Management Review 1 Each, 1 Each, Other, PHARMACY TO DOSE, Dorys Ro D.O.  •  oxyCODONE immediate-release (ROXICODONE) tablet 2.5 mg, 2.5 mg, Oral, Q3HRS PRN **OR** oxyCODONE immediate-release (ROXICODONE) tablet 5 mg, 5 mg, Oral, Q3HRS PRN **OR** morphine (pf) 4 mg/mL injection 2 mg, 2 mg, Intravenous, Q3HRS PRN, Dorys Ro D.O.  •  DILTIAZem (CARDIZEM) tablet 60 mg, 60 mg, Oral, Q6HRS, Angel Davis M.D., 60 mg at 06/03/21 0041  •  sulfamethoxazole-trimethoprim (BACTRIM DS) 800-160 MG tablet 2 tablet, 2 tablet, Oral, Q12HRS, Fela Jade M.D., 2 tablet at 06/03/21 0416  •  calcium carbonate (TUMS) chewable tab 500 mg, 500 mg, Oral, BID, Wut Sami Phoo, M.D., 500 mg at 06/03/21 0417  •  apixaban (ELIQUIS) tablet 5 mg, 5 mg, Oral, BID, Fortune  RODRICK Daugherty, 5 mg at 06/03/21 0416  •  albuterol inhaler 2 Puff, 2 Puff, Inhalation, Q6HRS PRN, Julio César Mckee M.D., 2 Puff at 06/02/21 0202  •  ferrous sulfate tablet 325 mg, 325 mg, Oral, QDAY with Breakfast, Julio César Mckee M.D., 325 mg at 06/02/21 0850  •  gabapentin (NEURONTIN) capsule 100 mg, 100 mg, Oral, BID, Julio César Mckee M.D., 100 mg at 06/03/21 0417  •  magnesium oxide (MAG-OX) tablet 400 mg, 400 mg, Oral, BID, Julio César Mckee M.D., 400 mg at 06/03/21 0416  •  montelukast (SINGULAIR) tablet 10 mg, 10 mg, Oral, DAILY, Julio César Mckee M.D., 10 mg at 06/03/21 0600  •  tamsulosin (FLOMAX) capsule 0.4 mg, 0.4 mg, Oral, QHS, Julio César Mckee M.D., 0.4 mg at 05/28/21 2020  •  senna-docusate (PERICOLACE or SENOKOT S) 8.6-50 MG per tablet 2 tablet, 2 tablet, Oral, BID, 2 tablet at 06/03/21 0417 **AND** polyethylene glycol/lytes (MIRALAX) PACKET 1 Packet, 1 Packet, Oral, QDAY PRN, 1 Packet at 05/31/21 1209 **AND** magnesium hydroxide (MILK OF MAGNESIA) suspension 30 mL, 30 mL, Oral, QDAY PRN, 30 mL at 05/25/21 1036 **AND** bisacodyl (DULCOLAX) suppository 10 mg, 10 mg, Rectal, QDAY PRN, Julio César Mckee M.D., 10 mg at 05/26/21 0620  •  Pharmacy Consult Request ...Pain Management Review 1 Each, 1 Each, Other, PHARMACY TO DOSE, Julio César Mckee M.D.  •  acetaminophen (Tylenol) tablet 650 mg, 650 mg, Oral, Q6HRS PRN, Julio César Mckee M.D., 650 mg at 05/19/21 1256  •  ondansetron (ZOFRAN) syringe/vial injection 4 mg, 4 mg, Intravenous, Q4HRS PRN, Julio César Mckee M.D., 4 mg at 05/29/21 0456  •  ondansetron (ZOFRAN ODT) dispertab 4 mg, 4 mg, Oral, Q4HRS PRN, Julio César Mckee M.D.  •  [DISCONTINUED] insulin regular (HumuLIN R,NovoLIN R) injection, 2-9 Units, Subcutaneous, 4X/DAY ACHS, 2 Units at 05/25/21 2140 **AND** [CANCELED] POC blood glucose manual result, , , Q AC AND BEDTIME(S) **AND** NOTIFY MD and PharmD, , , Once **AND** glucose 4 g chewable tablet 16 g, 16 g, Oral, Q15 MIN PRN **AND**  dextrose 50% (D50W) injection 50 mL, 50 mL, Intravenous, Q15 MIN ANSELMON, Julio César Mckee M.D.    Current Outpatient Medications:  Medications Prior to Admission   Medication Sig Dispense Refill Last Dose   • cefTRIAXone (ROCEPHIN) 2 GM Recon Soln Inject 2 g into the shoulder, thigh, or buttocks every day. Start date: 5/17/21  For 5 days   5/17/2021 at 1200   • ferrous sulfate 325 (65 Fe) MG tablet Take 325 mg by mouth 2 times a day.   5/18/2021 at 0800   • albuterol 108 (90 Base) MCG/ACT Aero Soln inhalation aerosol Inhale 2 Puffs every 6 hours as needed for Shortness of Breath.   5/16/2021 at Unknown time   • predniSONE (DELTASONE) 10 MG Tab Take 10-40 mg by mouth every day. 40 mg daily x2 days  30 mg daily x2 days  20 mg daily x2 days  10 mg daily x2 days   5/17/2021 at 1600   • acetaminophen (TYLENOL) 325 MG Tab Take 650 mg by mouth every four hours as needed.   5/11/2021   • magnesium hydroxide (MILK OF MAGNESIA) 400 MG/5ML Suspension Take 30 mL by mouth 1 time a day as needed.   5/4/2021   • ceFAZolin in dextrose (ANCEF) 2-4 GM/100ML-% IVPB Infuse 100 mL into a venous catheter every 8 hours for 37 days. (Patient taking differently: Infuse 2 g into a venous catheter every 8 hours. Start date: 4/28/21   For 37 days) 3000 mL 0 5/18/2021 at 0800   • daptomycin (CUBICIN) 500 MG Recon Soln Infuse 700 mg into a venous catheter every day for 37 days. (Patient taking differently: Infuse 8 mg/kg into a venous catheter every day. Start date: 4/30/21  For 35 days) 53502 mg 0 5/18/2021 at 0800   • tamsulosin (FLOMAX) 0.4 MG capsule Take 0.4 mg by mouth at bedtime.   5/17/2021 at 2000   • gabapentin (NEURONTIN) 100 MG Cap Take 100 mg by mouth 2 times a day.   5/18/2021 at 0800   • Multiple Vitamin (MULTI VITAMIN PO) Take 1 tablet by mouth every day.   5/18/2021 at 0800   • Ascorbic Acid (VITAMIN C) 1000 MG Tab Take 1,000 mg by mouth every day.   5/18/2021 at 0800   • montelukast (SINGULAIR) 10 MG Tab Take 1 tablet by mouth  "every day. 90 tablet 0 5/17/2021 at 2000   • apixaban (ELIQUIS) 5mg Tab Take 1 Tab by mouth 2 Times a Day. 60 Tab  5/18/2021 at 0800   • magnesium oxide (MAG-OX) 400 MG Tab tablet Take 1 Tab by mouth 2 Times a Day. 30 Tab 0 5/18/2021 at 0800   • pioglitazone (ACTOS) 15 MG Tab Take 15 mg by mouth every morning.   5/18/2021 at 0800   • atorvastatin (LIPITOR) 80 MG tablet TAKE ONE TABLET BY MOUTH DAILY IN THE EVENING (Patient not taking: Reported on 5/18/2021) 100 Tab 3 Not Taking at Unknown time   • Empagliflozin-metFORMIN HCl (SYNJARDY) 5-1000 MG Tab Take 1 Tab by mouth 2 Times a Day. 180 Tab 3 5/18/2021 at 0800   • nitroglycerin (NITROSTAT) 0.4 MG SL Tab Place 1 Tab under tongue as needed for Chest Pain. (Patient not taking: Reported on 5/18/2021) 25 Tab 1 Not Taking at Unknown time   • Cholecalciferol (VITAMIN D-3) 5000 UNITS TABS Take 1 Tab by mouth every 48 hours.   5/18/2021 at 0800       Medication Allergy:  Allergies   Allergen Reactions   • Fish Hives     shellfish   • Pcn [Penicillins] Hives     Positive penicillin skin test as a child.  Significant hives to amoxicillin as an adult.  Tolerates cephalosporins   • Amoxicillin Hives   • Food Swelling      Eggs,Melons, avocados-puffy mouth             Objective:   Vitals/ General Appearance:   Weight/BMI: Body mass index is 25.52 kg/m².  /60   Pulse 88   Temp 37.3 °C (99.1 °F) (Temporal)   Resp 16   Ht 1.88 m (6' 2.02\")   Wt 90.2 kg (198 lb 13.7 oz)   SpO2 95%   Vitals:    06/02/21 2049 06/03/21 0330 06/03/21 0719 06/03/21 0727   BP:  (!) 90/46 110/60    Pulse: 86 74 83 88   Resp: 18 16 17 16   Temp:  36.5 °C (97.7 °F) 37.3 °C (99.1 °F)    TempSrc:  Temporal Temporal    SpO2: 94% 94% 91% 95%   Weight:       Height:         Oxygen Therapy:  Pulse Oximetry: 95 %, O2 (LPM): 3, O2 Delivery Device: Silicone Nasal Cannula    Constitutional:   thin build with muscle wasting, No acute distress  HENMT:  Normocephalic, Atraumatic, Oropharynx moist mucous " membranes, No oral exudates, Nose normal.  No thyromegaly.  Eyes:  EOMI, Conjunctiva normal, No discharge.  Neck:  Normal range of motion, No cervical tenderness,  no JVD.  Cardiovascular:  Normal heart rate, Normal rhythm, No murmurs, No rubs, No gallops.   Extremitites with intact distal pulses, no cyanosis, or edema.  Lungs:  Reduced breath sounds left lung base anteriorly and posteriorly, no added sounds/crackles/wheeze, stable  Abdomen: Bowel sounds normal, Soft, No tenderness, No guarding, No rebound, No masses, No hepatosplenomegaly.  Skin: Warm, Dry, No erythema, No rash, no induration.  Neurologic: Alert & oriented x 3, No focal deficits noted, cranial nerves II through X are grossly intact.  Psychiatric: Affect normal, Judgment normal, Mood normal.    Labs:  Recent Labs     06/01/21  1024 06/03/21  0307   WBC 6.8 10.0   RBC 3.10* 2.77*   HEMOGLOBIN 8.6* 7.8*   HEMATOCRIT 28.6* 25.6*   MCV 92.3 92.4   MCH 27.7 28.2   MCHC 30.1* 30.5*   RDW 54.3* 53.6*   PLATELETCT 357 296   MPV 9.4 9.3                 Recent Labs     06/01/21  1024   SODIUM 137   POTASSIUM 5.4   CHLORIDE 101   CO2 30   GLUCOSE 102*   BUN 18     Recent Labs     06/01/21  1024   SODIUM 137   POTASSIUM 5.4   CHLORIDE 101   CO2 30   BUN 18   CREATININE 1.28   CALCIUM 8.3*     Results for orders placed or performed during the hospital encounter of 01/28/12   ECHOCARDIOGRAM COMP W/O CONT   Result Value Ref Range    Eject.Frac. MOD BP 55.03     Eject.Frac. MOD 4C 56.62     Eject.Frac. MOD 2C 62.97          Imaging:   DX-CHEST-PORTABLE (1 VIEW)   Final Result      1.  Stable bilateral pulmonary opacities and associated bilateral pleural effusions.   2.  Stable loculated left pneumothorax.         DX-CHEST-PORTABLE (1 VIEW)   Final Result      1.  Stable bilateral pulmonary opacities. Stable trace bilateral effusions.   2.  Stable loculated left pneumothorax.         DX-CHEST-PORTABLE (1 VIEW)   Final Result         Findings on chest radiograph  appear stable since the prior radiograph.  No new abnormalities are identified.      DX-CHEST-PORTABLE (1 VIEW)   Final Result      No significant interval change.      DX-CHEST-PORTABLE (1 VIEW)   Final Result      Mild further decrease in size of loculated left lateral pneumothorax      Stable moderate multifocal consolidation      DX-CHEST-PORTABLE (1 VIEW)   Final Result      Stable chest with loculated left lateral pneumothorax and multifocal consolidation      DX-CHEST-PORTABLE (1 VIEW)   Final Result      Left-sided pigtail catheter is partially visualized. Loculated left pneumothorax appears mildly increased.      No other significant change.         DX-CHEST-PORTABLE (1 VIEW)   Final Result      Interval removal of left pigtail chest tube. Slight increase in loculated left pneumothorax.      No other significant change.      DX-CHEST-PORTABLE (1 VIEW)   Final Result      1.  Small loculated left pneumothorax is unchanged.   2.  Small loculated right pleural effusion is stable.   3.  Bilateral airspace opacities are unchanged compared to prior.   4.  Stable cardiomegaly   5.  Atherosclerotic plaque.      DX-CHEST-PORTABLE (1 VIEW)   Final Result      1.  Minimal decrease in left pneumothorax with left pigtail chest tube in place.      2.  Persistent atelectasis or pneumonia in the left lower lobe and lingula.      3.  Unchanged small right pleural effusion and atelectasis or pneumonia in the right midlung and right lower lobe.      DX-CHEST-PORTABLE (1 VIEW)   Final Result      1.  Stable moderate sized left pneumothorax.      2.  Stable bilateral infiltrates and support devices.      3.  Stable cardiomegaly.      DX-CHEST-PORTABLE (1 VIEW)   Final Result      Stable to slightly more prominent left pneumothorax and with increased left pleural fluid.      No other significant interval change.      EC-ECHOCARDIOGRAM COMPLETE W/O CONT   Final Result      DX-CHEST-PORTABLE (1 VIEW)   Final Result         1.   Pulmonary infiltrates, increased on the right compared to prior study.   2.  New layering right pleural effusion.   3.  Left pneumothorax, decreased since prior study.   4.  Cardiomegaly   5.  Atherosclerosis      CT-CHEST (THORAX) WITH   Final Result         1.  Moderate size left pneumothorax with small caliber pigtail thoracostomy tube in place, appears similar to prior chest x-ray yesterday at 1054.   2.  Layering bilateral pleural effusions.   3.  Peripheral reticular consolidations, appearance compatible with Covid infiltrates. There are areas of denser consolidation suggesting superimposed secondary infectious process.   4.  Irregular hepatic contour, compatible with changes of cirrhosis.   5.  Trace perihepatic ascites      US-EXTREMITY VENOUS LOWER BILAT   Final Result      DX-CHEST-PORTABLE (1 VIEW)   Final Result      1.  Interval placement of small-caliber LEFT chest tube with improvement of pleural fluid and compensatory enlargement of LEFT pneumothorax, likely due to noncompliant underlying lung.   2.  Improving RIGHT lung infiltrates.      These findings were discussed with SUNDAR SUNG on 5/21/2021 11:18 AM.         CT-CHEST TUBE-EMPYEMA LEFT   Final Result      1. LEFT CHEST EMPYEMA Drainage with CT guidance.      DX-CHEST-LIMITED (1 VIEW)   Final Result      1.  Stable LEFT hydropneumothorax.   2.  Multifocal pneumonia again seen with slight worsening in RIGHT midlung region.            DX-CHEST-LIMITED (1 VIEW)   Final Result         Unchanged left lateral pneumothorax.      US-THORACENTESIS PUNCTURE LEFT   Final Result      1. Ultrasound guided left sided diagnostic and therapeutic thoracentesis.      2. 1550 mL of fluid withdrawn.      DX-CHEST-PORTABLE (1 VIEW)   Final Result      Interval decrease in size of the left pleural effusion which is now small.      Small small loculated left pneumothorax.      Small loculated right pleural effusion.      Left perihilar and basilar opacity may  represent atelectasis/consolidation.      Increased ill-defined airspace opacities on the right may represent a combination of atelectasis, edema and pneumonia.      Atherosclerotic plaque.      Findings discussed with the covering clinician.         CT-CHEST (THORAX) W/O   Final Result         1.  Large left and moderate pleural effusions.   2.  Peripheral reticular pulmonary opacities, appearance compatible with Covid infiltrates.   3.  Linear consolidations in the bilateral lung bases, likely atelectasis.   4.  Hepatomegaly and irregular hepatic contour, appearance most compatible changes of cirrhosis.   5.  Diverticulosis      DX-CHEST-PORTABLE (1 VIEW)   Final Result      Stable large left pleural effusion with associated compressive atelectasis. Correlate clinically for infection.      Possible small right pleural effusion with mildly increased opacity in the right lung.      DX-CHEST-PORTABLE (1 VIEW)    (Results Pending)           Assessment and Plan:    76-year-old male with past medical history significant for COVID pneumonia 11/20 during which time GGO's were detected, currently on IV antibiotics for osteomyelitis of the left fifth toe, admitted for PICC line occlusion from his outside care facility, also found to be hypoxic with pleural effusions bilaterally left greater than right detected on previous hospitalization but on this hospitalization s/p thoracentesis bilaterally 5/21 with removal of 1500 cc of bloody fluid from the left lung, 100 cc from right lung, complicated by iatrogenic left-sided hydropneumothorax, exudative, with GPC's, cultures negative, with subsequent pigtail catheter inserted with high-volume continuous drainage, with likely trapped left lung parenchyma, chest pigtail catheter removed yesterday, overnight with increasing dyspnea up to 10 LPM but now back down to 3 LPM and saturating in the 90s, some concern for aspiration and on SLP modified diet.    -O2 support as needed, try to  wean off of supplemental oxygen, now down to 3 LPM O2 support  -Continue using incentive spirometer, elevate head of bed, aspiration precautions, SLP evaluation ongoing  -PEP therapy, encourage incentive spirometer use  -Pulmonology is signing off, please contact us with any additional questions      Principal Problem:    Pleural effusion, bilateral POA: Unknown  Active Problems:    History of stroke POA: Yes      Overview: 2010 in June.    Essential hypertension POA: Yes    CAD (coronary artery disease) POA: Yes      Overview: October 2013: ACS. PCI/LETA x 2 in the mid circumflex (Xience 3.25 x 23mm)       and proximal circumflex (Xience 3.6 x 12mm).    Type 2 diabetes mellitus with peripheral neuropathy (HCC) POA: Yes    Deep vein thrombosis (DVT) of lower extremity (HCC) POA: Yes    Osteomyelitis of left foot (HCC) POA: Yes    Occluded PICC line, initial encounter ([facility]) POA: Unknown    Acute respiratory failure with hypoxia (HCC) POA: Unknown    Postprocedural pneumothorax POA: Unknown    Protein-calorie malnutrition, severe (HCC) POA: Unknown    Empyema, left (HCC) POA: Unknown    Atrial fibrillation (HCC) POA: Unknown  Resolved Problems:    * No resolved hospital problems. *

## 2021-06-03 NOTE — PROGRESS NOTES
Assessment/description of ears? Pink, intact  Which preventative measures are in place for the ears?  Grey foam padding on oxygen tubing    Assessment/description of elbows? Pink, blanching, intact, fragile  Which preventative measures are in place for the elbows?  Mepilex for protection    Assessment/description of sacrum? Excoriated, pink, blanching  Which preventative measures are in place for the sacrum?  ERIKA mattress, q2 turns, barrier paste    Assessment/description of heels? Pink, boggy, slow to angelica. DTI R heel, ankles red  Which preventative measures are in place for the heels?  Prevalon boots, mepilex, ERIKA matress    Which devices are in place? PICC, NC, glasses, Prevalon boots  Description of skin under devices: CDI  Which preventative measures are in place under devices? Gaom padding, remove glasses to sleep. Q2 turns, q shift skin checks    Other: Bilateral arms- Scattered bruising,

## 2021-06-03 NOTE — PROGRESS NOTES
Bedside report received. Pt is A&O X4.  He is on 4L O2, NC. Pt currently states he has no pain. Pt is BB, unable to ambulate. Bed frame alarm is on, hourly rounds in place. POC discussed with pt; all questions answered at this time.

## 2021-06-03 NOTE — DISCHARGE PLANNING
Received Transport Form @ 2975  Transport is scheduled for 6/4 @1100 going to [facility].    Care team notified of scheduled transport via Voalte @9999.

## 2021-06-03 NOTE — PROGRESS NOTES
Hospital Medicine Daily Progress Note    Date of Service  6/3/2021    Chief Complaint  76 y.o. male admitted 5/18/2021 with malfunctioning picc line and shortness of breath    Hospital Course  This is a 76-year-old male with past medical history of hypertension, type 2 diabetes with A1c of 4.9, Hx MI, CAD status post stents in 2013,'s history of CVA, left lower extremity DVT on Eliquis, Hx COVID (11/2020), and left foot osteomyelitis status post left fifth toe amputation on daptomycin and Ancef (switched to Bactrim) until 06/03/2021.  Patient was admitted on 05/18/2021 from [facility] due to malfunctioning PICC line.  PICC line working fine now.    Patient noted to have left sided empyema, bilateral pleural effusions, pigtail insertion of left lung with adequate drainage, status post R thoracentesis 5/21, complicated by left-sided postprocedure hydropneumothorax, chest tube in place 5/21.     TTE negative for endocarditis. Blood cultures no growth to date and pleural cultures no growth to date.     Left-sided pneumothorax with no improvement over 1 week.  Pulmonology recommended to have chest tube removal trial, 06/01.   Developed afib c RVR started cardizem 60mg q6      Interval Problem Update  Patient was seen and examined at bedside.  Chest tube removal trial started on 06/01.  Patient has tolerated removal of the chest tube.   He is now currently on 1 to 3 L nasal cannula saturating well above mid 90s.  I spoke to pulmonology, they are comfortable with discharge back to facility.  They will arrange for pulmonology follow-up outpatient.    Patient is to be transported back to [facility] on Friday.    Consultants/Specialty  Pulmonary   ID (signed off)    Code Status  Full Code    Disposition  Patient is to be transported back to [facility] on Friday.    Review of Systems  Review of Systems   Respiratory: Positive for cough.    All other systems reviewed and are negative.       Physical Exam  Temp:  [36.5 °C (97.7  °F)-37.3 °C (99.1 °F)] 37.3 °C (99.1 °F)  Pulse:  [] 97  Resp:  [16-18] 16  BP: ()/(46-67) 102/64  SpO2:  [82 %-98 %] 96 %    Physical Exam  Vitals and nursing note reviewed.   Constitutional:       General: He is not in acute distress.     Appearance: Normal appearance.   HENT:      Head: Normocephalic and atraumatic.   Eyes:      Extraocular Movements: Extraocular movements intact.      Conjunctiva/sclera: Conjunctivae normal.      Pupils: Pupils are equal, round, and reactive to light.   Cardiovascular:      Rate and Rhythm: Normal rate and regular rhythm.      Pulses: Normal pulses.      Heart sounds: No murmur heard.   No friction rub. No gallop.    Pulmonary:      Effort: Pulmonary effort is normal. No respiratory distress.      Breath sounds: Rhonchi present. No wheezing or rales.   Abdominal:      General: Bowel sounds are normal. There is no distension.      Palpations: Abdomen is soft.      Tenderness: There is no abdominal tenderness.   Musculoskeletal:         General: No swelling or tenderness. Normal range of motion.      Cervical back: Normal range of motion and neck supple. No muscular tenderness.      Right lower leg: No edema.      Left lower leg: No edema.   Skin:     General: Skin is warm and dry.      Capillary Refill: Capillary refill takes less than 2 seconds.      Findings: No bruising, erythema or rash.   Neurological:      General: No focal deficit present.      Mental Status: He is alert and oriented to person, place, and time.         Fluids    Intake/Output Summary (Last 24 hours) at 6/3/2021 1346  Last data filed at 6/3/2021 1258  Gross per 24 hour   Intake 250 ml   Output 540 ml   Net -290 ml       Laboratory  Recent Labs     06/01/21  1024 06/03/21  0307 06/03/21  1215   WBC 6.8 10.0  --    RBC 3.10* 2.77*  --    HEMOGLOBIN 8.6* 7.8* 7.9*   HEMATOCRIT 28.6* 25.6* 26.5*   MCV 92.3 92.4  --    MCH 27.7 28.2  --    MCHC 30.1* 30.5*  --    RDW 54.3* 53.6*  --    PLATELETCT 357  296  --    MPV 9.4 9.3  --      Recent Labs     06/01/21  1024 06/03/21  1225   SODIUM 137 135   POTASSIUM 5.4 5.2   CHLORIDE 101 98   CO2 30 30   GLUCOSE 102* 139*   BUN 18 26*   CREATININE 1.28 1.35   CALCIUM 8.3* 8.3*                   Imaging  DX-CHEST-PORTABLE (1 VIEW)   Final Result      1.  Loculated left pneumothorax is not significantly changed.   2.  Mildly increased left basilar pleural effusion compared to prior.   3.  Bilateral loculated pleural effusions with overlying atelectasis/consolidation.   4.  Atherosclerotic plaque.      DX-CHEST-PORTABLE (1 VIEW)   Final Result      1.  Stable bilateral pulmonary opacities and associated bilateral pleural effusions.   2.  Stable loculated left pneumothorax.         DX-CHEST-PORTABLE (1 VIEW)   Final Result      1.  Stable bilateral pulmonary opacities. Stable trace bilateral effusions.   2.  Stable loculated left pneumothorax.         DX-CHEST-PORTABLE (1 VIEW)   Final Result         Findings on chest radiograph appear stable since the prior radiograph.  No new abnormalities are identified.      DX-CHEST-PORTABLE (1 VIEW)   Final Result      No significant interval change.      DX-CHEST-PORTABLE (1 VIEW)   Final Result      Mild further decrease in size of loculated left lateral pneumothorax      Stable moderate multifocal consolidation      DX-CHEST-PORTABLE (1 VIEW)   Final Result      Stable chest with loculated left lateral pneumothorax and multifocal consolidation      DX-CHEST-PORTABLE (1 VIEW)   Final Result      Left-sided pigtail catheter is partially visualized. Loculated left pneumothorax appears mildly increased.      No other significant change.         DX-CHEST-PORTABLE (1 VIEW)   Final Result      Interval removal of left pigtail chest tube. Slight increase in loculated left pneumothorax.      No other significant change.      DX-CHEST-PORTABLE (1 VIEW)   Final Result      1.  Small loculated left pneumothorax is unchanged.   2.  Small  loculated right pleural effusion is stable.   3.  Bilateral airspace opacities are unchanged compared to prior.   4.  Stable cardiomegaly   5.  Atherosclerotic plaque.      DX-CHEST-PORTABLE (1 VIEW)   Final Result      1.  Minimal decrease in left pneumothorax with left pigtail chest tube in place.      2.  Persistent atelectasis or pneumonia in the left lower lobe and lingula.      3.  Unchanged small right pleural effusion and atelectasis or pneumonia in the right midlung and right lower lobe.      DX-CHEST-PORTABLE (1 VIEW)   Final Result      1.  Stable moderate sized left pneumothorax.      2.  Stable bilateral infiltrates and support devices.      3.  Stable cardiomegaly.      DX-CHEST-PORTABLE (1 VIEW)   Final Result      Stable to slightly more prominent left pneumothorax and with increased left pleural fluid.      No other significant interval change.      EC-ECHOCARDIOGRAM COMPLETE W/O CONT   Final Result      DX-CHEST-PORTABLE (1 VIEW)   Final Result         1.  Pulmonary infiltrates, increased on the right compared to prior study.   2.  New layering right pleural effusion.   3.  Left pneumothorax, decreased since prior study.   4.  Cardiomegaly   5.  Atherosclerosis      CT-CHEST (THORAX) WITH   Final Result         1.  Moderate size left pneumothorax with small caliber pigtail thoracostomy tube in place, appears similar to prior chest x-ray yesterday at 1054.   2.  Layering bilateral pleural effusions.   3.  Peripheral reticular consolidations, appearance compatible with Covid infiltrates. There are areas of denser consolidation suggesting superimposed secondary infectious process.   4.  Irregular hepatic contour, compatible with changes of cirrhosis.   5.  Trace perihepatic ascites      US-EXTREMITY VENOUS LOWER BILAT   Final Result      DX-CHEST-PORTABLE (1 VIEW)   Final Result      1.  Interval placement of small-caliber LEFT chest tube with improvement of pleural fluid and compensatory enlargement  of LEFT pneumothorax, likely due to noncompliant underlying lung.   2.  Improving RIGHT lung infiltrates.      These findings were discussed with SUNDAR SUNG on 5/21/2021 11:18 AM.         CT-CHEST TUBE-EMPYEMA LEFT   Final Result      1. LEFT CHEST EMPYEMA Drainage with CT guidance.      DX-CHEST-LIMITED (1 VIEW)   Final Result      1.  Stable LEFT hydropneumothorax.   2.  Multifocal pneumonia again seen with slight worsening in RIGHT midlung region.            DX-CHEST-LIMITED (1 VIEW)   Final Result         Unchanged left lateral pneumothorax.      US-THORACENTESIS PUNCTURE LEFT   Final Result      1. Ultrasound guided left sided diagnostic and therapeutic thoracentesis.      2. 1550 mL of fluid withdrawn.      DX-CHEST-PORTABLE (1 VIEW)   Final Result      Interval decrease in size of the left pleural effusion which is now small.      Small small loculated left pneumothorax.      Small loculated right pleural effusion.      Left perihilar and basilar opacity may represent atelectasis/consolidation.      Increased ill-defined airspace opacities on the right may represent a combination of atelectasis, edema and pneumonia.      Atherosclerotic plaque.      Findings discussed with the covering clinician.         CT-CHEST (THORAX) W/O   Final Result         1.  Large left and moderate pleural effusions.   2.  Peripheral reticular pulmonary opacities, appearance compatible with Covid infiltrates.   3.  Linear consolidations in the bilateral lung bases, likely atelectasis.   4.  Hepatomegaly and irregular hepatic contour, appearance most compatible changes of cirrhosis.   5.  Diverticulosis      DX-CHEST-PORTABLE (1 VIEW)   Final Result      Stable large left pleural effusion with associated compressive atelectasis. Correlate clinically for infection.      Possible small right pleural effusion with mildly increased opacity in the right lung.           Assessment/Plan  * Pleural effusion, bilateral  Assessment &  Plan  Unknown etiology  Was seen on last admission and was asymptomatic. Now with worsening SOB and worsening effusion  CT chest -findings of large left-sided pleural effusion and moderate right pleural effusion.    2D echo with preserved EF and no valvular abnormalities.  S/p thoracentesis of left pleural cavity 1.5 L of fluid removed.  S/p thoracentesis of right pleural cavity about 100 cc fluid removed, bloody.  Complicated by left-sided postprocedure hydropneumothorax, chest tube in place 5/21.   Fluid studies slightly consistent with exudative effusion.  Cytology negative    No improvement of pneumothorax x1 week  Trial of chest tube removal and assessment, removed 6/1    Atrial fibrillation (HCC)  Assessment & Plan  Noted on EKG from 06/02/2021  Troponins low 90s  Patient is already on Eliquis for history of DVT.  Already on Cardizem    Unclear if this is paroxysmal or new onset as I reviewed previous charts and there is no prior history of A. Fib.    Empyema, left (HCC)  Assessment & Plan  S/p thoracentesis of left pleural cavity .1.5 L of fluid removed.  S/p thoracentesis of right pleural cavity about 100 cc fluid removed, bloody.  Fluid studies slightly consistent with exudative effusion.  Cytology negative  Pulmonology following.    Rare gram-positive cocci growing in left pleural cavity concerning for empyema.  S/p pigtail placement of the left pleural cavity for adequate drainage of infection.  We will follow up on final cultures from the left pleural cavity and also monitor cultures from the right pleural cavity as well.   ID following, on vancomycin and Ancef initially and currently on Bactrim    Protein-calorie malnutrition, severe (HCC)  Assessment & Plan  RD following.  Follow-up on recommendations.    Postprocedural pneumothorax  Assessment & Plan  Post thoracentesis, chest x-ray with finding of a small left loculated pneumothorax.   We will repeat serial chest x-ray and closely monitor vital  signs.    Repeat chest x-ray with findings of persistent left-sided hydropneumothorax concerning for trapped lung.  CT chest 5/21 with findings of persistent moderate-sized left pneumothorax.  S/p IR for pigtail placement into left pleural cavity.     Chest tube removed on 06/1    Acute respiratory failure with hypoxia (HCC)  Assessment & Plan  Secondary to large pleural effusion  See above for plan    Occluded PICC line, initial encounter ([facility])  Assessment & Plan  Patient sent by nursing home for occluded PICC line  Was flushed by ED staff and reportedly working currently    Osteomyelitis of left foot (HCC)- (present on admission)  Assessment & Plan  Patient last admitted to [facility] with osteomyelitis of left foot  S/p amputation of left 5th toe, amputation well healing  ID was consulted last admission and plan for Daptomycin and Ancef until 6/3  Per ID, ABx till 6/3 , switched to Bactrim on last dose June 4    Deep vein thrombosis (DVT) of lower extremity (HCC)- (present on admission)  Assessment & Plan  History of DVT on Eliquis.  Repeat ultrasound Dopplers with findings of persistent nonocclusive left lower extremity DVT.  On Eliquis     Type 2 diabetes mellitus with peripheral neuropathy (HCC)- (present on admission)  Assessment & Plan  Patient with long history of DM with doccumented neuropathy and retinopathy  Hold home oral medications (ACTOS and Synjardy)  Insulin sliding scale for now  Diabetic diet  Last A1C 4.9 month ago    CAD (coronary artery disease)- (present on admission)  Assessment & Plan  Patient not currently taking statin, can be resumed as outpatient  Reported ACS in 2013 s/p stents  No current chest pain  ECHO WNL LVEF 60%  Will continue to monitor    Essential hypertension- (present on admission)  Assessment & Plan  Does not appear to be on antihypertensives at nursing home currently  Will continue to monitor    History of stroke- (present on admission)  Assessment & Plan  Prior history of  stroke with some aphasia  Some deconditioning  PT/OT       VTE prophylaxis: SCDs, Eliquis

## 2021-06-03 NOTE — DISCHARGE PLANNING
Medical Social Work    Anticipated Discharge Disposition: [facility]  Long-term bed    Action: LSW notified by MD that pt is doing better and is pending clearance from Pulmonary. Pt may require outpatient pulmonary follow-up upon discharge.  Pt is a long-term resident at [facility] and LSW will need to inform [facility] of follow-up appointments at time of discharge.        Barriers to Discharge: medical clearance    Plan: LSW will continue to monitor for discharge planning needs.

## 2021-06-03 NOTE — PROGRESS NOTES
Assessment/description of ears? Pink, red, blanching, intact  Which preventative measures are in place for the ears? Foam padding on oxygen tubing, encourage pt to remove glasses when not in use    Assessment/description of elbows? Pink, blanching, intact   Which preventative measures are in place for the elbows? mepilexes    Assessment/description of sacrum? Pink, excoriated, blanching  Which preventative measures are in place for the sacrum? Barrier paste, low airloss mattress, Q2turn    Assessment/description of heels? Pink, boggy, slow to angelica, DTI to R heel, bilateral ankle red  Which preventative measures are in place for the heels? Heel float boots    Which devices are in place? PICC, NC glasses  Description of skin under devices: CDI  Which preventative measures are in place under devices? Foam padding on silicone oxygen tubing, Q2turn    Other:   rectal

## 2021-06-03 NOTE — CARE PLAN
The patient is Stable - Low risk of patient condition declining or worsening    Shift Goals  Clinical Goals: Pt will tolerate titrating O2 down  Patient Goals: Pt will report no pain from chest tube site    Progress made toward(s) clinical / shift goals:  Lung sounds and respiratory effort assessed regularly. Pt given breathing treatments and encouraged to cough PRN. Encourage use of Incentive Spirometer. Monitor O2 saturation with .     Patient is not progressing towards the following goals:

## 2021-06-03 NOTE — CARE PLAN
The patient is Watcher - Medium risk of patient condition declining or worsening    Shift Goals  Clinical Goals: Keep O2 sat above 90  Patient Goals:    Progress made toward(s) clinical / shift goals:  Pt maintained O2 sat at 94-96 throughout shift with 4L O2, NC.    Patient is not progressing towards the following goals:

## 2021-06-03 NOTE — DISCHARGE PLANNING
Anticipated Discharge Disposition: Skilled    Action: Patient is medically clear to discharge  PC to [facility] talked to Artur, patient can come back tomorrow, will set up 11:00 transport  [facility] faxed transport communication form for a tentative transport time of 11:00 on 6/4/21 to [facility] with an update    Barriers to Discharge: n/a    Plan: n/a

## 2021-06-03 NOTE — THERAPY
"Speech Language Pathology  Daily Treatment     Patient Name: Agapito Stone  Age:  76 y.o., Sex:  male  Medical Record #: [id number]  Today's Date: 6/3/2021     Precautions  Precautions: Fall Risk, Swallow Precautions ( See Comments)  Comments: No formal WB orders, B walking boots    Assessment    Pt seen for dysphagia follow-up and presents with respiratory compromise affecting oral intake.  RN reporting difficulty yesterday with 10L oxymask O2, with po intake, however pt currently with 3-4L NC O2, with sats varying from 85-94%, with inconsistent signal reading while pt is self-feeding with LUE.  Upon switching to self-feeding with RUE, sat levels consistently reading above 90%.  Pt noted to cough once following a thin liquid bolus sip from a cup, otherwise, without s/s of penetration/aspiration during meal.  Of note, pulse oximeter is within pt's line-of-sight, and pt is aware to pause for adequate breath support as needed between bites/sips.      Plan    Continue current treatment plan.    Discharge Recommendations: (P) Anticipate that the patient will have no further speech therapy needs after discharge from the hospital (if goals met during acute care stay)     Objective       06/03/21 1050   Voice   Comments Clear.     Dysphagia    Dysphagia X   Other Treatments seen with breakfast tray   Diet / Liquid Recommendation Regular - Easy to Chew (7);Thin (0)   Nutritional Liquid Intake Rating Scale Non thickened beverages   Nutritional Food Intake Rating Scale Total oral diet with multiple consistencies without special preparation but with specific food limitations   Nursing Communication Swallow Precaution Sign Posted at Head of Bed   Skilled Intervention Compensatory Strategies   Recommended Route of Medication Administration   Medication Administration  Whole with Liquid Wash  (tolerated during am med pass)   Patient / Family Goals   Patient / Family Goal #1 \"I want to get better.\"   Goal #1 Outcome " Progressing as expected   Short Term Goals   Short Term Goal # 2 Pt will be able to follow swallow strategies (small bites and sips, remove mask for bite/sip and replace immediately) given min cues and >90% accuracy.   Goal Outcome # 2  Progressing as expected   Interdisciplinary Plan of Care Collaboration   Collaboration Comments Continue with monitor of O2 sat during meals.   Session Information   Date / Session Number 2, 6/3/21 (2/3-6/8)   Priority 2  (3x.Empyema/chest tube d/c. EC7/TNO w/ monitor of O2 sats)

## 2021-06-04 PROBLEM — J86.9 EMPYEMA, LEFT (HCC): Status: RESOLVED | Noted: 2021-01-01 | Resolved: 2021-01-01

## 2021-06-04 PROBLEM — J90 PLEURAL EFFUSION, BILATERAL: Status: RESOLVED | Noted: 2021-01-01 | Resolved: 2021-01-01

## 2021-06-04 PROBLEM — T82.898A OCCLUDED PICC LINE, INITIAL ENCOUNTER (HCC): Status: RESOLVED | Noted: 2021-01-01 | Resolved: 2021-01-01

## 2021-06-04 PROBLEM — J96.01 ACUTE RESPIRATORY FAILURE WITH HYPOXIA (HCC): Status: RESOLVED | Noted: 2021-01-01 | Resolved: 2021-01-01

## 2021-06-04 PROBLEM — M86.9 OSTEOMYELITIS OF LEFT FOOT (HCC): Status: RESOLVED | Noted: 2021-01-01 | Resolved: 2021-01-01

## 2021-06-04 NOTE — PROGRESS NOTES
Bedside report received. Pt is A&O X4.  He is on 3L O2, NC. Pt currently states he has no pain. Pt is BB, unable to ambulate. Bed frame alarm is on, hourly rounds in place. POC discussed with pt; all questions answered at this time.

## 2021-06-04 NOTE — DISCHARGE PLANNING
Medical Social Work  PC to patient's spouse, called to inform her that patient is going to [facility] today at 11:000

## 2021-06-04 NOTE — CARE PLAN
The patient is Watcher - Medium risk of patient condition declining or worsening    Shift Goals  Clinical Goals: Maintain O2 sat above 90  Patient Goals:     Progress made toward(s) clinical / shift goals:  Pt maintained above 90 O2 sat with 3L O2, NC.    Patient is not progressing towards the following goals:

## 2021-06-04 NOTE — PROGRESS NOTES
Received report from NOC RN and assumed care of pt. Pt is A&Ox4 resting in bed on 3L O2 via nasal cannula. Pt reports no pain at this time. POC discussed with pt, including plans to discharge today; all questions answered. No other needs at this time. Bed locked in lowest position, call light within reach, bed alarm on.

## 2021-06-04 NOTE — DISCHARGE INSTRUCTIONS
Pleural Effusion  Pleural effusion is an abnormal buildup of fluid in the layers of tissue between the lungs and the inside of the chest (pleural space) The two layers of tissue that line the lungs and the inside of the chest are called pleura. Usually, there is no air in the space between the pleura, only a thin layer of fluid. Some conditions can cause a large amount of fluid to build up, which can cause the lung to collapse if untreated. A pleural effusion is usually caused by another disease that requires treatment.  What are the causes?  Pleural effusion can be caused by:  · Heart failure.  · Certain infections, such as pneumonia or tuberculosis.  · Cancer.  · A blood clot in the lung (pulmonary embolism).  · Complications from surgery, such as from open heart surgery.  · Liver disease (cirrhosis).  · Kidney disease.  What are the signs or symptoms?  In some cases, pleural effusion may cause no symptoms. If symptoms are present, they may include:  · Shortness of breath, especially when lying down.  · Chest pain. This may get worse when taking a deep breath.  · Fever.  · Dry, long-lasting (chronic) cough.  · Hiccups.  · Rapid breathing.  An underlying condition that is causing the pleural effusion (such as heart failure, pneumonia, blood clots, tuberculosis, or cancer) may also cause other symptoms.  How is this diagnosed?  This condition may be diagnosed based on:  · Your symptoms and medical history.  · A physical exam.  · A chest X-ray.  · A procedure to use a needle to remove fluid from the pleural space (thoracentesis). This fluid is tested.  · Other imaging studies of the chest, such as ultrasound or CT scan.  How is this treated?  Depending on the cause of your condition, treatment may include:  · Treating the underlying condition that is causing the effusion. When that condition improves, the effusion will also improve. Examples of treatment for underlying conditions include:  ? Antibiotic medicines to  treat an infection.  ? Diuretics or other heart medicines to treat heart failure.  · Thoracentesis.  · Placing a thin flexible tube under your skin and into your chest to continuously drain the effusion (indwelling pleural catheter).  · Surgery to remove the outer layer of tissue from the pleural space (decortication).  · A procedure to put medicine into the chest cavity to seal the pleural space and prevent fluid buildup (pleurodesis).  · Chemotherapy and radiation therapy, if you have cancerous (malignant) pleural effusion. These treatments are typically used to treat cancer. They kill certain cells in the body.  Follow these instructions at home:  · Take over-the-counter and prescription medicines only as told by your health care provider.  · Ask your health care provider what activities are safe for you.  · Keep track of how long you are able to do mild exercise (such as walking) before you get short of breath. Write down this information to share with your health care provider. Your ability to exercise should improve over time.  · Do not use any products that contain nicotine or tobacco, such as cigarettes and e-cigarettes. If you need help quitting, ask your health care provider.  · Keep all follow-up visits as told by your health care provider. This is important.  Contact a health care provider if:  · The amount of time that you are able to do mild exercise:  ? Decreases.  ? Does not improve with time.  · You have a fever.  Get help right away if:  · You are short of breath.  · You develop chest pain.  · You develop a new cough.  Summary  · Pleural effusion is an abnormal buildup of fluid in the layers of tissue between the lungs and the inside of the chest.  · Pleural effusion can have many causes, including heart failure, pulmonary embolism, infections, or cancer.  · Symptoms of pleural effusion can include shortness of breath, chest pain, fever, long-lasting (chronic) cough, hiccups, or rapid  breathing.  · Diagnosis often involves making images of the chest (such as with ultrasound or X-ray) and removing fluid (thoracentesis) to send for testing.  · Treatment for pleural effusion depends on what underlying condition is causing it.  This information is not intended to replace advice given to you by your health care provider. Make sure you discuss any questions you have with your health care provider.  Document Released: 12/18/2006 Document Revised: 11/30/2018 Document Reviewed: 08/23/2018  [employer] Patient Education © 2020 [employer]      Discharge Instructions    Discharged to other by medical transportation with escort. Discharged via ambulance, hospital escort: Yes.  Special equipment needed: Not Applicable    Be sure to schedule a follow-up appointment with your primary care doctor or any specialists as instructed.     Discharge Plan:   Diet Plan: Discussed  Activity Level: Discussed  Confirmed Follow up Appointment: Patient to Call and Schedule Appointment  Medication Reconciliation Updated: Yes    I understand that a diet low in cholesterol, fat, and sodium is recommended for good health. Unless I have been given specific instructions below for another diet, I accept this instruction as my diet prescription.   Other diet: Diabetic    Special Instructions: None    · Is patient discharged on Warfarin / Coumadin?   No     Depression / Suicide Risk    As you are discharged from this [facility] facility, it is important to learn how to keep safe from harming yourself.    Recognize the warning signs:  · Abrupt changes in personality, positive or negative- including increase in energy   · Giving away possessions  · Change in eating patterns- significant weight changes-  positive or negative  · Change in sleeping patterns- unable to sleep or sleeping all the time   · Unwillingness or inability to communicate  · Depression  · Unusual sadness, discouragement and loneliness  · Talk of wanting to  die  · Neglect of personal appearance   · Rebelliousness- reckless behavior  · Withdrawal from people/activities they love  · Confusion- inability to concentrate     If you or a loved one observes any of these behaviors or has concerns about self-harm, here's what you can do:  · Talk about it- your feelings and reasons for harming yourself  · Remove any means that you might use to hurt yourself (examples: pills, rope, extension cords, firearm)  · Get professional help from the community (Mental Health, Substance Abuse, psychological counseling)  · Do not be alone:Call your Safe Contact- someone whom you trust who will be there for you.  · Call your local CRISIS HOTLINE 833-6490 or 114-405-8411  · Call your local Children's Mobile Crisis Response Team [place] (120) 037-7557 or www.[employer]  · Call the toll free National Suicide Prevention Hotlines   · National Suicide Prevention Lifeline 554-474-WWVM (0726)  · National Hope Line Network 800-SUICIDE (496-6273)

## 2021-06-04 NOTE — CARE PLAN
The patient is Stable - Low risk of patient condition declining or worsening    Shift Goals  Clinical Goals: Pt O2 will remain above 90  Patient Goals: Pt will report no pain from chest tube site    Progress made toward(s) clinical / shift goals:  Lung sounds and respiratory effort assessed regularly. Pt given breathing treatments and encouraged to cough PRN. Monitor O2 with .    Patient is not progressing towards the following goals:

## 2021-06-04 NOTE — DISCHARGE SUMMARY
Discharge Summary    CHIEF COMPLAINT ON ADMISSION  Chief Complaint   Patient presents with   • Vascular Access Problem     Left upper arm PICC line won't flush per staff at [facility]. PICC line accessed and it does flush with NS, however, it is very slow and hard to flush. No discomfort, redness, or swelling noted.       Reason for Admission  Malfunctioning PICC line    CODE STATUS  Full Code    HPI & HOSPITAL COURSE  This is a 76-year-old male with past medical history of hypertension, type 2 diabetes with A1c of 4.9, Hx MI, CAD status post stents in 2013,'s history of CVA, left lower extremity DVT on Eliquis, Hx COVID (11/2020), and left foot osteomyelitis status post left fifth toe amputation on daptomycin and Ancef (switched to Bactrim) until 06/03/2021.  Patient was admitted on 05/18/2021 from [facility] due to malfunctioning PICC line.  PICC line working fine now.    Patient noted to have left sided empyema, bilateral pleural effusions, pigtail insertion of left lung with adequate drainage, status post R thoracentesis 5/21, complicated by left-sided postprocedure hydropneumothorax, chest tube in place 5/21.     TTE negative for endocarditis. Blood cultures no growth to date and pleural cultures no growth to date.     Left-sided pneumothorax with no improvement over 1 week.  Pulmonology recommended to have chest tube removal trial, 06/01.   Developed afib c RVR started cardizem 60mg q6    Chest tube removal trial started on 06/01.  Patient has tolerated removal of the chest tube. He is now currently on 1 to 2 L nasal cannula saturating well above mid 90s oxygen saturation.  I spoke to pulmonology, they are comfortable with discharge back to facility.  They will arrange for pulmonology follow-up outpatient.    Therefore, he is discharged in good and stable condition to skilled nursing facility.    The patient met 2-midnight criteria for an inpatient stay at the time of discharge.      FOLLOW UP ITEMS POST  DISCHARGE  Primary care physician  Pulmonology    DISCHARGE DIAGNOSES  Principal Problem (Resolved):    Pleural effusion, bilateral POA: Unknown  Active Problems:    History of stroke POA: Yes      Overview: 2010 in June.    Essential hypertension POA: Yes    CAD (coronary artery disease) POA: Yes      Overview: October 2013: ACS. PCI/LETA x 2 in the mid circumflex (Xience 3.25 x 23mm)       and proximal circumflex (Xience 3.6 x 12mm).    Type 2 diabetes mellitus with peripheral neuropathy (HCC) POA: Yes    Deep vein thrombosis (DVT) of lower extremity (HCC) POA: Yes    Postprocedural pneumothorax POA: Unknown    Protein-calorie malnutrition, severe (HCC) POA: Unknown    Atrial fibrillation (HCC) POA: Unknown  Resolved Problems:    Osteomyelitis of left foot (HCC) POA: Yes    Occluded PICC line, initial encounter ([facility]) POA: Unknown    Acute respiratory failure with hypoxia (HCC) POA: Unknown    Empyema, left ([facility]) POA: Unknown      FOLLOW UP  [facility]  [address]  [facility] [address]  [phone]  On 6/4/2021  for evalation shanna Gregory, A.P.R.N.  [id number] [facility]  [address]  [facility] [address]  [phone]    In 1 week      Tabatha Ridley M.D.  [address]  [address]  [place] [address]  [phone]    In 1 week        MEDICATIONS ON DISCHARGE     Medication List      START taking these medications      Instructions   DILTIAZem 60 MG Tabs  Commonly known as: CARDIZEM   Take 1 tablet by mouth every 6 hours.  Dose: 60 mg     oxyCODONE immediate-release 5 MG Tabs  Commonly known as: ROXICODONE   Take 1 tablet by mouth every 6 hours as needed for Severe Pain for up to 3 days.  Dose: 5 mg        CONTINUE taking these medications      Instructions   acetaminophen 325 MG Tabs  Commonly known as: Tylenol   Take 650 mg by mouth every four hours as needed.  Dose: 650 mg     albuterol 108 (90 Base) MCG/ACT Aers inhalation aerosol   Inhale 2 Puffs every 6 hours as needed for  Shortness of Breath.  Dose: 2 Puff     apixaban 5mg Tabs  Commonly known as: ELIQUIS   Take 1 Tab by mouth 2 Times a Day.  Dose: 5 mg     cefTRIAXone 2 GM Solr  Commonly known as: ROCEPHIN   Inject 2 g into the shoulder, thigh, or buttocks every day. Start date: 5/17/21  For 5 days  Dose: 2 g     ferrous sulfate 325 (65 Fe) MG tablet   Take 325 mg by mouth 2 times a day.  Dose: 325 mg     gabapentin 100 MG Caps  Commonly known as: NEURONTIN   Take 100 mg by mouth 2 times a day.  Dose: 100 mg     magnesium hydroxide 400 MG/5ML Susp  Commonly known as: MILK OF MAGNESIA   Take 30 mL by mouth 1 time a day as needed.  Dose: 30 mL     magnesium oxide 400 MG Tabs tablet  Commonly known as: MAG-OX   Take 1 Tab by mouth 2 Times a Day.  Dose: 400 mg     montelukast 10 MG Tabs  Commonly known as: SINGULAIR   Take 1 tablet by mouth every day.  Dose: 10 mg     MULTI VITAMIN PO   Take 1 tablet by mouth every day.  Dose: 1 tablet     tamsulosin 0.4 MG capsule  Commonly known as: FLOMAX   Take 0.4 mg by mouth at bedtime.  Dose: 0.4 mg     Vitamin C 1000 MG Tabs   Take 1,000 mg by mouth every day.  Dose: 1,000 mg     Vitamin D-3 125 MCG (5000 UT) Tabs   Take 1 Tab by mouth every 48 hours.  Dose: 1 tablet        STOP taking these medications    atorvastatin 80 MG tablet  Commonly known as: LIPITOR     ceFAZolin in dextrose 2-4 GM/100ML-% IVPB  Commonly known as: ANCEF     daptomycin 500 MG Solr  Commonly known as: CUBICIN     nitroglycerin 0.4 MG Subl  Commonly known as: NITROSTAT     pioglitazone 15 MG Tabs  Commonly known as: ACTOS     predniSONE 10 MG Tabs  Commonly known as: DELTASONE     Synjardy 5-1000 MG Tabs  Generic drug: Empagliflozin-metFORMIN HCl            Allergies  Allergies   Allergen Reactions   • Fish Hives     shellfish   • Pcn [Penicillins] Hives     Positive penicillin skin test as a child.  Significant hives to amoxicillin as an adult.  Tolerates cephalosporins   • Amoxicillin Hives   • Food Swelling       Eggs,Melons, avocados-puffy mouth       DIET  Orders Placed This Encounter   Procedures   • Diet Order Diet: Level 7 - Easy to Chew; Liquid level: Level 0 - Thin; Second Modifier: (optional): Consistent CHO (Diabetic)     Standing Status:   Standing     Number of Occurrences:   1     Order Specific Question:   Diet:     Answer:   Level 7 - Easy to Chew [22]     Order Specific Question:   Liquid level     Answer:   Level 0 - Thin     Order Specific Question:   Second Modifier: (optional)     Answer:   Consistent CHO (Diabetic) [4]       ACTIVITY  As tolerated and directed by skilled nursing.  Weight bearing as tolerated    LINES, DRAINS, AND WOUNDS  This is an automated list. Peripheral IVs will be removed prior to discharge.  PICC Single Lumen 04/28/21 Left Brachial (Active)   Site Assessment Clean;Dry;Intact 06/03/21 1930   Line Status Scrubbed the hub prior to access;Flushed;Blood return noted 06/03/21 1930   Dressing Type Biopatch;Securing device;Transparent 06/03/21 1930   Dressing Status Clean;Dry;Intact 06/03/21 1930   Dressing Intervention N/A 06/03/21 1930   Dressing Change Due 06/05/21 06/03/21 1930   Date Primary Tubing Changed 05/26/21 05/26/21 0732   Date Secondary Tubing Changed 05/26/21 05/26/21 0732   Date IV Connector(s) Changed 05/30/21 05/30/21 2316   NEXT Primary Tubing Change  05/29/21 05/28/21 2020   NEXT Secondary Tubing Change  05/29/21 05/28/21 2020   NEXT IV Connector(s) Change 06/01/21 05/30/21 2316   Line Necessity Assessed Antibiotic Therapy Greater than 7 Days 06/01/21 0800     Chest Tube 1 Left Midaxillary (Active)   Function -20 cm H2O 06/01/21 1000   Chest Tube Air Leak No 06/01/21 1000   Chest Tube Patent Yes 06/01/21 1000   Dressing Status Clean;Dry;Intact 06/01/21 1000   Dressing Intervention N/A 06/01/21 1000   Drainage Description Serous 06/01/21 0605   Site Assessment Clean;Dry;Intact;Tender 06/01/21 1000   Surrounding Skin Unable to view;Other (Comment) 06/01/21 1000   Output  (mL) 70 mL 06/01/21 1400      Moisture Associated Skin Damage 05/19/21 Buttock;Groin (Active)   Wound Image   05/19/21 1600   NEXT Weekly Photo (Inpatient Only) 06/08/21 06/01/21 1500   Drainage Amount None 06/02/21 2300   Drainage Description IDA 05/25/21 2128   Periwound Assessment Pink 05/31/21 1137   IAD Cleansing Foam Cleanser/Washcloth 06/02/21 2300   Periwound Protectant Barrier Paste 06/01/21 1920   IAD Containment Device None 05/26/21 0846   Length (cm) 7 05/19/21 1600   Width (cm) 7 05/19/21 1600   WOUND NURSE ONLY - Time Spent with Patient (mins) 60 05/19/21 1600       Wound 08/24/20 Incision Ankle Right (Active)       Wound 12/06/20 Pressure Injury Heel Posterior Right POA (Active)       Wound 12/06/20 Partial Thickness Wound Ankle Lateral Left (Active)       Wound 04/23/21 Incision Foot Left Xeroform, 4x4, soft roll, ace (Active)   Wound Image   05/19/21 0844   Site Assessment Clean;Dry;Intact 05/31/21 2000   Periwound Assessment Clean;Dry;Intact 05/31/21 2000   Margins Attached edges 05/22/21 2023   Closure None 05/22/21 2023   Drainage Amount None 05/31/21 2000   Drainage Description IDA 05/21/21 2056   Treatments Cleansed 05/22/21 2023   Wound Cleansing Approved Wound Cleanser 05/29/21 2124   Dressing Options Open to Air 05/31/21 2000   Dressing Changed Observed 05/21/21 2056   Dressing Status Clean;Dry;Intact 04/27/21 0800       Wound 05/19/21 Pressure Injury Foot;Heel;Ankle Right POA (Active)   Wound Image     06/02/21 2300   Site Assessment Purple;Red 06/03/21 1930   Periwound Assessment Pink;Pale 06/03/21 1930   Margins Attached edges 06/02/21 2300   Closure Open to air 06/02/21 2300   Drainage Amount None 06/02/21 2300   Treatments Offloading 06/01/21 1500   Wound Cleansing Not Applicable 06/01/21 1500   Periwound Protectant Not Applicable 06/01/21 1500   Dressing Cleansing/Solutions Not Applicable 06/01/21 1500   Dressing Options Mepilex Heel 06/03/21 0930   Dressing Changed Observed 06/01/21  1920   Dressing Status Clean;Dry;Intact 06/03/21 0930   Dressing Change/Treatment Frequency Every 72 hrs, and As Needed 06/03/21 1930   NEXT Dressing Change/Treatment Date 06/03/21 06/03/21 0930   NEXT Weekly Photo (Inpatient Only) 06/08/21 06/01/21 1500   Pressure Injury Stage DTPI 05/26/21 1600   Wound Length (cm) 0.6 cm 05/19/21 1600   Wound Width (cm) 0.6 cm 05/19/21 1600   Wound Surface Area (cm^2) 0.36 cm^2 05/19/21 1600   Shape circular 06/01/21 1500   Wound Odor None 06/01/21 1500   Exposed Structures IDA 05/26/21 1600   WOUND NURSE ONLY - Time Spent with Patient (mins) 60 06/01/21 1500       Wound 05/19/21 Pressure Injury Foot;Ankle;Toe, Hallux;Toe, 4th;Toe, 3rd Left POA (Active)   Wound Image   06/02/21 2300   Site Assessment Brown;Dry 06/03/21 1930   Periwound Assessment [place] 06/02/21 2300   Margins Attached edges 06/02/21 2300   Closure Open to air 06/02/21 2300   Drainage Amount None 06/02/21 2300   Treatments Offloading 06/01/21 1500   Wound Cleansing Not Applicable 06/01/21 1500   Periwound Protectant Not Applicable 06/01/21 1500   Dressing Cleansing/Solutions 3% Betadine 06/03/21 0930   Dressing Options Open to Air 06/01/21 1920   Dressing Changed Changed 06/01/21 1920   Dressing Status Clean;Dry;Intact 05/31/21 1137   Dressing Change/Treatment Frequency Daily, and As Needed 06/03/21 1930   NEXT Dressing Change/Treatment Date 06/02/21 06/01/21 1920   NEXT Weekly Photo (Inpatient Only) 06/08/21 06/01/21 1500   Pressure Injury Stage DTPI 05/26/21 1600   Wound Length (cm) 0.8 cm 05/26/21 1600   Wound Width (cm) 0.6 cm 05/26/21 1600   Wound Depth (cm) 0 cm 05/26/21 1600   Wound Surface Area (cm^2) 0.48 cm^2 05/26/21 1600   Wound Volume (cm^3) 0 cm^3 05/26/21 1600   Shape round 05/26/21 1600   Wound Odor None 05/26/21 1600   Exposed Structures IDA 05/26/21 1600   WOUND NURSE ONLY - Time Spent with Patient (mins) 60 05/19/21 1600       Wound 05/19/21 Skin Tear Arm;Hand Right (Active)   Wound Image     05/26/21 2000   Site Assessment IDA 06/03/21 1930   Periwound Assessment Dry;Intact;Red;Pink;Fragile 06/01/21 0811   Margins Defined edges 06/01/21 0811   Closure Adhesive bandage 06/01/21 0811   Drainage Amount None 06/01/21 0811   Treatments Site care 06/01/21 0811   Wound Cleansing Not Applicable 06/01/21 0811   Periwound Protectant Not Applicable 06/01/21 0811   Dressing Cleansing/Solutions Not Applicable 06/01/21 0811   Dressing Options Mepitel One 06/01/21 0811   Dressing Changed Observed 06/01/21 1920   Dressing Status Clean;Dry;Intact 06/03/21 0930   Dressing Change/Treatment Frequency As Needed 06/03/21 1930   NEXT Weekly Photo (Inpatient Only) 06/02/21 06/01/21 0811   Non-staged Wound Description Full thickness 05/19/21 1600   Wound Length (cm) 2.5 cm 05/19/21 1600   Wound Width (cm) 2.5 cm 05/19/21 1600   Wound Surface Area (cm^2) 6.25 cm^2 05/19/21 1600   Shape Irregular 05/19/21 1600   Wound Odor None 05/19/21 1600   Exposed Structures None 05/19/21 1600   WOUND NURSE ONLY - Time Spent with Patient (mins) 60 05/19/21 1600       Wound 05/19/21 Pressure Injury Sacrum;Coccyx (Active)   Wound Image   06/01/21 1500   Site Assessment IDA 06/03/21 1930   Periwound Assessment Blanchable erythema 06/02/21 2300   Margins Undefined edges 06/01/21 1920   Closure Open to air 06/01/21 1500   Drainage Amount None 06/01/21 1500   Treatments Cleansed 06/01/21 1500   Wound Cleansing Foam Cleanser/Washcloth 06/01/21 1500   Periwound Protectant Barrier Paste 06/03/21 0930   Dressing Cleansing/Solutions Not Applicable 06/01/21 1500   Dressing Options Open to Air 06/01/21 1500   Dressing Changed Changed 06/01/21 1920   Dressing Status Clean;Dry;Intact 05/28/21 0800   Dressing Change/Treatment Frequency As Needed 06/03/21 1930   NEXT Dressing Change/Treatment Date 05/29/21 05/26/21 1600   NEXT Weekly Photo (Inpatient Only) 06/08/21 06/01/21 1500   Pressure Injury Stage DTPI 05/26/21 1600   Wound Length (cm) 0.6 cm 05/19/21  1600   Wound Width (cm) 0.6 cm 05/19/21 1600   Wound Surface Area (cm^2) 0.36 cm^2 05/19/21 1600   Shape round 05/26/21 1600   Wound Odor None 05/26/21 1600   Exposed Structures IDA 05/26/21 1600   WOUND NURSE ONLY - Time Spent with Patient (mins) 60 05/19/21 1600      PICC Single Lumen 04/28/21 Left Brachial (Active)   Site Assessment Clean;Dry;Intact 06/03/21 1930   Line Status Scrubbed the hub prior to access;Flushed;Blood return noted 06/03/21 1930   Dressing Type Biopatch;Securing device;Transparent 06/03/21 1930   Dressing Status Clean;Dry;Intact 06/03/21 1930   Dressing Intervention N/A 06/03/21 1930   Dressing Change Due 06/05/21 06/03/21 1930   Date Primary Tubing Changed 05/26/21 05/26/21 0732   Date Secondary Tubing Changed 05/26/21 05/26/21 0732   Date IV Connector(s) Changed 05/30/21 05/30/21 2316   NEXT Primary Tubing Change  05/29/21 05/28/21 2020   NEXT Secondary Tubing Change  05/29/21 05/28/21 2020   NEXT IV Connector(s) Change 06/01/21 05/30/21 2316   Line Necessity Assessed Antibiotic Therapy Greater than 7 Days 06/01/21 0800                MENTAL STATUS ON TRANSFER             CONSULTATIONS  Pulmonology  ID    PROCEDURES  Patient noted to have left sided empyema, bilateral pleural effusions, pigtail insertion of left lung with adequate drainage, status post R thoracentesis 5/21, complicated by left-sided postprocedure hydropneumothorax, chest tube in place 5/21.     LABORATORY  Lab Results   Component Value Date    SODIUM 135 06/03/2021    POTASSIUM 5.2 06/03/2021    CHLORIDE 98 06/03/2021    CO2 30 06/03/2021    GLUCOSE 139 (H) 06/03/2021    BUN 26 (H) 06/03/2021    CREATININE 1.35 06/03/2021    CREATININE 1.0 04/09/2007        Lab Results   Component Value Date    WBC 10.0 06/03/2021    HEMOGLOBIN 7.8 (L) 06/04/2021    HEMATOCRIT 25.3 (L) 06/04/2021    PLATELETCT 296 06/03/2021      DX-CHEST-PORTABLE (1 VIEW)   Final Result      1.  Loculated left pneumothorax is not significantly changed.    2.  Mildly increased left basilar pleural effusion compared to prior.   3.  Bilateral loculated pleural effusions with overlying atelectasis/consolidation.   4.  Atherosclerotic plaque.      DX-CHEST-PORTABLE (1 VIEW)   Final Result      1.  Stable bilateral pulmonary opacities and associated bilateral pleural effusions.   2.  Stable loculated left pneumothorax.         DX-CHEST-PORTABLE (1 VIEW)   Final Result      1.  Stable bilateral pulmonary opacities. Stable trace bilateral effusions.   2.  Stable loculated left pneumothorax.         DX-CHEST-PORTABLE (1 VIEW)   Final Result         Findings on chest radiograph appear stable since the prior radiograph.  No new abnormalities are identified.      DX-CHEST-PORTABLE (1 VIEW)   Final Result      No significant interval change.      DX-CHEST-PORTABLE (1 VIEW)   Final Result      Mild further decrease in size of loculated left lateral pneumothorax      Stable moderate multifocal consolidation      DX-CHEST-PORTABLE (1 VIEW)   Final Result      Stable chest with loculated left lateral pneumothorax and multifocal consolidation      DX-CHEST-PORTABLE (1 VIEW)   Final Result      Left-sided pigtail catheter is partially visualized. Loculated left pneumothorax appears mildly increased.      No other significant change.         DX-CHEST-PORTABLE (1 VIEW)   Final Result      Interval removal of left pigtail chest tube. Slight increase in loculated left pneumothorax.      No other significant change.      DX-CHEST-PORTABLE (1 VIEW)   Final Result      1.  Small loculated left pneumothorax is unchanged.   2.  Small loculated right pleural effusion is stable.   3.  Bilateral airspace opacities are unchanged compared to prior.   4.  Stable cardiomegaly   5.  Atherosclerotic plaque.      DX-CHEST-PORTABLE (1 VIEW)   Final Result      1.  Minimal decrease in left pneumothorax with left pigtail chest tube in place.      2.  Persistent atelectasis or pneumonia in the left lower  lobe and lingula.      3.  Unchanged small right pleural effusion and atelectasis or pneumonia in the right midlung and right lower lobe.      DX-CHEST-PORTABLE (1 VIEW)   Final Result      1.  Stable moderate sized left pneumothorax.      2.  Stable bilateral infiltrates and support devices.      3.  Stable cardiomegaly.      DX-CHEST-PORTABLE (1 VIEW)   Final Result      Stable to slightly more prominent left pneumothorax and with increased left pleural fluid.      No other significant interval change.      EC-ECHOCARDIOGRAM COMPLETE W/O CONT   Final Result      DX-CHEST-PORTABLE (1 VIEW)   Final Result         1.  Pulmonary infiltrates, increased on the right compared to prior study.   2.  New layering right pleural effusion.   3.  Left pneumothorax, decreased since prior study.   4.  Cardiomegaly   5.  Atherosclerosis      CT-CHEST (THORAX) WITH   Final Result         1.  Moderate size left pneumothorax with small caliber pigtail thoracostomy tube in place, appears similar to prior chest x-ray yesterday at 1054.   2.  Layering bilateral pleural effusions.   3.  Peripheral reticular consolidations, appearance compatible with Covid infiltrates. There are areas of denser consolidation suggesting superimposed secondary infectious process.   4.  Irregular hepatic contour, compatible with changes of cirrhosis.   5.  Trace perihepatic ascites      US-EXTREMITY VENOUS LOWER BILAT   Final Result      DX-CHEST-PORTABLE (1 VIEW)   Final Result      1.  Interval placement of small-caliber LEFT chest tube with improvement of pleural fluid and compensatory enlargement of LEFT pneumothorax, likely due to noncompliant underlying lung.   2.  Improving RIGHT lung infiltrates.      These findings were discussed with SUNDAR SUNG on 5/21/2021 11:18 AM.         CT-CHEST TUBE-EMPYEMA LEFT   Final Result      1. LEFT CHEST EMPYEMA Drainage with CT guidance.      DX-CHEST-LIMITED (1 VIEW)   Final Result      1.  Stable LEFT  hydropneumothorax.   2.  Multifocal pneumonia again seen with slight worsening in RIGHT midlung region.            DX-CHEST-LIMITED (1 VIEW)   Final Result         Unchanged left lateral pneumothorax.      US-THORACENTESIS PUNCTURE LEFT   Final Result      1. Ultrasound guided left sided diagnostic and therapeutic thoracentesis.      2. 1550 mL of fluid withdrawn.      DX-CHEST-PORTABLE (1 VIEW)   Final Result      Interval decrease in size of the left pleural effusion which is now small.      Small small loculated left pneumothorax.      Small loculated right pleural effusion.      Left perihilar and basilar opacity may represent atelectasis/consolidation.      Increased ill-defined airspace opacities on the right may represent a combination of atelectasis, edema and pneumonia.      Atherosclerotic plaque.      Findings discussed with the covering clinician.         CT-CHEST (THORAX) W/O   Final Result         1.  Large left and moderate pleural effusions.   2.  Peripheral reticular pulmonary opacities, appearance compatible with Covid infiltrates.   3.  Linear consolidations in the bilateral lung bases, likely atelectasis.   4.  Hepatomegaly and irregular hepatic contour, appearance most compatible changes of cirrhosis.   5.  Diverticulosis      DX-CHEST-PORTABLE (1 VIEW)   Final Result      Stable large left pleural effusion with associated compressive atelectasis. Correlate clinically for infection.      Possible small right pleural effusion with mildly increased opacity in the right lung.        Total time of the discharge process exceeds 45 minutes.

## 2021-08-15 NOTE — H&P
Hospital Medicine History & Physical Note    Date of Service  8/15/2021    Primary Care Physician  GEORGE Jenkins.    Consultants  None at this juncture.    Code Status  Prior    Chief Complaint  Chief Complaint   Patient presents with   • Shortness of Breath       History of Presenting Illness  Agapito Stone is a 76 y.o. male who presented 8/15/2021 with worsening hypoxia from [facility] where he resides.  Typically he is on 2 L nasal cannula however earlier today he had to go up to 4 L to maintain saturation greater than 90%.  He is currently comfortable, poor historian., Tells me that he is healthy with no particular issues at this juncture.  Does not recall the events that brought him to our emergency department.  This appears to be his baseline dementia.  As such, I am unable to elicit his specific elements of HPI, ROS, or family history.  Past medical and surgical history have been previously obtained and are auto populated into the appropriate spaces below.    I discussed the plan of care with patient.    Review of Systems  Unable to be obtained as patient is a poor historian.    Past Medical History   has a past medical history of Arthritis, CAD (coronary artery disease) (October 2013), Cataract, Diabetes, High cholesterol, Hyperlipidemia, Hypertension, Pain, Pneumonia (1968), Stroke (HCC) (2010), and Syncope.    Surgical History   has a past surgical history that includes carotid endarterectomy (7/13/2010); stent placement (2013); tonsillectomy (as a child); cataract extraction with iol (Bilateral); tendon lenghtening (Right, 8/24/2020); ankle fusion (Right, 8/24/2020); tendon transfer (Right, 8/24/2020); orthopedic osteotomy (Right, 8/24/2020); hammertoe correction (Right, 8/24/2020); irrigation & debridement ortho (Right, 12/10/2020); and toe amputation (Left, 4/23/2021).     Family History  Unable to be obtained, patient is a poor historian.    Social History   reports  that he has never smoked. He has never used smokeless tobacco. He reports previous alcohol use. He reports that he does not use drugs.    Allergies  Allergies   Allergen Reactions   • Avocado Swelling     Mouth swelling   • Eggs Swelling     Mouth swelling   • Fish Hives   • Food Swelling     Melons - mouth swelling   • Pcn [Penicillins] Hives     Positive penicillin skin test as a child.  Significant hives to amoxicillin as an adult.  Tolerates cephalosporins   • Shellfish Allergy Hives       Medications  Prior to Admission Medications   Prescriptions Last Dose Informant Patient Reported? Taking?   Ascorbic Acid (VITAMIN C) 1000 MG Tab 8/15/2021 at 0800 MAR from Other Facility Yes No   Sig: Take 1,000 mg by mouth every day.   Cholecalciferol (VITAMIN D-3) 5000 UNITS TABS 8/14/2021 at 0800 MAR from Other Facility Yes No   Sig: Take 1 Tab by mouth every 48 hours.   DILTIAZem (CARDIZEM) 60 MG Tab 8/15/2021 at 0800 MAR from Other Facility No No   Sig: Take 1 tablet by mouth every 6 hours.   Multiple Vitamin (MULTI VITAMIN PO) 8/15/2021 at 0800 MAR from Other Facility Yes No   Sig: Take 1 tablet by mouth every day.   albuterol 108 (90 Base) MCG/ACT Aero Soln inhalation aerosol 8/13/2021 at 0525 MAR from Other Facility Yes No   Sig: Inhale 2 Puffs every 6 hours as needed for Shortness of Breath.   apixaban (ELIQUIS) 5mg Tab 8/15/2021 at 0800 MAR from Other Facility No No   Sig: Take 1 Tab by mouth 2 Times a Day.   cefTRIAXone (ROCEPHIN) 1 GM Recon Soln 8/14/2021 at FINISHED MAR from Other Facility Yes Yes   Sig: Infuse 1 g into a venous catheter every day. 8/12-8/14/21   ferrous sulfate 325 (65 Fe) MG tablet 8/15/2021 at 0800 MAR from Other Facility Yes No   Sig: Take 325 mg by mouth 2 times a day.   furosemide (LASIX) 40 MG Tab 8/13/2021 at 1011 MAR from Other Facility Yes Yes   Sig: Take 40 mg by mouth one time. One time dose on 8/13/21   gabapentin (NEURONTIN) 100 MG Cap 8/15/2021 at 0800 MAR from Other Facility Yes  No   Sig: Take 100 mg by mouth 2 times a day.   magnesium oxide (MAG-OX) 400 MG Tab tablet 8/15/2021 at 0800 MAR from Other Facility No No   Sig: Take 1 Tab by mouth 2 Times a Day.   methylPREDNISolone sod succ (SOLU-MEDROL) 125 MG Recon Soln 8/12/2021 at FINISHED MAR from Other Facility Yes Yes   Sig: Infuse 125 mg into a venous catheter one time. One time dose on 8/12/21   montelukast (SINGULAIR) 10 MG Tab 8/14/2021 at 2000 MAR from Other Facility No No   Sig: Take 1 tablet by mouth every day.   potassium chloride SA (KDUR) 20 MEQ Tab CR 8/13/2021 at 1031 MAR from Other Facility Yes Yes   Sig: Take 20 mEq by mouth one time. One time dose given with furosemide on 8/13/21   tamsulosin (FLOMAX) 0.4 MG capsule 8/14/2021 at 2000 MAR from Other Facility Yes No   Sig: Take 0.4 mg by mouth at bedtime.      Facility-Administered Medications: None       Physical Exam  Temp:  [37.3 °C (99.2 °F)] 37.3 °C (99.2 °F)  Pulse:  [] 99  Resp:  [16-28] 18  BP: (108-114)/(59-75) 110/59  SpO2:  [99 %-100 %] 100 %    General: No acute distress  HEENT atraumatic, normocephalic, pupils equal round reactive to light  Neck: No JVD  Chest: Respirations are unlabored  Cardiac: Physiologic S1 and S2  Abdomen: Soft, nontender, nondistended  Extremities: Without clubbing, cyanosis or edema  Neuro: Cranial nerves II through XII are grossly intact.  Psych: No anxiety, judgement intact.        Laboratory:  Recent Labs     08/15/21  1300   WBC 7.6   RBC 3.08*   HEMOGLOBIN 8.5*   HEMATOCRIT 30.1*   MCV 97.7   MCH 27.6   MCHC 28.2*   RDW 58.7*   PLATELETCT 260   MPV 9.2     Recent Labs     08/15/21  1300 08/15/21  1455   SODIUM 142  --    POTASSIUM 5.9* 5.6*   CHLORIDE 101  --    CO2 38*  --    GLUCOSE 126*  --    BUN 50*  --    CREATININE 0.75  --    CALCIUM 9.1  --      Recent Labs     08/15/21  1300   ALTSGPT 28   ASTSGOT 30   ALKPHOSPHAT 535*   TBILIRUBIN 0.3   LIPASE 16   GLUCOSE 126*         Recent Labs     08/15/21  1300   NTPROBNP 15456*          Recent Labs     08/15/21  1300   TROPONINT 62*       Imaging:  CT-CHEST (THORAX) WITH   Final Result      1.  Bilateral loculated pleural effusions, larger on the LEFT with possible debris in the LEFT pleural fluid.   2.  Associated consolidation primarily involving the lower lobes, as seen previously.   3.  Worsening mediastinal adenopathy.   4.  Ascites and body wall edema, new from prior.               DX-CHEST-PORTABLE (1 VIEW)   Final Result      1.  Persistent hypoinflation with small RIGHT pleural fluid collection and/or pleural thickening, unchanged from prior exam.   2.  Consolidation again seen at the LEFT lung base with moderate associated pleural fluid.            Cardiology:  1.  Twelve-lead EKG discloses atrial fibrillation at a rate of 100 ventricular beats per minute.  T waves are inverted in lead II, upright throughout the remainder of the precordial leads.  No ST segment elevations or deviations concerning for ischemia are seen.  Evidence of right bundle branch block is seen.   ms.  Axis slightly leftward.  Of note, read as per my interpretation of images pending the benefit of cardiology at the time of this dictation.      Assessment/Plan:  I anticipate this patient will require at least two midnights for appropriate medical management, necessitating inpatient admission.    Atrial fibrillation (HCC)- (present on admission)  Assessment & Plan  Continue home dose Eliquis, diltiazem.  Patient appears volume overloaded, home dose furosemide will be increased from 40 to 60 mg and administered twice daily rather than once daily.  Echocardiogram was recently checked about 3 months ago, given his atrial fib I suspect this will continue to be nondiagnostic in terms of his diastolic function, okay to forego recheck at this juncture.    Pleural effusion  Assessment & Plan  Follows with Dr. Myers in her office, she suspects this is a trapped lung, no role for thoracentesis at this juncture.   Can consider pulmonary consult in the morning for reevaluation.    Age-related physical debility- (present on admission)  Assessment & Plan  Physical and Occupational Therapy evaluations    Deep vein thrombosis (DVT) of lower extremity (HCC)- (present on admission)  Assessment & Plan  Continue home dose Eliquis.    Essential hypertension- (present on admission)  Assessment & Plan  Continue home dose medications with hold parameters.      VTE prophylaxis: therapeutic anticoagulation with Eliquis

## 2021-08-15 NOTE — ED TRIAGE NOTES
Pt bib ems from [facility] nursing. Per rn at facility she noted pt o2 to be dropping to 90% on his normal 2 L and that he appeared to be working harder to breath. Pt on 4L at 98%. Pt denies feeling sob or cough. Pt recently treated for bronchitis and finished antibiotics yesterday

## 2021-08-15 NOTE — ED NOTES
Comfort check. Pt provided with urinal and verbalized understanding that medications will make him urinate frequently. Pt has call light in hand and aware to call if needs assistance with urinal

## 2021-08-15 NOTE — ED PROVIDER NOTES
ED Provider Note    Scribed for Dinora Marcum M.D. by Ana Pelaez. 8/15/2021  11:34 AM    Primary care provider: LION Jenkins  Means of arrival: EMS  History obtained from: patient   History limited by: none    CHIEF COMPLAINT  Chief Complaint   Patient presents with   • Shortness of Breath       HPI  Agapito Stone is a 76 y.o. male who presents to the emergency department via EMS from [facility] for evaluation of shortness of breath. Patient was sent from the SNF after staff noticed his oxygen saturation dropping to 90% on his regular 2L of oxygen.  He is now on 4 L of oxygen via nasal cannul saturating in the mid to high 90s.  Additionally they noted some increased work of breathing, however patient reports that he does not feel like his breathing is any different. Patient is denying any shortness of breath at this time, but endorses some associated generalized weakness and decreased urine output. Denies pain, fever, abdominal pain, chest pain, dizziness, lightheadedness, or leg swelling. He has a past medical history of A fib, but denies history of blood clots.  Patient is a poor historian.    REVIEW OF SYSTEMS  See HPI for further details. All other systems are negative.    PAST MEDICAL HISTORY  Past Medical History:   Diagnosis Date   • Arthritis     hands, wrists, ankles   • CAD (coronary artery disease) October 2013    Dr. Ovalle; ACS. PCI/LETA x 2 of the mid circumflex (Xience 3.25 x 23mm) and proximal circumflex (Xience 3.6x 12mm)   • Cataract     sugery   • Diabetes     oral meds   • High cholesterol    • Hyperlipidemia    • Hypertension    • Pain     ankles   • Pneumonia 1968   • Stroke (HCC) 2010    no residual effects   • Syncope        FAMILY HISTORY  Family History   Problem Relation Age of Onset   • Other Mother         Rheumatic fever     SOCIAL HISTORY  Social History     Socioeconomic History   • Marital status:    Tobacco Use   • Smoking status: Never Smoker    • Smokeless tobacco: Never Used   Substance and Sexual Activity   • Alcohol use: Not Currently     Alcohol/week: 0.0 oz     Comment: once a year   • Drug use: No     SURGICAL HISTORY  Past Surgical History:   Procedure Laterality Date   • TOE AMPUTATION Left 4/23/2021    Procedure: AMPUTATION, TOE - 5TH TOE;  Surgeon: Remi Scott M.D.;  Location: [facility];  Service: Orthopedics   • IRRIGATION & DEBRIDEMENT ORTHO Right 12/10/2020    Procedure: IRRIGATION AND DEBRIDEMENT, WOUND - HEEL;  Surgeon: Royal Bolivar M.D.;  Location: [facility];  Service: Orthopedics   • TENDON LENGHTENING Right 8/24/2020    Procedure: LENGTHENING, TENDON- , GASTROC RECESSION;  Surgeon: Royal Bolivar M.D.;  Location: [facility];  Service: Orthopedics   • ANKLE FUSION Right 8/24/2020    Procedure: FUSION, JOINT, ANKLE- ANKLE SUBTALAR FUSION , BONE GRAFT, MEDIAL RELEASE INCLUDING TENDONS, PATIAL EXCISION OF FIBULA;  Surgeon: Royal Bolivar M.D.;  Location: [facility];  Service: Orthopedics   • TENDON TRANSFER Right 8/24/2020    Procedure: TRANSFER, TENDON- PLANTAR FASCIA RELEASE;  Surgeon: Royal Bolivar M.D.;  Location: [facility];  Service: Orthopedics   • ORTHOPEDIC OSTEOTOMY Right 8/24/2020    Procedure: OSTEOTOMY- 1ST METATARSAL, CROSS PROCEDURE, 2-5 METATARSAL OSTEOTOMY, 2-5 PHALANGEL JOINT RECONSTRUCTION;  Surgeon: Royal Bolivar M.D.;  Location: [facility];  Service: Orthopedics   • HAMMERTOE CORRECTION Right 8/24/2020    Procedure: CORRECTION, HAMMER TOE 2-5;  Surgeon: Royal Bolivar M.D.;  Location: [facility];  Service: Orthopedics   • STENT PLACEMENT  2013    cardiac   • CAROTID ENDARTERECTOMY  7/13/2010    left; Performed by CHIQUITA CORRIGAN at [facility]   • CATARACT EXTRACTION WITH IOL Bilateral    • TONSILLECTOMY  as a child       CURRENT MEDICATIONS  Home Medications     Reviewed by Alexsandra Valencia R.N.  "(Registered Nurse) on 08/15/21 at 1128  Med List Status: Partial   Medication Last Dose Status   acetaminophen (TYLENOL) 325 MG Tab  Active   albuterol 108 (90 Base) MCG/ACT Aero Soln inhalation aerosol  Active   apixaban (ELIQUIS) 5mg Tab  Active   Ascorbic Acid (VITAMIN C) 1000 MG Tab  Active   cefTRIAXone (ROCEPHIN) 2 GM Recon Soln  Active   Cholecalciferol (VITAMIN D-3) 5000 UNITS TABS  Active   DILTIAZem (CARDIZEM) 60 MG Tab  Active   ferrous sulfate 325 (65 Fe) MG tablet  Active   gabapentin (NEURONTIN) 100 MG Cap  Active   magnesium hydroxide (MILK OF MAGNESIA) 400 MG/5ML Suspension  Active   magnesium oxide (MAG-OX) 400 MG Tab tablet  Active   montelukast (SINGULAIR) 10 MG Tab  Active   Multiple Vitamin (MULTI VITAMIN PO)  Active   tamsulosin (FLOMAX) 0.4 MG capsule  Active                ALLERGIES  Allergies   Allergen Reactions   • Fish Hives     shellfish   • Pcn [Penicillins] Hives     Positive penicillin skin test as a child.  Significant hives to amoxicillin as an adult.  Tolerates cephalosporins   • Amoxicillin Hives   • Food Swelling      Eggs,Melons, avocados-puffy mouth       PHYSICAL EXAM  VITAL SIGNS: /66   Pulse 97   Temp 37.3 °C (99.2 °F) (Temporal)   Resp (!) 28   Ht 1.88 m (6' 2\")   Wt 90.7 kg (200 lb)   SpO2 100%   BMI 25.68 kg/m²      Constitutional: Ill appearing, No acute distress;   HENT: Normocephalic, atraumatic; Bilateral external ears normal; Oropharynx with dry mucous membranes;    Eyes: PERRL, EOMI, Conjunctiva normal. No discharge.   Neck:  Supple, nontender midline; No stridor; No nuchal rigidity.   Lymphatic: No cervical lymphadenopathy noted.   Cardiovascular: Regular rate and rhythm without murmurs, rubs, or gallop.   Thorax & Lungs:  tachypnea with accessory muscle use and increased work of breathing, breath sounds clear to auscultation bilaterally without wheezing, rales or rhonchi. Nontender chest wall. No crepitus or subcutaneous air  Abdomen: Soft, nontender, " bowel sounds normal. No obvious masses; No pulsatile masses; no rebound, guarding, or peritoneal signs.   Skin: multiple area ecchymosis over arms, thin skin, Good color; warm and dry without rash or petechia.  Back: Nontender, No CVA tenderness.   Extremities: Distal radial, dorsalis pedis, posterior tibial pulses are equal bilaterally; No edema; Nontender calves or saphenous, No cyanosis, No clubbing.   Musculoskeletal: Padded booties on bilateral feet with bandage over the left lateral foot and toes, Good range of motion in all major joints. No tenderness to palpation or major deformities noted.   Neurologic: Alert & Oriented to [place], hospital name, thinks it is 1949 and the president is Frank. Flat affect. Clear speech.    EKG  12 Lead EKG; Interpreted by me as shown below.      LABS/RADIOLOGY/PROCEDURES  Results for orders placed or performed during the hospital encounter of 08/15/21   CBC WITH DIFFERENTIAL   Result Value Ref Range    WBC 7.6 4.8 - 10.8 K/uL    RBC 3.08 (L) 4.70 - 6.10 M/uL    Hemoglobin 8.5 (L) 14.0 - 18.0 g/dL    Hematocrit 30.1 (L) 42.0 - 52.0 %    MCV 97.7 81.4 - 97.8 fL    MCH 27.6 27.0 - 33.0 pg    MCHC 28.2 (L) 33.7 - 35.3 g/dL    RDW 58.7 (H) 35.9 - 50.0 fL    Platelet Count 260 164 - 446 K/uL    MPV 9.2 9.0 - 12.9 fL    Neutrophils-Polys 88.50 (H) 44.00 - 72.00 %    Lymphocytes 5.00 (L) 22.00 - 41.00 %    Monocytes 6.00 0.00 - 13.40 %    Eosinophils 0.00 0.00 - 6.90 %    Basophils 0.10 0.00 - 1.80 %    Immature Granulocytes 0.40 0.00 - 0.90 %    Nucleated RBC 0.00 /100 WBC    Neutrophils (Absolute) 6.69 1.82 - 7.42 K/uL    Lymphs (Absolute) 0.38 (L) 1.00 - 4.80 K/uL    Monos (Absolute) 0.45 0.00 - 0.85 K/uL    Eos (Absolute) 0.00 0.00 - 0.51 K/uL    Baso (Absolute) 0.01 0.00 - 0.12 K/uL    Immature Granulocytes (abs) 0.03 0.00 - 0.11 K/uL    NRBC (Absolute) 0.00 K/uL    Hypochromia 1+    COMP METABOLIC PANEL   Result Value Ref Range    Sodium 142 135 - 145 mmol/L    Potassium 5.9 (H)  3.6 - 5.5 mmol/L    Chloride 101 96 - 112 mmol/L    Co2 38 (H) 20 - 33 mmol/L    Anion Gap 3.0 (L) 7.0 - 16.0    Glucose 126 (H) 65 - 99 mg/dL    Bun 50 (H) 8 - 22 mg/dL    Creatinine 0.75 0.50 - 1.40 mg/dL    Calcium 9.1 8.5 - 10.5 mg/dL    AST(SGOT) 30 12 - 45 U/L    ALT(SGPT) 28 2 - 50 U/L    Alkaline Phosphatase 535 (H) 30 - 99 U/L    Total Bilirubin 0.3 0.1 - 1.5 mg/dL    Albumin 2.5 (L) 3.2 - 4.9 g/dL    Total Protein 5.6 (L) 6.0 - 8.2 g/dL    Globulin 3.1 1.9 - 3.5 g/dL    A-G Ratio 0.8 g/dL   LIPASE   Result Value Ref Range    Lipase 16 11 - 82 U/L   TROPONIN   Result Value Ref Range    Troponin T 62 (H) 6 - 19 ng/L   proBrain Natriuretic Peptide, NT   Result Value Ref Range    NT-proBNP 43104 (H) 0 - 125 pg/mL   LACTIC ACID   Result Value Ref Range    Lactic Acid 1.2 0.5 - 2.0 mmol/L   LACTIC ACID   Result Value Ref Range    Lactic Acid 1.2 0.5 - 2.0 mmol/L   COV-2, FLU A/B, AND RSV BY PCR (2-4 HOURS CEPHEID): Collect NP swab in VTM    Specimen: Nasopharyngeal; Respirate   Result Value Ref Range    Influenza virus A RNA Negative Negative    Influenza virus B, PCR Negative Negative    RSV, PCR Negative Negative    SARS-CoV-2 by PCR NotDetected     SARS-CoV-2 Source NP Swab    ESTIMATED GFR   Result Value Ref Range    GFR If African American >60 >60 mL/min/1.73 m 2    GFR If Non African American >60 >60 mL/min/1.73 m 2   PERIPHERAL SMEAR REVIEW   Result Value Ref Range    Peripheral Smear Review see below    PLATELET ESTIMATE   Result Value Ref Range    Plt Estimation Normal    MORPHOLOGY   Result Value Ref Range    RBC Morphology Present    DIFFERENTIAL COMMENT   Result Value Ref Range    Comments-Diff see below    POTASSIUM SERUM (K)   Result Value Ref Range    Potassium 5.6 (H) 3.6 - 5.5 mmol/L   EKG (NOW)   Result Value Ref Range    Report       [facility] Emergency Dept.    Test Date:  2021-08-15  Pt Name:    NADINE CABRERA                Department:   MRN:        [id number]                       Room:       BL 15  Gender:     Male                         Technician: [id number]  :        1945                   Requested By:ANAID MARCUM  Order #:    [id number]                    Reading MD: Anaid Marcum    Measurements  Intervals                                Axis  Rate:       100                          P:  MI:                                      QRS:        -91  QRSD:       143                          T:          90  QT:         329  QTc:        425    Interpretive Statements  Atrial fibrillation rate 100  T waves inverted V2  ST depression V2  No ST elevation  Right bundle branch block  Inferior infarct, old  Compared to ECG 2021 02:15:17  Myocardial infarct finding now present  Electronically Signed On 8- 15:52:16 PDT by Anaid Marcum           CT-CHEST (THORAX) WITH   Final Result      1.  Bilateral loculated pleural effusions, larger on the LEFT with possible debris in the LEFT pleural fluid.   2.  Associated consolidation primarily involving the lower lobes, as seen previously.   3.  Worsening mediastinal adenopathy.   4.  Ascites and body wall edema, new from prior.               DX-CHEST-PORTABLE (1 VIEW)   Final Result      1.  Persistent hypoinflation with small RIGHT pleural fluid collection and/or pleural thickening, unchanged from prior exam.   2.  Consolidation again seen at the LEFT lung base with moderate associated pleural fluid.          COURSE & MEDICAL DECISION MAKING  Pertinent Labs & Imaging studies reviewed. (See chart for details)    Reviewed patient's old medical records which showed he was seen in May 2021 and had left sided empyema, bilateral pleural effusions, right thoracentesis with post procedure hydropneumothorax. His last chest x-ray was on  and showed unchanged loculated pneumothorax. Patient was diagnosed with COVID in 2020. His effusions were exuviated and thought to be seeded by his osteomyelitis. He had possible septic emboli  and had gram positive cocci in his pleural fluid. Patients echo was negative. Infectious disease was consulted for the patient. 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 Patient followed with Dr. Myers in office who said unresolving loculated pneumothorax suggests the left lung is trapped and unlikely to expand but thought if the inflammation improved the lung may be able to inflate.    11:34 AM - Patient seen and examined at bedside. Discussed plan of care, including labs and chest x-ray. Patient agrees to the plan of care. Ordered for chest x-ray, CT chest, blood cultures, UA, CBC w/ diff, CMP, lactic acid, lipase, troponin, pro-BNP, and EKG to evaluate his symptoms.     1:54 PM - Reviewed the patient's lab and imaging results. Ordered repeat potassium.      - Reviewed patients CT results and remaining labs. Paged hospitalist.    - I discussed the patient's case and the above findings with Dr. Becker (Hospitalist) who agreed to evaluate patient for hospitalization. Patients care will be transferred at this time.       Patient presents to the ER from [facility] with increased work of breathing and increasing oxygen requirements.  Patient has a history of empyema.  Empyema was found to have grown out doxycycline resistant MRSA and Cipro resistant Proteus.  He ended up having a thoracentesis on the left side when he was here in May 2021 ended up with a hydropneumothorax.  Chest tube was placed.  Patient was counseled on by both pulmonary and infectious disease.  Ultimately chest tube was removed and patient was discharged to [place].  He is followed up with Dr. Myers, pulmonology, in the outpatient setting.  Patient is currently on 2 L of oxygen via nasal cannula.  Oxygen requirement is increased to 4 L.  He does have increased work of breathing.  He says he does not feel particularly short of breath, any more so than normal  that is.  He denies chest pain.  No abdominal pain.  He says he feels generally weak.  Patient is chronically ill-appearing.  He has markedly decreased breath sounds on the left.  Chest x-ray and CT scan of the thorax revealed pretty significant left pleural effusion.  He also has interval worsening of his mediastinal adenopathy.  Today there is also findings of abdominal ascites.  Perhaps patient has some sort of underlying malignancy going on here which is contributing to his worsening.  Additionally, patient was found to be hyperkalemic with a potassium of 5.9.  I repeated the potassium and it is still high at 5.6.  He was given insulin and D50.  I also gave him some Lasix since he seems to have some ascites.  Hopefully this will help somewhat with his potassium as well.  I suspect that the patient's worsening left-sided effusion is likely contributing to his increased work of breathing and increased oxygen requirement.  Perhaps he needs another thoracentesis to see what really going on in there.  His white count is not elevated.  He does not have a fever.  He does not appear to be septic or toxic.  Is not tachycardic or hypotensive.  However, obtain blood cultures and gave him a dose of Rocephin and vancomycin as he is penicillin allergic.  At this time I think patient is to be hospitalized for further evaluation management of his worsening left pleural effusion and his new abdominal ascites and hyperkalemia.  He likely will need another consultation with pulmonary and/or ID.  I spoke with Dr. Becker, hospitalist on-call, he will kindly evaluate the patient hospitalization.    DISPOSITION:  Patient will be hospitalized by Dr. Becker in guarded condition.    FINAL IMPRESSION  1. Hyperkalemia Acute   2. Pleural effusion, bilateral Acute   3. Other ascites Acute      This dictation has been created using voice recognition software. The accuracy of the dictation is limited by the abilities of the software. I expect  there may be some errors of grammar and possibly content. I made every attempt to manually correct the errors within my dictation. However, errors related to voice recognition software may still exist and should be interpreted within the appropriate context.       I, Ana Pelaez (Karthikeyan), am scribing for, and in the presence of, Dinora Marcum M.D..    Electronically signed by: Ana Pelaez (Karthikeyan), 8/15/2021    IDinora M.D. personally performed the services described in this documentation, as scribed by Ana Pelaez in my presence, and it is both accurate and complete. C    The note accurately reflects work and decisions made by me.  Dinora Marcum M.D.  8/15/2021  6:05 PM

## 2021-08-15 NOTE — ED NOTES
Med rec complete per pt's MAR from [facility]  Allergies reviewed  Pt was on Rocephin 1g from 8/12-8/14/21

## 2021-08-16 PROBLEM — G93.40 ACUTE ENCEPHALOPATHY: Status: ACTIVE | Noted: 2021-01-01

## 2021-08-16 PROBLEM — J96.02 ACUTE HYPERCAPNIC RESPIRATORY FAILURE (HCC): Status: ACTIVE | Noted: 2021-01-01

## 2021-08-16 NOTE — PROGRESS NOTES
Critical care consult note    Seen per myself and seen/discussed with resident.  Agree with findings except as noted in my note.  Please see my note below for further details and critical care billing.    76 y.o. male who presented 8/15/2021 with shortness of breath.  Per chart review seemed alert to person per notes but he has dementia baseline and limited history obtained.  I was consulted due to seizure activity of which given ativan and keppra. Ct head then showed 3 mm intracranial hemorrhage. 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  He was mumbling and seemed post ictal. Repeat abg improved.  At that point did not require transfer.  He is on antibiotics for sepsis. Ct with loculated effusion left side.  Some changes on right side.  Pulmonary called overnight and sees patient in clinic and said it is a hydropneumothorax and no thoracentesis.  Prior ct in may showed hydropneumothorax with trapped lung.  Will ultrasound.  Called back for worsening mental status which I confirmed at bedside. Family at bedside and he has been in nursing homes and mostly sedentary, occasional tv watching with poor quality of life since December. Too weak to be discharged and wife in wheelchair and unable to care for him.  During goals of care discussion made him DNAR.  She wants intubation ok for now but considering more palliative care approach.  Palliative care consult placed.  Will need intubation once arrives in ICU.  GCS less than 5.  Re discussed goals of care given poor health and poor prognosis, strongly recommended comfort care. She wants to continue with intubation. Intubated, high peak and plateau initially, likely from trapped lung, failed cmv, changed to pressure control and tolerated, low volume.  Readjust with abg.  Effusion not loculated on bedside us, leave for now and reassess in am given hx of hydropneumothorax.  No complications with intubation.  Art line right femoral given hypotension, levophed to 30 mcg/min and neosynephrine mult pushes.  EF on bedside us reduced, reassess  Am for possible echo.  No pericardial effusion. IVC limited variability.  Chest limited chest movement but expected with trapped lung, no secretions. Chest xray pending.  sbp improved. Fentanyl drip, precedex if necessary and prn versed.    Reviewed chart notes, labs, imaging and orders    Assessment and Plan:  Respiratory failure with hypoxia, intubated, trapped lung.  Hx hydropneumothorax.  Possible aspiration.  Mental status change. GCS 3.  Ct negative (neuro says no bleed).  Cover meningitis overnight, reassess am.  EEG negative seizures.  Neuro consulted.  apixiban overnight, another reason to do lp in am instead of tonight.  Acute encephalopathy, multifactorial, see above, hx dementia.  Likely associated with sepsis.  Sepsis, likely pneumonia.  Cover for meningitis though no neck stiffness.  Blood cx.  No secretions on intubation. CT changes for pna but many chronic changes  Trapped lung  Hydropneumothorax  DNAR  Failure to thrive  Atrial fibrillation, no filter for amio, given diltiazem 20 mg once, continue home dosing.  Hold ac for now.    Patient is critically ill. Patient obtunded, gcs less than 5.  Requires intubation for mechanical ventilator.  precedex and fentanyl for sedation.  If untreated there is a high chance of deterioration and eventually death. The critical that has been undertaken is medically complex. There has been no overlap in critical care time. Critical Care Time not including procedures: 115 minutes

## 2021-08-16 NOTE — PROGRESS NOTES
"This is a summary note serving as a transfer to ICU:    Patient is a 76-year-old male with past medical history of bilateral loculated pleural effusion with thoracentesis back in May 2021 and possibly trapped lung as well as dementia, A. fib on Eliquis.  He was admitted this time on 8/15/2021 for acute on chronic hypoxic respiratory failure requiring 4 L of oxygen from the 2 L baseline   The morning of the admission, per PT/OT report, patient was witnessed having \"patient was noted to have a seizure-like episode with altered mental status.\"  When the rapid team get to the room, patient appeared to be in the ictal phase but without any seizing activity.  He was given lorazepam and Keppra.  On physical exam, patient was obtunded and not responsive.  He was not oriented x3.  Unremarkable cardiac exam and crackle on the left lung lobe.    ABG was ordered stat that showed respiratory alkalosis with severely elevated carbon dioxide.  Subsequent ABG has the same finding but with slightly decreased.  CHEM panel was only positive for alkaline phosphatase as seen on admission but electrolyte where nearly unremarkable  CT of the head found a tiny 3 mm focal parenchymal hemorrhage in the left parietal lobe.    ICU resident and attending were consulted as well as neurology.  It is unknown if patient truly had a seizure, but what certain is that he was having acute encephalopathy different from the dementia at baseline.    #Acute encephalopathy  #Hypercapnic respiratory failure  -Unclear whether or not this is from a seizure  -ABG so far has showed severe respiratory acidosis that could be the etiology here  -Difficult to assess for post ictal phase given that patient has received lorazepam and is demented at baseline  -Patient was also admitted with consolidation and has been treated with cefepime and azithromycin.  Because cefepime can have an effect in seizure, he was switched to ceftriaxone.  -B12, TSH, ammonia and UA have " been ordered  -Ultimately, patient hypercapnic respiratory failure and will likely need further investigation (pneumonia vs post ictal state vs AMS). But at the moment, patient needs to be transferred to ICU. Might not be able to be on bipap because of being obtunded.   -ICU has agreed to patient transfer    Other issues being followed on fall  #ADRIENNE: Likely prerenal and possibly due to cardiorenal effect.  Improvement in the creatinine and back to baseline.  #Hyperkalemia: Likely due to taking potassium at home.  Improving  #Bilateral pleural effusion  #Consolidation: On Furosemide and Ceftriacone and Azithromycin

## 2021-08-16 NOTE — PROGRESS NOTES
4 Eyes Skin Assessment Completed by STEVEN Riley and STEVEN Goldman.    Head WDL  Ears Redness and Blanching  Nose WDL  Mouth WDL  Neck WDL  Breast/Chest WDL  Shoulder Blades WDL  Spine WDL  (R) Arm/Elbow/Hand Bruising, Discoloration and Edema, skin tear to right forearm  (L) Arm/Elbow/Hand Redness, Bruising, Discoloration and Edema, skin tear to left elbow  Abdomen WDL   Groin WDL  Scrotum/Coccyx/Buttocks Redness, Non-Blanching and Discoloration, unstagable pressure ulcer to sacrum  (R) Leg Edema, potential pressure injury to right lateral calf.  (L) Leg Edema  (R) Heel/Foot/Toe Redness, Non-Blanching, Boggy and Ulcer(s), potential pressure injury to right lateral ankle  (L) Heel/Foot/Toe Redness, Non-Blanching, Boggy and Ulcer(s), unstagable pressure ulcer to left lateral ankle, pressure ulcer to left lateral foot, pressure ulcer to left 4th toe, missing left 5th toe.       Devices In Places Tele Box, Pulse Ox and Condom Cath    Interventions In Place NC W/Ear Foams, Sacral Mepilex, Heel Float Boots, Waffle Overlay, Pillows, Elbow Mepilex, Q2 Turns, Heels Loaded W/Pillows and Pressure Redistribution Mattress, dressings in place    Possible Skin Injury Yes    Pictures Uploaded Into Epic Yes  Wound Consult Placed Yes  RN Wound Prevention Protocol Ordered No

## 2021-08-16 NOTE — PROGRESS NOTES
4 Eyes Skin Assessment Completed by STEVEN Max, STEVEN and STEVEN Gómez.    Head Non-Blanching redness   Ears Blanching redness and scabs to bilateral ears  Nose Non-Blanching redness on right nose  Mouth WDL  Neck WDL  Breast/Chest Non-Blanching Zyphoid process red/nonblanching  Shoulder Blades WDL  Spine WDL  (R) Arm/Elbow/Hand elbow black/red non blanching  (L) Arm/Elbow/Hand Redness and Bruising  Abdomen MASD  Groin WDL  Scrotum/Coccyx/Buttocks Non-Blanching pressure ulcer- see photo  (R) Leg Bruising and Edema +3  (L) Leg Bruising and Edema +3  (R) Heel/Foot/Toe Mepilex in place over heels- see wound note  (L ) Heel/Foot/Toe Redness 4th toe w/ open wound. Mepilex in place over heels- see wound note          Devices In Places ECG, Blood Pressure Cuff, Pulse Ox and Nasal Cannula      Interventions In Place NC W/Ear Foams, Heel Mepilex, Sacral Mepilex, Pillows, Q2 Turns, Low Air Loss Mattress, Heels Loaded W/Pillows and Pressure Redistribution Mattress    Possible Skin Injury Yes    Pictures Uploaded Into Epic Yes (photos taken previously)  Wound Consult Placed N/A  RN Wound Prevention Protocol Ordered Yes

## 2021-08-16 NOTE — ASSESSMENT & PLAN NOTE
Seizure episode, resolved with Ativan 2 mg and Keppra 1000 mg.  EEG not showing any epileptiform activity.  Head CT showed possible tiny 3 mm intraparenchymal hemorrhage without midline shift->reviewed this with neurology Dr. Parry, who does not believe it is a hemorrhage (seen on prior images from 2012).    Unclear whether seizure-like episode caused CO2 retention, also likely has a component of CO2 narcosis.     -Neurochecks  -Serial ABG monitoring  -Antiepileptics per neurology recommendations  -On Vanc ,Zosyn   -Per Neuro CNS infection unlikely       Interventions to be considered in all patients in order to minimize the risk of delirium.   -do not disturb the patient (vitals or lab draws) between the hours of 10 PM and 6 AM.  -ideally the patient should not sleep during the day and we should avoid day time naps.   -up in chair for meals  -ambulate at least three times daily, as able  -watch for constipation  -timed voiding - ask patient if they would like to go to the bathroom q 2-3 hours, except during the do not disturb hours.   -remove all unnecessary lines (central lines, peripheral IVs, feeding tubes, urbina catheters)  -minimize polypharmacy, if possible, medications should not be dosed during sleep hours

## 2021-08-16 NOTE — CONSULTS
Critical Care/Pulmonary Consultation    Date of Service: 8/16/2021    Date of Admission:  8/15/2021 11:21 AM    Consulting Physician: Pito Matamoros M.D.    Chief Complaint:  Shortness of Breath    History of Present Illness: Agapito Stone is a 76 y.o. 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  Patient was admitted to floor and started on ceftriaxone and vancomycin with diuresis with Lasix.  On day 2 of hospitalization 8/16, patient was noted to have a seizure-like episode with altered mental status. He received ativan 2mg and Keppra 1000 mg.  ABG at that time showed pH 7.28, PCO2 92, PO2 138 with bicarb 43.    Critical care consulted for acute hypercapnic respiratory failure and significant respiratory acidosis.  Upon my evaluation, patient is post ictal and somnolent.  He is protecting his airway, maintaining saturation at 4 L.    Review of Systems   Unable to perform ROS: Mental status change       Home Medications     Reviewed by Tiana Oconnell (Pharmacy Intern) on 08/15/21 at 1635  Med List Status: Complete   Medication Last Dose Status   albuterol 108 (90 Base) MCG/ACT Aero Soln inhalation aerosol 8/13/2021 Active   apixaban (ELIQUIS) 5mg Tab 8/15/2021 Active   Ascorbic Acid (VITAMIN C) 1000 MG Tab 8/15/2021 Active   cefTRIAXone (ROCEPHIN) 1 GM Recon Soln 8/14/2021 Active   Cholecalciferol (VITAMIN D-3) 5000 UNITS TABS 8/14/2021 Active   DILTIAZem (CARDIZEM) 60 MG Tab 8/15/2021 Active   ferrous sulfate 325 (65 Fe) MG tablet 8/15/2021  Active   furosemide (LASIX) 40 MG Tab 8/13/2021 Active   gabapentin (NEURONTIN) 100 MG Cap 8/15/2021 Active   magnesium oxide (MAG-OX) 400 MG Tab tablet 8/15/2021 Active   methylPREDNISolone sod succ (SOLU-MEDROL) 125 MG Recon Soln 8/12/2021 Active   montelukast (SINGULAIR) 10 MG Tab 8/14/2021 Active   Multiple Vitamin (MULTI VITAMIN PO) 8/15/2021 Active   potassium chloride SA (KDUR) 20 MEQ Tab CR 8/13/2021 Active   tamsulosin (FLOMAX) 0.4 MG capsule 8/14/2021 Active                Social History     Tobacco Use   • Smoking status: Never Smoker   • Smokeless tobacco: Never Used   Substance Use Topics   • Alcohol use: Not Currently     Alcohol/week: 0.0 oz     Comment: once a year   • Drug use: No        Past Medical History:   Diagnosis Date   • Arthritis     hands, wrists, ankles   • CAD (coronary artery disease) October 2013    Dr. Ovalle; ACS. PCI/LETA x 2 of the mid circumflex (Xience 3.25 x 23mm) and proximal circumflex (Xience 3.6x 12mm)   • Cataract     sugery   • Diabetes     oral meds   • High cholesterol    • Hyperlipidemia    • Hypertension    • Pain     ankles   • Pneumonia 1968   • Stroke (HCC) 2010    no residual effects   • Syncope        Past Surgical History:   Procedure Laterality Date   • TOE AMPUTATION Left 4/23/2021    Procedure: AMPUTATION, TOE - 5TH TOE;  Surgeon: Remi Scott M.D.;  Location: [facility];  Service: Orthopedics   • IRRIGATION & DEBRIDEMENT ORTHO Right 12/10/2020    Procedure: IRRIGATION AND DEBRIDEMENT, WOUND - HEEL;  Surgeon: Royal Bolivar M.D.;  Location: [facility];  Service: Orthopedics   • TENDON LENGHTENING Right 8/24/2020    Procedure: LENGTHENING, TENDON- , GASTROC RECESSION;  Surgeon: Royal Bolivar M.D.;  Location: [facility];  Service: Orthopedics   • ANKLE FUSION Right 8/24/2020    Procedure: FUSION, JOINT, ANKLE- ANKLE SUBTALAR FUSION , BONE GRAFT, MEDIAL RELEASE INCLUDING TENDONS, PATIAL EXCISION OF FIBULA;  Surgeon:  "Royal Bolivar M.D.;  Location: [facility];  Service: Orthopedics   • TENDON TRANSFER Right 8/24/2020    Procedure: TRANSFER, TENDON- PLANTAR FASCIA RELEASE;  Surgeon: Royal Bolivar M.D.;  Location: [facility];  Service: Orthopedics   • ORTHOPEDIC OSTEOTOMY Right 8/24/2020    Procedure: OSTEOTOMY- 1ST METATARSAL, CROSS PROCEDURE, 2-5 METATARSAL OSTEOTOMY, 2-5 PHALANGEL JOINT RECONSTRUCTION;  Surgeon: Royal Bolivar M.D.;  Location: [facility];  Service: Orthopedics   • HAMMERTOE CORRECTION Right 8/24/2020    Procedure: CORRECTION, HAMMER TOE 2-5;  Surgeon: Royal Bolivar M.D.;  Location: [facility];  Service: Orthopedics   • STENT PLACEMENT  2013    cardiac   • CAROTID ENDARTERECTOMY  7/13/2010    left; Performed by CHIQUITA CORRIGAN at [facility]   • CATARACT EXTRACTION WITH IOL Bilateral    • TONSILLECTOMY  as a child       Allergies: Avocado, Eggs, Fish, Food, Pcn [penicillins], and Shellfish allergy    Family History   Problem Relation Age of Onset   • Other Mother         Rheumatic fever       Vitals:    08/15/21 1127 08/15/21 1306 08/15/21 1317 08/15/21 1458   Height: 1.88 m (6' 2\")      Weight: 90.7 kg (200 lb)      Weight % change since last entry.: 0 %      BP: 108/75 114/66  110/59   Pulse: 90 (!) 102 97 99   BMI (Calculated): 25.68      Resp: (!) 22 16 (!) 28 18   Temp: 37.3 °C (99.2 °F)      TempSrc: Temporal       08/15/21 1659 08/15/21 1759 08/15/21 1859 08/15/21 1926   Height:       Weight:       Weight % change since last entry.:       BP: 102/55 100/62 107/76    Pulse: 97 96 97 (!) 122   BMI (Calculated):       Resp: (!) 22 (!) 11 (!) 21 (!) 29   Temp:       TempSrc:        08/15/21 1943 08/15/21 1959 08/15/21 2059 08/15/21 2159   Height:       Weight:       Weight % change since last entry.:       BP:  112/62 113/60 112/75   Pulse: (!) 114 (!) 102 (!) 122 95   BMI (Calculated):       Resp: (!) 33 (!) 29 (!) 27 (!) 28   Temp:    "    TempSrc:        08/15/21 2259 08/16/21 0000 08/16/21 0010 08/16/21 0300   Height:       Weight:       Weight % change since last entry.:       BP: 110/58  108/61 112/63   Pulse: 100 96 87 90   BMI (Calculated):       Resp: (!) 27 18 18 18   Temp:   36.7 °C (98.1 °F) 36.6 °C (97.9 °F)   TempSrc:   Temporal Temporal    08/16/21 0846 08/16/21 1023 08/16/21 1036 08/16/21 1100   Height:       Weight:       Weight % change since last entry.:       BP: 108/64 130/67 109/65 107/63   Pulse: 86 (!) 111  99   BMI (Calculated):       Resp: 18 18     Temp: 36.2 °C (97.2 °F)      TempSrc: Temporal       08/16/21 1244 08/16/21 1616 08/16/21 1630 08/16/21 1645   Height:       Weight:       Weight % change since last entry.:       BP: 106/61 100/56 111/59 106/56   Pulse: 97 73 95 68   BMI (Calculated):       Resp: 18 14 (!) 21 12   Temp: 36.4 °C (97.6 °F) (!) 35.7 °C (96.2 °F)     TempSrc: Temporal Temporal      08/16/21 1650 08/16/21 1655 08/16/21 1700 08/16/21 1715   Height:       Weight:       Weight % change since last entry.:       BP: 111/57 110/56 113/61 123/60   Pulse: 74 85 83 74   BMI (Calculated):       Resp: 13 18 15 13   Temp:       TempSrc:        08/16/21 1720 08/16/21 1727 08/16/21 1729 08/16/21 1730   Height:       Weight:       Weight % change since last entry.:       BP: 117/62 (!) 64/32 (!) 73/52 125/77   Pulse: 60 84 (!) 56 72   BMI (Calculated):       Resp: 16 (!) 30 (!) 11 18   Temp:       TempSrc:        08/16/21 1732 08/16/21 1733 08/16/21 1735   Height:      Weight:      Weight % change since last entry.:      BP: 154/89 156/86 152/75   Pulse: 69 81 71   BMI (Calculated):      Resp: 20 20 20   Temp:      TempSrc:          Physical Examination  General: Somnolent, postictal, in no acute distress  Neuro/Psych: Patient postictal, withdraws to pain in all extremities  HEENT: Normocephalic, atraumatic, PERRLA, no oral mucosal injury or tongue bites, unable to protect airway  CVS: S1-S2 heard, no  murmurs  Respiratory: Breath sounds diminished in left upper lung field, absent in mid and lower; right side: Crackles heard throughout  Abdomen/: Soft, nondistended, nontender  Extremities: No pitting edema, distal pulses present bilaterally, bilateral lower extremities warm  Skin: Warm, dry, capillary refill less than 2 seconds      Intake/Output Summary (Last 24 hours) at 8/16/2021 1308  Last data filed at 8/15/2021 2159  Gross per 24 hour   Intake 500 ml   Output --   Net 500 ml       Recent Labs     08/15/21  1300 08/16/21  0426 08/16/21  1048   WBC 7.6 5.2  --    NEUTSPOLYS 88.50*  --   --    LYMPHOCYTES 5.00*  --   --    MONOCYTES 6.00  --   --    EOSINOPHILS 0.00  --   --    BASOPHILS 0.10  --   --    ASTSGOT 30  --  36   ALTSGPT 28  --  29   ALKPHOSPHAT 535*  --  472*   TBILIRUBIN 0.3  --  0.3     Recent Labs     08/15/21  1300 08/15/21  1300 08/15/21  1455 08/16/21  0426 08/16/21  1048   SODIUM 142  --   --  142 146*   POTASSIUM 5.9*   < > 5.6* 5.4 5.0   CHLORIDE 101  --   --  100 103   CO2 38*  --   --  40* 39*   BUN 50*  --   --  43* 41*   CREATININE 0.75  --   --  0.75 0.59   CALCIUM 9.1  --   --  8.9 8.8    < > = values in this interval not displayed.     Recent Labs     08/15/21  1300 08/16/21  0426 08/16/21  1048   ALTSGPT 28  --  29   ASTSGOT 30  --  36   ALKPHOSPHAT 535*  --  472*   TBILIRUBIN 0.3  --  0.3   LIPASE 16  --   --    GLUCOSE 126* 110* 116*     Recent Labs     08/16/21  1048 08/16/21  1310   DSKIL19I 7.28* 7.37*   [id number] 92.8* 74.4*   SPKUG641I 140.4* 136.6*   UOAH8SUO 98.8 98.4   ARTHCO3 43* 42*   [id number] 4.0  --    ARTBE 13* 14*     CT-HEAD W/O   Final Result         1. There is tiny 3 mm focal parenchymal hemorrhage in the left parietal lobe.      No surrounding edema. No mass effect or midline shift.      2. Encephalomalacia in the right parietal lobe and right cerebellum could relate to prior infarct.            CRITICAL RESULT READ BACK: Preliminary findings discussed with  and critical read back performed by Dr. Rincon via telephone on 8/16/2021 11:37 AM      CT-CHEST (THORAX) WITH   Final Result      1.  Bilateral loculated pleural effusions, larger on the LEFT with possible debris in the LEFT pleural fluid.   2.  Associated consolidation primarily involving the lower lobes, as seen previously.   3.  Worsening mediastinal adenopathy.   4.  Ascites and body wall edema, new from prior.               DX-CHEST-PORTABLE (1 VIEW)   Final Result      1.  Persistent hypoinflation with small RIGHT pleural fluid collection and/or pleural thickening, unchanged from prior exam.   2.  Consolidation again seen at the LEFT lung base with moderate associated pleural fluid.      DX-CHEST-PORTABLE (1 VIEW)    (Results Pending)   EC-ECHOCARDIOGRAM COMPLETE W/O CONT    (Results Pending)       Patient Active Problem List   Diagnosis   • History of stroke   • Essential hypertension   • Carotid atherosclerosis   • CAD (coronary artery disease)   • Hyperlipidemia associated with type 2 diabetes mellitus (HCC)   • Stented coronary artery   • Dysphagia   • Type 2 diabetes mellitus with peripheral neuropathy (HCC)   • Hereditary and idiopathic peripheral neuropathy   • Vitamin D deficiency   • Seasonal allergies   • Agent orange exposure   • Right ankle pain   • Benign non-nodular prostatic hyperplasia with lower urinary tract symptoms   • Abdominal bloating   • Vitamin B12 deficiency   • Type 2 diabetes mellitus with both eyes affected by mild nonproliferative retinopathy without macular edema, without long-term current use of insulin ([facility])   • Adult failure to thrive, elevated troponin, recent orthopedic surgery   • Deep vein thrombosis (DVT) of lower extremity (HCC)   • History of ankle surgery   • Age-related physical debility   • Anemia   • Loculated pleural effusion   • Acute hypercapnic respiratory failure (HCC)   • Postprocedural pneumothorax   • Protein-calorie malnutrition, severe (HCC)   • Atrial  fibrillation (HCC)   • Acute encephalopathy       Assessment and Plan:  Acute encephalopathy  Assessment & Plan  Seizure episode, resolved with Ativan 2 mg and Keppra 1000 mg.  EEG not showing any epileptiform activity.  Head CT showed possible tiny 3 mm intraparenchymal hemorrhage without midline shift->reviewed this with neurology Dr. Parry, who does not believe it is a hemorrhage.  Unclear whether seizure-like episode caused CO2 retention, also likely has a component of CO2 narcosis. Sepsis due to empyema vs Meningitis cannot be ruled out.   -Neurochecks  -Serial ABG monitoring(acidosis improving as of now)  -Antiepileptics per neurology recommendations  -Eliquis resumption per neurology recommendations  -C3, vanco and ampicillin(to cover for listeria meningitis)  -Bronchoscopy and LP tomorrow(got eliquis today,so will wait till tomorrow)    * Acute hypercapnic respiratory failure (HCC)  Assessment & Plan  76M with h/o left-sided empyema(gram-positive cocci, however culture negative) s/p chest pigtail catheter(removed 6/1/2021), complicated by left trapped lung who presented initially with acute hypoxemic respiratory failure in the setting of loculated pleural effusions and volume overload.  Patient had seizure-like episode followed by acute encephalopathy, and subsequent ABG showing pH 7.28, PCO2 92, PO2 138.  Hypercapnia likely precipitated by respiratory depression in the setting of seizure.  Repeat ABG 2 hours later reviewed: pH 7.37, PCO2 74, PO2 136.  -Intubated for airway protection  -Continue IV antibiotics and Lasix  -ABCDEF Bundle  -Patient not a candidate for decortication due to high risk, may consider pulmonology consult for other measures such as intrapleural dornase injection.  Goals of care discussion may be needed with the patient prior to any interventions.    Atrial fibrillation (HCC)  Assessment & Plan  Cardizem dose  Eliquis if okay with cardiology    Loculated pleural effusion  Assessment &  Plan  Worsening bilateral loculation of prior empyema.  Patient did not tolerate chest tube well last time(with high volume output which was not decreasing), also has a left trapped lung now.  Repeat chest tube benefit unclear, also poor surgical candidate for decortication.  Recommend pulmonology consult.    Briana Trejo M.D.

## 2021-08-16 NOTE — PROGRESS NOTES
Bedside report given to RICU rn, pt transfering to RICU 101. All belongings in possesion of patient, Wife,  Simran, notified.

## 2021-08-16 NOTE — THERAPY
Missed Therapy     Patient Name: Agapito Stone  Age:  76 y.o., Sex:  male  Medical Record #: [id number]  Today's Date: 8/16/2021    Discussed missed therapy with RN       08/16/21 1217   Treatment Variance   Reason For Missed Therapy Medical - Patient Is Not Medically Stable   Total Time Spent   Total Time Spent (Mins) 5   Initial Contact Note    Initial Contact Note  Order Received and Verified, Speech Therapy Evaluation in Progress with Full Report to Follow.   Interdisciplinary Plan of Care Collaboration   IDT Collaboration with  Nursing   Collaboration Comments SLP attempted to see patient for clinical swallow evaluation. Per RN, hold for today, as patient had seizure this morning and is minimally responsive. SLP will re-attempt tomorrow as able and appropriate. Thank you.

## 2021-08-16 NOTE — PROGRESS NOTES
"Pharmacy Vancomycin Kinetics Note for 8/15/2021     76 y.o. male on Vancomycin day #       Vancomycin Indication (AUC Dosing): S. aureus pneumonia, HAP    Provider specified end date: 08/20/21    Active Antibiotics (From admission, onward)    Ordered     Ordering Provider       Sun Aug 15, 2021  5:36 PM    08/15/21 1736  cefepime (Maxipime) 2 g in  mL IVPB  EVERY 8 HOURS         DESIREE Martinez Aug 15, 2021  4:47 PM    08/15/21 1647  MD Alert...Vancomycin per Pharmacy  PHARMACY TO DOSE        Question:  Indication(s) for vancomycin?  Answer:  Unknown source of infection    DESIREE Martinez Aug 15, 2021  4:34 PM    08/15/21 1634  vancomycin (VANCOCIN) 2,250 mg in  mL IVPB  ONCE         Dinora Marcum M.D.          Dosing Weight: 90.7 kg (199 lb 15.3 oz)      Admission History: Admitted on 8/15/2021 for Hypoxia [R09.02]  Pertinent history: admitted from skilled nursing facility with increased oxygen demand, o2 @90% on 2L.  previous IV vancomycin administration on 5/25/2021 for MRSA and pulmonary coverage for empyema.    Allergies:     Avocado, Eggs, Fish, Food, Pcn [penicillins], and Shellfish allergy     Pertinent cultures to date:     Results     Procedure Component Value Units Date/Time    BLOOD CULTURE [[id number]] Collected: 08/15/21 1330    Order Status: Completed Specimen: Blood from Peripheral Updated: 08/15/21 1407    Narrative:      Per Hospital Policy: Only change Specimen Src: to \"Line\" if  specified by physician order.    COV-2, FLU A/B, AND RSV BY PCR (2-4 HOURS CEPHEID): Collect NP swab in VTM [[id number]] Collected: 08/15/21 1305    Order Status: Completed Specimen: Respirate from Nasopharyngeal Updated: 08/15/21 1402     Influenza virus A RNA Negative     Influenza virus B, PCR Negative     RSV, PCR Negative     SARS-CoV-2 by PCR NotDetected     Comment: PATIENTS: Important information regarding your results and instructions can  be found at " "[email or web address]   \"After your  Covid-19 Test\"    [facility] providers: PLEASE REFER TO DE-ESCALATION AND RETESTING PROTOCOL  on [email or web address]    **The [employer] GeneXpert Xpress SARS-CoV-2 RT-PCR Test has been made  available for use under the Emergency Use Authorization (EUA) only.          SARS-CoV-2 Source NP Swab    Narrative:      Have you been in close contact with a person who is suspected  or known to be positive for COVID-19 within the last 30 days  (e.g. last seen that person < 30 days ago)->Unknown    BLOOD CULTURE [[id number]] Collected: 08/15/21 1300    Order Status: Completed Specimen: Blood from Peripheral Updated: 08/15/21 1319    Narrative:      Per Hospital Policy: Only change Specimen Src: to \"Line\" if  specified by physician order.    URINALYSIS (UA) [[id number]]     Order Status: Sent Specimen: Urine, Clean Catch           Labs:     Estimated Creatinine Clearance: 100.5 mL/min (by C-G formula based on SCr of 0.75 mg/dL).  Recent Labs     08/15/21  1300   WBC 7.6   NEUTSPOLYS 88.50*     Recent Labs     08/15/21  1300   BUN 50*   CREATININE 0.75   ALBUMIN 2.5*     No intake or output data in the 24 hours ending 08/15/21 1753   /59   Pulse 99   Temp 37.3 °C (99.2 °F) (Temporal)   Resp 18   Ht 1.88 m (6' 2\")   Wt 90.7 kg (200 lb)   SpO2 100%  Temp (24hrs), Av.3 °C (99.2 °F), Min:37.3 °C (99.2 °F), Max:37.3 °C (99.2 °F)      List concerns for Vancomycin clearance:     None      AUC kinetics:   Ke (hr ^-1): 0.0878 hr^-1  Half life: 7.89 hr  Clearance: 5.176  Estimated TDD: 2588  Estimated Dose: 1068  Estimated interval: 9.9    Trough kinetics:   No results for input(s): VANCOTROUGH, VANCOPEAK, VANCORANDOM in the last 72 hours.     A/P:     -  Vancomycin dose: Load Vancomycin 2,250mg IV x1 followed by vancomycin 1,250mg IV q12h (06:00, 18:00)    -  Next vancomycin level(s):    -Clinical Rounding Pharmacist to order Vancomycin levels    -  Predicted vancomycin " AUC from initial AUC test calculator: 483 mg·hr/L      -  Comments: provider accepted recommendation to add cefepime for Pseudomonas coverage due to IV abx within 90 days risk factor for MDR/MRSA HAP.   BMP am lab active for 8/16.  Pharmacy to recommend de-escelation of antibiotics when clinically appropriate       iRya Dahl, PharmD,  8/15/21

## 2021-08-16 NOTE — ASSESSMENT & PLAN NOTE
Follows with Dr. Myers in her office, she suspects this is a trapped lung, no role for thoracentesis at this juncture.  Can consider pulmonary consult in the morning for reevaluation.

## 2021-08-16 NOTE — PROCEDURES
ROUTINE VIDEO ELECTROENCEPHALOGRAM REPORT      Referring provider:   April Packer M.D    DOS: 08/16/21 (0 hours and 27 minutes of total recording time).     INDICATION:  Agapito Stone 76 y.o. male presenting with altered mental status with body and/or facial twitching    CURRENT ANTIEPILEPTIC AND/OR SEDATING REGIMEN:   Ativan  (2mg 1026)  Keppra  (1000mg once)  Keppra  (1000mg Q12)    TECHNIQUE: Routine VEEG was set up by a Neurodiagnostic technologist who performed education to the patient and staff. A minimum of 23 electrodes and 23 channel recording was setup and performed by Neurodiagnostic technologist, in accordance with the international 10-20 system. The study was reviewed in bipolar and referential montages. The recording examined the patient in the  awake state(s).     DESCRIPTION OF THE RECORD:  The background was low to medium voltage and mostly continuous, without a well defined posterior dominant rhythm, with a mixture of polymorphic theta and delta activity.  Reactivity and state changes were present.    EEG Sleep: Sleep was not captured.    ACTIVATION PROCEDURES:   NA      ICTAL AND INTERICTAL FINDINGS:   No definitive focal or generalized epileptiform activity noted.     No regional slowing was seen during this routine study.      No seizures were reported or recorded during the study.     EKG: Sampling of the EKG recording showed an abnormal rhythm      EVENTS:  None    NOTE: This is a technically-limited study due to abundant myogenic, movement, and lead artifact obscuring most of the recording.     INTERPRETATION:   Abnormal, technically-limited video EEG recording in the awake state(s):  - Moderate background slowing suggestive of diffuse/multifocal cerebral dysfunction.    - No persistent focal asymmetries seen.  - No definitive epileptiform discharges seen   - No definitive seizures. Clinical correlation is recommended.  - Events of body and/or facial twitching were not  reported or seen on video. Clinical correlation recommended    Note: This EEG does not rule out epilepsy.  If the clinical suspicion remains high for seizures, a prolonged recording to capture clinical or subclinical events may be helpful.        Gomez Luque MD  Epilepsy and General Neurology  Department of Neurology  Instructor of Clinical Neurology [facility].   Office: [phone]  Fax: [phone]

## 2021-08-16 NOTE — ED NOTES
Pt brief changed. And helped pt turn onto right side to avoid skin break down. Pt denies needs at this time

## 2021-08-16 NOTE — CARE PLAN
Problem: Knowledge Deficit - Standard  Goal: Patient and family/care givers will demonstrate understanding of plan of care, disease process/condition, diagnostic tests and medications  Outcome: Not Progressing     Problem: Communication  Goal: The ability to communicate needs accurately and effectively will improve  Outcome: Not Progressing     The patient is Watcher - Medium risk of patient condition declining or worsening    Patient is not progressing towards the following goals:  Problem: Knowledge Deficit - Standard  Goal: Patient and family/care givers will demonstrate understanding of plan of care, disease process/condition, diagnostic tests and medications  Outcome: Not Progressing     Problem: Communication  Goal: The ability to communicate needs accurately and effectively will improve  Outcome: Not Progressing

## 2021-08-16 NOTE — THERAPY
Occupational Therapy   Initial Evaluation     Patient Name: Agapito Stone  Age:  76 y.o., Sex:  male  Medical Record #: [id number]  Today's Date: 8/16/2021       Precautions: Fall Risk  Comments: confused     Assessment    Patient is 76 y.o. male with h/o a-fib, CAD, CVA, DM, OA, HTN, HLD, PNA, L 5th toe amputation, multiple orthopedic surgeries to R foot/ankle, admitted with SOB, hypoxia. Pt dx with pleural effusion, DVT. Pt seen for OT eval. Pt very lethargic, difficult to rouse. Once awake, eyes were open but pt with little to no verbal responses or command following. Demonstrated L gaze preference throughout session. Pt required total A to mobilize to EOB due to not initiating. Once seated, pt demo'd heavy posterior lean. Unable to attempt stand. BUE stiff with what appears to be resistance to AA/PROM. Unable to perform hand-over-hand face washing. Pt did hold cup in R hand a took drinks through straw with assist for hand to mouth. Assisted pt back to supine, then boosted towards HOB with pad. Pt immediately presented with facial twitching, throat noises, decreased responsiveness. SpO2 rapidly dropped to low 70s (pt on 2.5L NC). Initiated staff assist. RR called. Pt's VSS. Stat head CT and EEG ordered. Await results and definitive POC. Continue acute OT as appropriate.     Plan    Recommend Occupational Therapy 2 times per week until therapy goals are met for the following treatments:  Adaptive Equipment, Cognitive Skill Development, Neuro Re-Education / Balance, Self Care/Activities of Daily Living, Therapeutic Activities and Therapeutic Exercises.    DC Equipment Recommendations: Unable to determine at this time  Discharge Recommendations: Recommend post-acute placement for additional occupational therapy services prior to discharge home        Objective       08/16/21 1023   Prior Living Situation   Prior Services Unable To Determine At This Time   Comments Per chart pt admitted from [facility]; unknown  whether pt is resident of SNF or there for rehab    Prior Level of ADL Function   Comments Pt is very confused and unable to provide any details regarding PLOF   Cognition    Speech/ Communication Delayed Responses  (Mimimal verbal output; primarily single words )   Level of Consciousness Responds to voice  (Requires high stim to respond)   Ability To Follow Commands Unable to Follow 1 Step Commands   Safety Awareness Impaired   New Learning Impaired   Attention Impaired   Sequencing Impaired   Initiation Impaired   Comments Pt lethargic, difficult to rouse. Once awake, eye were open but pt demonstated very limited command following; minimal verbal responses    Passive ROM Upper Body   Comments Stiffness throughout BUE; B shoulders limited to <90 degrees    Strength Upper Body   Upper Body Strength  Not Tested  (due to non-participation)   Coordination Upper Body   Comments Little to no purposeful activity with either UE. Pt did hold cup with R hand and min A for hand-to-mouth to drink    Balance Assessment   Sitting Balance (Static) Trace   Sitting Balance (Dynamic) Dependent   Weight Shift Sitting Absent   Bed Mobility    Supine to Sit Total Assist   Sit to Supine Total Assist   Scooting Total Assist   ADL Assessment   Eating Maximal Assist  (drinking from cup )   Grooming Total Assist;Seated  (Face washing with cloth)   Lower Body Dressing Total Assist  (sock donning )   Toileting   (NT; condom cath, no BM )   Functional Mobility   Sit to Stand Unable to Participate   Bed, Chair, Wheelchair Transfer Unable to Participate   Mobility Supine > < EOB, sitting balance    Short Term Goals   Short Term Goal # 1 Pt will follow 1-step commands 75% during functional ADL    Short Term Goal # 2 Pt will sit EOB with CGA >5 min to participate in ADL task    Short Term Goal # 3 Pt will complete seated grooming with min A   Short Term Goal # 4 Pt will complete gown change with min A

## 2021-08-16 NOTE — PROGRESS NOTES
Bedside report received from NOC RN. Assumed care of pt. Pt awake, laying in bed. A/Ox1, disoriented to time, place, event. VSS. No concerns, complaints or distress. Pt educated to call before getting out of bed. POC reviewed and white board updated. Tele box on. Afib 90 on the monitor. Call light in reach. Bed locked in lowest position with 2 upper bed rails up. Bed alarm on.

## 2021-08-16 NOTE — ED NOTES
Pt given meal tray and he is currently feeding himself. Attempted to call the wife for update but no answer.

## 2021-08-16 NOTE — ASSESSMENT & PLAN NOTE
Worsening bilateral loculation of prior empyema.  Patient did not tolerate chest tube well last time (with high volume output which was not decreasing), also has a left trapped lung now.  Repeat chest tube benefit unclear, also poor surgical candidate for decortication.

## 2021-08-16 NOTE — ED NOTES
When RN entered room to check on Pt, Pt O2 saturations were down in the low 70%s with Pt on 6 lpm via NC. Pt placed on 10 lpm via NRB but saturations did not raise to a satisfactory level. O2 increased to 15 lpm. Pt saturations elevated to low 90%s.

## 2021-08-16 NOTE — THERAPY
"Physical Therapy   Initial Evaluation     Patient Name: Agapito Stone  Age:  76 y.o., Sex:  male  Medical Record #: [id number]  Today's Date: 8/16/2021     Precautions: Fall Risk    Assessment  Patient is 76 y.o. male that presented to acute with complaint of SOB. Medical dx of dementia. Patient was unable to provide PLOF or subjective; chart indicates patient lives at [facility]. He presented to PT with impaired cognition and communication, decreased arousal and attention, pain, impaired balance and coordination, functional weakness, and decreased activity tolerance which are limiting his ability to perform functional mobility. He required total A for bed mobility and max A to maintain sitting balance at EOB due to significant posterior lean. He was able to initiate some purposeful movements with RUE (reaching for and holding cup and raising it toward mouth) but no observed volitional movements of BLE and standing deferred given poor sitting balance. Following return to supine position, patient demonstrated twitching and seizure like activity in head, neck, and mouth and became unresponsive with SpO2 dropping into low 70%s; staff assist called and RN staff assumed care. Will follow while in house if patient is mobilizing below baseline (also dependent on medical course and GOC/POC).    Plan    Recommend Physical Therapy 2 times per week until therapy goals are met for the following treatments:  Bed Mobility, Community Re-integration, Equipment, Gait Training, Manual Therapy, Neuro Re-Education / Balance, Self Care/Home Evaluation, Therapeutic Activities and Therapeutic Exercises    DC Equipment Recommendations: Unable to determine at this time  Discharge Recommendations:  (anticipate patient will require return to SNF for long term care)       Subjective    \"More\" (see cognition below)     Objective       08/16/21 1021   Total Time Spent   Total Time Spent (Mins) 22   Charge Group   PT Evaluation PT Evaluation " "Mod   Initial Contact Note    Initial Contact Note Order Received and Verified, Physical Therapy Evaluation in Progress with Full Report to Follow.   Precautions   Precautions Fall Risk   Vitals   O2 (LPM) 2.5   O2 Delivery Device Silicone Nasal Cannula   Pain 0 - 10 Group   Therapist Pain Assessment   (no pain complaint)   Prior Living Situation   Prior Services Unable To Determine At This Time   Comments Patient unable to provide subjective; chart indicates he [facility] at [facility]   Prior Level of Functional Mobility   Bed Mobility Unable To Determine At This Time   Comments assume patient required assist for mobility given current presentation   Cognition    Cognition / Consciousness X   Speech/ Communication Delayed Responses   Safety Awareness Impaired   New Learning Impaired   Attention Impaired   Sequencing Impaired   Initiation Impaired   Comments minimal verbalizations, said \"more\" when asked if he was done drinking water or wanted more; limited ability to follow commands   Passive ROM Lower Body   Passive ROM Lower Body X   Comments pain response with B hip flexion, knee extension, L ankle ROM   Active ROM Lower Body    Active ROM Lower Body  X   Comments no volitional movement during session   Strength Lower Body   Lower Body Strength  X   Comments unable to assess given cognition, anticipate gross BLE weakness, question if patient mobilizes OOB at baseline   Sensation Lower Body   Lower Extremity Sensation   Not Tested   Comments unable to test given cognition   Lower Body Muscle Tone   Lower Body Muscle Tone  Not Tested   Neurological Concerns   Neurological Concerns Yes   Comments given cognition however note dementia at baseline   Coordination Lower Body    Coordination Lower Body  X   Comments gross deficits   Balance Assessment   Sitting Balance (Static) Trace   Sitting Balance (Dynamic) Dependent   Weight Shift Sitting Absent   Comments required physical assist to maintain sitting; significant " posterior lean   Gait Analysis   Gait Level Of Assist Unable to Participate   Weight Bearing Status no restrictions   Bed Mobility    Supine to Sit Total Assist   Sit to Supine Total Assist   Scooting Total Assist   Functional Mobility   Sit to Stand   (deferred given poor sitting balance with posterior lean)   How much difficulty does the patient currently have...   Turning over in bed (including adjusting bedclothes, sheets and blankets)? 1   Sitting down on and standing up from a chair with arms (e.g., wheelchair, bedside commode, etc.) 1   Moving from lying on back to sitting on the side of the bed? 1   How much help from another person does the patient currently need...   Moving to and from a bed to a chair (including a wheelchair)? 1   Need to walk in a hospital room? 1   Climbing 3-5 steps with a railing? 1   6 clicks Mobility Score 6   Activity Tolerance   Sitting in Chair unable   Sitting Edge of Bed 8 min with assist   Standing unable   Comments no overt pain, fatigue, SOB, dizziness   Edema / Skin Assessment   Edema / Skin  Not Assessed   Patient / Family Goals    Patient / Family Goal #1 not able to state   Short Term Goals    Short Term Goal # 1 Patient will move supine<>sitting EOB with min A within 6tx in order to get in/out of bed   Short Term Goal # 2 Patient will maintain sitting balance at EOB for 5 min with min A within 6tx in order to perform functional task   Short Term Goal # 3 Patient will perform transfer with mod A within 6tx in order to achieve functional transfer   Education Group   Education Provided Role of Physical Therapist   Role of Physical Therapist Patient Response Patient;Acceptance;Explanation;No Learning Evidence   Problem List    Problems Impaired Bed Mobility;Pain;Impaired Transfers;Impaired Ambulation;Functional ROM Deficit;Functional Strength Deficit;Impaired Balance;Impaired Coordination;Decreased Activity Tolerance;Safety Awareness Deficits / Cognition;Limited Knowledge  of Post-Op Precautions;Motor Planning / Sequencing   Anticipated Discharge Equipment and Recommendations   DC Equipment Recommendations Unable to determine at this time   Discharge Recommendations   (anticipate patient will require return to SNF for long term )   Interdisciplinary Plan of Care Collaboration   IDT Collaboration with  Nursing;Occupational Therapist   Patient Position at End of Therapy In Bed;Other (Comments)  (with several RNs at bedside following staff assist)   Collaboration Comments RN aware of visit, response   Session Information   Date / Session Number  8/16-1 (1/2, 8/22) dependent on medical status   Priority   (.)

## 2021-08-16 NOTE — CODE DOCUMENTATION
MDs at bedside, Keppra, Ativan, and CT of head ordered. Patient's vitals stable, but remains unresponsive. Pupiles round and equal and reactive.

## 2021-08-16 NOTE — CONSULTS
"Neurology Initial Consult H&P  Neurohospitalist Service, [facility]iences    Referring Physician: Mauro Davis M.D.    Chief Complaint   Patient presents with   • Shortness of Breath       HPI: Agapito Stone is a 76 year-old man with history of strokes, atrial fibrillation, anticoagulated on apixaban, admitted to medicine service on 8/15 with worsening hypoxia from SNF.  In the ER, he was disoriented but verbal with no lateralizing weakness, which is apparently his baseline.  This AM, while working with PT, had \"facial twitching, throat noises, and decreased responsiveness\".  On primary team arrival, the patient appeared \"post ictal\", which I think meant non-verbal, non-interactive, but twitching movements were not seen.  He was loaded with 1g keppra.   A STAT head CT revealed no acute pathology, and EEG with no seizure activity, interictal events.  On chart review- patient with worsening pleural effusion, and increasing ascites.  He has been on ceftriaxone and vancomycin.  He does have elevated uremia relative to baseline. ABG this AM after the seizure with respiratory acidosis, with subsequent improvement in hypercapnia 2 hours later.      Review of systems: In addition to what is detailed in the HPI above, all other systems reviewed and are negative.    Past Medical History:    has a past medical history of Arthritis, CAD (coronary artery disease) (October 2013), Cataract, Diabetes, High cholesterol, Hyperlipidemia, Hypertension, Pain, Pneumonia (1968), Stroke (HCC) (2010), and Syncope.    FHx:  family history includes Other in his mother.    SHx:   reports that he has never smoked. He has never used smokeless tobacco. He reports previous alcohol use. He reports that he does not use drugs.    Allergies:  Allergies   Allergen Reactions   • Avocado Swelling     Mouth swelling   • Eggs Swelling     Mouth swelling   • Fish Hives   • Food Swelling     Melons - mouth swelling   • Pcn " [Penicillins] Hives     Positive penicillin skin test as a child.  Significant hives to amoxicillin as an adult.  Tolerates cephalosporins   • Shellfish Allergy Hives       Medications:    Current Facility-Administered Medications:   •  cefTRIAXone (ROCEPHIN) 2 g in  mL IVPB, 2 g, Intravenous, Q24HRS, April Packer M.D.  •  Respiratory Therapy Consult, , Nebulization, Continuous RT, Mauro Davis M.D.  •  famotidine (PEPCID) tablet 20 mg, 20 mg, Enteral Tube, Q12HRS **OR** famotidine (PEPCID) injection 20 mg, 20 mg, Intravenous, Q12HRS, Mauro Davis M.D.  •  MD Alert...ICU Electrolyte Replacement per Pharmacy, , Other, PHARMACY TO DOSE, Mauro Davis M.D.  •  lidocaine (XYLOCAINE) 1 % injection 2 mL, 2 mL, Tracheal Tube, Q30 MIN PRN, Mauro Davis M.D.  •  dexmedetomidine (PRECEDEX) 400 mcg/100mL NS premix infusion, 0-1.5 mcg/kg/hr, Intravenous, Continuous, Mauro Davis M.D.  •  fentaNYL (SUBLIMAZE) injection 25 mcg, 25 mcg, Intravenous, Q HOUR PRN **OR** fentaNYL (SUBLIMAZE) injection 50 mcg, 50 mcg, Intravenous, Q HOUR PRN **OR** fentaNYL (SUBLIMAZE) injection 100 mcg, 100 mcg, Intravenous, Q HOUR PRN, Mauro Davis M.D.  •  fentaNYL (SUBLIMAZE) 50 mcg/mL in 50mL, , Intravenous, Continuous, Mauro Davis M.D.  •  propofol (DIPRIVAN) 20mL vial injection (RWN),  mg, Intravenous, Once, Mauro Davis M.D.  •  rocuronium (ZEMURON) injection 50 mg, 50 mg, Intravenous, Once, Mauro Davis M.D.  •  MD Alert...Vancomycin per Pharmacy, , Other, PHARMACY TO DOSE, Tenzin Becker M.D.  •  senna-docusate (PERICOLACE or SENOKOT S) 8.6-50 MG per tablet 2 Tablet, 2 Tablet, Oral, BID **AND** polyethylene glycol/lytes (MIRALAX) PACKET 1 Packet, 1 Packet, Oral, QDAY PRN **AND** magnesium hydroxide (MILK OF MAGNESIA) suspension 30 mL, 30 mL, Oral, QDAY PRN **AND** bisacodyl (DULCOLAX) suppository 10 mg, 10 mg, Rectal, QDAY PRN, Tenzin Becker M.D.  •  acetaminophen (Tylenol) tablet 650 mg, 650 mg,  "Oral, Q6HRS PRN, Tenzin Becker M.D.  •  enalaprilat (VASOTEC) injection 1.25 mg, 1.25 mg, Intravenous, Q6HRS PRN, Tenzin Becker M.D.  •  labetalol (NORMODYNE/TRANDATE) injection 10 mg, 10 mg, Intravenous, Q4HRS PRN, Tenzin Becker M.D.  •  albuterol inhaler 2 Puff, 2 Puff, Inhalation, Q6HRS PRN, Tenzin Becker M.D.  •  DILTIAZem (CARDIZEM) tablet 60 mg, 60 mg, Oral, Q6HRS, Tenzin Becker M.D., 60 mg at 08/16/21 0000  •  tamsulosin (FLOMAX) capsule 0.4 mg, 0.4 mg, Oral, QHS, Tenzin Becker M.D., 0.4 mg at 08/15/21 2219  •  vancomycin (VANCOCIN) 1,250 mg in  mL IVPB, 1,250 mg, Intravenous, Q12HR, Tenzin Becker M.D., Stopped at 08/16/21 0832    Physical Examination:     General: Patient is asleep, he is non interactive  Eye: Examination of optic disks not indicated at this time given acuity of consult  Neck: There is no apparent nuchal rigidity  CV: regular rate   Extremities:  clear, dry, intact, without peripheral edema    NEUROLOGICAL EXAM:     /61   Pulse 97   Temp 36.4 °C (97.6 °F) (Temporal)   Resp 18   Ht 1.88 m (6' 2\")   Wt 90.7 kg (200 lb)   SpO2 96%   BMI 25.68 kg/m²       Mental status: Asleep, does not open eyes to voice, or distal noxious stim  Speech and language: Non-verbal, no command following  Cranial nerve exam: Pupils midline,does not track me, face grossly symmetric  Motor exam: There is slight flexion withdrawal in all extremities to noxious stim  Sensory exam: As above  Coordination: Cannot be tested due to patient's condition (encephalopathy)  Gait: Cannot be tested due to patient's condition (encephalopathy)    Objective Data:    Labs:  Lab Results   Component Value Date/Time    PROTHROMBTM 14.8 (H) 05/19/2021 12:05 PM    INR 1.12 05/19/2021 12:05 PM      Lab Results   Component Value Date/Time    WBC 5.2 08/16/2021 04:26 AM    RBC 3.47 (L) 08/16/2021 04:26 AM    HEMOGLOBIN 9.3 (L) 08/16/2021 04:26 AM    HEMATOCRIT 33.7 (L) 08/16/2021 04:26 AM    MCV 97.1 08/16/2021 " 04:26 AM    MCH 26.8 (L) 08/16/2021 04:26 AM    MCHC 27.6 (L) 08/16/2021 04:26 AM    MPV 9.1 08/16/2021 04:26 AM    NEUTSPOLYS 88.50 (H) 08/15/2021 01:00 PM    LYMPHOCYTES 5.00 (L) 08/15/2021 01:00 PM    MONOCYTES 6.00 08/15/2021 01:00 PM    EOSINOPHILS 0.00 08/15/2021 01:00 PM    EOSINOPHILS 1 05/21/2021 10:48 AM    BASOPHILS 0.10 08/15/2021 01:00 PM    HYPOCHROMIA 1+ 08/15/2021 01:00 PM    ANISOCYTOSIS 1+ 05/26/2021 06:19 AM      Lab Results   Component Value Date/Time    SODIUM 146 (H) 08/16/2021 10:48 AM    POTASSIUM 5.0 08/16/2021 10:48 AM    CHLORIDE 103 08/16/2021 10:48 AM    CO2 39 (H) 08/16/2021 10:48 AM    GLUCOSE 116 (H) 08/16/2021 10:48 AM    BUN 41 (H) 08/16/2021 10:48 AM    CREATININE 0.59 08/16/2021 10:48 AM    CREATININE 1.0 04/09/2007 12:43 PM      Lab Results   Component Value Date/Time    CHOLSTRLTOT 90 (L) 04/23/2021 04:00 AM    LDL 45 04/23/2021 04:00 AM    HDL 32 (A) 04/23/2021 04:00 AM    TRIGLYCERIDE 63 04/23/2021 04:00 AM       Lab Results   Component Value Date/Time    ALKPHOSPHAT 472 (H) 08/16/2021 10:48 AM    ASTSGOT 36 08/16/2021 10:48 AM    ALTSGPT 29 08/16/2021 10:48 AM    TBILIRUBIN 0.3 08/16/2021 10:48 AM        Imaging/Testing:    I interpreted and/or reviewed the patient's neuroimaging    CT-HEAD W/O   Final Result         1. There is tiny 3 mm focal parenchymal hemorrhage in the left parietal lobe.      No surrounding edema. No mass effect or midline shift.      2. Encephalomalacia in the right parietal lobe and right cerebellum could relate to prior infarct.            CRITICAL RESULT READ BACK: Preliminary findings discussed with and critical read back performed by Dr. Rincon via telephone on 8/16/2021 11:37 AM      CT-CHEST (THORAX) WITH   Final Result      1.  Bilateral loculated pleural effusions, larger on the LEFT with possible debris in the LEFT pleural fluid.   2.  Associated consolidation primarily involving the lower lobes, as seen previously.   3.  Worsening  mediastinal adenopathy.   4.  Ascites and body wall edema, new from prior.               DX-CHEST-PORTABLE (1 VIEW)   Final Result      1.  Persistent hypoinflation with small RIGHT pleural fluid collection and/or pleural thickening, unchanged from prior exam.   2.  Consolidation again seen at the LEFT lung base with moderate associated pleural fluid.          Assessment and Plan:  Agapito Stone is a 76 year old man with history of stroke, atrial fibrillation, on anticoagulation with eliquis, admitted to medicine service for worsening hypoxia, found to have worsening pleural effusion, pulmonary consolidative process.  He was started on antibiotics.  He apparently had a seizure like event with subsequent obtundation.  His current altered mental status is likely  multifactorial- related to possible post-ictal state, combined with his uremia, hypercapnia, delirium, infection, all exacerbated due to his overall poor brain milieu as noted by his dementia, multifocal strokes and poor functional baseline. At this time we do know he is not actively seizing based on his EEG results.  It is reasonable to continue his keppra therapy.  Given his infectious prodrome, it may be reasonable to complete CSF studies to rule out intracranial infection.    Of note, the head CT notes a hyperdense lesion in the L posterior frontal.  This is seen on both his MRIs and CTs dated as far back as 2012, and as such, is not an acute hemorrhage.  An occult cavernous malformation may have similar, stable radiographic appearance.    Problem list:  1.  Altered mental status  2.  Possible seizure  3.  Respiratory distress    Plan:   - patient to be transferred to ICU   - continue keppra 1000mg q12h   - continue apixaban 5mg BID as there is no acute hemorrhage on CT   - normalize blood gas as able per ICU team   - correct underlying metabolic/toxic derangements   - consider CSF studies to rule out intracranial infectious process    The  evaluation of the patient, and recommended management, was discussed with the ICU attending.    Kemar Parry MD  Neurohospitalist, Acute Care Services

## 2021-08-16 NOTE — ED NOTES
Pt's wife called regarding Pt. Confirmed Pt name and . Updated Pt's wife on Pt status and POC. Wife thanked RN and stated she will call back tomorrow morning.

## 2021-08-16 NOTE — ASSESSMENT & PLAN NOTE
Continue home dose Eliquis, diltiazem.  Patient appears volume overloaded, home dose furosemide will be increased from 40 to 60 mg and administered twice daily rather than once daily.  Echocardiogram was recently checked about 3 months ago, given his atrial fib I suspect this will continue to be nondiagnostic in terms of his diastolic function, okay to forego recheck at this juncture.

## 2021-08-16 NOTE — PROGRESS NOTES
Patient transport from ED via ZOLL, on 10L O2. Patient A&Ox2, no signs of respiratory distress noted or reported. Tele box applied and monitor room notified. VSS. Pt reports pain under control at this time. Bed locked in lowest position, fall precautions in place. Call light within reach. No additional needs at this time.

## 2021-08-16 NOTE — ASSESSMENT & PLAN NOTE
76M with h/o prior COVID 11/2020,  left-sided empyema(gram-positive cocci, however culture negative) s/p chest pigtail catheter(removed 6/1/2021), complicated by left trapped lung who presented initially with acute hypoxemic respiratory failure in the setting of loculated pleural effusions and volume overload 8/15.  8/16 Patient had seizure-like episode followed by acute encephalopathy, and subsequent ABG showing pH 7.28, PCO2 92, PO2 138.  Hypercapnia likely precipitated by respiratory depression in the setting of seizure.   Transferred to ICU.     -Intubated for airway protection  -Continue IV antibiotics   -ABCDEF Bundle  -Patient not a candidate for decortication due to high risk, may consider pulmonology consult for other measures such as intrapleural dornase injection.  -Detailed discussion about GOC with wife today , will talk to family's  tomorrow. Inclining towards comfort care. She will update the team tomorrow.

## 2021-08-16 NOTE — WOUND TEAM
[facility]  Inpatient Services  Initial Wound and Skin Care Evaluation    Admission Date: 8/15/2021     Last order of IP CONSULT TO WOUND CARE was found on 8/16/2021 from Hospital Encounter on 8/15/2021     HPI, PMH, SH: Reviewed    Past Surgical History:   Procedure Laterality Date   • TOE AMPUTATION Left 4/23/2021    Procedure: AMPUTATION, TOE - 5TH TOE;  Surgeon: Remi Scott M.D.;  Location: [facility];  Service: Orthopedics   • IRRIGATION & DEBRIDEMENT ORTHO Right 12/10/2020    Procedure: IRRIGATION AND DEBRIDEMENT, WOUND - HEEL;  Surgeon: Royal Bolivar M.D.;  Location: [facility];  Service: Orthopedics   • TENDON LENGHTENING Right 8/24/2020    Procedure: LENGTHENING, TENDON- , GASTROC RECESSION;  Surgeon: Royal Bolivar M.D.;  Location: [facility];  Service: Orthopedics   • ANKLE FUSION Right 8/24/2020    Procedure: FUSION, JOINT, ANKLE- ANKLE SUBTALAR FUSION , BONE GRAFT, MEDIAL RELEASE INCLUDING TENDONS, PATIAL EXCISION OF FIBULA;  Surgeon: Royal Bolivar M.D.;  Location: [facility];  Service: Orthopedics   • TENDON TRANSFER Right 8/24/2020    Procedure: TRANSFER, TENDON- PLANTAR FASCIA RELEASE;  Surgeon: Royal Bolivar M.D.;  Location: [facility];  Service: Orthopedics   • ORTHOPEDIC OSTEOTOMY Right 8/24/2020    Procedure: OSTEOTOMY- 1ST METATARSAL, CROSS PROCEDURE, 2-5 METATARSAL OSTEOTOMY, 2-5 PHALANGEL JOINT RECONSTRUCTION;  Surgeon: Royal Bolivar M.D.;  Location: [facility];  Service: Orthopedics   • HAMMERTOE CORRECTION Right 8/24/2020    Procedure: CORRECTION, HAMMER TOE 2-5;  Surgeon: Royal Bolivar M.D.;  Location: [facility];  Service: Orthopedics   • STENT PLACEMENT  2013    cardiac   • CAROTID ENDARTERECTOMY  7/13/2010    left; Performed by CHIQUITA CORRIGAN at [facility]   • CATARACT EXTRACTION WITH IOL Bilateral    • TONSILLECTOMY  as a child     Social History      Tobacco Use   • Smoking status: Never Smoker   • Smokeless tobacco: Never Used   Substance Use Topics   • Alcohol use: Not Currently     Alcohol/week: 0.0 oz     Comment: once a year     Chief Complaint   Patient presents with   • Shortness of Breath     Diagnosis: Hypoxia [R09.02]    Unit where seen by Wound Team: WILBER`708-2 (transferred to R100 shortly after)      WOUND CONSULT/FOLLOW UP RELATED TO:  Multiple areas      WOUND HISTORY:  Patient admitted from SNF for SOB.  Witnessed having seizure like activity 07/16/21 and assessed and transferred to RICU shortly after.  Multiple areas of pressure injury breakdown.      WOUND ASSESSMENT/LDA      Wound 08/16/21 Pressure Injury Sacrum;Coccyx unstageable POA  (Active)   Wound Image   08/16/21 0400   Site Assessment Pink;Purple;Red;White;Yellow    Periwound Assessment Pink;Red    Margins Defined edges    Closure None    Drainage Amount Scant    Drainage Description Serosanguineous    Treatments Cleansed    Wound Cleansing Approved Wound Cleanser    Periwound Protectant Skin Protectant Wipes to Periwound    Dressing Cleansing/Solutions Not Applicable    Dressing Options Mepilex;Hydrocolloid Thin    Dressing Changed New    Dressing Status Clean;Dry;Intact    Dressing Change/Treatment Frequency Every 72 hrs, and As Needed    NEXT Dressing Change/Treatment Date 08/19/21    NEXT Weekly Photo (Inpatient Only) 08/22/21    Pressure Injury Stage U    Wound Length (cm) 4 cm    Wound Width (cm) 5 cm    Wound Surface Area (cm^2) 20 cm^2    WOUND NURSE ONLY - Time Spent with Patient (mins) 30    Number of days: 0       Wound 08/16/21 Pressure Injury Ankle;Foot Lateral Left unstageable POA  (Active)   Wound Image    08/16/21 1615   Site Assessment IDA    Periwound Assessment Pink;Red;Fragile    Margins Attached edges    Closure None    Drainage Amount Scant    Drainage Description Serosanguineous    Treatments Site care;Offloading;Cleansed    Wound Cleansing Approved Wound Cleanser     Periwound Protectant Skin Protectant Wipes to Periwound    Dressing Cleansing/Solutions Not Applicable    Dressing Options Honey Colloid;Mepilex Heel    Dressing Changed Observed    Dressing Status Dry;Intact;Clean    Dressing Change/Treatment Frequency Every 72 hrs, and As Needed    NEXT Dressing Change/Treatment Date 08/19/21    NEXT Weekly Photo (Inpatient Only) 08/23/21    Wound Length (cm) 4 cm    Wound Width (cm) 2 cm    Wound Surface Area (cm^2) 8 cm^2    WOUND NURSE ONLY - Time Spent with Patient (mins) 30    Number of days: 0       Wound 08/16/21 Pressure Injury Ankle Lateral Right sDTI POA (Active)   Wound Image   08/16/21 1615   Site Assessment Purple    Periwound Assessment Intact    Margins Attached edges    Closure None    Drainage Amount None    Treatments Offloading    Wound Cleansing Approved Wound Cleanser    Dressing Cleansing/Solutions Not Applicable    Dressing Options Mepilex Heel    Dressing Changed New    Dressing Status Clean;Dry;Intact    Dressing Change/Treatment Frequency Every 72 hrs, and As Needed    NEXT Dressing Change/Treatment Date 08/19/21    NEXT Weekly Photo (Inpatient Only) 08/23/21    Pressure Injury Stage DTPI    Wound Length (cm) 2 cm    Wound Width (cm) 2 cm    Wound Surface Area (cm^2) 4 cm^2    Number of days: 0       Wound 08/16/21 Full Thickness Wound Toe, 4th Anterior Left (Active)   Wound Image   08/16/21 1615   Site Assessment Red;Pink    Periwound Assessment Red;Pink    Margins Attached edges    Closure None    Drainage Amount Small    Drainage Description Serosanguineous    Treatments Cleansed;Site care    Wound Cleansing Approved Wound Cleanser    Periwound Protectant Skin Protectant Wipes to Periwound    Dressing Cleansing/Solutions Not Applicable    Dressing Options Nonadhesive Foam;Hydrofiber Silver;Dry Gauze;Hypafix Tape    Dressing Changed Changed    Dressing Status Clean;Dry;Intact    Dressing Change/Treatment Frequency Every 72 hrs, and As Needed    NEXT  Dressing Change/Treatment Date 08/19/21    NEXT Weekly Photo (Inpatient Only) 08/23/21    Non-staged Wound Description Full thickness    Wound Length (cm) 0.6 cm    Wound Width (cm) 0.6 cm    Wound Depth (cm) 0.2 cm    Wound Surface Area (cm^2) 0.36 cm^2    Wound Volume (cm^3) 0.072 cm^3    Number of days: 0          Vascular:    HAKEEM:   No results found.    Lab Values:    Lab Results   Component Value Date/Time    WBC 5.2 08/16/2021 04:26 AM    RBC 3.47 (L) 08/16/2021 04:26 AM    HEMOGLOBIN 9.3 (L) 08/16/2021 04:26 AM    HEMATOCRIT 33.7 (L) 08/16/2021 04:26 AM    CREACTPROT 13.87 (H) 06/03/2021 03:07 AM    SEDRATEWES 26 (H) 11/19/2020 11:36 AM    HBA1C 4.9 04/22/2021 04:12 PM        Culture Results show:  No results found for this or any previous visit (from the past 720 hour(s)).    Pain Level/Medicated:  Patient lethargic, no complaints of pain.        INTERVENTIONS BY WOUND TEAM:  Chart and images reviewed. Discussed with bedside RN. All areas of concern (based on picture review, LDA review and discussion with bedside RN) have been thoroughly assessed. Documentation of areas based on significant findings. This RN in to assess patient. Performed standard wound care which includes appropriate positioning, dressing removal and non-selective debridement. Pictures and measurements obtained weekly if/when required.    Left lateral ankle:     Preparation for Dressing removal: NA   Cleansed with:  wound clenaser and gauze.  Sharp debridement: NA   Nataly wound: Cleansed with wound cleanser, Prepped with no sting   Primary Dressing: honey colloid   Secondary (Outer) Dressing: heel mepilex     Left lateral foot:     Primary Dressing: cut portion of heel mepilex     Left 4th toe:     Primary Dressing: hydrofiber silver   Secondary (Outer) Dressing: dry gauze and hypafix tape     Right lateral ankle:     Primary Dressing: heel mepilex     Sacrum/coccyx:      Primary Dressing: Hydrocolloid thin   Secondary (Outer) Dressing:  sacral mepilex     Interdisciplinary consultation: Patient, Bedside RN, wife at bedside     EVALUATION / RATIONALE FOR TREATMENT:  Most Recent Date:  08/16/21: wife at bedside did not realize patient had wounds.  Heel float boots applied for offloading.  Patients lungs compromised and likely transferring to ICU.  Being assessed by ICU team during eval.      Goals: Steady decrease in wound area and depth weekly.    WOUND TEAM PLAN OF CARE ([X] for frequency of wound follow up,):   Nursing to follow orders written for wound care. Contact wound team if area fails to progress, deteriorates or with any questions/concerns  Dressing changes by wound team:                   Follow up 3 times weekly:                NPWT change 3 times weekly:     Follow up 1-2 times weekly:    X  Follow up Bi-Monthly:                   Follow up as needed:     Other (explain):     NURSING PLAN OF CARE ORDERS (X):  Dressing changes: See Dressing Care orders: X  Skin care: See Skin Care orders: X  RN Prevention Protocol:   Rectal tube care: See Rectal Tube Care orders:   Other orders:    RSKIN:   CURRENTLY IN PLACE (X), APPLIED THIS VISIT (A), ORDERED (O):   Q shift Bradley:  X  Q shift pressure point assessments:  X    Surface/Positioning   Pressure redistribution mattress            Low Airloss        Transferring to ICU bed   Bariatric foam      Bariatric ERIKA     Waffle cushion        Waffle Overlay        (was in place)   Reposition q 2 hours    X  TAPs Turning system     Z Rick Pillow     Offloading/Redistribution   Sacral Mepilex (Silicone dressing)   X  Heel Mepilex (Silicone dressing)       X  Heel float boots (Prevalon boot)           X  Float Heels off Bed with Pillows           Respiratory   Silicone O2 tubing       X  Gray Foam Ear protectors     Cannula fixation Device (Tender )          High flow offloading Clip    Elastic head band offloading device      Gregory                                                         Trach  with Optifoam split foam             Containment/Moisture Prevention     Rectal tube or BMS    Purwick/Condom Cath        Wu Catheter    Barrier wipes           Barrier paste       Antifungal tx      Interdry        Mobilization       Up to chair        Ambulate      PT/OT      Nutrition       Dietician        Diabetes Education      PO     TF     TPN     NPO X  # days     Other        Anticipated discharge plans:  TBA - back to SNF  LTACH:        SNF/Rehab:                  Home Health Care:           Outpatient Wound Center:            Self/Family Care:        Other:

## 2021-08-17 NOTE — PROGRESS NOTES
[facility] resident paged regarding patient's heart rate. Increasing up to 180s-not sustained. Physician at beside assessing patient. Requested to be notified if HR sustains >120.

## 2021-08-17 NOTE — CARE PLAN
Ventilator Daily Summary    Vent Day # 2    ETT 7.5 @ 24    Ventilator settings changed this shift:yes based on ABGs    PCMV 26 P 24 10 60%    Weaning trials:no    Respiratory Procedures:no     Plan: Continue current ventilator settings and wean mechanical ventilation as tolerated per physician orders.

## 2021-08-17 NOTE — PROGRESS NOTES
[facility] ICU Progress Note      Admit Date: 8/15/2021    Resident(s): Franko Mullen M.D.   Attending:  NHAN MACIAS/ Dr. Noonan     Patient ID:    Name:  Agapito Stone   YOB: 1945  Age:  76 y.o.  male   MRN:  [id number]    Hospital Course (carried forward and updated):  Agapito Stone is a 76 y.o. 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   8/16/2021 reportedly noted to have seizure-like activity and postictal confusion, hypercapnic respiratory failure subsequently transferred to ICU and intubated for airway protection.    Consultants:  Critical Care  Neurology      Interval Events:  No acute events since transfer to the ICU.    T-max 90 7.7F  HR 80-100s  RR 20-30s  BP /50-60s  On full vent support  Zosyn, vancomycin for abx   Cortrak placement today   Levophed for MAP > 65       Had a detail discussion with pt's wife and her close friend at bedside. Events leading to his transfer to ICU , intubation were discussed. Plan of care in ICU was put forward and all questions answered. Per wife , pt had a steady decline since he was moved to [place] rehab facility almost a year ago. She agreed he may have dementia but was not formally diagnosed with. In regards to GOC she noted that pt and herself never had a discussion about it in past but she was clear that he would not want prolonged vent support for himself. She would like to discuss with her family  tomorrow and talk to the team if she decides to continue treatment vs more palliative/comfort care       Vitals Range last 24h:  Temp:  [35.7 °C (96.2 °F)-36.5 °C (97.7 °F)] 36.4 °C (97.6 °F)  Pulse:   [] 90  Resp:  [11-36] 26  BP: ()/(32-89) 92/58  SpO2:  [83 %-100 %] 96 %      Intake/Output Summary (Last 24 hours) at 8/17/2021 0817  Last data filed at 8/17/2021 0600  Gross per 24 hour   Intake 1412.98 ml   Output 3780 ml   Net -2367.02 ml        Review of Systems   Unable to perform ROS: Intubated       PHYSICAL EXAM:  Vitals:    08/17/21 0645 08/17/21 0700 08/17/21 0715 08/17/21 0730   BP: 108/60 (!) 95/61 126/71 (!) 92/58   Pulse: (!) 109 90     Resp: (!) 26 (!) 26     Temp:       TempSrc:       SpO2: 96% 96%     Weight:       Height:        Body mass index is 25.68 kg/m².    O2 therapy: Pulse Oximetry: 96 %, O2 (LPM): 15, O2 Delivery Device: Ventilator         Physical Exam  Vitals and nursing note reviewed.   Constitutional:       Appearance: He is ill-appearing.      Comments: Elderly male , ET tube in mouth    HENT:      Head: Normocephalic.      Mouth/Throat:      Mouth: Mucous membranes are dry.   Eyes:      Pupils: Pupils are equal, round, and reactive to light.   Cardiovascular:      Rate and Rhythm: Rhythm irregular.      Pulses: Normal pulses.      Heart sounds: No murmur heard.     Pulmonary:      Breath sounds: No wheezing.      Comments: Symmetric breath sounds b/l , on vent support   Abdominal:      General: There is no distension.      Palpations: Abdomen is soft.      Tenderness: There is no abdominal tenderness.   Musculoskeletal:      Cervical back: Normal range of motion.      Right lower leg: Edema present.      Left lower leg: Edema present.   Skin:     Findings: Bruising (bilateral upper extremities ) present.   Neurological:      Comments: Exam limited due to sedation  Moves all extremities to pain           Recent Labs     08/16/21  1048 08/16/21  1310 08/16/21  1839 08/17/21  0050   UJAPR85T 7.28* 7.37*  --   --    EVEJHI039A 92.8* 74.4*  --   --    KVXUW484X 140.4* 136.6*  --   --    WGJX5QRZ 98.8 98.4  --   --    ARTHCO3 43* 42*  --   --    R2SPTWRQR 4.0  --   --   --     ARTBE 13* 14*  --   --    ISTATAPH  --   --  7.484 7.386*   ISTATAPCO2  --   --  59.9* 71.5*   ISTATAPO2  --   --  250* 54*   ISTATATCO2  --   --  47* 45*   ZEBDZSU0YVQ  --   --  100* 86*   ISTATARTHCO3  --   --  45.0* 42.9*   ISTATARTBE  --   --  19* 16*   ISTATTEMP  --   --  96.1 F 97.6 F   ISTATFIO2  --   --  100 60   ISTATSPEC  --   --  Arterial Arterial   ISTATAPHTC  --   --  7.505* 7.394*   UTBVVPDM2HX  --   --  243* 52*     Recent Labs     08/16/21 0426 08/16/21 1048 08/17/21  0400   SODIUM 142 146* 146*   POTASSIUM 5.4 5.0 4.4   CHLORIDE 100 103 103   CO2 40* 39* 35*   BUN 43* 41* 40*   CREATININE 0.75 0.59 0.64   MAGNESIUM  --   --  2.5   PHOSPHORUS  --   --  2.3*   CALCIUM 8.9 8.8 8.8     Recent Labs     08/15/21  1300 08/15/21  1300 08/16/21 0426 08/16/21 1048 08/17/21  0400   ALTSGPT 28  --   --  29  --    ASTSGOT 30  --   --  36  --    ALKPHOSPHAT 535*  --   --  472*  --    TBILIRUBIN 0.3  --   --  0.3  --    LIPASE 16  --   --   --   --    GLUCOSE 126*   < > 110* 116* 92    < > = values in this interval not displayed.     Recent Labs     08/15/21  1300 08/16/21  0426 08/17/21  0400   RBC 3.08* 3.47* 3.22*   HEMOGLOBIN 8.5* 9.3* 8.9*   HEMATOCRIT 30.1* 33.7* 30.7*   PLATELETCT 260 235 315     Recent Labs     08/15/21  1300 08/16/21  0426 08/16/21 1048 08/17/21  0400   WBC 7.6 5.2  --  16.9*   NEUTSPOLYS 88.50*  --   --  89.30*   LYMPHOCYTES 5.00*  --   --  4.60*   MONOCYTES 6.00  --   --  5.10   EOSINOPHILS 0.00  --   --  0.10   BASOPHILS 0.10  --   --  0.20   ASTSGOT 30  --  36  --    ALTSGPT 28  --  29  --    ALKPHOSPHAT 535*  --  472*  --    TBILIRUBIN 0.3  --  0.3  --        Meds:  • [Held by provider] potassium phosphate IVPB (CUSTOM)  15 mmol     • cefTRIAXone (ROCEPHIN) IV  2 g Stopped (08/16/21 3057)   • Respiratory Therapy Consult       • famotidine  20 mg      Or   • famotidine  20 mg     • MD Alert...Adult ICU Electrolyte Replacement per Pharmacy       • lidocaine  2 mL     •  dexmedetomidine (PRECEDEX) infusion  0-1.5 mcg/kg/hr Stopped (08/16/21 9282)   • fentaNYL  25 mcg      Or   • fentaNYL  50 mcg      Or   • fentaNYL  100 mcg     • fentaNYL   50 mcg/hr (08/17/21 9195)   • propofol   mg     • ampicillin  2,000 mg Stopped (08/17/21 9436)   • midazolam  1-2 mg     • norepinephrine (Levophed) infusion  0-30 mcg/min 8 mcg/min (08/17/21 6098)   • labetalol  10 mg     • DILTIAZem  60 mg     • senna-docusate  2 Tablet      And   • polyethylene glycol/lytes  1 Packet      And   • magnesium hydroxide  30 mL      And   • bisacodyl  10 mg     • acetaminophen  650 mg     • MD Alert...Vancomycin per Pharmacy       • enalaprilat  1.25 mg     • albuterol  2 Puff     • vancomycin  1,250 mg Stopped (08/17/21 8355)        Procedures:  ETT 8/16  R IJ 8/16      Imaging:  DX-CHEST-PORTABLE (1 VIEW)   Final Result         1. Stable lines and tubes.   2. Unchanged bilateral pulmonary opacities and bilateral pleural effusions, left worse than right.         DX-CHEST-PORTABLE (1 VIEW)   Final Result         Endotracheal tube with tip projecting over the mid thoracic trachea.      Right central venous catheter with tip projecting over the expected area of the lower SVC.      Moderate to large left pleural effusion, slightly less than prior.      CT-HEAD W/O   Final Result         1. There is tiny 3 mm focal parenchymal hemorrhage in the left parietal lobe.      No surrounding edema. No mass effect or midline shift.      2. Encephalomalacia in the right parietal lobe and right cerebellum could relate to prior infarct.            CRITICAL RESULT READ BACK: Preliminary findings discussed with and critical read back performed by Dr. Rincon via telephone on 8/16/2021 11:37 AM      CT-CHEST (THORAX) WITH   Final Result      1.  Bilateral loculated pleural effusions, larger on the LEFT with possible debris in the LEFT pleural fluid.   2.  Associated consolidation primarily involving the lower lobes, as seen  previously.   3.  Worsening mediastinal adenopathy.   4.  Ascites and body wall edema, new from prior.               DX-CHEST-PORTABLE (1 VIEW)   Final Result      1.  Persistent hypoinflation with small RIGHT pleural fluid collection and/or pleural thickening, unchanged from prior exam.   2.  Consolidation again seen at the LEFT lung base with moderate associated pleural fluid.      EC-ECHOCARDIOGRAM COMPLETE W/O CONT    (Results Pending)       ASSESSEMENT and PLAN:    * Acute hypercapnic respiratory failure (HCC)- (present on admission)  Assessment & Plan  76M with h/o prior COVID 11/2020,  left-sided empyema(gram-positive cocci, however culture negative) s/p chest pigtail catheter(removed 6/1/2021), complicated by left trapped lung who presented initially with acute hypoxemic respiratory failure in the setting of loculated pleural effusions and volume overload 8/15.  8/16 Patient had seizure-like episode followed by acute encephalopathy, and subsequent ABG showing pH 7.28, PCO2 92, PO2 138.  Hypercapnia likely precipitated by respiratory depression in the setting of seizure.   Transferred to ICU.     -Intubated for airway protection  -Continue IV antibiotics   -ABCDEF Bundle  -Patient not a candidate for decortication due to high risk, may consider pulmonology consult for other measures such as intrapleural dornase injection.  -Detailed discussion about GOC with wife today , will talk to family's  tomorrow. Inclining towards comfort care. She will update the team tomorrow.       Acute encephalopathy  Assessment & Plan  Seizure episode, resolved with Ativan 2 mg and Keppra 1000 mg.  EEG not showing any epileptiform activity.  Head CT showed possible tiny 3 mm intraparenchymal hemorrhage without midline shift->reviewed this with neurology Dr. Parry, who does not believe it is a hemorrhage (seen on prior images from 2012).    Unclear whether seizure-like episode caused CO2 retention, also likely has a component  of CO2 narcosis.     -Neurochecks  -Serial ABG monitoring  -Antiepileptics per neurology recommendations  -On Vanc ,Zosyn   -Per Neuro CNS infection unlikely       Interventions to be considered in all patients in order to minimize the risk of delirium.   -do not disturb the patient (vitals or lab draws) between the hours of 10 PM and 6 AM.  -ideally the patient should not sleep during the day and we should avoid day time naps.   -up in chair for meals  -ambulate at least three times daily, as able  -watch for constipation  -timed voiding - ask patient if they would like to go to the bathroom q 2-3 hours, except during the do not disturb hours.   -remove all unnecessary lines (central lines, peripheral IVs, feeding tubes, urbina catheters)  -minimize polypharmacy, if possible, medications should not be dosed during sleep hours          Atrial fibrillation (HCC)- (present on admission)  Assessment & Plan  Home Cardizem   Resume Eliquis   Consider Diltiazem pushes for sustained RVR > 120    Loculated pleural effusion  Assessment & Plan  Worsening bilateral loculation of prior empyema.  Patient did not tolerate chest tube well last time (with high volume output which was not decreasing), also has a left trapped lung now.  Repeat chest tube benefit unclear, also poor surgical candidate for decortication.      Deep vein thrombosis (DVT) of lower extremity (HCC)- (present on admission)  Assessment & Plan  Chronic LLE non occluding thrombus   Continue Eliquis       DISPO: ICU    CODE STATUS: DNR, I OK     Quality Measures:  Feeding: cortrak  Analgesia: fentanyl  Sedation: propofol   Thromboprophylaxis: Eliquis   Head of bed: >30 degrees  Ulcer prophylaxis: famotidine  Glycemic control: Correctional: N/A  Bowel care: bowel regimen  Indwelling lines: R IJ , CVC   Deescalation of antibiotics: Currently on Zosyn , Vancomycin       Franko Mullen M.D.

## 2021-08-17 NOTE — FLOWSHEET NOTE
Respiratory Therapy Update                       Cough: Moist;Productive (08/17/21 1210)  Sputum Amount: Scant (08/17/21 1210)  Sputum Color: Clear;White (08/17/21 1210)  Sputum Consistency: Thick;Thin (08/17/21 1210)               FiO2%: 60 % (08/17/21 1210)  O2 (LPM): 15 (08/16/21 1700)       Breath Sounds  RUL Breath Sounds: Diminished (08/17/21 1210)  RML Breath Sounds: Diminished (08/17/21 1210)  RLL Breath Sounds: Diminished (08/17/21 1210)  DWAIN Breath Sounds: Diminished (08/17/21 1210)  LLL Breath Sounds: Diminished (08/17/21 1210)    Events/Summary/Plan: Pt/vent checked. (08/17/21 1210)

## 2021-08-17 NOTE — FLOWSHEET NOTE
Respiratory Therapy Update                       Cough: Productive (08/16/21 1745)  Sputum Amount: Scant (08/16/21 1745)  Sputum Color: White (08/16/21 1745)  Sputum Consistency: Thick;Thin (08/16/21 1745)               FiO2%: 100 % (08/16/21 1745)  O2 (LPM): 15 (08/16/21 1700)       Breath Sounds  RUL Breath Sounds: Diminished;Fine Crackles (08/16/21 1745)  RML Breath Sounds: Diminished;Fine Crackles (08/16/21 1745)  RLL Breath Sounds: Diminished (08/16/21 1745)  DWAIN Breath Sounds: Diminished;Fine Crackles (08/16/21 1745)  LLL Breath Sounds: Diminished (08/16/21 1745)        Events/Summary/Plan: Pt intubated by resident and Dr Davis with 7.5 ETT at 24 lip with glide scope.  Pt placed on vent per Dr Davis orders.  Pt tolerated mostly well. (08/16/21 1745)

## 2021-08-17 NOTE — PROGRESS NOTES
Pt picked up from T708/2 by this RN and tech, pt obtunded, responds to only painful stimuli. O2 >92% on 4L [place]  at bedside upon arrival to [facility]. Central line, arterial line and ETT tube placed at bedside from approx 1051-6007. VS monitored and hypotension treated during this time (see MAR and VS flowsheet).

## 2021-08-17 NOTE — PROCEDURES
Arterial line: right femoral artery, ultrasound guided.    Reason: hypoxic respiratory failure and sepsis with shock    Procedure:  Sterile procedure.  Draped appropriately.  Right femoral artery, anatomically guided.  Needle inserted with flash obtained.  Guidewire inserted through needle, catheter progressed over wire, wire removed, catheter sutured in place with two sutures, chlorhexidine patch and bandage applied.  Connected appropriately.  No complications. Less than 3 cc blood loss.

## 2021-08-17 NOTE — DIETARY
"Nutrition Support/TF Assessment:  Day 0 of admit.  Agapito Stone is a 76 y.o. male with admitting DX of Hypoxia, transferred from Skilled Nursing Facility.      Current problem list:  1. Acute encephalopathy   2. Atrial fibrillation  3. Loculated pleural effusion    4. Acute hypercapnic respiratory failure  5. Deep vein thrombosis (DVT) of lower extremity          Assessment:  Estimated Nutritional Needs based on:   Height: 188 cm (6' 2\")  Weight: 90.7 kg (200 lb)  Weight to Use in Calculations: 90.7 kg (199 lb 15.3 oz)- per stand up scale on 8/15  Ideal Body Weight: 86.2 kg (190 lb)  Percent Ideal Body Weight: 105.3  Body mass index is 25.68 kg/m²., BMI classification: Overweight    Calculation/Equation: MSJ X 1.2= ; The [place] Equation (PSU) = 1755 kcals/day.    Total Calories / day: 0639-6154  (Calories / k-23)  Total Grams Protein / day: 109 - 127  (Grams Protein / k.2 - 1.4)  Total Free Water/day: 2270- 2720 ml/day (~ 25-30 ml/kg)     Evaluation:   1. TF consult received, appropriate due to mechanical ventilation. Reviewed with MD MELE wanting to hold off on TF for now and reassess tomorrow.   2. Enteral access pending.   3. Past medical history of Arthritis, CAD (coronary artery disease) (2013), Cataract, Diabetes, High cholesterol, Hyperlipidemia, Hypertension, Pain, Pneumonia (), Stroke (HCC) (), and Syncope. MD notes pt has some dementia and poor historian.   4. Pertinent Labs: Na+ 146, BUN 40, Phos 2.3.   5. Pertinent Meds: Precedex, Norepinephrine @ 4 mcg/min, sodium phosphate.   6. Skin: Unstageable pressure ulcer noted to sacrum/coccyx and left ankle, DTI pressure ulcer to right ankle, Full thickness wound to 4th toe on left foot, skin tears to elbow and right arm     Malnutrition Risk: none identified, limited information.      Recommendations/Plan:  1. RD to follow for plan of care.                 "

## 2021-08-17 NOTE — PROCEDURES
"Date: 8/16/2021    Procedure:  Central Venous Catheter Insertion    Indication: Respiratory failure with poor IV access requiring induction mediation for intubation as well as Vasoactive medications    Physician:  Performed by Brittany VILLA, supervised by Susan Wade MD    Consent:  Obtained from NOK and included in the medical record; time out performed    Procedure:  The patient was placed in the supine position.  Appropriate \"time out\" was performed.  The right neck was prepped and draped in the usual sterile fashion.  Under full body sterile barrier precautions, a triple lumen central venous catheter was placed without difficulty in the right IJ position.  US was used for localization and placement.  All lumens were flushed with sterile saline and sterile caps were applied.  The line was sutured in place and a sterile dressing was applied.  There were no immediate complications.  A post-procedure CXR will be reviewed.  Estimated blood loss < 5cc.    "

## 2021-08-17 NOTE — CARE PLAN
The patient is Unstable - High likelihood or risk of patient condition declining or worsening    Problem: Safety - Medical Restraint  Goal: Remains free of injury from restraints (Restraint for Interference with Medical Device)  Outcome: Progressing  Flowsheets (Taken 8/17/2021 0242)  Addressed this shift: Remains free of injury from restraints (restraint for interference with medical device):   Determine that other, less restrictive measures have been tried or would not be effective before applying the restraint   Evaluate the patient's condition at the time of restraint application   Inform patient/family regarding the reason for restraint   Every 2 hours: Monitor safety, psychosocial status, comfort, nutrition and hydration     Problem: Hemodynamics  Goal: Patient's hemodynamics, fluid balance and neurologic status will be stable or improve  Note: Hemodynamic stability        Shift Goals  Clinical Goals: Intubate/ventilate, stable hemodynamically  Patient Goals: IDA  Family Goals: not at bedside    Progress made toward(s) clinical / shift goals: Patient hypotensive, Levophed gtt initiated.     Patient is not progressing towards the following goals: RASS-3, no sedation.

## 2021-08-17 NOTE — CARE PLAN
The patient is Watcher - Medium risk of patient condition declining or worsening    Shift Goals  Clinical Goals: Intubate/ventilate, stable hemodynamically  Patient Goals: IDA  Family Goals: not at bedside    Progress made toward(s) clinical / shift goals:  Afib 100-120s treated with  IVP Cardizem 20mg with good response, HR now 80s-100s. Levophed on for <10 minutes during line placements. BP >150 without pressors. Fentanyl given for post intubation pain and BP returned to WNL. Pt tolerating mechanical ventilation.    Problem: Hemodynamics  Goal: Patient's hemodynamics, fluid balance and neurologic status will be stable or improve  Outcome: Progressing     Problem: Respiratory  Goal: Patient will achieve/maintain optimum respiratory ventilation and gas exchange  Outcome: Progressing     Problem: Mechanical Ventilation  Goal: Safe management of artificial airway and ventilation  Outcome: Progressing       Patient is not progressing towards the following goals:

## 2021-08-17 NOTE — PROGRESS NOTES
Cortrak placement addressed with Juani, doctor Forrest. D/t no order in place. Per MD, hold off on cortrak placement-discuss with am team & patient's family. Patient currently on vasopressor, holding p.o. Cardizem.

## 2021-08-17 NOTE — PROGRESS NOTES
Pt -150s intermittantly. Pt awaiting Cortrak insertion for oral cardizem however, trained staff not available to insert Cortrak r/t unit acuity. Discussed w/  who orders 10mg Cardizem IVP.

## 2021-08-17 NOTE — THERAPY
Missed Therapy     Patient Name: Agapito Stone  Age:  76 y.o., Sex:  male  Medical Record #: [id number]  Today's Date: 8/17/2021 08/17/21 0807   Treatment Variance   Reason For Missed Therapy Medical - Patient Is Not Medically Stable   Interdisciplinary Plan of Care Collaboration   IDT Collaboration with  Nursing   Collaboration Comments Orders received for a clinical swallow evaluation. Per RN, patient is now intubated and not appropriate. SLP will HOLD at this time.

## 2021-08-17 NOTE — PROCEDURES
Endotracheal Intubation Procedure Note  INDICATION: Acute hypercapnic respiratory failure  PROCEDURE : Briana Trejo  ATTENDING PHYSICIAN:Dr. Mauro Davis     CONSENT:   Consent was obtained from patient's wife prior to the procedure. Indications, risks, and benefits were explained at length.      PROCEDURE SUMMARY:      A time out was performed. My hands were washed immediately prior to the procedure. I wore a surgical cap, mask with protective eyewear, gown and gloves throughout the procedure. The patient was placed on a cardiac monitor including continuous pulse oximetry. Rapid Sequence Intubation was conducted. The patient received 20 mg of etomidate for induction and 50 mg of rocuronium for adequate paralysis. Cricoid pressure was maintained from time induction agent was given to time of cuff balloon inflation. Using a glidescope and a size 7.5 endotracheal tube with stylet, the patient was intubated on the first attempt. The stylet was removed and cuff balloon was inflated. Appropriate endotracheal tube position was confirmed by direct visualization of vocal cord passage, fogging of the tube, CO2 colometric indicator and symmetric breath sounds. The tube was secured at 23cm cm at the lips. Post intubation chest x-ray is pending at this time.

## 2021-08-18 NOTE — DISCHARGE SUMMARY
Death Summary    Cause of Death  Acute hypoxic respiratory failure due to septic shock .    Comorbid Conditions at the Time of Death  Principal Problem:    Acute hypercapnic respiratory failure (HCC) POA: Yes  Active Problems:    Deep vein thrombosis (DVT) of lower extremity (HCC) POA: Yes    Loculated pleural effusion POA: Unknown    Atrial fibrillation (HCC) POA: Yes    Acute encephalopathy POA: Unknown  Resolved Problems:    * No resolved hospital problems. *      History of Presenting Illness and Hospital Course  Agapito Stone is a 76 y.o. 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  Patient was admitted to floor and started on ceftriaxone and vancomycin with diuresis with Lasix.On day 2 of hospitalization 8/16, patient was noted to have a seizure-like episode with altered mental status. He received ativan 2mg and Keppra 1000 mg.  ABG at that time showed pH 7.28, PCO2 92, PO2 138 with bicarb 43.  Critical care consulted for acute hypercapnic respiratory failure and significant respiratory acidosis. He was transferred to the ICU , after detailed GOC discussion with wife, made DNR but intubated on her wishes. 8/17 evening while on full vent support he was noted to have desaturations to 60-70's, bagged on vent and settings adjusted. Despite all possible measures he continued to desaturate. Provider called the wife, updates provided, prognosis discussed, she agreed to exubation and comfort measures. He was compassionately extubated and care  transitioned to comfort measures only. Patient passed away peacefully, wife Simran was notified by the RN.       Death Date: 08/17/21   Death Time: 1756         Pronounced By (RN1): Winter Posadas RN  Pronounced By (RN2): Anthony Delacruz RN

## 2021-08-18 NOTE — PROGRESS NOTES
1650: RT notified pt sats 87% on 100%. Suction provided w/ no output.  1655: RT Ray again notified pt satting 83%.  1658: RT at bedside  1659:  updated- recommends bagging patient and order CXR   1704:  @ at bedside. RT baggin patient, sats 60s%, BP dropping 60s/30s. HR 150s. Jose increased   1706:  attempting to call wife Simran.   1714: Per , wife agrees to comfort care and she will put the orders in. Per , wife states she prefers him extubated now.

## 2021-08-18 NOTE — PROGRESS NOTES
Pt compassionately extubated @ approx 1740. Dilaudid drip initiated per MAR. Phone call to wife Simran @ 1750 to update that pt's oxygen is rapidly declining, wife placed on speaker phone and phone placed to patient's ear. Simran states that pt's son is en route from [place] and requests that we allow his son to view Agapito as he is in his room shall he pass away.     Update @ 1756 notified by [employer] that pt is in asystole. Pt pronounced by two RNs (myself and Simran GALVAN, RN). Attempted to call randee Khalil multiple times, no answer or busy signal and her voicemail is full.    Update: Pt's wife Simran notified @ 1835 that patient  peacefully and was not showing s/s of pain.

## 2021-08-18 NOTE — PLAN OF CARE (IOPOC)
Notified that patient is desaturation, currently being bagged on vent. Vent settings adjusted however patient continues to saturate in 60s. Attempted to call wife x4 however it goes to voicemail and cannot leave message due to full system.    5:14 PM  Spoke with wife Simran Cheatham who called back. It was explained to the wife that patient is not saturating well at this time despite maximally tolerated ventilation setting. She states that she will try to find a ride to come in as soon as possible. It was discussed for palliative extubation and comfort care and she states that if he's not doing well, then she wants him to extubated at this time and placed on comfort care. She understands that he will pass away and will make arrangements with her  to come in as soon as possible.

## 2021-08-19 LAB
BACTERIA UR CULT: NORMAL
SIGNIFICANT IND 70042: NORMAL
SITE SITE: NORMAL
SOURCE SOURCE: NORMAL

## 2021-08-19 NOTE — DOCUMENTATION QUERY
Critical access hospital                                                                       Query Response Note      PATIENT:               NADINE CABRERA  ACCT #:                  [id number]  MRN:                     [id number]  :                      1945  ADMIT DATE:       8/15/2021 11:21 AM  DISCH DATE:        2021 5:56 PM  RESPONDING  PROVIDER #:        [id number]           QUERY TEXT:    Pressure ulcer is documented by the Wound Care RN on .     Please specify if you:    NOTE:  If an appropriate response is not listed below, please respond with a new note.    The patient's Clinical Indicators include:   Wound Team Note:   1) Pressure Injury Sacrum; Coccyx unstageable POA    2) Pressure Injury Ankle; Foot Lateral Left unstageable POA  3) Pressure Injury Ankle Lateral Right Stage DTPI POA  Treatment: Wound team/dietary consults; site care; mepilex; hydrocolloid; offloading  Risk Factors: Chronic illness (dementia, hx CVA, hx LLE DVT, SOB, osteomyelitis)    Thank You,  Isatu Moy RN  Clinical    Connect via [employer]  Options provided:   -- Agree with Wound Care RN assessment   -- Disagree with Wound Care RN assessment   -- Unable to determine      Query created by: Isatu Moy on 2021 7:30 AM    RESPONSE TEXT:    Agree with Wound Care RN assessment          Electronically signed by:  LEE BALLARD MD 2021 8:28 AM

## 2021-08-20 LAB
BACTERIA BLD CULT: NORMAL
BACTERIA BLD CULT: NORMAL
SIGNIFICANT IND 70042: NORMAL
SIGNIFICANT IND 70042: NORMAL
SITE SITE: NORMAL
SITE SITE: NORMAL
SOURCE SOURCE: NORMAL
SOURCE SOURCE: NORMAL

## 2021-08-23 NOTE — DOCUMENTATION QUERY
[facility]                                                                       Query Response Note      PATIENT:               NADINE CABRERA  ACCT #:                  [id number]  MRN:                     [id number]  :                      1945  ADMIT DATE:       8/15/2021 11:21 AM  DISCH DATE:        2021 5:56 PM  RESPONDING  PROVIDER #:        [id number]           QUERY TEXT:    Encephalopathy is documented in the Medical Record. Please specify type.    NOTE:  If an appropriate response is not listed below, please respond with a new note.    The patient's Clinical Indicators include:   CT-Head: Hemorrhage L parietal lobe. No edema. Encephalomalacia R parietal lobe & R cerebellum.   CC Consult: Seizure-like episode followed by encephalopathy & ABG - pH 7.28, PCO2 92, PO2 138.    HM PN: Unknown if seizure, what is certain is acute encephalopathy different from dementia baseline.   CC PN: Acute encephalopathy, multifactorial - hx dementia.  Likely associated w/ sepsis.   Neuro Consult: AMS likely multifactorial - r/t poss. post-ictal state, uremia, hypercapnia, delirium, infection   CC PN: Encephalopathy, unclear seizure-like episode caused CO2 retention, likely component CO2 narcosis.  Treatment: CT-Head; neurology consult; treat underlying resp failure/sepsis/poss seizure; lab  Risk Factors: Age; sepsis; acute resp failure w/ hypoxia/hypercapnia; possible seizure; dementia    Thank You,  Isatu Moy RN  Clinical    Connect via [employer]  Options provided:   -- Metabolic encephalopathy   -- Septic encephalopathy   -- Toxic encephalopathy   -- Other type of encephalopathy   -- Unable to determine      Query created by: Isatu Moy on 2021 10:18 AM    RESPONSE TEXT:    Toxic encephalopathy          Electronically signed by:  LEE BALLARD MD 2021 8:13 AM

## 2021-08-24 NOTE — DOCUMENTATION QUERY
[facility]                                                                       Query Response Note      PATIENT:               NADINE CABRERA  ACCT #:                  [id number]  MRN:                     [id number]  :                      1945  ADMIT DATE:       8/15/2021 11:21 AM  DISCH DATE:        2021 5:56 PM  RESPONDING  PROVIDER #:        [id number]           QUERY TEXT:    It is unclear whether sepsis was present on admission.  Your help is needed to clarify the POA status.     NOTE:  If an appropriate response is not listed below, please respond with a new note.    The patient's Clinical Indicators include:  8/15 Admit (1127) SIRS 1/4 (RR: 22); (1327) SIRS 2/4 (HR: 97; RR: 28)  8/15 BP: 100/62 - 114/66; T: 99.2; HR: 90 - 122; RR: 11 - 33  8/15 WBC's: 7.6; Lactic Acid: 1.2; 8/15 Blood Cultures x 2: NEG  8/15 ED: Presents from SNF for eval SOB.  8/15 H&P: A-fib appears volume overloaded, pleural effusion, poss. trapped lung.    (1308) CC Consult: Encephalopathy, seizure. Sepsis d/t empyema vs meningitis   (5819) CC PN: Resp failure w/ hypoxia, poss aspiration. Sepsis, likely pneumonia   (4134) Intubated for acute hypercapnic resp failure  Treatment: IV antibiotics; RT; O2; intubated; lab/imaging  Risk Factors: Pleural effusion, possible trapped lung; empyema; poss pneumonia    Thank You,  Isatu Moy RN  Clinical    Connect via [employer]  Options provided:   -- Sepsis was present at the time of inpatient admission   -- Sepsis was not present at the time of inpatient admission   -- The documentation is insufficient to determine if sepsis was present on admission   -- The provider is unable to clinically determine whether sepsis was present on admission or not      Query created by: Isatu Moy on 2021 3:46 PM    RESPONSE TEXT:    The provider is unable to clinically determine  whether sepsis was present on admission or not          Electronically signed by:  LILO LANDEROS MD 8/24/2021 10:55 AM

## 2022-04-03 NOTE — PROGRESS NOTES
2 RN Skin Check    2 RN skin check complete Iglesia RN.   Devices in place: PT glasses, RLE cast. .  Skin assessed under devices: Bilateral ears assessed, red and blanching, UAE under RLE cast. Toes are pink and warm.  Confirmed pressure ulcers found on: N/A.  New potential pressure ulcers noted on N/A. Wound consult placed Yes.  The following interventions in place Pillows.    Patient L heel red and blanching. L outer ankle red and blanching. Sacrum pink and blanching. Cut to right back thigh just above knee. CDI. Bandaid in place. L Knee and L elbow with abrasions from fall.      Admission

## 2022-11-29 NOTE — TELEPHONE ENCOUNTER
Message   Received: Today   Message Contents   Kade Ovalle M.D.  Carla Vera L.P.N.   Caller: Unspecified (1 month ago)             I have personally reviewed his EKG that demonstrates sinus rhythm with a right bundle branch block and left anterior hemiblock.  EKG is unchanged from the prior tracing.           Date of Consult: 11/29/22  CC/HPI: 72 y/o male PMHx HTN and HLD presents to ED sent in by podiatry for surgery on right first toe. Pt states he has infection and toe needs to be amputated. Sent in by Dr. Coronado for infection of right toe and possible amputation. Pt reports several weeks of redness in area. X-ray in podiatry office today showing gas gangrene. Pt sent to ER for admission. No travel, no sick contacts, no illegal drugs, no social concerns. Patient says that he has been having this wound to the right foot for the past week and it just blew up. Patient says he is well aware of his pedal pathology, and is understanding of the severity of his infection.     REVIEW OF SYSTEMS: All other review of systems is negative unless indicated above    PMH: Hypertension    History of colorectal cancer    Hyperlipidemia      PSH:S/P tonsillectomy    History of ankle surgery    History of colon resection        Allergies:No Known Allergies      Vitals  T(F): 102.2 (11-29-22 @ 17:13), Max: 102.2 (11-29-22 @ 17:13)  HR: 112 (11-29-22 @ 17:13) (112 - 112)  BP: 131/79 (11-29-22 @ 17:13) (131/79 - 131/79)  RR: 14 (11-29-22 @ 17:13) (14 - 14)  SpO2: 93% (11-29-22 @ 17:13) (93% - 93%)  Wt(kg): --    MEDICATIONS  (STANDING):  piperacillin/tazobactam IVPB. 3.375 Gram(s) IV Intermittent once  piperacillin/tazobactam IVPB.. 3.375 Gram(s) IV Intermittent once  vancomycin  IVPB 1000 milliGRAM(s) IV Intermittent once    MEDICATIONS  (PRN):      Physical Exam:   Constitutiona: NAD, alert;  Derm:  Skin warm, dry and supple bilateral.  Right distal phalanx gangrene, wound measruing 2x3x2 cm, + malodor, + pus discharge, + probe to bone, + tunneling.   Vascular: Dorsalis Pedis and Posterior Tibial pulses 1/4.  Capillary re-fill time less then 3 seconds digits 1-5 bilateral.   Neuro: Protective sensation Severely Diminished to the level of the digits bilateral.  MSK: Muscle strength 4/5 in all major muscle groups of Right lower extremity. 5/5 in all muscle groups of LLE;  .        Labs:      WBC Trend      Chem    Rad/EKG          Date of Consult: 11/29/22  CC/HPI: 72 y/o male PMHx HTN and HLD sent to ED by his podiatrist for R hallux infected ulcer. Pt states he has infection and toe needs to be amputated. Sent in by Dr. Coronado for infection of right toe and possible amputation. Pt reports several weeks of redness in area. No travel, no sick contacts, no illegal drugs, no social concerns. Patient says that he has been having this wound to the right foot for the past week and it just blew up. Patient says he is well aware of his pedal pathology, and is understanding of the severity of his infection.     REVIEW OF SYSTEMS: All other review of systems is negative unless indicated above    PMH: Hypertension    History of colorectal cancer    Hyperlipidemia      PSH:S/P tonsillectomy    History of ankle surgery    History of colon resection        Allergies:No Known Allergies      Vitals  T(F): 102.2 (11-29-22 @ 17:13), Max: 102.2 (11-29-22 @ 17:13)  HR: 112 (11-29-22 @ 17:13) (112 - 112)  BP: 131/79 (11-29-22 @ 17:13) (131/79 - 131/79)  RR: 14 (11-29-22 @ 17:13) (14 - 14)  SpO2: 93% (11-29-22 @ 17:13) (93% - 93%)  Wt(kg): --    MEDICATIONS  (STANDING):  piperacillin/tazobactam IVPB. 3.375 Gram(s) IV Intermittent once  piperacillin/tazobactam IVPB.. 3.375 Gram(s) IV Intermittent once  vancomycin  IVPB 1000 milliGRAM(s) IV Intermittent once    MEDICATIONS  (PRN):      Physical Exam:   Constitutiona: NAD, alert;  Derm:  Skin warm, dry and supple bilateral.  Right hallux wound measruing 2x3x2 cm, + malodor, + pus discharge, + probe to bone, + tunneling.   Vascular: Dorsalis Pedis and Posterior Tibial pulses 1/4.  Capillary re-fill time less then 3 seconds digits 1-5 bilateral.   Neuro: Protective sensation Severely Diminished to the level of the digits bilateral.  MSK: Muscle strength 4/5 in all major muscle groups of Right lower extremity. 5/5 in all muscle groups of LLE;  .        Labs:      WBC Trend      Chem    Rad/EKG

## 2022-12-02 NOTE — MR AVS SNAPSHOT
"        Agapito Stone   2017 2:00 PM   Office Visit   MRN: [id number]    Department:  [facility]   Dept Phone:  [phone]    Description:  Male : 1945   Provider:  Brody Zamorano M.D.           Reason for Visit     Follow-Up           Allergies as of 2017     Allergen Noted Reactions    Fish 2010   Hives    shellfish    Pcn [Penicillins] 2010   Hives    Amoxicillin 2013   Hives    Food 03/10/2014   Swelling     Eggs,Melons, avocados-puffy mouth    Horse Allergy 2015       Influenza Vaccines 2016         You were diagnosed with     Type 2 diabetes mellitus with peripheral neuropathy (CMS-HCC)   [0263540]       Vitamin D deficiency   [7371417]       Pure hypercholesterolemia   [272.0.ICD-9-CM]       Impacted cerumen of right ear   [790833]       Prostate cancer screening   [984756]         Vital Signs     Blood Pressure Pulse Temperature Respirations Height Oxygen Saturation    134/80 mmHg 65 36.9 °C (98.4 °F) 16 1.88 m (6' 2.02\") 95%    Smoking Status                   Never Smoker            Basic Information     Date Of Birth Sex Race Ethnicity Preferred Language    1945 Male White Non- English      Your appointments     Sep 11, 2017 10:20 AM   FOLLOW UP with Kade Ovalle M.D.   [facility] (--)    [address] [place] [address] [address]  [facility] [address]   [phone]              Problem List              ICD-10-CM Priority Class Noted - Resolved    History of stroke Z86.73 Medium  2012 - Present    HTN (hypertension) I10 High  2012 - Present    Carotid atherosclerosis I65.29 High  2012 - Present    CAD (coronary artery disease) I25.10 High  10/30/2013 - Present    Hyperlipidemia E78.5 High  10/30/2013 - Present    Stented coronary artery Z95.5 High  2013 - Present    Dysphagia R13.10   2014 - Present    Type 2 diabetes mellitus with peripheral neuropathy (CMS-HCC) E11.42   " 9/24/2014 - Present    Hereditary and idiopathic peripheral neuropathy G60.9   9/24/2014 - Present    Vitamin D deficiency E55.9   11/9/2015 - Present    Seasonal allergies J30.2   11/9/2015 - Present    Obesity (BMI 30.0-34.9) E66.9   11/9/2015 - Present    Agent orange exposure Z77.098   1/14/2016 - Present    Right ankle pain M25.571   9/22/2016 - Present    Benign non-nodular prostatic hyperplasia with lower urinary tract symptoms N40.1   1/30/2017 - Present    Pneumonia of right lower lobe due to infectious organism J18.1   3/30/2017 - Present    Impacted cerumen of right ear H61.21   5/16/2017 - Present      Health Maintenance        Date Due Completion Dates    IMM DTaP/Tdap/Td Vaccine (1 - Tdap) 2/25/1966 ---    COLONOSCOPY 2/25/1997 ---    IMM ZOSTER VACCINE 2/25/2007 ---    IMM PNEUMOCOCCAL 65+ (ADULT) LOW/MEDIUM RISK SERIES (1 of 2 - PCV13) 2/25/2012 ---    DIABETES MONOFILAMENT / LE EXAM 3/18/2016 3/18/2015 (N/S), 3/10/2014    Override on 3/18/2015: (N/S)    A1C SCREENING 11/15/2017 5/15/2017, 1/27/2017, 9/22/2016, 6/20/2016, 3/19/2016, 3/19/2016, 9/23/2015, 3/18/2015, 9/24/2014, 9/22/2014, 11/15/2013, 1/29/2012, 6/14/2010    RETINAL SCREENING 1/25/2018 1/25/2017, 10/12/2015, 10/6/2014, 8/16/2013, 6/29/2012    FASTING LIPID PROFILE 5/15/2018 5/15/2017, 1/27/2017, 6/20/2016, 3/19/2016, 10/8/2015, 9/22/2014, 11/15/2013, 1/29/2012, 6/14/2010    URINE ACR / MICROALBUMIN 5/15/2018 5/15/2017, 1/27/2017, 6/20/2016, 3/19/2016, 9/21/2015, 10/22/2012    SERUM CREATININE 5/15/2018 5/15/2017, 1/27/2017, 9/5/2016, 6/20/2016, 3/19/2016, 10/8/2015, 3/30/2015, 3/16/2015, 9/22/2014, 11/15/2013, 2/20/2013, 1/31/2013, 10/22/2012, 1/29/2012, 1/28/2012, 7/14/2010, 7/6/2010, 6/14/2010, 6/13/2010, 4/9/2007, 1/23/2007            Current Immunizations     No immunizations on file.      Below and/or attached are the medications your provider expects you to take. Review all of your home medications and newly ordered medications  PAST MEDICAL HISTORY:  HTN (hypertension)      with your provider and/or pharmacist. Follow medication instructions as directed by your provider and/or pharmacist. Please keep your medication list with you and share with your provider. Update the information when medications are discontinued, doses are changed, or new medications (including over-the-counter products) are added; and carry medication information at all times in the event of emergency situations     Allergies:  FISH - Hives     PCN - Hives     AMOXICILLIN - Hives     FOOD - Swelling     HORSE ALLERGY - (reactions not documented)     INFLUENZA VACCINES - (reactions not documented)               Medications  Valid as of: May 16, 2017 -  2:13 PM    Generic Name Brand Name Tablet Size Instructions for use    Aspirin (Tab) aspirin 81 MG Take 81 mg by mouth every day.        Atorvastatin Calcium (Tab) LIPITOR 80 MG Take 1 Tab by mouth every day.        Blood Glucose Monitoring Suppl (Misc) Blood Glucose Monitoring Suppl SUPPLIES Accu Check Yoanna blood glucose test strips.  Checking blood sugars 2 times per day E11.40        Blood Glucose Monitoring Suppl (Kit) FREESTYLE FREEDOM LITE W/DEVICE 1 Application by Does not apply route every day. Dx: E11.42        Blood Glucose Monitoring Suppl (Misc) Blood Glucose Monitoring Suppl SUPPLIES Test strips order: Test strips for Abbott Freestyle Lite meter. Sig: use once daily. Dx: E11.42        Carvedilol (Tab) COREG 12.5 MG Take 1 Tab by mouth 2 times a day, with meals.        Cholecalciferol (Tab) Vitamin D-3 5000 UNITS Take 1 Tab by mouth every day.          HydroCHLOROthiazide (Cap) MICROZIDE 12.5 MG TAKE ONE CAPSULE BY MOUTH ONE TIME DAILY        Ibuprofen (Tab) MOTRIN 200 MG Take 200 mg by mouth every 6 hours as needed.        Lancets (Misc) Lancets  Lancets order: Lancets for Abbott Freestyle Lite meter. Sig: use once daily. Dx: E11.42        Lisinopril (Tab) PRINIVIL 20 MG Take 1 Tab by mouth every day.        MetFORMIN HCl (Tab) GLUCOPHAGE 500 MG Take 1  Tab by mouth 3 times a day, with meals.        Nitroglycerin (SL Tab) NITROSTAT 0.4 MG Place 1 Tab under tongue as needed for Chest Pain.        Pioglitazone HCl (Tab) ACTOS 45 MG Take 1 Tab by mouth every day.        .                 Medicines prescribed today were sent to:     [id number] - [place] - [address] [place] [address]    Phone: [phone] Fax: [phone]    Open 24 Hours?: No      Medication refill instructions:       If your prescription bottle indicates you have medication refills left, it is not necessary to call your provider’s office. Please contact your pharmacy and they will refill your medication.    If your prescription bottle indicates you do not have any refills left, you may request refills at any time through one of the following ways: The online [employer] system (except Urgent Care), by calling your provider’s office, or by asking your pharmacy to contact your provider’s office with a refill request. Medication refills are processed only during regular business hours and may not be available until the next business day. Your provider may request additional information or to have a follow-up visit with you prior to refilling your medication.   *Please Note: Medication refills are assigned a new Rx number when refilled electronically. Your pharmacy may indicate that no refills were authorized even though a new prescription for the same medication is available at the pharmacy. Please request the medicine by name with the pharmacy before contacting your provider for a refill.        Your To Do List     Future Labs/Procedures Complete By Expires    COMP METABOLIC PANEL  As directed 5/17/2018    HEMOGLOBIN A1C  As directed 5/17/2018    LIPID PROFILE  As directed 5/17/2018    MICROALBUMIN CREAT RATIO URINE  As directed 5/17/2018    PROSTATE SPECIFIC AG SCREENING  As directed 5/17/2018    VITAMIN D,25 HYDROXY  As directed 5/17/2018         [employer] Access Code: Activation  code not generated  Current MyChart Status: Active

## 2023-08-12 NOTE — THERAPY
"Occupational Therapy   Initial Evaluation     Patient Name: Agapito Stone  Age:  75 y.o., Sex:  male  Medical Record #: [id number]  Today's Date: 9/1/2020       Precautions: Fall Risk, Non Weight Bearing Right Lower Extremity  Comments: POD 7 R cavus foot reconstrucion and ankle fusion    Assessment  Patient is 75 y.o. male with a diagnosis of GLF & elevated troponins.  Pt had right cavus foot reconstruction & ankle fusion 7 days ago & is NWB RLE.  Additional factors influencing patient status / progress: Pt is significantly limited by his recent ankle sx.  Pt did not appear to be adequately prepared to D/C home after his ankle sx.  Pt stated it was a same day sx & he was sent home without any AE (crutches/FWW)  Pt will need to become a lot more independent prior to D/C as he has a tri level home.      Plan    Recommend Occupational Therapy 3 times per week until therapy goals are met for the following treatments:  Adaptive Equipment, Neuro Re-Education / Balance, Self Care/Activities of Daily Living, Therapeutic Activities and Therapeutic Exercises.    DC Equipment Recommendations: Unable to determine at this time  Discharge Recommendations: Recommend post-acute placement for additional occupational therapy services prior to discharge home     Subjective    \"I haven't had a BM since my ankle sx\"     Objective       09/01/20 1238   Prior Living Situation   Prior Services None   Housing / Facility 3 Story House   Steps Into Home 1   Steps In Home   (tri level home)   Bathroom Set up Walk In Shower;Bathtub / Shower Combination  (all showers are on the 2nd & 3rd floors)   Equipment Owned 4-Wheel Walker   Lives with - Patient's Self Care Capacity Spouse   Comments Pt lives with his wife.  Pt has not been functioning very well since his ankle/foot sx   Pain 0 - 10 Group   Location Ankle   Location Orientation Right;Lower   Therapist Pain Assessment During Activity;Nurse Notified;7  (pt reports increased pain RLE " Physical Therapy Discharge Summary    Reason for therapy discharge:    Discharged to home.    Progress towards therapy goal(s). See goals on Care Plan in [facility] electronic health record for goal details.  Goals partially met.  Barriers to achieving goals:   discharge from facility.    Therapy recommendation(s):    Continued therapy is recommended.  Rationale/Recommendations:  Recommend continued PT in order to address balance deficits and improve safety and independence with mobility.       since his fall at home)   Cognition    Cognition / Consciousness X   Level of Consciousness Alert   Safety Awareness Impaired   New Learning Impaired   Comments pt presents with an odd affect.  Pt has poor insight into his deficits & did not function well at home after his sx.  Highly suspect pt has been WB on his RLE at home   Strength Upper Body   Gross Strength Generalized Weakness, Equal Bilaterally.    Comments pt lacks the BUE strength needed to maintain NWB  RLE for transfers   Balance Assessment   Sitting Balance (Static) Good   Sitting Balance (Dynamic) Fair +   Standing Balance (Static) Poor   Standing Balance (Dynamic) Trace +   Weight Shift Sitting Fair   Weight Shift Standing Absent   ADL Assessment   Eating Modified Independent   Grooming Minimal Assist;Seated   Bathing Moderate Assist   Upper Body Dressing Minimal Assist   Lower Body Dressing Maximal Assist   Toileting Maximal Assist   Functional Mobility   Sit to Stand Moderate Assist   Bed, Chair, Wheelchair Transfer Maximal Assist   Transfer Method Squat Pivot   Patient / Family Goals   Patient / Family Goal #1 To get my strength back   Short Term Goals   Short Term Goal # 1 Pt will be Min A for ADL transfers   Short Term Goal # 2 Pt will maintain NWB RLE during transfers   Short Term Goal # 3 Pt will groom seated with set up

## 2023-10-12 NOTE — PROGRESS NOTES
Nephrology Progress Note      Subjective     10/10/2023  Patient still short of breath blood pressures running towards higher side still have swelling    10/11/2023  Patient still wheezing still short of breath blood pressure is better swelling is still there and wants to go home    10/12/2023  Patient still wheezing but is feeling better denies any shortness of breath had diarrhea also no constipation    Objective       Vital signs :     Vitals:    10/12/23 0027 10/12/23 0300 10/12/23 0500 10/12/23 0556   BP:  154/73  178/98   BP Location:  Left arm  Left arm   Patient Position:  Sitting  Sitting   Pulse: 74 76     Resp: 20 20     Temp:  98.2 °F (36.8 °C)     TempSrc:  Oral     SpO2: 97% 95%     Weight:   109 kg (241 lb 4.8 oz)    Height:            Intake/Output                         10/10/23 0701 - 10/11/23 0700 10/11/23 0701 - 10/12/23 0700     [id number] 2601-7544 Total 6698-5509 3735-6134 Total                 Intake    P.O.  480  -- 480  600  -- 600    Total Intake 480 -- 480 600 -- 600       Output    Total Output -- -- -- -- -- --           10/10/2023 examined and reviewed  10/11/2023 examined and reviewed  10/12/2023 examined and reviewed    Physical Exam:    General Appearance : alert, pleasant, appears stated age, cooperative   Head  :  normocephalic, without obvious abnormality and atraumatic  Eyes  :  conjunctivae and sclerae normal, no icterus, no pallor and PERRLA  Neck  :  no adenopathy, suppple, no carotid bruit, no JVD   Lungs : Bilateral wheezes  Heart :  regular rhythm & normal rate, normal S1, S2 and no murmur, no rub  Abdomen : normal bowel sounds, no masses, no hepatomegaly, no splenomegaly, soft non-tender and no guarding  Extremities : moves extremities well, 2 plus  edema, no cyanosis and no redness  Skin :  no bleeding, bruising or rash  Neurologic :   orientated to person, place, time and situation, Grossly no focal deficits  Acess :     Laboratory Data :       CBC and  Report given to STEVEN Bethea. Dx, persistent positive Covid results, discharge plan, NWB RLE, pending ortho cast removal and plan of care reviewed. Pt currently 97% on 1L NC. No acute needs at this time. Care fully relinquished.   coagulation:  Results from last 7 days   Lab Units 10/12/23  0057 10/09/23  2326 10/09/23  0132 10/08/23  2108 10/08/23  1923 10/08/23  1709   PROCALCITONIN ng/mL  --   --   --  0.10  --   --    LACTATE mmol/L  --   --   --   --   --  0.9   CRP mg/dL  --   --   --   --  5.41*  --    WBC 10*3/mm3 7.34 8.19 7.35  --   --  7.38   HEMOGLOBIN g/dL 9.5* 11.5* 11.6*  --   --  11.0*   HEMATOCRIT % 31.9* 37.3 37.2  --   --  36.2   MCV fL 90.1 88.2 87.9  --   --  88.9   MCHC g/dL 29.8* 30.8* 31.2*  --   --  30.4*   PLATELETS 10*3/mm3 269 267 233  --   --  289     Acid/base balance:  Results from last 7 days   Lab Units 10/08/23  1707   PH, ARTERIAL pH units 7.363   PO2 ART mm Hg 53.5*   PCO2, ARTERIAL mm Hg 36.5   HCO3 ART mmol/L 20.7     Renal and electrolytes:    Results from last 7 days   Lab Units 10/12/23  0057 10/11/23  1156 10/11/23  0525 10/10/23  1420 10/09/23  2325 10/09/23  0754 10/09/23  0132 10/08/23  1709   SODIUM mmol/L 134* 134* 136 137 136   < > 139 138   POTASSIUM mmol/L 5.8* 5.5* 5.9* 5.6* 5.6*   < > 6.2* 5.9*   MAGNESIUM mg/dL  --   --   --   --   --   --  2.0 1.9   CHLORIDE mmol/L 101 101 101 105 103   < > 105 107   CO2 mmol/L 23.6 21.9* 23.9 20.8* 22.4   < > 21.1* 20.8*   BUN mg/dL 46* 39* 38* 33* 30*   < > 29* 27*   CREATININE mg/dL 5.01* 4.70* 4.71* 4.06* 4.07*   < > 3.98* 3.61*   CALCIUM mg/dL 9.5 9.5 9.4 8.7 9.1   < > 9.0 9.0    < > = values in this interval not displayed.     Estimated Creatinine Clearance: 12.5 mL/min (A) (by C-G formula based on SCr of 5.01 mg/dL (H)).     Liver and pancreatic function:  Results from last 7 days   Lab Units 10/12/23  0057 10/11/23  0525 10/09/23  2325   ALBUMIN g/dL 3.9 3.8 3.2*   BILIRUBIN mg/dL 0.3 0.3 0.3   ALK PHOS U/L 140* 136* 132*   AST (SGOT) U/L 15 13 13   ALT (SGPT) U/L 7 6 <5       Albumin Albumin   Date Value Ref Range Status   10/12/2023 3.9 3.5 - 5.2 g/dL Final   10/11/2023 3.8 3.5 - 5.2 g/dL Final   10/09/2023 3.2 (L) 3.5 - 5.2 g/dL Final     "  Magnesium No results found for: \"MG\"         PTH               No results found for: \"PTH\"    Cardiac:  Results from last 7 days   Lab Units 10/08/23  1709   PROBNP pg/mL 8,093.0*     Liver and pancreatic function:  Results from last 7 days   Lab Units 10/12/23  0057 10/11/23  0525 10/09/23  2325   ALBUMIN g/dL 3.9 3.8 3.2*   BILIRUBIN mg/dL 0.3 0.3 0.3   ALK PHOS U/L 140* 136* 132*   AST (SGOT) U/L 15 13 13   ALT (SGPT) U/L 7 6 <5       XR Chest 1 View    Result Date: 10/10/2023  1.  Bilateral airspace disease consistent with pneumonia. 2.  CHF/edema.   This report was finalized on 10/10/2023 11:55 AM by Dr. Eduardo August MD.      CT Chest Without Contrast Diagnostic    Result Date: 10/10/2023  1.  CHF/edema. 2.  Small right and very small left pleural effusions which are nonloculated. 3.  Bilateral consolidative airspace disease as detailed above consistent with pneumonia. 4.  Small pericardial effusion. 5.  Cardiomegaly with severe coronary artery calcifications. 6.  Mediastinal and hilar adenopathy. May be reactive but follow-up recommended to exclude neoplastic etiologies. 7.  Liver cirrhosis. 8.  Anasarca. 9.  Other nonacute findings above.   This report was finalized on 10/10/2023 11:55 AM by Dr. Eduardo August MD.      US Renal Bilateral    Result Date: 10/9/2023   SMALL AND ECHOGENIC RIGHT KIDNEY SUGGESTING A UNILATERAL PROCESS SUCH AS RENAL ARTERY STENOSIS.  NORMAL LEFT KIDNEY.  NEGATIVE FOR HYDRONEPHROSIS.    This report was finalized on 10/9/2023 5:49 AM by Imelda Walsh MD.      XR Chest 1 View    Result Date: 10/8/2023  Trace right effusion and right basilar airspace disease    This report was finalized on 10/8/2023 7:17 PM by Dr. Julio Dominguez MD.        Medications :     aspirin, 325 mg, Oral, Daily  azithromycin, 500 mg, Intravenous, Q24H  carvedilol, 25 mg, Oral, BID With Meals  folic acid, 1 mg, Oral, Daily  heparin (porcine), 5,000 Units, Subcutaneous, Q12H  hydrALAZINE, 25 mg, Oral, " Q8H  insulin regular, 5 Units, Intravenous, Once  ipratropium-albuterol, 3 mL, Nebulization, 4x Daily - RT  lactulose, 10 g, Oral, Once  levothyroxine, 88 mcg, Oral, Q AM  montelukast, 10 mg, Oral, Nightly  nicotine, 1 patch, Transdermal, Q24H  pantoprazole, 40 mg, Oral, Daily  predniSONE, 40 mg, Oral, Daily With Breakfast  rosuvastatin, 10 mg, Oral, Nightly  senna-docusate sodium, 2 tablet, Oral, BID  sodium chloride, 10 mL, Intravenous, Q12H  sodium zirconium cyclosilicate, 10 g, Oral, Once  vitamin B-12, 1,000 mcg, Oral, Daily             Assessment & Plan     -Acute kidney injury  -Atrophic right kidney  -Chronic kidney disease stage IIIa  -Acute hyperkalemia  -Bilateral edema  -Acute hypoxic respite failure  -COPD  -Hypertension  -Medical noncompliance       10/09/23  Patient baseline creatinine is unknown we will get the records came in with a creatinine of 3.69 is down to 3.6 patient is short of breath we will give 1 dose of Lasix we will check urine protein creatinine ratio, ultrasound of the kidneys showed atrophic right kidney     Patient's bilateral swelling is most likely multifactorial secondary to diastolic congestive heart failure with pulmonary hypertension also will give 1 dose of Lasix today     Hyperkalemia most likely secondary to lisinopril and decreased GFR we will hold lisinopril and start the patient on Lokelma      10/10/2023  Patient's creatinine is 4.07 from 3.6, patient got 1 dose of Lasix as patient was very short of breath, ultrasound of the kidney showed atrophic right kidney can be secondary to renal artery stenosis but I do not think this is causing her kidney function to get worse acutely, patient's proteinuria is only 413 mg, will check serology, hold diuretics give 1 dose of albumin , continue to hold lisinopril    Continue Lokelma for hyperkalemia    Will increase hydralazine to 25 mg 3 times a day    Patient's anasarca is most likely secondary to diastolic congestive heart  failure and pulmonary hypertension PA pressure around 45    10/11/2023  Patient's baseline creatinine is unknown and is rising 4.7 from 4.0 from 3.6, will continue to hold Lasix ultrasound of the kidney showed atrophic right kidney proteinuria of 430 mg serologies pending, will continue to hold lisinopril give 1 dose of albumin  We will give Kayexalate and lactulose for hyperkalemia and counseled the patient on low potassium diet  Patient is high risk for dialysis  Blood pressures well controlled    10/12/2023  Patient's baseline creatinine last year was 2.2 is rising up to 5.0 ultrasound of the kidney showed atrophic right kidney proteinuria 430 mg serologies are pending, clinically patient is not in CHF we will start patient on gentle hydration  We will continue Lokelma for hyperkalemia  Patient is high risk for dialysis  Complements are normal other serologies are pending      Prognosis guarded     Please avoid nephrotoxic medication and pharmacy to adjust the medication according to the GFR     Plan of care discussed with pt, answered all questions, patient verbalized understanding and agreed.      MERNA Steinberg MD  10/12/23  07:05 EDT

## 2024-01-11 NOTE — PROGRESS NOTES
Mom brought pre-auricular pit to our attention.  V Bleser into see and assess.  Referred to ENT at [facility]   Received report from [facility] shift RN. Pt is alert and oriented, sitting up in bed eating breakfast. Pt denies any pain at this time. Mepilex dressing observed to L heel/ankle, CDI. Wound vac observed to R foot, CDI on and running. Bed is locked and in lowest position, personal belongings and call light within reach.

## 2024-07-04 NOTE — TELEPHONE ENCOUNTER
Was the patient seen in the last year in this department? Yes    Does patient have an active prescription for medications requested? No     Received Request Via: Pharmacy   Patient is a 70y old  Female who presents with a chief complaint of mechanical fall (02 Jul 2024 10:45)    INTERVAL HPI/OVERNIGHT EVENTS:   Patient seen and examined bedside.   no overnight events       REVIEW OF SYSTEMS: remaining ROS negative     MEDICATIONS  (STANDING):  acetaminophen     Tablet .. 650 milliGRAM(s) Oral every 6 hours  azithromycin  IVPB 500 milliGRAM(s) IV Intermittent every 24 hours  budesonide  80 MICROgram(s)/formoterol 4.5 MICROgram(s) Inhaler 2 Puff(s) Inhalation two times a day  clonazePAM  Tablet 0.5 milliGRAM(s) Oral at bedtime  dextrose 10% Bolus 125 milliLiter(s) IV Bolus once  dextrose 5%. 1000 milliLiter(s) (100 mL/Hr) IV Continuous <Continuous>  dextrose 5%. 1000 milliLiter(s) (50 mL/Hr) IV Continuous <Continuous>  dextrose 50% Injectable 12.5 Gram(s) IV Push once  dextrose 50% Injectable 25 Gram(s) IV Push once  diltiazem    milliGRAM(s) Oral daily  enalapril 5 milliGRAM(s) Oral at bedtime  enoxaparin Injectable 60 milliGRAM(s) SubCutaneous every 12 hours  glucagon  Injectable 1 milliGRAM(s) IntraMuscular once  guaiFENesin  milliGRAM(s) Oral every 12 hours  insulin glargine Injectable (LANTUS) 12 Unit(s) SubCutaneous at bedtime  insulin lispro (ADMELOG) corrective regimen sliding scale   SubCutaneous at bedtime  insulin lispro (ADMELOG) corrective regimen sliding scale   SubCutaneous three times a day before meals  insulin lispro Injectable (ADMELOG) 4 Unit(s) SubCutaneous three times a day before meals  metoprolol succinate ER 50 milliGRAM(s) Oral at bedtime  piperacillin/tazobactam IVPB.. 3.375 Gram(s) IV Intermittent every 12 hours  polyethylene glycol 3350 17 Gram(s) Oral at bedtime  pregabalin 200 milliGRAM(s) Oral at bedtime  senna 2 Tablet(s) Oral at bedtime  sodium chloride 1 Gram(s) Oral two times a day  sodium chloride 0.9% Bolus 500 milliLiter(s) IV Bolus once  sodium chloride 0.9%. 500 milliLiter(s) (500 mL/Hr) IV Continuous <Continuous>    MEDICATIONS  (PRN):  acetaminophen     Tablet .. 650 milliGRAM(s) Oral every 6 hours PRN Temp greater or equal to 38C (100.4F), Mild Pain (1 - 3)  albuterol    90 MICROgram(s) HFA Inhaler 2 Puff(s) Inhalation every 6 hours PRN Shortness of Breath and/or Wheezing  aluminum hydroxide/magnesium hydroxide/simethicone Suspension 30 milliLiter(s) Oral every 4 hours PRN Dyspepsia  benzonatate 100 milliGRAM(s) Oral every 8 hours PRN Cough  dextrose Oral Gel 15 Gram(s) Oral once PRN Blood Glucose LESS THAN 70 milliGRAM(s)/deciliter  magnesium hydroxide Suspension 30 milliLiter(s) Oral daily PRN Constipation  melatonin 3 milliGRAM(s) Oral at bedtime PRN Insomnia  morphine  - Injectable 0.5 milliGRAM(s) IV Push every 6 hours PRN Moderate Pain (4 - 6)  morphine  - Injectable 1 milliGRAM(s) IV Push every 6 hours PRN Severe Pain (7 - 10)  ondansetron Injectable 4 milliGRAM(s) IV Push every 8 hours PRN Nausea and/or Vomiting  ondansetron Injectable 4 milliGRAM(s) IV Push every 6 hours PRN Nausea and/or Vomiting  oxyCODONE    IR 2.5 milliGRAM(s) Oral every 4 hours PRN Moderate Pain (4 - 6)  oxyCODONE    IR 5 milliGRAM(s) Oral every 4 hours PRN Severe Pain (7 - 10)  traMADol 25 milliGRAM(s) Oral every 6 hours PRN Mild Pain (1 - 3)      Allergies    No Known Allergies    Intolerances        Vital Signs Last 24 Hrs  T(C): 36.5 (04 Jul 2024 17:19), Max: 36.8 (03 Jul 2024 23:54)  T(F): 97.7 (04 Jul 2024 17:19), Max: 98.2 (03 Jul 2024 23:54)  HR: 84 (04 Jul 2024 17:19) (71 - 104)  BP: 163/83 (04 Jul 2024 17:19) (117/65 - 175/96)  BP(mean): --  RR: 18 (04 Jul 2024 17:19) (18 - 20)  SpO2: 94% (04 Jul 2024 19:45) (92% - 100%)    Parameters below as of 04 Jul 2024 19:45  Patient On (Oxygen Delivery Method): nasal cannula  O2 Flow (L/min): 3          PHYSICAL EXAM:  GENERAL: NAD, thin appearing, no increased WOB, speaking in full sentences, not using accessory muscles   HEAD:  Atraumatic, Normocephalic  EYES: EOMI, PERRLA, conjunctiva and sclera clear  ENMT: No tonsillar erythema, exudates, or enlargement; Moist mucous membranes,    NECK: Supple, No JVD,    NERVOUS SYSTEM:  Alert & Oriented X3, Good concentration; nonfocal   CHEST/LUNG: good b/l air entry, b/l course BS   HEART: Regular rate and rhythm; No murmurs, rubs, or gallops  ABDOMEN: Soft, Nontender, Nondistended; Bowel sounds present  EXTREMITIES:  2+ Peripheral Pulses b/l, No clubbing, cyanosis, calf tenderness or edema b/l          LABS:                                              11.1   5.15  )-----------( 337      ( 04 Jul 2024 06:07 )             35.5   07-04    133<L>  |  98  |  16  ----------------------------<  134<H>  3.4<L>   |  33<H>  |  0.70    Ca    8.7      04 Jul 2024 06:07  Phos  2.5     07-04  Mg     1.9     07-04    TPro  5.3<L>  /  Alb  1.3<L>  /  TBili  0.3  /  DBili  x   /  AST  10<L>  /  ALT  10<L>  /  AlkPhos  88  07-03          Urinalysis Basic - ( 02 Jul 2024 05:58 )    Color: x / Appearance: x / SG: x / pH: x  Gluc: 272 mg/dL / Ketone: x  / Bili: x / Urobili: x   Blood: x / Protein: x / Nitrite: x   Leuk Esterase: x / RBC: x / WBC x   Sq Epi: x / Non Sq Epi: x / Bacteria: x        RADIOLOGY & ADDITIONAL TESTS:    Imaging Personally Reviewed:  [ ] YES  [ ] NO    Consultant(s) Notes Reviewed:  [ x] YES  [ ] NO    Care Discussed with Consultants/Other Providers [ x] YES  [ ] NO  Care plan and all findings were discussed in detail with patient.  All questions and concerns addressed

## 2025-07-16 NOTE — TELEPHONE ENCOUNTER
"Spoke with patient's wife Simran and advised that the patient's prescription was for 100 days on 12/26/19 and asked if the medication is being taken as prescribed because according to the prescription, the patient should have enough medication through April 6, 2020.  It appears that Simran is very confused.  She reports that the patient is \"very cautious\" and will even hold his medication if his blood pressure is in the low 100s.      Advised Simran that a new prescription can be sent to the pharmacy but insurance most likely will not cover it.  She asked how much the out-of-pocket cost will be.  Advised that she will need to contact her pharmacy for pricing.    Simran verbalized understanding of the situation.  100-day supply reordered with no refills at this time.  Patient needs to be seen in-office for additional refills.  FV will be re-assessed when appropriate in light of the ongoing Coronavirus precautions.  " Physical Therapy Evaluation    Visit Type: Initial Evaluation  Visit: 1  Referring Provider: Nate Salmeron MD  Medical Diagnosis (from order): M79.673 - Pain of foot, unspecified laterality   Treatment Diagnosis: left ankle - increased pain/symptoms, impaired posture, impaired range of motion, impaired muscle length/flexibility, impaired tissue/wound healing, impaired joint play/mobility, impaired mobility, impaired balance, impaired motor function/performance/coordination, impaired gait, increased swelling and impaired strength.  Chart reviewed at time of initial evaluation (relevant co-morbidities, allergies, tests and medication listed):   Refer to Smart chart    SUBJECTIVE                                                                                                               Patient reports physical therapy today with complaints of left lateral leg pain.  Patient stated over the winter he was training for a marathon which she was going to perform in May of this year however pains started during a longer run and at 10 miles sharp pain kicked in through the lateral lower calf region.  Patient finished his run but had to dissipate and has discontinued running at this time secondary to the pains.  Patient has not seen anybody specific for it but felt he should start to take care of it since its already been approximately 6 months of irritation.  Presently pains are with increased weightbearing ADLs, prolonged walking and/or running.    Pain / Symptoms  - Pain rating (out of 10): Current: 2 ; Best: 1; Worst: 4  - Location: Left lower lateral gastrocsoleus musculature  - Quality / Description: sore, shooting, sharp, burning, throbbing, tight, stiff  - Alleviating Factors: ice, rest  - Progression since onset: worsening    Function:   Limitations / Exacerbation Factors:   - Patient reports pain and difficulty with function reported below.  - , community distances, stairs, walking quickly as required to cross a  street/exit a building rapidly  Prior Level of Function: pain free ADLs and IADLs,    Patient Goals: decreased pain, improved balance, increased motion, increased strength and independence with ADLs/IADLs.    Prior treatment  - no therapies  - Discharged from hospital, home health, or skilled nursing facility in last 30 days: no  Home Environment   - Patient lives with: significant other  - Type of home: multiple level home  - Assistance available: consistent  - Denies 2 or more falls or an unexplained fall with injury in the last year.  - Feel safe at home / work / school: yes      OBJECTIVE                                                                                                                    Observation   Bilateral pes planus  Bilateral pronation at mid stance during gait cycle    Range of Motion (ROM)   (degrees unless noted; active unless noted; norms in ( ); negative=lacking to 0, positive=beyond 0)  Ankle:   - Dorsiflexion (20):       Left:   WNL       Right:   WNL    - Plantar Flexion (45-50):       Left:   WNL       Right:   WNL   - Inversion (30-35):      Left: WNL      Right: WNL   - Eversion (15-20):      Left: WNL      Right: WNL  First MTP (Hallux):    - Extension (50-70):         Left: WNL         Right: WNL    - Flexion (30-45):         Left:  WNL         Right: WNL    Strength  (out of 5 unless noted, standard test position unless noted)   Ankle:    - Dorsiflexion:         Left: 4-         Right: 4+    - Plantar Flexion:         Left: 3+         Right: 5    - Inversion:         Left: 3+          Right: 4+    - Eversion:         Left: 4          Right: 4         Palpation  Significant palpable tightness and tenderness through the left lower gastroc and soleus musculature region slightly into the peroneals                  Outcome/Assessments  Outcome Measures:   Lower Extremity Functional Scale: LEFS Calculated Total: 66 (0=extreme difficulty; 80=no difficulty) see flowsheet for additional  documentation        Treatment     Therapeutic Exercise  Patient education on anatomy and pathology  Patient education on anti-inflammatory control  Patient education on initial home exercise program    Manual Therapy   Soft tissue mobilization and clearance for the left lower lateral gastroc and soleus musculature including peroneal muscules    Skilled input: verbal instruction/cues and demonstration    Writer verbally educated and received verbal consent for hand placement, positioning of patient, and techniques to be performed today from patient   Home Exercise Program  *above indicates provided as part of home exercise program      ASSESSMENT                                                                                                          45 year old patient has reported functional limitations listed above impacted by signs and symptoms consistent with treatment diagnosis below.  Treatment Diagnosis:   - Involved: left ankle.  - Symptoms/impairments: increased pain/symptoms, impaired posture, impaired range of motion, impaired muscle length/flexibility, impaired tissue/wound healing, impaired joint play/mobility, impaired mobility, impaired balance, impaired motor function/performance/coordination, impaired gait, increased swelling and impaired strength.    Feel patient presents with a prolonged strain and tightness of the left lateral gastroc and soleus musculature  Pain/symptoms after session (out of 10): 3    Prognosis: Patient will benefit from skilled therapy.  Rehabilitative potential is: good.  Predicted patient presentation: Low (stable) - Patient comorbidities and complexities, as defined above, will have little effect on progress for prescribed plan of care.  Education:   - Present and ready to learn: patient  - Present and not ready to learn: patient  - Results of above outlined education: Verbalizes understanding    PLAN                                                                                                                          The following skilled interventions to be implemented to achieve goals listed below:  Neuromuscular Re-Education (77103)  Therapeutic Activity (39860)  Therapeutic Exercise (50313)  Manual Therapy (13575)  Heat/Cold (07796)  Electrical Stimulation Unattended (98678 or )  Ultrasound (72570)    Frequency / Duration  1 times per week tapering as patient progresses for 8 weeks for an estimated total of 8 visits    Patient involved in and agreed to plan of care and goals.  Patient given attendance policy at time of initial evaluation.    Suggestions for next session as indicated: Progress per plan of care    Goals  Long Term Goals: to be met by end of plan of care  1. Patient will demonstrate ability to react to change of direction during simulation 5/5 reps in clinic without increased pain or instability to return to age appropriate and community activities at prior level of function.  2. Patient will participate in 75 cumulative minutes of vigorous level physical activity (16 and above on 20 point Seamus scale) over the past week, to resume participation in age appropriate activities.  3. Patient will ascend and descend one flight of stairs (12 steps or more) with reciprocal pattern while carrying at least 10 lbs basket of laundry to resume independent home management.  4. Lower Extremity Functional Scale: Patient will score 75 or higher on The Lower Extremity Functional Scale to indicate a decreased level of difficulty with walking and moving around. (minimal clinically important difference: 9 points change)          Therapy procedure time and total treatment time can be found documented on the Time Entry flowsheet

## (undated) DEVICE — PACK TOTAL KNEE  (1/CA)

## (undated) DEVICE — SODIUM CHL IRRIGATION 0.9% 1000ML (12EA/CA)

## (undated) DEVICE — SUTURE 0 VICRYL PLUS CT-1 - 8 X 18 INCH (12/BX)

## (undated) DEVICE — ELECTRODE DUAL RETURN W/ CORD - (50/PK)

## (undated) DEVICE — SET EXTENSION WITH 2 PORTS (48EA/CA) ***PART #2C8610 IS A SUBSTITUTE*****

## (undated) DEVICE — Device

## (undated) DEVICE — SENSOR SPO2 NEO LNCS ADHESIVE (20/BX) SEE USER NOTES

## (undated) DEVICE — SUCTION INSTRUMENT YANKAUER BULBOUS TIP W/O VENT (50EA/CA)

## (undated) DEVICE — PACK LOWER EXTREMITY - (2/CA)

## (undated) DEVICE — KIT ANESTHESIA W/CIRCUIT & 3/LT BAG W/FILTER (20EA/CA)

## (undated) DEVICE — DRAPE LARGE 3 QUARTER - (20/CA)

## (undated) DEVICE — CANISTER SUCTION 3000ML MECHANICAL FILTER AUTO SHUTOFF MEDI-VAC NONSTERILE LF DISP  (40EA/CA)

## (undated) DEVICE — BANDAGE ELASTIC STERILE VELCRO 6 X 5 YDS (25EA/CA)

## (undated) DEVICE — ELECTRODE 850 FOAM ADHESIVE - HYDROGEL RADIOTRNSPRNT (50/PK)

## (undated) DEVICE — TUBE E-T HI-LO CUFF 8.0MM (10EA/PK)

## (undated) DEVICE — SUTURE O FIBERWIRE - (12/BX)

## (undated) DEVICE — TOWELS CLOTH SURGICAL - (4/PK 20PK/CA)

## (undated) DEVICE — GLOVE SZ 7.5 BIOGEL PI MICRO - PF LF (50PR/BX)

## (undated) DEVICE — SLEEVE, VASO, REPROC, LARGE

## (undated) DEVICE — DRAPE C ARMOR (12EA/CA)

## (undated) DEVICE — CHLORAPREP 26 ML APPLICATOR - ORANGE TINT(25/CA)

## (undated) DEVICE — WATER IRRIGATION STERILE 1000ML (12EA/CA)

## (undated) DEVICE — GLOVE BIOGEL INDICATOR SZ 6.5 SURGICAL PF LTX - (50PR/BX 4BX/CA)

## (undated) DEVICE — NEPTUNE 4 PORT MANIFOLD - (20/PK)

## (undated) DEVICE — MASK ANESTHESIA ADULT  - (100/CA)

## (undated) DEVICE — BLADE SURGICAL #15 - (50/BX 3BX/CA)

## (undated) DEVICE — GOWN WARMING STANDARD FLEX - (30/CA)

## (undated) DEVICE — LACTATED RINGERS INJ 1000 ML - (14EA/CA 60CA/PF)

## (undated) DEVICE — GOWN SURGEONS X-LARGE - DISP. (30/CA)

## (undated) DEVICE — PAD LAP STERILE 18 X 18 - (5/PK 40PK/CA)

## (undated) DEVICE — SET LEADWIRE 5 LEAD BEDSIDE DISPOSABLE ECG (1SET OF 5/EA)

## (undated) DEVICE — GLOVE BIOGEL PI INDICATOR SZ 8.0 SURGICAL PF LF -(50/BX 4BX/CA)

## (undated) DEVICE — SPONGE GAUZE STER 4X4 8-PL - (2/PK 50PK/BX 12BX/CS)

## (undated) DEVICE — WRAP CO-FLEX 4IN X 5YD STERIL - SELF-ADHERENT (18/CA)

## (undated) DEVICE — SWAB ANAEROBIC SPEC.COLLECTOR - (25/PK 4PK/CA 100EA/CA)

## (undated) DEVICE — DRESSING 3X8 ADAPTIC GAUZE - NON-ADHERING (36/PK 6PK/BX)

## (undated) DEVICE — PROTECTOR ULNA NERVE - (36PR/CA)

## (undated) DEVICE — GLOVE BIOGEL ECLIPSE  PF LATEX SIZE 6.5 (50PR/BX)

## (undated) DEVICE — SUTURE 3-0 ETHILON FS-1 - (36/BX) 30 INCH

## (undated) DEVICE — DRAPE 36X28IN RAD CARM BND BG - (25/CA) O

## (undated) DEVICE — TUBE, CULTURE AEROBIC

## (undated) DEVICE — GLOVE, LITE (PAIR)

## (undated) DEVICE — GOWN WARMING X-LARGE FLEX - (20/CA)

## (undated) DEVICE — DISPOSABLE WOUND VAC PICO 10 X 20 CM - WOUND CARE (3/CA)

## (undated) DEVICE — HEAD HOLDER JUNIOR/ADULT

## (undated) DEVICE — SUTURE 3-0 VICRYL PLUS SH - 8X 18 INCH (12/BX)

## (undated) DEVICE — BANDAGE ELASTIC LATEX STERILE VELCRO 4 X 5 YDS (25EA/CA)

## (undated) DEVICE — SUTURE 3-0 ETHILON PS-1 (36PK/BX)

## (undated) DEVICE — PADDING CAST 6 IN STERILE - 6 X 4 YDS (24/CA)

## (undated) DEVICE — DRESSING ABDOMINAL PAD STERILE 8 X 10" (360EA/CA)"

## (undated) DEVICE — TOURNIQUET CUFF 34 X 4 ONE PORT DISP - STERILE (10/BX)

## (undated) DEVICE — PAD PREP 24 X 48 CUFFED - (100/CA)

## (undated) DEVICE — SLEEVE, VASO, THIGH, MED

## (undated) DEVICE — STOCKINET BIAS 6 IN STERILE - (20/CA)

## (undated) DEVICE — TOWEL STOP TIMEOUT SAFETY FLAG (40EA/CA)

## (undated) DEVICE — TUBING CLEARLINK DUO-VENT - C-FLO (48EA/CA)

## (undated) DEVICE — SUTURE ETHILON 2-0 FSLX 30 (36PK/BX)"

## (undated) DEVICE — GLOVE BIOGEL SZ 8 SURGICAL PF LTX - (50PR/BX 4BX/CA)

## (undated) DEVICE — GLOVE BIOGEL ECLIPSE PF LATEX SIZE 8.0  (50PR/BX)

## (undated) DEVICE — PADDING CAST 4 IN STERILE - 4 X 4 YDS (24/CA)

## (undated) DEVICE — SUTURE GENERAL

## (undated) DEVICE — GLOVE BIOGEL SZ 7.5 SURGICAL PF LTX - (50PR/BX 4BX/CA)

## (undated) DEVICE — SUTURE 2-0 VICRYL PLUS CT-1 - 8 X 18 INCH(12/BX)

## (undated) DEVICE — SPLINT PLASTER 5 IN X 30 IN - (50EA/BX 6BX/CA)

## (undated) DEVICE — DRESSING PETROLEUM GAUZE 5 X 9" (50EA/BX 4BX/CA)"

## (undated) DEVICE — BANDAGE ELASTIC 6 IN X 5 YDS - LATEX FREE (10/BX)

## (undated) DEVICE — GLOVE BIOGEL SZ 7 SURGICAL PF LTX - (50PR/BX 4BX/CA)

## (undated) DEVICE — GLOVE BIOGEL INDICATOR SZ 8 SURGICAL PF LTX - (50/BX 4BX/CA)